# Patient Record
Sex: FEMALE | Race: WHITE | NOT HISPANIC OR LATINO | Employment: OTHER | URBAN - METROPOLITAN AREA
[De-identification: names, ages, dates, MRNs, and addresses within clinical notes are randomized per-mention and may not be internally consistent; named-entity substitution may affect disease eponyms.]

---

## 2017-10-05 PROBLEM — R13.10 DYSPHAGIA: Status: ACTIVE | Noted: 2017-10-05

## 2017-10-06 ENCOUNTER — OFFICE VISIT (OUTPATIENT)
Dept: DERMATOLOGY | Facility: CLINIC | Age: 67
End: 2017-10-06
Payer: MEDICARE

## 2017-10-06 VITALS — BODY MASS INDEX: 42.98 KG/M2 | WEIGHT: 235 LBS

## 2017-10-06 DIAGNOSIS — L30.4 INTERTRIGO: Primary | ICD-10-CM

## 2017-10-06 DIAGNOSIS — Z87.2 HISTORY OF ACTINIC KERATOSES: ICD-10-CM

## 2017-10-06 DIAGNOSIS — L82.1 SEBORRHEIC KERATOSES: ICD-10-CM

## 2017-10-06 DIAGNOSIS — L81.4 LENTIGINES: ICD-10-CM

## 2017-10-06 DIAGNOSIS — Z85.828 HISTORY OF SKIN CANCER: ICD-10-CM

## 2017-10-06 PROCEDURE — 99999 PR PBB SHADOW E&M-EST. PATIENT-LVL III: CPT | Mod: PBBFAC,,, | Performed by: DERMATOLOGY

## 2017-10-06 PROCEDURE — 99202 OFFICE O/P NEW SF 15 MIN: CPT | Mod: S$GLB,,, | Performed by: DERMATOLOGY

## 2017-10-06 RX ORDER — ECONAZOLE NITRATE 10 MG/G
CREAM TOPICAL
Qty: 85 G | Refills: 2 | Status: SHIPPED | OUTPATIENT
Start: 2017-10-06

## 2017-10-06 RX ORDER — ECONAZOLE NITRATE 10 MG/G
CREAM TOPICAL
Qty: 85 G | Refills: 2 | Status: SHIPPED | OUTPATIENT
Start: 2017-10-06 | End: 2017-10-06 | Stop reason: SDUPTHER

## 2017-10-06 NOTE — LETTER
October 6, 2017      Ratna Jefferson MD  [address] [facility] [address]           [place] - Dermatology  [address] [facility] [address]  Phone: [phone]  Fax: [phone]          Patient: Monique Trujillo   MR Number: [id number]   YOB: 1950   Date of Visit: 10/6/2017       Dear Dr. Ratna Jefferson:    Thank you for referring Monique Trujillo to me for evaluation. Attached you will find relevant portions of my assessment and plan of care.    If you have questions, please do not hesitate to call me. I look forward to following Monique Trujillo along with you.    Sincerely,    Loree Pro MD    Enclosure  CC:  No Recipients    If you would like to receive this communication electronically, please contact [email or web address] or [phone] to request more information on [facility] Link access.    For providers and/or their staff who would like to refer a patient to [facility], please contact us through our one-stop-shop provider referral line, [facility], at [phone].    If you feel you have received this communication in error or would no longer like to receive these types of communications, please e-mail [email or web address]

## 2017-10-06 NOTE — PROGRESS NOTES
"  Subjective:       Patient ID:  Monique Trujillo is a 66 y.o. female who presents for   Chief Complaint   Patient presents with    Skin Check     UBSE    Lesion     History of Present Illness: The patient presents with chief complaint of rash.  Location: chest and legs  Duration: months  Signs/Symptoms: itch    Prior treatments: see notes 2/17  -     triamcinolone acetonide 0.1% (KENALOG) 0.1 % cream; Pt has sterile jar.  Pt to mix TAC, lotrimin cream and Milk of Magnesia in 1:1:1 ratio. Apply bid prn  Dispense: 60 g; Refill: 3     The patient was given education and reassurance about this condition.  Pt has intertrigo and she was given rx for shake lotion.            Review of Systems   Constitutional: Negative for fever.   Skin: Positive for itching and rash.   Hematologic/Lymphatic: Does not bruise/bleed easily.        Objective:    Physical Exam   Constitutional: She appears well-developed and well-nourished. No distress.   Neurological: She is alert and oriented to person, place, and time. She is not disoriented.   Psychiatric: She has a normal mood and affect.   Skin:   Areas Examined (abnormalities noted in diagram):   Head / Face Inspection Performed  Neck Inspection Performed  Chest / Axilla Inspection Performed  Abdomen Inspection Performed  Back Inspection Performed  RUE Inspected  LUE Inspection Performed                   Diagram Legend        Pigmented verrucoid papule/plaque c/w seborrheic keratosis        Surgical scar with no sign of skin cancer recurrence       Erythematous eczematous patches and plaques         Assessment / Plan:        Intertrigo  -     econazole nitrate 1 % cream; Use bid for rash  Dispense: 85 g; Refill: 2     Zeasorb powder         hibiclens weekly in shower      Seborrheic keratoses  Brochure provided  reassurance      Lentigines  The "ABCD" rules to observe pigmented lesions were reviewed.  sunscreen    History of actinic keratoses    History of skin " cancer  Comments:  forehead years ago removed elsewhere             Return in about 1 year (around 10/6/2018).

## 2017-10-18 PROBLEM — M19.012 OSTEOARTHRITIS OF LEFT SHOULDER: Status: ACTIVE | Noted: 2017-10-18

## 2017-10-18 PROBLEM — M17.0 BILATERAL PRIMARY OSTEOARTHRITIS OF KNEE: Chronic | Status: ACTIVE | Noted: 2017-10-18

## 2017-12-14 PROBLEM — K25.9 MULTIPLE GASTRIC ULCERS: Status: ACTIVE | Noted: 2017-12-14

## 2018-02-08 ENCOUNTER — OFFICE VISIT (OUTPATIENT)
Dept: ORTHOPEDICS | Facility: CLINIC | Age: 68
End: 2018-02-08
Payer: MEDICARE

## 2018-02-08 VITALS — BODY MASS INDEX: 42.33 KG/M2 | HEIGHT: 62 IN | WEIGHT: 230 LBS

## 2018-02-08 DIAGNOSIS — M17.0 PRIMARY OSTEOARTHRITIS OF BOTH KNEES: Primary | ICD-10-CM

## 2018-02-08 PROCEDURE — 1125F AMNT PAIN NOTED PAIN PRSNT: CPT | Mod: S$GLB,,, | Performed by: ORTHOPAEDIC SURGERY

## 2018-02-08 PROCEDURE — 1159F MED LIST DOCD IN RCRD: CPT | Mod: S$GLB,,, | Performed by: ORTHOPAEDIC SURGERY

## 2018-02-08 PROCEDURE — 3008F BODY MASS INDEX DOCD: CPT | Mod: S$GLB,,, | Performed by: ORTHOPAEDIC SURGERY

## 2018-02-08 PROCEDURE — 99204 OFFICE O/P NEW MOD 45 MIN: CPT | Mod: S$GLB,,, | Performed by: ORTHOPAEDIC SURGERY

## 2018-02-08 PROCEDURE — 99999 PR PBB SHADOW E&M-EST. PATIENT-LVL II: CPT | Mod: PBBFAC,,, | Performed by: ORTHOPAEDIC SURGERY

## 2018-02-08 RX ORDER — SODIUM CHLORIDE 0.9 % (FLUSH) 0.9 %
3 SYRINGE (ML) INJECTION
Status: CANCELLED | OUTPATIENT
Start: 2018-02-08

## 2018-02-08 RX ORDER — MUPIROCIN 20 MG/G
OINTMENT TOPICAL
Status: CANCELLED | OUTPATIENT
Start: 2018-02-08

## 2018-02-08 NOTE — PROGRESS NOTES
Subjective:      Patient ID: Monique Trujillo is a 67 y.o. female.    Chief Complaint: Pain of the Right Knee    HPI     They have experienced problems with their bilateral knee (R>>L) over the past 5 years. The patient denies relevant history of injury/aggravation. Pain is located medially Associated symptoms include swelling, giving way and gelling. They have been treated with injections, over the counter analgesics, NSAIDS, physitherapy and activity modification.   Symptoms have recently worsened. Ambulation reportedly has been impaired. Self care ADLs are painful.     Review of Systems   Constitution: Negative for fever and weight loss.   HENT: Negative for congestion.    Eyes: Negative for visual disturbance.   Cardiovascular: Negative for chest pain.   Respiratory: Negative for shortness of breath.    Hematologic/Lymphatic: Negative for bleeding problem. Does not bruise/bleed easily.   Skin: Negative for poor wound healing.   Musculoskeletal: Positive for joint pain and joint swelling.   Gastrointestinal: Negative for abdominal pain.   Genitourinary: Negative for dysuria.   Neurological: Negative for seizures.   Psychiatric/Behavioral: Negative for altered mental status.   Allergic/Immunologic: Negative for persistent infections.         Objective:            Ortho/SPM Exam    Right Knee    There were no vitals filed for this visit.    The patient is not in acute distress.   Body habitus is obese.   The patient walks with a limp.  Resisted SLR negative.   The skin over the knee is intact.  Knee effusion positive fluid wave   Tendernes is located presentmedial  Range of motion- Flexion 110 deg, Extension 0 deg,   Ligament exam:   MCL 1+ laxity   Lachman intact              Post sag intact    LCL intact  Patellar apprehension negative.  Popliteal cyst negative  Patellar crepitation present.  Flexion/pinch negative.  Pulses DP present, PT present.  Motor normal 5/5 strength in all tested muscle groups.    Sensory normal.    Left Knee    This side is idententical to the contralateral     I reviewed the recent radiographic images.  Both knees have advanced loss of  With significant patellofemoral de        Assessment:       No diagnosis found. .        The right side is much more symptomatic.  The patient has had extensive nonsurgical care and has considered arthroplasty for some time.  She wishes to proceed at this time.    Plan:       There are no diagnoses linked to this encounter.    I explained my diagnostic impression and the reasoning behind it in detail, using layman's terms.  Models and/or pictures were used to help in the explanation.    Considering the patient's history, examination and imaging it is reasonable to proceed with arthroplasty at  This point..  The patient and I agree to defer treatment of the left knee and shoulder for now.        Treatment options were discussed. The surgical process of right knee replacement was discussed in detail with the patient including a detailed discussion of the procedure itself (including visual model, x-ray review, and literature review). The typical perioperative and post-operative course was discussed and perioperative risks were discussed to the patient's satisfaction.  Risks and complications discussed included but were not limited to the risks of anesthetic complications, infection, bleeding, wound healing complications, stiffness, aseptic loosening, instability, limb length inequality, neurologic dysfunction including numbness and weakness, additional surgery,  DVT, pulmonary embolism, perioperative medical risks (cardiac, pulmonary, renal, neurologic), and death and the patient elects to proceed.  The patient should get medically cleared and attend the joint seminar.

## 2018-02-08 NOTE — PROGRESS NOTES
Patient, Monique Trujillo (MRN #[id number]), presented with a recorded BMI of 42.07 kg/m^2 consistent with the definition of morbid obesity (ICD-10 E66.01). The patient's morbid obesity was monitored, evaluated, addressed and/or treated. This addendum to the medical record is made on 02/08/2018.     Preoperative weight loss was recommended during the office visit, and the reasons were explained.

## 2018-02-27 ENCOUNTER — OFFICE VISIT (OUTPATIENT)
Dept: ORTHOPEDICS | Facility: CLINIC | Age: 68
End: 2018-02-27
Payer: MEDICARE

## 2018-02-27 ENCOUNTER — LAB VISIT (OUTPATIENT)
Dept: LAB | Facility: HOSPITAL | Age: 68
End: 2018-02-27
Attending: NURSE PRACTITIONER
Payer: MEDICARE

## 2018-02-27 VITALS
TEMPERATURE: 98 F | BODY MASS INDEX: 42.33 KG/M2 | DIASTOLIC BLOOD PRESSURE: 62 MMHG | SYSTOLIC BLOOD PRESSURE: 98 MMHG | HEIGHT: 62 IN | WEIGHT: 230 LBS

## 2018-02-27 DIAGNOSIS — R30.0 DYSURIA: ICD-10-CM

## 2018-02-27 DIAGNOSIS — M17.11 PRIMARY OSTEOARTHRITIS OF RIGHT KNEE: Primary | ICD-10-CM

## 2018-02-27 LAB
BILIRUB UR QL STRIP: NEGATIVE
CLARITY UR: CLEAR
COLOR UR: YELLOW
GLUCOSE UR QL STRIP: NEGATIVE
HGB UR QL STRIP: NEGATIVE
KETONES UR QL STRIP: NEGATIVE
LEUKOCYTE ESTERASE UR QL STRIP: NEGATIVE
NITRITE UR QL STRIP: NEGATIVE
PH UR STRIP: 6 [PH] (ref 5–8)
PROT UR QL STRIP: NEGATIVE
SP GR UR STRIP: 1.01 (ref 1–1.03)
URN SPEC COLLECT METH UR: NORMAL
UROBILINOGEN UR STRIP-ACNC: NEGATIVE EU/DL

## 2018-02-27 PROCEDURE — 87086 URINE CULTURE/COLONY COUNT: CPT

## 2018-02-27 PROCEDURE — 99999 PR PBB SHADOW E&M-EST. PATIENT-LVL III: CPT | Mod: PBBFAC,,, | Performed by: ORTHOPAEDIC SURGERY

## 2018-02-27 PROCEDURE — 81003 URINALYSIS AUTO W/O SCOPE: CPT

## 2018-02-27 PROCEDURE — 99499 UNLISTED E&M SERVICE: CPT | Mod: S$GLB,,, | Performed by: ORTHOPAEDIC SURGERY

## 2018-02-27 NOTE — H&P
Subjective:      Patient ID: Monique Trujillo is a 67 y.o. female.    Chief Complaint: Pain of the Right Knee      Past Medical History:   Diagnosis Date    Allergy     Arthritis     Chronic lower back pain     Depression     Fever blister     GERD (gastroesophageal reflux disease)     Hemorrhoids, internal 11/5/2014    Hyperlipidemia     Hypertension     Joint pain     Morbid obesity with BMI of 40.0-44.9, adult 2/24/2015    Multiple gastric ulcers 12/14/2017    TIMMY (obstructive sleep apnea)     Skin cancer      Past Surgical History:   Procedure Laterality Date    BACK SURGERY  06/19/2015    COLONOSCOPY N/A 11/2/2016    Procedure: COLONOSCOPY;  Surgeon: Viji Dewitt MD;  Location: [facility];  Service: Endoscopy;  Laterality: N/A;    HIP SURGERY  04/28/2016    Right     KNEE ARTHROSCOPY W/ MENISCAL REPAIR Right 2012    NECK SURGERY      TUBAL LIGATION  1980     Social History     Occupational History     [employer]     Social History Main Topics    Smoking status: Never Smoker    Smokeless tobacco: Never Used    Alcohol use No    Drug use: No    Sexual activity: No      ROS  Current Outpatient Prescriptions on File Prior to Visit   Medication Sig Dispense Refill    amlodipine (NORVASC) 10 MG tablet TAKE 1 TABLET BY MOUTH EVERY DAY 90 tablet 3    ascorbic acid (VITAMIN C) 1000 MG tablet Take 1,000 mg by mouth once daily.      aspirin (ECOTRIN) 325 MG EC tablet Take 1 tablet (325 mg total) by mouth 2 (two) times daily. 60 tablet 0    atenolol (TENORMIN) 25 MG tablet TAKE 1 TABLET BY MOUTH EVERY DAY 90 tablet 3    benzonatate (TESSALON) 100 MG capsule Take 1 capsule (100 mg total) by mouth 3 (three) times daily as needed for Cough. 42 capsule 3    calcium-vitamin D3 (CALCIUM 500 + D) 500 mg(1,250mg) -200 unit per tablet Take 1 tablet by mouth 2 (two) times daily with meals.      citalopram (CELEXA) 40 MG tablet TAKE 1 TABLET BY MOUTH EVERY DAY 90 tablet 3    co-enzyme Q-10 30  "mg capsule Take 30 mg by mouth once daily.       econazole nitrate 1 % cream Use bid for rash 85 g 2    fish oil-omega-3 fatty acids 300-1,000 mg capsule Take 2 g by mouth once daily.      furosemide (LASIX) 40 MG tablet Take 1 tablet (40 mg total) by mouth daily as needed (edema). 90 tablet 3    gabapentin (NEURONTIN) 800 MG tablet Take 1 tablet (800 mg total) by mouth every evening. (Patient taking differently: Take 400 mg by mouth every evening. ) 30 tablet 9    GLUCOSA KHAN 2KCL/CHONDROITIN KHAN (GLUCOSAMINE SULF-CHONDROITIN ORAL) Take 1 tablet by mouth once daily.      multivitamin capsule Take 1 capsule by mouth once daily.      oxybutynin (DITROPAN XL) 15 MG TR24 Take 1 tablet (15 mg total) by mouth once daily. 90 tablet 3    pantoprazole (PROTONIX) 20 MG tablet TAKE 1 TABLET BY MOUTH EVERY DAY 90 tablet 0    potassium 99 mg Tab Take 99 mg by mouth once daily.      pravastatin (PRAVACHOL) 40 MG tablet TAKE 1 TABLET BY MOUTH EVERY DAY 90 tablet 33    triamcinolone acetonide 0.1% (KENALOG) 0.1 % cream Pt has sterile jar.  Pt to mix TAC, lotrimin cream and Milk of Magnesia in 1:1:1 ratio. Apply bid prn 60 g 3    TRILIPIX 135 mg CpDR Take 1 capsule (135 mg total) by mouth once daily. 90 capsule 3    vitamin E 400 UNIT capsule Take 400 Units by mouth once daily.       Current Facility-Administered Medications on File Prior to Visit   Medication Dose Route Frequency Provider Last Rate Last Dose    lidocaine (PF) 20 mg/mL (2 %) injection 2 mL  2 mL Intradermal Once Kalyan Han MD         Review of patient's allergies indicates:  No Known Allergies      Objective:      Vitals:    02/27/18 1400   BP: 98/62   Temp: 98.4 °F (36.9 °C)   Weight: 104.3 kg (230 lb)   Height: 5' 2" (1.575 m)     Overweight woman no acute distress.  Heart regular rate and rhythm  Chest clear  Abdomen nontender      Ortho Exam         The patient walks with a limp.  Resisted SLR negative.   The skin over the knee is " intact.  Knee effusion positive fluid wave   Tendernes is located presentmedial  Range of motion- Flexion 110 deg, Extension 0 deg,   Ligament exam:              MCL 1+ laxity              Lachman intact              Post sag intact              LCL intact  Patellar apprehension negative.  Popliteal cyst negative  Patellar crepitation present.  Flexion/pinch negative.  Pulses DP present, PT present.  Motor normal 5/5 strength in all tested muscle groups.   Sensory normal.  Assessment:       No diagnosis found.      Plan:       Treatment options were discussed. The surgical process of right knee replacement was discussed in detail with the patient including a detailed discussion of the procedure itself (including visual model, x-ray review, and literature review). The typical perioperative and post-operative course was discussed and perioperative risks were discussed to the patient's satisfaction.  Risks and complications discussed included but were not limited to the risks of anesthetic complications, infection, bleeding, wound healing complications, stiffness, aseptic loosening, instability, limb length inequality, neurologic dysfunction including numbness and weakness, additional surgery,  DVT, pulmonary embolism, perioperative medical risks (cardiac, pulmonary, renal, neurologic), and death and the patient elects to proceed.  The patient should get medically cleared and attend the joint seminar.

## 2018-02-28 LAB — BACTERIA UR CULT: NO GROWTH

## 2018-03-07 ENCOUNTER — HOSPITAL ENCOUNTER (OUTPATIENT)
Dept: PREADMISSION TESTING | Facility: HOSPITAL | Age: 68
Discharge: HOME | End: 2018-03-07
Attending: RADIOLOGY
Payer: MEDICARE

## 2018-03-07 ENCOUNTER — ANESTHESIA EVENT (OUTPATIENT)
Dept: SURGERY | Facility: HOSPITAL | Age: 68
DRG: 470 | End: 2018-03-07
Payer: MEDICARE

## 2018-03-07 ENCOUNTER — CLINICAL SUPPORT (OUTPATIENT)
Dept: LAB | Facility: HOSPITAL | Age: 68
End: 2018-03-07
Attending: RADIOLOGY
Payer: MEDICARE

## 2018-03-07 VITALS
SYSTOLIC BLOOD PRESSURE: 126 MMHG | WEIGHT: 230 LBS | RESPIRATION RATE: 16 BRPM | DIASTOLIC BLOOD PRESSURE: 71 MMHG | OXYGEN SATURATION: 99 % | HEIGHT: 62 IN | HEART RATE: 70 BPM | BODY MASS INDEX: 42.33 KG/M2

## 2018-03-07 DIAGNOSIS — Z01.818 PRE-OP EVALUATION: ICD-10-CM

## 2018-03-07 DIAGNOSIS — M85.89 OSTEOPENIA OF MULTIPLE SITES: Primary | ICD-10-CM

## 2018-03-07 DIAGNOSIS — G47.33 OSA (OBSTRUCTIVE SLEEP APNEA): ICD-10-CM

## 2018-03-07 DIAGNOSIS — M17.0 PRIMARY OSTEOARTHRITIS OF BOTH KNEES: ICD-10-CM

## 2018-03-07 PROCEDURE — 93005 ELECTROCARDIOGRAM TRACING: CPT

## 2018-03-07 RX ORDER — LIDOCAINE HYDROCHLORIDE 10 MG/ML
1 INJECTION, SOLUTION EPIDURAL; INFILTRATION; INTRACAUDAL; PERINEURAL ONCE
Status: CANCELLED | OUTPATIENT
Start: 2018-03-07 | End: 2018-03-07

## 2018-03-07 RX ORDER — SODIUM CHLORIDE, SODIUM LACTATE, POTASSIUM CHLORIDE, CALCIUM CHLORIDE 600; 310; 30; 20 MG/100ML; MG/100ML; MG/100ML; MG/100ML
INJECTION, SOLUTION INTRAVENOUS CONTINUOUS
Status: CANCELLED | OUTPATIENT
Start: 2018-03-07

## 2018-03-07 NOTE — ANESTHESIA PREPROCEDURE EVALUATION
03/07/2018  Monique Trujillo is a 67 y.o., female with TIMMY not using CPAP is scheduled for right TKA under spinal/reg/MAC/gen on 3/14/2018.    PCP H&P and clearance in [facility] on 3/09/2018.      Past Surgical History:   Procedure Laterality Date    ANTERIOR CERVICAL CORPECTOMY W/ FUSION  1988    COLONOSCOPY N/A 11/2/2016    Procedure: COLONOSCOPY;  Surgeon: Viji Dewitt MD;  Location[facility];  Service: Endoscopy;  Laterality: N/A;    KNEE ARTHROSCOPY W/ MENISCAL REPAIR Right 2012    LUMBAR LAMINECTOMY  06/19/2015    SKIN CANCER EXCISION      forehead does not recall date    TOTAL HIP ARTHROPLASTY Right 04/28/2016         TUBAL LIGATION  1980     BP Readings from Last 3 Encounters:   03/09/18 (!) 149/70   03/07/18 126/71   02/27/18 98/62       Anesthesia Evaluation    I have reviewed the Patient Summary Reports.    I have reviewed the Nursing Notes.   I have reviewed the Medications.   Steroids Taken In Past Year: Cortisone    Review of Systems  Anesthesia Hx:  No problems with previous Anesthesia  History of prior surgery of interest to airway management or planning: cervical fusion. Previous anesthesia: MAC, General  Procedure performed at an [facility] Facility.    with MAC.  Procedure performed at an [facility] Facility. Denies Family Hx of Anesthesia complications.   Denies Personal Hx of Anesthesia complications.   Social:  Non-Smoker, No Alcohol Use    Hematology/Oncology:  Hematology Normal      Current/Recent Cancer. (skiin cancer excision, forehead) Oncology Comments:       EENT/Dental:EENT/Dental Normal   Cardiovascular:   Hypertension, well controlled Denies Dysrhythmias.   Denies Angina.     hyperlipidemia     ECG has been reviewed.  Functional Capacity 3 METS    Pulmonary:   Denies Shortness of breath. Sleep Apnea (does not use CPAP)    Renal/:  Renal/ Normal     Hepatic/GI:   PUD,  GERD, well controlled  Esophageal / Stomach Disorders Gerd, Peptic Ulcer Disease (states much improved with protonix), in past Controlled by chronic antireflux medication.    Musculoskeletal:   Arthritis  Right knee pain Spine Disorders: lumbar Degenerative disease    Neurological:  Neurology Normal    Endocrine:  Endocrine Normal    Dermatological:  Skin Normal    Psych:   depression          Physical Exam  General:  Morbid Obesity    Airway/Jaw/Neck:  Airway Findings: Mouth Opening: Normal Tongue: Normal  General Airway Assessment: Adult  Mallampati: I  TM Distance: Normal, at least 6 cm  Jaw/Neck Findings:     Neck ROM: Normal ROM  Neck Findings:  Girth Increased, Short Neck     Eyes/Ears/Nose:  EYES/EARS/NOSE FINDINGS: Normal   Dental:  Dental Findings: In tact, Periodontal disease, Severe    Chest/Lungs:  Chest/Lungs Clear    Heart/Vascular:  Heart Findings: Normal Heart murmur: negative    Abdomen:  Abdomen Findings: Normal    Musculoskeletal:  Musculoskeletal Findings: (right knee) Tender Joint     Mental Status:  Mental Status Findings: Normal      2D Echo w/CFD 10/2017  CONCLUSIONS     1 - Normal left ventricular systolic function (EF 55-60%).     2 - Normal right ventricular systolic function .     3 - Indeterminate LV diastolic function.     4 - Mild mitral regurgitation.     5 - Pulmonary hypertension. The estimated PA systolic pressure is 42 mmHg.     6 - Mild tricuspid regurgitation.   This document has been electronically    SIGNED BY: Gerry King MD On: 10/19/2017 09:24    Anesthesia Plan  Type of Anesthesia, risks & benefits discussed:  Anesthesia Type:  MAC, general, regional, spinal  Patient's Preference:   Intra-op Monitoring Plan: standard ASA monitors  Intra-op Monitoring Plan Comments:   Post Op Pain Control Plan: multimodal analgesia and peripheral nerve block  Post Op Pain Control Plan Comments:   Induction:   IV  Beta Blocker:         Informed Consent: Patient understands risks and agrees  with Anesthesia plan.  Questions answered.   ASA Score: 3     Day of Surgery Review of History & Physical:  There are no significant changes.      Anesthesia Plan Notes: Anesthesia consent will be obtained prior to procedure on 3/14/2018.    PCP H&P and clearance in [facility] on 3/09/2018.               Ready For Surgery From Anesthesia Perspective.

## 2018-03-07 NOTE — DISCHARGE INSTRUCTIONS
Your surgery is scheduled for 3/14/18.    Please report to Outpatient Surgery Intake Office on the 2nd FLOOR at *6:00 a.m.          INSTRUCTIONS IMPORTANT!!!  ¨ Do not eat or drink after 12 midnight-including water. OK to brush teeth, no   gum, candy or mints!    ¨ Take only these medicines with a small swallow of water-morning of surgery: protonix        ____  Proceed to [facility] on 3/7/18 for additional blood test.        ____  Do not wear makeup, including mascara.  ____  No powder, lotions or creams to surgical area.  ____  Please remove all jewelry, including piercings and leave at home.  ____  No money or valuables needed. Please leave at home.  ____  Please bring any documents given by your doctor.  ____  If going home the same day, arrange for a ride home. You will not be able to             drive if Anesthesia was used.  ____  Wear loose fitting clothing. Allow for dressings, bandages.  ____  Stop Aspirin, Ibuprofen, Motrin and Aleve at least 3-5 days before surgery, unless otherwise instructed by your doctor, or the nurse.   You MAY use Tylenol/acetaminophen until day of surgery.  ____  Wash the surgical area with Hibiclens the night before surgery, and again the             morning of surgery.  Be sure to rinse hibiclens off completely (if instructed by   nurse).  ____  If you take diabetic medication, do not take am of surgery unless instructed by Doctor.  ____  Call MD for temperature above 101 degrees.  ____ Stop taking any Fish Oil supplement or any Vitamins that contain Vitamin E at least 5 days prior to surgery.  ____ Do Not wear your contact lenses the day of your procedure.  You may wear your glasses.        I have read or had read and explained to me, and understand the above information.  Additional comments or instructions:  For additional questions call [phone]      Pre-Op Bathing Instructions    Before surgery, you can play an important role in your own health.    Because  skin is not sterile, we need to be sure that your skin is as free of germs as possible. By following the instructions below, you can reduce the number of germs on your skin before surgery.    IMPORTANT: You will need to shower with a special soap called Hibiclens*, available at any pharmacy.  If you are allergic to Chlorhexidine (the antiseptic in Hibiclens), use an antibacterial soap such as Dial Soap for your preoperative shower.  You will shower with Hibiclens both the night before your surgery and the morning of your surgery.  Do not use Hibiclens on the head, face or genitals to avoid injury to those areas.    STEP #1: THE NIGHT BEFORE YOUR SURGERY     1. Do not shave the area of your body where your surgery will be performed.  2. Shower and wash your hair and body as usual with your normal soap and shampoo.  3. Rinse your hair and body thoroughly after you shower to remove all soap residue.  4. With your hand, apply one packet of Hibiclens soap to the surgical site.   5. Wash the site gently for five (5) minutes. Do not scrub your skin too hard.   6. Do not wash with your regular soap after Hibiclens is used.  7. Rinse your body thoroughly.  8. Pat yourself dry with a clean, soft towel.  9. Do not use lotion, cream, or powder.  10. Wear clean clothes.    STEP #2: THE MORNING OF YOUR SURGERY     1. Repeat Step #1.    * Not to be used by people allergic to Chlorhexidine.        Total Knee Replacement  During total knee replacement surgery, your damaged knee joint is replaced with an artificial joint, called a prosthesis. This surgery almost always reduces joint pain and improves your quality of life.     The parts of the prosthesis are secured to the bones of the knee. Together they form the new joint.   Before your surgery  You will most likely arrive at the hospital on the morning of the surgery. Be sure to follow all of your doctors instructions on preparing for surgery:  · Follow any directions you are  given for taking medicines or for not eating or drinking before surgery.  · At the hospital, your temperature, pulse, breathing, and blood pressure will be checked.  · An IV (intravenous) line will be started to provide fluids and medicines needed during surgery.  The surgical procedure  When the surgical team is ready, youll be taken to the operating room. There youll be given anesthesia to help you sleep through surgery, or to make you numb from the waist down. Then an incision is made on the front or side of your knee. Any damaged bone is cleaned away, and the new joint components are put into place. The incision is closed with surgical staples or stitches.  After your surgery  After surgery, youll be sent to the recovery room. When you are fully awake, youll be moved to your room. The nurses will give you medicines to ease your pain. You may have a catheter (small tube) in your bladder. A continuous passive motion machine may be used on your knee to keep it from getting stiff. A sequential compression machine may be used to prevent blood clots by gently squeezing then releasing your leg. You may be given medicine to prevent blood clots. Soon, healthcare providers will help you get up and moving.  When to call your doctor  Once at home, call your doctor if you have any of the symptoms below:  · An increase in knee pain  · Pain or swelling in the calf or leg  · Unusual redness, heat, or drainage at the incision site  · Fever of 100.4°F (38°C) or higher  · Shaking chills  · Trouble breathing or chest pain (call 911)   Date Last Reviewed: 9/20/2015  © 4514-2457 [employer]. [address] [place] [address], [place] [address]. All rights reserved. This information is not intended as a substitute for professional medical care. Always follow your healthcare professional's instructions.

## 2018-03-07 NOTE — PRE-PROCEDURE INSTRUCTIONS
Jessie Zarate  [phone]    Allergies, medical, surgical, family and psychosocial histories reviewed with patient. Periop plan of care reviewed. Preop instructions given, including medications to take and to hold. Time allotted for questions to be addressed.  Patient verbalized understanding.

## 2018-03-14 ENCOUNTER — HOSPITAL ENCOUNTER (INPATIENT)
Facility: HOSPITAL | Age: 68
LOS: 1 days | Discharge: HOME HEALTH | DRG: 470 | End: 2018-03-15
Attending: ORTHOPAEDIC SURGERY | Admitting: ORTHOPAEDIC SURGERY
Payer: MEDICARE

## 2018-03-14 ENCOUNTER — ANESTHESIA (OUTPATIENT)
Dept: SURGERY | Facility: HOSPITAL | Age: 68
DRG: 470 | End: 2018-03-14
Payer: MEDICARE

## 2018-03-14 DIAGNOSIS — M17.0 PRIMARY OSTEOARTHRITIS OF BOTH KNEES: ICD-10-CM

## 2018-03-14 PROCEDURE — 37000008 HC ANESTHESIA 1ST 15 MINUTES: Performed by: ORTHOPAEDIC SURGERY

## 2018-03-14 PROCEDURE — 25000003 PHARM REV CODE 250: Performed by: ANESTHESIOLOGY

## 2018-03-14 PROCEDURE — 0SRC0J9 REPLACEMENT OF RIGHT KNEE JOINT WITH SYNTHETIC SUBSTITUTE, CEMENTED, OPEN APPROACH: ICD-10-PCS | Performed by: ORTHOPAEDIC SURGERY

## 2018-03-14 PROCEDURE — 94761 N-INVAS EAR/PLS OXIMETRY MLT: CPT

## 2018-03-14 PROCEDURE — 63600175 PHARM REV CODE 636 W HCPCS: Performed by: ORTHOPAEDIC SURGERY

## 2018-03-14 PROCEDURE — 71000039 HC RECOVERY, EACH ADD'L HOUR: Performed by: ORTHOPAEDIC SURGERY

## 2018-03-14 PROCEDURE — 97116 GAIT TRAINING THERAPY: CPT

## 2018-03-14 PROCEDURE — C1713 ANCHOR/SCREW BN/BN,TIS/BN: HCPCS | Performed by: ORTHOPAEDIC SURGERY

## 2018-03-14 PROCEDURE — C1776 JOINT DEVICE (IMPLANTABLE): HCPCS | Performed by: ORTHOPAEDIC SURGERY

## 2018-03-14 PROCEDURE — 25000003 PHARM REV CODE 250: Performed by: ORTHOPAEDIC SURGERY

## 2018-03-14 PROCEDURE — 27201423 OPTIME MED/SURG SUP & DEVICES STERILE SUPPLY: Performed by: ORTHOPAEDIC SURGERY

## 2018-03-14 PROCEDURE — 63600175 PHARM REV CODE 636 W HCPCS: Performed by: ANESTHESIOLOGY

## 2018-03-14 PROCEDURE — S0020 INJECTION, BUPIVICAINE HYDRO: HCPCS | Performed by: ANESTHESIOLOGY

## 2018-03-14 PROCEDURE — 36000710: Performed by: ORTHOPAEDIC SURGERY

## 2018-03-14 PROCEDURE — 27447 TOTAL KNEE ARTHROPLASTY: CPT | Mod: RT,,, | Performed by: ORTHOPAEDIC SURGERY

## 2018-03-14 PROCEDURE — 27447 TOTAL KNEE ARTHROPLASTY: CPT | Mod: AS,RT,, | Performed by: ORTHOPAEDIC SURGERY

## 2018-03-14 PROCEDURE — 36000711: Performed by: ORTHOPAEDIC SURGERY

## 2018-03-14 PROCEDURE — 97161 PT EVAL LOW COMPLEX 20 MIN: CPT

## 2018-03-14 PROCEDURE — G8978 MOBILITY CURRENT STATUS: HCPCS | Mod: CK

## 2018-03-14 PROCEDURE — 11000001 HC ACUTE MED/SURG PRIVATE ROOM

## 2018-03-14 PROCEDURE — 71000033 HC RECOVERY, INTIAL HOUR: Performed by: ORTHOPAEDIC SURGERY

## 2018-03-14 PROCEDURE — 97165 OT EVAL LOW COMPLEX 30 MIN: CPT

## 2018-03-14 PROCEDURE — G8979 MOBILITY GOAL STATUS: HCPCS | Mod: CJ

## 2018-03-14 PROCEDURE — 37000009 HC ANESTHESIA EA ADD 15 MINS: Performed by: ORTHOPAEDIC SURGERY

## 2018-03-14 PROCEDURE — 27200688 HC TRAY, SPINAL-HYPER/ ISOBARIC: Performed by: ANESTHESIOLOGY

## 2018-03-14 PROCEDURE — 25000003 PHARM REV CODE 250: Performed by: NURSE PRACTITIONER

## 2018-03-14 DEVICE — TRAY TIBIAL CEMENTED 2.5 COBAL: Type: IMPLANTABLE DEVICE | Site: KNEE | Status: FUNCTIONAL

## 2018-03-14 DEVICE — CEMENT BONE IMPLANT: Type: IMPLANTABLE DEVICE | Site: KNEE | Status: FUNCTIONAL

## 2018-03-14 RX ORDER — ONDANSETRON 4 MG/1
4 TABLET, ORALLY DISINTEGRATING ORAL EVERY 6 HOURS PRN
Status: DISCONTINUED | OUTPATIENT
Start: 2018-03-14 | End: 2018-03-15 | Stop reason: HOSPADM

## 2018-03-14 RX ORDER — POLYETHYLENE GLYCOL 3350 17 G/17G
17 POWDER, FOR SOLUTION ORAL DAILY
Status: DISCONTINUED | OUTPATIENT
Start: 2018-03-14 | End: 2018-03-15 | Stop reason: HOSPADM

## 2018-03-14 RX ORDER — DEXAMETHASONE SODIUM PHOSPHATE 4 MG/ML
INJECTION, SOLUTION INTRA-ARTICULAR; INTRALESIONAL; INTRAMUSCULAR; INTRAVENOUS; SOFT TISSUE
Status: DISCONTINUED | OUTPATIENT
Start: 2018-03-14 | End: 2018-03-14

## 2018-03-14 RX ORDER — SODIUM CHLORIDE 0.9 % (FLUSH) 0.9 %
3 SYRINGE (ML) INJECTION
Status: DISCONTINUED | OUTPATIENT
Start: 2018-03-14 | End: 2018-03-14

## 2018-03-14 RX ORDER — OXYCODONE AND ACETAMINOPHEN 5; 325 MG/1; MG/1
1 TABLET ORAL
Status: DISCONTINUED | OUTPATIENT
Start: 2018-03-14 | End: 2018-03-14 | Stop reason: HOSPADM

## 2018-03-14 RX ORDER — ACETAMINOPHEN 500 MG
1000 TABLET ORAL 3 TIMES DAILY
Status: DISCONTINUED | OUTPATIENT
Start: 2018-03-14 | End: 2018-03-15 | Stop reason: HOSPADM

## 2018-03-14 RX ORDER — CEFAZOLIN SODIUM 2 G/50ML
2 SOLUTION INTRAVENOUS
Status: COMPLETED | OUTPATIENT
Start: 2018-03-14 | End: 2018-03-15

## 2018-03-14 RX ORDER — OXYCODONE HCL 10 MG/1
10 TABLET, FILM COATED, EXTENDED RELEASE ORAL EVERY 12 HOURS
Status: DISCONTINUED | OUTPATIENT
Start: 2018-03-14 | End: 2018-03-15 | Stop reason: HOSPADM

## 2018-03-14 RX ORDER — OXYBUTYNIN CHLORIDE 5 MG/1
15 TABLET, EXTENDED RELEASE ORAL DAILY
Status: DISCONTINUED | OUTPATIENT
Start: 2018-03-14 | End: 2018-03-15 | Stop reason: HOSPADM

## 2018-03-14 RX ORDER — TRANEXAMIC ACID 100 MG/ML
INJECTION, SOLUTION INTRAVENOUS
Status: DISCONTINUED | OUTPATIENT
Start: 2018-03-14 | End: 2018-03-14 | Stop reason: HOSPADM

## 2018-03-14 RX ORDER — PANTOPRAZOLE SODIUM 20 MG/1
20 TABLET, DELAYED RELEASE ORAL DAILY
Status: DISCONTINUED | OUTPATIENT
Start: 2018-03-14 | End: 2018-03-15 | Stop reason: HOSPADM

## 2018-03-14 RX ORDER — SODIUM CHLORIDE, SODIUM LACTATE, POTASSIUM CHLORIDE, CALCIUM CHLORIDE 600; 310; 30; 20 MG/100ML; MG/100ML; MG/100ML; MG/100ML
INJECTION, SOLUTION INTRAVENOUS CONTINUOUS
Status: DISCONTINUED | OUTPATIENT
Start: 2018-03-14 | End: 2018-03-14

## 2018-03-14 RX ORDER — LIDOCAINE HCL/PF 100 MG/5ML
SYRINGE (ML) INTRAVENOUS
Status: DISCONTINUED | OUTPATIENT
Start: 2018-03-14 | End: 2018-03-14

## 2018-03-14 RX ORDER — OXYCODONE HYDROCHLORIDE 5 MG/1
10 TABLET ORAL EVERY 6 HOURS PRN
Status: DISCONTINUED | OUTPATIENT
Start: 2018-03-14 | End: 2018-03-15 | Stop reason: HOSPADM

## 2018-03-14 RX ORDER — SODIUM CHLORIDE, SODIUM LACTATE, POTASSIUM CHLORIDE, CALCIUM CHLORIDE 600; 310; 30; 20 MG/100ML; MG/100ML; MG/100ML; MG/100ML
INJECTION, SOLUTION INTRAVENOUS CONTINUOUS PRN
Status: DISCONTINUED | OUTPATIENT
Start: 2018-03-14 | End: 2018-03-14

## 2018-03-14 RX ORDER — FUROSEMIDE 40 MG/1
40 TABLET ORAL DAILY PRN
Status: DISCONTINUED | OUTPATIENT
Start: 2018-03-14 | End: 2018-03-15 | Stop reason: HOSPADM

## 2018-03-14 RX ORDER — HYDROMORPHONE HYDROCHLORIDE 2 MG/ML
0.2 INJECTION, SOLUTION INTRAMUSCULAR; INTRAVENOUS; SUBCUTANEOUS EVERY 5 MIN PRN
Status: DISCONTINUED | OUTPATIENT
Start: 2018-03-14 | End: 2018-03-14 | Stop reason: HOSPADM

## 2018-03-14 RX ORDER — DIPHENHYDRAMINE HYDROCHLORIDE 50 MG/ML
25 INJECTION INTRAMUSCULAR; INTRAVENOUS EVERY 6 HOURS PRN
Status: DISCONTINUED | OUTPATIENT
Start: 2018-03-14 | End: 2018-03-14 | Stop reason: HOSPADM

## 2018-03-14 RX ORDER — PROPOFOL 10 MG/ML
INJECTION, EMULSION INTRAVENOUS CONTINUOUS PRN
Status: DISCONTINUED | OUTPATIENT
Start: 2018-03-14 | End: 2018-03-14

## 2018-03-14 RX ORDER — OXYCODONE HYDROCHLORIDE 5 MG/1
5 TABLET ORAL
Status: DISCONTINUED | OUTPATIENT
Start: 2018-03-14 | End: 2018-03-15 | Stop reason: HOSPADM

## 2018-03-14 RX ORDER — ONDANSETRON 2 MG/ML
INJECTION INTRAMUSCULAR; INTRAVENOUS
Status: DISCONTINUED | OUTPATIENT
Start: 2018-03-14 | End: 2018-03-14

## 2018-03-14 RX ORDER — SODIUM CHLORIDE 0.9 % (FLUSH) 0.9 %
3 SYRINGE (ML) INJECTION EVERY 8 HOURS
Status: DISCONTINUED | OUTPATIENT
Start: 2018-03-14 | End: 2018-03-14 | Stop reason: HOSPADM

## 2018-03-14 RX ORDER — FENOFIBRIC ACID 135 MG/1
135 CAPSULE, DELAYED RELEASE ORAL DAILY
Status: DISCONTINUED | OUTPATIENT
Start: 2018-03-14 | End: 2018-03-15

## 2018-03-14 RX ORDER — HYDROMORPHONE HYDROCHLORIDE 2 MG/ML
0.5 INJECTION, SOLUTION INTRAMUSCULAR; INTRAVENOUS; SUBCUTANEOUS EVERY 6 HOURS PRN
Status: DISCONTINUED | OUTPATIENT
Start: 2018-03-14 | End: 2018-03-15 | Stop reason: HOSPADM

## 2018-03-14 RX ORDER — MUPIROCIN 20 MG/G
1 OINTMENT TOPICAL 2 TIMES DAILY
Status: DISCONTINUED | OUTPATIENT
Start: 2018-03-14 | End: 2018-03-15 | Stop reason: HOSPADM

## 2018-03-14 RX ORDER — LACTULOSE 10 G/15ML
20 SOLUTION ORAL EVERY 6 HOURS PRN
Status: DISCONTINUED | OUTPATIENT
Start: 2018-03-14 | End: 2018-03-15 | Stop reason: HOSPADM

## 2018-03-14 RX ORDER — BISACODYL 10 MG
10 SUPPOSITORY, RECTAL RECTAL DAILY PRN
Status: DISCONTINUED | OUTPATIENT
Start: 2018-03-14 | End: 2018-03-15 | Stop reason: HOSPADM

## 2018-03-14 RX ORDER — LIDOCAINE HYDROCHLORIDE 10 MG/ML
1 INJECTION, SOLUTION EPIDURAL; INFILTRATION; INTRACAUDAL; PERINEURAL ONCE
Status: DISCONTINUED | OUTPATIENT
Start: 2018-03-14 | End: 2018-03-14 | Stop reason: HOSPADM

## 2018-03-14 RX ORDER — MEPERIDINE HYDROCHLORIDE 50 MG/ML
12.5 INJECTION INTRAMUSCULAR; INTRAVENOUS; SUBCUTANEOUS EVERY 10 MIN PRN
Status: DISCONTINUED | OUTPATIENT
Start: 2018-03-14 | End: 2018-03-14 | Stop reason: HOSPADM

## 2018-03-14 RX ORDER — TRANEXAMIC ACID 100 MG/ML
INJECTION, SOLUTION INTRAVENOUS
Status: DISCONTINUED | OUTPATIENT
Start: 2018-03-14 | End: 2018-03-14

## 2018-03-14 RX ORDER — SODIUM CHLORIDE 0.9 % (FLUSH) 0.9 %
5 SYRINGE (ML) INJECTION
Status: DISCONTINUED | OUTPATIENT
Start: 2018-03-14 | End: 2018-03-15 | Stop reason: HOSPADM

## 2018-03-14 RX ORDER — SODIUM CHLORIDE 0.9 % (FLUSH) 0.9 %
3 SYRINGE (ML) INJECTION
Status: DISCONTINUED | OUTPATIENT
Start: 2018-03-14 | End: 2018-03-14 | Stop reason: HOSPADM

## 2018-03-14 RX ORDER — GABAPENTIN 400 MG/1
400 CAPSULE ORAL NIGHTLY
Status: DISCONTINUED | OUTPATIENT
Start: 2018-03-14 | End: 2018-03-15 | Stop reason: HOSPADM

## 2018-03-14 RX ORDER — ONDANSETRON 2 MG/ML
4 INJECTION INTRAMUSCULAR; INTRAVENOUS EVERY 6 HOURS PRN
Status: DISCONTINUED | OUTPATIENT
Start: 2018-03-14 | End: 2018-03-15 | Stop reason: HOSPADM

## 2018-03-14 RX ORDER — ATENOLOL 25 MG/1
25 TABLET ORAL DAILY
Status: DISCONTINUED | OUTPATIENT
Start: 2018-03-14 | End: 2018-03-15 | Stop reason: HOSPADM

## 2018-03-14 RX ORDER — CITALOPRAM 20 MG/1
40 TABLET, FILM COATED ORAL DAILY
Status: DISCONTINUED | OUTPATIENT
Start: 2018-03-14 | End: 2018-03-15 | Stop reason: HOSPADM

## 2018-03-14 RX ORDER — AMLODIPINE BESYLATE 5 MG/1
10 TABLET ORAL DAILY
Status: DISCONTINUED | OUTPATIENT
Start: 2018-03-14 | End: 2018-03-15 | Stop reason: HOSPADM

## 2018-03-14 RX ORDER — AMOXICILLIN 250 MG
1 CAPSULE ORAL 2 TIMES DAILY
Status: DISCONTINUED | OUTPATIENT
Start: 2018-03-14 | End: 2018-03-15 | Stop reason: HOSPADM

## 2018-03-14 RX ORDER — SODIUM CHLORIDE 9 MG/ML
INJECTION, SOLUTION INTRAVENOUS CONTINUOUS
Status: DISCONTINUED | OUTPATIENT
Start: 2018-03-14 | End: 2018-03-15 | Stop reason: HOSPADM

## 2018-03-14 RX ORDER — MUPIROCIN 20 MG/G
OINTMENT TOPICAL
Status: DISCONTINUED | OUTPATIENT
Start: 2018-03-14 | End: 2018-03-14 | Stop reason: HOSPADM

## 2018-03-14 RX ORDER — PROPOFOL 10 MG/ML
INJECTION, EMULSION INTRAVENOUS
Status: DISCONTINUED | OUTPATIENT
Start: 2018-03-14 | End: 2018-03-14

## 2018-03-14 RX ORDER — HYDROMORPHONE HYDROCHLORIDE 2 MG/ML
0.5 INJECTION, SOLUTION INTRAMUSCULAR; INTRAVENOUS; SUBCUTANEOUS EVERY 5 MIN PRN
Status: DISCONTINUED | OUTPATIENT
Start: 2018-03-14 | End: 2018-03-14 | Stop reason: HOSPADM

## 2018-03-14 RX ORDER — FAMOTIDINE 20 MG/1
20 TABLET, FILM COATED ORAL 2 TIMES DAILY
Status: DISCONTINUED | OUTPATIENT
Start: 2018-03-14 | End: 2018-03-14

## 2018-03-14 RX ORDER — BUPIVACAINE HYDROCHLORIDE 5 MG/ML
INJECTION, SOLUTION EPIDURAL; INTRACAUDAL
Status: DISCONTINUED | OUTPATIENT
Start: 2018-03-14 | End: 2018-03-14

## 2018-03-14 RX ORDER — PRAVASTATIN SODIUM 20 MG/1
40 TABLET ORAL DAILY
Status: DISCONTINUED | OUTPATIENT
Start: 2018-03-14 | End: 2018-03-15 | Stop reason: HOSPADM

## 2018-03-14 RX ORDER — ASPIRIN 325 MG
325 TABLET ORAL DAILY
Status: DISCONTINUED | OUTPATIENT
Start: 2018-03-14 | End: 2018-03-15 | Stop reason: HOSPADM

## 2018-03-14 RX ADMIN — MUPIROCIN 1 G: 20 OINTMENT TOPICAL at 01:03

## 2018-03-14 RX ADMIN — POLYETHYLENE GLYCOL 3350 17 G: 17 POWDER, FOR SOLUTION ORAL at 04:03

## 2018-03-14 RX ADMIN — OXYCODONE HYDROCHLORIDE 10 MG: 10 TABLET, FILM COATED, EXTENDED RELEASE ORAL at 08:03

## 2018-03-14 RX ADMIN — DEXAMETHASONE SODIUM PHOSPHATE 4 MG: 4 INJECTION, SOLUTION INTRAMUSCULAR; INTRAVENOUS at 08:03

## 2018-03-14 RX ADMIN — OXYCODONE HYDROCHLORIDE 10 MG: 5 TABLET ORAL at 03:03

## 2018-03-14 RX ADMIN — PRAVASTATIN SODIUM 40 MG: 20 TABLET ORAL at 04:03

## 2018-03-14 RX ADMIN — PROPOFOL 20 MG: 10 INJECTION, EMULSION INTRAVENOUS at 08:03

## 2018-03-14 RX ADMIN — GABAPENTIN 400 MG: 400 CAPSULE ORAL at 08:03

## 2018-03-14 RX ADMIN — DEXTROSE 2 G: 50 INJECTION, SOLUTION INTRAVENOUS at 08:03

## 2018-03-14 RX ADMIN — CEFAZOLIN SODIUM 2 G: 2 SOLUTION INTRAVENOUS at 04:03

## 2018-03-14 RX ADMIN — OXYBUTYNIN CHLORIDE 15 MG: 5 TABLET, EXTENDED RELEASE ORAL at 04:03

## 2018-03-14 RX ADMIN — LIDOCAINE HYDROCHLORIDE 100 MG: 20 INJECTION, SOLUTION INTRAVENOUS at 08:03

## 2018-03-14 RX ADMIN — ATENOLOL 25 MG: 25 TABLET ORAL at 04:03

## 2018-03-14 RX ADMIN — PANTOPRAZOLE SODIUM 20 MG: 20 TABLET, DELAYED RELEASE ORAL at 04:03

## 2018-03-14 RX ADMIN — TRANEXAMIC ACID 1000 MG: 100 INJECTION, SOLUTION INTRAVENOUS at 08:03

## 2018-03-14 RX ADMIN — PROPOFOL 100 MCG/KG/MIN: 10 INJECTION, EMULSION INTRAVENOUS at 08:03

## 2018-03-14 RX ADMIN — STANDARDIZED SENNA CONCENTRATE AND DOCUSATE SODIUM 1 TABLET: 8.6; 5 TABLET, FILM COATED ORAL at 08:03

## 2018-03-14 RX ADMIN — MUPIROCIN 1 G: 20 OINTMENT TOPICAL at 08:03

## 2018-03-14 RX ADMIN — CITALOPRAM HYDROBROMIDE 40 MG: 20 TABLET ORAL at 04:03

## 2018-03-14 RX ADMIN — ACETAMINOPHEN 1000 MG: 500 TABLET ORAL at 08:03

## 2018-03-14 RX ADMIN — ACETAMINOPHEN 1000 MG: 500 TABLET ORAL at 04:03

## 2018-03-14 RX ADMIN — AMLODIPINE BESYLATE 10 MG: 5 TABLET ORAL at 04:03

## 2018-03-14 RX ADMIN — ONDANSETRON 4 MG: 2 INJECTION, SOLUTION INTRAMUSCULAR; INTRAVENOUS at 08:03

## 2018-03-14 RX ADMIN — SODIUM CHLORIDE, POTASSIUM CHLORIDE, SODIUM LACTATE AND CALCIUM CHLORIDE 10 ML/HR: 600; 310; 30; 20 INJECTION, SOLUTION INTRAVENOUS at 07:03

## 2018-03-14 RX ADMIN — BUPIVACAINE HYDROCHLORIDE 2.4 ML: 5 INJECTION, SOLUTION EPIDURAL; INTRACAUDAL; PERINEURAL at 08:03

## 2018-03-14 RX ADMIN — HYDROMORPHONE HYDROCHLORIDE 0.5 MG: 2 INJECTION INTRAMUSCULAR; INTRAVENOUS; SUBCUTANEOUS at 10:03

## 2018-03-14 RX ADMIN — ASPIRIN 325 MG ORAL TABLET 325 MG: 325 PILL ORAL at 04:03

## 2018-03-14 RX ADMIN — SODIUM CHLORIDE: 0.9 INJECTION, SOLUTION INTRAVENOUS at 04:03

## 2018-03-14 RX ADMIN — SODIUM CHLORIDE, SODIUM LACTATE, POTASSIUM CHLORIDE, AND CALCIUM CHLORIDE: .6; .31; .03; .02 INJECTION, SOLUTION INTRAVENOUS at 07:03

## 2018-03-14 NOTE — PROGRESS NOTES
"   03/14/18 1525   Vital Signs   Temp 97.5 °F (36.4 °C)   Temp src Oral   Pulse 74   Heart Rate Source NIBP   Resp 18   SpO2 95 %   O2 Device (Oxygen Therapy) room air   BP (!) 152/79   MAP (mmHg) 108   Height and Weight   Height 5' 2" (1.575 m)   Height Method Stated   Weight 101 kg (222 lb 9.6 oz)   Weight Method Bed Scale   BSA (Calculated - sq m) 2.1 sq meters   BMI (Calculated) 40.8   Weight in (lb) to have BMI = 25 136.4       Patient arrived to room from surgery, voided in bathroom.  IV fluids and antibiotics infusing.  Pain medication given per MAR.  Orders released.  PT came to work with patient.  Bed alarm on. Instructed patient to call with needs.  Will continue to monitor.   "

## 2018-03-14 NOTE — PLAN OF CARE
Nerve block complete. VSS. Denies pain or discomfort @ this time. No changes noted. Family updated. Cont to monitor.

## 2018-03-14 NOTE — INTERVAL H&P NOTE
The patient has been examined and the H&P has been reviewed:    I concur with the findings and no changes have occurred since H&P was written.    Anesthesia/Surgery risks, benefits and alternative options discussed and understood by patient/family.          Active Hospital Problems    Diagnosis  POA    Primary osteoarthritis of both knees [M17.0]  Yes      Resolved Hospital Problems    Diagnosis Date Resolved POA   No resolved problems to display.

## 2018-03-14 NOTE — PLAN OF CARE
VSS. No changes noted. Denies pain or discomfort @ this time. Sensation felt @ L2 dermatome Right leg, not on left leg. Still unable to move BLE. Cap refill < 3 sec BLE, pulses 2+ x4. Daughter updated on pt status. Cont to monitor.

## 2018-03-14 NOTE — PLAN OF CARE
Problem: Physical Therapy Goal  Goal: Physical Therapy Goal  Goals to be met by: 18     Patient will increase functional independence with mobility by performin. Supine <> sit with Stand-by Assistance  2. Sit to stand transfer with Stand-by Assistance  3. Bed to chair transfer with Stand-by Assistance using Rolling Walker  4. Gait  x 100 feet with Stand-by Assistance using Rolling Walker.   5. Lower extremity exercise program x 10 reps per handout, with supervision    Outcome: Ongoing (interventions implemented as appropriate)  PT evaluation completed with note to follow. Recommending  PT/OT upon d/c.

## 2018-03-14 NOTE — H&P (VIEW-ONLY)
Subjective:      Patient ID: Monique Trujillo is a 67 y.o. female.    Chief Complaint: Pain of the Right Knee      Past Medical History:   Diagnosis Date    Allergy     Arthritis     Chronic lower back pain     Depression     Fever blister     GERD (gastroesophageal reflux disease)     Hemorrhoids, internal 11/5/2014    Hyperlipidemia     Hypertension     Joint pain     Morbid obesity with BMI of 40.0-44.9, adult 2/24/2015    Multiple gastric ulcers 12/14/2017    TIMMY (obstructive sleep apnea)     Skin cancer      Past Surgical History:   Procedure Laterality Date    BACK SURGERY  06/19/2015    COLONOSCOPY N/A 11/2/2016    Procedure: COLONOSCOPY;  Surgeon: Viji Dewitt MD;  Location: [facility];  Service: Endoscopy;  Laterality: N/A;    HIP SURGERY  04/28/2016    Right     KNEE ARTHROSCOPY W/ MENISCAL REPAIR Right 2012    NECK SURGERY      TUBAL LIGATION  1980     Social History     Occupational History     [employer]     Social History Main Topics    Smoking status: Never Smoker    Smokeless tobacco: Never Used    Alcohol use No    Drug use: No    Sexual activity: No      ROS  Current Outpatient Prescriptions on File Prior to Visit   Medication Sig Dispense Refill    amlodipine (NORVASC) 10 MG tablet TAKE 1 TABLET BY MOUTH EVERY DAY 90 tablet 3    ascorbic acid (VITAMIN C) 1000 MG tablet Take 1,000 mg by mouth once daily.      aspirin (ECOTRIN) 325 MG EC tablet Take 1 tablet (325 mg total) by mouth 2 (two) times daily. 60 tablet 0    atenolol (TENORMIN) 25 MG tablet TAKE 1 TABLET BY MOUTH EVERY DAY 90 tablet 3    benzonatate (TESSALON) 100 MG capsule Take 1 capsule (100 mg total) by mouth 3 (three) times daily as needed for Cough. 42 capsule 3    calcium-vitamin D3 (CALCIUM 500 + D) 500 mg(1,250mg) -200 unit per tablet Take 1 tablet by mouth 2 (two) times daily with meals.      citalopram (CELEXA) 40 MG tablet TAKE 1 TABLET BY MOUTH EVERY DAY 90 tablet 3    co-enzyme Q-10 30  "mg capsule Take 30 mg by mouth once daily.       econazole nitrate 1 % cream Use bid for rash 85 g 2    fish oil-omega-3 fatty acids 300-1,000 mg capsule Take 2 g by mouth once daily.      furosemide (LASIX) 40 MG tablet Take 1 tablet (40 mg total) by mouth daily as needed (edema). 90 tablet 3    gabapentin (NEURONTIN) 800 MG tablet Take 1 tablet (800 mg total) by mouth every evening. (Patient taking differently: Take 400 mg by mouth every evening. ) 30 tablet 9    GLUCOSA KHAN 2KCL/CHONDROITIN KHAN (GLUCOSAMINE SULF-CHONDROITIN ORAL) Take 1 tablet by mouth once daily.      multivitamin capsule Take 1 capsule by mouth once daily.      oxybutynin (DITROPAN XL) 15 MG TR24 Take 1 tablet (15 mg total) by mouth once daily. 90 tablet 3    pantoprazole (PROTONIX) 20 MG tablet TAKE 1 TABLET BY MOUTH EVERY DAY 90 tablet 0    potassium 99 mg Tab Take 99 mg by mouth once daily.      pravastatin (PRAVACHOL) 40 MG tablet TAKE 1 TABLET BY MOUTH EVERY DAY 90 tablet 33    triamcinolone acetonide 0.1% (KENALOG) 0.1 % cream Pt has sterile jar.  Pt to mix TAC, lotrimin cream and Milk of Magnesia in 1:1:1 ratio. Apply bid prn 60 g 3    TRILIPIX 135 mg CpDR Take 1 capsule (135 mg total) by mouth once daily. 90 capsule 3    vitamin E 400 UNIT capsule Take 400 Units by mouth once daily.       Current Facility-Administered Medications on File Prior to Visit   Medication Dose Route Frequency Provider Last Rate Last Dose    lidocaine (PF) 20 mg/mL (2 %) injection 2 mL  2 mL Intradermal Once Kalyan Han MD         Review of patient's allergies indicates:  No Known Allergies      Objective:      Vitals:    02/27/18 1400   BP: 98/62   Temp: 98.4 °F (36.9 °C)   Weight: 104.3 kg (230 lb)   Height: 5' 2" (1.575 m)     Overweight woman no acute distress.  Heart regular rate and rhythm  Chest clear  Abdomen nontender      Ortho Exam         The patient walks with a limp.  Resisted SLR negative.   The skin over the knee is " intact.  Knee effusion positive fluid wave   Tendernes is located presentmedial  Range of motion- Flexion 110 deg, Extension 0 deg,   Ligament exam:              MCL 1+ laxity              Lachman intact              Post sag intact              LCL intact  Patellar apprehension negative.  Popliteal cyst negative  Patellar crepitation present.  Flexion/pinch negative.  Pulses DP present, PT present.  Motor normal 5/5 strength in all tested muscle groups.   Sensory normal.  Assessment:       No diagnosis found.      Plan:       Treatment options were discussed. The surgical process of right knee replacement was discussed in detail with the patient including a detailed discussion of the procedure itself (including visual model, x-ray review, and literature review). The typical perioperative and post-operative course was discussed and perioperative risks were discussed to the patient's satisfaction.  Risks and complications discussed included but were not limited to the risks of anesthetic complications, infection, bleeding, wound healing complications, stiffness, aseptic loosening, instability, limb length inequality, neurologic dysfunction including numbness and weakness, additional surgery,  DVT, pulmonary embolism, perioperative medical risks (cardiac, pulmonary, renal, neurologic), and death and the patient elects to proceed.  The patient should get medically cleared and attend the joint seminar.

## 2018-03-14 NOTE — BRIEF OP NOTE
[facility]  Brief Operative Note    SUMMARY     Surgery Date: 3/14/2018     Surgeon(s) and Role:     * Yunier Combs MD - Primary    Assisting Surgeon: None    Pre-op Diagnosis:  Primary osteoarthritis of both knees [M17.0]    Post-op Diagnosis:  Post-Op Diagnosis Codes:     * Primary osteoarthritis of both knees [M17.0]    Procedure(s) (LRB):  ARTHROPLASTY-KNEE (Right)    Anesthesia: Regional    Description of Procedure: Right total knee arthroplasty     Description of the findings of the procedure: OA    Estimated Blood Loss: 5 mL         Specimens:   Specimen (12h ago through future)    None

## 2018-03-14 NOTE — OP NOTE
INDICATION/PREOPERATIVE DIAGNOSIS: Osteoarthritis of the rightknee.    POSTOPERATIVE DIAGNOSIS: Same.    DATE OF SURGERY: 3/14/2018    NAME OF PROCEDURE:right total knee replacement.    SURGEON: Yunier Combs MD    ASSISTANT: ROBINSON Adams    IMPLANT SYSTEM: Depuy PFC Sigma PS     EBL: trace    DESCRIPTION OF OPERATION: The patient was taken to the Operating Room. Spinal anesthesia was administered and preoperative antibiotics were given. A tourniquet was placed on the proximal thigh. The lower extremity was prepped and draped in the usual sterile fashion. It was exsanguinated with an Esmarch and the tourniquet was inflated to 300 mmHg. An anterior incision was made. Sharp dissection was carried down to the extensor mechanism. A medial parapatellar arthrotomy was performed. The proximal medial face of the tibia was exposed. The patella was everted. The knee was carefully flexed. The remnants of the anterior cruciate ligament and the patellofemoral ligament were released. The fat pad was resected with the anterior part of the lateral meniscus. Osteophytes around the femur were debrided. The femoral canal was entered with the drill. The alignment guide was inserted. The distal jig was applied in 7 degrees of valgus and the distal cut was made. The distal femur was measured as a size 2.5. The combination cutting block was applied in 3 degrees of external rotation. Anterior, posterior and chamfer cuts were made. The center box was then cut at the same size. The meniscal remnants were resected. The proximal tibia was exposed. The surrounding soft tissues were protected with retractors. The external jig was applied in neutral alignment and a neutral cut was made. This cut was checked and found to be satisfactory. A lamina  was inserted with the knee in flexion. The posterior compartment was debrided of osteophytes and synovitis. The tibia was then drilled and punched with the size 2.5 base plate in the appropriate  rotational position, aligned with the tibial tubercle. Trial implants were inserted. There was excellent range of motion and stability with a size 12.5 mm spacer. The patella was then exposed. It was resected using the guide leaving 14 mm of residual bone. It was then drilled for the size 32 mm button. The trial had central tracking. The trials were all removed. The knee was irrigated and dried. Antibiotic cement was mixed. The implants were cemented with the knee held in extension and the patella clamped. After the cement had dried a careful search for extra cement was made and all was removed. The knee was irrigated. The final spacer was snapped into place. The extensor mechanism was closed with interrupted #1 Vicryl. This was checked in flexion and found to be secure. The joint was injected with 1g of Transexemic acid. The subcutaneous tissue was closed with 2-0 Vicryl. The skin was closed with clips and a clear plastic dressing with a ROBERTO stocking was applied. The tourniquet was released. There were no known complications. I was present for the entire procedure.

## 2018-03-14 NOTE — ANESTHESIA POSTPROCEDURE EVALUATION
"Anesthesia Post Evaluation    Patient: Monique Trujillo    Procedure(s) Performed: Procedure(s) (LRB):  ARTHROPLASTY-KNEE (Right)    Final Anesthesia Type: spinal  Patient location during evaluation: PACU  Patient participation: Yes- Able to Participate  Level of consciousness: awake and alert, oriented and awake  Post-procedure vital signs: reviewed and stable  Pain management: adequate  Airway patency: patent  PONV status at discharge: No PONV  Anesthetic complications: no      Cardiovascular status: blood pressure returned to baseline  Respiratory status: unassisted and room air  Hydration status: euvolemic  Follow-up not needed.        Visit Vitals  /68   Pulse 64   Temp 36.1 °C (97 °F) (Skin)   Resp 18   Ht 5' 2" (1.575 m)   Wt 104.3 kg (230 lb)   SpO2 98%   Breastfeeding? No   BMI 42.07 kg/m²       Pain/Lindsey Score: Pain Assessment Performed: Yes (3/14/2018 10:15 AM)  Presence of Pain: denies (3/14/2018 11:15 AM)  Lindsey Score: 9 (3/14/2018 11:15 AM)      "

## 2018-03-14 NOTE — PLAN OF CARE
VSS. No changes noted. Denies pain or discomfort @ this time. Report given to YASHIRA Johnson, with time allotted for questions.

## 2018-03-14 NOTE — PLAN OF CARE
Problem: Occupational Therapy Goal  Goal: Occupational Therapy Goal  Goals to be met by: 3/19/18     Patient will increase functional independence with ADLs by performing:    LE Dressing with Stand-by Assistance.  Grooming while standing with Stand-by Assistance.  Toileting from toilet with Stand-by Assistance for hygiene and clothing management.   Supine to sit with Stand-by Assistance.  Stand pivot transfers with Stand-by Assistance.  Toilet transfer to toilet with Stand-by Assistance.  Increased functional strength to WFL for self care skills and functional mobility.  Upper extremity exercise program x10 reps per handout, with independence.    Outcome: Ongoing (interventions implemented as appropriate)  Pt would benefit from cont OT services in order to maximize functional independence. Recommending HHOT/PT at d/c. Owns all recommended DME

## 2018-03-14 NOTE — TRANSFER OF CARE
"Anesthesia Transfer of Care Note    Patient: Monique Trujillo    Procedure(s) Performed: Procedure(s) (LRB):  ARTHROPLASTY-KNEE (Right)    Patient location: PACU    Anesthesia Type: spinal    Transport from OR: Transported from OR on 6-10 L/min O2 by face mask with adequate spontaneous ventilation    Post pain: adequate analgesia    Post assessment: no apparent anesthetic complications    Post vital signs: stable    Level of consciousness: awake and alert    Nausea/Vomiting: no nausea/vomiting    Complications: none    Transfer of care protocol was followed      Last vitals:   Visit Vitals  /65 (BP Location: Left arm, Patient Position: Lying)   Pulse 63   Temp 36 °C (96.8 °F) (Oral)   Resp 18   Ht 5' 2" (1.575 m)   Wt 104.3 kg (230 lb)   SpO2 100%   Breastfeeding? No   BMI 42.07 kg/m²     "

## 2018-03-14 NOTE — PLAN OF CARE
VSS. Awake and alert. No changes noted. Denies pain or discomfort @ this time. Unable to feel sensation or move ble, post spinal anesthesia. Cont to monitor.

## 2018-03-14 NOTE — PLAN OF CARE
Patient sleeping, easily arousable. VSS. Denies pain. Left message for patients family Tessa regarding patients room assignment with call back number. Report to Alida AC with time for questions, verbalized understanding. Dr. Gloria mares to release patient from PACU. Patient will transport to floor via stretcher.

## 2018-03-14 NOTE — ANESTHESIA PROCEDURE NOTES
Spinal    Diagnosis: right knee pain  Patient location during procedure: OR  Start time: 3/14/2018 8:00 AM  Timeout: 3/14/2018 8:00 AM  End time: 3/14/2018 8:10 AM  Staffing  Anesthesiologist: NICHOLAS HA  Resident/CRNA: CHAYITO DANIELSON  Performed: resident/CRNA   Preanesthetic Checklist  Completed: patient identified, site marked, surgical consent, pre-op evaluation, timeout performed, IV checked, risks and benefits discussed and monitors and equipment checked  Spinal Block  Patient position: sitting  Prep: ChloraPrep  Patient monitoring: heart rate and continuous pulse ox  Approach: left paramedian  Location: L3-4  Injection technique: single shot  CSF Fluid: clear free-flowing CSF  Needle  Needle type: pencil-tip   Needle gauge: 25 G  Needle length: 4 in  Additional Documentation: incremental injection, negative aspiration for heme and no paresthesia on injection  Needle localization: anatomical landmarks  Medications:  Bolus administered: 2.4 mL of 0.5 bupivacaine  Additional Notes  Sterile technique throughout procedure.

## 2018-03-14 NOTE — PLAN OF CARE
Daughter updated. Daughter has left the building, so she is un-available for a bedside visit at this time. VSS. Denies pain or discomfort @ this time. Able to move BLE and feel sensation at all dermatomes BLE. Cont to monitor.

## 2018-03-14 NOTE — PLAN OF CARE
Pt states name and . Verified to armband. Pt verifies procedure to be done. Verified consent x2 and EPIC chart. Placed on cardiac monitor x3, pulse ox, and O2.

## 2018-03-14 NOTE — PT/OT/SLP EVAL
Occupational Therapy   Evaluation    Name: Monique Trujillo  MRN: [id number]  Admitting Diagnosis:  <principal problem not specified> Day of Surgery    Recommendations:     Discharge Recommendations: home health PT, home health OT  Discharge Equipment Recommendations:  none  Barriers to discharge:  None    History:     Occupational Profile:  Living Environment: Pt reports living with spouse, dghtr, dghtr's fiance and multiple grandchildren in 2 story home, threshold to enter, bed and WIS available downstairs with built in seat  Previous level of function: mod I/independent; PRN use of DME  Equipment Owned:  walker, rolling, rollator, shower chair, bedside commode, cane, straight  Assistance upon Discharge: from family     Past Medical History:   Diagnosis Date    Allergy     Arthritis     Chronic lower back pain     Depression     Fever blister     GERD (gastroesophageal reflux disease)     Hemorrhoids, internal 11/5/2014    Hyperlipidemia     Hypertension     Joint pain     Morbid obesity with BMI of 40.0-44.9, adult 2/24/2015    Multiple gastric ulcers 12/14/2017    TIMMY (obstructive sleep apnea)     does not use CPAP    Skin cancer        Past Surgical History:   Procedure Laterality Date    ANTERIOR CERVICAL CORPECTOMY W/ FUSION  1988    COLONOSCOPY N/A 11/2/2016    Procedure: COLONOSCOPY;  Surgeon: Viji Dewitt MD;  Location: [facility];  Service: Endoscopy;  Laterality: N/A;    KNEE ARTHROSCOPY W/ MENISCAL REPAIR Right 2012    LUMBAR LAMINECTOMY  06/19/2015    SKIN CANCER EXCISION      forehead does not recall date    TOTAL HIP ARTHROPLASTY Right 04/28/2016         TUBAL LIGATION  1980       Subjective     Chief Complaint: pain  Patient/Family stated goals: return to PLOF   Communicated with: nsg prior to session.  Pain/Comfort:  · Pain Rating 1: 8/10  · Location - Side 1: Right  · Location - Orientation 1: generalized  · Location 1: knee  · Pain Addressed 1: Reposition, Distraction,  Cessation of Activity, Nurse notified  · Pain Rating Post-Intervention 1: 8/10    Patients cultural, spiritual, Hoahaoism conflicts given the current situation:      Objective:     Patient found with:      General Precautions: Standard, fall   Orthopedic Precautions:RLE weight bearing as tolerated   Braces: N/A     Occupational Performance:    Bed Mobility:    · Patient completed Scooting/Bridging with stand by assistance  · Patient completed Supine to Sit with contact guard assistance and minimum assistance  · Patient completed Sit to Supine with contact guard assistance and minimum assistance    Functional Mobility/Transfers:  · Patient completed Sit <> Stand Transfer with contact guard assistance  with  rolling walker   · Functional Mobility: CGA with RW; see PT note for details     Activities of Daily Living:  · LB Dressing: total assistance    · Toileting: maximal assistance requires assist with hygiene and clothign management     Cognitive/Visual Perceptual:  Cognitive/Psychosocial Skills:     -       Oriented to: Person, Place, Time and Situation   -       Follows Commands/attention:Follows multistep  commands  -       Communication: clear/fluent  -       Memory: No Deficits noted  -       Safety awareness/insight to disability: intact   -       Mood/Affect/Coping skills/emotional control: Appropriate to situation    Physical Exam:  Balance:    -       WFL sitting balance; CGA standing   Postural examination/scapula alignment:    -       Rounded shoulders  Skin integrity: Wound surgical R knee  Edema:  Mild surgical R knee  Dominant hand:    -       right  Upper Extremity Range of Motion:   RUE WFL; LUE deficits at shoulder 2/2 previous injury; limited flexion and internal rotation   Upper Extremity Strength: L shoulder 4-/5 WFL distally; RUE WFL    Strength:  Good   Fine Motor Coordination:    -       Intact    Patient left supine with all lines intact, call button in reach, bed alarm on and nsg  "notified    [facility] 6 Click:  [facility] Total Score: 18    Treatment & Education:  Pt performing functional mobility in room with RW and education on adaptive sequence to decrease pain at this time. Educated on impt of LE therex/ROM as well as knee in extension while supine   Education:    Assessment:     Monique Trujillo is a 67 y.o. female with a medical diagnosis of s/p R TKA.  She presents with s/p R TKA.  Performance deficits affecting function are weakness, gait instability, impaired balance, impaired endurance, decreased lower extremity function, impaired self care skills, impaired functional mobilty, pain, orthopedic precautions, decreased ROM, decreased upper extremity function.  Pt would benefit from cont OT services in order to maximize functional independence. Recommending HHOT/PT at d/c. Owns all recommended DME    Rehab Prognosis:  Good ; patient would benefit from acute skilled OT services to address these deficits and reach maximum level of function.         Clinical Decision Makin.  OT Low:  "Pt evaluation falls under low complexity for evaluation coding due to performance deficits noted in 1-3 areas as stated above and 0 co-morbities affecting current functional status. Data obtained from problem focused assessments. No modifications or assistance was required for completion of evaluation. Only brief occupational profile and history review completed."     Plan:     Patient to be seen 5 x/week to address the above listed problems via self-care/home management, therapeutic exercises, therapeutic activities  · Plan of Care Expires: 18  · Plan of Care Reviewed with: patient    This Plan of care has been discussed with the patient who was involved in its development and understands and is in agreement with the identified goals and treatment plan    GOALS:    Occupational Therapy Goals        Problem: Occupational Therapy Goal    Goal Priority Disciplines Outcome Interventions   Occupational " Therapy Goal     OT, PT/OT Ongoing (interventions implemented as appropriate)    Description:  Goals to be met by: 3/19/18     Patient will increase functional independence with ADLs by performing:    LE Dressing with Stand-by Assistance.  Grooming while standing with Stand-by Assistance.  Toileting from toilet with Stand-by Assistance for hygiene and clothing management.   Supine to sit with Stand-by Assistance.  Stand pivot transfers with Stand-by Assistance.  Toilet transfer to toilet with Stand-by Assistance.  Increased functional strength to WFL for self care skills and functional mobility.  Upper extremity exercise program x10 reps per handout, with independence.                      Time Tracking:     OT Date of Treatment: 03/14/18  OT Start Time: 1328  OT Stop Time: 1352  OT Total Time (min): 24 min    Billable Minutes:Evaluation 10    Alena Marinelli OT  3/14/2018

## 2018-03-14 NOTE — BRIEF OP NOTE
Certification of Assistant at Surgery       Surgery Date: 3/14/2018     Participating Surgeons:  Surgeon(s) and Role:     * Yunier Combs MD - Primary    Procedures:  Procedure(s) (LRB):  ARTHROPLASTY-KNEE (Right)    Assistant Surgeon's Certification of Necessity:  I understand that section 1842 (b) (6) (d) of the Social Security Act generally prohibits Medicare Part B reasonable charge payment for the services of assistants at surgery in teaching hospitals when qualified residents are available to furnish such services. I certify that the services for which payment is claimed were medically necessary, and that no qualified resident was available to perform the services. I further understand that these services are subject to post-payment review by the Medicare carrier.      Lauren Adams PA-C    03/14/2018  10:15 AM

## 2018-03-14 NOTE — PLAN OF CARE
VSS. No changes noted. Awake and alert @ this time, unable to feel sensation or move BLE post spinal anesthesia. Warm blankets placed on pt. Cont to monitor.

## 2018-03-14 NOTE — PT/OT/SLP EVAL
Physical Therapy Evaluation and Treatment    Patient Name:  Monique Trujillo   MRN:  [id number]    Recommendations:     Discharge Recommendations:  home health PT, home health OT   Discharge Equipment Recommendations: none   Barriers to discharge: None    Assessment:     Monique Trujillo is a 67 y.o. female admitted with a medical diagnosis of <principal problem not specified>.  She presents with the following impairments/functional limitations:  weakness, impaired endurance, impaired self care skills, impaired functional mobilty, gait instability, impaired balance, decreased lower extremity function, decreased upper extremity function, pain, decreased ROM, edema. Pt ambulated 20 ft with RW and CGA with cues for 3-point gait pattern.    Rehab Prognosis: Good; patient would benefit from acute skilled PT services to address these deficits and reach maximum level of function.      Recent Surgery: Procedure(s) (LRB):  ARTHROPLASTY-KNEE (Right) Day of Surgery    Plan:     During this hospitalization, patient to be seen BID (BID M-F, DAily Sat/Sun) to address the above listed problems via gait training, therapeutic activities, therapeutic exercises  · Plan of Care Expires:  04/14/18   Plan of Care Reviewed with: patient (Simultaneous filing. User may not have seen previous data.)    Subjective     Communicated with YASHIRA Irwin prior to session.  Patient found supine with HOB elevated upon PT entry to room, agreeable to evaluation.      Chief Complaint: R knee pain  Patient comments/goals: pt reports she is starting to feels her R knee more and she is having more pain- RN notified  Pain/Comfort:  · Pain Rating 1: 8/10  · Location - Side 1: Right  · Location - Orientation 1: generalized  · Location 1: knee  · Pain Addressed 1: Reposition, Distraction, Nurse notified  · Pain Rating Post-Intervention 1: 8/10    Patients cultural, spiritual, Judaism conflicts given the current situation: none reported    Living  Environment:  Pt lives with her , daughter, daughter's fiance, and her grandchildren in a 2  with THE and [facility] with built in seat and pt stays on the main level and does not need to access the 2nd floor.   Prior to admission, patients level of function was mod I with or without RW/rollator.  Patient has the following equipment: walker, rolling, rollator, cane, straight, bedside commode (built in shower seat).  DME owned (not currently used): none.  Upon discharge, patient will have assistance from her family.    Objective:     Patient found with: peripheral IV, SCD, bed alarm     General Precautions: Standard, fall   Orthopedic Precautions:RLE weight bearing as tolerated   Braces: N/A     Exams:  · Cognitive Exam:  Patient is oriented to Person, Place, Time and Situation and follows 100% of 2-step commands   · Fine Motor Coordination:    · -       Intact  · Gross Motor Coordination:  WFL  · Postural Exam:  Patient presented with the following abnormalities:    · -       Rounded shoulders  · Sensation:    · -       Intact  · Skin Integrity/Edema:      · -       Skin integrity: R knee surgical incision, ACE wrapped  · -       Edema: Mild RLE  · RLE ROM: Deficits: knee flexion ~20-85   · RLE Strength: ankle DF WNLs, knee extension and hip flexion 3-/5  · LLE ROM: WFL  · LLE Strength: WFL    Functional Mobility:  · Bed Mobility:     · Scooting: stand by assistance  · Supine to Sit: stand by assistance  · Sit to Supine: minimum assistance  · Transfers:     · Sit to Stand:  contact guard assistance with rolling walker  · Gait: 20 ft with RW and CGA with cues for 3-point gait pattern and increased UE WB during R stand phase    AM-PAC 6 CLICK MOBILITY  Total Score:17       Therapeutic Activities and Exercises:  Pt tolerated gait as reported above and reports R knee pain remaining the same following activity. Pt educated on pillow positioning under RLE to avoid R knee flexion and on use of ice for pain management.  Pt educated on APs, heel slides, and QS.    Patient left HOB elevated with all lines intact, call button in reach, bed alarm on and RN notified.    GOALS:    Physical Therapy Goals        Problem: Physical Therapy Goal    Goal Priority Disciplines Outcome Goal Variances Interventions   Physical Therapy Goal     PT/OT, PT Ongoing (interventions implemented as appropriate)     Description:  Goals to be met by: 18     Patient will increase functional independence with mobility by performin. Supine <> sit with Stand-by Assistance  2. Sit to stand transfer with Stand-by Assistance  3. Bed to chair transfer with Stand-by Assistance using Rolling Walker  4. Gait  x 100 feet with Stand-by Assistance using Rolling Walker.   5. Lower extremity exercise program x 10 reps per handout, with supervision                      History:     Past Medical History:   Diagnosis Date    Allergy     Arthritis     Chronic lower back pain     Depression     Fever blister     GERD (gastroesophageal reflux disease)     Hemorrhoids, internal 2014    Hyperlipidemia     Hypertension     Joint pain     Morbid obesity with BMI of 40.0-44.9, adult 2015    Multiple gastric ulcers 2017    TIMMY (obstructive sleep apnea)     does not use CPAP    Skin cancer        Past Surgical History:   Procedure Laterality Date    ANTERIOR CERVICAL CORPECTOMY W/ FUSION      COLONOSCOPY N/A 2016    Procedure: COLONOSCOPY;  Surgeon: Viji Dewitt MD;  Location: [facility];  Service: Endoscopy;  Laterality: N/A;    KNEE ARTHROSCOPY W/ MENISCAL REPAIR Right     LUMBAR LAMINECTOMY  2015    SKIN CANCER EXCISION      forehead does not recall date    TOTAL HIP ARTHROPLASTY Right 2016         TUBAL LIGATION         Clinical Decision Making:     History  Co-morbidities and personal factors that may impact the plan of care Examination  Body Structures and Functions, activity limitations and  participation restrictions that may impact the plan of care Clinical Presentation   Decision Making/ Complexity Score   Co-morbidities:   [] Time since onset of injury / illness / exacerbation  [] Status of current condition  []Patient's cognitive status and safety concerns    [] Multiple Medical Problems (see med hx)  Personal Factors:   [] Patient's age  [] Prior Level of function   [] Patient's home situation (environment and family support)  [] Patient's level of motivation  [] Expected progression of patient      HISTORY:(criteria)    [x] 73803 - no personal factors/history    [] 50030 - has 1-2 personal factor/comorbidity     [] 28396 - has >3 personal factor/comorbidity     Body Regions:  [] Objective examination findings  [] Head     []  Neck  [] Trunk   [] Upper Extremity  [x] Lower Extremity    Body Systems:  [] For communication ability, affect, cognition, language, and learning style: the assessment of the ability to make needs known, consciousness, orientation (person, place, and time), expected emotional /behavioral responses, and learning preferences (eg, learning barriers, education  needs)  [x] For the neuromuscular system: a general assessment of gross coordinated movement (eg, balance, gait, locomotion, transfers, and transitions) and motor function  (motor control and motor learning)  [x] For the musculoskeletal system: the assessment of gross symmetry, gross range of motion, gross strength, height, and weight  [] For the integumentary system: the assessment of pliability(texture), presence of scar formation, skin color, and skin integrity  [] For cardiovascular/pulmonary system: the assessment of heart rate, respiratory rate, blood pressure, and edema     Activity limitations:    [] Patient's cognitive status and saf ety concerns          [] Status of current condition      [] Weight bearing restriction  [] Cardiopulmunary Restriction    Participation Restrictions:   [] Goals and goal agreement  with the patient     [] Rehab potential (prognosis) and probable outcome      Examination of Body System: (criteria)    [x] 77059 - addressing 1-2 elements    [] 94005 - addressing a total of 3 or more elements     [] 48734 -  Addressing a total of 4 or more elements         Clinical Presentation: (criteria)  Stable - 26913     On examination of body system using standardized tests and measures patient presents with 1-2 elements from any of the following: body structures and functions, activity limitations, and/or participation restrictions.  Leading to a clinical presentation that is considered stable and/or uncomplicated                              Clinical Decision Making  (Eval Complexity):  Low- 99790     Time Tracking:     PT Received On: 03/14/18  PT Start Time: 1528     PT Stop Time: 1552  PT Total Time (min): 24 min with OT    Billable Minutes: Evaluation 10 and Gait Training 14      Erika Garrett, PT  03/14/2018

## 2018-03-15 ENCOUNTER — TELEPHONE (OUTPATIENT)
Dept: ORTHOPEDICS | Facility: CLINIC | Age: 68
End: 2018-03-15

## 2018-03-15 VITALS
HEIGHT: 62 IN | TEMPERATURE: 99 F | HEART RATE: 65 BPM | BODY MASS INDEX: 42.52 KG/M2 | RESPIRATION RATE: 20 BRPM | DIASTOLIC BLOOD PRESSURE: 68 MMHG | OXYGEN SATURATION: 96 % | SYSTOLIC BLOOD PRESSURE: 132 MMHG | WEIGHT: 231.06 LBS

## 2018-03-15 LAB
ANION GAP SERPL CALC-SCNC: 6 MMOL/L
BUN SERPL-MCNC: 17 MG/DL
CALCIUM SERPL-MCNC: 9.6 MG/DL
CHLORIDE SERPL-SCNC: 105 MMOL/L
CO2 SERPL-SCNC: 29 MMOL/L
CREAT SERPL-MCNC: 0.8 MG/DL
EST. GFR  (AFRICAN AMERICAN): >60 ML/MIN/1.73 M^2
EST. GFR  (NON AFRICAN AMERICAN): >60 ML/MIN/1.73 M^2
GLUCOSE SERPL-MCNC: 186 MG/DL
HCT VFR BLD AUTO: 41.6 %
HGB BLD-MCNC: 13.5 G/DL
POTASSIUM SERPL-SCNC: 4.3 MMOL/L
SODIUM SERPL-SCNC: 140 MMOL/L

## 2018-03-15 PROCEDURE — 63600175 PHARM REV CODE 636 W HCPCS: Performed by: ORTHOPAEDIC SURGERY

## 2018-03-15 PROCEDURE — 36415 COLL VENOUS BLD VENIPUNCTURE: CPT

## 2018-03-15 PROCEDURE — 25000003 PHARM REV CODE 250: Performed by: ORTHOPAEDIC SURGERY

## 2018-03-15 PROCEDURE — 85014 HEMATOCRIT: CPT

## 2018-03-15 PROCEDURE — 80048 BASIC METABOLIC PNL TOTAL CA: CPT

## 2018-03-15 PROCEDURE — 94761 N-INVAS EAR/PLS OXIMETRY MLT: CPT

## 2018-03-15 PROCEDURE — 97116 GAIT TRAINING THERAPY: CPT

## 2018-03-15 PROCEDURE — 97110 THERAPEUTIC EXERCISES: CPT

## 2018-03-15 PROCEDURE — 97535 SELF CARE MNGMENT TRAINING: CPT

## 2018-03-15 PROCEDURE — 85018 HEMOGLOBIN: CPT

## 2018-03-15 RX ORDER — FENOFIBRATE 145 MG/1
145 TABLET, FILM COATED ORAL DAILY
Status: DISCONTINUED | OUTPATIENT
Start: 2018-03-15 | End: 2018-03-15 | Stop reason: HOSPADM

## 2018-03-15 RX ORDER — OXYCODONE AND ACETAMINOPHEN 5; 325 MG/1; MG/1
1 TABLET ORAL EVERY 4 HOURS PRN
Qty: 50 TABLET | Refills: 0 | OUTPATIENT
Start: 2018-03-15 | End: 2018-03-15 | Stop reason: HOSPADM

## 2018-03-15 RX ORDER — OXYCODONE AND ACETAMINOPHEN 5; 325 MG/1; MG/1
1 TABLET ORAL EVERY 4 HOURS PRN
Qty: 50 TABLET | Refills: 0
Start: 2018-03-15 | End: 2018-04-03 | Stop reason: SDUPTHER

## 2018-03-15 RX ADMIN — SODIUM CHLORIDE: 0.9 INJECTION, SOLUTION INTRAVENOUS at 04:03

## 2018-03-15 RX ADMIN — CITALOPRAM HYDROBROMIDE 40 MG: 20 TABLET ORAL at 09:03

## 2018-03-15 RX ADMIN — HYDROMORPHONE HYDROCHLORIDE 0.5 MG: 2 INJECTION INTRAMUSCULAR; INTRAVENOUS; SUBCUTANEOUS at 04:03

## 2018-03-15 RX ADMIN — FENOFIBRATE 145 MG: 145 TABLET ORAL at 09:03

## 2018-03-15 RX ADMIN — MUPIROCIN 1 G: 20 OINTMENT TOPICAL at 09:03

## 2018-03-15 RX ADMIN — OXYCODONE HYDROCHLORIDE 10 MG: 10 TABLET, FILM COATED, EXTENDED RELEASE ORAL at 09:03

## 2018-03-15 RX ADMIN — OXYBUTYNIN CHLORIDE 15 MG: 5 TABLET, EXTENDED RELEASE ORAL at 09:03

## 2018-03-15 RX ADMIN — OXYCODONE HYDROCHLORIDE 10 MG: 5 TABLET ORAL at 11:03

## 2018-03-15 RX ADMIN — ATENOLOL 25 MG: 25 TABLET ORAL at 09:03

## 2018-03-15 RX ADMIN — PRAVASTATIN SODIUM 40 MG: 20 TABLET ORAL at 09:03

## 2018-03-15 RX ADMIN — ACETAMINOPHEN 1000 MG: 500 TABLET ORAL at 09:03

## 2018-03-15 RX ADMIN — CEFAZOLIN SODIUM 2 G: 2 SOLUTION INTRAVENOUS at 12:03

## 2018-03-15 RX ADMIN — PANTOPRAZOLE SODIUM 20 MG: 20 TABLET, DELAYED RELEASE ORAL at 09:03

## 2018-03-15 RX ADMIN — AMLODIPINE BESYLATE 10 MG: 5 TABLET ORAL at 09:03

## 2018-03-15 RX ADMIN — STANDARDIZED SENNA CONCENTRATE AND DOCUSATE SODIUM 1 TABLET: 8.6; 5 TABLET, FILM COATED ORAL at 09:03

## 2018-03-15 RX ADMIN — ASPIRIN 325 MG ORAL TABLET 325 MG: 325 PILL ORAL at 09:03

## 2018-03-15 RX ADMIN — POLYETHYLENE GLYCOL 3350 17 G: 17 POWDER, FOR SOLUTION ORAL at 09:03

## 2018-03-15 NOTE — PT/OT/SLP PROGRESS
Occupational Therapy   Treatment    Name: Monique Trujillo  MRN: [id number]  Admitting Diagnosis:  Primary osteoarthritis of both knees  1 Day Post-Op    Recommendations:     Discharge Recommendations: home health PT  Discharge Equipment Recommendations:  none  Barriers to discharge:  None    Subjective     Communicated with: RN prior to session.  Pain/Comfort:  · Pain Rating 1: 0/10  · Pain Rating Post-Intervention 1: 0/10    Patients cultural, spiritual, Roman Catholic conflicts given the current situation:      Objective:     Patient found with: peripheral IV    General Precautions: Standard, fall   Orthopedic Precautions:RLE weight bearing as tolerated   Braces: N/A     Occupational Performance:    Bed Mobility:    · Pt found up in chair.    Functional Mobility/Transfers:  · Patient completed Sit <> Stand Transfer with supervision  with  rolling walker   · Patient completed Toilet Transfer Stand Pivot technique with supervision with  grab bars and rolling walker  · Functional Mobility: pt ambulated room distances with Supervision using RW.    Activities of Daily Living:  · Grooming: supervision standing at sink  · UB Dressing: modified independence seated  · LB Dressing: minimum assistance chaim pants  · Toileting: supervision      Patient left up in chair with all lines intact, call button in reach and RN notified    AMPA 6 Click:  AMPA Total Score: 20    Treatment & Education:  Pt educated on use of AE to assisted with LB drsg [place]. She reports she is familiar with sock aid and reacher and may look into purchasing.  She does have dtr assists at this time who can assist with ADL's.  Education:    Assessment:     Monique Trujillo is a 67 y.o. female with a medical diagnosis of Primary osteoarthritis of both knees.  She presents with RLE orthopedic precautions, and decreased overall strength, endurance, balance and [place] and safety with ADL's and fx mobility. Performance deficits affecting function  are weakness, impaired endurance, impaired self care skills, impaired functional mobilty, gait instability, impaired balance, orthopedic precautions, impaired skin, edema, decreased ROM, pain, decreased safety awareness.  Pt progressing well towards goals.  She performs transfers with Supervision using RW and is able to perform basic self care with Supervision- Min A for LB mgmt. Continue OT services to address functional goals, progressing as able.      Rehab Prognosis:  good; patient would benefit from acute skilled OT services to address these deficits and reach maximum level of function.       Plan:     Patient to be seen 5 x/week to address the above listed problems via self-care/home management, therapeutic activities, therapeutic exercises  · Plan of Care Expires: 03/19/18  · Plan of Care Reviewed with: patient    This Plan of care has been discussed with the patient who was involved in its development and understands and is in agreement with the identified goals and treatment plan    GOALS:    Occupational Therapy Goals        Problem: Occupational Therapy Goal    Goal Priority Disciplines Outcome Interventions   Occupational Therapy Goal     OT, PT/OT Ongoing (interventions implemented as appropriate)    Description:  Goals to be met by: 3/19/18     Patient will increase functional independence with ADLs by performing:    LE Dressing with Stand-by Assistance.  Grooming while standing with Stand-by Assistance.  Toileting from toilet with Stand-by Assistance for hygiene and clothing management.   Supine to sit with Stand-by Assistance.  Stand pivot transfers with Stand-by Assistance.  Toilet transfer to toilet with Stand-by Assistance.  Increased functional strength to WFL for self care skills and functional mobility.  Upper extremity exercise program x10 reps per handout, with independence.                      Time Tracking:     OT Date of Treatment: 03/15/18  OT Start Time: 1317  OT Stop Time: 1349  OT  Total Time (min): 32 min    Billable Minutes:Self Care/Home Management 32    HARSHAL Berry/MICKY  3/15/2018

## 2018-03-15 NOTE — PLAN OF CARE
Patient to d/c home today, pt has DME in place. TN spoke with Radha at [facility], pt has been set up with [facility].    Message sent to Dr Combs office for post op follow up, pt will be contacted.       o/p pharmacy bedside delivery used.     No other d/c needs.   Follow-up With  Details  Why  Contact Info   Yunier Combs MD  In 2 weeks  Office will contact you with follow up  [address]  [address]  [facility] [address]  [phone]   [facility]  [address]  [address]  [facility] [address]  [phone]       Future Appointments  Date Time Provider Department Center   4/5/2018 10:30 AM [address]   4/25/2018 8:45 AM [facility]   5/2/2018 2:00 PM Cam Garcia MD [facility] MED [facility]           03/15/18 1327   Final Note   Assessment Type Final Discharge Note   Discharge Disposition Home-Health   What phone number can be called within the next 1-3 days to see how you are doing after discharge? [phone]   Hospital Follow Up  Appt(s) scheduled? Yes   Discharge plans and expectations educations in teach back method with documentation complete? Yes   Right Care Referral Info   Post Acute Recommendation Home-care   Referral Type HH via [facility]

## 2018-03-15 NOTE — PROGRESS NOTES
[facility]  Orthopedics  Progress Note    Patient Name: Monique Trujillo  MRN: [id number]  Admission Date: 3/14/2018  Hospital Length of Stay: 1 days  Attending Provider: Yunier Combs MD  Primary Care Provider: Cam Garcia MD  Follow-up For: Procedure(s) (LRB):  ARTHROPLASTY-KNEE (Right)    Post-Operative Day: 1 Day Post-Op  Subjective:     Principal Problem:OA right knee    Principal Orthopedic Problem: same    Interval History: 24 Hr Interval Hx:   The patient states that pain is adequately controlled. No specific complaints reported.        Review of patient's allergies indicates:  No Known Allergies    Current Facility-Administered Medications   Medication    0.9%  NaCl infusion    acetaminophen tablet 1,000 mg    amLODIPine tablet 10 mg    aspirin tablet 325 mg    atenolol tablet 25 mg    bisacodyl suppository 10 mg    citalopram tablet 40 mg    fenofibric acid capsule 135 mg    furosemide tablet 40 mg    gabapentin capsule 400 mg    hydromorphone (PF) injection 0.5 mg    lactulose 20 gram/30 mL solution Soln 20 g    mupirocin 2 % ointment 1 g    ondansetron disintegrating tablet 4 mg    ondansetron injection 4 mg    oxybutynin 24 hr tablet 15 mg    oxyCODONE 12 hr tablet 10 mg    oxyCODONE immediate release tablet 10 mg    oxyCODONE immediate release tablet 5 mg    pantoprazole EC tablet 20 mg    polyethylene glycol packet 17 g    pravastatin tablet 40 mg    senna-docusate 8.6-50 mg per tablet 1 tablet    sodium chloride 0.9% flush 5 mL     Objective:     Vital Signs (Most Recent):  Temp: 97.5 °F (36.4 °C) (03/15/18 0430)  Pulse: 72 (03/15/18 0430)  Resp: 18 (03/15/18 0430)  BP: (!) 147/71 (03/15/18 0430)  SpO2: (!) 94 % (03/15/18 0443) Vital Signs (24h Range):  Temp:  [96.8 °F (36 °C)-97.7 °F (36.5 °C)] 97.5 °F (36.4 °C)  Pulse:  [58-78] 72  Resp:  [13-22] 18  SpO2:  [92 %-100 %] 94 %  BP: (103-152)/(53-81) 147/71     Weight: 104.8 kg (231 lb 0.7  "oz)  Height: 5' 2" (157.5 cm)  Body mass index is 42.26 kg/m².      Intake/Output Summary (Last 24 hours) at 03/15/18 0753  Last data filed at 03/15/18 0700   Gross per 24 hour   Intake          1996.25 ml   Output             1155 ml   Net           841.25 ml       Ortho/SPM Exam    Significant Labs: All pertinent labs within the past 24 hours have been reviewed.    Significant Imaging: None         Vital Sign Ranges:        Temp:  [96.8 °F (36 °C)-97.7 °F (36.5 °C)] 97.5 °F (36.4 °C)  Pulse:  [58-78] 72  Resp:  [13-22] 18  SpO2:  [92 %-100 %] 94 %  BP: (103-152)/(53-81) 147/71           Ort Exam:    Appears alert and comfortable.  There is a small amount of blood the surgical dressing.  Distal neurocirculatory status is intact on the operated extremity.                  Assessment/Plan:     Active Diagnoses:    Diagnosis Date Noted POA    Primary osteoarthritis of both knees [M17.0] 03/14/2018 Yes      Problems Resolved During this Admission:    Diagnosis Date Noted Date Resolved POA     Assessment & Plan:  Normal postop day one.  Physical therapy.  Discontinue intravenous fluid and intravenous narcotics.      Stable for discharge home.      Signature:        Yunier Combs MD        Electronic Signature        Yunier Combs MD  Orthopedics  [facility]  "

## 2018-03-15 NOTE — DISCHARGE SUMMARY
DISCHARGE SUMMARY      Date and time of Admission: 3/14/2018  6:44 AM    Date of Discharge:3/15/2018    Discharge Diagnoses:    Active Hospital Problems    Diagnosis  POA    *Primary osteoarthritis of both knees [M17.0]  Yes      Resolved Hospital Problems    Diagnosis Date Resolved POA   No resolved problems to display.       Discharge Medications:       Medication List      START taking these medications    oxyCODONE-acetaminophen 5-325 mg per tablet  Commonly known as:  PERCOCET  Take 1 tablet by mouth every 4 (four) hours as needed. 1-2 po 4XD prn pain        CHANGE how you take these medications    gabapentin 800 MG tablet  Commonly known as:  NEURONTIN  Take 1 tablet (800 mg total) by mouth every evening.  What changed:  how much to take        CONTINUE taking these medications    amLODIPine 10 MG tablet  Commonly known as:  NORVASC  TAKE 1 TABLET BY MOUTH EVERY DAY     atenolol 25 MG tablet  Commonly known as:  TENORMIN  TAKE 1 TABLET BY MOUTH EVERY DAY     benzonatate 100 MG capsule  Commonly known as:  TESSALON  Take 1 capsule (100 mg total) by mouth 3 (three) times daily as needed for Cough.     CALCIUM 500 + D 500 mg(1,250mg) -200 unit per tablet  Generic drug:  calcium-vitamin D3     citalopram 40 MG tablet  Commonly known as:  CELEXA  TAKE 1 TABLET BY MOUTH EVERY DAY     co-enzyme Q-10 30 mg capsule     econazole nitrate 1 % cream  Use bid for rash     fish oil-omega-3 fatty acids 300-1,000 mg capsule     furosemide 40 MG tablet  Commonly known as:  LASIX  Take 1 tablet (40 mg total) by mouth daily as needed (edema).     GLUCOSAMINE SULF-CHONDROITIN ORAL     multivitamin capsule     oxybutynin 15 MG Tr24  Commonly known as:  DITROPAN XL  Take 1 tablet (15 mg total) by mouth once daily.     pantoprazole 20 MG tablet  Commonly known as:  PROTONIX  TAKE 1 TABLET BY MOUTH EVERY DAY     pravastatin 40 MG tablet  Commonly known as:  PRAVACHOL  TAKE 1 TABLET BY MOUTH EVERY DAY     triamcinolone acetonide 0.1%  0.1 % cream  Commonly known as:  KENALOG  Pt has sterile jar.  Pt to mix TAC, lotrimin cream and Milk of Magnesia in 1:1:1 ratio. Apply bid prn     TRILIPIX 135 mg Cpdr  Generic drug:  fenofibric acid  Take 1 capsule (135 mg total) by mouth once daily.     VITAMIN C 1000 MG tablet  Generic drug:  ascorbic acid (vitamin C)     vitamin E 400 UNIT capsule           Where to Get Your Medications      Information about where to get these medications is not yet available    Ask your nurse or doctor about these medications  · oxyCODONE-acetaminophen 5-325 mg per tablet         Follow-up (including scheduled tests):    History of Present Illness: See H&P    Past Medical History:    Past Medical History:   Diagnosis Date    Allergy     Arthritis     Chronic lower back pain     Depression     Fever blister     GERD (gastroesophageal reflux disease)     Hemorrhoids, internal 11/5/2014    Hyperlipidemia     Hypertension     Joint pain     Morbid obesity with BMI of 40.0-44.9, adult 2/24/2015    Multiple gastric ulcers 12/14/2017    TIMMY (obstructive sleep apnea)     does not use CPAP    Skin cancer        Allergies: Patient has no known allergies.    Hospital Course:    The patient underwent surgery on the day of admission. The procedure was uneventful and the patient tolerated well. Post operatively the patient was hemodynamically stable and made appropriate progress in therapy. There were no evident acute postoperative complications.  Procedures: Right tka    Discharge Physical Exam:    See progress note    Condition: Improved    Activity: WBAT    Diet: Resume preadmission diet    Disposition: Home with services

## 2018-03-15 NOTE — PROGRESS NOTES
.Pharmacy New Medication Education    Patient accepted medication education.    Pharmacy educated patient on name and purpose of medications and possible side effects, using the teach-back method.     D/C 0.9% NaCl infusion   D/C acetaminophen tablet 1,000 mg   D/C amLODIPine tablet 10 mg   D/C aspirin tablet 325 mg   D/C atenolol tablet 25 mg   D/C bisacodyl suppository 10 mg   D/C citalopram tablet 40 mg   D/C fenofibric acid capsule 135 mg   D/C furosemide tablet 40 mg   D/C gabapentin capsule 400 mg   D/C hydromorphone (PF) injection 0.5 mg   D/C lactulose 20 gram/30 mL solution Soln 20 g   D/C mupirocin 2 % ointment 1 g   D/C ondansetron disintegrating tablet 4 mg   D/C ondansetron injection 4 mg   D/C oxybutynin 24 hr tablet 15 mg   D/C oxyCODONE 12 hr tablet 10 mg   D/C oxyCODONE immediate release tablet 10 mg   D/C oxyCODONE immediate release tablet 5 mg   D/C pantoprazole EC tablet 20 mg   D/C polyethylene glycol packet 17 g   D/C pravastatin tablet 40 mg   D/C senna-docusate 8.6-50 mg per tablet 1 tablet   D/C sodium chloride 0.9% flush 5 mL       Learners of pharmacy medication education included:  Patient    Patient +/- learner response:  Verbalized Understanding, Teachback

## 2018-03-15 NOTE — TELEPHONE ENCOUNTER
Spoke with patient to schedule post op appointment. Advised patient of date, time, and location of appointment. Verbalized understanding.    ----- Message from Leonora Whitmore sent at 3/15/2018  1:46 PM CDT -----  Contact: [phone]/ self   Pt its requesting a post op appointment in week . Please advise

## 2018-03-15 NOTE — PT/OT/SLP PROGRESS
Physical Therapy Treatment    Patient Name:  Monique Trujillo   MRN:  [id number]    Recommendations:     Discharge Recommendations:  home health PT   Discharge Equipment Recommendations: none   Barriers to discharge: None    Assessment:     Monique Trujillo is a 67 y.o. female admitted with a medical diagnosis of Primary osteoarthritis of both knees.  She presents with the following impairments/functional limitations:  weakness, impaired endurance, impaired functional mobilty, gait instability, impaired balance, decreased lower extremity function, pain, decreased ROM, impaired coordination, orthopedic precautions pt tolerated fair secondary to pain and will benefit from HH services upon discharge.    Rehab Prognosis:  Good; patient would benefit from acute skilled PT services to address these deficits and reach maximum level of function.      Recent Surgery: Procedure(s) (LRB):  ARTHROPLASTY-KNEE (Right) 1 Day Post-Op    Plan:     During this hospitalization, patient to be seen BID (M-F) to address the above listed problems via gait training, therapeutic activities, therapeutic exercises  · Plan of Care Expires:  04/14/18   Plan of Care Reviewed with: patient    Subjective     Communicated with nsg prior to session.  Patient found supine upon PT entry to room, agreeable to treatment.      Chief Complaint: pain  Patient comments/goals: pt wants to be independent again  Pain/Comfort:  · Pain Rating 1: 6/10  · Location - Side 1: Right  · Location - Orientation 1: generalized  · Location 1: knee  · Pain Addressed 1: Reposition, Distraction, Nurse notified  · Pain Rating Post-Intervention 1: 8/10    Patients cultural, spiritual, Jehovah's witness conflicts given the current situation: none reported    Objective:     Patient found with: peripheral IV, SCD     General Precautions: Standard, fall   Orthopedic Precautions:RLE weight bearing as tolerated   Braces: N/A     Functional Mobility:  · Bed Mobility:     · Supine to Sit:  minimum assistance and at R le  · Transfers:     · Sit to Stand:  contact guard assistance and X 3 trials with rolling walker  · Bed to Chair: stand by assistance and contact guard assistance with  rolling walker  using  ambulation  · Gait: amb ~15' X 1 and ~25' X 1 with RW and CGA X 1 seated rest  · Balance: Fair sitting balance      AM-PAC 6 CLICK MOBILITY  Turning over in bed (including adjusting bedclothes, sheets and blankets)?: 3  Sitting down on and standing up from a chair with arms (e.g., wheelchair, bedside commode, etc.): 3  Moving from lying on back to sitting on the side of the bed?: 3  Moving to and from a bed to a chair (including a wheelchair)?: 3  Need to walk in hospital room?: 3  Climbing 3-5 steps with a railing?: 2  Total Score: 17       Therapeutic Activities and Exercises: le supine ex's X 10-12 reps inc: ap,qs,hs,abd/add,slr with assistance on R       Patient left up in chair with all lines intact, call button in reach and nsg notified..    GOALS: see general POC   Physical Therapy Goals        Problem: Physical Therapy Goal    Goal Priority Disciplines Outcome Goal Variances Interventions   Physical Therapy Goal     PT/OT, PT Ongoing (interventions implemented as appropriate)     Description:  Goals to be met by: 18     Patient will increase functional independence with mobility by performin. Supine <> sit with Stand-by Assistance  2. Sit to stand transfer with Stand-by Assistance  3. Bed to chair transfer with Stand-by Assistance using Rolling Walker  4. Gait  x 100 feet with Stand-by Assistance using Rolling Walker.   5. Lower extremity exercise program x 10 reps per handout, with supervision                      Time Tracking:     PT Received On: 03/15/18  PT Start Time: 858     PT Stop Time: 928  PT Total Time (min): 30 min     Billable Minutes: Gait Training 18 and Therapeutic Exercise 12    Treatment Type: Treatment  PT/PTA: PTA     PTA Visit Number: 1     Gerber Serrato  PTA  03/15/2018

## 2018-03-15 NOTE — PLAN OF CARE
Problem: Occupational Therapy Goal  Goal: Occupational Therapy Goal  Goals to be met by: 3/19/18     Patient will increase functional independence with ADLs by performing:    LE Dressing with Stand-by Assistance.  Grooming while standing with Stand-by Assistance.  Toileting from toilet with Stand-by Assistance for hygiene and clothing management.   Supine to sit with Stand-by Assistance.  Stand pivot transfers with Stand-by Assistance.  Toilet transfer to toilet with Stand-by Assistance.  Increased functional strength to WFL for self care skills and functional mobility.  Upper extremity exercise program x10 reps per handout, with independence.     Outcome: Ongoing (interventions implemented as appropriate)  Monique Trujillo is a 67 y.o. female with a medical diagnosis of Primary osteoarthritis of both knees.  She presents with RLE orthopedic precautions, and decreased overall strength, endurance, balance and [place] and safety with ADL's and fx mobility. Performance deficits affecting function are weakness, impaired endurance, impaired self care skills, impaired functional mobilty, gait instability, impaired balance, orthopedic precautions, impaired skin, edema, decreased ROM, pain, decreased safety awareness.  Pt progressing well towards goals.  She performs transfers with Supervision using RW and is able to perform basic self care with Supervision-Min A for LB mgmt. She demonstrates good safety awareness.  Continue OT services to address functional goals, progressing as able.    CALVIN Berry

## 2018-03-15 NOTE — PLAN OF CARE
Problem: Physical Therapy Goal  Goal: Physical Therapy Goal  Goals to be met by: 18     Patient will increase functional independence with mobility by performin. Supine <> sit with Stand-by Assistance  2. Sit to stand transfer with Stand-by Assistance  3. Bed to chair transfer with Stand-by Assistance using Rolling Walker  4. Gait  x 100 feet with Stand-by Assistance using Rolling Walker.   5. Lower extremity exercise program x 10 reps per handout, with supervision     Outcome: Ongoing (interventions implemented as appropriate)  Goals ongoing

## 2018-03-15 NOTE — PT/OT/SLP PROGRESS
Physical Therapy Treatment    Patient Name:  Monique Trujillo   MRN:  [id number]    Recommendations:     Discharge Recommendations:  home health PT   Discharge Equipment Recommendations: none   Barriers to discharge: None    Assessment:     Monique Trujillo is a 67 y.o. female admitted with a medical diagnosis of Primary osteoarthritis of both knees.  She presents with the following impairments/functional limitations:  weakness, impaired endurance, impaired functional mobilty, gait instability, impaired balance, decreased lower extremity function, pain, decreased ROM, impaired coordination, orthopedic precautions, impaired skin pt with decreased pain in PM and improved mobility and endurance,will benefit from HH services upon discharge.    Rehab Prognosis:  Good; patient would benefit from acute skilled PT services to address these deficits and reach maximum level of function.      Recent Surgery: Procedure(s) (LRB):  ARTHROPLASTY-KNEE (Right) 1 Day Post-Op    Plan:     During this hospitalization, patient to be seen BID (M-F) to address the above listed problems via gait training, therapeutic activities, therapeutic exercises  · Plan of Care Expires:  04/14/18   Plan of Care Reviewed with: patient    Subjective     Communicated with nsg prior to session.  Patient found up in chair upon PT entry to room, agreeable to treatment.      Chief Complaint: increased urinating from fluids  Patient comments/goals: pt feels she is ready to go home  Pain/Comfort:  · Pain Rating 1: 3/10  · Location - Side 1: Right  · Location - Orientation 1: generalized  · Location 1: knee  · Pain Addressed 1: Pre-medicate for activity, Reposition, Distraction  · Pain Rating Post-Intervention 1: 4/10    Patients cultural, spiritual, Episcopalian conflicts given the current situation: none reported    Objective:     Patient found with: peripheral IV     General Precautions: Standard, fall   Orthopedic Precautions:RLE weight bearing as tolerated    Braces: N/A     Functional Mobility:  · Transfers:     · Sit to Stand:  stand by assistance with rolling walker  · Toilet Transfer: stand by assistance with  rolling walker  using  Step Transfer  · Gait: amb ~50' X 1 with RW and SBA  · Balance: requires RW for standing balance      AM-PAC 6 CLICK MOBILITY  Turning over in bed (including adjusting bedclothes, sheets and blankets)?: 3  Sitting down on and standing up from a chair with arms (e.g., wheelchair, bedside commode, etc.): 3  Moving from lying on back to sitting on the side of the bed?: 3  Moving to and from a bed to a chair (including a wheelchair)?: 3  Need to walk in hospital room?: 3  Climbing 3-5 steps with a railing?: 2  Total Score: 17       Therapeutic Activities and Exercises: le supine ex's X 10-12 reps inc: ap,qs,hs,abd/add,slr ane self-assisted R knee flexion with L le X 3 reps       Patient left up in chair with all lines intact, call button in reach and nsg notified..    GOALS: see general POC   Physical Therapy Goals        Problem: Physical Therapy Goal    Goal Priority Disciplines Outcome Goal Variances Interventions   Physical Therapy Goal     PT/OT, PT Ongoing (interventions implemented as appropriate)     Description:  Goals to be met by: 18     Patient will increase functional independence with mobility by performin. Supine <> sit with Stand-by Assistance  2. Sit to stand transfer with Stand-by Assistance  3. Bed to chair transfer with Stand-by Assistance using Rolling Walker  4. Gait  x 100 feet with Stand-by Assistance using Rolling Walker.   5. Lower extremity exercise program x 10 reps per handout, with supervision                      Time Tracking:     PT Received On: 03/15/18  PT Start Time: 1220     PT Stop Time: 1246  PT Total Time (min): 26 min     Billable Minutes: Gait Training 16 and Therapeutic Exercise 10    Treatment Type: Treatment  PT/PTA: PTA     PTA Visit Number: 1     Gerber Serrato, LORI  03/15/2018

## 2018-03-28 ENCOUNTER — TELEPHONE (OUTPATIENT)
Dept: ORTHOPEDICS | Facility: CLINIC | Age: 68
End: 2018-03-28

## 2018-03-28 NOTE — TELEPHONE ENCOUNTER
Spoke with Naty to inform her that we will see patient in office to take out her staples. Verbalized understanding.

## 2018-03-28 NOTE — TELEPHONE ENCOUNTER
----- Message from Mary Venegas sent at 3/28/2018 11:19 AM CDT -----  Contact: jaret palacio [phone]  Nurse wanted to speak with the nurse to verify if the doctor want them to remove the staples or wait until the patient see him. Please call and advise.

## 2018-04-03 ENCOUNTER — TELEPHONE (OUTPATIENT)
Dept: ORTHOPEDICS | Facility: CLINIC | Age: 68
End: 2018-04-03

## 2018-04-03 ENCOUNTER — OFFICE VISIT (OUTPATIENT)
Dept: ORTHOPEDICS | Facility: CLINIC | Age: 68
End: 2018-04-03
Payer: MEDICARE

## 2018-04-03 ENCOUNTER — HOSPITAL ENCOUNTER (OUTPATIENT)
Dept: RADIOLOGY | Facility: HOSPITAL | Age: 68
Discharge: HOME | End: 2018-04-03
Attending: ORTHOPAEDIC SURGERY
Payer: MEDICARE

## 2018-04-03 VITALS
WEIGHT: 231 LBS | SYSTOLIC BLOOD PRESSURE: 135 MMHG | BODY MASS INDEX: 42.51 KG/M2 | TEMPERATURE: 98 F | HEART RATE: 74 BPM | DIASTOLIC BLOOD PRESSURE: 80 MMHG | HEIGHT: 62 IN

## 2018-04-03 DIAGNOSIS — Z96.651 S/P TOTAL KNEE ARTHROPLASTY, RIGHT: Primary | ICD-10-CM

## 2018-04-03 DIAGNOSIS — Z96.651 S/P TOTAL KNEE ARTHROPLASTY, RIGHT: ICD-10-CM

## 2018-04-03 PROCEDURE — 73562 X-RAY EXAM OF KNEE 3: CPT | Mod: 26,RT,, | Performed by: RADIOLOGY

## 2018-04-03 PROCEDURE — 73562 X-RAY EXAM OF KNEE 3: CPT | Mod: TC,FY,RT

## 2018-04-03 PROCEDURE — 99499 UNLISTED E&M SERVICE: CPT | Mod: S$GLB,,, | Performed by: ORTHOPAEDIC SURGERY

## 2018-04-03 PROCEDURE — 99999 PR PBB SHADOW E&M-EST. PATIENT-LVL IV: CPT | Mod: PBBFAC,,, | Performed by: ORTHOPAEDIC SURGERY

## 2018-04-03 PROCEDURE — 73560 X-RAY EXAM OF KNEE 1 OR 2: CPT | Mod: TC,FY,LT

## 2018-04-03 PROCEDURE — 73560 X-RAY EXAM OF KNEE 1 OR 2: CPT | Mod: 26,59,LT, | Performed by: RADIOLOGY

## 2018-04-03 RX ORDER — OXYCODONE AND ACETAMINOPHEN 5; 325 MG/1; MG/1
1 TABLET ORAL EVERY 4 HOURS PRN
Qty: 50 TABLET | Refills: 0
Start: 2018-04-03 | End: 2018-09-21

## 2018-04-03 NOTE — TELEPHONE ENCOUNTER
Spoke with patient she states that she needs a physical therapy order of when patient should start physical therapy because they are booked out. Can you please place order with time frame of when patient should start.  ----- Message from Sandy Aguirre sent at 4/3/2018  1:58 PM CDT -----  Contact: self, [phone]  Patient states physical therapy needs to know exactly when doctor wants patient to be seen. States they are booked.

## 2018-04-03 NOTE — TELEPHONE ENCOUNTER
Spoke with Yoly mills that she faxed over orders for home equipment. Advised her that fax was not received and to resend fax. Verbalized understanding.    ----- Message from Nohemy Garrett sent at 4/3/2018 10:30 AM CDT -----  Contact: [facility] [phone]  Nurse requesting to speak with regarding the status of an order for equipment. Please advise.

## 2018-04-09 ENCOUNTER — TELEPHONE (OUTPATIENT)
Dept: ORTHOPEDICS | Facility: CLINIC | Age: 68
End: 2018-04-09

## 2018-04-09 NOTE — TELEPHONE ENCOUNTER
----- Message from Andrew Ann sent at 4/9/2018 10:53 AM CDT -----  Contact: self/[phone]  Patient called to speak with your office about getting her pain medication refilled.  She said the PA tried filling it last week but it did not work for her.    Please call and advise.    oxyCODONE-acetaminophen (PERCOCET) 5-325 mg per tablet    [facility] [place] - [address]

## 2018-04-10 ENCOUNTER — CLINICAL SUPPORT (OUTPATIENT)
Dept: REHABILITATION | Facility: HOSPITAL | Age: 68
End: 2018-04-10
Payer: MEDICARE

## 2018-04-10 DIAGNOSIS — R53.1 DECREASED STRENGTH: ICD-10-CM

## 2018-04-10 DIAGNOSIS — M25.661 DECREASED ROM OF RIGHT KNEE: ICD-10-CM

## 2018-04-10 DIAGNOSIS — Z74.09 DECREASED MOBILITY AND ENDURANCE: ICD-10-CM

## 2018-04-10 DIAGNOSIS — M25.561 ACUTE PAIN OF RIGHT KNEE: ICD-10-CM

## 2018-04-10 PROCEDURE — G8979 MOBILITY GOAL STATUS: HCPCS | Mod: CK,PN

## 2018-04-10 PROCEDURE — 97110 THERAPEUTIC EXERCISES: CPT | Mod: PN

## 2018-04-10 PROCEDURE — G8978 MOBILITY CURRENT STATUS: HCPCS | Mod: CK,PN

## 2018-04-10 PROCEDURE — 97161 PT EVAL LOW COMPLEX 20 MIN: CPT | Mod: PN

## 2018-04-10 NOTE — PLAN OF CARE
TIME RECORD    Date: 4/10/2018    Start Time:  2:15  Stop Time:  3:00    PROCEDURES:    TIMED  Procedure Min.   TE 10                     UNTIMED  Procedure Min.   IE 35         Total Timed Minutes:  10  Total Timed Units:  1 TE  Total Untimed Units:  1 LCE  Charges Billed/# of units:  1 TE + 1 LCE    [facility]    PHYSICAL THERAPY EVALUATION  Onset Date: 3/14/18  Primary Diagnosis: S/P total knee arthroplasty, right  Treatment Diagnosis:   1. Decreased ROM of right knee     2. Decreased mobility and endurance     3. Acute pain of right knee     4. Decreased strength       Past Medical History:   Diagnosis Date    Allergy     Arthritis     Chronic lower back pain     Depression     Fever blister     GERD (gastroesophageal reflux disease)     Hemorrhoids, internal 11/5/2014    Hyperlipidemia     Hypertension     Joint pain     Morbid obesity with BMI of 40.0-44.9, adult 2/24/2015    Multiple gastric ulcers 12/14/2017    TIMMY (obstructive sleep apnea)     does not use CPAP    Skin cancer      Precautions: depression,, high BMI, skin cancer, R PAPITO  Prior Therapy: Yes for R PAPITO  Medications: Monique Trujillo has a current medication list which includes the following prescription(s): amlodipine, ascorbic acid (vitamin c), atenolol, benzonatate, calcium-vitamin d3, citalopram, co-enzyme q-10, econazole nitrate, fish oil-omega-3 fatty acids, furosemide, gabapentin, glucosa kelley 2kcl/chondroitin kelley, multivitamin, oxybutynin, oxycodone-acetaminophen, pantoprazole, pravastatin, triamcinolone acetonide 0.1%, trilipix, and vitamin e, and the following Facility-Administered Medications: lidocaine (pf).  Nutrition:  Overweight  History of Present Illness: See subjective below  Prior Level of Function: Independent  Social History: was working 2 days a week as  at [employer]  Place of Residence (Steps/Adaptations): 2 story home, bedroom on 1st floor  Functional Deficits Leading to  Referral/Nature of Injury: squatting down, lifting/carrying, prolonged standing/walking without walker  Patient Therapy Goals: I want to be able to walk longer distance without pain or stopping    Subjective     Monique Trujillo states she had R knee replacement on 3/14/18 due to OA; pt reported that she also had R PAPITO and low back fusion. Pt received home health 2x a week for 3 weeks, and reported that she can bend her knee about 94 degrees. Pt is walking with a rollator outside of house, but does not use it sometimes at home right now while she does dishes and other chores. Pt was not using any AD before surgery. Pt also states that she is thinking about getting L shoulder and L knee surgery as well in the future. Results after low back surgery was that she has constant R lateral side of leg numbness and R toes and some foot numbness.  Pt states that her R leg swelled up a couple of days ago, and not sure why.    Pain:  Location: R knee  and L shoulder   Description: Aching and Numb  Activities Which Increase Pain: Sitting, Standing, Bending and Flexing  Activities Which Decrease Pain: rest  Pain Scale: 0/10 at best 1/10 now  10/10 at worst    Objective     Posture: increased thoracic kyphosis, forward head  Palpation: +TTP at posterior R knee, R lateral knee, increased B LE edema (R side minimally greater than L throughout LE), mild increased R LE warmth compared to L Le, no significant redness  Sensation: decreased light touch at R anterior knee laterally, R anterior foot  DTRs: NT  Range of Motion/Strength:   R knee:  AROM: 0-89  PROM: 0-91    L knee:  AROM: 0-118  PROM: 0-120    L/E Strength w/ MicroFET Muscle Edgar Dynamometer Right Left Pain/Dysfunction with Movement   (approx 4 sec hold w/ max contraction)   Hip Flexion 9.3 kg  12.6 kg     Hip Abduction 8.9 kg  13.3 kg     Quadriceps 22.9 kg  26.5 kg     Hamstrings 15.7 kg  17.8 kg       TUG:  15.38 seconds with rollator  30 second sit-to-stand test  (without U/E support): 10 with B UE support    Flexibility: decreased B HS length and R quad length  Gait: With AD.  Device Used -  rollator  Analysis:decreased dea  Bed Mobility:Independent  Transfers: Independent  Special Tests: NT    Functional Limitation Reports: G codes  Tool: FOTO KNEE SURVEY  Category: Mobility ()  Limitation: 55%  Current: CK = at least 40% but < 60% impaired, limited or restricted  Goal: CK = at least 40% but < 60% impaired, limited or restricted    TREATMENT     Time In: 2:50  Time Out: 3:00    PT Evaluation Completed? Yes  Discussed Plan of Care with patient: Yes    Monique received 10 minutes of therapeutic exercise & instruction including:  DATE 4/10/2018   VISIT 1   POC 6/10/18    G CODE 1/10   FOTO 1/5   Cap Visit  Cap Total 113.20  113.20   Bike Next    MHP    MT    Stretches:    HS stretch Next    IT band stretch    Supine:    Quad sets 5x5''   SLR 5x R   SAQ    Knee flexion stretch Next w/ strap   Heel slides 5x R   SL hip abd Next    Clams    Hip add    Bridges 5x       Prone:    Quad stretch    HS curls        Seated:    LAQ Next    Marching Next        Standing:    Heel raises Next    Calf stretch on fitter Next    Steamboats    TKEs    SLS    Tandem walking    Cybex HS curls    Cybex LAQ    Leg Press    Gait Next with SPC   CP    INITIALS RONALDO     Written Home Exercises Provided: YES  Monique demo good understanding of the education provided. Patient demo good return demo of skill of exercises.    See EMR in Patient Instructions for HEP given: Yes      Assessment       Initial Assessment (Pertinent finding, problem list and factors affecting outcome):   Pt is a 66 yo female dx with S/P R TKA presenting to PT at [facility]. Pt currently presents with R knee pain, decreased right knee ROM, decreased BLE strength, poor posture, impaired balance and gait, and functional deficits with community walking, standing ADLs, squatting, down, and  balance. Pt would benefit from skilled PT consisting of gait training, muscular skeletal stretching/strengthening, manual therapy, neuro muscular re-education, and modalities prn to address limitations and increase functional mobility.      History  Co-morbidities and personal factors that may impact the plan of care Examination  Body Structures and Functions, activity limitations and participation restrictions that may impact the plan of care    Clinical Presentation   Co-morbidities:   arthritis, BMI over 30, GI disease, HTN, R TKA, lumbar fusion        Personal Factors:   no deficits Body Regions:   back  lower extremities  trunk    Body Systems:    gross symmetry  ROM  strength  gross coordinated movement  balance  gait  transfers  transitions  motor control  motor learning  edema  scar formation  skin integrity  skin color            Participation Restrictions:   Community walking, standing ADLs     Activity limitations:   Learning and applying knowledge  no deficits    General Tasks and Commands  no deficits    Communication  no deficits    Mobility  lifting and carrying objects  walking  driving (bike, car, motorcycle)    Self care  no deficits    Domestic Life  shopping  cooking  doing house work (cleaning house, washing dishes, laundry)  assisting others    Interactions/Relationships  no deficits    Life Areas  no deficits    Community and Social Life  no deficits         stable and uncomplicated                      low   high  high Decision Making/ Complexity Score:  low       Rehab Potiential: excellent  Spiritual/Cultural Needs: None  Barriers to Rehab: None  Short Term Goals: (4 weeks)  1. Pt will be independent with HEP  2. Pt will improve R LE strength to at least 75% of L LE strength as measured via MicroFet handheld dynamometer in order to improve functional mobility  3. Pt will improve R knee AROM to at least 0-105 in order to improve gait    Long Term Goals: (8 weeks)  1. Pt will be independent  with updated HEP  2. Pt will improve R LE strength to at least 90% of L LE strength as measured via MicroFet handheld dynamometer in order to improve functional mobility  3. Pt will improve R knee AROM to at least 0-110 in order to improve gait and ability to perform ADLs  4. Pt will improve FOTO knee survey score to </= 42% limited in order to demo improved functional mobility  5. Pt will perform TUG in < 10 seconds without AD in order to demo improved gait speed  6. Pt will perform at least 10 sit to stands without UE support on 30 second sit to stand test in order to demo improved ability to perform transfers        Pt's next scheduled visit in on 4/20/18; POC increased to 9 weeks instead of 8 weeks.  Plan     Certification Period: 4/10/18 to 6/20/18  Recommended Treatment Plan: 2 times per week for 9 weeks: Cervical/Lumbar Traction, Electrical Stimulation IFC/Russian, Gait Training, Manual Therapy, Moist Heat/ Ice, Neuromuscular Re-ed, Patient Education, Self Care, Therapeutic Activites, Therapeutic Exercise and Ultrasound/Phonophoresis  Other Recommendations: modalities prn, ASTYM prn, kinesiotape prn, Functional Dry Needling prn       Lew Dao, PT  4/10/2018      I CERTIFY THE NEED FOR THESE SERVICES FURNISHED UNDER THIS PLAN OF TREATMENT AND WHILE UNDER MY CARE    Physician's comments: ________________________________________________________________________________________________________________________________________________      Physician's Name: ___________________________________

## 2018-04-20 ENCOUNTER — CLINICAL SUPPORT (OUTPATIENT)
Dept: REHABILITATION | Facility: HOSPITAL | Age: 68
End: 2018-04-20
Payer: MEDICARE

## 2018-04-20 DIAGNOSIS — R53.1 DECREASED STRENGTH: ICD-10-CM

## 2018-04-20 DIAGNOSIS — Z74.09 DECREASED MOBILITY AND ENDURANCE: ICD-10-CM

## 2018-04-20 DIAGNOSIS — M25.561 RIGHT KNEE PAIN, UNSPECIFIED CHRONICITY: ICD-10-CM

## 2018-04-20 DIAGNOSIS — M25.661 DECREASED ROM OF RIGHT KNEE: ICD-10-CM

## 2018-04-20 PROCEDURE — 97110 THERAPEUTIC EXERCISES: CPT | Mod: PN

## 2018-04-20 NOTE — PROGRESS NOTES
DAILY TREATMENT NOTE    DATE: 4/20/2018    Start Time:  2:05  Stop Time:  2:20    PROCEDURES:    TIMED  Procedure Min.   TE 15                     UNTIMED  Procedure Min.             Total Timed Minutes:  15  Total Timed Units:  1 TE  Total Untimed Units:  0  Charges Billed/# of units:  1 TE      Progress/Current Status    Subjective:     Patient ID: Monique Trujillo is a 67 y.o. female.  Diagnosis: No diagnosis found.  Pain: 2 /10  Pt reports that she is doing well and has been doing HEP.    Objective:     Monique received 15 minutes of TE 1:1 with PT for B HS stretch and R quad sets; see DH note for sets and reps.    Gait training next with SPC.    Assessment:     Pt performed initial TE well without issues. Pt demo nearly full active R knee extension and greater than 90 degrees R knee active flexion. Cont to progress pt to increase flexion and get pt on SPC. Gait training next.  Patient Education/Response:     Cont HEP    Plans and Goals:     Cont per POC, progress per pt tolerance.    Short Term Goals: (4 weeks)  1. Pt will be independent with HEP  2. Pt will improve R LE strength to at least 75% of L LE strength as measured via MicroFet handheld dynamometer in order to improve functional mobility  3. Pt will improve R knee AROM to at least 0-105 in order to improve gait     Long Term Goals: (8 weeks)  1. Pt will be independent with updated HEP  2. Pt will improve R LE strength to at least 90% of L LE strength as measured via MicroFet handheld dynamometer in order to improve functional mobility  3. Pt will improve R knee AROM to at least 0-110 in order to improve gait and ability to perform ADLs  4. Pt will improve FOTO knee survey score to </= 42% limited in order to demo improved functional mobility  5. Pt will perform TUG in < 10 seconds without AD in order to demo improved gait speed  6. Pt will perform at least 10 sit to stands without UE support on 30 second sit to stand test in order to demo improved  ability to perform transfers

## 2018-04-20 NOTE — PROGRESS NOTES
"DAILY TREATMENT NOTE    DATE: 4/20/2018    Start Time:  220  Stop Time:  300    PROCEDURES:    TIMED  Procedure Min.   Therex 40         Total Timed Minutes:  40  Total Timed Units:  3  Total Untimed Units:  0  Charges Billed/# of units:  3  TE      Progress/Current Status    Subjective:     Patient ID: Monique Trujillo is a 67 y.o. female.  Diagnosis:   1. Decreased ROM of right knee     2. Decreased mobility and endurance     3. Right knee pain, unspecified chronicity     4. Decreased strength       See note of Lew Dao PT for subjective reports.    Objective:     Patient treated initial 20 minutes by Lew Dao PT, Remaining 40 minutes with 1:1 by PTA including increased reps and added standing therex as noted.    DATE  4/20/18 4/10/2018   VISIT  2 1   POC 6/10/18       G CODE  2/10 1/10   FOTO  2/5 1/5   Cap Visit  Cap Total 121.28  234.48 113.20  113.20   Bike  Next Next    MHP       MT       Stretches:       HS stretch RONALDO  3x30'' R Next    IT band stretch       Supine:       Quad sets RONALDO  10x10'' R  towel under ankle 5x5''   SLR  2x10 B 5x R   SAQ  2# 2x10 B     Knee flexion stretch  5"x15 1 strap Next w/ strap   Heel slides  w/strap 5x R   SL hip abd  unable - shldr pain Next    Clams        Hip add  w/ball 5"x10     Bridges  2x10 5x           Prone:       Quad stretch  --     HS curls  --             Seated:       LAQ  2# 2x10 B Next    Marching   Next            Standing:       Heel raises   Next    Calf stretch on fitter  oot Next    Steamboats       TKEs       SLS       Tandem walking       Cybex HS curls       Cybex LAQ       Leg Press       Gait  Unable due to shoulder pain Next with SPC   CP  Home     INITIALS  DH 1/6 RONALDO         Assessment:     Patient able to increase activity significantly with added weights and reps, however treatment was limited by complaint of Left shoulder pain.  Unable to perform sidelying TE or pull with B UE for supine knee flexion stretch.  Reports "good " "workout - I'm Ok" after session.    Patient Education/Response:     Patient educated to continue HEP.  Verbalized understanding.      Plans and Goals:     Continue PT per POC, progress as able.    Short Term Goals: (4 weeks)  1. Pt will be independent with HEP  2. Pt will improve R LE strength to at least 75% of L LE strength as measured via MicroFet handheld dynamometer in order to improve functional mobility  3. Pt will improve R knee AROM to at least 0-105 in order to improve gait     Long Term Goals: (8 weeks)  1. Pt will be independent with updated HEP  2. Pt will improve R LE strength to at least 90% of L LE strength as measured via MicroFet handheld dynamometer in order to improve functional mobility  3. Pt will improve R knee AROM to at least 0-110 in order to improve gait and ability to perform ADLs  4. Pt will improve FOTO knee survey score to </= 42% limited in order to demo improved functional mobility  5. Pt will perform TUG in < 10 seconds without AD in order to demo improved gait speed  6. Pt will perform at least 10 sit to stands without UE support on 30 second sit to stand test in order to demo improved ability to perform transfers       "

## 2018-04-24 ENCOUNTER — CLINICAL SUPPORT (OUTPATIENT)
Dept: REHABILITATION | Facility: HOSPITAL | Age: 68
End: 2018-04-24
Payer: MEDICARE

## 2018-04-24 DIAGNOSIS — R53.1 DECREASED STRENGTH: ICD-10-CM

## 2018-04-24 DIAGNOSIS — Z74.09 DECREASED MOBILITY AND ENDURANCE: ICD-10-CM

## 2018-04-24 DIAGNOSIS — M25.561 RIGHT KNEE PAIN, UNSPECIFIED CHRONICITY: ICD-10-CM

## 2018-04-24 DIAGNOSIS — M25.661 DECREASED ROM OF RIGHT KNEE: ICD-10-CM

## 2018-04-24 PROCEDURE — 97110 THERAPEUTIC EXERCISES: CPT | Mod: PN

## 2018-04-24 NOTE — PROGRESS NOTES
"DAILY TREATMENT NOTE    DATE: 4/24/2018    Start Time:  220  Stop Time:  310    PROCEDURES:    TIMED  Procedure Min.   Therex 45         UNTIMED  Procedure Min.   Bike 5         Total Timed Minutes:  45  Total Timed Units:  3  Total Untimed Units:  0  Charges Billed/# of units:  3 TE      Progress/Current Status    Subjective:     Patient ID: Monique Trujillo is a 67 y.o. female.  Diagnosis:   1. Decreased ROM of right knee     2. Decreased mobility and endurance     3. Right knee pain, unspecified chronicity     4. Decreased strength       "My Right knee and ankle are swollen and stiff."    Objective:     Patient performed therex 45 minutes with 1:1 by PTA including increased reps and added standing therex as noted. Added trial on bike today x 5 minutes.    DATE 4/24/18  4/20/18 4/10/2018   VISIT 3  2 1   POC 6/10/18        G CODE 3/10  2/10 1/10   FOTO 3/5  2/5 1/5   Cap Visit  Cap Total   90.96  325.44 121.28  234.48 113.20  113.20   Bike 5'  Next Next    MHP        MT        Stretches:        HS stretch 30"x3 B RONALDO  3x30'' R Next    IT band stretch        Supine        Quad sets 10x10'' R  towel under ankle [id number]'' R  towel under ankle 5x5''   SLR 2x12 B  2x10 B 5x R   SAQ   2# 2x10 B     Knee flexion stretch 5"x15 w/2 straps  5"x15 1 strap Next w/ strap   Heel slides   w/strap 5x R   SL hip abd Unable L shldr  unable - shldr pain Next    Clams         Hip add W/ball 5"x10  w/ball 5"x10     Bridges 2x10  2x10 5x            Prone:        Quad stretch --  --     HS curls --  --              Seated:        LAQ 2# 2x12  2# 2x10 B Next    Marching 2# 2x12   Next             Standing:        Heel raises 2x10   Next    Calf stretch on fitter 30"x3 B  oot Next    Steamboats 1x10 B       TKEs        SLS        Tandem walking        Cybex HS curls        Cybex LAQ        Leg Press        Gait NT  Unable due to shoulder pain Next with SPC   CP   Home     INITIALS DH 2/6  DH 1/6 RONALDO       Assessment:     Treatment " "slightly limited by time due to patient late for appointment.  Able to increase activity with added therex as well as standing and bike trial.  States "good - I got a lot done." Voiced no complaints after treatment.    Patient Education/Response:     Patient educated to continue HEP.  Verbalized understanding.    Plans and Goals:     Continue PT per POC, progress as able.    Short Term Goals: (4 weeks)  1. Pt will be independent with HEP  2. Pt will improve R LE strength to at least 75% of L LE strength as measured via MicroFet handheld dynamometer in order to improve functional mobility  3. Pt will improve R knee AROM to at least 0-105 in order to improve gait     Long Term Goals: (8 weeks)  1. Pt will be independent with updated HEP  2. Pt will improve R LE strength to at least 90% of L LE strength as measured via MicroFet handheld dynamometer in order to improve functional mobility  3. Pt will improve R knee AROM to at least 0-110 in order to improve gait and ability to perform ADLs  4. Pt will improve FOTO knee survey score to </= 42% limited in order to demo improved functional mobility  5. Pt will perform TUG in < 10 seconds without AD in order to demo improved gait speed  6. Pt will perform at least 10 sit to stands without UE support on 30 second sit to stand test in order to demo improved ability to perform transfers       "

## 2018-04-25 ENCOUNTER — OFFICE VISIT (OUTPATIENT)
Dept: ORTHOPEDICS | Facility: CLINIC | Age: 68
End: 2018-04-25
Payer: MEDICARE

## 2018-04-25 DIAGNOSIS — Z96.651 S/P TOTAL KNEE ARTHROPLASTY, RIGHT: Primary | ICD-10-CM

## 2018-04-25 PROCEDURE — 99024 POSTOP FOLLOW-UP VISIT: CPT | Mod: S$GLB,,, | Performed by: ORTHOPAEDIC SURGERY

## 2018-04-25 PROCEDURE — 99999 PR PBB SHADOW E&M-EST. PATIENT-LVL II: CPT | Mod: PBBFAC,,, | Performed by: ORTHOPAEDIC SURGERY

## 2018-04-25 NOTE — PROGRESS NOTES
Subjective:      Patient ID: Monique Trujillo is a 67 y.o. female.    Chief Complaint: Post-op Evaluation of the Right Knee      HPI: Monique Trujillo is 6 weeks s/p right total knee replacement. She is progressing well. She is currently in therapy. She walks with a Rolator walker in public but ambulates independently at home. She reports little knee pain and only requires medication after therapy. Today she is actually more concerned about left shoulder pain.  She attributes worsening of chronic shoulder pain to using her arms for raising from a seated position and walking with walker/cane.    Past Medical History:   Diagnosis Date    Allergy     Arthritis     Chronic lower back pain     Depression     Fever blister     GERD (gastroesophageal reflux disease)     Hemorrhoids, internal 11/5/2014    Hyperlipidemia     Hypertension     Joint pain     Morbid obesity with BMI of 40.0-44.9, adult 2/24/2015    Multiple gastric ulcers 12/14/2017    TIMMY (obstructive sleep apnea)     does not use CPAP    Skin cancer        Current Outpatient Prescriptions:     amlodipine (NORVASC) 10 MG tablet, TAKE 1 TABLET BY MOUTH EVERY DAY, Disp: 90 tablet, Rfl: 3    ascorbic acid (VITAMIN C) 1000 MG tablet, Take 1,000 mg by mouth once daily., Disp: , Rfl:     atenolol (TENORMIN) 25 MG tablet, TAKE 1 TABLET BY MOUTH EVERY DAY, Disp: 90 tablet, Rfl: 3    benzonatate (TESSALON) 100 MG capsule, Take 1 capsule (100 mg total) by mouth 3 (three) times daily as needed for Cough., Disp: 42 capsule, Rfl: 3    calcium-vitamin D3 (CALCIUM 500 + D) 500 mg(1,250mg) -200 unit per tablet, Take 1 tablet by mouth 2 (two) times daily with meals., Disp: , Rfl:     citalopram (CELEXA) 40 MG tablet, TAKE 1 TABLET BY MOUTH EVERY DAY, Disp: 90 tablet, Rfl: 3    co-enzyme Q-10 30 mg capsule, Take 30 mg by mouth once daily. , Disp: , Rfl:     econazole nitrate 1 % cream, Use bid for rash, Disp: 85 g, Rfl: 2    fish oil-omega-3 fatty  acids 300-1,000 mg capsule, Take 2 g by mouth once daily., Disp: , Rfl:     furosemide (LASIX) 40 MG tablet, Take 1 tablet (40 mg total) by mouth daily as needed (edema)., Disp: 90 tablet, Rfl: 3    gabapentin (NEURONTIN) 800 MG tablet, Take 1 tablet (800 mg total) by mouth every evening. (Patient taking differently: Take 400 mg by mouth every evening. ), Disp: 30 tablet, Rfl: 9    GLUCOSA KHAN 2KCL/CHONDROITIN KHAN (GLUCOSAMINE SULF-CHONDROITIN ORAL), Take 1 tablet by mouth once daily., Disp: , Rfl:     multivitamin capsule, Take 1 capsule by mouth once daily., Disp: , Rfl:     oxybutynin (DITROPAN XL) 15 MG TR24, Take 1 tablet (15 mg total) by mouth once daily., Disp: 90 tablet, Rfl: 3    oxyCODONE-acetaminophen (PERCOCET) 5-325 mg per tablet, Take 1 tablet by mouth every 4 (four) hours as needed. 1-2 po 4XD prn pain, Disp: 50 tablet, Rfl: 0    pantoprazole (PROTONIX) 20 MG tablet, TAKE 1 TABLET BY MOUTH EVERY DAY, Disp: 90 tablet, Rfl: 0    pravastatin (PRAVACHOL) 40 MG tablet, TAKE 1 TABLET BY MOUTH EVERY DAY, Disp: 90 tablet, Rfl: 33    triamcinolone acetonide 0.1% (KENALOG) 0.1 % cream, Pt has sterile jar.  Pt to mix TAC, lotrimin cream and Milk of Magnesia in 1:1:1 ratio. Apply bid prn, Disp: 60 g, Rfl: 3    TRILIPIX 135 mg CpDR, Take 1 capsule (135 mg total) by mouth once daily., Disp: 90 capsule, Rfl: 3    vitamin E 400 UNIT capsule, Take 400 Units by mouth once daily., Disp: , Rfl:     Current Facility-Administered Medications:     lidocaine (PF) 20 mg/mL (2 %) injection 2 mL, 2 mL, Intradermal, Once, Kalyan Han MD  Review of patient's allergies indicates:  No Known Allergies    There were no vitals taken for this visit.    Review of Systems   Constitution: Negative for chills and fever.   Cardiovascular: Negative for chest pain and palpitations.   Respiratory: Negative for shortness of breath and wheezing.    Skin: Negative for poor wound healing and rash.   Musculoskeletal: Positive for  joint pain (left shoulder, left knee). Negative for falls and joint swelling.   Gastrointestinal: Negative for nausea and vomiting.   Neurological: Negative for seizures and tremors.   Psychiatric/Behavioral: Negative for altered mental status.         Objective:    Right Knee Exam     Tenderness   The patient is experiencing no tenderness.         Range of Motion   Extension: 0   Flexion: 120     Other   Erythema: absent  Sensation: normal  Pulse: present  Swelling: none    Comments:  Incision is well healed. Pt walking very well with walker -- subtle limp.               Assessment:     Imaging: no new imaging         1. S/P total knee arthroplasty, right          Plan:      Continue therapy. Instructed pt to bring cane to PT for education and instructions for use.  Advised pt to resume normal life activities.   She can start driving.  She would like to wait to resume working as a  since that requires long periods of standing. This is reasonable.   She still has some Percocet left, which she can finished but discussed transitioning to OTC analgesics afterwards.   Referral to Dr. Torres for shoulder pain.   Follow-up in about 1 month (around 5/25/2018).

## 2018-04-27 ENCOUNTER — CLINICAL SUPPORT (OUTPATIENT)
Dept: REHABILITATION | Facility: HOSPITAL | Age: 68
End: 2018-04-27
Payer: MEDICARE

## 2018-04-27 DIAGNOSIS — R53.1 DECREASED STRENGTH: ICD-10-CM

## 2018-04-27 DIAGNOSIS — M25.661 DECREASED ROM OF RIGHT KNEE: ICD-10-CM

## 2018-04-27 DIAGNOSIS — M25.561 RIGHT KNEE PAIN, UNSPECIFIED CHRONICITY: ICD-10-CM

## 2018-04-27 DIAGNOSIS — Z74.09 DECREASED MOBILITY AND ENDURANCE: ICD-10-CM

## 2018-04-27 PROCEDURE — 97110 THERAPEUTIC EXERCISES: CPT | Mod: PN

## 2018-04-27 NOTE — PROGRESS NOTES
"DAILY TREATMENT NOTE    DATE: 4/27/2018    Start Time:  2:05  Stop Time:  3:10    PROCEDURES:    TIMED  Procedure Min.   Therex 25         UNTIMED  Procedure Min.   Bike 5   CP 10     Total Timed Minutes:  30  Total Timed Units:  2  Total Untimed Units:  0  Charges Billed/# of units:  2 TE      Progress/Current Status    Subjective:     Patient ID: Monique Trujillo is a 67 y.o. female.  Diagnosis:   1. Decreased ROM of right knee     2. Decreased mobility and endurance     3. Right knee pain, unspecified chronicity     4. Decreased strength       3-4/10 R knee pain  Pt reports she is walking without cane and walker    Objective:     Patient performed bike x 5 mins included in TE with increased R knee AROM, decreased stiffness and decreased mobility. Pt then performed therex 25 minutes with 1:1 by PT per log below. Pt then performed TE supervised x 25 mins per log below.    DATE 4/27/18 4/24/18  4/20/18 4/10/2018   VISIT 4 3  2 1   POC 6/10/18         G CODE 4/10 3/10  2/10 1/10   FOTO 4/5 3/5  2/5 1/5   Cap Visit  Cap Total 60.64  686.04 90.96  325.44 121.28  234.48 113.20  113.20   Bike 5' 5'  Next Next    MHP         MT         Stretches:         HS stretch 3x30'' R  stairs 30"x3 B RONALDO  3x30'' R Next    IT band stretch         Supine         Quad sets 10''x12 R  Towel under ankle; PT overpressure 10x10'' R  towel under ankle [id number]'' R  towel under ankle 5x5''   SLR 3x10 B 2x12 B  2x10 B 5x R   SAQ    2# 2x10 B     Knee flexion stretch 10x10'' w/ straps  PT overpressure 5"x15 w/2 straps  5"x15 1 strap Next w/ strap   Heel slides 2x10 2# R w/ pillowcase   w/strap 5x R   SL hip abd standing Unable L shldr  unable - shldr pain Next    Clams          Hip add 10x5'' w/ ball W/ball 5"x10  w/ball 5"x10     Bridges 2x15 2x10  2x10 5x             Prone:         Quad stretch  --  --     HS curls  --  --               Seated:         LAQ NT 2# 2x12  2# 2x10 B Next    Marching NT 2# 2x12   Next              Standing:     " "    Heel raises 2x10  2x10   Next    Calf stretch on fitter 3x30'' B 30"x3 B  oot Next    Steamboats Hip abd, ext  2x15 R 1x10 B       TKEs         SLS         Tandem walking         Cybex HS curls         Cybex LAQ         Leg Press 2x10 DL  3.5 plates        Gait NT NT  Unable due to shoulder pain Next with SPC   CP 10'   Home     INITIALS RONALDO DH 2/6  DH 1/6 RONALDO     R knee  AROM: 1-103  PROM: 0-109     Assessment:     Pt performed TE well and demo good effort with all stretching and strengthening. Pt progressing well with R knee range noted above.    Patient Education/Response:     Patient educated to continue HEP.  Verbalized understanding.    Plans and Goals:     Continue PT per POC, progress as able.    Short Term Goals: (4 weeks)  1. Pt will be independent with HEP  2. Pt will improve R LE strength to at least 75% of L LE strength as measured via MicroFet handheld dynamometer in order to improve functional mobility  3. Pt will improve R knee AROM to at least 0-105 in order to improve gait     Long Term Goals: (8 weeks)  1. Pt will be independent with updated HEP  2. Pt will improve R LE strength to at least 90% of L LE strength as measured via MicroFet handheld dynamometer in order to improve functional mobility  3. Pt will improve R knee AROM to at least 0-110 in order to improve gait and ability to perform ADLs  4. Pt will improve FOTO knee survey score to </= 42% limited in order to demo improved functional mobility  5. Pt will perform TUG in < 10 seconds without AD in order to demo improved gait speed  6. Pt will perform at least 10 sit to stands without UE support on 30 second sit to stand test in order to demo improved ability to perform transfers       "

## 2018-05-04 ENCOUNTER — CLINICAL SUPPORT (OUTPATIENT)
Dept: REHABILITATION | Facility: HOSPITAL | Age: 68
End: 2018-05-04
Payer: MEDICARE

## 2018-05-04 DIAGNOSIS — M25.661 DECREASED ROM OF RIGHT KNEE: ICD-10-CM

## 2018-05-04 DIAGNOSIS — Z74.09 DECREASED MOBILITY AND ENDURANCE: ICD-10-CM

## 2018-05-04 DIAGNOSIS — R53.1 DECREASED STRENGTH: ICD-10-CM

## 2018-05-04 DIAGNOSIS — M25.561 RIGHT KNEE PAIN, UNSPECIFIED CHRONICITY: ICD-10-CM

## 2018-05-04 PROCEDURE — 97110 THERAPEUTIC EXERCISES: CPT | Mod: PN

## 2018-05-04 NOTE — PROGRESS NOTES
"DAILY TREATMENT NOTE    DATE: 5/4/2018    Start Time:  2:05  Stop Time:  3:10    PROCEDURES:    TIMED  Procedure Min.   Therex 25         UNTIMED  Procedure Min.   Bike 5   CP 10     Total Timed Minutes:  30  Total Timed Units:  2  Total Untimed Units:  0  Charges Billed/# of units:  2 TE      Progress/Current Status    Subjective:     Patient ID: Monique Trujillo is a 67 y.o. female.  Diagnosis:   No diagnosis found.  3-4/10 R knee pain  Pt reports she is walking without cane and walker    Objective:     Patient performed bike x 5 mins included in TE with increased R knee AROM, decreased stiffness and decreased mobility. Pt then performed therex 25 minutes with 1:1 by PT per log below. Pt then performed TE supervised x 25 mins per log below.    DATE  4/27/18 4/24/18  4/20/18 4/10/2018   VISIT  4 3  2 1   POC 6/10/18          G CODE  4/10 3/10  2/10 1/10   FOTO  4/5 3/5  2/5 1/5   Cap Visit  Cap Total  60.64  686.04 90.96  325.44 121.28  234.48 113.20  113.20   Bike  5' 5'  Next Next    MHP          MT          Stretches:          HS stretch  3x30'' R  stairs 30"x3 B RONALDO  3x30'' R Next    IT band stretch          Supine          Quad sets  10''x12 R  Towel under ankle; PT overpressure 10x10'' R  towel under ankle [id number]'' R  towel under ankle 5x5''   SLR  3x10 B 2x12 B  2x10 B 5x R   SAQ     2# 2x10 B     Knee flexion stretch  10x10'' w/ straps  PT overpressure 5"x15 w/2 straps  5"x15 1 strap Next w/ strap   Heel slides  2x10 2# R w/ pillowcase   w/strap 5x R   SL hip abd  standing Unable L shldr  unable - shldr pain Next    Clams           Hip add  10x5'' w/ ball W/ball 5"x10  w/ball 5"x10     Bridges  2x15 2x10  2x10 5x              Prone:          Quad stretch   --  --     HS curls   --  --                Seated:          LAQ  NT 2# 2x12  2# 2x10 B Next    Marching  NT 2# 2x12   Next               Standing:          Heel raises  2x10  2x10   Next    Calf stretch on fitter  3x30'' B 30"x3 B  oot Next  "   Steamboats  Hip abd, ext  2x15 R 1x10 B       TKEs          SLS          Tandem walking          Cybex HS curls          Cybex LAQ          Leg Press  2x10 DL  3.5 plates        Gait  NT NT  Unable due to shoulder pain Next with SPC   CP  10'   Home     INITIALS RONALDO RONALDO  2/6   1/6 RONALDO     R knee  AROM: 1-103  PROM: 0-109     Assessment:     Pt performed TE well and demo good effort with all stretching and strengthening. Pt progressing well with R knee range noted above.    Patient Education/Response:     Patient educated to continue HEP.  Verbalized understanding.    Plans and Goals:     Continue PT per POC, progress as able.    Short Term Goals: (4 weeks)  1. Pt will be independent with HEP  2. Pt will improve R LE strength to at least 75% of L LE strength as measured via MicroFet handheld dynamometer in order to improve functional mobility  3. Pt will improve R knee AROM to at least 0-105 in order to improve gait     Long Term Goals: (8 weeks)  1. Pt will be independent with updated HEP  2. Pt will improve R LE strength to at least 90% of L LE strength as measured via MicroFet handheld dynamometer in order to improve functional mobility  3. Pt will improve R knee AROM to at least 0-110 in order to improve gait and ability to perform ADLs  4. Pt will improve FOTO knee survey score to </= 42% limited in order to demo improved functional mobility  5. Pt will perform TUG in < 10 seconds without AD in order to demo improved gait speed  6. Pt will perform at least 10 sit to stands without UE support on 30 second sit to stand test in order to demo improved ability to perform transfers

## 2018-05-04 NOTE — PROGRESS NOTES
"DAILY TREATMENT NOTE    DATE: 5/4/2018    Start Time: 1400  Stop Time: 1510    PROCEDURES:    TIMED  Procedure Min.   TE supervised 20   TE 25   MT 10     UNTIMED  Procedure Min.   Bike 5   CP 10     Total Timed Minutes:  35  Total Timed Units:  2  Total Untimed Units: 2  Charges Billed/# of units: TE-2      Progress/Current Status    Subjective:     Patient ID: Monique Trujillo is a 67 y.o. female.  Diagnosis:   1. Decreased ROM of right knee     2. Decreased mobility and endurance     3. Right knee pain, unspecified chronicity     4. Decreased strength       Pain: 0/10 R knee pain    Pt reports she has not been compliant with heel slides or remaining HEP. When asked why pt reports she does not have a flat surface to perform it on    Objective:     Session began on bike supervised x 5' for increased R knee flexion ROM f/b TE supervised x 20' per log. MT x 10' including knee flexion contract relax and overpressure w/ heel slides and quad sets. TE 1:1 with PT x 25' per log.     R knee  AROM: 1-110  PROM: 0-116    DATE 5/4/18 4/27/18 4/24/18  4/20/18 4/10/2018   VISIT 5 4 3  2 1   POC 6/10/18          G CODE 5/10 4/10 3/10  2/10 1/10   FOTO 5/5 done 4/5 3/5  2/5 1/5   Cap Visit  Cap Total 60.64  746.68 60.64  686.04 90.96  325.44 121.28  234.48 113.20  113.20   Bike 5' 5' 5'  Next Next    MHP          MT 10'         Stretches:          HS stretch 3x30'' R  stairs 3x30'' R  stairs 30"x3 B RONALDO  3x30'' R Next    IT band stretch          Supine          Quad sets 10''x10 R  Towel under ankle; PT overpressure 10''x12 R  Towel under ankle; PT overpressure 10x10'' R  towel under ankle [id number]'' R  towel under ankle 5x5''   SLR 2x15 B 3x10 B 2x12 B  2x10 B 5x R   SAQ     2# 2x10 B     Knee flexion stretch 10"x10' R w/ straps  10"x10 PT overpressure 10x10'' w/ straps  PT overpressure 5"x15 w/2 straps  5"x15 1 strap Next w/ strap   Heel slides  2x10 2# R w/ pillowcase   w/strap 5x R   SL hip abd Standing standing Unable L " "shldr  unable - shldr pain Next    Clams           Hip add HEP 10x5'' w/ ball W/ball 5"x10  w/ball 5"x10     Bridges 2x15 B 2x15 2x10  2x10 5x              Prone:          Quad stretch   --  --     HS curls   --  --                Seated:          LAQ  NT 2# 2x12  2# 2x10 B Next    Marching  NT 2# 2x12   Next               Standing:          Heel raises 2x10 2x10  2x10   Next    Calf stretch on fitter 3x30'' B 3x30'' B 30"x3 B  oot Next    Steamboats Hip abd, ext  2x15 R Hip abd, ext  2x15 R 1x10 B       TKEs          SLS          Tandem walking          Cybex HS curls          Cybex LAQ          Leg Press 2x10 DL  3.5 plates 2x10 DL  3.5 plates        Gait  NT NT  Unable due to shoulder pain Next with SPC   CP 10' 10'   Home     INITIALS CC RONALDO DH 2/6  DH 1/6 RONALDO     Assessment:     Knee extension near full. Improvement in knee flexion ROM following MT. Continue to perform MET to improve ROM. Pt would benefit from progressed LE strengthening next.     Patient Education/Response:     Patient educated on importance of HEP in improving ROM and strength. Pt shown how to perform heel slides while sitting up in bed to improve flexion ROM. Verbalized understanding.    Plans and Goals:     Continue PT per POC, progress as able.    Short Term Goals: (4 weeks)  1. Pt will be independent with HEP  2. Pt will improve R LE strength to at least 75% of L LE strength as measured via MicroFet handheld dynamometer in order to improve functional mobility  3. Pt will improve R knee AROM to at least 0-105 in order to improve gait     Long Term Goals: (8 weeks)  1. Pt will be independent with updated HEP  2. Pt will improve R LE strength to at least 90% of L LE strength as measured via MicroFet handheld dynamometer in order to improve functional mobility  3. Pt will improve R knee AROM to at least 0-110 in order to improve gait and ability to perform ADLs  4. Pt will improve FOTO knee survey score to </= 42% limited in order to demo " improved functional mobility  5. Pt will perform TUG in < 10 seconds without AD in order to demo improved gait speed  6. Pt will perform at least 10 sit to stands without UE support on 30 second sit to stand test in order to demo improved ability to perform transfers

## 2018-05-09 ENCOUNTER — CLINICAL SUPPORT (OUTPATIENT)
Dept: REHABILITATION | Facility: HOSPITAL | Age: 68
End: 2018-05-09
Payer: MEDICARE

## 2018-05-09 DIAGNOSIS — M25.661 DECREASED ROM OF RIGHT KNEE: ICD-10-CM

## 2018-05-09 DIAGNOSIS — Z74.09 DECREASED MOBILITY AND ENDURANCE: ICD-10-CM

## 2018-05-09 DIAGNOSIS — R53.1 DECREASED STRENGTH: ICD-10-CM

## 2018-05-09 DIAGNOSIS — M25.561 RIGHT KNEE PAIN, UNSPECIFIED CHRONICITY: ICD-10-CM

## 2018-05-09 PROCEDURE — 97110 THERAPEUTIC EXERCISES: CPT | Mod: PN

## 2018-05-09 PROCEDURE — 97140 MANUAL THERAPY 1/> REGIONS: CPT | Mod: PN

## 2018-05-09 NOTE — PROGRESS NOTES
"DAILY TREATMENT NOTE    DATE: 2018    Start Time: 1500  Stop Time: 1605    PROCEDURES:    TIMED  Procedure Min.   TE supervised 30 NC   TE 10   MT 15     UNTIMED  Procedure Min.       CP 10     Total Timed Minutes:  25  Total Timed Units:  2  Total Untimed Units:   Charges Billed/# of units: 2 (MT-1, TE-1)      Progress/Current Status    Subjective:     Patient ID: Monique Trujillo is a 67 y.o. female.  Diagnosis:   1. Decreased ROM of right knee     2. Decreased mobility and endurance     3. Right knee pain, unspecified chronicity     4. Decreased strength       Pain: 5/10 R knee pain    Pt states she worked a 7 hour shift today and "it went okay." She was able to sit down a couple of times.    Objective:     Session began on bike supervised x 5' for increased R knee flexion ROM f/b TE supervised x 25' f/b TE 1:1 with PT x 10' per log f/b MT x 10' including knee flexion contract relax and overpressure w/ heel slides and quad sets, Kinesiotapin-"I" strip applied to inferior patella in "U" shaped pattern for pain relief and 1-"I" strip applied to scar for scar reformation. Patient instructed on purpose, wear, care, precautions to monitor and removal of KT. Patient verbalized understanding of all instructions provided.       DATE 5/8/18 5/4/18 4/27/18 4/24/18  4/20/18 4/10/2018   VISIT 6 5 4 3  2 1   POC 6/10/18           G CODE 6/10 5/10 4/10 3/10  2/10 1/10   FOTO 6/10 5/5 done 4/5 3/5  2/5 1/5   Cap Visit  Cap Total 57.78  804.46 60.64  746.68 60.64  686.04 90.96  325.44 121.28  234.48 113.20  113.20   Bike 5' 5' 5' 5'  Next Next    [facility]           MT 15' 10'         Stretches:           HS stretch 3x30'' R  stairs 3x30'' R  stairs 3x30'' R  stairs 30"x3 B RONALDO  3x30'' R Next    IT band stretch           Supine           Quad sets 10''x10 R  Towel under ankle; PT overpressure 10''x10 R  Towel under ankle; PT overpressure 10''x12 R  Towel under ankle; PT overpressure 10x10'' R  towel under ankle [id number]'' " "R  towel under ankle 5x5''   SLR 2x15 B 2x15 B 3x10 B 2x12 B  2x10 B 5x R   SAQ      2# 2x10 B     Knee flexion stretch 10"x10' R w/ straps  10"x10 PT overpressure 10"x10' R w/ straps  10"x10 PT overpressure 10x10'' w/ straps  PT overpressure 5"x15 w/2 straps  5"x15 1 strap Next w/ strap   Heel slides   2x10 2# R w/ pillowcase   w/strap 5x R   SL hip abd  Standing standing Unable L shldr  unable - shldr pain Next    Clams            Hip add  HEP 10x5'' w/ ball W/ball 5"x10  w/ball 5"x10     Bridges 2x15 B 2x15 B 2x15 2x10  2x10 5x               Prone:           Quad stretch    --  --     HS curls    --  --                 Seated:           LAQ   NT 2# 2x12  2# 2x10 B Next    Marching   NT 2# 2x12   Next                Standing:           Heel raises 2x10 2x10 2x10  2x10   Next    Calf stretch on fitter 3x30'' B 3x30'' B 3x30'' B 30"x3 B  oot Next    Steamboats Hip abd, ext  2x15 R Hip abd, ext  2x15 R Hip abd, ext  2x15 R 1x10 B       TKEs           SLS           Tandem walking           Cybex HS curls           Cybex LAQ           Leg Press 2x10 DL  3.5 plates 2x10 DL  3.5 plates 2x10 DL  3.5 plates        Gait   NT NT  Unable due to shoulder pain Next with SPC   CP 10' 10' 10'   Home     INITIALS KV CC RONALDO DH 2/6  DH 1/6 RONALDO     Assessment:     Pt with reports of decreased knee pain at the end of the treatment session. Not specific to pain scale. She may benefit from ASTYM to improve pain and ROM.    Patient Education/Response:     Cont HEP.    Plans and Goals:     Continue PT per POC, progress as able.    Short Term Goals: (4 weeks)  1. Pt will be independent with HEP  2. Pt will improve R LE strength to at least 75% of L LE strength as measured via MicroFet handheld dynamometer in order to improve functional mobility  3. Pt will improve R knee AROM to at least 0-105 in order to improve gait     Long Term Goals: (8 weeks)  1. Pt will be independent with updated HEP  2. Pt will improve R LE strength to at least " 90% of L LE strength as measured via MicroFet handheld dynamometer in order to improve functional mobility  3. Pt will improve R knee AROM to at least 0-110 in order to improve gait and ability to perform ADLs  4. Pt will improve FOTO knee survey score to </= 42% limited in order to demo improved functional mobility  5. Pt will perform TUG in < 10 seconds without AD in order to demo improved gait speed  6. Pt will perform at least 10 sit to stands without UE support on 30 second sit to stand test in order to demo improved ability to perform transfers

## 2018-05-11 ENCOUNTER — CLINICAL SUPPORT (OUTPATIENT)
Dept: REHABILITATION | Facility: HOSPITAL | Age: 68
End: 2018-05-11
Payer: MEDICARE

## 2018-05-11 DIAGNOSIS — M25.661 DECREASED ROM OF RIGHT KNEE: ICD-10-CM

## 2018-05-11 DIAGNOSIS — M25.561 RIGHT KNEE PAIN, UNSPECIFIED CHRONICITY: ICD-10-CM

## 2018-05-11 DIAGNOSIS — R53.1 DECREASED STRENGTH: ICD-10-CM

## 2018-05-11 DIAGNOSIS — Z74.09 DECREASED MOBILITY AND ENDURANCE: ICD-10-CM

## 2018-05-11 PROCEDURE — 97110 THERAPEUTIC EXERCISES: CPT | Mod: PN

## 2018-05-11 PROCEDURE — 97140 MANUAL THERAPY 1/> REGIONS: CPT | Mod: PN

## 2018-05-11 NOTE — PROGRESS NOTES
"DAILY TREATMENT NOTE    DATE: 5/11/2018    Start Time: 3:00  Stop Time: 4:00    PROCEDURES:    TIMED  Procedure Min.   TE supervised 35 NC   TE 10   MT 15     UNTIMED  Procedure Min.       CP 10     Total Timed Minutes:  25  Total Timed Units:  2  Total Untimed Units:   Charges Billed/# of units: 2 (MT-1, TE-1)      Progress/Current Status    Subjective:     Patient ID: Monique Trujillo is a 67 y.o. female.  Diagnosis:   1. Decreased ROM of right knee     2. Decreased mobility and endurance     3. Right knee pain, unspecified chronicity     4. Decreased strength       Pain: 5/10 R knee pain  Pt reports that she has been doing well so far today, knee is a little sore. Pt reports that taping helped.    Objective:     Session began on bike supervised x 5' for increased R knee flexion ROM f/b TE 1:1 with PT x 10' per log f/b MT x 15' including knee flexion contract relax and overpressure w/ heel slides and quad sets, knee extensions stretch, and knee flexion stretch. Pt then began supervised TE x 30 mins per log below.     DATE 5/11/18 5/8/18 5/4/18 4/27/18 4/24/18  4/20/18 4/10/2018   VISIT 7 6 5 4 3  2 1   POC 6/10/18            G CODE 7/10 6/10 5/10 4/10 3/10  2/10 1/10   FOTO 7/10 6/10 5/5 done 4/5 3/5  2/5 1/5   Cap Visit  Cap Total 57.78  862.24 57.78  804.46 60.64  746.68 60.64  686.04 90.96  325.44 121.28  234.48 113.20  113.20   Bike 5' 5' 5' 5' 5'  Next Next    M[facility]            MT 15' 15' 10'         Stretches:            HS stretch 3x30'' R stairs 3x30'' R  stairs 3x30'' R  stairs 3x30'' R  stairs 30"x3 B RONALDO  3x30'' R Next    IT band stretch            Supine            Quad sets 10''x10 R  Towel under ankle; PT overpressure 10''x10 R  Towel under ankle; PT overpressure 10''x10 R  Towel under ankle; PT overpressure 10''x12 R  Towel under ankle; PT overpressure 10x10'' R  towel under ankle [id number]'' R  towel under ankle 5x5''   SLR 3x10 B 1# 2x15 B 2x15 B 3x10 B 2x12 B  2x10 B 5x R   SAQ       2# 2x10 B   " "  Knee flexion stretch 10"x10' R w/ straps  10"x10 PT overpressure 10"x10' R w/ straps  10"x10 PT overpressure 10"x10' R w/ straps  10"x10 PT overpressure 10x10'' w/ straps  PT overpressure 5"x15 w/2 straps  5"x15 1 strap Next w/ strap   Heel slides Next w/ weight   2x10 2# R w/ pillowcase   w/strap 5x R   SL hip abd --  Standing standing Unable L shldr  unable - shldr pain Next    Clams             Hip add   HEP 10x5'' w/ ball W/ball 5"x10  w/ball 5"x10     Bridges 2x15 B 2x15 B 2x15 B 2x15 2x10  2x10 5x                Prone:            Quad stretch Next     --  --     HS curls     --  --                  Seated:            LAQ    NT 2# 2x12  2# 2x10 B Next    Marching    NT 2# 2x12   Next                 Standing:            Heel raises 3x10  2x10 2x10 2x10  2x10   Next    Calf stretch on fitter 3x30'' B 3x30'' B 3x30'' B 3x30'' B 30"x3 B  oot Next    Steamboats Hip abd, ext  2x15 R Hip abd, ext  2x15 R Hip abd, ext  2x15 R Hip abd, ext  2x15 R 1x10 B       TKEs Next            SLS            Tandem walking            Cybex HS curls            Cybex LAQ            Leg Press 2x10 DL  3.5 plates 2x10 DL  3.5 plates 2x10 DL  3.5 plates 2x10 DL  3.5 plates        Gait    NT NT  Unable due to shoulder pain Next with SPC   CP 10' 10' 10' 10'   Home     INITIALS RONALDO KV CC RONALDO DH 2/6  DH 1/6 RONALDO     0-110  0-115    Assessment:     Pt demo increased tenderness behind R knee that appears normal, and no significant differences in tissue seen between B knees posteriorly. Pt progressing well in all areas of therapy, pt would benefit from balance training now that she is off the cane. Tape knee next visit.    Patient Education/Response:     Cont HEP.    Plans and Goals:     Continue PT per POC, progress as able.    Short Term Goals: (4 weeks)  1. Pt will be independent with HEP  2. Pt will improve R LE strength to at least 75% of L LE strength as measured via MicroFet handheld dynamometer in order to improve functional " mobility  3. Pt will improve R knee AROM to at least 0-105 in order to improve gait     Long Term Goals: (8 weeks)  1. Pt will be independent with updated HEP  2. Pt will improve R LE strength to at least 90% of L LE strength as measured via [employer] handheld dynamometer in order to improve functional mobility  3. Pt will improve R knee AROM to at least 0-110 in order to improve gait and ability to perform ADLs  4. Pt will improve FOTO knee survey score to </= 42% limited in order to demo improved functional mobility  5. Pt will perform TUG in < 10 seconds without AD in order to demo improved gait speed  6. Pt will perform at least 10 sit to stands without UE support on 30 second sit to stand test in order to demo improved ability to perform transfers

## 2018-05-17 ENCOUNTER — TELEPHONE (OUTPATIENT)
Dept: REHABILITATION | Facility: HOSPITAL | Age: 68
End: 2018-05-17

## 2018-05-18 ENCOUNTER — CLINICAL SUPPORT (OUTPATIENT)
Dept: REHABILITATION | Facility: HOSPITAL | Age: 68
End: 2018-05-18
Payer: MEDICARE

## 2018-05-18 DIAGNOSIS — R53.1 DECREASED STRENGTH: ICD-10-CM

## 2018-05-18 DIAGNOSIS — M25.661 DECREASED ROM OF RIGHT KNEE: ICD-10-CM

## 2018-05-18 DIAGNOSIS — M25.561 RIGHT KNEE PAIN, UNSPECIFIED CHRONICITY: ICD-10-CM

## 2018-05-18 DIAGNOSIS — Z74.09 DECREASED MOBILITY AND ENDURANCE: ICD-10-CM

## 2018-05-18 PROCEDURE — 97140 MANUAL THERAPY 1/> REGIONS: CPT | Mod: PN

## 2018-05-18 PROCEDURE — 97110 THERAPEUTIC EXERCISES: CPT | Mod: PN

## 2018-05-18 NOTE — PROGRESS NOTES
"DAILY TREATMENT NOTE    DATE: 5/18/2018    Start Time: 1:15 PM  Stop Time: 2:00 PM    PROCEDURES:    TIMED  Procedure Min.   MT 15   TE 30         UNTIMED  Procedure Min.       CP 10     Total Timed Minutes:  45  Total Timed Units:  3  Total Untimed Units:   Charges Billed/# of units: 3 (MT-1, TE-2)      Progress/Current Status    Subjective:     Patient ID: Monique Trujillo is a 67 y.o. female.  Diagnosis:   1. Decreased ROM of right knee     2. Decreased mobility and endurance       Pain: 1/10 R Knee Region  Pt reports that she had a rough day today. Pt was agreeable to PT treatment.     Objective:     Session intitiated with Manual therapy x 15 mins in supine which include: STM medial / lateral of R knee joint, STM to hamstrings and ITB, and quadricep mm splaying. Pt then completed therex per log below with SPTA / PTA x 30 mins.       DATE 5/18/18 5/11/18 5/8/18 5/4/18 4/27/18 4/24/18  4/20/18 4/10/2018   VISIT 8 7 6 5 4 3  2 1   POC 6/10/18             G CODE 8/10 7/10 6/10 5/10 4/10 3/10  2/10 1/10   FOTO 8/10 7/10 6/10 5/5 done 4/5 3/5  2/5 1/5   Cap Visit  Cap Total 88.10  950/34 57.78  862.24 57.78  804.46 60.64  746.68 60.64  686.04 90.96  325.44 121.28  234.48 113.20  113.20   Bike NT 5' 5' 5' 5' 5'  Next Next    [facility]             MT 15' 15' 15' 10'         Stretches:             HS stretch 3x30'' R manually  3x30'' R stairs 3x30'' R  stairs 3x30'' R  stairs 3x30'' R  stairs 30"x3 B RONALDO  3x30'' R Next    IT band stretch             Supine             Quad sets 10''x10 R  Towel under ankle; PT overpressure  10''x10 R  Towel under ankle; PT overpressure 10''x10 R  Towel under ankle; PT overpressure 10''x10 R  Towel under ankle; PT overpressure 10''x12 R  Towel under ankle; PT overpressure 10x10'' R  towel under ankle [id number]'' R  towel under ankle 5x5''   SLR 3x10 B 1#  3x10 B 1# 2x15 B 2x15 B 3x10 B 2x12 B  2x10 B 5x R   SAQ        2# 2x10 B     Knee flexion stretch NT 10"x10' R w/ straps  10"x10 PT " "overpressure 10"x10' R w/ straps  10"x10 PT overpressure 10"x10' R w/ straps  10"x10 PT overpressure 10x10'' w/ straps  PT overpressure 5"x15 w/2 straps  5"x15 1 strap Next w/ strap   Heel slides NT Next w/ weight   2x10 2# R w/ pillowcase   w/strap 5x R   SL hip abd standing --  Standing standing Unable L shldr  unable - shldr pain Next    Clams              Hip add    HEP 10x5'' w/ ball W/ball 5"x10  w/ball 5"x10     Bridges 2x15 B 2x15 B 2x15 B 2x15 B 2x15 2x10  2x10 5x                 Prone:             Quad stretch NT Next     --  --     HS curls      --  --                   Seated:             LAQ     NT 2# 2x12  2# 2x10 B Next    Marching     NT 2# 2x12   Next                  Standing:             Heel/Toe raises 3x10 3x10  2x10 2x10 2x10  2x10   Next    Calf stretch on fitter 30"X3 B 3x30'' B 3x30'' B 3x30'' B 3x30'' B 30"x3 B  oot Next    Steamboats RTC   2x10  B Hip abd, ext  2x15 R Hip abd, ext  2x15 R Hip abd, ext  2x15 R Hip abd, ext  2x15 R 1x10 B       TKEs CKC with Purple TB Next            SLS             Tandem walking             Cybex HS curls             Cybex LAQ             Leg Press 2x10 DL  4.5 plates 2x10 DL  3.5 plates 2x10 DL  3.5 plates 2x10 DL  3.5 plates 2x10 DL  3.5 plates        Gait     NT NT  Unable due to shoulder pain Next with SPC   CP 10' 10' 10' 10' 10'   Home     INITIALS JA / KN 1/6 RONALDO KV CC RONALDO DH 2/6  DH 1/6 RONALDO     0-110  0-115    Assessment:     Pt demonstrated with R posterior knee tenderness with palpation during manual therapy. Pt tolerated treatment with no complaints of R knee pain. No complaints of pain with increased resistance and weights. Pt was cooperative and no adverse reactions to session noted.     Patient Education/Response:     Cont HEP.    Plans and Goals:     Continue PT per POC, progress as able.    Short Term Goals: (4 weeks)  1. Pt will be independent with HEP  2. Pt will improve R LE strength to at least 75% of L LE strength as measured via " MicroFet handheld dynamometer in order to improve functional mobility  3. Pt will improve R knee AROM to at least 0-105 in order to improve gait     Long Term Goals: (8 weeks)  1. Pt will be independent with updated HEP  2. Pt will improve R LE strength to at least 90% of L LE strength as measured via MicroFet handheld dynamometer in order to improve functional mobility  3. Pt will improve R knee AROM to at least 0-110 in order to improve gait and ability to perform ADLs  4. Pt will improve FOTO knee survey score to </= 42% limited in order to demo improved functional mobility  5. Pt will perform TUG in < 10 seconds without AD in order to demo improved gait speed  6. Pt will perform at least 10 sit to stands without UE support on 30 second sit to stand test in order to demo improved ability to perform transfers

## 2018-05-21 ENCOUNTER — CLINICAL SUPPORT (OUTPATIENT)
Dept: REHABILITATION | Facility: HOSPITAL | Age: 68
End: 2018-05-21
Payer: MEDICARE

## 2018-05-21 DIAGNOSIS — M25.561 RIGHT KNEE PAIN, UNSPECIFIED CHRONICITY: ICD-10-CM

## 2018-05-21 DIAGNOSIS — M25.661 DECREASED ROM OF RIGHT KNEE: ICD-10-CM

## 2018-05-21 DIAGNOSIS — R53.1 DECREASED STRENGTH: ICD-10-CM

## 2018-05-21 DIAGNOSIS — Z74.09 DECREASED MOBILITY AND ENDURANCE: ICD-10-CM

## 2018-05-21 PROCEDURE — 97110 THERAPEUTIC EXERCISES: CPT | Mod: PN

## 2018-05-21 NOTE — PROGRESS NOTES
"DAILY TREATMENT NOTE    DATE: 5/21/2018    Start Time: 2:05 PM  Stop Time: 3:10 PM    PROCEDURES:    TIMED  Procedure Min.   MT    TE 30         UNTIMED  Procedure Min.   Supervised TE/Bike 20   CP 10     Total Timed Minutes:  30  Total Timed Units:  2  Total Untimed Units:   Charges Billed/# of units: 2 TE      Progress/Current Status    Subjective:     Patient ID: Monique Trujillo is a 67 y.o. female.  Diagnosis:   1. Decreased ROM of right knee     2. Decreased mobility and endurance     3. Right knee pain, unspecified chronicity     4. Decreased strength       Pain: 1/10 R Knee Region  Pt states she started working this past weekend as .    Objective:     Pt performed bike x 5 mins for warmup and then supervised TE x 20 mins. Pt then performed TE 1:1 with PT x 30 mins per log below including tests and measures.    DATE 5/22/18 5/18/18 5/11/18 5/8/18 5/4/18 4/27/18 4/24/18  4/20/18 4/10/2018   VISIT 9 8 7 6 5 4 3  2 1   POC 6/10/18              G CODE 9/10 8/10 7/10 6/10 5/10 4/10 3/10  2/10 1/10   FOTO 9/10 8/10 7/10 6/10 5/5 done 4/5 3/5  2/5 1/5   Cap Visit  Cap Total 60.64  1010.98 88.10  950/34 57.78  862.24 57.78  804.46 60.64  746.68 60.64  686.04 90.96  325.44 121.28  234.48 113.20  113.20   Bike 5' NT 5' 5' 5' 5' 5'  Next Next    MHP              MT NT 15' 15' 15' 10'         Stretches:              HS stretch 3x30'' R stairs 3x30'' R manually  3x30'' R stairs 3x30'' R  stairs 3x30'' R  stairs 3x30'' R  stairs 30"x3 B RONALDO  3x30'' R Next    IT band stretch              Supine              Quad sets 10''x10 R  Towel under ankle; PT overpressure 10''x10 R  Towel under ankle; PT overpressure  10''x10 R  Towel under ankle; PT overpressure 10''x10 R  Towel under ankle; PT overpressure 10''x10 R  Towel under ankle; PT overpressure 10''x12 R  Towel under ankle; PT overpressure 10x10'' R  towel under ankle [id number]'' R  towel under ankle 5x5''   SLR 2x15 1.5# 3x10 B 1#  3x10 B 1# 2x15 B 2x15 B 3x10 B " "2x12 B  2x10 B 5x R   SAQ         2# 2x10 B     Knee flexion stretch 10"x10' R w/ straps NT 10"x10' R w/ straps  10"x10 PT overpressure 10"x10' R w/ straps  10"x10 PT overpressure 10"x10' R w/ straps  10"x10 PT overpressure 10x10'' w/ straps  PT overpressure 5"x15 w/2 straps  5"x15 1 strap Next w/ strap   Heel slides Next  NT Next w/ weight   2x10 2# R w/ pillowcase   w/strap 5x R   SL hip abd standing standing --  Standing standing Unable L shldr  unable - shldr pain Next    Clams               Hip add     HEP 10x5'' w/ ball W/ball 5"x10  w/ball 5"x10     Bridges 2x15 2x15 B 2x15 B 2x15 B 2x15 B 2x15 2x10  2x10 5x                  Prone:              Quad stretch  NT Next     --  --     HS curls       --  --                    Seated:              LAQ      NT 2# 2x12  2# 2x10 B Next    Marching      NT 2# 2x12   Next                   Standing:              Heel/Toe raises 2x15 3x10 3x10  2x10 2x10 2x10  2x10   Next    Calf stretch on fitter 3x30'' B 30"X3 B 3x30'' B 3x30'' B 3x30'' B 3x30'' B 30"x3 B  oot Next    Steamboats RTC 2x10 B RTC   2x10  B Hip abd, ext  2x15 R Hip abd, ext  2x15 R Hip abd, ext  2x15 R Hip abd, ext  2x15 R 1x10 B       TKEs oot CKC with Purple TB Next            SLS              Tandem walking              Cybex HS curls              Cybex LAQ              Leg Press 3x10 DL  4.5 plates 2x10 DL  4.5 plates 2x10 DL  3.5 plates 2x10 DL  3.5 plates 2x10 DL  3.5 plates 2x10 DL  3.5 plates        Gait      NT NT  Unable due to shoulder pain Next with SPC   CP 10' 10' 10' 10' 10' 10'   Home     INITIALS RONALDO JA / KN  RONALDO KV CC RONALDO DH   DH  RONALDO     18 re-assessment  R knee  AROM: 0-111  PROM: 0-115    L/E Strength w/ MicroFET Muscle Edgar Dynamometer Right Left Pain/Dysfunction with Movement   (approx 4 sec hold w/ max contraction)   Hip Flexion 12.7 kg  20.5 kg     Hip Abduction 12.2 kg  12.2 kg     Quadriceps 25.1 kg  27.3 kg     Hamstrings 17.1 kg  20.3 kg       TU.67  " seconds without AD  30 second sit-to-stand test (without U/E support): 10 without B UE use      Assessment:     Pt did well today with minimal increase in pain throughout session. Pt demo improved B LE strength and R knee A/PROM, and is progressing towards discharge. Pt does exhibit gait deviations, and would benefit from balance training.    Patient Education/Response:     Cont HEP.    Plans and Goals:     Continue PT per POC, progress as able.    Short Term Goals: (4 weeks)  1. Pt will be independent with HEP  2. Pt will improve R LE strength to at least 75% of L LE strength as measured via MicroFet handheld dynamometer in order to improve functional mobility  3. Pt will improve R knee AROM to at least 0-105 in order to improve gait     Long Term Goals: (8 weeks)  1. Pt will be independent with updated HEP  2. Pt will improve R LE strength to at least 90% of L LE strength as measured via MicroFet handheld dynamometer in order to improve functional mobility  3. Pt will improve R knee AROM to at least 0-110 in order to improve gait and ability to perform ADLs  4. Pt will improve FOTO knee survey score to </= 42% limited in order to demo improved functional mobility  5. Pt will perform TUG in < 10 seconds without AD in order to demo improved gait speed  6. Pt will perform at least 10 sit to stands without UE support on 30 second sit to stand test in order to demo improved ability to perform transfers

## 2018-05-22 ENCOUNTER — OFFICE VISIT (OUTPATIENT)
Dept: ORTHOPEDICS | Facility: CLINIC | Age: 68
End: 2018-05-22
Payer: MEDICARE

## 2018-05-22 VITALS
SYSTOLIC BLOOD PRESSURE: 141 MMHG | WEIGHT: 231 LBS | BODY MASS INDEX: 42.51 KG/M2 | DIASTOLIC BLOOD PRESSURE: 79 MMHG | HEIGHT: 62 IN | HEART RATE: 75 BPM

## 2018-05-22 DIAGNOSIS — Z96.651 S/P TOTAL KNEE ARTHROPLASTY, RIGHT: Primary | ICD-10-CM

## 2018-05-22 DIAGNOSIS — M17.11 PRIMARY OSTEOARTHRITIS OF RIGHT KNEE: ICD-10-CM

## 2018-05-22 DIAGNOSIS — M19.012 PRIMARY OSTEOARTHRITIS OF LEFT SHOULDER: ICD-10-CM

## 2018-05-22 PROCEDURE — 99999 PR PBB SHADOW E&M-EST. PATIENT-LVL III: CPT | Mod: PBBFAC,,, | Performed by: ORTHOPAEDIC SURGERY

## 2018-05-22 PROCEDURE — 99024 POSTOP FOLLOW-UP VISIT: CPT | Mod: S$GLB,,, | Performed by: ORTHOPAEDIC SURGERY

## 2018-05-22 RX ORDER — MELOXICAM 15 MG/1
15 TABLET ORAL DAILY
Qty: 30 TABLET | Refills: 1 | Status: SHIPPED | OUTPATIENT
Start: 2018-05-22 | End: 2018-06-14

## 2018-05-22 NOTE — PROGRESS NOTES
Subjective:      Patient ID: Monique Trujillo is a 67 y.o. female.    Chief Complaint: Post-op Evaluation of the Right Knee      HPI: Monique Trujillo is here for a pos-op visit. She is 10 weeks s/p Right total knee replacement. She is doing great. Currently in therapy through June. Walking independently without difficulty. She has resumed working as a  without issue. Today she complaints of numbness around inferior lateral knee and swelling at posterior knee that is occasionally tender. Otherwise she is doing excellent with the knee.     Unrelated, she has left shoulder pain for 2 years duration. She has pain at night and with overhead activities. She is scheduled to see Dr. Torres in June.     Past Medical History:   Diagnosis Date    Allergy     Arthritis     Chronic lower back pain     Depression     Fever blister     GERD (gastroesophageal reflux disease)     Hemorrhoids, internal 11/5/2014    Hyperlipidemia     Hypertension     Joint pain     Morbid obesity with BMI of 40.0-44.9, adult 2/24/2015    Multiple gastric ulcers 12/14/2017    TIMMY (obstructive sleep apnea)     does not use CPAP    Skin cancer        Current Outpatient Prescriptions:     amlodipine (NORVASC) 10 MG tablet, TAKE 1 TABLET BY MOUTH EVERY DAY, Disp: 90 tablet, Rfl: 3    ascorbic acid (VITAMIN C) 1000 MG tablet, Take 1,000 mg by mouth once daily., Disp: , Rfl:     atenolol (TENORMIN) 25 MG tablet, TAKE 1 TABLET BY MOUTH EVERY DAY, Disp: 90 tablet, Rfl: 3    benzonatate (TESSALON) 100 MG capsule, Take 1 capsule (100 mg total) by mouth 3 (three) times daily as needed for Cough., Disp: 42 capsule, Rfl: 3    calcium-vitamin D3 (CALCIUM 500 + D) 500 mg(1,250mg) -200 unit per tablet, Take 1 tablet by mouth 2 (two) times daily with meals., Disp: , Rfl:     citalopram (CELEXA) 40 MG tablet, TAKE 1 TABLET BY MOUTH EVERY DAY, Disp: 90 tablet, Rfl: 3    co-enzyme Q-10 30 mg capsule, Take 30 mg by mouth once daily. ,  "Disp: , Rfl:     econazole nitrate 1 % cream, Use bid for rash, Disp: 85 g, Rfl: 2    fish oil-omega-3 fatty acids 300-1,000 mg capsule, Take 2 g by mouth once daily., Disp: , Rfl:     furosemide (LASIX) 40 MG tablet, Take 1 tablet (40 mg total) by mouth daily as needed (edema)., Disp: 90 tablet, Rfl: 3    gabapentin (NEURONTIN) 800 MG tablet, Take 1 tablet (800 mg total) by mouth every evening. (Patient taking differently: Take 400 mg by mouth every evening. ), Disp: 30 tablet, Rfl: 9    GLUCOSA KHAN 2KCL/CHONDROITIN KHAN (GLUCOSAMINE SULF-CHONDROITIN ORAL), Take 1 tablet by mouth once daily., Disp: , Rfl:     multivitamin capsule, Take 1 capsule by mouth once daily., Disp: , Rfl:     oxybutynin (DITROPAN XL) 15 MG TR24, Take 1 tablet (15 mg total) by mouth once daily., Disp: 90 tablet, Rfl: 3    oxyCODONE-acetaminophen (PERCOCET) 5-325 mg per tablet, Take 1 tablet by mouth every 4 (four) hours as needed. 1-2 po 4XD prn pain, Disp: 50 tablet, Rfl: 0    pantoprazole (PROTONIX) 20 MG tablet, Take 1 tablet (20 mg total) by mouth once daily., Disp: 90 tablet, Rfl: 3    pravastatin (PRAVACHOL) 40 MG tablet, TAKE 1 TABLET BY MOUTH EVERY DAY, Disp: 90 tablet, Rfl: 33    triamcinolone acetonide 0.1% (KENALOG) 0.1 % cream, Pt has sterile jar.  Pt to mix TAC, lotrimin cream and Milk of Magnesia in 1:1:1 ratio. Apply bid prn, Disp: 60 g, Rfl: 3    TRILIPIX 135 mg CpDR, Take 1 capsule (135 mg total) by mouth once daily., Disp: 90 capsule, Rfl: 3    vitamin E 400 UNIT capsule, Take 400 Units by mouth once daily., Disp: , Rfl:     meloxicam (MOBIC) 15 MG tablet, Take 1 tablet (15 mg total) by mouth once daily., Disp: 30 tablet, Rfl: 1    Current Facility-Administered Medications:     lidocaine (PF) 20 mg/mL (2 %) injection 2 mL, 2 mL, Intradermal, Once, Kalyan Han MD  Review of patient's allergies indicates:  No Known Allergies    BP (!) 141/79   Pulse 75   Ht 5' 2" (1.575 m)   Wt 104.8 kg (231 lb)   BMI " 42.25 kg/m²     Review of Systems   Constitution: Negative for chills and fever.   Cardiovascular: Negative for chest pain and palpitations.   Respiratory: Negative for shortness of breath and wheezing.    Skin: Negative for poor wound healing and rash.   Musculoskeletal: Positive for arthritis and joint pain (left shoulder). Negative for neck pain.   Gastrointestinal: Negative for nausea and vomiting.   Neurological: Negative for seizures and tremors.   Psychiatric/Behavioral: Negative for altered mental status.         Objective:    Ortho Exam     RIGHT KNEE:  The patient walks with non-antalgic gait.  Resisted SLR negative.  The skin over the knee is intact with healed midline scar.  Knee effusion none. No swelling noted.  Tendernes is located posteriorly.  Flores Cyst present.  Range of motion- Flexion 120 deg, Extension 0 deg,   Ligament exam:              MCL intact              Lachman intact              Post sag intact              LCL intact  Patellar crepitation present.  Pulses DP present, PT present  Motor normal 5/5  strength in all tested muscle groups.   Sensory decreased inferior lateral knee.         Assessment:     Imaging: no new imaging of knee today. I reviewed Left shoulder xray from 7/2017 which reveals significant degenerative changes.        1. S/P total knee arthroplasty, right    2. Primary osteoarthritis of right knee    3. Primary osteoarthritis of left shoulder          Plan:     Progressing well with Right knee.   Continue therapy through June, I do not foresee her needing additional therapy.   Follow-up in one year with Dr. Combs. Will need Xrays at this visit.     For the shoulder. I offered the patient the option of a subacromial injection today for relief but it might not be as effective as an intraarticular injection. Or the pt can wait for apt in June with Dr. Torres for an intraarticular injection. She opted to wait for apt in June.   In the meantime I have given her meloxicam  for shoulder arthritis.   Follow-up with Dr. Torres in June.     Orders Placed This Encounter    meloxicam (MOBIC) 15 MG tablet

## 2018-05-24 ENCOUNTER — CLINICAL SUPPORT (OUTPATIENT)
Dept: REHABILITATION | Facility: HOSPITAL | Age: 68
End: 2018-05-24
Payer: MEDICARE

## 2018-05-24 DIAGNOSIS — R53.1 DECREASED STRENGTH: ICD-10-CM

## 2018-05-24 DIAGNOSIS — M25.561 RIGHT KNEE PAIN, UNSPECIFIED CHRONICITY: ICD-10-CM

## 2018-05-24 DIAGNOSIS — Z74.09 DECREASED MOBILITY AND ENDURANCE: ICD-10-CM

## 2018-05-24 DIAGNOSIS — M25.661 DECREASED ROM OF RIGHT KNEE: ICD-10-CM

## 2018-05-24 PROCEDURE — 97140 MANUAL THERAPY 1/> REGIONS: CPT | Mod: PN

## 2018-05-24 PROCEDURE — 97110 THERAPEUTIC EXERCISES: CPT | Mod: PN

## 2018-05-24 NOTE — PROGRESS NOTES
"DAILY TREATMENT NOTE    DATE: 5/24/2018    Start Time: 3:00 PM  Stop Time: 3:50 PM    PROCEDURES:    TIMED  Procedure Min.   MT 15   TE 35         UNTIMED  Procedure Min.       CP 10     Total Timed Minutes: 50  Total Timed Units:  3  Total Untimed Units:   Charges Billed/# of units: 1 MT, 2 TE       Progress/Current Status    Subjective:     Patient ID: Monique Trujillo is a 67 y.o. female.  Diagnosis:   1. Decreased ROM of right knee     2. Decreased mobility and endurance     3. Right knee pain, unspecified chronicity     4. Decreased strength       Pain: 1/10 R Knee Region  Pt reports that she has an aggravating, soreness feeling on her R knee region. Pt was agreeable to PT treatment.     Objective:     Session initiated with stationary bike x 5 mins for joint lubrication and warm-up. Pt then received Manual therapy x 15 mins in supine which include: STM medial / lateral of R knee joint, STM to hamstrings and ITB, and quadricep mm splaying. Pt then completed therex per log below x 30 mins with SPTA, under direct supervision of PT (Ami Rushing).      DATE 5/24/18 5/18/18 5/11/18 5/8/18 5/4/18 4/27/18 4/24/18  4/20/18 4/10/2018   VISIT 9 8 7 6 5 4 3  2 1   POC 6/10/18              G CODE 9/10 8/10 7/10 6/10 5/10 4/10 3/10  2/10 1/10   FOTO 9/10  8/10 7/10 6/10 5/5 done 4/5 3/5  2/5 1/5   Cap Visit  Cap Total 88.10  1038.44 88.10  950.34 57.78  862.24 57.78  804.46 60.64  746.68 60.64  686.04 90.96  325.44 121.28  234.48 113.20  113.20   Bike 5' NT 5' 5' 5' 5' 5'  Next Next    MHP              MT 15' 15' 15' 15' 10'         Stretches:              HS stretch 3x30'' bilateral manually   3x30'' R manually  3x30'' R stairs 3x30'' R  stairs 3x30'' R  stairs 3x30'' R  stairs 30"x3 B RONALDO  3x30'' R Next    IT band stretch              Supine              Quad sets 10''x10 R  Towel under ankle; SPTA overpressure  10''x10 R  Towel under ankle; PT overpressure  10''x10 R  Towel under ankle; PT overpressure 10''x10 " "R  Towel under ankle; PT overpressure 10''x10 R  Towel under ankle; PT overpressure 10''x12 R  Towel under ankle; PT overpressure 10x10'' R  towel under ankle [id number]'' R  towel under ankle 5x5''   SLR 3x10 B 1# 3x10 B 1#  3x10 B 1# 2x15 B 2x15 B 3x10 B 2x12 B  2x10 B 5x R   SAQ         2# 2x10 B     Knee flexion stretch NT NT 10"x10' R w/ straps  10"x10 PT overpressure 10"x10' R w/ straps  10"x10 PT overpressure 10"x10' R w/ straps  10"x10 PT overpressure 10x10'' w/ straps  PT overpressure 5"x15 w/2 straps  5"x15 1 strap Next w/ strap   Heel slides 2x10 2# R w/ pillowcase  NT Next w/ weight   2x10 2# R w/ pillowcase   w/strap 5x R   SL hip abd standing standing --  Standing standing Unable L shldr  unable - shldr pain Next    Clams               Hip add     HEP 10x5'' w/ ball W/ball 5"x10  w/ball 5"x10     Bridges 2x15 B 2x15 B 2x15 B 2x15 B 2x15 B 2x15 2x10  2x10 5x                  Prone:              Quad stretch NT NT Next     --  --     HS curls       --  --                    Seated:              LAQ      NT 2# 2x12  2# 2x10 B Next    Marching      NT 2# 2x12   Next                   Standing:              Heel/Toe raises 3x10  3x10 3x10  2x10 2x10 2x10  2x10   Next    Calf stretch on fitter 30"X3 B  30"X3 B 3x30'' B 3x30'' B 3x30'' B 3x30'' B 30"x3 B  oot Next    Steamboats 2x10 B 1# RTC   2x10  B Hip abd, ext  2x15 R Hip abd, ext  2x15 R Hip abd, ext  2x15 R Hip abd, ext  2x15 R 1x10 B       TKEs CKC with Purple TB  CKC with Purple TB Next            SLS              Tandem walking              Cybex HS curls              Cybex LAQ              Leg Press 2x10 DL  4.5 plates  2x10 DL  4.5 plates 2x10 DL  3.5 plates 2x10 DL  3.5 plates 2x10 DL  3.5 plates 2x10 DL  3.5 plates        Gait      NT NT  Unable due to shoulder pain Next with SPC   CP 10' 10' 10' 10' 10' 10'   Home     INITIALS FS / GIANCARLO CHENG / GIANCARLO 1/6 RONALDO KV CC RONALDO DH 2/6  DH 1/6 RONALDO         Assessment:     Pt tolerated treatment without any " complaints of pain to R knee region. Tenderness noted with palpation in posterior aspect of R knee and quadriceps mm, improved with manual therapy. Required verbal / visual instructions to complete therex for proper technique and form.       Patient Education/Response:     Cont HEP. Pt verbalized understanding.     Plans and Goals:     Continue PT per POC, progress as able.    Short Term Goals: (4 weeks)  1. Pt will be independent with HEP  2. Pt will improve R LE strength to at least 75% of L LE strength as measured via MicroFet handheld dynamometer in order to improve functional mobility  3. Pt will improve R knee AROM to at least 0-105 in order to improve gait     Long Term Goals: (8 weeks)  1. Pt will be independent with updated HEP  2. Pt will improve R LE strength to at least 90% of L LE strength as measured via MicroFet handheld dynamometer in order to improve functional mobility  3. Pt will improve R knee AROM to at least 0-110 in order to improve gait and ability to perform ADLs  4. Pt will improve FOTO knee survey score to </= 42% limited in order to demo improved functional mobility  5. Pt will perform TUG in < 10 seconds without AD in order to demo improved gait speed  6. Pt will perform at least 10 sit to stands without UE support on 30 second sit to stand test in order to demo improved ability to perform transfers    Session conducted by VEENA Blanco, under direct supervision of Ami Rushing PT

## 2018-05-29 ENCOUNTER — PATIENT MESSAGE (OUTPATIENT)
Dept: ORTHOPEDICS | Facility: CLINIC | Age: 68
End: 2018-05-29

## 2018-05-30 ENCOUNTER — CLINICAL SUPPORT (OUTPATIENT)
Dept: REHABILITATION | Facility: HOSPITAL | Age: 68
End: 2018-05-30
Payer: MEDICARE

## 2018-05-30 DIAGNOSIS — R53.1 DECREASED STRENGTH: ICD-10-CM

## 2018-05-30 DIAGNOSIS — Z74.09 DECREASED MOBILITY AND ENDURANCE: ICD-10-CM

## 2018-05-30 DIAGNOSIS — M25.661 DECREASED ROM OF RIGHT KNEE: ICD-10-CM

## 2018-05-30 DIAGNOSIS — M25.561 RIGHT KNEE PAIN, UNSPECIFIED CHRONICITY: ICD-10-CM

## 2018-05-30 PROCEDURE — 97110 THERAPEUTIC EXERCISES: CPT | Mod: PN

## 2018-05-30 PROCEDURE — 97140 MANUAL THERAPY 1/> REGIONS: CPT | Mod: PN

## 2018-05-30 NOTE — PROGRESS NOTES
"DAILY TREATMENT NOTE    DATE: 5/30/2018    Start Time:  3:00pm  Stop Time:  4:05pm    PROCEDURES:    TIMED  Procedure Min.   TE 30   MT 10   TE supervised 10 NC   bike 5 NC         UNTIMED  Procedure Min.   CP 10         Total Timed Minutes:  40  Total Timed Units:  3  Total Untimed Units:  0  Charges Billed/# of units:  3 (2TE+1MT)      Progress/Current Status    Subjective:     Patient ID: Monique Trujillo is a 67 y.o. female.  Diagnosis:   1. Decreased ROM of right knee     2. Decreased mobility and endurance     3. Right knee pain, unspecified chronicity     4. Decreased strength       Pain: 0 /10  Patient reports having no pain today or over the weekend, and reports no pain with walking.    Objective:     Pt initiated treatment session today with LE bike for 5' for warm-up, f/b TE supervised for 10', f/b TE 1:1 with SPT per log below for 30', and MT for 10; including STM/MFR to quads, hamstrings, gastroc, and IT band, with supervision of Alena Rutledge PT. Pt completed treatment session with CP for 10' to R LE.     DATE 5/30/18 5/24/18 5/18/18 5/11/18 5/8/18 5/4/18 4/27/18 4/24/18  4/20/18 4/10/2018   VISIT 10 9 8 7 6 5 4 3  2 1   POC 6/10/18                      G CODE 10/10 9/10 8/10 7/10 6/10 5/10 4/10 3/10  2/10 1/10   FOTO done 9/10  8/10 7/10 6/10 5/5 done 4/5 3/5 2/5 1/5   Cap Visit  Cap Total 88.10  1126.54 88.10  1038.44 88.10  950.34 57.78  862.24 57.78  804.46 60.64  746.68 60.64  686.04 90.96  325.44 121.28  234.48 113.20  113.20   Bike 5' 5' NT 5' 5' 5' 5' 5'  Next Next    MHP                      MT 10' 15' 15' 15' 15' 10'           Stretches:                      HS stretch 3x30'' bilateral manually  3x30'' bilateral manually   3x30'' R manually  3x30'' R stairs 3x30'' R  stairs 3x30'' R  stairs 3x30'' R  stairs 30"x3 B RONALDO  3x30'' R Next    IT band stretch                      Supine                      Quad sets 10''x10 R  Towel under ankle; SPTA overpressure 10''x10 R  Towel under ankle; " "SPTA overpressure  10''x10 R  Towel under ankle; PT overpressure  10''x10 R  Towel under ankle; PT overpressure 10''x10 R  Towel under ankle; PT overpressure 10''x10 R  Towel under ankle; PT overpressure 10''x12 R  Towel under ankle; PT overpressure 10x10'' R  towel under ankle [id number]'' R  towel under ankle 5x5''   SLR 3x10 B 1# 3x10 B 1# 3x10 B 1#  3x10 B 1# 2x15 B 2x15 B 3x10 B 2x12 B  2x10 B 5x R   SAQ                 2# 2x10 B     Knee flexion stretch  NT NT 10"x10' R w/ straps  10"x10 PT overpressure 10"x10' R w/ straps  10"x10 PT overpressure 10"x10' R w/ straps  10"x10 PT overpressure 10x10'' w/ straps  PT overpressure 5"x15 w/2 straps  5"x15 1 strap Next w/ strap   Heel slides  2x10 2# R w/ pillowcase  NT Next w/ weight     2x10 2# R w/ pillowcase    w/strap 5x R   SL hip abd  standing standing --   Standing standing Unable L shldr  unable - shldr pain Next    Clams                       Hip add          HEP 10x5'' w/ ball W/ball 5"x10  w/ball 5"x10     Bridges 2x15 B 2x15 B 2x15 B 2x15 B 2x15 B 2x15 B 2x15 2x10  2x10 5x                          Prone:                      Quad stretch  NT NT Next        --  --     HS curls              --  --                            Seated:                      LAQ            NT 2# 2x12  2# 2x10 B Next    Marching            NT 2# 2x12   Next                           Standing:                      Heel/Toe raises 3x10  3x10  3x10 3x10  2x10 2x10 2x10  2x10   Next    Calf stretch on fitter 30"X3 B  30"X3 B  30"X3 B 3x30'' B 3x30'' B 3x30'' B 3x30'' B 30"x3 B  oot Next    Steamboats RTC   2x10  B 2x10 B 1# RTC   2x10  B Hip abd, ext  2x15 R Hip abd, ext  2x15 R Hip abd, ext  2x15 R Hip abd, ext  2x15 R 1x10 B       TKEs 2x15 w 3" hold CKC with Purple TB  CKC with Purple TB Next                SLS                      Tandem walking                      Cybex HS curls                      Cybex LAQ                      Leg Press 2x15 DL 4.5 plates 2x10 DL  4.5 plates  " 2x10 DL  4.5 plates 2x10 DL  3.5 plates 2x10 DL  3.5 plates 2x10 DL  3.5 plates 2x10 DL  3.5 plates         Gait            NT NT  Unable due to shoulder pain Next with SPC   CP 10' 10' 10' 10' 10' 10' 10'    Home     INITIALS AB/KV FS / KN  JA / KN 1/6 RONALDO KV CC RONALDO DH 2/6  DH 1/6 RONALDO         Assessment:     Pt was able to progress TE by increased repetitions on leg press, and pt was able to tolerate the progression and all other TE without complaints of increased pain.    Patient Education/Response:     Pt to continue with HEP, pt verbalized understanding.    Plans and Goals:     Continue PT per POC, progress to standing TE as able.     Short Term Goals: (4 weeks)  1. Pt will be independent with HEP  2. Pt will improve R LE strength to at least 75% of L LE strength as measured via MicroFet handheld dynamometer in order to improve functional mobility  3. Pt will improve R knee AROM to at least 0-105 in order to improve gait     Long Term Goals: (8 weeks)  1. Pt will be independent with updated HEP  2. Pt will improve R LE strength to at least 90% of L LE strength as measured via MicroFet handheld dynamometer in order to improve functional mobility  3. Pt will improve R knee AROM to at least 0-110 in order to improve gait and ability to perform ADLs  4. Pt will improve FOTO knee survey score to </= 42% limited in order to demo improved functional mobility  5. Pt will perform TUG in < 10 seconds without AD in order to demo improved gait speed  6. Pt will perform at least 10 sit to stands without UE support on 30 second sit to stand test in order to demo improved ability to perform transfers    Armando Barron, SPT

## 2018-06-05 ENCOUNTER — TELEPHONE (OUTPATIENT)
Dept: ORTHOPEDICS | Facility: CLINIC | Age: 68
End: 2018-06-05

## 2018-06-05 NOTE — TELEPHONE ENCOUNTER
Left message for patient to return call to office.    ----- Message from Leonora Whitmore sent at 6/5/2018  3:35 PM CDT -----  Contact: [phone]/ self   Patient called in returning your call. Please advise

## 2018-06-06 ENCOUNTER — CLINICAL SUPPORT (OUTPATIENT)
Dept: REHABILITATION | Facility: HOSPITAL | Age: 68
End: 2018-06-06
Payer: MEDICARE

## 2018-06-06 DIAGNOSIS — R53.1 DECREASED STRENGTH: ICD-10-CM

## 2018-06-06 DIAGNOSIS — Z74.09 DECREASED MOBILITY AND ENDURANCE: ICD-10-CM

## 2018-06-06 DIAGNOSIS — M25.661 DECREASED ROM OF RIGHT KNEE: ICD-10-CM

## 2018-06-06 PROCEDURE — 97110 THERAPEUTIC EXERCISES: CPT | Mod: PN

## 2018-06-06 PROCEDURE — 97140 MANUAL THERAPY 1/> REGIONS: CPT | Mod: PN

## 2018-06-06 NOTE — PROGRESS NOTES
"DAILY TREATMENT NOTE    DATE: 6/6/2018    Start Time:  200  Stop Time:  300    PROCEDURES:    TIMED  Procedure Min.   Therex 20   therex supervised 25   Manual therapy 10             UNTIMED  Procedure Min.   Bike 5         Total Timed Minutes:  30  Total Timed Units:  2  Total Untimed Units:  0  Total Units:  2,  MTx1, TEx1  Charges Billed/# of units:  2       Progress/Current Status    Subjective:     Patient ID: Monique Trujillo is a 67 y.o. female.  Diagnosis:   1. Decreased ROM of right knee     2. Decreased mobility and endurance     3. Decreased strength         Patient reports complaint of L buttock pain going down leg since last visit.  "I felt something when I was doing the bridges, its been hurting since."    Objective:       Pt initiated treatment session today with LE bike for 5' for warm-up,  f/b TE 1:1 with PTA per log below for 20' additional 25 with supervision, and MT for 10; including STM/MFR to quads, hamstrings, gastroc, and IT band. Trial L piriformis release.     DATE 6/6/18 5/30/18 5/24/18 5/18/18 5/11/18 5/8/18 5/4/18 4/27/18 4/24/18  4/20/18 4/10/2018   VISIT 11 10 9 8 7 6 5 4 3  2 1   POC 6/10/18                       G CODE 1/10 10/10 9/10 8/10 7/10 6/10 5/10 4/10 3/10  2/10 1/10   FOTO done done 9/10  8/10 7/10 6/10 5/5 done 4/5 3/5  2/5 1/5   Cap Visit  Cap Total     57.78  1187.18   88.10  1126.54 88.10  1038.44 88.10  950.34 57.78  862.24 57.78  804.46 60.64  746.68 60.64  686.04 90.96  325.44 121.28  234.48 113.20  113.20   Bike 5' 5' 5' NT 5' 5' 5' 5' 5'  Next Next    MHP                       MT 10' 10' 15' 15' 15' 15' 10'           Stretches:                       HS stretch 30"x2 B manual 3x30'' bilateral manually  3x30'' bilateral manually   3x30'' R manually  3x30'' R stairs 3x30'' R  stairs 3x30'' R  stairs 3x30'' R  stairs 30"x3 B RONALDO  3x30'' R Next    IT band stretch                       Supine                       Quad sets 10''x10 R  Towel under ankle; 10''x10 R  Towel " "under ankle; SPTA overpressure 10''x10 R  Towel under ankle; SPTA overpressure  10''x10 R  Towel under ankle; PT overpressure  10''x10 R  Towel under ankle; PT overpressure 10''x10 R  Towel under ankle; PT overpressure 10''x10 R  Towel under ankle; PT overpressure 10''x12 R  Towel under ankle; PT overpressure 10x10'' R  towel under ankle [id number]'' R  towel under ankle 5x5''   SLR 2# 2x10 B 3x10 B 1# 3x10 B 1# 3x10 B 1#  3x10 B 1# 2x15 B 2x15 B 3x10 B 2x12 B  2x10 B 5x R   SAQ                  2# 2x10 B     Knee flexion stretch   NT NT 10"x10' R w/ straps  10"x10 PT overpressure 10"x10' R w/ straps  10"x10 PT overpressure 10"x10' R w/ straps  10"x10 PT overpressure 10x10'' w/ straps  PT overpressure 5"x15 w/2 straps  5"x15 1 strap Next w/ strap   Heel slides 2x10 R   2x10 2# R w/ pillowcase  NT Next w/ weight     2x10 2# R w/ pillowcase    w/strap 5x R   SL hip abd   standing standing --   Standing standing Unable L shldr  unable - shldr pain Next    Clams                        Hip add           HEP 10x5'' w/ ball W/ball 5"x10  w/ball 5"x10     Bridges Held 2x15 B 2x15 B 2x15 B 2x15 B 2x15 B 2x15 B 2x15 2x10  2x10 5x                           Prone:                       Quad stretch   NT NT Next        --  --     HS curls               --  --                             Seated:                       LAQ             NT 2# 2x12  2# 2x10 B Next    Marching             NT 2# 2x12   Next                            Standing:                       Heel/Toe raises 3x10 DL 3x10  3x10  3x10 3x10  2x10 2x10 2x10  2x10   Next    Calf stretch on fitter 30"x3 B 30"X3 B  30"X3 B  30"X3 B 3x30'' B 3x30'' B 3x30'' B 3x30'' B 30"x3 B  oot Next    Steamboats RTC  2x10 B ^ RTC   2x10  B 2x10 B 1# RTC   2x10  B Hip abd, ext  2x15 R Hip abd, ext  2x15 R Hip abd, ext  2x15 R Hip abd, ext  2x15 R 1x10 B       TKEs 2x15 3" hold R 2x15 w 3" hold CKC with Purple TB  CKC with Purple TB Next                SLS                       Tandem " "walking                       Cybex HS curls                       Cybex LAQ                       Leg Press 4.5 DL  2x15 2x15 DL 4.5 plates 2x10 DL  4.5 plates  2x10 DL  4.5 plates 2x10 DL  3.5 plates 2x10 DL  3.5 plates 2x10 DL  3.5 plates 2x10 DL  3.5 plates         Gait             NT NT  Unable due to shoulder pain Next with SPC   CP  10' 10' 10' 10' 10' 10' 10'    Home     INITIALS DH 1/6 AB/KV FS / KN  JA / KN 1/6 RONALDO KV CC RONALDO DH 2/6  DH 1/6 RONALDO     AROM 0*-111*  PROM flex 118*      Assessment:     Patient able to tolerate progression of therex with added weight and reps as noted.  Reports no complaint of knee pain, left buttock pain "still there."    Patient Education/Response:     Instructed in self piriformis stretch in supine and in sitting.  Demonstrated understanding.    Plans and Goals:     Continue PT per POC, progress to standing TE as able.     Short Term Goals: (4 weeks)  1. Pt will be independent with HEP  2. Pt will improve R LE strength to at least 75% of L LE strength as measured via MicroFet handheld dynamometer in order to improve functional mobility  3. Pt will improve R knee AROM to at least 0-105 in order to improve gait     Long Term Goals: (8 weeks)  1. Pt will be independent with updated HEP  2. Pt will improve R LE strength to at least 90% of L LE strength as measured via MicroFet handheld dynamometer in order to improve functional mobility  3. Pt will improve R knee AROM to at least 0-110 in order to improve gait and ability to perform ADLs  4. Pt will improve FOTO knee survey score to </= 42% limited in order to demo improved functional mobility  5. Pt will perform TUG in < 10 seconds without AD in order to demo improved gait speed  6. Pt will perform at least 10 sit to stands without UE support on 30 second sit to stand test in order to demo improved ability to perform transfers        "

## 2018-06-08 ENCOUNTER — CLINICAL SUPPORT (OUTPATIENT)
Dept: REHABILITATION | Facility: HOSPITAL | Age: 68
End: 2018-06-08
Payer: MEDICARE

## 2018-06-08 DIAGNOSIS — G89.29 CHRONIC PAIN OF RIGHT KNEE: ICD-10-CM

## 2018-06-08 DIAGNOSIS — M25.661 DECREASED ROM OF RIGHT KNEE: ICD-10-CM

## 2018-06-08 DIAGNOSIS — M25.561 CHRONIC PAIN OF RIGHT KNEE: ICD-10-CM

## 2018-06-08 DIAGNOSIS — Z74.09 DECREASED MOBILITY AND ENDURANCE: ICD-10-CM

## 2018-06-08 DIAGNOSIS — R53.1 DECREASED STRENGTH: ICD-10-CM

## 2018-06-08 PROCEDURE — 97110 THERAPEUTIC EXERCISES: CPT | Mod: PN

## 2018-06-08 NOTE — PROGRESS NOTES
"DAILY TREATMENT NOTE    DATE: 6/8/2018    Start Time:  315  Stop Time:  400    PROCEDURES:    TIMED  Procedure Min.   Therex 40         UNTIMED  Procedure Min.   Bike 5         Total Timed Minutes:  40  Total Timed Units:  3  Total Untimed Units:  0  Charges Billed/# of units:  3 TE      Progress/Current Status    Subjective:     Patient ID: Monique Trujillo is a 67 y.o. female.  Diagnosis:   1. Decreased ROM of right knee     2. Decreased mobility and endurance     3. Chronic pain of right knee     4. Decreased strength       Pain: 3.5 /10  My right leg is good - Its the left side in the buttocks from the other day.  But its less than it was."    Objective:     Patient arrived 15 minutes late for appointment.  Initiated treatment session today with LE bike for 5' for warm-up,  f/b TE 1:1 with PTA per log below for 40 minutes.    DATE 6/8/18 6/6/18 5/30/18 5/24/18 5/18/18 5/11/18 5/8/18 5/4/18 4/27/18 4/24/18  4/20/18 4/10/2018   VISIT 12 11 10 9 8 7 6 5 4 3  2 1   POC 6/10/18                        G CODE 2/10 1/10 10/10 9/10 8/10 7/10 6/10 5/10 4/10 3/10  2/10 1/10   FOTO w/gcode done done 9/10  8/10 7/10 6/10 5/5 done 4/5 3/5  2/5 1/5   Cap Visit  Cap Total     90.96  1278.14     57.78  1187.18   88.10  1126.54 88.10  1038.44 88.10  950.34 57.78  862.24 57.78  804.46 60.64  746.68 60.64  686.04 90.96  325.44 121.28  234.48 113.20  113.20   Bike 5' 5' 5' 5' NT 5' 5' 5' 5' 5'  Next Next    MHP                        MT NT oot 10' 10' 15' 15' 15' 15' 10'           Stretches:                        HS stretch 30"x2 B  Manual  30"x2 B manual 3x30'' bilateral manually  3x30'' bilateral manually   3x30'' R manually  3x30'' R stairs 3x30'' R  stairs 3x30'' R  stairs 3x30'' R  stairs 30"x3 B RONALDO  3x30'' R Next    IT band stretch                        Supine                        Quad sets 10"x10 R  Towel under ankle 10''x10 R  Towel under ankle; 10''x10 R  Towel under ankle; SPTA overpressure 10''x10 R  Towel under " "ankle; SPTA overpressure  10''x10 R  Towel under ankle; PT overpressure  10''x10 R  Towel under ankle; PT overpressure 10''x10 R  Towel under ankle; PT overpressure 10''x10 R  Towel under ankle; PT overpressure 10''x12 R  Towel under ankle; PT overpressure 10x10'' R  towel under ankle [id number]'' R  towel under ankle 5x5''   SLR #2 2x12 B 2# 2x10 B 3x10 B 1# 3x10 B 1# 3x10 B 1#  3x10 B 1# 2x15 B 2x15 B 3x10 B 2x12 B  2x10 B 5x R   SAQ                   2# 2x10 B     Knee flexion stretch    NT NT 10"x10' R w/ straps  10"x10 PT overpressure 10"x10' R w/ straps  10"x10 PT overpressure 10"x10' R w/ straps  10"x10 PT overpressure 10x10'' w/ straps  PT overpressure 5"x15 w/2 straps  5"x15 1 strap Next w/ strap   Heel slides 2x10 R 2x10 R   2x10 2# R w/ pillowcase  NT Next w/ weight     2x10 2# R w/ pillowcase    w/strap 5x R   SL hip abd    standing standing --   Standing standing Unable L shldr  unable - shldr pain Next    Clams                         Hip add            HEP 10x5'' w/ ball W/ball 5"x10  w/ball 5"x10     Bridges Held Held 2x15 B 2x15 B 2x15 B 2x15 B 2x15 B 2x15 B 2x15 2x10  2x10 5x                            Prone:                        Quad stretch    NT NT Next        --  --     HS curls                --  --                              Seated:                        LAQ              NT 2# 2x12  2# 2x10 B Next    Marching              NT 2# 2x12   Next                             Standing:                        Heel/Toe raises 2x15 DL 3x10 DL 3x10  3x10  3x10 3x10  2x10 2x10 2x10  2x10   Next    Calf stretch on fitter 30"x3 B 30"x3 B 30"X3 B  30"X3 B  30"X3 B 3x30'' B 3x30'' B 3x30'' B 3x30'' B 30"x3 B  oot Next    Steamboats RTC  2x12 B RTC  2x10 B ^ RTC   2x10  B 2x10 B 1# RTC   2x10  B Hip abd, ext  2x15 R Hip abd, ext  2x15 R Hip abd, ext  2x15 R Hip abd, ext  2x15 R 1x10 B       TKEs oot 2x15 3" hold R 2x15 w 3" hold CKC with Purple TB  CKC with Purple TB Next                SLS                  "       Tandem walking                        Cybex HS curls                        Cybex LAQ                        Leg Press 4.5 DL   2x15  4.5 DL  2x15 2x15 DL 4.5 plates 2x10 DL  4.5 plates  2x10 DL  4.5 plates 2x10 DL  3.5 plates 2x10 DL  3.5 plates 2x10 DL  3.5 plates 2x10 DL  3.5 plates         Gait              NT NT  Unable due to shoulder pain Next with SPC   CP   10' 10' 10' 10' 10' 10' 10'    Home     INITIALS DH 2/6  1/6 AB/KV FS / KN  JA / KN 1/6 RONALDO KV CC RONALDO  2/6   1/6 RONALDO       Assessment:     Patient able to increase activity slightly with added reps.  Treatment limited by time due to late for appointment.    Patient Education/Response:     Patient educated to continue HEP.  Verbalized understanding.    Plans and Goals:     Continue PT per POC, progress to standing TE as able.     Short Term Goals: (4 weeks)  1. Pt will be independent with HEP  2. Pt will improve R LE strength to at least 75% of L LE strength as measured via MicroFet handheld dynamometer in order to improve functional mobility  3. Pt will improve R knee AROM to at least 0-105 in order to improve gait     Long Term Goals: (8 weeks)  1. Pt will be independent with updated HEP  2. Pt will improve R LE strength to at least 90% of L LE strength as measured via MicroFet handheld dynamometer in order to improve functional mobility  3. Pt will improve R knee AROM to at least 0-110 in order to improve gait and ability to perform ADLs  4. Pt will improve FOTO knee survey score to </= 42% limited in order to demo improved functional mobility  5. Pt will perform TUG in < 10 seconds without AD in order to demo improved gait speed  6. Pt will perform at least 10 sit to stands without UE support on 30 second sit to stand test in order to demo improved ability to perform transfers

## 2018-06-11 ENCOUNTER — CLINICAL SUPPORT (OUTPATIENT)
Dept: REHABILITATION | Facility: HOSPITAL | Age: 68
End: 2018-06-11
Payer: MEDICARE

## 2018-06-11 DIAGNOSIS — R53.1 DECREASED STRENGTH: ICD-10-CM

## 2018-06-11 DIAGNOSIS — M25.661 DECREASED ROM OF RIGHT KNEE: ICD-10-CM

## 2018-06-11 DIAGNOSIS — G89.29 CHRONIC PAIN OF RIGHT KNEE: ICD-10-CM

## 2018-06-11 DIAGNOSIS — Z74.09 DECREASED MOBILITY AND ENDURANCE: ICD-10-CM

## 2018-06-11 DIAGNOSIS — M25.561 CHRONIC PAIN OF RIGHT KNEE: ICD-10-CM

## 2018-06-11 PROCEDURE — G8980 MOBILITY D/C STATUS: HCPCS | Mod: CJ,PN

## 2018-06-11 PROCEDURE — G8979 MOBILITY GOAL STATUS: HCPCS | Mod: CK,PN

## 2018-06-11 PROCEDURE — 97110 THERAPEUTIC EXERCISES: CPT | Mod: PN

## 2018-06-11 NOTE — Clinical Note
Abiel Combs  Thank you for your referral of Monique Trujillo (MRN:[id number], :1950) to [facility] and [facility]. Pt discharged from PT today reaching all goals set.  Thank you, Lew Dao, PT, DPT

## 2018-06-11 NOTE — PROGRESS NOTES
"DAILY TREATMENT NOTE    DATE: 6/11/2018    Start Time:  2:00  Stop Time:  2:45    PROCEDURES:    TIMED  Procedure Min.   Therex 40         UNTIMED  Procedure Min.   Bike 5         Total Timed Minutes:  40  Total Timed Units:  3  Total Untimed Units:  0  Charges Billed/# of units:  3 TE      Progress/Current Status    Subjective:     Patient ID: Monique Trujillo is a 67 y.o. female.  Diagnosis:   1. Decreased ROM of right knee     2. Decreased mobility and endurance     3. Chronic pain of right knee     4. Decreased strength       Pain: 3.5 /10  Pt reports no R knee pain,    Objective:     Pt performed bike x 5 mins and then TE x 40 mins per log below including tests and measures for discharge today.    DATE 6/11/18 6/8/18 6/6/18 5/30/18 5/24/18 5/18/18 5/11/18 5/8/18 5/4/18 4/27/18 4/24/18  4/20/18 4/10/2018   VISIT 13 12 11 10 9 8 7 6 5 4 3  2 1   POC 6/10/18                         G CODE 3/10 2/10 1/10 10/10 9/10 8/10 7/10 6/10 5/10 4/10 3/10  2/10 1/10   FOTO  w/gcode done done 9/10  8/10 7/10 6/10 5/5 done 4/5 3/5  2/5 1/5   Cap Visit  Cap Total 90.96  1369.10     90.96  1278.14     57.78  1187.18   88.10  1126.54 88.10  1038.44 88.10  950.34 57.78  862.24 57.78  804.46 60.64  746.68 60.64  686.04 90.96  325.44 121.28  234.48 113.20  113.20   Bike 5' 5' 5' 5' 5' NT 5' 5' 5' 5' 5'  Next Next    MHP                         MT  NT oot 10' 10' 15' 15' 15' 15' 10'           Stretches:                         HS stretch 2x30'' B 30"x2 B  Manual  30"x2 B manual 3x30'' bilateral manually  3x30'' bilateral manually   3x30'' R manually  3x30'' R stairs 3x30'' R  stairs 3x30'' R  stairs 3x30'' R  stairs 30"x3 B RONALDO  3x30'' R Next    IT band stretch                         Supine                         Quad sets 10x10'' R 10"x10 R  Towel under ankle 10''x10 R  Towel under ankle; 10''x10 R  Towel under ankle; SPTA overpressure 10''x10 R  Towel under ankle; SPTA overpressure  10''x10 R  Towel under ankle; PT overpressure  " "10''x10 R  Towel under ankle; PT overpressure 10''x10 R  Towel under ankle; PT overpressure 10''x10 R  Towel under ankle; PT overpressure 10''x12 R  Towel under ankle; PT overpressure 10x10'' R  towel under ankle [id number]'' R  towel under ankle 5x5''   SLR 2# 2x15 B #2 2x12 B 2# 2x10 B 3x10 B 1# 3x10 B 1# 3x10 B 1#  3x10 B 1# 2x15 B 2x15 B 3x10 B 2x12 B  2x10 B 5x R   SAQ                    2# 2x10 B     Knee flexion stretch 4x20'' R w/ straps    NT NT 10"x10' R w/ straps  10"x10 PT overpressure 10"x10' R w/ straps  10"x10 PT overpressure 10"x10' R w/ straps  10"x10 PT overpressure 10x10'' w/ straps  PT overpressure 5"x15 w/2 straps  5"x15 1 strap Next w/ strap   Heel slides 2x10 R 2x10 R 2x10 R   2x10 2# R w/ pillowcase  NT Next w/ weight     2x10 2# R w/ pillowcase    w/strap 5x R   SL hip abd     standing standing --   Standing standing Unable L shldr  unable - shldr pain Next    Clams                          Hip add             HEP 10x5'' w/ ball W/ball 5"x10  w/ball 5"x10     Bridges NT, pain Held Held 2x15 B 2x15 B 2x15 B 2x15 B 2x15 B 2x15 B 2x15 2x10  2x10 5x                             Prone:                         Quad stretch     NT NT Next        --  --     HS curls                 --  --                               Seated:                         LAQ               NT 2# 2x12  2# 2x10 B Next    Marching               NT 2# 2x12   Next                              Standing:                         Heel/Toe raises -- 2x15 DL 3x10 DL 3x10  3x10  3x10 3x10  2x10 2x10 2x10  2x10   Next    Calf stretch on fitter -- 30"x3 B 30"x3 B 30"X3 B  30"X3 B  30"X3 B 3x30'' B 3x30'' B 3x30'' B 3x30'' B 30"x3 B  oot Next    Steamboats -- RTC  2x12 B RTC  2x10 B ^ RTC   2x10  B 2x10 B 1# RTC   2x10  B Hip abd, ext  2x15 R Hip abd, ext  2x15 R Hip abd, ext  2x15 R Hip abd, ext  2x15 R 1x10 B       TKEs -- oot 2x15 3" hold R 2x15 w 3" hold CKC with Purple TB  CKC with Purple TB Next                SLS                     "     Tandem walking                         Cybex HS curls                         Cybex LAQ                         Leg Press -- 4.5 DL   2x15  4.5 DL  2x15 2x15 DL 4.5 plates 2x10 DL  4.5 plates  2x10 DL  4.5 plates 2x10 DL  3.5 plates 2x10 DL  3.5 plates 2x10 DL  3.5 plates 2x10 DL  3.5 plates         Gait               NT NT  Unable due to shoulder pain Next with SPC   CP    10' 10' 10' 10' 10' 10' 10'    Home     INITIALS RONALDO  2/6  1/6 AB/KV FS / KN  JA / KN 1/6 RONALDO KV CC [facility] 2/6   1/6 [facility]       Assessment:     See discharge assessment below.    Patient Education/Response:     See discharge assessment below.    Plans and Goals:     See discharge assessment below.    REHAB SERVICES OUTPATIENT DISCHARGE SUMMARY  Physical Therapy      Name:  Monique Trujillo  Date:  6/11/18  Date of Evaluation:  4/10/18  Physician:  Yunier Combs MD   Total # Of Visits: 13  Diagnosis:    1. Decreased ROM of right knee     2. Decreased mobility and endurance     3. Chronic pain of right knee     4. Decreased strength         Physical/Functional Status:  At time of discharge, patient was able to perform all daily activities at home and at work without issues. Pt does report her only pain in her L upper buttock that started about a week ago when performing a bridge. Pain has been about the same and pt instructed to make MD follow-up if pain is still present after 2 weeks or gets worse.    The patient is to be discharged from our Therapy service for the following reason(s):  Patient is now asymptomatic, Patient has met all of his/her goals and Patient has reached the maximum rehab potential for the present time  EVAL:  Range of Motion/Strength:   R knee:  AROM: 0-89  PROM: 0-91     L knee:  AROM: 0-118  PROM: 0-120     L/E Strength w/ MicroFET Muscle Edgar Dynamometer Right Left Pain/Dysfunction with Movement   (approx 4 sec hold w/ max contraction)   Hip Flexion 9.3 kg  12.6 kg      Hip Abduction 8.9 kg  13.3 kg       Quadriceps 22.9 kg  26.5 kg      Hamstrings 15.7 kg  17.8 kg         TUG:  15.38 seconds with rollator  30 second sit-to-stand test (without U/E support): 10 with B UE support    D/C:  Range of Motion/Strength:     R knee:  AROM: 0-113  PROM: 0-116    L knee:  AROM: 0-121  PROM: 0-123     L/E Strength w/ MicroFET Muscle Edgar Dynamometer Right Left Pain/Dysfunction with Movement   (approx 4 sec hold w/ max contraction)   Hip Flexion 14.6 kg  18.2 kg      Hip Abduction 14.6 kg  12.0 kg      Quadriceps 24.0 kg  25.0 kg      Hamstrings 16.6 kg  22.0 kg         TUG:  10.37 seconds without AD  30 second sit-to-stand test (without U/E support): 12 without BUE support     Degree of Goal Achievement:  Patient has met all goals  Short Term Goals: (4 weeks)  1. Pt will be independent with HEP - Met  2. Pt will improve R LE strength to at least 75% of L LE strength as measured via MicroFet handheld dynamometer in order to improve functional mobility - Met  3. Pt will improve R knee AROM to at least 0-105 in order to improve gait - Met     Long Term Goals: (8 weeks)  1. Pt will be independent with updated HEP. - Met  2. Pt will improve R LE strength to at least 90% of L LE strength as measured via MicroFet handheld dynamometer in order to improve functional mobility  - Met  3. Pt will improve R knee AROM to at least 0-110 in order to improve gait and ability to perform ADLs - Met  4. Pt will improve FOTO knee survey score to </= 42% limited in order to demo improved functional mobility - Met  5. Pt will perform TUG in < 10 seconds without AD in order to demo improved gait speed. - Met  6. Pt will perform at least 10 sit to stands without UE support on 30 second sit to stand test in order to demo improved ability to perform transfers. - Met  Patient Education:  HEP    Discharge Plan:  Home Program:  HEP

## 2018-06-13 ENCOUNTER — TELEPHONE (OUTPATIENT)
Dept: ORTHOPEDICS | Facility: CLINIC | Age: 68
End: 2018-06-13

## 2018-06-13 DIAGNOSIS — M25.511 RIGHT SHOULDER PAIN, UNSPECIFIED CHRONICITY: Primary | ICD-10-CM

## 2018-06-14 ENCOUNTER — HOSPITAL ENCOUNTER (OUTPATIENT)
Dept: RADIOLOGY | Facility: HOSPITAL | Age: 68
Discharge: HOME | End: 2018-06-14
Attending: ORTHOPAEDIC SURGERY
Payer: MEDICARE

## 2018-06-14 ENCOUNTER — OFFICE VISIT (OUTPATIENT)
Dept: ORTHOPEDICS | Facility: CLINIC | Age: 68
End: 2018-06-14
Payer: MEDICARE

## 2018-06-14 VITALS — HEIGHT: 62 IN | BODY MASS INDEX: 41.04 KG/M2 | WEIGHT: 223 LBS

## 2018-06-14 DIAGNOSIS — M19.012 PRIMARY OSTEOARTHRITIS OF LEFT SHOULDER: Primary | ICD-10-CM

## 2018-06-14 DIAGNOSIS — M25.511 RIGHT SHOULDER PAIN, UNSPECIFIED CHRONICITY: ICD-10-CM

## 2018-06-14 PROCEDURE — 99999 PR PBB SHADOW E&M-EST. PATIENT-LVL II: CPT | Mod: PBBFAC,,, | Performed by: ORTHOPAEDIC SURGERY

## 2018-06-14 PROCEDURE — 99213 OFFICE O/P EST LOW 20 MIN: CPT | Mod: 25,S$GLB,, | Performed by: ORTHOPAEDIC SURGERY

## 2018-06-14 PROCEDURE — 20610 DRAIN/INJ JOINT/BURSA W/O US: CPT | Mod: LT,S$GLB,, | Performed by: ORTHOPAEDIC SURGERY

## 2018-06-14 RX ORDER — TRIAMCINOLONE ACETONIDE 40 MG/ML
40 INJECTION, SUSPENSION INTRA-ARTICULAR; INTRAMUSCULAR
Status: COMPLETED | OUTPATIENT
Start: 2018-06-14 | End: 2018-06-14

## 2018-06-14 RX ORDER — CELECOXIB 200 MG/1
200 CAPSULE ORAL DAILY
Qty: 30 CAPSULE | Refills: 3 | Status: SHIPPED | OUTPATIENT
Start: 2018-06-14 | End: 2018-09-17 | Stop reason: SDUPTHER

## 2018-06-14 RX ADMIN — TRIAMCINOLONE ACETONIDE 40 MG: 40 INJECTION, SUSPENSION INTRA-ARTICULAR; INTRAMUSCULAR at 02:06

## 2018-06-14 NOTE — LETTER
June 14, 2018      Yunier Combs MD  [address]  [address]  [place] [address]           [facility]  [address]  [place] [address]  Phone: [phone]          Patient: Monique Trujillo   MR Number: [id number]   YOB: 1950   Date of Visit: 6/14/2018       Dear Dr. Yunier Combs:    Thank you for referring Monique Trujillo to me for evaluation. Attached you will find relevant portions of my assessment and plan of care.    If you have questions, please do not hesitate to call me. I look forward to following oMnique Trujillo along with you.    Sincerely,    Jovanni Torres Jr., MD    Enclosure  CC:  No Recipients    If you would like to receive this communication electronically, please contact [email or web address] or [phone] to request more information on [employer] access.    For providers and/or their staff who would like to refer a patient to [facility], please contact us through our one-stop-shop provider referral line, [facility], at [phone].    If you feel you have received this communication in error or would no longer like to receive these types of communications, please e-mail [email or web address]

## 2018-06-14 NOTE — PROGRESS NOTES
INITIAL VISIT HISTORY:  A 67-year-old female referred by Dr. Combs for   evaluation of left shoulder symptoms.  She has had problems on and off left   shoulder for the past several years, but has not really had any specific   treatment for it.    No recent trauma or injury reported.  She is having pain and difficulty using   the shoulder and she does hear a grinding sensation from time to time.    No recent trauma.    PAST MEDICAL HISTORY:  Significant for arthritis, back problems, depression,   GERD, hyperlipidemia, hypertension and history of ulcers.    PAST SURGICAL HISTORY:  Includes knee arthroscopy, tubal ligation, anterior   cervical fusion, lumbar surgery and knee replacement.    FAMILY HISTORY:  Positive for kidney disease, Alzheimer's and cancer.    SOCIAL HISTORY:  The patient does not smoke or drink.    REVIEW OF SYSTEMS:  Negative fever, chills, rashes.    CURRENT MEDICATIONS:  Reviewed on chart.    ALLERGIES:  None.    PHYSICAL EXAMINATION:  GENERAL:  Well-developed, well-nourished female in no acute distress, alert and   oriented x3.  MUSCULOSKELETAL:  Examination of upper extremities significant for the left   shoulder, demonstrating some mild tenderness along the anterior joint line.    Range of motion of left shoulder is slightly decreased secondary to pain.  She   does have some crepitation and pain with external rotation and abduction left   shoulder.    Rotator cuff strength intact.  No instability.  Sensation intact.  Neck   nontender.    X-RAYS:  AP and lateral, left shoulder, demonstrate arthritic change of the   glenohumeral joint, moderate-to-severe.  She also has some spurring at the   anterolateral acromion.    IMPRESSION:  Glenohumeral arthritis, left shoulder.    PLAN:  I explained the nature of the problem to the patient.  Recommended we try   an injection today.  She was in agreement.    After pause for timeout, she identified the left shoulder injected combination   of Kenalog 40  mg, 2 mL Xylocaine, sterile technique.  She tolerated the   procedure well without complication.    I have also recommended we try her on Celebrex.  She does have a history of   stomach ulcers, so I want to be real careful with that just one a day with food   and discontinue if she has any problems.  Follow up in one month for recheck.      GRIS  dd: 06/14/2018 14:05:02 (CDT)  td: 06/15/2018 10:25:45 (CDT)  Doc ID   #[id number]  Job ID #[id number]    CC:

## 2018-07-16 ENCOUNTER — OFFICE VISIT (OUTPATIENT)
Dept: ORTHOPEDICS | Facility: CLINIC | Age: 68
End: 2018-07-16
Payer: MEDICARE

## 2018-07-16 VITALS — WEIGHT: 223 LBS | HEIGHT: 62 IN | BODY MASS INDEX: 41.04 KG/M2

## 2018-07-16 DIAGNOSIS — M19.012 PRIMARY OSTEOARTHRITIS OF LEFT SHOULDER: Primary | ICD-10-CM

## 2018-07-16 PROCEDURE — 20610 DRAIN/INJ JOINT/BURSA W/O US: CPT | Mod: LT,S$GLB,, | Performed by: ORTHOPAEDIC SURGERY

## 2018-07-16 PROCEDURE — 99213 OFFICE O/P EST LOW 20 MIN: CPT | Mod: 25,S$GLB,, | Performed by: ORTHOPAEDIC SURGERY

## 2018-07-16 PROCEDURE — 99999 PR PBB SHADOW E&M-EST. PATIENT-LVL IV: CPT | Mod: PBBFAC,,, | Performed by: ORTHOPAEDIC SURGERY

## 2018-07-16 RX ORDER — TRIAMCINOLONE ACETONIDE 40 MG/ML
40 INJECTION, SUSPENSION INTRA-ARTICULAR; INTRAMUSCULAR
Status: COMPLETED | OUTPATIENT
Start: 2018-07-16 | End: 2018-07-16

## 2018-07-16 RX ADMIN — TRIAMCINOLONE ACETONIDE 40 MG: 40 INJECTION, SUSPENSION INTRA-ARTICULAR; INTRAMUSCULAR at 01:07

## 2018-07-16 NOTE — PROGRESS NOTES
INITIAL VISIT HISTORY:  Ms. Trujillo was seen previously for left shoulder   arthritis and had good results after the previous injection, but would like to   have another injection today.  Symptoms improved.    PHYSICAL EXAMINATION:  LEFT SHOULDER:  Mild tenderness, left shoulder.  No swelling.  Range of motion   improved, still lacks full elevation and external rotation, mild crepitation.    Strength is improved.    IMPRESSION:  Left shoulder arthritis, glenohumeral.    PLAN:  After a pause for timeout, the patient identified the left shoulder,   injected the glenohumeral joint with combination of Kenalog 40 mg and 2 mL   Xylocaine, sterile technique.  She tolerated the procedure well without   complication.    I also brought up therapy as an option.  She really does not want to do that   now.  I think she has some stuff going on with her  who has Alzheimer's,   but I have shown her some exercises she can do at home including walking the   wall, to work on elevation of the shoulder and abduction.  Advil or Tylenol for   pain.  Follow up in 1-2 months or as needed.      GRIS  dd: 07/16/2018 13:55:24 (CDT)  td: 07/17/2018 02:33:54 (CDT)  Doc ID   #[id number]  Job ID #[id number]    CC:

## 2018-09-17 ENCOUNTER — OFFICE VISIT (OUTPATIENT)
Dept: ORTHOPEDICS | Facility: CLINIC | Age: 68
End: 2018-09-17
Payer: MEDICARE

## 2018-09-17 VITALS — WEIGHT: 225 LBS | BODY MASS INDEX: 41.41 KG/M2 | HEIGHT: 62 IN

## 2018-09-17 DIAGNOSIS — M19.012 PRIMARY OSTEOARTHRITIS OF LEFT SHOULDER: Primary | ICD-10-CM

## 2018-09-17 PROCEDURE — 99999 PR PBB SHADOW E&M-EST. PATIENT-LVL II: CPT | Mod: PBBFAC,,, | Performed by: ORTHOPAEDIC SURGERY

## 2018-09-17 PROCEDURE — 1101F PT FALLS ASSESS-DOCD LE1/YR: CPT | Mod: CPTII,,, | Performed by: ORTHOPAEDIC SURGERY

## 2018-09-17 PROCEDURE — 99212 OFFICE O/P EST SF 10 MIN: CPT | Mod: PBBFAC,PN | Performed by: ORTHOPAEDIC SURGERY

## 2018-09-17 PROCEDURE — 99213 OFFICE O/P EST LOW 20 MIN: CPT | Mod: S$PBB,,, | Performed by: ORTHOPAEDIC SURGERY

## 2018-09-17 RX ORDER — CELECOXIB 200 MG/1
200 CAPSULE ORAL DAILY
Qty: 30 CAPSULE | Refills: 3 | Status: SHIPPED | OUTPATIENT
Start: 2018-09-17 | End: 2019-01-27 | Stop reason: SDUPTHER

## 2018-09-17 NOTE — PROGRESS NOTES
HISTORY OF PRESENT ILLNESS:  Ms. Trujillo in followup of left shoulder arthritis,   doing much better since the injection last visit.  She is having less pain and   feels like she is moving the arm more.    PHYSICAL EXAMINATION:  LEFT SHOULDER:  No tenderness, no swelling.  Range of motion is definitely   improved.  She has elevation to about 120 degrees, external rotation also   improved.  Strength is better.    PLAN:  Since she is doing well, I have just recommended she stay on   anti-inflammatory medication by mouth, no heavy lifting but we will keep an eye   on the shoulder, maybe consider injection in a month or two if symptoms get   worse.  Refilled Celebrex today.  Follow up in 1-2 months.      GRIS  dd: 09/17/2018 14:23:01 (CDT)  td: 09/18/2018 03:11:46 (CDT)  Doc ID   #[id number]  Job ID #[id number]    CC:

## 2018-09-27 ENCOUNTER — TELEPHONE (OUTPATIENT)
Dept: ORTHOPEDICS | Facility: CLINIC | Age: 68
End: 2018-09-27

## 2018-09-27 ENCOUNTER — OFFICE VISIT (OUTPATIENT)
Dept: ORTHOPEDICS | Facility: CLINIC | Age: 68
End: 2018-09-27
Payer: MEDICARE

## 2018-09-27 VITALS — WEIGHT: 231 LBS | HEIGHT: 62 IN | BODY MASS INDEX: 42.51 KG/M2

## 2018-09-27 DIAGNOSIS — Z96.651 S/P TOTAL KNEE ARTHROPLASTY, RIGHT: Primary | ICD-10-CM

## 2018-09-27 PROCEDURE — 99999 PR PBB SHADOW E&M-EST. PATIENT-LVL III: CPT | Mod: PBBFAC,,, | Performed by: ORTHOPAEDIC SURGERY

## 2018-09-27 PROCEDURE — 1101F PT FALLS ASSESS-DOCD LE1/YR: CPT | Mod: CPTII,,, | Performed by: ORTHOPAEDIC SURGERY

## 2018-09-27 PROCEDURE — 99213 OFFICE O/P EST LOW 20 MIN: CPT | Mod: PBBFAC,PN | Performed by: ORTHOPAEDIC SURGERY

## 2018-09-27 PROCEDURE — 99212 OFFICE O/P EST SF 10 MIN: CPT | Mod: S$PBB,,, | Performed by: ORTHOPAEDIC SURGERY

## 2018-09-27 NOTE — TELEPHONE ENCOUNTER
I left message for pt in regards to provider rescheduling her appt from 10:45 to 1:45 . I explained time , date and location on Voicemail .

## 2018-09-27 NOTE — PROGRESS NOTES
Subjective:      Patient ID: Monique Trujillo is a 67 y.o. female.    Chief Complaint: Follow-up of the Right Knee      HPI:  Six month follow-up   The patient is seen for postop follow-up of right  TKA.  Pain control has been satisfactory  They feel that they are ambulating easily  Preoperative complaints include:  Clicking at start up      Current Outpatient Medications:     amlodipine (NORVASC) 10 MG tablet, TAKE 1 TABLET BY MOUTH EVERY DAY, Disp: 90 tablet, Rfl: 3    ascorbic acid (VITAMIN C) 1000 MG tablet, Take 1,000 mg by mouth once daily., Disp: , Rfl:     atenolol (TENORMIN) 25 MG tablet, TAKE 1 TABLET BY MOUTH EVERY DAY, Disp: 90 tablet, Rfl: 3    calcium-vitamin D3 (CALCIUM 500 + D) 500 mg(1,250mg) -200 unit per tablet, Take 1 tablet by mouth 2 (two) times daily with meals., Disp: , Rfl:     celecoxib (CELEBREX) 200 MG capsule, Take 1 capsule (200 mg total) by mouth once daily., Disp: 30 capsule, Rfl: 3    citalopram (CELEXA) 40 MG tablet, TAKE 1 TABLET BY MOUTH EVERY DAY, Disp: 90 tablet, Rfl: 3    co-enzyme Q-10 30 mg capsule, Take 30 mg by mouth once daily. , Disp: , Rfl:     econazole nitrate 1 % cream, Use bid for rash, Disp: 85 g, Rfl: 2    fish oil-omega-3 fatty acids 300-1,000 mg capsule, Take 2 g by mouth once daily., Disp: , Rfl:     furosemide (LASIX) 40 MG tablet, Take 1 tablet (40 mg total) by mouth daily as needed (edema)., Disp: 90 tablet, Rfl: 3    gabapentin (NEURONTIN) 800 MG tablet, TAKE 1 TABLET BY MOUTH EVERY EVENING, Disp: 90 tablet, Rfl: 3    GLUCOSA KHAN 2KCL/CHONDROITIN KHAN (GLUCOSAMINE SULF-CHONDROITIN ORAL), Take 1 tablet by mouth once daily., Disp: , Rfl:     multivitamin capsule, Take 1 capsule by mouth once daily., Disp: , Rfl:     OPW TEST CLAIM - DO NOT FILL, Take by mouth. OPW test claim. Do not fill., Disp: 11 tablet, Rfl: 0    pantoprazole (PROTONIX) 20 MG tablet, Take 1 tablet (20 mg total) by mouth once daily., Disp: 90 tablet, Rfl: 3    pravastatin  "(PRAVACHOL) 40 MG tablet, TAKE 1 TABLET BY MOUTH EVERY DAY, Disp: 90 tablet, Rfl: 33    solifenacin (VESICARE) 10 MG tablet, Take 1 tablet (10 mg total) by mouth once daily., Disp: 28 tablet, Rfl: 0    vitamin E 400 UNIT capsule, Take 400 Units by mouth once daily., Disp: , Rfl:     Current Facility-Administered Medications:     lidocaine (PF) 20 mg/mL (2 %) injection 2 mL, 2 mL, Intradermal, Once, Kalyan Han MD  Review of patient's allergies indicates:  No Known Allergies    Ht 5' 2" (1.575 m)   Wt 104.8 kg (231 lb)   BMI 42.25 kg/m²     ROS        Objective:              Alert, oriented, no acute distress  non-ataxic  wound margins intact and healing well.  No signs of infection.  Range of motion: extension- 0 degrees; flexion- 135 degrees  Valgus/varus stability- stable  No crepitation  Neurovascular exam intact    Assessment:     Imaging:  None        1. S/P total knee arthroplasty, right        This total knee is functioning normally.  I explained the anatomic basis of clicking.          Plan:          No Follow-up on file.    The total knee is functioning normally    Avoidance of provocative positions explained      X-ray at follow-up        "

## 2018-10-24 ENCOUNTER — HOSPITAL ENCOUNTER (OUTPATIENT)
Dept: RADIOLOGY | Facility: HOSPITAL | Age: 68
Discharge: HOME | End: 2018-10-24
Attending: PAIN MEDICINE
Payer: MEDICARE

## 2018-10-24 ENCOUNTER — OFFICE VISIT (OUTPATIENT)
Dept: PAIN MEDICINE | Facility: CLINIC | Age: 68
End: 2018-10-24
Payer: MEDICARE

## 2018-10-24 VITALS
WEIGHT: 233 LBS | DIASTOLIC BLOOD PRESSURE: 80 MMHG | SYSTOLIC BLOOD PRESSURE: 138 MMHG | BODY MASS INDEX: 42.62 KG/M2 | HEART RATE: 75 BPM

## 2018-10-24 DIAGNOSIS — M54.42 CHRONIC BILATERAL LOW BACK PAIN WITH BILATERAL SCIATICA: Primary | ICD-10-CM

## 2018-10-24 DIAGNOSIS — M51.36 LUMBAR DEGENERATIVE DISC DISEASE: ICD-10-CM

## 2018-10-24 DIAGNOSIS — G89.29 CHRONIC BILATERAL LOW BACK PAIN WITH BILATERAL SCIATICA: Primary | ICD-10-CM

## 2018-10-24 DIAGNOSIS — M54.41 CHRONIC BILATERAL LOW BACK PAIN WITH BILATERAL SCIATICA: ICD-10-CM

## 2018-10-24 DIAGNOSIS — G89.29 CHRONIC BILATERAL LOW BACK PAIN WITH BILATERAL SCIATICA: ICD-10-CM

## 2018-10-24 DIAGNOSIS — M54.42 CHRONIC BILATERAL LOW BACK PAIN WITH BILATERAL SCIATICA: ICD-10-CM

## 2018-10-24 DIAGNOSIS — M54.41 CHRONIC BILATERAL LOW BACK PAIN WITH BILATERAL SCIATICA: Primary | ICD-10-CM

## 2018-10-24 DIAGNOSIS — M54.16 LUMBAR RADICULOPATHY: ICD-10-CM

## 2018-10-24 DIAGNOSIS — E66.01 MORBID OBESITY WITH BMI OF 40.0-44.9, ADULT: Chronic | ICD-10-CM

## 2018-10-24 PROCEDURE — 72110 X-RAY EXAM L-2 SPINE 4/>VWS: CPT | Mod: TC,FY

## 2018-10-24 PROCEDURE — 72110 X-RAY EXAM L-2 SPINE 4/>VWS: CPT | Mod: 26,,, | Performed by: RADIOLOGY

## 2018-10-24 PROCEDURE — 99999 PR PBB SHADOW E&M-EST. PATIENT-LVL III: CPT | Mod: PBBFAC,,, | Performed by: PAIN MEDICINE

## 2018-10-24 PROCEDURE — 3079F DIAST BP 80-89 MM HG: CPT | Mod: CPTII,,, | Performed by: PAIN MEDICINE

## 2018-10-24 PROCEDURE — 3075F SYST BP GE 130 - 139MM HG: CPT | Mod: CPTII,,, | Performed by: PAIN MEDICINE

## 2018-10-24 PROCEDURE — 99204 OFFICE O/P NEW MOD 45 MIN: CPT | Mod: S$PBB,,, | Performed by: PAIN MEDICINE

## 2018-10-24 PROCEDURE — 99213 OFFICE O/P EST LOW 20 MIN: CPT | Mod: PBBFAC,25,PO | Performed by: PAIN MEDICINE

## 2018-10-24 PROCEDURE — 1101F PT FALLS ASSESS-DOCD LE1/YR: CPT | Mod: CPTII,,, | Performed by: PAIN MEDICINE

## 2018-10-24 NOTE — PROGRESS NOTES
"[facility] New Patient Evaluation    Referred by: Self  Reason for referral: Back Pain    CC:   Chief Complaint   Patient presents with    Low-back Pain     Last 3 PDI Scores 10/24/2018   Pain Disability Index (PDI) 42       HPI:   Monique Trujillo is a 67 y.o. female who complains of clbp with radiculopathy.    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  Completed for knee and hip after each replacement.  HEP:   Interventions: Denies  Surgery:   2014 - lumbar fixation to treat listhesis - patient felt good for several years after this   2017 - right hip replacement   2018 - right knee replaced  Medications:   - NSAIDS: Yes, current  - MSK Relaxants: Tried several in the past with severe sedation  - TCAs:   - SNRIs:   - Topicals:   - Anticonvulsants: Yes, current  - Opioids:     Current Pain Medications:  1. Celecoxib 200 mg daily  2. Gabapentin 800 mg QHS  3. Other: Celexa    Review of Systems:  ROS    History:    Current Outpatient Medications:     amLODIPine (NORVASC) 10 MG tablet, TAKE 1 TABLET BY MOUTH EVERY DAY, Disp: 90 tablet, Rfl: 3    ascorbic acid (VITAMIN C) 1000 MG tablet, Take 1,000 mg by mouth once daily., Disp: , Rfl:     atenolol (TENORMIN) 25 MG tablet, TAKE 1 TABLET BY MOUTH EVERY DAY, Disp: 90 tablet, Rfl: 3    calcium-vitamin D3 (CALCIUM 500 + D) 500 mg(1,250mg) -200 unit per tablet, Take 1 tablet by mouth 2 (two) times daily with meals., Disp: , Rfl:     celecoxib (CELEBREX) 200 MG capsule, Take 1 capsule (200 mg total) by mouth once daily., Disp: 30 capsule, Rfl: 3    citalopram (CELEXA) 40 MG tablet, TAKE " 1 TABLET BY MOUTH EVERY DAY, Disp: 90 tablet, Rfl: 3    co-enzyme Q-10 30 mg capsule, Take 30 mg by mouth once daily. , Disp: , Rfl:     econazole nitrate 1 % cream, Use bid for rash, Disp: 85 g, Rfl: 2    fish oil-omega-3 fatty acids 300-1,000 mg capsule, Take 2 g by mouth once daily., Disp: , Rfl:     furosemide (LASIX) 40 MG tablet, Take 1 tablet (40 mg total) by mouth daily as needed (edema)., Disp: 90 tablet, Rfl: 3    gabapentin (NEURONTIN) 800 MG tablet, TAKE 1 TABLET BY MOUTH EVERY EVENING, Disp: 90 tablet, Rfl: 3    GLUCOSA KHAN 2KCL/CHONDROITIN KHAN (GLUCOSAMINE SULF-CHONDROITIN ORAL), Take 1 tablet by mouth once daily., Disp: , Rfl:     multivitamin capsule, Take 1 capsule by mouth once daily., Disp: , Rfl:     OPW TEST CLAIM - DO NOT FILL, Take by mouth. OPW test claim. Do not fill., Disp: 11 tablet, Rfl: 0    pantoprazole (PROTONIX) 20 MG tablet, Take 1 tablet (20 mg total) by mouth once daily., Disp: 90 tablet, Rfl: 3    pravastatin (PRAVACHOL) 40 MG tablet, TAKE 1 TABLET BY MOUTH EVERY DAY, Disp: 90 tablet, Rfl: 33    solifenacin (VESICARE) 10 MG tablet, Take 1 tablet (10 mg total) by mouth once daily., Disp: 28 tablet, Rfl: 0    vitamin E 400 UNIT capsule, Take 400 Units by mouth once daily., Disp: , Rfl:     Current Facility-Administered Medications:     lidocaine (PF) 20 mg/mL (2 %) injection 2 mL, 2 mL, Intradermal, Once, Kalyan Han MD    Past Medical History:   Diagnosis Date    Allergy     Arthritis     Chronic lower back pain     Depression     Fever blister     GERD (gastroesophageal reflux disease)     Hemorrhoids, internal 11/5/2014    Hyperlipidemia     Hypertension     Joint pain     Morbid obesity with BMI of 40.0-44.9, adult 2/24/2015    Multiple gastric ulcers 12/14/2017    TIMMY (obstructive sleep apnea)     does not use CPAP    Skin cancer        Past Surgical History:   Procedure Laterality Date    ANTERIOR CERVICAL CORPECTOMY W/ FUSION  1988     ARTHROPLASTY-HIP Right 4/28/2016    [facility]    ARTHROPLASTY-KNEE Right 3/14/2018    [facility]    COLONOSCOPY N/A 11/2/2016    Procedure: COLONOSCOPY;  Surgeon: Viji Dewitt MD;  Location: [facility];  Service: Endoscopy;  Laterality: N/A;    COLONOSCOPY N/A 11/2/2016    [facility]    COLONOSCOPY N/A 11/5/2014    [facility] ENDO    ESOPHAGOGASTRODUODENOSCOPY (EGD) N/A 12/14/2017    [facility] El [facility] ENDO    ESOPHAGOGASTRODUODENOSCOPY (EGD) N/A 10/20/2017    [facility]    KNEE ARTHROSCOPY W/ MENISCAL REPAIR Right 2012    LUMBAR LAMINECTOMY  06/19/2015    SKIN CANCER EXCISION      forehead does not recall date    TOTAL HIP ARTHROPLASTY Right 04/28/2016         TUBAL LIGATION  1980       Family History   Problem Relation Age of Onset    Arthritis Mother     Cancer Mother         breast ca    Breast cancer Mother     Kidney disease Father     Alzheimer's disease Maternal Grandfather     Cancer Paternal Grandmother         colon ca    Colon cancer Paternal Grandmother        Social History     Socioeconomic History    Marital status:      Spouse name: None    Number of children: None    Years of education: 12    Highest education level: None   Social Needs    Financial resource strain: None    Food insecurity - worry: None    Food insecurity - inability: None    Transportation needs - medical: None    Transportation needs - non-medical: None   Occupational History     Employer: [employer]   Tobacco Use    Smoking status: Never Smoker    Smokeless tobacco: Never Used   Substance and Sexual Activity    Alcohol use: No     Alcohol/week: 0.0 oz    Drug use: No    Sexual activity: No   Other Topics Concern    Are you pregnant or think you may be? Not Asked    Breast-feeding  Not Asked   Social History Narrative    None       Review of patient's allergies indicates:  No Known Allergies    Physical Exam:  Vitals:    10/24/18 1350   BP: 138/80   Pulse: 75   Weight: 105.7 kg (233 lb)   PainSc:   8     Ortho/SPM Exam    Imaging:  None recent.  Old imaging prior to lumbar fusion surgery was reviewed.    Labs:  BMP  Lab Results   Component Value Date     05/02/2018    K 4.2 05/02/2018     05/02/2018    CO2 23 05/02/2018    BUN 20 05/02/2018    CREATININE 0.9 05/02/2018    CALCIUM 10.0 05/02/2018    ANIONGAP 14 05/02/2018    ESTGFRAFRICA >60.0 05/02/2018    EGFRNONAA >60.0 05/02/2018     Lab Results   Component Value Date    ALT 15 05/02/2018    AST 19 05/02/2018    ALKPHOS 74 05/02/2018    BILITOT 0.6 05/02/2018       Assessment:  Problem List Items Addressed This Visit     Morbid obesity with BMI of 40.0-44.9, adult (Chronic)    Lumbar degenerative disc disease    Relevant Orders    X-Ray Lumbar Spine Complete 5 View (Completed)    MRI Lumbar Spine Without Contrast    Lumbar radiculopathy    Relevant Orders    X-Ray Lumbar Spine Complete 5 View (Completed)    MRI Lumbar Spine Without Contrast    Chronic bilateral low back pain with bilateral sciatica - Primary    Relevant Orders    X-Ray Lumbar Spine Complete 5 View (Completed)    MRI Lumbar Spine Without Contrast          67-year-old female with medical problems consisting of morbid obesity, GERD, hypertension, a multiple gastric ulcers who presents complaining of chronic low back pain with radiculopathy.  She had a lumbar surgery 4 years ago with fusion of the lumbar spine using hardware.  Following that surgery, radicular symptoms improved; however, over the past year she has experienced increasing radicular pain associated with numbness and tingling in the feet bilaterally.  The pain is made worse with prolonged sitting or lying in bed in certain positions.  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  There is no current imaging of her lumbar spine and I will place an order for x-ray lumbar spine as well as MRI lumbar spine today.  I will have her return to clinic immediately after obtaining the MRI lumbar spine so that we can make an assessment and recommend interventional therapies.  I will likely recommend an epidural steroid injection but MRI is required to ensure safe access into the epidural space.    : Not applicable    Treatment Plan:   Procedures: None pending MRI result  PT/OT/HEP:   Medications:   Labs: reviewed and medications are appropriately dosed for current hepatorenal function.  Imaging:    (1) XRAY Lumbar spine   (2) MRI lumbar spine    Follow Up: RTC 1-2 weeks    Lydia Velásquez Jr, MD  Interventional Pain Medicine / Anesthesiology    Disclaimer: This note was partly generated using dictation software which may occasionally result in transcription errors.

## 2018-10-30 ENCOUNTER — HOSPITAL ENCOUNTER (OUTPATIENT)
Dept: RADIOLOGY | Facility: HOSPITAL | Age: 68
Discharge: HOME | End: 2018-10-30
Attending: PAIN MEDICINE
Payer: MEDICARE

## 2018-10-30 DIAGNOSIS — M54.41 CHRONIC BILATERAL LOW BACK PAIN WITH BILATERAL SCIATICA: ICD-10-CM

## 2018-10-30 DIAGNOSIS — M54.16 LUMBAR RADICULOPATHY: ICD-10-CM

## 2018-10-30 DIAGNOSIS — M51.36 LUMBAR DEGENERATIVE DISC DISEASE: ICD-10-CM

## 2018-10-30 DIAGNOSIS — M54.42 CHRONIC BILATERAL LOW BACK PAIN WITH BILATERAL SCIATICA: ICD-10-CM

## 2018-10-30 DIAGNOSIS — G89.29 CHRONIC BILATERAL LOW BACK PAIN WITH BILATERAL SCIATICA: ICD-10-CM

## 2018-10-30 PROCEDURE — 72148 MRI LUMBAR SPINE W/O DYE: CPT | Mod: 26,,, | Performed by: RADIOLOGY

## 2018-10-30 PROCEDURE — 72148 MRI LUMBAR SPINE W/O DYE: CPT | Mod: TC

## 2018-10-31 ENCOUNTER — OFFICE VISIT (OUTPATIENT)
Dept: PAIN MEDICINE | Facility: CLINIC | Age: 68
End: 2018-10-31
Payer: MEDICARE

## 2018-10-31 VITALS
BODY MASS INDEX: 42.43 KG/M2 | HEART RATE: 64 BPM | DIASTOLIC BLOOD PRESSURE: 78 MMHG | SYSTOLIC BLOOD PRESSURE: 130 MMHG | WEIGHT: 232 LBS

## 2018-10-31 DIAGNOSIS — M54.16 LUMBAR RADICULOPATHY: Primary | ICD-10-CM

## 2018-10-31 DIAGNOSIS — M48.062 SPINAL STENOSIS OF LUMBAR REGION WITH NEUROGENIC CLAUDICATION: ICD-10-CM

## 2018-10-31 DIAGNOSIS — G89.29 CHRONIC BILATERAL LOW BACK PAIN WITH BILATERAL SCIATICA: ICD-10-CM

## 2018-10-31 DIAGNOSIS — M47.26 OSTEOARTHRITIS OF SPINE WITH RADICULOPATHY, LUMBAR REGION: ICD-10-CM

## 2018-10-31 DIAGNOSIS — M54.42 CHRONIC BILATERAL LOW BACK PAIN WITH BILATERAL SCIATICA: ICD-10-CM

## 2018-10-31 DIAGNOSIS — M43.16 SPONDYLOLISTHESIS, LUMBAR REGION: ICD-10-CM

## 2018-10-31 DIAGNOSIS — M54.41 CHRONIC BILATERAL LOW BACK PAIN WITH BILATERAL SCIATICA: ICD-10-CM

## 2018-10-31 PROCEDURE — 3078F DIAST BP <80 MM HG: CPT | Mod: CPTII,,, | Performed by: PAIN MEDICINE

## 2018-10-31 PROCEDURE — 99213 OFFICE O/P EST LOW 20 MIN: CPT | Mod: PBBFAC,PO | Performed by: PAIN MEDICINE

## 2018-10-31 PROCEDURE — 1101F PT FALLS ASSESS-DOCD LE1/YR: CPT | Mod: CPTII,,, | Performed by: PAIN MEDICINE

## 2018-10-31 PROCEDURE — 3075F SYST BP GE 130 - 139MM HG: CPT | Mod: CPTII,,, | Performed by: PAIN MEDICINE

## 2018-10-31 PROCEDURE — 99999 PR PBB SHADOW E&M-EST. PATIENT-LVL III: CPT | Mod: PBBFAC,,, | Performed by: PAIN MEDICINE

## 2018-10-31 PROCEDURE — 99214 OFFICE O/P EST MOD 30 MIN: CPT | Mod: S$PBB,,, | Performed by: PAIN MEDICINE

## 2018-10-31 NOTE — PROGRESS NOTES
"[facility] Established Patient Evaluation    Referred by: Self  Reason for referral: Back Pain    CC:   Chief Complaint   Patient presents with    Low-back Pain    Leg Pain    Foot Pain     Last 3 PDI Scores 10/31/2018 10/24/2018   Pain Disability Index (PDI) 35 42     Interval Update:   10/31/18- Pt returns today for follow up with MRI results she is reporting 6 /10 pain.    Background:   Monique Trujillo is a 67 y.o. female who complains of clbp with radiculopathy.    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  Completed for knee and hip after each replacement.  HEP:   Interventions: Denies  Surgery:   2014 - lumbar fixation to treat listhesis - patient felt good for several years after this   2017 - right hip replacement   2018 - right knee replaced  Medications:   - NSAIDS: Yes, current  - MSK Relaxants: Tried several in the past with severe sedation  - TCAs:   - SNRIs:   - Topicals:   - Anticonvulsants: Yes, current  - Opioids:     Current Pain Medications:  1. Celecoxib 200 mg daily  2. Gabapentin 800 mg QHS  3. Other: Celexa    Review of Systems:  Review of Systems   Constitutional: Negative for chills and fever.   HENT: Negative for nosebleeds.    Eyes: Negative for pain.   Respiratory: Negative for hemoptysis.    Cardiovascular: Negative for chest pain.   Gastrointestinal: Negative for nausea and vomiting.   Genitourinary: Negative for dysuria.   Musculoskeletal: Positive for back pain and joint pain. Negative for falls.   Skin: Negative for rash.   Neurological: Positive for tingling. Negative for focal weakness. "   Endo/Heme/Allergies: Does not bruise/bleed easily.   Psychiatric/Behavioral: The patient has insomnia.        History:    Current Outpatient Medications:     amLODIPine (NORVASC) 10 MG tablet, TAKE 1 TABLET BY MOUTH EVERY DAY, Disp: 90 tablet, Rfl: 3    ascorbic acid (VITAMIN C) 1000 MG tablet, Take 1,000 mg by mouth once daily., Disp: , Rfl:     atenolol (TENORMIN) 25 MG tablet, TAKE 1 TABLET BY MOUTH EVERY DAY, Disp: 90 tablet, Rfl: 3    calcium-vitamin D3 (CALCIUM 500 + D) 500 mg(1,250mg) -200 unit per tablet, Take 1 tablet by mouth 2 (two) times daily with meals., Disp: , Rfl:     celecoxib (CELEBREX) 200 MG capsule, Take 1 capsule (200 mg total) by mouth once daily., Disp: 30 capsule, Rfl: 3    citalopram (CELEXA) 40 MG tablet, TAKE 1 TABLET BY MOUTH EVERY DAY, Disp: 90 tablet, Rfl: 3    co-enzyme Q-10 30 mg capsule, Take 30 mg by mouth once daily. , Disp: , Rfl:     econazole nitrate 1 % cream, Use bid for rash, Disp: 85 g, Rfl: 2    fenofibric acid (FIBRICOR) 135 mg CpDR, Take 1 capsule (135 mg total) by mouth once daily., Disp: 90 capsule, Rfl: 3    fish oil-omega-3 fatty acids 300-1,000 mg capsule, Take 2 g by mouth once daily., Disp: , Rfl:     furosemide (LASIX) 40 MG tablet, Take 1 tablet (40 mg total) by mouth daily as needed (edema)., Disp: 90 tablet, Rfl: 3    gabapentin (NEURONTIN) 800 MG tablet, TAKE 1 TABLET BY MOUTH EVERY EVENING, Disp: 90 tablet, Rfl: 3    GLUCOSA KHAN 2KCL/CHONDROITIN KHAN (GLUCOSAMINE SULF-CHONDROITIN ORAL), Take 1 tablet by mouth once daily., Disp: , Rfl:     multivitamin capsule, Take 1 capsule by mouth once daily., Disp: , Rfl:     OPW TEST CLAIM - DO NOT FILL, Take by mouth. OPW test claim. Do not fill., Disp: 11 tablet, Rfl: 0    pantoprazole (PROTONIX) 20 MG tablet, Take 1 tablet (20 mg total) by mouth once daily., Disp: 90 tablet, Rfl: 3    pravastatin (PRAVACHOL) 40 MG tablet, TAKE 1 TABLET BY MOUTH EVERY DAY, Disp: 90 tablet, Rfl: 3    solifenacin  (VESICARE) 10 MG tablet, Take 1 tablet (10 mg total) by mouth once daily., Disp: 28 tablet, Rfl: 0    vitamin E 400 UNIT capsule, Take 400 Units by mouth once daily., Disp: , Rfl:     Current Facility-Administered Medications:     lidocaine (PF) 20 mg/mL (2 %) injection 2 mL, 2 mL, Intradermal, Once, Kalyan Han MD    Past Medical History:   Diagnosis Date    Allergy     Arthritis     Chronic lower back pain     Depression     Fever blister     GERD (gastroesophageal reflux disease)     Hemorrhoids, internal 11/5/2014    Hyperlipidemia     Hypertension     Joint pain     Morbid obesity with BMI of 40.0-44.9, adult 2/24/2015    Multiple gastric ulcers 12/14/2017    TIMMY (obstructive sleep apnea)     does not use CPAP    Skin cancer        Past Surgical History:   Procedure Laterality Date    ANTERIOR CERVICAL CORPECTOMY W/ FUSION  1988    ARTHROPLASTY-HIP Right 4/28/2016    [facility]    ARTHROPLASTY-KNEE Right 3/14/2018    [facility]    COLONOSCOPY N/A 11/2/2016    Procedure: COLONOSCOPY;  Surgeon: Viji Dewitt MD;  Location: [facility];  Service: Endoscopy;  Laterality: N/A;    COLONOSCOPY N/A 11/2/2016    [facility]    COLONOSCOPY N/A 11/5/2014    [facility] ENDO    ESOPHAGOGASTRODUODENOSCOPY (EGD) N/A 12/14/2017    [facility]    ESOPHAGOGASTRODUODENOSCOPY (EGD) N/A 10/20/2017    [facility]    KNEE ARTHROSCOPY W/ MENISCAL REPAIR Right 2012    LUMBAR LAMINECTOMY  06/19/2015    SKIN CANCER EXCISION      forehead does not recall date    TOTAL HIP ARTHROPLASTY Right 04/28/2016         TUBAL LIGATION  1980       Family History   Problem Relation Age of Onset    Arthritis Mother     Cancer Mother         breast ca    Breast cancer Mother     Kidney disease  Father     Alzheimer's disease Maternal Grandfather     Cancer Paternal Grandmother         colon ca    Colon cancer Paternal Grandmother        Social History     Socioeconomic History    Marital status:      Spouse name: None    Number of children: None    Years of education: 12    Highest education level: None   Social Needs    Financial resource strain: None    Food insecurity - worry: None    Food insecurity - inability: None    Transportation needs - medical: None    Transportation needs - non-medical: None   Occupational History     Employer: [employer]   Tobacco Use    Smoking status: Never Smoker    Smokeless tobacco: Never Used   Substance and Sexual Activity    Alcohol use: No     Alcohol/week: 0.0 oz    Drug use: No    Sexual activity: No   Other Topics Concern    Are you pregnant or think you may be? Not Asked    Breast-feeding Not Asked   Social History Narrative    None       Review of patient's allergies indicates:  No Known Allergies    Physical Exam:  Vitals:    10/31/18 0835   BP: 130/78   Pulse: 64   Weight: 105.2 kg (232 lb)   PainSc:   6     General    Nursing note and vitals reviewed.  Constitutional: She is oriented to person, place, and time. She appears well-developed and well-nourished. No distress.   HENT:   Head: Normocephalic and atraumatic.   Nose: Nose normal.   Eyes: Conjunctivae and EOM are normal. Pupils are equal, round, and reactive to light. Right eye exhibits no discharge. Left eye exhibits no discharge. No scleral icterus.   Neck: No JVD present.   Cardiovascular: Intact distal pulses.    Pulmonary/Chest: Effort normal. No respiratory distress.   Abdominal: She exhibits no distension.   Neurological: She is alert and oriented to person, place, and time. Coordination normal.   Psychiatric: She has a normal mood and affect. Her behavior is normal. Judgment and thought content normal.     General Musculoskeletal Exam   Gait: normal     Back (L-Spine &  T-Spine) / Neck (C-Spine) Exam     Tenderness Right paramedian tenderness of the Lower L-Spine. Left paramedian tenderness of the Lower L-Spine.     Back (L-Spine & T-Spine) Range of Motion   Back extension: facet loading is positive and exacerabtes/reproduces the patient's typical low back pain    Back flexion: limited ROM but partial relief of low back pain noted.     Spinal Sensation   Right Side Sensation  L-Spine Level: normal  Left Side Sensation  L-Spine Level: normal    Other She has no scoliosis .    Comments:  + SLR on right      Muscle Strength   Right Lower Extremity   Hip Flexion: 5/5   Hip Extensors: 5/5  Quadriceps:  5/5   Hamstrin/5   Gastrocsoleus:  5/5/5  Left Lower Extremity   Hip Flexion: 5/5   Hip Extensors: 5/5  Quadriceps:  5/5   Hamstrin/5   Gastrocsoleus:  5/5/5    Reflexes     Left Side  Quadriceps:  2+  Achilles:  2+    Right Side   Quadriceps:  2+  Achilles:  2+      Imaging:  MRI Lumbar Spine Without Contrast 10/31/2018   1. Severe multifactorial central canal spinal stenosis at the L3-4 disc space since the previous study.  There is also a 8 mm anterolisthesis of L3 on L4 since the previous study.  2. Postoperative changes from L4-5 discectomy with improvement in the anterolisthesis.  There is however at least a moderate central canal spinal stenosis related to hypertrophic changes of the dorsal elements.  3. Spondylotic changes T12-L1 disc space as above.  4. Rest of the findings as above.       Labs:  BMP  Lab Results   Component Value Date     2018    K 4.2 2018     2018    CO2 23 2018    BUN 20 2018    CREATININE 0.9 2018    CALCIUM 10.0 2018    ANIONGAP 14 2018    ESTGFRAFRICA >60.0 2018    EGFRNONAA >60.0 2018     Lab Results   Component Value Date    ALT 15 2018    AST 19 2018    ALKPHOS 74 2018    BILITOT 0.6 2018       Assessment:  Problem List Items Addressed This Visit      Osteoarthritis of spine with radiculopathy, lumbar region    Spondylolisthesis, lumbar region    Lumbar radiculopathy - Primary    Chronic bilateral low back pain with bilateral sciatica    Spinal stenosis of lumbar region with neurogenic claudication          10/24/18 - 67-year-old female with medical problems consisting of morbid obesity, GERD, hypertension, a multiple gastric ulcers who presents complaining of chronic low back pain with radiculopathy.  She had a lumbar surgery 4 years ago with fusion of the lumbar spine using hardware.  Following that surgery, radicular symptoms improved; however, over the past year she has experienced increasing radicular pain associated with numbness and tingling in the feet bilaterally.  The pain is made worse with prolonged sitting or lying in bed in certain positions.  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  There is no current imaging of her lumbar spine and I will place an order for x-ray lumbar spine as well as MRI lumbar spine today.  I will have her return to clinic immediately after obtaining the MRI lumbar spine so that we can make an assessment and recommend interventional therapies.  I will likely recommend an epidural steroid injection but MRI is required to ensure safe access into the epidural space.    10/31/18 - Patient returned to discuss imaging which shows anterolisthesis of L3 on L4 with central canal stenosis.  There is also multilevel neuroforaminal stenosis as well.  I recommend Lumbar ELINA at L2-3 for therapeutic purposes.  Once pain is controlled, she will be referred to PT.  She may also need eval by neurosurgery as this qualifies as adjacent level disease with her Hx of L4-5 fusion with hardware for anterolisthesis at that level.    : Not applicable    Treatment Plan:   Procedures: Lumbar ELINA @ L2-3 (above the lesion).  Consider Caudal with cath in the  future.  PT/OT/HEP: I plan to refer to PT once pain is better controlled with ELINA  Medications:   Labs: reviewed and medications are appropriately dosed for current hepatorenal function.  Imaging: Reviewed with patient today.  Follow Up: RTC p injection    Lydia Velásquez Jr, MD  Interventional Pain Medicine / Anesthesiology    Disclaimer: This note was partly generated using dictation software which may occasionally result in transcription errors.

## 2018-10-31 NOTE — H&P (VIEW-ONLY)
"[facility] Established Patient Evaluation    Referred by: Self  Reason for referral: Back Pain    CC:   Chief Complaint   Patient presents with    Low-back Pain    Leg Pain    Foot Pain     Last 3 PDI Scores 10/31/2018 10/24/2018   Pain Disability Index (PDI) 35 42     Interval Update:   10/31/18- Pt returns today for follow up with MRI results she is reporting 6 /10 pain.    Background:   Monique Trujillo is a 67 y.o. female who complains of clbp with radiculopathy.    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  Completed for knee and hip after each replacement.  HEP:   Interventions: Denies  Surgery:   2014 - lumbar fixation to treat listhesis - patient felt good for several years after this   2017 - right hip replacement   2018 - right knee replaced  Medications:   - NSAIDS: Yes, current  - MSK Relaxants: Tried several in the past with severe sedation  - TCAs:   - SNRIs:   - Topicals:   - Anticonvulsants: Yes, current  - Opioids:     Current Pain Medications:  1. Celecoxib 200 mg daily  2. Gabapentin 800 mg QHS  3. Other: Celexa    Review of Systems:  Review of Systems   Constitutional: Negative for chills and fever.   HENT: Negative for nosebleeds.    Eyes: Negative for pain.   Respiratory: Negative for hemoptysis.    Cardiovascular: Negative for chest pain.   Gastrointestinal: Negative for nausea and vomiting.   Genitourinary: Negative for dysuria.   Musculoskeletal: Positive for back pain and joint pain. Negative for falls.   Skin: Negative for rash.   Neurological: Positive for tingling. Negative for focal weakness. "   Endo/Heme/Allergies: Does not bruise/bleed easily.   Psychiatric/Behavioral: The patient has insomnia.        History:    Current Outpatient Medications:     amLODIPine (NORVASC) 10 MG tablet, TAKE 1 TABLET BY MOUTH EVERY DAY, Disp: 90 tablet, Rfl: 3    ascorbic acid (VITAMIN C) 1000 MG tablet, Take 1,000 mg by mouth once daily., Disp: , Rfl:     atenolol (TENORMIN) 25 MG tablet, TAKE 1 TABLET BY MOUTH EVERY DAY, Disp: 90 tablet, Rfl: 3    calcium-vitamin D3 (CALCIUM 500 + D) 500 mg(1,250mg) -200 unit per tablet, Take 1 tablet by mouth 2 (two) times daily with meals., Disp: , Rfl:     celecoxib (CELEBREX) 200 MG capsule, Take 1 capsule (200 mg total) by mouth once daily., Disp: 30 capsule, Rfl: 3    citalopram (CELEXA) 40 MG tablet, TAKE 1 TABLET BY MOUTH EVERY DAY, Disp: 90 tablet, Rfl: 3    co-enzyme Q-10 30 mg capsule, Take 30 mg by mouth once daily. , Disp: , Rfl:     econazole nitrate 1 % cream, Use bid for rash, Disp: 85 g, Rfl: 2    fenofibric acid (FIBRICOR) 135 mg CpDR, Take 1 capsule (135 mg total) by mouth once daily., Disp: 90 capsule, Rfl: 3    fish oil-omega-3 fatty acids 300-1,000 mg capsule, Take 2 g by mouth once daily., Disp: , Rfl:     furosemide (LASIX) 40 MG tablet, Take 1 tablet (40 mg total) by mouth daily as needed (edema)., Disp: 90 tablet, Rfl: 3    gabapentin (NEURONTIN) 800 MG tablet, TAKE 1 TABLET BY MOUTH EVERY EVENING, Disp: 90 tablet, Rfl: 3    GLUCOSA KHAN 2KCL/CHONDROITIN KHAN (GLUCOSAMINE SULF-CHONDROITIN ORAL), Take 1 tablet by mouth once daily., Disp: , Rfl:     multivitamin capsule, Take 1 capsule by mouth once daily., Disp: , Rfl:     OPW TEST CLAIM - DO NOT FILL, Take by mouth. OPW test claim. Do not fill., Disp: 11 tablet, Rfl: 0    pantoprazole (PROTONIX) 20 MG tablet, Take 1 tablet (20 mg total) by mouth once daily., Disp: 90 tablet, Rfl: 3    pravastatin (PRAVACHOL) 40 MG tablet, TAKE 1 TABLET BY MOUTH EVERY DAY, Disp: 90 tablet, Rfl: 3    solifenacin  (VESICARE) 10 MG tablet, Take 1 tablet (10 mg total) by mouth once daily., Disp: 28 tablet, Rfl: 0    vitamin E 400 UNIT capsule, Take 400 Units by mouth once daily., Disp: , Rfl:     Current Facility-Administered Medications:     lidocaine (PF) 20 mg/mL (2 %) injection 2 mL, 2 mL, Intradermal, Once, Kalyan Han MD    Past Medical History:   Diagnosis Date    Allergy     Arthritis     Chronic lower back pain     Depression     Fever blister     GERD (gastroesophageal reflux disease)     Hemorrhoids, internal 11/5/2014    Hyperlipidemia     Hypertension     Joint pain     Morbid obesity with BMI of 40.0-44.9, adult 2/24/2015    Multiple gastric ulcers 12/14/2017    TIMMY (obstructive sleep apnea)     does not use CPAP    Skin cancer        Past Surgical History:   Procedure Laterality Date    ANTERIOR CERVICAL CORPECTOMY W/ FUSION  1988    ARTHROPLASTY-HIP Right 4/28/2016    Performed by Fermín Naylor MD at [facility] OR    ARTHROPLASTY-KNEE Right 3/14/2018    [facility]    COLONOSCOPY N/A 11/2/2016    Procedure: COLONOSCOPY;  Surgeon: Viji Dewitt MD;  Location: [facility];  Service: Endoscopy;  Laterality: N/A;    COLONOSCOPY N/A 11/2/2016    [facility] at [facility]    COLONOSCOPY N/A 11/5/2014    Performed by Pastora Olvera MD at [facility] END[facility]    ESOPHAGOGASTRODUODENOSCOPY (EGD) N/A 12/14/2017    Performed by El Dewitt MD at [facility] END[facility]    ESOPHAGOGASTRODUODENOSCOPY (EGD) N/A 10/20/2017    [facility] at [facility]    KNEE ARTHROSCOPY W/ MENISCAL REPAIR Right 2012    LUMBAR LAMINECTOMY  06/19/2015    SKIN CANCER EXCISION      forehead does not recall date    TOTAL HIP ARTHROPLASTY Right 04/28/2016         TUBAL LIGATION  1980       Family History   Problem Relation Age of Onset    Arthritis Mother     Cancer Mother         breast ca    Breast cancer Mother     Kidney disease  Father     Alzheimer's disease Maternal Grandfather     Cancer Paternal Grandmother         colon ca    Colon cancer Paternal Grandmother        Social History     Socioeconomic History    Marital status:      Spouse name: None    Number of children: None    Years of education: 12    Highest education level: None   Social Needs    Financial resource strain: None    Food insecurity - worry: None    Food insecurity - inability: None    Transportation needs - medical: None    Transportation needs - non-medical: None   Occupational History     Employer: [employer]   Tobacco Use    Smoking status: Never Smoker    Smokeless tobacco: Never Used   Substance and Sexual Activity    Alcohol use: No     Alcohol/week: 0.0 oz    Drug use: No    Sexual activity: No   Other Topics Concern    Are you pregnant or think you may be? Not Asked    Breast-feeding Not Asked   Social History Narrative    None       Review of patient's allergies indicates:  No Known Allergies    Physical Exam:  Vitals:    10/31/18 0835   BP: 130/78   Pulse: 64   Weight: 105.2 kg (232 lb)   PainSc:   6     General    Nursing note and vitals reviewed.  Constitutional: She is oriented to person, place, and time. She appears well-developed and well-nourished. No distress.   HENT:   Head: Normocephalic and atraumatic.   Nose: Nose normal.   Eyes: Conjunctivae and EOM are normal. Pupils are equal, round, and reactive to light. Right eye exhibits no discharge. Left eye exhibits no discharge. No scleral icterus.   Neck: No JVD present.   Cardiovascular: Intact distal pulses.    Pulmonary/Chest: Effort normal. No respiratory distress.   Abdominal: She exhibits no distension.   Neurological: She is alert and oriented to person, place, and time. Coordination normal.   Psychiatric: She has a normal mood and affect. Her behavior is normal. Judgment and thought content normal.     General Musculoskeletal Exam   Gait: normal     Back (L-Spine &  T-Spine) / Neck (C-Spine) Exam     Tenderness Right paramedian tenderness of the Lower L-Spine. Left paramedian tenderness of the Lower L-Spine.     Back (L-Spine & T-Spine) Range of Motion   Back extension: facet loading is positive and exacerabtes/reproduces the patient's typical low back pain    Back flexion: limited ROM but partial relief of low back pain noted.     Spinal Sensation   Right Side Sensation  L-Spine Level: normal  Left Side Sensation  L-Spine Level: normal    Other She has no scoliosis .    Comments:  + SLR on right      Muscle Strength   Right Lower Extremity   Hip Flexion: 5/5   Hip Extensors: 5/5  Quadriceps:  5/5   Hamstrin/5   Gastrocsoleus:  5/5/5  Left Lower Extremity   Hip Flexion: 5/5   Hip Extensors: 5/5  Quadriceps:  5/5   Hamstrin/5   Gastrocsoleus:  5/5/5    Reflexes     Left Side  Quadriceps:  2+  Achilles:  2+    Right Side   Quadriceps:  2+  Achilles:  2+      Imaging:  MRI Lumbar Spine Without Contrast 10/31/2018   1. Severe multifactorial central canal spinal stenosis at the L3-4 disc space since the previous study.  There is also a 8 mm anterolisthesis of L3 on L4 since the previous study.  2. Postoperative changes from L4-5 discectomy with improvement in the anterolisthesis.  There is however at least a moderate central canal spinal stenosis related to hypertrophic changes of the dorsal elements.  3. Spondylotic changes T12-L1 disc space as above.  4. Rest of the findings as above.       Labs:  BMP  Lab Results   Component Value Date     2018    K 4.2 2018     2018    CO2 23 2018    BUN 20 2018    CREATININE 0.9 2018    CALCIUM 10.0 2018    ANIONGAP 14 2018    ESTGFRAFRICA >60.0 2018    EGFRNONAA >60.0 2018     Lab Results   Component Value Date    ALT 15 2018    AST 19 2018    ALKPHOS 74 2018    BILITOT 0.6 2018       Assessment:  Problem List Items Addressed This Visit      Osteoarthritis of spine with radiculopathy, lumbar region    Spondylolisthesis, lumbar region    Lumbar radiculopathy - Primary    Chronic bilateral low back pain with bilateral sciatica    Spinal stenosis of lumbar region with neurogenic claudication          10/24/18 - 67-year-old female with medical problems consisting of morbid obesity, GERD, hypertension, a multiple gastric ulcers who presents complaining of chronic low back pain with radiculopathy.  She had a lumbar surgery 4 years ago with fusion of the lumbar spine using hardware.  Following that surgery, radicular symptoms improved; however, over the past year she has experienced increasing radicular pain associated with numbness and tingling in the feet bilaterally.  The pain is made worse with prolonged sitting or lying in bed in certain positions.  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  There is no current imaging of her lumbar spine and I will place an order for x-ray lumbar spine as well as MRI lumbar spine today.  I will have her return to clinic immediately after obtaining the MRI lumbar spine so that we can make an assessment and recommend interventional therapies.  I will likely recommend an epidural steroid injection but MRI is required to ensure safe access into the epidural space.    10/31/18 - Patient returned to discuss imaging which shows anterolisthesis of L3 on L4 with central canal stenosis.  There is also multilevel neuroforaminal stenosis as well.  I recommend Lumbar ELINA at L2-3 for therapeutic purposes.  Once pain is controlled, she will be referred to PT.  She may also need eval by neurosurgery as this qualifies as adjacent level disease with her Hx of L4-5 fusion with hardware for anterolisthesis at that level.    : Not applicable    Treatment Plan:   Procedures: Lumbar ELINA @ L2-3 (above the lesion).  Consider Caudal with cath in the  future.  PT/OT/HEP: I plan to refer to PT once pain is better controlled with ELINA  Medications:   Labs: reviewed and medications are appropriately dosed for current hepatorenal function.  Imaging: Reviewed with patient today.  Follow Up: RTC p injection    Lydia Velásquez Jr, MD  Interventional Pain Medicine / Anesthesiology    Disclaimer: This note was partly generated using dictation software which may occasionally result in transcription errors.

## 2018-11-01 ENCOUNTER — TELEPHONE (OUTPATIENT)
Dept: PAIN MEDICINE | Facility: CLINIC | Age: 68
End: 2018-11-01

## 2018-11-01 DIAGNOSIS — M54.16 LUMBAR RADICULOPATHY: Primary | ICD-10-CM

## 2018-11-07 NOTE — PLAN OF CARE
left message for pt to arrive at 1030. advd n[place] and hold dm and [facility] med. advd must have a ride to and from.

## 2018-11-07 NOTE — DISCHARGE INSTRUCTIONS
Home Care Instructions Pain Management:    1.  DIET:    You may resume your normal diet today.    2.  BATHING:    You may shower with luke warm water.    3.  DRESSING:    You may remove your bandage today.    4.  ACTIVITY LEVEL:      You may resume your normal activities 24 hours after your procedure.    5.  MEDICATIONS:    You may resume your normal medications today.    6.  SPECIAL INSTRUCTIONS:    No heat to the injection site for 24 hours including bath or shower, heating pad, moist heat or hot tubs.    Use an ice pack to the injection site for any pain or discomfort.  Apply ice packs for 20 minute intervals as needed.    If you have received any sedatives by mouth today, you can not drive for 12 hours.    If you have received sedation through an IV, you can not drive for 24 hours.    PLEASE CALL YOUR DOCTOR FOR THE FOLLOWIN.  Redness or swelling around the injection site.  2.  Fever of 101 degrees.  3.  Drainage (pus) from the injection site.  4.  For any continuous bleeding (some dried blood over the incision is normal.)    FOR EMERGENCIES:    If any unusual problems or difficulties occur during clinic hours, call [phone] or dial [phone].    Follow up with with your physician in 2-3 weeks.

## 2018-11-08 ENCOUNTER — HOSPITAL ENCOUNTER (OUTPATIENT)
Facility: HOSPITAL | Age: 68
Discharge: HOME | End: 2018-11-08
Attending: PAIN MEDICINE | Admitting: PAIN MEDICINE
Payer: MEDICARE

## 2018-11-08 VITALS
HEIGHT: 62 IN | WEIGHT: 230 LBS | RESPIRATION RATE: 16 BRPM | SYSTOLIC BLOOD PRESSURE: 155 MMHG | DIASTOLIC BLOOD PRESSURE: 74 MMHG | HEART RATE: 63 BPM | OXYGEN SATURATION: 95 % | BODY MASS INDEX: 42.33 KG/M2 | TEMPERATURE: 98 F

## 2018-11-08 DIAGNOSIS — M54.16 LUMBAR RADICULOPATHY: Primary | ICD-10-CM

## 2018-11-08 DIAGNOSIS — G89.29 CHRONIC PAIN: ICD-10-CM

## 2018-11-08 PROCEDURE — 25500020 PHARM REV CODE 255: Performed by: PAIN MEDICINE

## 2018-11-08 PROCEDURE — 62323 NJX INTERLAMINAR LMBR/SAC: CPT | Performed by: PAIN MEDICINE

## 2018-11-08 PROCEDURE — 25000003 PHARM REV CODE 250: Performed by: PAIN MEDICINE

## 2018-11-08 PROCEDURE — 62323 NJX INTERLAMINAR LMBR/SAC: CPT | Mod: ,,, | Performed by: PAIN MEDICINE

## 2018-11-08 PROCEDURE — 63600175 PHARM REV CODE 636 W HCPCS: Performed by: PAIN MEDICINE

## 2018-11-08 RX ORDER — METHYLPREDNISOLONE ACETATE 40 MG/ML
INJECTION, SUSPENSION INTRA-ARTICULAR; INTRALESIONAL; INTRAMUSCULAR; SOFT TISSUE
Status: DISCONTINUED | OUTPATIENT
Start: 2018-11-08 | End: 2018-11-08 | Stop reason: HOSPADM

## 2018-11-08 RX ORDER — LIDOCAINE HYDROCHLORIDE 20 MG/ML
INJECTION, SOLUTION EPIDURAL; INFILTRATION; INTRACAUDAL; PERINEURAL
Status: DISCONTINUED | OUTPATIENT
Start: 2018-11-08 | End: 2018-11-08 | Stop reason: HOSPADM

## 2018-11-08 RX ORDER — ALPRAZOLAM 0.5 MG/1
0.5 TABLET, ORALLY DISINTEGRATING ORAL ONCE AS NEEDED
Status: COMPLETED | OUTPATIENT
Start: 2018-11-08 | End: 2018-11-08

## 2018-11-08 RX ADMIN — ALPRAZOLAM 0.5 MG: 0.5 TABLET, ORALLY DISINTEGRATING ORAL at 11:11

## 2018-11-08 NOTE — OP NOTE
"Procedure Note    Pre-operative Diagnosis: Lumbar Radiculopathy  Post-operative Diagnosis: Lumbar Radiculopathy  Procedure Date: 11/08/2018  Procedure:  (1) Lumbar Epidural Steroid Injection at L2-3    (2) Intraoperative fluoroscopy          Anesthesia: Local    Indications: To alleviate pain and suffering, and reduce functional impairment.    Procedure in Detail:   The patients history and physical exam were reviewed. The risks, benefits and alternatives to the procedure were discussed, and all questions were answered to the patients satisfaction. The patient agreed to proceed, and written informed consent was verified.    The patient was brought into the procedure room and placed in the prone position on the fluoroscopy table. The area of the lumbar spine was prepped with Chloraprep and draped in a sterile manner. The L2-3 interspace was identified and marked under AP fluoroscopy. The skin and subcutaneous tissues overlying the targeted interspace were anesthetized with 3-5 mL of 1% lidocaine using a 25G, 1.5" needle. A 17G, 3.5" Tuohy epidural needle was directed toward the interspace under fluoroscopic guidance until the ligamentum flavum was engaged. From this point, a loss of resistance technique with a glass syringe and saline was used to identify entrance of the needle into the epidural space. Once loss of resistance was observed 1 mL of contrast solution was injected. An appropriate epidurogram was noted.    A 5 mL mixture consisting of saline and Depomedrol 80 mg was injected slowly and without resistance.  The needle was removed and a bandage applied to puncture site.    Blood Loss: nil    Disposition: The patient tolerated the procedure well, and there were no apparent complications. Vital signs remained stable throughout the procedure. The patient was taken to the recovery area where written discharge instructions for the procedure were given.     Follow-up: RTC as scheduled      Lydia Mg" Jr Velásquez MD  Interventional Pain Medicine / Anesthesiology

## 2018-12-10 ENCOUNTER — OFFICE VISIT (OUTPATIENT)
Dept: PAIN MEDICINE | Facility: CLINIC | Age: 68
End: 2018-12-10
Payer: MEDICARE

## 2018-12-10 VITALS
BODY MASS INDEX: 43.02 KG/M2 | HEART RATE: 75 BPM | WEIGHT: 235.25 LBS | SYSTOLIC BLOOD PRESSURE: 145 MMHG | DIASTOLIC BLOOD PRESSURE: 71 MMHG

## 2018-12-10 DIAGNOSIS — G89.4 CHRONIC PAIN SYNDROME: ICD-10-CM

## 2018-12-10 DIAGNOSIS — M48.062 SPINAL STENOSIS OF LUMBAR REGION WITH NEUROGENIC CLAUDICATION: ICD-10-CM

## 2018-12-10 DIAGNOSIS — M54.41 CHRONIC BILATERAL LOW BACK PAIN WITH BILATERAL SCIATICA: ICD-10-CM

## 2018-12-10 DIAGNOSIS — G89.29 CHRONIC BILATERAL LOW BACK PAIN WITH BILATERAL SCIATICA: ICD-10-CM

## 2018-12-10 DIAGNOSIS — M54.42 CHRONIC BILATERAL LOW BACK PAIN WITH BILATERAL SCIATICA: ICD-10-CM

## 2018-12-10 DIAGNOSIS — M43.16 SPONDYLOLISTHESIS, LUMBAR REGION: ICD-10-CM

## 2018-12-10 DIAGNOSIS — M54.16 LUMBAR RADICULOPATHY: Primary | ICD-10-CM

## 2018-12-10 PROCEDURE — 3077F SYST BP >= 140 MM HG: CPT | Mod: CPTII,S$GLB,, | Performed by: NURSE PRACTITIONER

## 2018-12-10 PROCEDURE — 3078F DIAST BP <80 MM HG: CPT | Mod: CPTII,S$GLB,, | Performed by: NURSE PRACTITIONER

## 2018-12-10 PROCEDURE — 1101F PT FALLS ASSESS-DOCD LE1/YR: CPT | Mod: CPTII,S$GLB,, | Performed by: NURSE PRACTITIONER

## 2018-12-10 PROCEDURE — 99999 PR PBB SHADOW E&M-EST. PATIENT-LVL III: CPT | Mod: PBBFAC,,, | Performed by: NURSE PRACTITIONER

## 2018-12-10 PROCEDURE — 99213 OFFICE O/P EST LOW 20 MIN: CPT | Mod: S$GLB,,, | Performed by: NURSE PRACTITIONER

## 2018-12-10 NOTE — PROGRESS NOTES
"[facility] Established Patient Evaluation    Referred by: Self  Reason for referral: Back Pain    CC:   Chief Complaint   Patient presents with    Back Pain     Last 3 PDI Scores 12/10/2018 10/31/2018 10/24/2018   Pain Disability Index (PDI) 37 [phone]     Interval Update: 12/10/18 pt returns today S/P Lumbar Epidural Steroid Injection on 11/8/18 with 20% relief.    10/31/18- Pt returns today for follow up with MRI results she is reporting 6 /10 pain.    Background:   Monique Trujillo is a 68 y.o. female who complains of clbp with radiculopathy.    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  Completed for knee and hip after each replacement.  HEP:   Interventions: 11/8/18 Lumbar Epidural Steroid Injection  Surgery:   2014 - lumbar fixation to treat listhesis - patient felt good for several years after this   2017 - right hip replacement   2018 - right knee replaced  Medications:   - NSAIDS: Yes, current  - MSK Relaxants: Tried several in the past with severe sedation  - TCAs:   - SNRIs:   - Topicals:   - Anticonvulsants: Yes, current  - Opioids:     Current Pain Medications:  1. Celecoxib 200 mg daily  2. Gabapentin 800 mg QHS  3. Other: Celexa    Review of Systems:  Review of Systems   Constitutional: Negative for chills and fever.   HENT: Negative for nosebleeds.    Eyes: Negative for pain.   Respiratory: Negative for hemoptysis.    Cardiovascular: Negative for chest pain.   Gastrointestinal: Negative for nausea and vomiting.   Genitourinary: Negative for dysuria.   Musculoskeletal: Positive for back pain and joint pain. Negative for " falls.   Skin: Negative for rash.   Neurological: Positive for tingling. Negative for focal weakness.   Endo/Heme/Allergies: Does not bruise/bleed easily.   Psychiatric/Behavioral: The patient has insomnia.        History:    Current Outpatient Medications:     amLODIPine (NORVASC) 10 MG tablet, TAKE 1 TABLET BY MOUTH EVERY DAY, Disp: 90 tablet, Rfl: 3    ascorbic acid (VITAMIN C) 1000 MG tablet, Take 1,000 mg by mouth once daily., Disp: , Rfl:     atenolol (TENORMIN) 25 MG tablet, TAKE 1 TABLET BY MOUTH EVERY DAY, Disp: 90 tablet, Rfl: 3    calcium-vitamin D3 (CALCIUM 500 + D) 500 mg(1,250mg) -200 unit per tablet, Take 1 tablet by mouth 2 (two) times daily with meals., Disp: , Rfl:     celecoxib (CELEBREX) 200 MG capsule, Take 1 capsule (200 mg total) by mouth once daily., Disp: 30 capsule, Rfl: 3    citalopram (CELEXA) 40 MG tablet, TAKE 1 TABLET BY MOUTH EVERY DAY, Disp: 90 tablet, Rfl: 3    co-enzyme Q-10 30 mg capsule, Take 30 mg by mouth once daily. , Disp: , Rfl:     econazole nitrate 1 % cream, Use bid for rash, Disp: 85 g, Rfl: 2    fenofibric acid (FIBRICOR) 135 mg CpDR, Take 1 capsule (135 mg total) by mouth once daily., Disp: 90 capsule, Rfl: 3    fish oil-omega-3 fatty acids 300-1,000 mg capsule, Take 2 g by mouth once daily., Disp: , Rfl:     furosemide (LASIX) 40 MG tablet, Take 1 tablet (40 mg total) by mouth daily as needed (edema)., Disp: 90 tablet, Rfl: 3    gabapentin (NEURONTIN) 800 MG tablet, TAKE 1 TABLET BY MOUTH EVERY EVENING, Disp: 90 tablet, Rfl: 3    GLUCOSA KHAN 2KCL/CHONDROITIN KHAN (GLUCOSAMINE SULF-CHONDROITIN ORAL), Take 1 tablet by mouth once daily., Disp: , Rfl:     multivitamin capsule, Take 1 capsule by mouth once daily., Disp: , Rfl:     OPW TEST CLAIM - DO NOT FILL, Take by mouth. OPW test claim. Do not fill., Disp: 11 tablet, Rfl: 0    pantoprazole (PROTONIX) 20 MG tablet, Take 1 tablet (20 mg total) by mouth once daily., Disp: 90 tablet, Rfl: 3    pravastatin  (PRAVACHOL) 40 MG tablet, TAKE 1 TABLET BY MOUTH EVERY DAY, Disp: 90 tablet, Rfl: 3    solifenacin (VESICARE) 10 MG tablet, Take 1 tablet (10 mg total) by mouth once daily., Disp: 28 tablet, Rfl: 0    vitamin E 400 UNIT capsule, Take 400 Units by mouth once daily., Disp: , Rfl:     Current Facility-Administered Medications:     lidocaine (PF) 20 mg/mL (2 %) injection 2 mL, 2 mL, Intradermal, Once, Kalyan Han MD    Past Medical History:   Diagnosis Date    Allergy     Arthritis     Chronic lower back pain     Depression     Fever blister     GERD (gastroesophageal reflux disease)     Hemorrhoids, internal 11/5/2014    Hyperlipidemia     Hypertension     Joint pain     Morbid obesity with BMI of 40.0-44.9, adult 2/24/2015    Multiple gastric ulcers 12/14/2017    TIMMY (obstructive sleep apnea)     does not use CPAP    Skin cancer        Past Surgical History:   Procedure Laterality Date    ANTERIOR CERVICAL CORPECTOMY W/ FUSION  1988    ARTHROPLASTY-HIP Right 4/28/2016    [facility]formed [facility] Ferm[facility]    ARTHROPLASTY-KNEE Right 3/14/2018    [facility] by [facility]ier Combs [facility] at [facility] OR    COLONOSCOPY N/A 11/2/2016    Procedure: COLONOSCOPY;  Surgeon: Viji Dewitt MD;  [facility];  Service: Endoscopy;  Laterality: N/A;    COLONOSCOPY N/A 11/2/2016    [facility]    COLONOSCOPY N/A 11/5/2014    [facility]    EPIDURAL STEROID INJECTION INTO LUMBAR SPINE N/A 11/8/2018    Procedure: Injection-steroid-epidural-lumbar-L2-3 (LEFT > RIGHT);  Surgeon: Lydia Velásquez Jr., MD;  Location: [facility] PAIN MGT;  Service: Pain Management;  Laterality: N/A;    ESOPHAGOGASTRODUODENOSCOPY (EGD) N/A 12/14/2017    [facility]    ESOPHAGOGASTRODUODENOSCOPY (EGD) N/A 10/20/2017    [facility]     Injection-steroid-epidural-lumbar-L2-3 (LEFT > RIGHT) N/A 11/8/2018    [facility]    KNEE ARTHROSCOPY W/ MENISCAL REPAIR Right 2012    LUMBAR LAMINECTOMY  06/19/2015    SKIN CANCER EXCISION      forehead does not recall date    TOTAL HIP ARTHROPLASTY Right 04/28/2016         TUBAL LIGATION  1980       Family History   Problem Relation Age of Onset    Arthritis Mother     Cancer Mother         breast ca    Breast cancer Mother     Kidney disease Father     Alzheimer's disease Maternal Grandfather     Cancer Paternal Grandmother         colon ca    Colon cancer Paternal Grandmother        Social History     Socioeconomic History    Marital status:      Spouse name: None    Number of children: None    Years of education: 12    Highest education level: None   Social Needs    Financial resource strain: None    Food insecurity - worry: None    Food insecurity - inability: None    Transportation needs - medical: None    Transportation needs - non-medical: None   Occupational History     Employer: [employer]   Tobacco Use    Smoking status: Never Smoker    Smokeless tobacco: Never Used   Substance and Sexual Activity    Alcohol use: No     Alcohol/week: 0.0 oz    Drug use: No    Sexual activity: No   Other Topics Concern    Are you pregnant or think you may be? Not Asked    Breast-feeding Not Asked   Social History Narrative    None       Review of patient's allergies indicates:  No Known Allergies    Physical Exam:  Vitals:    12/10/18 0855   BP: (!) 145/71   Pulse: 75   Weight: 106.7 kg (235 lb 3.7 oz)   PainSc:   8     General    Nursing note and vitals reviewed.  Constitutional: She is oriented to person, place, and time. She appears well-developed and well-nourished. No distress.   HENT:   Head: Normocephalic and atraumatic.   Nose: Nose normal.   Eyes: Conjunctivae and EOM are normal. Pupils are equal, round, and reactive to light. Right  eye exhibits no discharge. Left eye exhibits no discharge. No scleral icterus.   Neck: No JVD present.   Cardiovascular: Intact distal pulses.    Pulmonary/Chest: Effort normal. No respiratory distress.   Abdominal: She exhibits no distension.   Neurological: She is alert and oriented to person, place, and time. Coordination normal.   Psychiatric: She has a normal mood and affect. Her behavior is normal. Judgment and thought content normal.     General Musculoskeletal Exam   Gait: normal     Back (L-Spine & T-Spine) / Neck (C-Spine) Exam     Tenderness Right paramedian tenderness of the Lower L-Spine. Left paramedian tenderness of the Lower L-Spine.     Back (L-Spine & T-Spine) Range of Motion   Back extension: facet loading is positive and exacerabtes/reproduces the patient's typical low back pain    Back flexion: limited ROM but partial relief of low back pain noted.     Spinal Sensation   Right Side Sensation  L-Spine Level: normal  Left Side Sensation  L-Spine Level: normal    Other She has no scoliosis .    Comments:  + SLR on right      Muscle Strength   Right Lower Extremity   Hip Flexion: 5/5   Hip Extensors: 5/5  Quadriceps:  5/5   Hamstrin/5   Gastrocsoleus:  5/5/5  Left Lower Extremity   Hip Flexion: 5/5   Hip Extensors: 5/5  Quadriceps:  5/5   Hamstrin/5   Gastrocsoleus:  5/5/5    Reflexes     Left Side  Quadriceps:  2+  Achilles:  2+    Right Side   Quadriceps:  2+  Achilles:  2+      Imaging:  MRI Lumbar Spine Without Contrast 10/31/2018   1. Severe multifactorial central canal spinal stenosis at the L3-4 disc space since the previous study.  There is also a 8 mm anterolisthesis of L3 on L4 since the previous study.  2. Postoperative changes from L4-5 discectomy with improvement in the anterolisthesis.  There is however at least a moderate central canal spinal stenosis related to hypertrophic changes of the dorsal elements.  3. Spondylotic changes T12-L1 disc space as above.  4. Rest of the  findings as above.       Labs:  BMP  Lab Results   Component Value Date     05/02/2018    K 4.2 05/02/2018     05/02/2018    CO2 23 05/02/2018    BUN 20 05/02/2018    CREATININE 0.9 05/02/2018    CALCIUM 10.0 05/02/2018    ANIONGAP 14 05/02/2018    ESTGFRAFRICA >60.0 05/02/2018    EGFRNONAA >60.0 05/02/2018     Lab Results   Component Value Date    ALT 15 05/02/2018    AST 19 05/02/2018    ALKPHOS 74 05/02/2018    BILITOT 0.6 05/02/2018       Assessment:  Problem List Items Addressed This Visit     None          10/24/18 - 67-year-old female with medical problems consisting of morbid obesity, GERD, hypertension, a multiple gastric ulcers who presents complaining of chronic low back pain with radiculopathy.  She had a lumbar surgery 4 years ago with fusion of the lumbar spine using hardware.  Following that surgery, radicular symptoms improved; however, over the past year she has experienced increasing radicular pain associated with numbness and tingling in the feet bilaterally.  The pain is made worse with prolonged sitting or lying in bed in certain positions.  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  There is no current imaging of her lumbar spine and I will place an order for x-ray lumbar spine as well as MRI lumbar spine today.  I will have her return to clinic immediately after obtaining the MRI lumbar spine so that we can make an assessment and recommend interventional therapies.  I will likely recommend an epidural steroid injection but MRI is required to ensure safe access into the epidural space.    10/31/18 - Patient returned to discuss imaging which shows anterolisthesis of L3 on L4 with central canal stenosis.  There is also multilevel neuroforaminal stenosis as well.  I recommend Lumbar ELINA at L2-3 for therapeutic purposes.  Once pain is controlled, she will be referred to PT.  She may also need eval by  neurosurgery as this qualifies as adjacent level disease with her Hx of L4-5 fusion with hardware for anterolisthesis at that level.    [id number]-hodk-old female status post lumbar ELINA at L2-3 with 20% relief, she has history of a fusion at L4-5, previous clinic encounter with Dr. Velásquez suggested that we attempt a Caudal ELINA if relief was minimal with L2-3 IESI. I will s/f Caudal today, explained procedure in detail with patient, pt states she understood and agreed.     : Not applicable    Treatment Plan:   Procedures:  Caudal ELINA   PT/OT/HEP: I plan to refer to PT once pain is better controlled with ELINA  Medications:   Labs: reviewed and medications are appropriately dosed for current hepatorenal function.  Imaging: Reviewed with patient today.  Follow Up: RTC p injection    Curt Chambers, NP-C  Interventional Pain Management      Disclaimer: This note was partly generated using dictation software which may occasionally result in transcription errors.

## 2018-12-11 ENCOUNTER — TELEPHONE (OUTPATIENT)
Dept: PAIN MEDICINE | Facility: CLINIC | Age: 68
End: 2018-12-11

## 2018-12-11 NOTE — TELEPHONE ENCOUNTER
----- Message from Yoly Veliz sent at 12/11/2018  4:44 PM CST -----  Contact: [phone]/self  Pt would like a callback regarding a pre admit blue folder that was left at office. Please call and advise.

## 2018-12-11 NOTE — TELEPHONE ENCOUNTER
Patient was called by Deborah and she informed the patient that she left her procedure folder at the clinic

## 2018-12-13 ENCOUNTER — OFFICE VISIT (OUTPATIENT)
Dept: ORTHOPEDICS | Facility: CLINIC | Age: 68
End: 2018-12-13
Payer: MEDICARE

## 2018-12-13 VITALS — HEIGHT: 62 IN | WEIGHT: 235 LBS | BODY MASS INDEX: 43.24 KG/M2

## 2018-12-13 DIAGNOSIS — M19.012 PRIMARY OSTEOARTHRITIS OF LEFT SHOULDER: Primary | ICD-10-CM

## 2018-12-13 PROCEDURE — 99999 PR PBB SHADOW E&M-EST. PATIENT-LVL III: CPT | Mod: PBBFAC,,, | Performed by: ORTHOPAEDIC SURGERY

## 2018-12-13 PROCEDURE — 1101F PT FALLS ASSESS-DOCD LE1/YR: CPT | Mod: CPTII,S$GLB,, | Performed by: ORTHOPAEDIC SURGERY

## 2018-12-13 PROCEDURE — 20610 DRAIN/INJ JOINT/BURSA W/O US: CPT | Mod: LT,S$GLB,, | Performed by: ORTHOPAEDIC SURGERY

## 2018-12-13 PROCEDURE — 99213 OFFICE O/P EST LOW 20 MIN: CPT | Mod: 25,S$GLB,, | Performed by: ORTHOPAEDIC SURGERY

## 2018-12-13 RX ORDER — TRAMADOL HYDROCHLORIDE 50 MG/1
50 TABLET ORAL EVERY 12 HOURS PRN
Qty: 40 TABLET | Refills: 1 | Status: SHIPPED | OUTPATIENT
Start: 2018-12-13 | End: 2019-02-06

## 2018-12-13 RX ORDER — TRIAMCINOLONE ACETONIDE 40 MG/ML
40 INJECTION, SUSPENSION INTRA-ARTICULAR; INTRAMUSCULAR
Status: COMPLETED | OUTPATIENT
Start: 2018-12-13 | End: 2018-12-13

## 2018-12-13 RX ADMIN — TRIAMCINOLONE ACETONIDE 40 MG: 40 INJECTION, SUSPENSION INTRA-ARTICULAR; INTRAMUSCULAR at 09:12

## 2018-12-13 NOTE — PROGRESS NOTES
HISTORY OF PRESENT ILLNESS:  Ms. Trujillo in followup left shoulder arthritis,   has had a flare-up again, possibly related to weather changes.  She is having   some pain in the shoulder.  She does have severe arthritis.    PHYSICAL EXAMINATION:  LEFT SHOULDER:  No tenderness.  No swelling.  Range of motion limited secondary   to stiffness and pain.  She does have some crepitation and limited external   rotation and abduction with pain.  NEUROLOGIC:  Intact.    IMPRESSION:  Left shoulder arthritis.    PLAN:  We discussed options for treatment including injection versus surgery.  I   think she would be a good candidate for shoulder replacement.  She would like   to hold off on that for now.    After pause for timeout, she identified the left shoulder, injected with Kenalog   40 mg, 2 mL Xylocaine, sterile technique, tolerated the procedure well without   complication.    Continue anti-inflammatory medication by mouth.  Follow up in 2-3 months.      GRIS  dd: 12/13/2018 09:39:33 (CST)  td: 12/14/2018 05:15:44 (CST)  Doc ID   #[id number]  Job ID #[id number]    CC:

## 2018-12-14 ENCOUNTER — TELEPHONE (OUTPATIENT)
Dept: PAIN MEDICINE | Facility: CLINIC | Age: 68
End: 2018-12-14

## 2018-12-14 DIAGNOSIS — M54.16 LUMBAR RADICULOPATHY: Primary | ICD-10-CM

## 2018-12-19 NOTE — PLAN OF CARE
Spoke with pt regarding arrival time, advised to arrive at 1315 . Advised npo at midnight and hold asa and dm medication. Advised must have a ride to and from procedure.       *advised pt insurance still pending and to check in the morning to see if it is approved.

## 2018-12-20 NOTE — DISCHARGE INSTRUCTIONS
Home Care Instructions Pain Management:    1.  DIET:    You may resume your normal diet today.    2.  BATHING:    You may shower with luke warm water.    3.  DRESSING:    You may remove your bandage today.    4.  ACTIVITY LEVEL:      You may resume your normal activities 24 hours after your procedure.    5.  MEDICATIONS:    You may resume your normal medications today.    6.  SPECIAL INSTRUCTIONS:    No heat to the injection site for 24 hours including bath or shower, heating pad, moist heat or hot tubs.    Use an ice pack to the injection site for any pain or discomfort.  Apply ice packs for 20 minute intervals as needed.    If you have received any sedatives by mouth today, you can not drive for 12 hours.    If you have received sedation through an IV, you can not drive for 24 hours.    PLEASE CALL YOUR DOCTOR FOR THE FOLLOWIN.  Redness or swelling around the injection site.  2.  Fever of 101 degrees.  3.  Drainage (pus) from the injection site.  4.  For any continuous bleeding (some dried blood over the incision is normal.)    FOR EMERGENCIES:    If any unusual problems or difficulties occur during clinic hours, call [phone] or dial [phone].    Follow up with with your physician in 2-3 weeks.

## 2019-01-02 ENCOUNTER — TELEPHONE (OUTPATIENT)
Dept: PAIN MEDICINE | Facility: HOSPITAL | Age: 69
End: 2019-01-02

## 2019-01-02 ENCOUNTER — TELEPHONE (OUTPATIENT)
Dept: PAIN MEDICINE | Facility: CLINIC | Age: 69
End: 2019-01-02

## 2019-01-02 NOTE — TELEPHONE ENCOUNTER
Pt is rescheduled for 1/10/19  ----- Message from Sandy Aguirre sent at 1/2/2019 11:44 AM CST -----  Contact: self, [phone] (M)  Patient requests to reschedule her procedure scheduled for tomorrow. States she woke up with today with sore throat, earache and case of diarrhea. Please advise.

## 2019-01-07 ENCOUNTER — TELEPHONE (OUTPATIENT)
Dept: PAIN MEDICINE | Facility: CLINIC | Age: 69
End: 2019-01-07

## 2019-01-10 ENCOUNTER — HOSPITAL ENCOUNTER (OUTPATIENT)
Facility: HOSPITAL | Age: 69
Discharge: HOME | End: 2019-01-10
Attending: PAIN MEDICINE | Admitting: PAIN MEDICINE
Payer: MEDICARE

## 2019-01-10 VITALS
HEART RATE: 71 BPM | WEIGHT: 130 LBS | BODY MASS INDEX: 24.55 KG/M2 | DIASTOLIC BLOOD PRESSURE: 72 MMHG | OXYGEN SATURATION: 97 % | TEMPERATURE: 98 F | RESPIRATION RATE: 16 BRPM | HEIGHT: 61 IN | SYSTOLIC BLOOD PRESSURE: 153 MMHG

## 2019-01-10 DIAGNOSIS — M54.16 LUMBAR RADICULOPATHY: Primary | ICD-10-CM

## 2019-01-10 DIAGNOSIS — G89.29 CHRONIC PAIN: ICD-10-CM

## 2019-01-10 PROCEDURE — 62327 PR INJ/INFUS, LUMBAR/SACRAL INDWELL CATH, W/GUIDANCE: ICD-10-PCS | Mod: ,,, | Performed by: PAIN MEDICINE

## 2019-01-10 PROCEDURE — 25000003 PHARM REV CODE 250: Performed by: PAIN MEDICINE

## 2019-01-10 PROCEDURE — 62323 NJX INTERLAMINAR LMBR/SAC: CPT | Performed by: PAIN MEDICINE

## 2019-01-10 PROCEDURE — 62327 NJX INTERLAMINAR LMBR/SAC: CPT | Mod: ,,, | Performed by: PAIN MEDICINE

## 2019-01-10 RX ORDER — ALPRAZOLAM 0.5 MG/1
0.5 TABLET, ORALLY DISINTEGRATING ORAL ONCE AS NEEDED
Status: COMPLETED | OUTPATIENT
Start: 2019-01-10 | End: 2019-01-10

## 2019-01-10 RX ADMIN — ALPRAZOLAM 0.5 MG: 0.5 TABLET, ORALLY DISINTEGRATING ORAL at 01:01

## 2019-01-10 NOTE — H&P
[facility]  History & Physical - Short Stay  Pain Management           SUBJECTIVE:     Procedure: Procedure(s) (LRB):  Injection-steroid-epidural-caudal (N/A)    Chief Complaint/Reason for Admission:  Lumbar radiculopathy [M54.16]    Facility-Administered Medications Prior to Admission   Medication    lidocaine (PF) 20 mg/mL (2 %) injection 2 mL     PTA Medications   Medication Sig    amLODIPine (NORVASC) 10 MG tablet TAKE 1 TABLET BY MOUTH EVERY DAY    ascorbic acid (VITAMIN C) 1000 MG tablet Take 1,000 mg by mouth once daily.    atenolol (TENORMIN) 25 MG tablet TAKE 1 TABLET BY MOUTH EVERY DAY    celecoxib (CELEBREX) 200 MG capsule Take 1 capsule (200 mg total) by mouth once daily.    citalopram (CELEXA) 40 MG tablet TAKE 1 TABLET BY MOUTH EVERY DAY    co-enzyme Q-10 30 mg capsule Take 30 mg by mouth once daily.     fish oil-omega-3 fatty acids 300-1,000 mg capsule Take 2 g by mouth once daily.    pantoprazole (PROTONIX) 20 MG tablet Take 1 tablet (20 mg total) by mouth once daily.    pravastatin (PRAVACHOL) 40 MG tablet TAKE 1 TABLET BY MOUTH EVERY DAY    solifenacin (VESICARE) 10 MG tablet Take 1 tablet (10 mg total) by mouth once daily.    vitamin E 400 UNIT capsule Take 400 Units by mouth once daily.    calcium-vitamin D3 (CALCIUM 500 + D) 500 mg(1,250mg) -200 unit per tablet Take 1 tablet by mouth 2 (two) times daily with meals.    econazole nitrate 1 % cream Use bid for rash    fenofibric acid (FIBRICOR) 135 mg CpDR Take 1 capsule (135 mg total) by mouth once daily.    furosemide (LASIX) 40 MG tablet Take 1 tablet (40 mg total) by mouth daily as needed (edema).    gabapentin (NEURONTIN) 800 MG tablet TAKE 1 TABLET BY MOUTH EVERY EVENING    GLUCOSA KHAN 2KCL/CHONDROITIN KHAN (GLUCOSAMINE SULF-CHONDROITIN ORAL) Take 1 tablet by mouth once daily.    multivitamin capsule Take 1 capsule by mouth once daily.    OPW TEST CLAIM - DO NOT FILL Take by mouth. OPW test claim. Do not  fill.    traMADol (ULTRAM) 50 mg tablet Take 1 tablet (50 mg total) by mouth every 12 (twelve) hours as needed for Pain.       Review of patient's allergies indicates:  No Known Allergies    Past Medical History:   Diagnosis Date    Allergy     Arthritis     Chronic lower back pain     Depression     Fever blister     GERD (gastroesophageal reflux disease)     Hemorrhoids, internal 11/5/2014    Hyperlipidemia     Hypertension     Joint pain     Morbid obesity with BMI of 40.0-44.9, adult 2/24/2015    Multiple gastric ulcers 12/14/2017    TIMMY (obstructive sleep apnea)     does not use CPAP    Skin cancer      Past Surgical History:   Procedure Laterality Date    ANTERIOR CERVICAL CORPECTOMY W/ FUSION  1988    ARTHROPLASTY-HIP Right 4/28/2016    [facility]    ARTHROPLASTY-KNEE Right 3/14/2018    [facility] OR    COLONOSCOPY N/A 11/2/2016    [facility]itt [facility] ENDO    COLONOSCOPY N/A 11/5/2014    [facility] by [facility]a [facility] at [facility] ENDO    ESOPHAGOGASTRODUODENOSCOPY (EGD) N/A 12/14/2017    [facility] by El [facility]itt [facility] at [facility] ENDO    ESOPHAGOGASTRODUODENOSCOPY (EGD) N/A 10/20/2017    [facility] ENDO    Injection-steroid-epidural-lumbar-L2-3 (LEFT > RIGHT) N/A 11/8/2018    Performed by Lydia [facility]quez Jr., [facility] at [facility] PAIN MGT    KNEE ARTHROSCOPY W/ MENISCAL REPAIR Right 2012    LUMBAR LAMINECTOMY  06/19/2015    SKIN CANCER EXCISION      forehead does not recall date    TOTAL HIP ARTHROPLASTY Right 04/28/2016         TUBAL LIGATION  1980     Family History   Problem Relation Age of Onset    Arthritis Mother     Cancer Mother         breast ca    Breast cancer Mother     Kidney disease Father     Alzheimer's disease Maternal Grandfather     Cancer Paternal Grandmother         colon ca    Colon cancer Paternal Grandmother      Social History      Tobacco Use    Smoking status: Never Smoker    Smokeless tobacco: Never Used   Substance Use Topics    Alcohol use: No     Alcohol/week: 0.0 oz    Drug use: No        Review of Systems:  Review of Systems   Constitutional: Negative for chills and fever.   HENT: Negative for nosebleeds.    Eyes: Negative for blurred vision.   Respiratory: Negative for hemoptysis.    Cardiovascular: Negative for chest pain and palpitations.   Gastrointestinal: Negative for heartburn and vomiting.   Genitourinary: Negative for hematuria.   Musculoskeletal: Positive for back pain. Negative for myalgias.   Skin: Negative for rash.   Neurological: Negative for seizures and loss of consciousness.   Endo/Heme/Allergies: Does not bruise/bleed easily.   Psychiatric/Behavioral: Negative for hallucinations.       OBJECTIVE:     Vital Signs (Most Recent):  Temp: 98.7 °F (37.1 °C) (01/10/19 1319)  Pulse: 61 (01/10/19 1410)  Resp: 17 (01/10/19 1410)  BP: 129/69 (01/10/19 1410)  SpO2: 100 % (01/10/19 1410)    Physical Exam:  General appearance - alert, well appearing, and in no distress  Mental status - AOx3  Eyes - pupils equal and reactive, extraocular eye movements intact  Heart - normal rate, regular rhythm, normal S1, S2, no murmurs, rubs, clicks or gallops  Chest - clear to auscultation, no wheezes, rales or rhonchi, symmetric air entry  Abdomen - soft, nontender, nondistended, no masses or organomegaly  Neurological - alert, oriented, normal speech, no focal findings or movement disorder noted  Extremities - peripheral pulses normal, no pedal edema, no clubbing or cyanosis        ASSESSMENT/PLAN:     Active Hospital Problems    Diagnosis  POA    Chronic pain [G89.29]  Yes      Resolved Hospital Problems   No resolved problems to display.        The risks and benefits of this intervention, and alternative therapies were discussed with the patient.  The discussion of risks included infection, bleeding, need for additional procedures  or surgery, nerve damage, paralysis, adverse medication reaction(s), stroke, and/or death.  Questions regarding the procedure, risks, expected outcome, and possible side effects were solicited and answered to the patient's satisfaction.  Monique wishes to proceed with the injection.  Verbal and written consent were verified.      Proceed with intervention as scheduled.      Lydia Velásquez Jr, MD  Interventional Pain Medicine / Anesthesiology

## 2019-01-10 NOTE — PROGRESS NOTES
Discharge instructions reviewed with patient. Questions answered. Verbalized understanding, no further questions at this time. Procedure site is DSI. VSS. No acute distress noted. Staff at bedside to help pt change. Wheeled to DC are by staff. Home with family in private vehicle.

## 2019-01-10 NOTE — OP NOTE
"Procedure Note    Pre-operative Diagnosis: Lumbar radiculopathy  Post-operative Diagnosis: Lumbar radiculopathy  Procedure Date: 01/10/2019  Procedure:  (1) Caudal Epidural Steroid Injection with Epidural Catheter    (2) Intraoperative fluoroscopy      Indications: To alleviate pain and suffering, and reduce functional impairment associated with lumbar radiculopathy.      The patients history and physical exam were reviewed. The risks, benefits and alternatives to the procedure were discussed, and all questions were answered to the patients satisfaction. Special attention was given to her bleeding risk during the consenting process due to her use of warfarin, which was not stopped for this procedure.  The patient agreed to proceed, and written informed consent was verified.    Procedure in Detail: The patient was brought into the procedure room and placed in the prone position on the fluoroscopy table. The area of the sacrum and coccyx was prepped with Chloraprep and draped in a sterile manner. The sacral hiatus was identified by palpation and lateral fluoroscopy.  The skin and subcutaneous tissues were anesthetized with 1 mL of Lidocaine 1% using a 25G 1.25" needle.  A 16G 3.5" Touhy needle was inserted inferiorly to the sacral hiatus with cephalad angulation and advanced into through the sacro-coxxygeal ligament.  Entry into the epidural space was confirmed on latera fluoroscopy by advancement of a 19G styletted catheter through the needle and into the epidural space.  Live and intermittent AP fluoroscopy was then used to guide the catheter to the level of L5.  Injection of 1 mL of iodinated contrast revealed appropriate epidurogram without evidence of direct vascular uptake.    A 5 mL containing Depomedrol 80 mg (1 mL) and Ropivacaine 0.2% (1 mL) with saline (3 mL) was injected slowly and without resistance.  The needle and catheter were removed and a bandage applied to puncture site.    Disposition: The " patient tolerated the procedure well, and there were no apparent complications. Vital signs remained stable throughout the procedure. The patient was taken to the recovery area where written discharge instructions for the procedure were given.     EBL: nil    Findings: Catheter and needle placement were consistent with successful caudal epidural steroid injection with catheter.    Follow-up: in clinic as scheduled      Lydia Velásquez Jr, MD  Interventional Pain Medicine / Anesthesiology

## 2019-01-10 NOTE — DISCHARGE SUMMARY
[facility]  Discharge Note  Short Stay     Admit Date: 1/10/2019    Discharge Date: 1/10/2019     Attending Physician: Lydia Velásquez Jr, MD    Diagnoses:  Active Hospital Problems    Diagnosis  POA    *Lumbar radiculopathy [M54.16]  Yes    Chronic pain [G89.29]  Yes      Resolved Hospital Problems   No resolved problems to display.     Discharged Condition: Good     Hospital Course: Patient was admitted for an outpatient interventional pain management procedure and tolerated the procedure well with no complications.     Final Diagnoses: Same as principal problem.     Disposition: Home or Self Care     Follow up/Patient Instructions:    Follow-up Information     Lydia Velásquez Jr, MD. Go in 2 weeks.    Specialty:  Pain Medicine  Why:  Post-procedural Follow Up As Scheduled, Call to make an appointment if you do not have one  Contact information:  [address]  [address]  [place] [address]  [phone]                   Reconciled Medications:     Medication List      CONTINUE taking these medications    amLODIPine 10 MG tablet  Commonly known as:  NORVASC  TAKE 1 TABLET BY MOUTH EVERY DAY     atenolol 25 MG tablet  Commonly known as:  TENORMIN  TAKE 1 TABLET BY MOUTH EVERY DAY     CALCIUM 500 + D 500 mg(1,250mg) -200 unit per tablet  Generic drug:  calcium-vitamin D3  Take 1 tablet by mouth 2 (two) times daily with meals.     celecoxib 200 MG capsule  Commonly known as:  CeleBREX  Take 1 capsule (200 mg total) by mouth once daily.     citalopram 40 MG tablet  Commonly known as:  CELEXA  TAKE 1 TABLET BY MOUTH EVERY DAY     co-enzyme Q-10 30 mg capsule  Take 30 mg by mouth once daily.     econazole nitrate 1 % cream  Use bid for rash     fenofibric acid 135 mg Cpdr  Commonly known as:  FIBRICOR  Take 1 capsule (135 mg total) by mouth once daily.     fish oil-omega-3 fatty acids 300-1,000 mg capsule  Take 2 g by mouth once daily.     furosemide 40 MG tablet  Commonly known as:  LASIX  Take 1  tablet (40 mg total) by mouth daily as needed (edema).     gabapentin 800 MG tablet  Commonly known as:  NEURONTIN  TAKE 1 TABLET BY MOUTH EVERY EVENING     GLUCOSAMINE SULF-CHONDROITIN ORAL  Take 1 tablet by mouth once daily.     multivitamin capsule  Take 1 capsule by mouth once daily.     OPW TEST CLAIM - DO NOT FILL  Take by mouth. OPW test claim. Do not fill.     pantoprazole 20 MG tablet  Commonly known as:  PROTONIX  Take 1 tablet (20 mg total) by mouth once daily.     pravastatin 40 MG tablet  Commonly known as:  PRAVACHOL  TAKE 1 TABLET BY MOUTH EVERY DAY     solifenacin 10 MG tablet  Commonly known as:  VESICARE  Take 1 tablet (10 mg total) by mouth once daily.     traMADol 50 mg tablet  Commonly known as:  ULTRAM  Take 1 tablet (50 mg total) by mouth every 12 (twelve) hours as needed for Pain.     VITAMIN C 1000 MG tablet  Generic drug:  ascorbic acid (vitamin C)  Take 1,000 mg by mouth once daily.     vitamin E 400 UNIT capsule  Take 400 Units by mouth once daily.           Discharge Procedure Orders (must include Diet, Follow-up, Activity)   Call MD for:  temperature >100.4     Call MD for:  severe uncontrolled pain     Call MD for:  redness, tenderness, or signs of infection (pain, swelling, redness, odor or green/yellow discharge around incision site)     Call MD for:  difficulty breathing or increased cough     Call MD for:  severe persistent headache     Call MD for:  worsening rash     Remove dressing in 24 hours       Lydia Velásquez Jr, MD  Interventional Pain Medicine / Anesthesiology

## 2019-01-31 RX ORDER — CELECOXIB 200 MG/1
CAPSULE ORAL
Qty: 30 CAPSULE | Refills: 0 | Status: SHIPPED | OUTPATIENT
Start: 2019-01-31 | End: 2019-02-11 | Stop reason: SDUPTHER

## 2019-02-06 ENCOUNTER — OFFICE VISIT (OUTPATIENT)
Dept: INTERNAL MEDICINE | Facility: CLINIC | Age: 69
End: 2019-02-06
Payer: MEDICARE

## 2019-02-06 ENCOUNTER — HOSPITAL ENCOUNTER (OUTPATIENT)
Dept: RADIOLOGY | Facility: HOSPITAL | Age: 69
Discharge: HOME | End: 2019-02-06
Attending: INTERNAL MEDICINE
Payer: MEDICARE

## 2019-02-06 VITALS
HEART RATE: 80 BPM | SYSTOLIC BLOOD PRESSURE: 118 MMHG | BODY MASS INDEX: 43.28 KG/M2 | DIASTOLIC BLOOD PRESSURE: 72 MMHG | TEMPERATURE: 99 F | WEIGHT: 229.25 LBS | OXYGEN SATURATION: 95 % | HEIGHT: 61 IN

## 2019-02-06 DIAGNOSIS — Z13.220 ENCOUNTER FOR LIPID SCREENING FOR CARDIOVASCULAR DISEASE: ICD-10-CM

## 2019-02-06 DIAGNOSIS — Z00.00 ANNUAL PHYSICAL EXAM: Primary | ICD-10-CM

## 2019-02-06 DIAGNOSIS — I10 ESSENTIAL HYPERTENSION: ICD-10-CM

## 2019-02-06 DIAGNOSIS — M85.80 OSTEOPENIA, UNSPECIFIED LOCATION: ICD-10-CM

## 2019-02-06 DIAGNOSIS — G89.29 CHRONIC BILATERAL LOW BACK PAIN, WITH SCIATICA PRESENCE UNSPECIFIED: ICD-10-CM

## 2019-02-06 DIAGNOSIS — M54.5 CHRONIC BILATERAL LOW BACK PAIN, WITH SCIATICA PRESENCE UNSPECIFIED: ICD-10-CM

## 2019-02-06 DIAGNOSIS — Z23 NEED FOR INFLUENZA VACCINATION: ICD-10-CM

## 2019-02-06 DIAGNOSIS — Z13.6 ENCOUNTER FOR LIPID SCREENING FOR CARDIOVASCULAR DISEASE: ICD-10-CM

## 2019-02-06 DIAGNOSIS — N39.3 STRESS INCONTINENCE: ICD-10-CM

## 2019-02-06 DIAGNOSIS — M25.561 ACUTE PAIN OF RIGHT KNEE: ICD-10-CM

## 2019-02-06 DIAGNOSIS — R73.9 HYPERGLYCEMIA: ICD-10-CM

## 2019-02-06 PROCEDURE — 3074F PR MOST RECENT SYSTOLIC BLOOD PRESSURE < 130 MM HG: ICD-10-PCS | Mod: CPTII,S$GLB,, | Performed by: INTERNAL MEDICINE

## 2019-02-06 PROCEDURE — G0008 FLU VACCINE - HIGH DOSE (65+) PRESERVATIVE FREE IM: ICD-10-PCS | Mod: S$GLB,,, | Performed by: INTERNAL MEDICINE

## 2019-02-06 PROCEDURE — 90662 IIV NO PRSV INCREASED AG IM: CPT | Mod: S$GLB,,, | Performed by: INTERNAL MEDICINE

## 2019-02-06 PROCEDURE — 99214 OFFICE O/P EST MOD 30 MIN: CPT | Mod: 25,S$GLB,, | Performed by: INTERNAL MEDICINE

## 2019-02-06 PROCEDURE — 90662 FLU VACCINE - HIGH DOSE (65+) PRESERVATIVE FREE IM: ICD-10-PCS | Mod: S$GLB,,, | Performed by: INTERNAL MEDICINE

## 2019-02-06 PROCEDURE — 73564 XR KNEE COMP 4 OR MORE VIEWS RIGHT: ICD-10-PCS | Mod: 26,RT,, | Performed by: RADIOLOGY

## 2019-02-06 PROCEDURE — 3288F FALL RISK ASSESSMENT DOCD: CPT | Mod: CPTII,S$GLB,, | Performed by: INTERNAL MEDICINE

## 2019-02-06 PROCEDURE — 3078F PR MOST RECENT DIASTOLIC BLOOD PRESSURE < 80 MM HG: ICD-10-PCS | Mod: CPTII,S$GLB,, | Performed by: INTERNAL MEDICINE

## 2019-02-06 PROCEDURE — 73564 X-RAY EXAM KNEE 4 OR MORE: CPT | Mod: 26,RT,, | Performed by: RADIOLOGY

## 2019-02-06 PROCEDURE — 99999 PR PBB SHADOW E&M-EST. PATIENT-LVL IV: ICD-10-PCS | Mod: PBBFAC,,, | Performed by: INTERNAL MEDICINE

## 2019-02-06 PROCEDURE — 3078F DIAST BP <80 MM HG: CPT | Mod: CPTII,S$GLB,, | Performed by: INTERNAL MEDICINE

## 2019-02-06 PROCEDURE — 3074F SYST BP LT 130 MM HG: CPT | Mod: CPTII,S$GLB,, | Performed by: INTERNAL MEDICINE

## 2019-02-06 PROCEDURE — 1100F PTFALLS ASSESS-DOCD GE2>/YR: CPT | Mod: CPTII,S$GLB,, | Performed by: INTERNAL MEDICINE

## 2019-02-06 PROCEDURE — 99214 PR OFFICE/OUTPT VISIT, EST, LEVL IV, 30-39 MIN: ICD-10-PCS | Mod: 25,S$GLB,, | Performed by: INTERNAL MEDICINE

## 2019-02-06 PROCEDURE — 1100F PR PT FALLS ASSESS DOC 2+ FALLS/FALL W/INJURY/YR: ICD-10-PCS | Mod: CPTII,S$GLB,, | Performed by: INTERNAL MEDICINE

## 2019-02-06 PROCEDURE — 99999 PR PBB SHADOW E&M-EST. PATIENT-LVL IV: CPT | Mod: PBBFAC,,, | Performed by: INTERNAL MEDICINE

## 2019-02-06 PROCEDURE — G0008 ADMIN INFLUENZA VIRUS VAC: HCPCS | Mod: S$GLB,,, | Performed by: INTERNAL MEDICINE

## 2019-02-06 PROCEDURE — 73564 X-RAY EXAM KNEE 4 OR MORE: CPT | Mod: TC,FY,PO,RT

## 2019-02-06 PROCEDURE — 3288F PR FALLS RISK ASSESSMENT DOCUMENTED: ICD-10-PCS | Mod: CPTII,S$GLB,, | Performed by: INTERNAL MEDICINE

## 2019-02-06 RX ORDER — TRAMADOL HYDROCHLORIDE 50 MG/1
50 TABLET ORAL EVERY 8 HOURS PRN
Qty: 30 TABLET | Refills: 0 | Status: SHIPPED | OUTPATIENT
Start: 2019-02-06 | End: 2019-07-09

## 2019-02-06 NOTE — PROGRESS NOTES
Subjective:       Patient ID: Monique Trujillo is a 68 y.o. female.    Chief Complaint: Establish Care    HPI Mrs. Trujillo is a 68-year-old female with chronic back pain, anxiety, depression, osteopenia, hypertension, hyperlipidemia who presents to [facility] for annual exam.  Patient has a diagnosis of obstructive sleep apnea the chart.  However she states that she went for sleep study and it did not reveal sleep apnea.  Patient has been followed by [facility] so pain management for chronic back pain. She has had multiple back injections.  However they have not been helpful.  Currently she is experiencing pain. She is not taking the tramadol.  Reports the gabapentin and tramadol cause drowsiness.  She was taking half a tablet of her 's hydrocodone for back pain recently.  However this also did not help with her pain. About 1 week ago she fell and injured her right knee and hit the back of her head.  Her head is feeling much better.  However she continues to have pain in the right knee.  It is associated with bruising.  Patient also complains of difficulty holding her urine before getting to the bathroom.  Reports that she has some leakage of urine with coughing or sneezing.  She has had 3 vaginal deliveries.  She has followed up with what sounds like Urogynecology in the past.  She was given Myrbetriq for treatment.  However she reports that this did not help so she stopped the medicine.    Review of Systems   Genitourinary:        Urinary incontinence     Musculoskeletal: Positive for back pain (chronic).        Right knee pain       Objective:      Physical Exam   Constitutional: She is oriented to person, place, and time. She appears well-developed and well-nourished. No distress.   HENT:   Head: Normocephalic and atraumatic.   Eyes: Conjunctivae and EOM are normal.   Cardiovascular: Normal rate, regular rhythm, normal heart sounds and intact distal pulses. Exam reveals no gallop and no friction rub.    No murmur heard.  Pulmonary/Chest: Effort normal and breath sounds normal. No stridor. No respiratory distress. She has no wheezes. She has no rales.   Musculoskeletal:   Bruising noted in area of right knee.  Patient has full range of motion of knee both actively and passively.  No appreciable edema or erythema.   Neurological: She is alert and oriented to person, place, and time.   Skin: Skin is warm and dry. She is not diaphoretic.   Psychiatric: She has a normal mood and affect. Her behavior is normal. Judgment and thought content normal.   Vitals reviewed.      Assessment:       1. Annual physical exam    2. Acute pain of right knee    3. Need for influenza vaccination    4. Chronic bilateral low back pain, with sciatica presence unspecified    5. Encounter for lipid screening for cardiovascular disease    6. Hyperglycemia    7. Stress incontinence    8. Essential hypertension    9. Osteopenia, unspecified location        Plan:     1.  Annual exam  CBC, CMP, A1c, lipids  Influenza vaccine given today    2.  Acute pain of right knee  Right knee x-ray for further evaluation  Increasing dose of tramadol to 50 mg p.o. q.8 hours p.r.n. pain    3.  Chronic bilateral low back pain  Increasing tramadol as above  Referral placed to outside pain management    4.  Encounter for lipid screening for cardiovascular disease  Lipid profile pending    5.  Hyperglycemia  Noted on prior labs  A1c pending    6.  Stress incontinence  Discussed with patient that I recommend physical therapy and referral to Urogynecology for this issue.  Patient declines both at this time.    7.  Hypertension  Stable, well controlled  Continue current medications    8.  Osteopenia  Continue calcium and vitamin-D supplementation.    Return to clinic in 3 months for follow-up

## 2019-02-08 DIAGNOSIS — R94.4 DECREASED GFR: ICD-10-CM

## 2019-02-08 DIAGNOSIS — E83.52 HYPERCALCEMIA: Primary | ICD-10-CM

## 2019-02-11 RX ORDER — CELECOXIB 200 MG/1
CAPSULE ORAL
Qty: 30 CAPSULE | Refills: 0 | Status: SHIPPED | OUTPATIENT
Start: 2019-02-11 | End: 2019-04-19 | Stop reason: SDUPTHER

## 2019-02-28 ENCOUNTER — OFFICE VISIT (OUTPATIENT)
Dept: ORTHOPEDICS | Facility: CLINIC | Age: 69
End: 2019-02-28
Payer: MEDICARE

## 2019-02-28 ENCOUNTER — TELEPHONE (OUTPATIENT)
Dept: ORTHOPEDICS | Facility: CLINIC | Age: 69
End: 2019-02-28

## 2019-02-28 VITALS — HEIGHT: 61 IN | WEIGHT: 229.25 LBS | BODY MASS INDEX: 43.28 KG/M2

## 2019-02-28 DIAGNOSIS — M19.019 SHOULDER ARTHRITIS: ICD-10-CM

## 2019-02-28 PROCEDURE — 99213 PR OFFICE/OUTPT VISIT, EST, LEVL III, 20-29 MIN: ICD-10-PCS | Mod: 25,S$GLB,, | Performed by: ORTHOPAEDIC SURGERY

## 2019-02-28 PROCEDURE — 1101F PR PT FALLS ASSESS DOC 0-1 FALLS W/OUT INJ PAST YR: ICD-10-PCS | Mod: CPTII,S$GLB,, | Performed by: ORTHOPAEDIC SURGERY

## 2019-02-28 PROCEDURE — 99999 PR PBB SHADOW E&M-EST. PATIENT-LVL IV: CPT | Mod: PBBFAC,,, | Performed by: ORTHOPAEDIC SURGERY

## 2019-02-28 PROCEDURE — 99499 RISK ADDL DX/OHS AUDIT: ICD-10-PCS | Mod: S$GLB,,, | Performed by: ORTHOPAEDIC SURGERY

## 2019-02-28 PROCEDURE — 99499 UNLISTED E&M SERVICE: CPT | Mod: S$GLB,,, | Performed by: ORTHOPAEDIC SURGERY

## 2019-02-28 PROCEDURE — 20610 DRAIN/INJ JOINT/BURSA W/O US: CPT | Mod: LT,S$GLB,, | Performed by: ORTHOPAEDIC SURGERY

## 2019-02-28 PROCEDURE — 1101F PT FALLS ASSESS-DOCD LE1/YR: CPT | Mod: CPTII,S$GLB,, | Performed by: ORTHOPAEDIC SURGERY

## 2019-02-28 PROCEDURE — 20610 PR DRAIN/INJECT LARGE JOINT/BURSA: ICD-10-PCS | Mod: LT,S$GLB,, | Performed by: ORTHOPAEDIC SURGERY

## 2019-02-28 PROCEDURE — 99213 OFFICE O/P EST LOW 20 MIN: CPT | Mod: 25,S$GLB,, | Performed by: ORTHOPAEDIC SURGERY

## 2019-02-28 PROCEDURE — 99999 PR PBB SHADOW E&M-EST. PATIENT-LVL IV: ICD-10-PCS | Mod: PBBFAC,,, | Performed by: ORTHOPAEDIC SURGERY

## 2019-02-28 RX ORDER — TRIAMCINOLONE ACETONIDE 40 MG/ML
40 INJECTION, SUSPENSION INTRA-ARTICULAR; INTRAMUSCULAR
Status: COMPLETED | OUTPATIENT
Start: 2019-02-28 | End: 2019-02-28

## 2019-02-28 RX ADMIN — TRIAMCINOLONE ACETONIDE 40 MG: 40 INJECTION, SUSPENSION INTRA-ARTICULAR; INTRAMUSCULAR at 11:02

## 2019-02-28 NOTE — PROGRESS NOTES
Patient, Monique Trujillo (MRN #[id number]), presented with a recorded BMI of 43.32 kg/m^2 consistent with the definition of morbid obesity (ICD-10 E66.01). The patient's morbid obesity was monitored, evaluated, addressed and/or treated. This addendum to the medical record is made on 02/28/2019.

## 2019-02-28 NOTE — PROGRESS NOTES
HISTORY OF PRESENT ILLNESS:  Ms. Trujillo in followup of left shoulder arthritis,   not interested in surgery, but she would like to have another injection today.    PHYSICAL EXAMINATION:  LEFT SHOULDER:  Mild diffuse tenderness.  Range of motion limited secondary to   pain.  Positive crepitation.  NEUROLOGIC:  Intact.    IMPRESSION:  Left shoulder arthritis.    PLAN:  After pause for timeout, she identified the left shoulder, injected with   Kenalog 40 mg, 2 mL Xylocaine, sterile technique, tolerated the procedure well   without complication.    Recommend she continue anti-inflammatory medication by mouth, consider surgery   in the future and follow up in 3-4 months.      HARDEEP/ANNELIESE  dd: 02/28/2019 11:33:04 (CST)  td: 03/01/2019 06:31:38 (CST)  Doc ID   #[id number]  Job ID #[id number]    CC:

## 2019-04-19 RX ORDER — CELECOXIB 200 MG/1
CAPSULE ORAL
Qty: 30 CAPSULE | Refills: 0 | Status: SHIPPED | OUTPATIENT
Start: 2019-04-19 | End: 2019-05-30 | Stop reason: ALTCHOICE

## 2019-05-06 ENCOUNTER — LAB VISIT (OUTPATIENT)
Dept: LAB | Facility: HOSPITAL | Age: 69
End: 2019-05-06
Attending: INTERNAL MEDICINE
Payer: MEDICARE

## 2019-05-06 ENCOUNTER — OFFICE VISIT (OUTPATIENT)
Dept: INTERNAL MEDICINE | Facility: CLINIC | Age: 69
End: 2019-05-06
Payer: MEDICARE

## 2019-05-06 VITALS
DIASTOLIC BLOOD PRESSURE: 84 MMHG | WEIGHT: 233.44 LBS | HEART RATE: 82 BPM | BODY MASS INDEX: 44.07 KG/M2 | HEIGHT: 61 IN | SYSTOLIC BLOOD PRESSURE: 126 MMHG | TEMPERATURE: 98 F | OXYGEN SATURATION: 97 %

## 2019-05-06 DIAGNOSIS — F32.A DEPRESSION, UNSPECIFIED DEPRESSION TYPE: ICD-10-CM

## 2019-05-06 DIAGNOSIS — G89.29 CHRONIC BILATERAL LOW BACK PAIN WITH BILATERAL SCIATICA: ICD-10-CM

## 2019-05-06 DIAGNOSIS — E83.52 HYPERCALCEMIA: ICD-10-CM

## 2019-05-06 DIAGNOSIS — E83.52 HYPERCALCEMIA: Primary | ICD-10-CM

## 2019-05-06 DIAGNOSIS — F41.9 ANXIETY: ICD-10-CM

## 2019-05-06 DIAGNOSIS — M54.41 CHRONIC BILATERAL LOW BACK PAIN WITH BILATERAL SCIATICA: ICD-10-CM

## 2019-05-06 DIAGNOSIS — M54.42 CHRONIC BILATERAL LOW BACK PAIN WITH BILATERAL SCIATICA: ICD-10-CM

## 2019-05-06 DIAGNOSIS — R42 DIZZINESS: ICD-10-CM

## 2019-05-06 DIAGNOSIS — G47.00 INSOMNIA, UNSPECIFIED TYPE: ICD-10-CM

## 2019-05-06 LAB — PTH-INTACT SERPL-MCNC: 20 PG/ML (ref 9–77)

## 2019-05-06 PROCEDURE — 99499 UNLISTED E&M SERVICE: CPT | Mod: S$GLB,,, | Performed by: INTERNAL MEDICINE

## 2019-05-06 PROCEDURE — 99999 PR PBB SHADOW E&M-EST. PATIENT-LVL V: ICD-10-PCS | Mod: PBBFAC,,, | Performed by: INTERNAL MEDICINE

## 2019-05-06 PROCEDURE — 99499 RISK ADDL DX/OHS AUDIT: ICD-10-PCS | Mod: S$GLB,,, | Performed by: INTERNAL MEDICINE

## 2019-05-06 PROCEDURE — 36415 COLL VENOUS BLD VENIPUNCTURE: CPT | Mod: PO

## 2019-05-06 PROCEDURE — 1101F PT FALLS ASSESS-DOCD LE1/YR: CPT | Mod: CPTII,S$GLB,, | Performed by: INTERNAL MEDICINE

## 2019-05-06 PROCEDURE — 99214 OFFICE O/P EST MOD 30 MIN: CPT | Mod: S$GLB,,, | Performed by: INTERNAL MEDICINE

## 2019-05-06 PROCEDURE — 3074F SYST BP LT 130 MM HG: CPT | Mod: CPTII,S$GLB,, | Performed by: INTERNAL MEDICINE

## 2019-05-06 PROCEDURE — 3079F PR MOST RECENT DIASTOLIC BLOOD PRESSURE 80-89 MM HG: ICD-10-PCS | Mod: CPTII,S$GLB,, | Performed by: INTERNAL MEDICINE

## 2019-05-06 PROCEDURE — 99214 PR OFFICE/OUTPT VISIT, EST, LEVL IV, 30-39 MIN: ICD-10-PCS | Mod: S$GLB,,, | Performed by: INTERNAL MEDICINE

## 2019-05-06 PROCEDURE — 83970 ASSAY OF PARATHORMONE: CPT

## 2019-05-06 PROCEDURE — 3079F DIAST BP 80-89 MM HG: CPT | Mod: CPTII,S$GLB,, | Performed by: INTERNAL MEDICINE

## 2019-05-06 PROCEDURE — 3074F PR MOST RECENT SYSTOLIC BLOOD PRESSURE < 130 MM HG: ICD-10-PCS | Mod: CPTII,S$GLB,, | Performed by: INTERNAL MEDICINE

## 2019-05-06 PROCEDURE — 82306 VITAMIN D 25 HYDROXY: CPT

## 2019-05-06 PROCEDURE — 82330 ASSAY OF CALCIUM: CPT

## 2019-05-06 PROCEDURE — 99999 PR PBB SHADOW E&M-EST. PATIENT-LVL V: CPT | Mod: PBBFAC,,, | Performed by: INTERNAL MEDICINE

## 2019-05-06 PROCEDURE — 1101F PR PT FALLS ASSESS DOC 0-1 FALLS W/OUT INJ PAST YR: ICD-10-PCS | Mod: CPTII,S$GLB,, | Performed by: INTERNAL MEDICINE

## 2019-05-06 RX ORDER — TRAZODONE HYDROCHLORIDE 50 MG/1
50 TABLET ORAL NIGHTLY PRN
Qty: 30 TABLET | Refills: 1 | Status: SHIPPED | OUTPATIENT
Start: 2019-05-06 | End: 2019-05-30 | Stop reason: SDUPTHER

## 2019-05-06 RX ORDER — SERTRALINE HYDROCHLORIDE 25 MG/1
25 TABLET, FILM COATED ORAL DAILY
Qty: 30 TABLET | Refills: 1 | Status: SHIPPED | OUTPATIENT
Start: 2019-05-06 | End: 2019-05-30 | Stop reason: SDUPTHER

## 2019-05-06 NOTE — PATIENT INSTRUCTIONS
Please take your calcium supplement only once per day.    The phone number for the Psychology/Psychiatry Department is [phone]

## 2019-05-06 NOTE — PROGRESS NOTES
Subjective:       Patient ID: Monique Trujillo is a 68 y.o. female.    Chief Complaint: Follow-up (3 month )    HPI Mrs. Trujillo is a 68 year old female with chronic low back pain, depression, anxiety, hyperlipidemia, obesity who presents for follow-up of these general medical conditions.  She has seen by Pain Management at [facility] in the past for chronic back pain. A procedure was performed but this did not alleviate her pain.  At last visit, I referred her to outside pain management.  However she does not recall this.  She continues to have low back pain that radiates down to the right leg.      She also reports that her daughter is concerned that she is depressed.  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  She thinks this is likely taking a toll on her.  She denies any suicidal ideation.  She is currently taking Celexa 40 mg p.o. daily.  She is having difficulty sleeping at night.      She reports dizziness.  Dizziness is worsened by head movements.  It is not always associated with head movements however.  It will sometimes occur randomly.  She denies any sinus symptoms such as nasal congestion, sinus pressure/pain, runny nose, sore throat, or ear pain. She does however have associated tinnitus.    Her recent lab results were reviewed with her.  They do show prediabetes, an elevated calcium level, and slight reduction in renal function.  Patient reports that she takes 600 mg of calcium in the morning and 600 mg again in the evening.    Review of Systems   Musculoskeletal: Positive for back pain.   Neurological: Positive for dizziness.   Psychiatric/Behavioral: Positive for dysphoric mood and sleep disturbance. Negative for suicidal ideas.       Objective:      Physical Exam   Constitutional: She is oriented to person, place, and time. She appears well-developed and well-nourished. No distress.   HENT:   Head: Normocephalic and atraumatic.   Right Ear: External ear normal.    Left Ear: External ear normal.   Nose: Nose normal.   Eyes: Conjunctivae and EOM are normal.   Cardiovascular: Normal rate, regular rhythm, normal heart sounds and intact distal pulses. Exam reveals no gallop and no friction rub.   No murmur heard.  Pulmonary/Chest: Effort normal and breath sounds normal. No stridor. No respiratory distress. She has no wheezes. She has no rales.   Neurological: She is alert and oriented to person, place, and time.   Skin: Skin is warm and dry. She is not diaphoretic.   Psychiatric: She has a normal mood and affect. Her behavior is normal. Judgment and thought content normal.   Vitals reviewed.      Assessment:       1. Hypercalcemia    2. Dizziness    3. Chronic bilateral low back pain with bilateral sciatica    4. Depression, unspecified depression type    5. Anxiety    6. Insomnia, unspecified type        Plan:         1.  Hypercalcemia  Reduce calcium dosing to 600 mg in the a.m. only  Checking PTH, ionized calcium, vitamin-D levels today    2.  Dizziness  Likely due to middle ear dysfunction given associated tinnitus.  Referral placed to ENT    3.  Chronic bilateral low back pain with bilateral sciatica  Referral placed to pain management.  Patient wishes to be seen at [facility].    4.  Depression and anxiety, insomnia  PHQ-9 score 19 indicating severe depression.  No suicidal ideation  Discontinue Celexa  Starting Zoloft 25 mg p.o. daily  Starting trazodone 50 mg p.o. nightly as needed for sleep    RTC 6 weeks

## 2019-05-07 ENCOUNTER — TELEPHONE (OUTPATIENT)
Dept: PAIN MEDICINE | Facility: CLINIC | Age: 69
End: 2019-05-07

## 2019-05-07 LAB
25(OH)D3+25(OH)D2 SERPL-MCNC: 54 NG/ML (ref 30–96)
CA-I BLDV-SCNC: 1.22 MMOL/L (ref 1.06–1.42)

## 2019-05-07 NOTE — TELEPHONE ENCOUNTER
Nurse spoke with patient regarding up-coming appt with . Nurse explained to patient that per [facility] policy a patient can't be under the care of two pain managements MD and she will have to to be released by her previous pain management MD. Pt verbalized understanding.

## 2019-05-30 ENCOUNTER — OFFICE VISIT (OUTPATIENT)
Dept: ORTHOPEDICS | Facility: CLINIC | Age: 69
End: 2019-05-30
Payer: MEDICARE

## 2019-05-30 VITALS — BODY MASS INDEX: 43.99 KG/M2 | WEIGHT: 233 LBS | HEIGHT: 61 IN

## 2019-05-30 DIAGNOSIS — M19.012 PRIMARY OSTEOARTHRITIS OF LEFT SHOULDER: Primary | ICD-10-CM

## 2019-05-30 DIAGNOSIS — F41.9 ANXIETY: ICD-10-CM

## 2019-05-30 DIAGNOSIS — M25.511 CHRONIC PAIN OF BOTH SHOULDERS: ICD-10-CM

## 2019-05-30 DIAGNOSIS — G47.00 INSOMNIA, UNSPECIFIED TYPE: ICD-10-CM

## 2019-05-30 DIAGNOSIS — M25.50 POLYARTHRALGIA: ICD-10-CM

## 2019-05-30 DIAGNOSIS — M25.512 CHRONIC PAIN OF BOTH SHOULDERS: ICD-10-CM

## 2019-05-30 DIAGNOSIS — F32.A DEPRESSION, UNSPECIFIED DEPRESSION TYPE: ICD-10-CM

## 2019-05-30 DIAGNOSIS — G89.29 CHRONIC PAIN OF BOTH SHOULDERS: ICD-10-CM

## 2019-05-30 PROCEDURE — 99213 PR OFFICE/OUTPT VISIT, EST, LEVL III, 20-29 MIN: ICD-10-PCS | Mod: S$GLB,,, | Performed by: ORTHOPAEDIC SURGERY

## 2019-05-30 PROCEDURE — 1101F PT FALLS ASSESS-DOCD LE1/YR: CPT | Mod: CPTII,S$GLB,, | Performed by: ORTHOPAEDIC SURGERY

## 2019-05-30 PROCEDURE — 99999 PR PBB SHADOW E&M-EST. PATIENT-LVL III: ICD-10-PCS | Mod: PBBFAC,,, | Performed by: ORTHOPAEDIC SURGERY

## 2019-05-30 PROCEDURE — 99213 OFFICE O/P EST LOW 20 MIN: CPT | Mod: S$GLB,,, | Performed by: ORTHOPAEDIC SURGERY

## 2019-05-30 PROCEDURE — 1101F PR PT FALLS ASSESS DOC 0-1 FALLS W/OUT INJ PAST YR: ICD-10-PCS | Mod: CPTII,S$GLB,, | Performed by: ORTHOPAEDIC SURGERY

## 2019-05-30 PROCEDURE — 99999 PR PBB SHADOW E&M-EST. PATIENT-LVL III: CPT | Mod: PBBFAC,,, | Performed by: ORTHOPAEDIC SURGERY

## 2019-05-30 RX ORDER — ETODOLAC 200 MG/1
400 CAPSULE ORAL 2 TIMES DAILY
Qty: 60 CAPSULE | Refills: 1 | Status: SHIPPED | OUTPATIENT
Start: 2019-05-30 | End: 2019-09-12

## 2019-05-30 RX ORDER — SERTRALINE HYDROCHLORIDE 25 MG/1
TABLET, FILM COATED ORAL
Qty: 30 TABLET | Refills: 0 | Status: SHIPPED | OUTPATIENT
Start: 2019-05-30 | End: 2019-06-28 | Stop reason: SDUPTHER

## 2019-05-30 RX ORDER — TRAZODONE HYDROCHLORIDE 50 MG/1
50 TABLET ORAL NIGHTLY PRN
Qty: 30 TABLET | Refills: 0 | Status: SHIPPED | OUTPATIENT
Start: 2019-05-30 | End: 2019-07-01 | Stop reason: SDUPTHER

## 2019-05-30 NOTE — PROGRESS NOTES
Subjective:      Patient ID: Monique Trujillo is a 68 y.o. female.    Chief Complaint: Pain of the Left Shoulder      HPI: Monique Trujillo is here in follow-up chronic left shoulder pain secondary to arthritis.  She was last seen injury 3 months ago with corticosteroid injection left shoulder by Dr. Torres.  Patient reports injections were very helpful in the past however, the last injection was not helpful.  She continues to have constant achy pain as worse with use, crepitation, and decreased range of motion.  She is starting to experience symptoms on the opposite, right side. 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    Today, she also complains of polyarthropathy (bilateral shoulders, knees, ankles, and wrist) as well as chronic back pain status post lumbar laminectomy and multiple epidural injections.     Past Medical History:   Diagnosis Date    Allergy     Arthritis     Chronic lower back pain     Depression     Fever blister     GERD (gastroesophageal reflux disease)     Hemorrhoids, internal 11/5/2014    Hyperlipidemia     Hypertension     Joint pain     Morbid obesity with BMI of 40.0-44.9, adult 2/24/2015    Multiple gastric ulcers 12/14/2017    TIMMY (obstructive sleep apnea)     does not use CPAP    Skin cancer        Current Outpatient Medications:     amLODIPine (NORVASC) 10 MG tablet, TAKE 1 TABLET BY MOUTH EVERY DAY, Disp: 90 tablet, Rfl: 3    ascorbic acid (VITAMIN C) 1000 MG tablet, Take 1,000 mg by mouth once daily., Disp: , Rfl:     atenolol (TENORMIN) 25 MG tablet, TAKE 1 TABLET BY MOUTH EVERY DAY, Disp: 90 tablet, Rfl: 3    calcium-vitamin D3 (CALCIUM 500 + D) 500 mg(1,250mg) -200 unit per tablet, Take 1 tablet by mouth 2 (two) times daily with meals., Disp: , Rfl:     co-enzyme Q-10 30 mg capsule, Take 30 mg by mouth once daily. , Disp: , Rfl:     fenofibric acid (FIBRICOR) 135 mg CpDR, Take 1 capsule  "(135 mg total) by mouth once daily., Disp: 90 capsule, Rfl: 3    fish oil-omega-3 fatty acids 300-1,000 mg capsule, Take 2 g by mouth once daily., Disp: , Rfl:     gabapentin (NEURONTIN) 800 MG tablet, Take 1 tablet (800 mg total) by mouth every evening. (Patient taking differently: Take 800 mg by mouth every evening. ), Disp: 90 tablet, Rfl: 3    GLUCOSA KHAN 2KCL/CHONDROITIN KHAN (GLUCOSAMINE SULF-CHONDROITIN ORAL), Take 1 tablet by mouth once daily., Disp: , Rfl:     multivitamin capsule, Take 1 capsule by mouth once daily., Disp: , Rfl:     pantoprazole (PROTONIX) 20 MG tablet, TAKE 1 TABLET BY MOUTH EVERY DAY, Disp: 90 tablet, Rfl: 3    pravastatin (PRAVACHOL) 40 MG tablet, TAKE 1 TABLET BY MOUTH EVERY DAY, Disp: 90 tablet, Rfl: 3    sertraline (ZOLOFT) 25 MG tablet, Take 1 tablet (25 mg total) by mouth once daily., Disp: 30 tablet, Rfl: 1    traMADol (ULTRAM) 50 mg tablet, Take 1 tablet (50 mg total) by mouth every 8 (eight) hours as needed for Pain., Disp: 30 tablet, Rfl: 0    traZODone (DESYREL) 50 MG tablet, Take 1 tablet (50 mg total) by mouth nightly as needed for Insomnia., Disp: 30 tablet, Rfl: 1    vitamin E 400 UNIT capsule, Take 400 Units by mouth once daily., Disp: , Rfl:     econazole nitrate 1 % cream, Use bid for rash, Disp: 85 g, Rfl: 2    etodolac (LODINE) 200 MG Cap, Take 2 capsules (400 mg total) by mouth 2 (two) times daily., Disp: 60 capsule, Rfl: 1    OPW TEST CLAIM - DO NOT FILL, Take by mouth. OPW test claim. Do not fill., Disp: 11 tablet, Rfl: 0    Current Facility-Administered Medications:     lidocaine (PF) 20 mg/mL (2 %) injection 2 mL, 2 mL, Intradermal, Once, Kalyan Han MD  Review of patient's allergies indicates:  No Known Allergies    Ht 5' 1" (1.549 m)   Wt 105.7 kg (233 lb)   BMI 44.02 kg/m²     Review of Systems   Constitution: Negative for chills and fever.   Cardiovascular: Negative for chest pain and palpitations.   Respiratory: Negative for shortness " of breath and wheezing.    Skin: Negative for poor wound healing and rash.   Musculoskeletal: Positive for arthritis, back pain, joint pain, joint swelling and stiffness.   Gastrointestinal: Negative for nausea and vomiting.   Genitourinary: Negative for dysuria and hematuria.   Neurological: Positive for numbness (Bilateral lower leg secondary to sciatica) and paresthesias. Negative for seizures and tremors.   Psychiatric/Behavioral: Negative for altered mental status.   Allergic/Immunologic: Negative for environmental allergies and persistent infections.         Objective:    Ortho Exam   Left shoulder  Skin: No rashes or lesions on exposed areas.  Atrophy: none noted.  Tenderness to palpation:  Posterior/joint line/deltoid.  AROM (deg): abduction-60, flexion-60, rotation- unrestricted, painful rotation- present.  Rotator cuff: limited by pain, Impingement test- positive.  Cross arm adduction test- positive.  Instability testing: negative.   Distal neuro: normal, no muscle wasting or atrophy.  Pulses: Positive peripheral pulses.   Right shoulder  Skin: No rashes or lesions on exposed areas.  Atrophy: none noted.  Tenderness to palpation: None.  AROM (deg): abduction-120, flexion-160, rotation- unrestricted, painful rotation- absent.  Rotator cuff: limited by pain, Impingement test- positive.  Cross arm adduction test- negative.  Instability testing: negative.   Distal neuro: normal, no muscle wasting or atrophy.  Pulses: Positive peripheral pulses.     Bilateral wrist:  Significant for swelling.  Tenderness to palpation.  Range of motion stiff.   strength decreased.    GEN: Well developed, well nourished female. AAOX3. No acute distress.   Normocephalic, atraumatic.   STELLA  Breathing unlabored.  Mood and affect appropriate.         Assessment:     Imaging:  Previous imaging of the left shoulder from 1 year ago was reviewed which reveals advanced osteoarthritic changes of the glenohumeral joint.  Patient will  need updated bilateral shoulder x-rays.        1. Primary osteoarthritis of left shoulder    2. Polyarthralgia    3. Chronic pain of both shoulders          Plan:       Patient is not interested in definitive surgical treatment at this time or in the forseeable future secondary to caring for her .  Injections have worked in the past however they no longer effective.  For the the left shoulder, I recommend referral to pain management for nerve block/RFA.  For the polyarthralgias I recommend the patient follow with rheumatology for better control/maintenance.  We will consider individual joint injections for acute flares.  Celebrex is no longer helpful - will try Lodine.    Orders Placed This Encounter    X-Ray Shoulder Complete Bilateral    Ambulatory Referral to Pain Clinic    Ambulatory Referral to Rheumatology    etodolac (LODINE) 200 MG Cap     Follow up in about 3 months (around 8/30/2019).

## 2019-06-07 ENCOUNTER — HOSPITAL ENCOUNTER (OUTPATIENT)
Dept: RADIOLOGY | Facility: HOSPITAL | Age: 69
Discharge: HOME | End: 2019-06-07
Attending: ORTHOPAEDIC SURGERY
Payer: MEDICARE

## 2019-06-07 DIAGNOSIS — G89.29 CHRONIC PAIN OF BOTH SHOULDERS: ICD-10-CM

## 2019-06-07 DIAGNOSIS — M25.512 CHRONIC PAIN OF BOTH SHOULDERS: ICD-10-CM

## 2019-06-07 DIAGNOSIS — M25.511 CHRONIC PAIN OF BOTH SHOULDERS: ICD-10-CM

## 2019-06-07 PROCEDURE — 73030 XR SHOULDER COMPLETE 2 OR MORE VIEWS BILATERAL: ICD-10-PCS | Mod: 26,50,, | Performed by: INTERNAL MEDICINE

## 2019-06-07 PROCEDURE — 73030 X-RAY EXAM OF SHOULDER: CPT | Mod: TC,50,FY

## 2019-06-07 PROCEDURE — 73030 X-RAY EXAM OF SHOULDER: CPT | Mod: 26,50,, | Performed by: INTERNAL MEDICINE

## 2019-06-10 ENCOUNTER — OFFICE VISIT (OUTPATIENT)
Dept: PAIN MEDICINE | Facility: CLINIC | Age: 69
End: 2019-06-10
Payer: MEDICARE

## 2019-06-10 VITALS
HEART RATE: 81 BPM | DIASTOLIC BLOOD PRESSURE: 78 MMHG | SYSTOLIC BLOOD PRESSURE: 137 MMHG | HEIGHT: 61 IN | WEIGHT: 233 LBS | BODY MASS INDEX: 43.99 KG/M2

## 2019-06-10 DIAGNOSIS — M25.511 CHRONIC PAIN OF BOTH SHOULDERS: ICD-10-CM

## 2019-06-10 DIAGNOSIS — M25.512 CHRONIC PAIN OF BOTH SHOULDERS: ICD-10-CM

## 2019-06-10 DIAGNOSIS — G89.29 CHRONIC PAIN OF BOTH SHOULDERS: ICD-10-CM

## 2019-06-10 DIAGNOSIS — M19.019 SHOULDER ARTHRITIS: Primary | ICD-10-CM

## 2019-06-10 PROCEDURE — 3075F SYST BP GE 130 - 139MM HG: CPT | Mod: CPTII,S$GLB,, | Performed by: PAIN MEDICINE

## 2019-06-10 PROCEDURE — 1101F PR PT FALLS ASSESS DOC 0-1 FALLS W/OUT INJ PAST YR: ICD-10-PCS | Mod: CPTII,S$GLB,, | Performed by: PAIN MEDICINE

## 2019-06-10 PROCEDURE — 1101F PT FALLS ASSESS-DOCD LE1/YR: CPT | Mod: CPTII,S$GLB,, | Performed by: PAIN MEDICINE

## 2019-06-10 PROCEDURE — 99214 PR OFFICE/OUTPT VISIT, EST, LEVL IV, 30-39 MIN: ICD-10-PCS | Mod: S$GLB,,, | Performed by: PAIN MEDICINE

## 2019-06-10 PROCEDURE — 99999 PR PBB SHADOW E&M-EST. PATIENT-LVL III: CPT | Mod: PBBFAC,,, | Performed by: PAIN MEDICINE

## 2019-06-10 PROCEDURE — 3078F PR MOST RECENT DIASTOLIC BLOOD PRESSURE < 80 MM HG: ICD-10-PCS | Mod: CPTII,S$GLB,, | Performed by: PAIN MEDICINE

## 2019-06-10 PROCEDURE — 99999 PR PBB SHADOW E&M-EST. PATIENT-LVL III: ICD-10-PCS | Mod: PBBFAC,,, | Performed by: PAIN MEDICINE

## 2019-06-10 PROCEDURE — 99214 OFFICE O/P EST MOD 30 MIN: CPT | Mod: S$GLB,,, | Performed by: PAIN MEDICINE

## 2019-06-10 PROCEDURE — 3075F PR MOST RECENT SYSTOLIC BLOOD PRESS GE 130-139MM HG: ICD-10-PCS | Mod: CPTII,S$GLB,, | Performed by: PAIN MEDICINE

## 2019-06-10 PROCEDURE — 3078F DIAST BP <80 MM HG: CPT | Mod: CPTII,S$GLB,, | Performed by: PAIN MEDICINE

## 2019-06-10 NOTE — LETTER
Radha 10, 2019      Lauren Adams PA-C  [address]  [address]  [facility] [place] [address]           [place] [facility]  [address]  [facility] [place] [address]  Phone: [phone]          Patient: Monique Trujillo   MR Number: [id number]   YOB: 1950   Date of Visit: 6/10/2019       Dear Lauren Adams:    Thank you for referring Monique Trujillo to me for evaluation. Attached you will find relevant portions of my assessment and plan of care.    If you have questions, please do not hesitate to call me. I look forward to following Monique Trujillo along with you.    Sincerely,    Lydia Velásquez Jr., MD    Enclosure  CC:  No Recipients    If you would like to receive this communication electronically, please contact [email or web address] or [phone] to request more information on [facility] Link access.    For providers and/or their staff who would like to refer a patient to [facility], please contact us through our one-stop-shop provider referral line, [facility] [place], at [phone].    If you feel you have received this communication in error or would no longer like to receive these types of communications, please e-mail [email or web address]

## 2019-06-10 NOTE — PROGRESS NOTES
"[facility] Established Patient Evaluation    Referred by: Self  Reason for referral: Back Pain    CC:   Chief Complaint   Patient presents with    Shoulder Pain     Last 3 PDI Scores 1/23/2019 12/10/2018 10/31/2018   Pain Disability Index (PDI) 29 [phone] 35     Interval Update:   6/10/19 Pt presents today for evaluation of her chronic shoulder pain. She was referred by her orthopedic surgery team for consideration bilateral shoulder articular branch block and possible radiofrequency ablation.  She reports chronic pain in both shoulders which she rates 6/10 at this time. Shoulder pain is worse on the left side; however, pain in both shoulders limits her ability to perform some activities of daily living, especially those requiring her to reach overhead.  Patient states that she is opposed to any surgical interventions at this time and would like to try the articular branch block/ablation.    12/10/18 pt returns today S/P Lumbar Epidural Steroid Injection on 11/8/18 with 20% relief.    10/31/18- Pt returns today for follow up with MRI results she is reporting 6 /10 pain.    Background:   Monique Trujillo is a 68 y.o. female who complains of clbp with radiculopathy.    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  Completed for knee and hip after each replacement.  HEP:   Interventions: 11/8/18 Lumbar Epidural Steroid Injection  Surgery:   2014 - lumbar fixation to treat listhesis - patient felt good for several years after this   2017 - right hip replacement   2018 - right knee replaced  Medications:   - NSAIDS: Yes, current  - MSK Relaxants: " Tried several in the past with severe sedation  - TCAs:   - SNRIs:   - Topicals:   - Anticonvulsants: Yes, current  - Opioids:     Current Pain Medications:  1. Celecoxib 200 mg daily  2. Gabapentin 800 mg QHS  3. Other: Celexa    Review of Systems:  Review of Systems   Constitutional: Negative for chills and fever.   HENT: Negative for nosebleeds.    Eyes: Negative for pain.   Respiratory: Negative for hemoptysis.    Cardiovascular: Negative for chest pain.   Gastrointestinal: Negative for nausea and vomiting.   Genitourinary: Negative for dysuria.   Musculoskeletal: Positive for back pain and joint pain. Negative for falls.   Skin: Negative for rash.   Neurological: Positive for tingling. Negative for focal weakness.   Endo/Heme/Allergies: Does not bruise/bleed easily.   Psychiatric/Behavioral: The patient has insomnia.        History:    Current Outpatient Medications:     amLODIPine (NORVASC) 10 MG tablet, TAKE 1 TABLET BY MOUTH EVERY DAY, Disp: 90 tablet, Rfl: 3    ascorbic acid (VITAMIN C) 1000 MG tablet, Take 1,000 mg by mouth once daily., Disp: , Rfl:     atenolol (TENORMIN) 25 MG tablet, TAKE 1 TABLET BY MOUTH EVERY DAY, Disp: 90 tablet, Rfl: 3    calcium-vitamin D3 (CALCIUM 500 + D) 500 mg(1,250mg) -200 unit per tablet, Take 1 tablet by mouth 2 (two) times daily with meals., Disp: , Rfl:     co-enzyme Q-10 30 mg capsule, Take 30 mg by mouth once daily. , Disp: , Rfl:     econazole nitrate 1 % cream, Use bid for rash, Disp: 85 g, Rfl: 2    etodolac (LODINE) 200 MG Cap, Take 2 capsules (400 mg total) by mouth 2 (two) times daily., Disp: 60 capsule, Rfl: 1    fenofibric acid (FIBRICOR) 135 mg CpDR, Take 1 capsule (135 mg total) by mouth once daily., Disp: 90 capsule, Rfl: 3    fish oil-omega-3 fatty acids 300-1,000 mg capsule, Take 2 g by mouth once daily., Disp: , Rfl:     gabapentin (NEURONTIN) 800 MG tablet, Take 1 tablet (800 mg total) by mouth every evening. (Patient taking differently: Take  800 mg by mouth every evening. ), Disp: 90 tablet, Rfl: 3    GLUCOSA KHAN 2KCL/CHONDROITIN KHAN (GLUCOSAMINE SULF-CHONDROITIN ORAL), Take 1 tablet by mouth once daily., Disp: , Rfl:     multivitamin capsule, Take 1 capsule by mouth once daily., Disp: , Rfl:     OPW TEST CLAIM - DO NOT FILL, Take by mouth. OPW test claim. Do not fill., Disp: 11 tablet, Rfl: 0    oxybutynin (DITROPAN XL) 15 MG TR24, TAKE 1 TABLET (15 MG TOTAL) BY MOUTH ONCE DAILY., Disp: 90 tablet, Rfl: 3    pantoprazole (PROTONIX) 20 MG tablet, TAKE 1 TABLET BY MOUTH EVERY DAY, Disp: 90 tablet, Rfl: 3    pravastatin (PRAVACHOL) 40 MG tablet, TAKE 1 TABLET BY MOUTH EVERY DAY, Disp: 90 tablet, Rfl: 3    sertraline (ZOLOFT) 25 MG tablet, TAKE 1 TABLET BY MOUTH EVERY DAY, Disp: 30 tablet, Rfl: 0    traMADol (ULTRAM) 50 mg tablet, Take 1 tablet (50 mg total) by mouth every 8 (eight) hours as needed for Pain., Disp: 30 tablet, Rfl: 0    traZODone (DESYREL) 50 MG tablet, TAKE 1 TABLET (50 MG TOTAL) BY MOUTH NIGHTLY AS NEEDED FOR INSOMNIA., Disp: 30 tablet, Rfl: 0    vitamin E 400 UNIT capsule, Take 400 Units by mouth once daily., Disp: , Rfl:     Current Facility-Administered Medications:     lidocaine (PF) 20 mg/mL (2 %) injection 2 mL, 2 mL, Intradermal, Once, Kalyan Han MD    Past Medical History:   Diagnosis Date    Allergy     Arthritis     Chronic lower back pain     Depression     Fever blister     GERD (gastroesophageal reflux disease)     Hemorrhoids, internal 11/5/2014    Hyperlipidemia     Hypertension     Joint pain     Morbid obesity with BMI of 40.0-44.9, adult 2/24/2015    Multiple gastric ulcers 12/14/2017    TIMMY (obstructive sleep apnea)     does not use CPAP    Skin cancer        Past Surgical History:   Procedure Laterality Date    ANTERIOR CERVICAL CORPECTOMY W/ FUSION  1988    ARTHROPLASTY-HIP Right 4/28/2016    [facility]    ARTHROPLASTY-KNEE Right 3/14/2018    Performed  by [facility]ier [facility] at [facility] OR    COLONOSCOPY N/A 11/2/2016    [facility] ENDO    COLONOSCOPY N/A 11/5/2014    Performed by Pastora Olvera [facility] at [facility] ENDO    ESOPHAGOGASTRODUODENOSCOPY (EGD) N/A 12/14/2017    Performed by El [facility]itt [facility] at [facility] ENDO    ESOPHAGOGASTRODUODENOSCOPY (EGD) N/A 10/20/2017    Performed by El [facility]itt [facility] at [facility] ENDO    Injection-steroid-epidural-caudal N/A 1/10/2019    Performed by Lydia Vel[facility]quez Jr., [facility] at [facility] PAIN MGT    Injection-steroid-epidural-lumbar-L2-3 (LEFT > RIGHT) N/A 11/8/2018    Performed by L[facility] Jr[facility] at [facility] PAIN MGT    KNEE ARTHROSCOPY W/ MENISCAL REPAIR Right 2012    LUMBAR LAMINECTOMY  06/19/2015    SKIN CANCER EXCISION      forehead does not recall date    TOTAL HIP ARTHROPLASTY Right 04/28/2016         TUBAL LIGATION  1980       Family History   Problem Relation Age of Onset    Arthritis Mother     Cancer Mother         breast ca    Breast cancer Mother     Kidney disease Father     Alzheimer's disease Maternal Grandfather     Cancer Paternal Grandmother         colon ca    Colon cancer Paternal Grandmother        Social History     Socioeconomic History    Marital status:      Spouse name: Not on file    Number of children: Not on file    Years of education: 12    Highest education level: Not on file   Occupational History     Employer: [employer]   Social Needs    Financial resource strain: Not on file    Food insecurity:     Worry: Not on file     Inability: Not on file    Transportation needs:     Medical: Not on file     Non-medical: Not on file   Tobacco Use    Smoking status: Never Smoker    Smokeless tobacco: Never Used   Substance and Sexual Activity    Alcohol use: No     Alcohol/week: 0.0 oz    Drug use: No    Sexual activity: Never   Lifestyle    Physical activity:     Days per week: Not on file     Minutes per session: Not on file     "Stress: Not on file   Relationships    Social connections:     Talks on phone: Not on file     Gets together: Not on file     Attends Evangelical service: Not on file     Active member of club or organization: Not on file     Attends meetings of clubs or organizations: Not on file     Relationship status: Not on file   Other Topics Concern    Are you pregnant or think you may be? Not Asked    Breast-feeding Not Asked   Social History Narrative    Not on file       Review of patient's allergies indicates:  No Known Allergies    Physical Exam:  Vitals:    06/10/19 1024   BP: 137/78   Pulse: 81   Weight: 105.7 kg (233 lb)   Height: 5' 1" (1.549 m)   PainSc:   6     General    Nursing note and vitals reviewed.  Constitutional: She is oriented to person, place, and time. She appears well-developed and well-nourished. No distress.   HENT:   Head: Normocephalic and atraumatic.   Nose: Nose normal.   Eyes: Conjunctivae and EOM are normal. Pupils are equal, round, and reactive to light. Right eye exhibits no discharge. Left eye exhibits no discharge. No scleral icterus.   Neck: No JVD present.   Cardiovascular: Intact distal pulses.    Pulmonary/Chest: Effort normal. No respiratory distress.   Abdominal: She exhibits no distension.   Neurological: She is alert and oriented to person, place, and time. Coordination normal.   Psychiatric: She has a normal mood and affect. Her behavior is normal. Judgment and thought content normal.     General Musculoskeletal Exam   Gait: normal     Back (L-Spine & T-Spine) / Neck (C-Spine) Exam     Tenderness Right paramedian tenderness of the Lower L-Spine. Left paramedian tenderness of the Lower L-Spine.     Back (L-Spine & T-Spine) Range of Motion   Back extension: facet loading is positive and exacerabtes/reproduces the patient's typical low back pain    Back flexion: limited ROM but partial relief of low back pain noted.     Spinal Sensation   Right Side Sensation  L-Spine Level: " normal  Left Side Sensation  L-Spine Level: normal    Other She has no scoliosis .    Comments:  + SLR on right      Muscle Strength   Right Lower Extremity   Hip Flexion: 5/5   Hip Extensors: 5/5  Quadriceps:  5/5   Hamstrin/5   Gastrocsoleus:  5/5/5  Left Lower Extremity   Hip Flexion: 5/5   Hip Extensors: 5/5  Quadriceps:  5/5   Hamstrin/5   Gastrocsoleus:  5/5/5    Reflexes     Left Side  Quadriceps:  2+  Achilles:  2+    Right Side   Quadriceps:  2+  Achilles:  2+      Imaging:  MRI Lumbar Spine Without Contrast 10/31/2018   1. Severe multifactorial central canal spinal stenosis at the L3-4 disc space since the previous study.  There is also a 8 mm anterolisthesis of L3 on L4 since the previous study.  2. Postoperative changes from L4-5 discectomy with improvement in the anterolisthesis.  There is however at least a moderate central canal spinal stenosis related to hypertrophic changes of the dorsal elements.  3. Spondylotic changes T12-L1 disc space as above.  4. Rest of the findings as above.       Labs:  BMP  Lab Results   Component Value Date     2019    K 3.6 2019     2019    CO2 31 (H) 2019    BUN 22 2019    CREATININE 1.0 2019    CALCIUM 11.3 (H) 2019    ANIONGAP 8 2019    ESTGFRAFRICA >60.0 2019    EGFRNONAA 57.6 (A) 2019     Lab Results   Component Value Date    ALT 23 2019    AST 24 2019    ALKPHOS 55 2019    BILITOT 0.5 2019       Assessment:  Problem List Items Addressed This Visit     None          10/24/18 - 67-year-old female with medical problems consisting of morbid obesity, GERD, hypertension, a multiple gastric ulcers who presents complaining of chronic low back pain with radiculopathy.  She had a lumbar surgery 4 years ago with fusion of the lumbar spine using hardware.  Following that surgery, radicular symptoms improved; however, over the past year she has experienced increasing  radicular pain associated with numbness and tingling in the feet bilaterally.  The pain is made worse with prolonged sitting or lying in bed in certain positions.  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  There is no current imaging of her lumbar spine and I will place an order for x-ray lumbar spine as well as MRI lumbar spine today.  I will have her return to clinic immediately after obtaining the MRI lumbar spine so that we can make an assessment and recommend interventional therapies.  I will likely recommend an epidural steroid injection but MRI is required to ensure safe access into the epidural space.    10/31/18 - Patient returned to discuss imaging which shows anterolisthesis of L3 on L4 with central canal stenosis.  There is also multilevel neuroforaminal stenosis as well.  I recommend Lumbar ELINA at L2-3 for therapeutic purposes.  Once pain is controlled, she will be referred to PT.  She may also need eval by neurosurgery as this qualifies as adjacent level disease with her Hx of L4-5 fusion with hardware for anterolisthesis at that level.    [birth year]/10[birth year]-64-kxhf-old female status post lumbar ELINA at L2-3 with 20% relief, she has history of a fusion at L4-5, previous clinic encounter with Dr. Velásquez suggested that we attempt a Caudal ELINA if relief was minimal with L2-3 IESI. I will s/f Caudal today, explained procedure in detail with patient, pt states she understood and agreed.     6/10/19 - while we have been treating her for chronic low back pain radiculopathy, patient presents today complaining of bilateral shoulder pain. She was referred by her orthopedic surgery team for consideration of bilateral articular branch block of the shoulder and possible radiofrequency ablation.  If she responds well to a single block, will move forward with the ablation.  Imaging of the shoulders was reviewed today demonstrating  degenerative joint disease.    : Not applicable    Treatment Plan:   Procedures: Bilateral Shoulder articular branch block.  Plan for RFA if block works well.  PT/OT/HEP:  Medications:   Labs: reviewed and medications are appropriately dosed for current hepatorenal function.  Imaging: Reviewed with patient today.  Follow Up: RTC p injection    Lydia Velásquez Jr, MD  Interventional Pain Medicine / Anesthesiology        Disclaimer: This note was partly generated using dictation software which may occasionally result in transcription errors.

## 2019-06-11 ENCOUNTER — TELEPHONE (OUTPATIENT)
Dept: PAIN MEDICINE | Facility: CLINIC | Age: 69
End: 2019-06-11

## 2019-06-11 DIAGNOSIS — M19.012 PRIMARY OSTEOARTHRITIS OF BOTH SHOULDERS: Primary | ICD-10-CM

## 2019-06-11 DIAGNOSIS — M25.511 CHRONIC PAIN OF BOTH SHOULDERS: ICD-10-CM

## 2019-06-11 DIAGNOSIS — M19.011 PRIMARY OSTEOARTHRITIS OF BOTH SHOULDERS: Primary | ICD-10-CM

## 2019-06-11 DIAGNOSIS — M25.512 CHRONIC PAIN OF BOTH SHOULDERS: ICD-10-CM

## 2019-06-11 DIAGNOSIS — G89.29 CHRONIC PAIN OF BOTH SHOULDERS: ICD-10-CM

## 2019-06-24 NOTE — DISCHARGE INSTRUCTIONS
Home Care Instructions Pain Management:    1.  DIET:    You may resume your normal diet today.    2.  BATHING:    You may shower with luke warm water.    3.  DRESSING:    You may remove your bandage today.    4.  ACTIVITY LEVEL:      You may resume your normal activities 24 hours after your procedure.    5.  MEDICATIONS:    You may resume your normal medications today.    6.  SPECIAL INSTRUCTIONS:    No heat to the injection site for 24 hours including bath or shower, heating pad, moist heat or hot tubs.    Use an ice pack to the injection site for any pain or discomfort.  Apply ice packs for 20 minute intervals as needed.    If you have received any sedatives by mouth today, you can not drive for 12 hours.    If you have received sedation through an IV, you can not drive for 24 hours.    PLEASE CALL YOUR DOCTOR FOR THE FOLLOWIN.  Redness or swelling around the injection site.  2.  Fever of 101 degrees.  3.  Drainage (pus) from the injection site.  4.  For any continuous bleeding (some dried blood over the incision is normal.)    FOR EMERGENCIES:    If any unusual problems or difficulties occur during clinic hours, call [phone] or dial [phone].    Follow up with with your physician in 2-3 weeks.

## 2019-06-26 ENCOUNTER — TELEPHONE (OUTPATIENT)
Dept: PAIN MEDICINE | Facility: CLINIC | Age: 69
End: 2019-06-26

## 2019-06-28 DIAGNOSIS — F32.A DEPRESSION, UNSPECIFIED DEPRESSION TYPE: ICD-10-CM

## 2019-06-28 DIAGNOSIS — F41.9 ANXIETY: ICD-10-CM

## 2019-06-28 RX ORDER — SERTRALINE HYDROCHLORIDE 25 MG/1
TABLET, FILM COATED ORAL
Qty: 30 TABLET | Refills: 0 | Status: SHIPPED | OUTPATIENT
Start: 2019-06-28 | End: 2019-07-27 | Stop reason: SDUPTHER

## 2019-07-01 DIAGNOSIS — F41.9 ANXIETY: ICD-10-CM

## 2019-07-01 DIAGNOSIS — G47.00 INSOMNIA, UNSPECIFIED TYPE: ICD-10-CM

## 2019-07-01 RX ORDER — TRAZODONE HYDROCHLORIDE 50 MG/1
50 TABLET ORAL NIGHTLY PRN
Qty: 30 TABLET | Refills: 0 | Status: SHIPPED | OUTPATIENT
Start: 2019-07-01 | End: 2019-07-27 | Stop reason: SDUPTHER

## 2019-07-08 ENCOUNTER — TELEPHONE (OUTPATIENT)
Dept: PAIN MEDICINE | Facility: CLINIC | Age: 69
End: 2019-07-08

## 2019-07-09 ENCOUNTER — INITIAL CONSULT (OUTPATIENT)
Dept: RHEUMATOLOGY | Facility: CLINIC | Age: 69
End: 2019-07-09
Payer: MEDICARE

## 2019-07-09 VITALS
HEART RATE: 82 BPM | SYSTOLIC BLOOD PRESSURE: 151 MMHG | DIASTOLIC BLOOD PRESSURE: 84 MMHG | BODY MASS INDEX: 43.16 KG/M2 | WEIGHT: 234.56 LBS | HEIGHT: 62 IN

## 2019-07-09 DIAGNOSIS — M17.12 PRIMARY OSTEOARTHRITIS OF LEFT KNEE: ICD-10-CM

## 2019-07-09 DIAGNOSIS — M19.012 PRIMARY OSTEOARTHRITIS OF LEFT SHOULDER: ICD-10-CM

## 2019-07-09 DIAGNOSIS — M48.062 SPINAL STENOSIS OF LUMBAR REGION WITH NEUROGENIC CLAUDICATION: Primary | ICD-10-CM

## 2019-07-09 DIAGNOSIS — E66.01 CLASS 3 SEVERE OBESITY WITH BODY MASS INDEX (BMI) OF 40.0 TO 44.9 IN ADULT, UNSPECIFIED OBESITY TYPE, UNSPECIFIED WHETHER SERIOUS COMORBIDITY PRESENT: ICD-10-CM

## 2019-07-09 DIAGNOSIS — M25.561 ACUTE PAIN OF RIGHT KNEE: ICD-10-CM

## 2019-07-09 PROCEDURE — 3079F PR MOST RECENT DIASTOLIC BLOOD PRESSURE 80-89 MM HG: ICD-10-PCS | Mod: CPTII,S$GLB,, | Performed by: INTERNAL MEDICINE

## 2019-07-09 PROCEDURE — 3077F PR MOST RECENT SYSTOLIC BLOOD PRESSURE >= 140 MM HG: ICD-10-PCS | Mod: CPTII,S$GLB,, | Performed by: INTERNAL MEDICINE

## 2019-07-09 PROCEDURE — 99999 PR PBB SHADOW E&M-EST. PATIENT-LVL III: CPT | Mod: PBBFAC,,, | Performed by: INTERNAL MEDICINE

## 2019-07-09 PROCEDURE — 3077F SYST BP >= 140 MM HG: CPT | Mod: CPTII,S$GLB,, | Performed by: INTERNAL MEDICINE

## 2019-07-09 PROCEDURE — 1101F PT FALLS ASSESS-DOCD LE1/YR: CPT | Mod: CPTII,S$GLB,, | Performed by: INTERNAL MEDICINE

## 2019-07-09 PROCEDURE — 99204 PR OFFICE/OUTPT VISIT, NEW, LEVL IV, 45-59 MIN: ICD-10-PCS | Mod: S$GLB,,, | Performed by: INTERNAL MEDICINE

## 2019-07-09 PROCEDURE — 99204 OFFICE O/P NEW MOD 45 MIN: CPT | Mod: S$GLB,,, | Performed by: INTERNAL MEDICINE

## 2019-07-09 PROCEDURE — 99999 PR PBB SHADOW E&M-EST. PATIENT-LVL III: ICD-10-PCS | Mod: PBBFAC,,, | Performed by: INTERNAL MEDICINE

## 2019-07-09 PROCEDURE — 1101F PR PT FALLS ASSESS DOC 0-1 FALLS W/OUT INJ PAST YR: ICD-10-PCS | Mod: CPTII,S$GLB,, | Performed by: INTERNAL MEDICINE

## 2019-07-09 PROCEDURE — 3079F DIAST BP 80-89 MM HG: CPT | Mod: CPTII,S$GLB,, | Performed by: INTERNAL MEDICINE

## 2019-07-09 RX ORDER — TRAMADOL HYDROCHLORIDE 50 MG/1
50 TABLET ORAL EVERY 12 HOURS PRN
Qty: 30 TABLET | Refills: 5 | Status: SHIPPED | OUTPATIENT
Start: 2019-07-09 | End: 2019-08-08

## 2019-07-09 ASSESSMENT — ROUTINE ASSESSMENT OF PATIENT INDEX DATA (RAPID3)
TOTAL RAPID3 SCORE: 6.72
FATIGUE SCORE: 7
PATIENT GLOBAL ASSESSMENT SCORE: 7.5
PAIN SCORE: 8
WHEN YOU AWAKENED IN THE MORNING OVER THE LAST WEEK, PLEASE INDICATE THE AMOUNT OF TIME IT TAKES UNTIL YOU ARE AS LIMBER AS YOU WILL BE FOR THE DAY: 3H
AM STIFFNESS SCORE: 1, YES
PSYCHOLOGICAL DISTRESS SCORE: 2
MDHAQ FUNCTION SCORE: 1.4

## 2019-07-09 NOTE — PROGRESS NOTES
Chief Complaint   Patient presents with    Disease Management     osteoarthritis in many joints    Pain       Patient was referred by Lauren    History of presenting illness    68 year old female has had pain in the joints all over  She has pain in the bones and muscles    Hands,wrists hurt  She has a hard time opening jars  No particular joint bothers more  No swelling    Elbows ok    Right hip replaced  Right knee replaced  Left knee has moderate OA    Low back has degenerative arthritis  She has had surgeries  MRI LS spine :  Since the previous examination there are postoperative changes from a L4-5 discectomy and dorsal spinal fusion with pedicle screws extending in the L4 and L5 vertebral bodies.  There is no marrow edema throughout the lumbar spine to suggest acute fractures or neoplastic process.  There is a hemangioma in the L1 vertebral body, seen on the previous study and without significant change from the study.  The tip of the conus medullaris is at T12-L1 disc space, a normal location.  Paraspinal soft tissues are unremarkable.    L5-S1: No disc herniations or spinal stenosis or foraminal stenosis.    L4-5: There are postoperative changes from a discectomy with a spacer within the disc space.  The posterior margin of the spacer is at the level of the endplate of L4.  There is also a approximately 3 mm anterolisthesis of L4 on L5, significantly improved from the previous examination.  Mild extradural compression of the thecal sac ventrally greater on the right is noted.  There is also facet arthropathy.  There is suggestion of a right L4-5 laminectomy and partial facetectomy.  There is at least a moderate L4-5 central canal spinal stenosis.  There is moderate bilateral foraminal stenosis.    L3-4: Since the previous study there is now severe disc degeneration with disc space narrowing and a approximately 8 mm anterolisthesis of L4 on L5.  There is a circumferential annular disc bulge with extension  in the neural foramina resulting in at least moderate bilateral foraminal stenosis.  No definite signs for compression of the exiting L3 roots in the foramina.  There is degenerative hypertrophic facet arthropathy and hypertrophic changes of the ligamentum flava resulting in a severe L3-4 central canal spinal stenosis.    L2-3: Mild circumferential annular disc bulge.  No significant spinal stenosis or foraminal stenosis.  There is facet arthropathy.    L1-2: There is no significant disc herniations or spinal stenosis or foraminal stenosis.    T12-L1: There is disc space narrowing associated with marginal anterior spondylotic osteophytes.  There is development of Schmorl's node along the inferior endplate of T12 since the previous examination.  There is a circumferential annular disc bulge with compression of the thecal sac.  No significant central canal spinal stenosis.  Neural foramina are unremarkable.    There is degenerative disc disease also at the T11-T12 and T10-T11 disc spaces without cord compression or significant spinal stenosis.      Impression       1. Severe multifactorial central canal spinal stenosis at the L3-4 disc space since the previous study.  There is also a 8 mm anterolisthesis of L3 on L4 since the previous study.  2. Postoperative changes from L4-5 discectomy with improvement in the anterolisthesis.  There is however at least a moderate central canal spinal stenosis related to hypertrophic changes of the dorsal elements.  3. Spondylotic changes T12-L1 disc space as above.  4. Rest of the findings as above.     Right leg is numb    Left shoulder : pain : cannot lift it up  Surgery recommended : replacement suggested  B/l shoulder xrays :   The left shoulder again demonstrates significant narrowing of the glenohumeral joint space, with osteophytic spurring and subchondral sclerosis and cyst formation.  Findings at the left shoulder are more pronounced as compared to prior imaging.  There is  no dislocation.  On the right, there is osteophytic spurring at the glenohumeral joint with moderate degenerative change, less advanced as compared to the left.  There is no dislocation.  Acromioclavicular joint degenerative changes are present.  There is likely a small calcification in the soft tissues overlying the glenoid.  MRI Left shoulder in the past showed torn rotator cuff tendons    Pain management     Tramadol helps minimally  Ibuprofen helps   Lodine prn used to help  Cant tolerate trazodone   Gabapentin 400 mg a day helps some  Zoloft 25 mg for depression    Past history :  HTN,HLD,mood d/o, osteoarthritis of many joints,TIMMY    Family history : arthritis in parents    Social history : not a smoker        Review of Systems   Constitutional: Negative for activity change, appetite change, chills, diaphoresis, fatigue, fever and unexpected weight change.   HENT: Negative for congestion, dental problem, drooling, ear discharge, ear pain, facial swelling, hearing loss, mouth sores, nosebleeds, postnasal drip, rhinorrhea, sinus pressure, sinus pain, sneezing, sore throat, tinnitus, trouble swallowing and voice change.    Eyes: Negative for photophobia, pain, discharge, redness, itching and visual disturbance.   Respiratory: Negative for apnea, cough, choking, chest tightness, shortness of breath, wheezing and stridor.    Cardiovascular: Negative for chest pain, palpitations and leg swelling.   Gastrointestinal: Negative for abdominal distention, abdominal pain, anal bleeding, blood in stool, constipation, diarrhea, nausea, rectal pain and vomiting.   Endocrine: Negative for cold intolerance, heat intolerance, polydipsia, polyphagia and polyuria.   Genitourinary: Negative for decreased urine volume, difficulty urinating, dysuria, enuresis, flank pain, frequency, genital sores, hematuria and urgency.   Musculoskeletal: Positive for arthralgias. Negative for back pain, gait problem, joint swelling, myalgias, neck  pain and neck stiffness.   Skin: Negative for color change, pallor, rash and wound.   Allergic/Immunologic: Negative for environmental allergies, food allergies and immunocompromised state.   Neurological: Negative for dizziness, tremors, seizures, syncope, facial asymmetry, speech difficulty, weakness, light-headedness, numbness and headaches.   Hematological: Negative for adenopathy. Does not bruise/bleed easily.   Psychiatric/Behavioral: Negative for agitation, behavioral problems, confusion, decreased concentration, dysphoric mood, hallucinations, self-injury, sleep disturbance and suicidal ideas. The patient is not nervous/anxious and is not hyperactive.      Physical Exam     Tenderness:   LUE: glenohumeral  LLE: acetabulofemoral and tibiofemoral    TOWNSEND-28 tender joint count: 2  TOWNSEND-28 swollen joint count: 0    Positive SLRT left side    Hyperalgesia +    Physical Exam   Constitutional: She is oriented to person, place, and time and well-developed, well-nourished, and in no distress. No distress.   HENT:   Head: Normocephalic.   Mouth/Throat: Oropharynx is clear and moist.   Eyes: Conjunctivae are normal. Pupils are equal, round, and reactive to light. Right eye exhibits no discharge. Left eye exhibits no discharge. No scleral icterus.   Neck: Normal range of motion. No thyromegaly present.   Cardiovascular: Normal rate, regular rhythm, normal heart sounds and intact distal pulses.    Pulmonary/Chest: Effort normal and breath sounds normal. No stridor.   Abdominal: Soft. Bowel sounds are normal.   Lymphadenopathy:     She has no cervical adenopathy.   Neurological: She is alert and oriented to person, place, and time.   Skin: Skin is warm. No rash noted. She is not diaphoretic.     Psychiatric: Affect and judgment normal.   Musculoskeletal: Normal range of motion.         Assessment     68 year old female has had pain in the joints all over  She has pain in the bones and muscles    Hands,wrists hurt  She has a  hard time opening jars  No particular joint bothers more  No swelling    Elbows ok    Right hip replaced  Right knee replaced  Left knee has moderate OA    Low back has degenerative arthritis  She has had surgeries  MRI LS spine :  Since the previous examination there are postoperative changes from a L4-5 discectomy and dorsal spinal fusion with pedicle screws extending in the L4 and L5 vertebral bodies.  There is no marrow edema throughout the lumbar spine to suggest acute fractures or neoplastic process.  There is a hemangioma in the L1 vertebral body, seen on the previous study and without significant change from the study.  The tip of the conus medullaris is at T12-L1 disc space, a normal location.  Paraspinal soft tissues are unremarkable.    L5-S1: No disc herniations or spinal stenosis or foraminal stenosis.    L4-5: There are postoperative changes from a discectomy with a spacer within the disc space.  The posterior margin of the spacer is at the level of the endplate of L4.  There is also a approximately 3 mm anterolisthesis of L4 on L5, significantly improved from the previous examination.  Mild extradural compression of the thecal sac ventrally greater on the right is noted.  There is also facet arthropathy.  There is suggestion of a right L4-5 laminectomy and partial facetectomy.  There is at least a moderate L4-5 central canal spinal stenosis.  There is moderate bilateral foraminal stenosis.    L3-4: Since the previous study there is now severe disc degeneration with disc space narrowing and a approximately 8 mm anterolisthesis of L4 on L5.  There is a circumferential annular disc bulge with extension in the neural foramina resulting in at least moderate bilateral foraminal stenosis.  No definite signs for compression of the exiting L3 roots in the foramina.  There is degenerative hypertrophic facet arthropathy and hypertrophic changes of the ligamentum flava resulting in a severe L3-4 central canal  spinal stenosis.    L2-3: Mild circumferential annular disc bulge.  No significant spinal stenosis or foraminal stenosis.  There is facet arthropathy.    L1-2: There is no significant disc herniations or spinal stenosis or foraminal stenosis.    T12-L1: There is disc space narrowing associated with marginal anterior spondylotic osteophytes.  There is development of Schmorl's node along the inferior endplate of T12 since the previous examination.  There is a circumferential annular disc bulge with compression of the thecal sac.  No significant central canal spinal stenosis.  Neural foramina are unremarkable.    There is degenerative disc disease also at the T11-T12 and T10-T11 disc spaces without cord compression or significant spinal stenosis.      Impression       1. Severe multifactorial central canal spinal stenosis at the L3-4 disc space since the previous study.  There is also a 8 mm anterolisthesis of L3 on L4 since the previous study.  2. Postoperative changes from L4-5 discectomy with improvement in the anterolisthesis.  There is however at least a moderate central canal spinal stenosis related to hypertrophic changes of the dorsal elements.  3. Spondylotic changes T12-L1 disc space as above.  4. Rest of the findings as above.     Right leg is numb    Left shoulder : pain : cannot lift it up  Surgery recommended : replacement suggested  B/l shoulder xrays :   The left shoulder again demonstrates significant narrowing of the glenohumeral joint space, with osteophytic spurring and subchondral sclerosis and cyst formation.  Findings at the left shoulder are more pronounced as compared to prior imaging.  There is no dislocation.  On the right, there is osteophytic spurring at the glenohumeral joint with moderate degenerative change, less advanced as compared to the left.  There is no dislocation.  Acromioclavicular joint degenerative changes are present.  There is likely a small calcification in the soft tissues  overlying the glenoid.  MRI Left shoulder in the past showed torn rotator cuff tendons    Pain management     Tramadol helps minimally  Ibuprofen helps   Lodine prn used to help  Cant tolerate trazodone   Gabapentin 400 mg a day helps some  Zoloft 25 mg for depression    Past history :  HTN,HLD,mood d/o, osteoarthritis of many joints,TIMMY    Her symptoms are from osteoarthritis of : left shoulder,left knee,low back  Suspect a component of central sensitisation    1. Spinal stenosis of lumbar region with neurogenic claudication    2. Acute pain of right knee    3. Primary osteoarthritis of left knee    4. Primary osteoarthritis of left shoulder    5. Class 3 severe obesity with body mass index (BMI) of 40.0 to 44.9 in adult, unspecified obesity type, unspecified whether serious comorbidity present          New problem     Plan    Offered    Tramadol 1 per day  Gabapentin 400 mg bed time : see how this regimen works   Prn NSAIDs only     Pain management : gives her injections to the shoulder,LS spine    Lose weight    Physical therapy offered     Offered medical fitness referral.She defers it to a later date    Suggested that she can follow henceforth with pain management    Monique was seen today for disease management and pain.    Diagnoses and all orders for this visit:    Spinal stenosis of lumbar region with neurogenic claudication    Acute pain of right knee  -     traMADol (ULTRAM) 50 mg tablet; Take 1 tablet (50 mg total) by mouth every 12 (twelve) hours as needed for Pain.    Primary osteoarthritis of left knee    Primary osteoarthritis of left shoulder    Class 3 severe obesity with body mass index (BMI) of 40.0 to 44.9 in adult, unspecified obesity type, unspecified whether serious comorbidity present

## 2019-07-19 ENCOUNTER — OFFICE VISIT (OUTPATIENT)
Dept: OTOLARYNGOLOGY | Facility: CLINIC | Age: 69
End: 2019-07-19
Payer: MEDICARE

## 2019-07-19 ENCOUNTER — CLINICAL SUPPORT (OUTPATIENT)
Dept: OTOLARYNGOLOGY | Facility: CLINIC | Age: 69
End: 2019-07-19
Payer: MEDICARE

## 2019-07-19 VITALS
HEART RATE: 74 BPM | HEIGHT: 62 IN | BODY MASS INDEX: 42.01 KG/M2 | TEMPERATURE: 97 F | SYSTOLIC BLOOD PRESSURE: 121 MMHG | WEIGHT: 228.31 LBS | DIASTOLIC BLOOD PRESSURE: 70 MMHG

## 2019-07-19 DIAGNOSIS — H93.13 TINNITUS AURIUM, BILATERAL: ICD-10-CM

## 2019-07-19 DIAGNOSIS — H90.3 SENSORINEURAL HEARING LOSS (SNHL), BILATERAL: ICD-10-CM

## 2019-07-19 DIAGNOSIS — R42 DIZZINESS: ICD-10-CM

## 2019-07-19 DIAGNOSIS — R42 VERTIGO: Primary | ICD-10-CM

## 2019-07-19 DIAGNOSIS — H90.3 SENSORINEURAL HEARING LOSS (SNHL), BILATERAL: Primary | ICD-10-CM

## 2019-07-19 PROCEDURE — 3074F SYST BP LT 130 MM HG: CPT | Mod: CPTII,S$GLB,, | Performed by: OTOLARYNGOLOGY

## 2019-07-19 PROCEDURE — 92567 TYMPANOMETRY: CPT | Mod: S$GLB,,, | Performed by: AUDIOLOGIST-HEARING AID FITTER

## 2019-07-19 PROCEDURE — 3078F PR MOST RECENT DIASTOLIC BLOOD PRESSURE < 80 MM HG: ICD-10-PCS | Mod: CPTII,S$GLB,, | Performed by: OTOLARYNGOLOGY

## 2019-07-19 PROCEDURE — 1101F PR PT FALLS ASSESS DOC 0-1 FALLS W/OUT INJ PAST YR: ICD-10-PCS | Mod: CPTII,S$GLB,, | Performed by: OTOLARYNGOLOGY

## 2019-07-19 PROCEDURE — 3074F PR MOST RECENT SYSTOLIC BLOOD PRESSURE < 130 MM HG: ICD-10-PCS | Mod: CPTII,S$GLB,, | Performed by: OTOLARYNGOLOGY

## 2019-07-19 PROCEDURE — 99203 PR OFFICE/OUTPT VISIT, NEW, LEVL III, 30-44 MIN: ICD-10-PCS | Mod: S$GLB,,, | Performed by: OTOLARYNGOLOGY

## 2019-07-19 PROCEDURE — 3078F DIAST BP <80 MM HG: CPT | Mod: CPTII,S$GLB,, | Performed by: OTOLARYNGOLOGY

## 2019-07-19 PROCEDURE — 92567 PR TYMPA2METRY: ICD-10-PCS | Mod: S$GLB,,, | Performed by: AUDIOLOGIST-HEARING AID FITTER

## 2019-07-19 PROCEDURE — 99999 PR PBB SHADOW E&M-EST. PATIENT-LVL V: ICD-10-PCS | Mod: PBBFAC,,, | Performed by: OTOLARYNGOLOGY

## 2019-07-19 PROCEDURE — 92557 PR COMPREHENSIVE HEARING TEST: ICD-10-PCS | Mod: S$GLB,,, | Performed by: AUDIOLOGIST-HEARING AID FITTER

## 2019-07-19 PROCEDURE — 99203 OFFICE O/P NEW LOW 30 MIN: CPT | Mod: S$GLB,,, | Performed by: OTOLARYNGOLOGY

## 2019-07-19 PROCEDURE — 99999 PR PBB SHADOW E&M-EST. PATIENT-LVL II: CPT | Mod: PBBFAC,,, | Performed by: AUDIOLOGIST-HEARING AID FITTER

## 2019-07-19 PROCEDURE — 1101F PT FALLS ASSESS-DOCD LE1/YR: CPT | Mod: CPTII,S$GLB,, | Performed by: OTOLARYNGOLOGY

## 2019-07-19 PROCEDURE — 99999 PR PBB SHADOW E&M-EST. PATIENT-LVL V: CPT | Mod: PBBFAC,,, | Performed by: OTOLARYNGOLOGY

## 2019-07-19 PROCEDURE — 99999 PR PBB SHADOW E&M-EST. PATIENT-LVL II: ICD-10-PCS | Mod: PBBFAC,,, | Performed by: AUDIOLOGIST-HEARING AID FITTER

## 2019-07-19 PROCEDURE — 92557 COMPREHENSIVE HEARING TEST: CPT | Mod: S$GLB,,, | Performed by: AUDIOLOGIST-HEARING AID FITTER

## 2019-07-19 NOTE — PROGRESS NOTES
"  Chief Complaint   Patient presents with    Dizziness     Referred by    .     HPI:     Mrs. Trujillo is a 68 year old female who presents for evaluation of dizziness and "feeling of equilibrium being off". The symptoms started 2 months and have gradually worsened. The attacks are described as a lightheadedness sensation and feeling of falling and occur frequently and last a few seconds. Positions that worsen symptoms: bending over, standing up and turning head.  Associated ear symptoms: tinnitus. Associated CNS symptoms: none. Recent infections: none. Head trauma: denied. Drug ingestion prior to onset: none. Noise exposure: no occupational exposure.Previous workup: none. Previous treatment includes: no medications to date. She has not noted any alleviating or exacerbating factors. She does have lumbar disc disease and lumbar radiculopathy and walks with a cane. She denies any falls.       Past Medical History:   Diagnosis Date    Allergy     Arthritis     Chronic lower back pain     Depression     Fever blister     GERD (gastroesophageal reflux disease)     Hemorrhoids, internal 11/5/2014    Hyperlipidemia     Hypertension     Joint pain     Morbid obesity with BMI of 40.0-44.9, adult 2/24/2015    Multiple gastric ulcers 12/14/2017    TIMMY (obstructive sleep apnea)     does not use CPAP    Skin cancer      Social History     Socioeconomic History    Marital status:      Spouse name: Not on file    Number of children: Not on file    Years of education: 12    Highest education level: Not on file   Occupational History     Employer: [employer]   Social Needs    Financial resource strain: Not on file    Food insecurity:     Worry: Not on file     Inability: Not on file    Transportation needs:     Medical: Not on file     Non-medical: Not on file   Tobacco Use    Smoking status: Never Smoker    Smokeless tobacco: Never Used   Substance and Sexual Activity    Alcohol use: No     " Alcohol/week: 0.0 oz    Drug use: No    Sexual activity: Never   Lifestyle    Physical activity:     Days per week: Not on file     Minutes per session: Not on file    Stress: Not on file   Relationships    Social connections:     Talks on phone: Not on file     Gets together: Not on file     Attends Denominational service: Not on file     Active member of club or organization: Not on file     Attends meetings of clubs or organizations: Not on file     Relationship status: Not on file   Other Topics Concern    Are you pregnant or think you may be? Not Asked    Breast-feeding Not Asked   Social History Narrative    Not on file     Past Surgical History:   Procedure Laterality Date    ANTERIOR CERVICAL CORPECTOMY W/ FUSION  1988    ARTHROPLASTY-HIP Right 4/28/2016    [facility]    ARTHROPLASTY-KNEE Right 3/14/2018    [facility]bs [facility] at [facility]    COLONOSCOPY N/A 11/2/2016    [facility]itt [facility] ENDO    COLONOSCOPY N/A 11/5/2014    [facility] Pastora [facility]vera [facility] at [facility]    ESOPHAGOGASTRODUODENOSCOPY (EGD) N/A 12/14/2017    [facility]formed by El [facility]itt [facility] END[facility]    ESOPHAGOGASTRODUODENOSCOPY (EGD) N/A 10/20/2017    [facility] El [facility] END[facility]    Injection-steroid-epidural-caudal N/A 1/10/2019    Performed by Lydia [facility] Jr., [facility] PAIN MGT    Injection-steroid-epidural-lumbar-L2-3 (LEFT > RIGHT) N/A 11/8/2018    Performed [facility] L[facility] Jr[facility] at [facility]    KNEE ARTHROSCOPY W/ MENISCAL REPAIR Right 2012    LUMBAR LAMINECTOMY  06/19/2015    SKIN CANCER EXCISION      forehead does not recall date    TOTAL HIP ARTHROPLASTY Right 04/28/2016         TUBAL LIGATION  1980     Family History   Problem Relation Age of Onset    Arthritis Mother     Cancer Mother         breast ca    Breast cancer Mother     Kidney disease Father     Alzheimer's disease Maternal  Grandfather     Cancer Paternal Grandmother         colon ca    Colon cancer Paternal Grandmother            Review of Systems  General: negative for chills, fever or weight loss  Psychological: negative for mood changes or depression  Ophthalmic: negative for blurry vision, photophobia or eye pain  ENT: see HPI  Respiratory: no cough, shortness of breath, or wheezing  Cardiovascular: no chest pain or dyspnea on exertion  Gastrointestinal: no abdominal pain, change in bowel habits, or black/ bloody stools  Musculoskeletal: negative for gait disturbance or muscular weakness  Neurological: no syncope or seizures; no ataxia  Dermatological: negative for puritis,  rash and jaundice  Hematologic/lymphatic: no easy bruising, no new lumps or bumps      Physical Exam:    Vitals:    07/19/19 0950   BP: 121/70   Pulse: 74   Temp: 96.8 °F (36 °C)       Constitutional: Well appearing / communicating without difficutly.  NAD.  Eyes: EOM I Bilaterally  Head/Face: Normocephalic.  Negative paranasal sinus pressure/tenderness.  Salivary glands WNL.  House Brackmann I Bilaterally.    Right Ear: Auricle normal appearance. External Auditory Canal within normal limits no lesions or masses,TM w/o masses/lesions/perforations. TM mobility noted.   Left Ear: Auricle normal appearance. External Auditory Canal within normal limits no lesions or masses,TM w/o masses/lesions/perforations. TM mobility noted.  Vestibular exam: negativehead thrust; negative Fakuda step test; negative Romberg; negative Right Dixx- Hallpike; negative left Dixx- Hallpike  Nose: No gross nasal septal deviation. Inferior Turbinates 3+ bilaterally. No septal perforation. No masses/lesions. External nasal skin appears normal without masses/lesions.  Oral Cavity: Gingiva/lips within normal limits.  Dentition/gingiva healthy appearing. Mucus membranes moist. Floor of mouth soft, no masses palpated. Oral Tongue mobile. Hard Palate appears normal.    Oropharynx: Base of  tongue appears normal. No masses/lesions noted. Tonsillar fossa/pharyngeal wall without lesions. Posterior oropharynx WNL.  Soft palate without masses. Midline uvula.   Neck/Lymphatic: No LAD I-VI bilaterally.  No thyromegaly.  No masses noted on exam.    Mirror laryngoscopy/nasopharyngoscopy: Active gag reflex.  Unable to perform.    Neuro/Psychiatric: AOx3.  Normal mood and affect.   Cardiovascular: Normal carotid pulses bilaterally, no increasing jugular venous distention noted at cervical region bilaterally.    Respiratory: Normal respiratory effort, no stridor, no retractions noted.        Audiogram reviewed personally by myself and in detail with the patient today.               Assessment:    ICD-10-CM ICD-9-CM    1. Vertigo R42 780.4 Basic vestibular evaluation   2. Sensorineural hearing loss (SNHL), bilateral H90.3 389.18    3. Tinnitus aurium, bilateral H93.13 388.31      The primary encounter diagnosis was Vertigo. Diagnoses of Sensorineural hearing loss (SNHL), bilateral and Tinnitus aurium, bilateral were also pertinent to this visit.      Plan:  Orders Placed This Encounter   Procedures    Basic vestibular evaluation         I had a long discussion with the patient regarding their symptoms.  Dizziness, vertigo and disequilibrium are common symptoms reported by adults during visits to their doctors. They are all symptoms that can result from a peripheral vestibular disorder (a dysfunction of the balance organs of the inner ear) or central vestibular disorder (a dysfunction of one or more parts of the central nervous system that help process balance and spatial information).  There are also non-vestibular causes of dizziness, and dizziness can be linked to a wide array of problems such as blood-flow irregularities from cardiovascular problems and blood pressure fluctuations.  I would recommend a VNG for further diagnostic testing, and will contact the patient with further recommendations when that is  complete.     We reviewed the patient's recent audiogram and hearing loss in detail.    We discussed the use hearing protection when exposed to loud noise, including lawn equipment.   We also discussed that tinnitus is most often caused by a hearing loss, and that as the hair cells are damaged, either genetic or as a result of loud noise exposure, they then cause tinnitus.  Some patients find that restricting the salt or caffeine in their diet helps.  Tinnitus tends to be louder in times of stress and fatigue, and may decrease with time.  Sound machines may also be an effective masking technique if needed at night. Recommend annual audiogram.     Thank you kindly for allowing me to participate in the patient's care.     Evelyn Flores MD

## 2019-07-19 NOTE — PATIENT INSTRUCTIONS
[facility] [address], [address]  [place] [address]  [phone]    Your physician may determine a need for one or more tests of your balance system at the time of your visit. One test is called a videonystagmogram (VNG), the other is called Posturography.  We ask that you prepare in the event that either of these tests are ordered.  The tests are simple and painless and take about 90 minutes (combined).  Please dress comfortably.  Certain medications will affect the results of theses tests.  In order to prevent this from happening, it will be necessary for you to follow these instructions:    1. DO NOT  take any of the following medications for at least 24 hours prior to the test:  a. Sleeping pills (Seconal, Donnatol, Dalmane, etc.)  b. Tranquilizers (Librium, Valium, Equanil, etc.)  c. Anti-histamines or over-the-counter cold medications  d. Anti-dizzy medications (Antivert, Dramamine, etc.)  e. Muscle relaxants    Seizure medications (Dilantin, Phenobartibal, etc.) interfere with the accuracy of the test.  They should be stopped BUT ONLY AFTER CLEARANCE FROM THE PHYSICIAN WHO PRESCRIBED THESE MEDICATIONS.    Heart or blood pressure medications ARE allowed.    2. DO NOT  eat or drink anything other than small sips of water for 4 hours prior to the VNG.    3. DO NOT drink alcoholic beverages for 24 hours prior to the test.      4. DO NOT wear makeup, especially mascara or eye liner for the VNG test.    5. Ladies who are scheduled for Posturography should wear slacks rather than skirts or dresses.    6. If you are diabetic, please inform the  when booking your appointment.  Please schedule a time that would make fasting for 4 hours most convenient to your medication routine.  If you have any concerns, check with your primary care physician.    Testing is scheduled at [facility] at [address] [place] [address]. After the physician orders your balance testing, a  representative from the main campus will call you to schedule your appointment. If you need to cancel or re-schedule, please call them at [phone].    If you have any questions, please call [phone] to reach us at [facility] in [place].

## 2019-07-19 NOTE — PROGRESS NOTES
Chaparro Brian, CCC-A  [facility] [address]  [address]  [place] [address]      Patient: Monique Trujillo   MRN: [id number]  : 1950  BAUMANN: 2019       AUDIOLOGICAL EVALUATION    CASE HISTORY:    Monique Trujillo, 68 y.o., was seen on the above date for an audiological evaluation. Patient reported suspected hearing loss and tinnitus. She also reported constant dizziness. No further history significant to hearing loss was reported.    TEST RESULTS:    Otoscopy was unremarkable for both ears.   Imittance testing revealed normal middle ear compliance (Type A) in her left ear.  Imittance testing revealed reduced compliance (Type As) in her right ear.  Speech reception thresholds were obtained at 25 dB HL and 20 dB HL, in the right and left ears, respectively, which was consistent with pure tone results.   Word recognitions scores of 96% were obtained in both ears using a presentation level of 65 dB HL in the right ear and 60 dB HL in the left ear.    IMPRESSION:   Audiological testing indicated that Monique Trujillo has a mild sensorineural hearing loss in both ears.    RECOMMENDATIONS:   It is recommended that she continue to receive audiological monitoring annually.    If you should have any questions or concerns regarding the above information, please do not hesitate to contact me at [phone].      _______________________________  Zechariah Brian, CCC-A  Clinical Audiologist    enclosure: audiogram

## 2019-07-19 NOTE — LETTER
July 19, 2019      Katelynn Laura MD  [address]  [place] [address]           [facility]hin[facility]  [address]  [place] [address]  Phone: [phone]  Fax: [phone]          Patient: Monique Trujillo   MR Number: [id number]   YOB: 1950   Date of Visit: 7/19/2019       Dear Dr. Katelynn Laura:    Thank you for referring Monique Trujillo to me for evaluation. Attached you will find relevant portions of my assessment and plan of care.    If you have questions, please do not hesitate to call me. I look forward to following Monique Trujillo along with you.    Sincerely,    Evelyn Flores MD    Enclosure  CC:  No Recipients    If you would like to receive this communication electronically, please contact [email or web address] or [phone] to request more information on [employer] Link access.    For providers and/or their staff who would like to refer a patient to [facility], please contact us through our one-stop-shop provider referral line, [facility], at [phone].    If you feel you have received this communication in error or would no longer like to receive these types of communications, please e-mail [email or web address]

## 2019-07-19 NOTE — PATIENT INSTRUCTIONS
WHAT IS TINNITUS?  The perception of sound heard in one or both sides of the head. Some refer to it as a ringing, noise, buzzing, roaring, clicking, wooshing, crickets, heartbeat, music, or other noises. There are varying levels of severity. The tinnitus may be constant or periodic. Common complaints include difficulty sleeping, struggling to understand other's speech, depression, and problems focusing.  Tinnitus is a symptom, not a disease. It affects 10-15% of adults in the [place].    WHAT CAUSES TINNITUS?  There are 2 types of tinnitus: Primary and Secondary. Primary tinnitus has an unknown cause. There may be a relationship to primary tinnitus and hearing loss. Secondary tinnitus has a cause which could be impacted cerumen (wax), or diseases or pressure behind the eardrum. It could also be related to Meniere's disease.   There are several likely sources, all of which are known to trigger or worsen tinnitus such as noise exposure, head/neck trauma, ototoxic drugs (www.drugThe Original SoupMan.com), tumors, blood vessel problems, cardiovascular disease or jaw misalignment.     WHAT CAN YOU DO?  You should seek medical care if you suspect you have tinnitus and tell your physician if your symptoms are affecting your daily life. Tinnitus may cause anxiety, depression, insomnia, negative emotions, and withdrawal. It's okay to seek help as your symptoms may be managed, and common.    HOW IS TINNITUS DIAGNOSED?  A doctor can diagnose tinnitus after a physical examination and interview. The examination may rule out conditions causing the tinnitus such as wax impaction or fluid behind the eardrum. Tinnitus may also be related to hearing loss, especially hearing loss from frequent noise exposure. A hearing test may be performed if you are experiencing tinnitus.    TREATMENT FOR TINNITUS?  Treatment may improve on its own but if it doesn't there are several ways to manage your symptoms although there is no cure. Possible treatment  options include amplification (hearing aids), sound therapy (maskers,white noise,etc.), TMJ treatment, cognitive behavioral therapy, relaxation exercises, and managing your medications.  There is not enough evidence to suggest that over the counter products help patients with tinnitus. Acupuncture can neither be recommended or discouraged due to lack of evidence as well.    Source: American Academy of Otolaryngology-Head And Neck Surgery    ORGANIZATIONS AND LINKS FOR TINNITUS AND HEARING LOSS    American Academy of Audiology      www.audiology.org  American Tinnitus Associate        www.feliberto.org  American Academy of Otolaryngology, Head & Neck Surgery www.entnet.org  American Auditory Society     www.amauditorysoc.org  Better Hearing Baggs      www.betterhearing.org  EarHelp        www.earhelp.co.uk/  Hearing Education and Awareness for Rockers (HEAR)  www.hearnet.National Transcript Center  Hearing Loss Association of [place]    www.hearingloss.com   Hear-It        www.hear-it.org  Healthy Hearing       www.healthyenEvolvring.National Transcript Center   Sight and Hearing Association     www.sightandhearing.org

## 2019-07-27 DIAGNOSIS — F41.9 ANXIETY: ICD-10-CM

## 2019-07-27 DIAGNOSIS — G47.00 INSOMNIA, UNSPECIFIED TYPE: ICD-10-CM

## 2019-07-27 DIAGNOSIS — F32.A DEPRESSION, UNSPECIFIED DEPRESSION TYPE: ICD-10-CM

## 2019-07-29 RX ORDER — SERTRALINE HYDROCHLORIDE 25 MG/1
TABLET, FILM COATED ORAL
Qty: 30 TABLET | Refills: 0 | Status: SHIPPED | OUTPATIENT
Start: 2019-07-29 | End: 2019-09-09 | Stop reason: SDUPTHER

## 2019-07-29 RX ORDER — TRAZODONE HYDROCHLORIDE 50 MG/1
50 TABLET ORAL NIGHTLY PRN
Qty: 30 TABLET | Refills: 0 | Status: SHIPPED | OUTPATIENT
Start: 2019-07-29 | End: 2019-09-23

## 2019-07-31 ENCOUNTER — TELEPHONE (OUTPATIENT)
Dept: PAIN MEDICINE | Facility: CLINIC | Age: 69
End: 2019-07-31

## 2019-08-01 ENCOUNTER — HOSPITAL ENCOUNTER (OUTPATIENT)
Facility: HOSPITAL | Age: 69
Discharge: HOME | End: 2019-08-01
Attending: PAIN MEDICINE | Admitting: PAIN MEDICINE
Payer: MEDICARE

## 2019-08-01 VITALS
RESPIRATION RATE: 16 BRPM | OXYGEN SATURATION: 97 % | HEIGHT: 61 IN | WEIGHT: 220 LBS | DIASTOLIC BLOOD PRESSURE: 65 MMHG | HEART RATE: 71 BPM | TEMPERATURE: 98 F | SYSTOLIC BLOOD PRESSURE: 129 MMHG | BODY MASS INDEX: 41.54 KG/M2

## 2019-08-01 DIAGNOSIS — G89.29 CHRONIC PAIN: ICD-10-CM

## 2019-08-01 DIAGNOSIS — M25.511 CHRONIC PAIN OF BOTH SHOULDERS: Primary | ICD-10-CM

## 2019-08-01 DIAGNOSIS — G89.29 CHRONIC PAIN OF BOTH SHOULDERS: Primary | ICD-10-CM

## 2019-08-01 DIAGNOSIS — M25.512 CHRONIC PAIN OF BOTH SHOULDERS: Primary | ICD-10-CM

## 2019-08-01 PROCEDURE — 64417 NJX AA&/STRD AX NERVE IMG: CPT | Mod: 50,59,, | Performed by: PAIN MEDICINE

## 2019-08-01 PROCEDURE — 64418 NJX AA&/STRD SPRSCAP NRV: CPT | Mod: 50

## 2019-08-01 PROCEDURE — 25000003 PHARM REV CODE 250: Performed by: PAIN MEDICINE

## 2019-08-01 PROCEDURE — 64418 NJX AA&/STRD SPRSCAP NRV: CPT | Mod: 50,,, | Performed by: PAIN MEDICINE

## 2019-08-01 PROCEDURE — 64417 NJX AA&/STRD AX NERVE IMG: CPT | Mod: 50 | Performed by: PAIN MEDICINE

## 2019-08-01 PROCEDURE — 64418 PR NERVE BLOCK INJ, ANES/STEROID, SUPRASCAPULAR: ICD-10-PCS | Mod: 50,,, | Performed by: PAIN MEDICINE

## 2019-08-01 PROCEDURE — 64417 PR NERVE BLOCK INJ, ANES/STEROID, AXILLARY, INCL IMAG GUIDANCE: ICD-10-PCS | Mod: 50,59,, | Performed by: PAIN MEDICINE

## 2019-08-01 PROCEDURE — 25500020 PHARM REV CODE 255: Performed by: PAIN MEDICINE

## 2019-08-01 PROCEDURE — 64450 NJX AA&/STRD OTHER PN/BRANCH: CPT | Mod: 50 | Performed by: PAIN MEDICINE

## 2019-08-01 RX ORDER — LIDOCAINE HYDROCHLORIDE 10 MG/ML
INJECTION, SOLUTION EPIDURAL; INFILTRATION; INTRACAUDAL; PERINEURAL
Status: DISCONTINUED | OUTPATIENT
Start: 2019-08-01 | End: 2019-08-01 | Stop reason: HOSPADM

## 2019-08-01 RX ORDER — INDOMETHACIN 25 MG/1
CAPSULE ORAL
Status: DISCONTINUED | OUTPATIENT
Start: 2019-08-01 | End: 2019-08-01 | Stop reason: HOSPADM

## 2019-08-01 RX ORDER — BUPIVACAINE HYDROCHLORIDE 2.5 MG/ML
INJECTION, SOLUTION EPIDURAL; INFILTRATION; INTRACAUDAL
Status: DISCONTINUED | OUTPATIENT
Start: 2019-08-01 | End: 2019-08-01 | Stop reason: HOSPADM

## 2019-08-01 RX ORDER — ALPRAZOLAM 0.25 MG/1
1 TABLET, ORALLY DISINTEGRATING ORAL ONCE AS NEEDED
Status: COMPLETED | OUTPATIENT
Start: 2019-08-01 | End: 2019-08-01

## 2019-08-01 RX ADMIN — ALPRAZOLAM 1 MG: 0.5 TABLET, ORALLY DISINTEGRATING ORAL at 10:08

## 2019-08-01 NOTE — OP NOTE
"Procedure Note    Pre-operative Diagnosis: Chronic Shoulder Pain  Post-operative Diagnosis: Chronic Shoulder Pain  Procedure Date: 08/01/2019  Procedure:  (1) Bilateral Suprascapular Nerve Articular Branch Block    (2) Bilateral Axillary Nerve Articular Branch Block  (3) Procedural Fluoroscopy    Indications: This is a diagnostic and therapeutic intervention to alleviate pain and suffering, and reduce functional impairment associated with chronic shoulder pain.      The patients history and physical exam were reviewed. The risks, benefits and alternatives to the procedure were discussed, and all questions were answered to the patients satisfaction. The patient agreed to proceed, and written informed consent was verified.    Procedure in Detail: The patient was brought into the procedure room and placed in the prone position on the fluoroscopy table. The area of the Left shoulder was prepped with Chloraprep and draped in a sterile manner. In a PA fluoroscopic view the target locations were identified.  The intensifier was adjusted with approximately 15 degrees of ipsilateral oblique rotation and 25 degrees of caudal tilt to optimize the view.  The target points were identified and marked on the skin.  Skin and subcutaneous tissues were anesthetized with 1% lidocaine using a 25G, 1.5" needle.  Next, a 22G 3.5" spinal needle was introduced through the anesthetized skin directed to contact os inferior to the spinoglenoid notch on the most lateral border of the glenoid fossa rim.  Iodinated contrast was administered demonstrating local accumulation over the target area.  Anesthesia was produced by injecting bupivacaine 0.5% 1 mL. The needle was repositioned.  Additional contrast was administered demonstrating local accumulation followed by administration of a 2nd dose of bupivacaine 0.5%.    Next, the junction of the greater tubercle and the surgical neck of the humerus was identified and marked on the skin.  The skin " "and subcutaneous tissues were infiltrated with lidocaine 1% using a 25 G, 1.5" needle.  A 22G 3.5"  spinal needle was introduced through the anesthetized skin and directed to contact os at the target location.  Iodinated contrast was administered demonstrated local accumulation of the target area.  Anesthesia was produced by injecting bupivacaine 0.5% 1 mL.    The procedure was repeated on the right with similar fluoroscopic findings.    Following the injection, all needles were removed and that he has of Band-Aid was applied.      Disposition: The patient tolerated the procedure well, and there were no apparent complications. Vital signs remained stable throughout the procedure. The patient was taken to the recovery area where written discharge instructions for the procedure were given.     Follow-up:  The patient was directed to follow up with our clinic as scheduled and to complete the pain diary.      Lydia Velásquez Jr, MD  Interventional Pain Medicine / Anesthesiology      "

## 2019-08-01 NOTE — H&P
[facility]  History & Physical - Short Stay  Pain Management           SUBJECTIVE:     Procedure: Procedure(s) (LRB):  BILATERAL SHOULDER ARTICULAR BRANCH BLOCK (Bilateral)    Chief Complaint/Reason for Admission:  Chronic pain of both shoulders [M25.511, G89.29, M25.512]  Primary osteoarthritis of both shoulders [M19.011, M19.012]    Facility-Administered Medications Prior to Admission   Medication    lidocaine (PF) 20 mg/mL (2 %) injection 2 mL     PTA Medications   Medication Sig    amLODIPine (NORVASC) 10 MG tablet TAKE 1 TABLET BY MOUTH EVERY DAY    ascorbic acid (VITAMIN C) 1000 MG tablet Take 1,000 mg by mouth once daily.    atenolol (TENORMIN) 25 MG tablet TAKE 1 TABLET BY MOUTH EVERY DAY    calcium-vitamin D3 (CALCIUM 500 + D) 500 mg(1,250mg) -200 unit per tablet Take 1 tablet by mouth 2 (two) times daily with meals.    co-enzyme Q-10 30 mg capsule Take 30 mg by mouth once daily.     etodolac (LODINE) 200 MG Cap Take 2 capsules (400 mg total) by mouth 2 (two) times daily.    fenofibric acid (FIBRICOR) 135 mg CpDR Take 1 capsule (135 mg total) by mouth once daily.    fish oil-omega-3 fatty acids 300-1,000 mg capsule Take 2 g by mouth once daily.    gabapentin (NEURONTIN) 800 MG tablet Take 1 tablet (800 mg total) by mouth every evening. (Patient taking differently: Take 800 mg by mouth every evening. )    GLUCOSA KHAN 2KCL/CHONDROITIN KHAN (GLUCOSAMINE SULF-CHONDROITIN ORAL) Take 1 tablet by mouth once daily.    multivitamin capsule Take 1 capsule by mouth once daily.    oxybutynin (DITROPAN XL) 15 MG TR24 TAKE 1 TABLET (15 MG TOTAL) BY MOUTH ONCE DAILY.    pantoprazole (PROTONIX) 20 MG tablet TAKE 1 TABLET BY MOUTH EVERY DAY    pravastatin (PRAVACHOL) 40 MG tablet TAKE 1 TABLET BY MOUTH EVERY DAY    sertraline (ZOLOFT) 25 MG tablet TAKE 1 TABLET BY MOUTH EVERY DAY    traMADol (ULTRAM) 50 mg tablet Take 1 tablet (50 mg total) by mouth every 12 (twelve) hours as needed for  Pain.    traZODone (DESYREL) 50 MG tablet TAKE 1 TABLET (50 MG TOTAL) BY MOUTH NIGHTLY AS NEEDED FOR INSOMNIA.    vitamin E 400 UNIT capsule Take 400 Units by mouth once daily.    econazole nitrate 1 % cream Use bid for rash    OPW TEST CLAIM - DO NOT FILL Take by mouth. OPW test claim. Do not fill.       Review of patient's allergies indicates:  No Known Allergies    Past Medical History:   Diagnosis Date    Allergy     Arthritis     Chronic lower back pain     Depression     Fever blister     GERD (gastroesophageal reflux disease)     Hemorrhoids, internal 11/5/2014    Hyperlipidemia     Hypertension     Joint pain     Morbid obesity with BMI of 40.0-44.9, adult 2/24/2015    Multiple gastric ulcers 12/14/2017    TIMMY (obstructive sleep apnea)     does not use CPAP    Skin cancer      Past Surgical History:   Procedure Laterality Date    ANTERIOR CERVICAL CORPECTOMY W/ FUSION  1988    ARTHROPLASTY-HIP Right 4/28/2016    [facility]    ARTHROPLASTY-KNEE Right 3/14/2018    Performed by Yunier Combs [facility]    COLONOSCOPY N/A 11/2/2016    [facility]iji [facility]itt [facility] ENDO    COLONOSCOPY N/A 11/5/2014    [facility] Past[facility] Ol[facility]a [facility] at [facility] ENDO    ESOPHAGOGASTRODUODENOSCOPY (EGD) N/A 12/14/2017    [facility]formed by El [facility]itt [facility] ENDO    ESOPHAGOGASTRODUODENOSCOPY (EGD) N/A 10/20/2017    [facility] El [facility] ENDO    Injection-steroid-epidural-caudal N/A 1/10/2019    Per[facility] L[facility] Jr., [facility] PAIN MGT    Injection-steroid-epidural-lumbar-L2-3 (LEFT > RIGHT) N/A 11/8/2018    [facility] PAIN MGT    KNEE ARTHROSCOPY W/ MENISCAL REPAIR Right 2012    LUMBAR LAMINECTOMY  06/19/2015    SKIN CANCER EXCISION      forehead does not recall date    TOTAL HIP ARTHROPLASTY Right 04/28/2016         TUBAL LIGATION  1980     Family History    Problem Relation Age of Onset    Arthritis Mother     Cancer Mother         breast ca    Breast cancer Mother     Kidney disease Father     Alzheimer's disease Maternal Grandfather     Cancer Paternal Grandmother         colon ca    Colon cancer Paternal Grandmother      Social History     Tobacco Use    Smoking status: Never Smoker    Smokeless tobacco: Never Used   Substance Use Topics    Alcohol use: No     Alcohol/week: 0.0 oz    Drug use: No        Review of Systems:  Review of Systems   Constitutional: Negative for chills and fever.   HENT: Negative for nosebleeds.    Eyes: Negative for blurred vision.   Respiratory: Negative for hemoptysis.    Cardiovascular: Negative for chest pain and palpitations.   Gastrointestinal: Negative for heartburn and vomiting.   Genitourinary: Negative for hematuria.   Musculoskeletal: Positive for joint pain. Negative for myalgias.   Skin: Negative for rash.   Neurological: Negative for seizures and loss of consciousness.   Endo/Heme/Allergies: Does not bruise/bleed easily.   Psychiatric/Behavioral: Negative for hallucinations.       OBJECTIVE:     Vital Signs (Most Recent):  Temp: 98 °F (36.7 °C) (08/01/19 0930)  Pulse: 72 (08/01/19 0930)  Resp: 17 (08/01/19 0930)  BP: (!) 150/72 (08/01/19 0930)  SpO2: (!) 94 % (08/01/19 0930)    Physical Exam:  General appearance - alert, well appearing, and in no distress  Mental status - AOx3  Eyes - pupils equal and reactive, extraocular eye movements intact  Heart - normal rate, regular rhythm, normal S1, S2, no murmurs, rubs, clicks or gallops  Chest - clear to auscultation, no wheezes, rales or rhonchi, symmetric air entry  Abdomen - soft, nontender, nondistended, no masses or organomegaly  Neurological - alert, oriented, normal speech, no focal findings or movement disorder noted  Extremities - peripheral pulses normal, no pedal edema, no clubbing or cyanosis  bilat shoulder pain with ROM      ASSESSMENT/PLAN:     Active  Hospital Problems    Diagnosis  POA    Chronic pain [G89.29]  Yes      Resolved Hospital Problems   No resolved problems to display.        The risks and benefits of this intervention, and alternative therapies were discussed with the patient.  The discussion of risks included infection, bleeding, need for additional procedures or surgery, nerve damage, paralysis, adverse medication reaction(s), stroke, and/or death.  Questions regarding the procedure, risks, expected outcome, and possible side effects were solicited and answered to the patient's satisfaction.  Monique wishes to proceed with the injection.  Verbal and written consent were verified.      Proceed with intervention as scheduled.      Lydia Velásquez Jr, MD  Interventional Pain Medicine / Anesthesiology

## 2019-08-01 NOTE — DISCHARGE SUMMARY
[facility]  Discharge Note  Short Stay     Admit Date: 8/1/2019    Discharge Date: 8/1/2019     Attending Physician: Lydia Velásquez Jr, MD    Diagnoses:  Active Hospital Problems    Diagnosis  POA    *Chronic pain of both shoulders [M25.511, G89.29, M25.512]  Yes    Chronic pain [G89.29]  Yes      Resolved Hospital Problems   No resolved problems to display.     Discharged Condition: Good     Hospital Course: Patient was admitted for an outpatient interventional pain management procedure and tolerated the procedure well with no complications.     Final Diagnoses: Same as principal problem.     Disposition: Home or Self Care     Follow up/Patient Instructions:    Follow-up Information     Lydia Velásquez Jr, MD. Go in 2 weeks.    Specialty:  Pain Medicine  Why:  Post-procedural Follow Up As Scheduled, To Report Your Pain Scores from the Pain Diary  Contact information:  [address]  [address]  [place] [id number] [phone]                   Reconciled Medications:     Medication List      CONTINUE taking these medications    amLODIPine 10 MG tablet  Commonly known as:  NORVASC  TAKE 1 TABLET BY MOUTH EVERY DAY     atenolol 25 MG tablet  Commonly known as:  TENORMIN  TAKE 1 TABLET BY MOUTH EVERY DAY     CALCIUM 500 + D 500 mg(1,250mg) -200 unit per tablet  Generic drug:  calcium-vitamin D3  Take 1 tablet by mouth 2 (two) times daily with meals.     co-enzyme Q-10 30 mg capsule  Take 30 mg by mouth once daily.     econazole nitrate 1 % cream  Use bid for rash     etodolac 200 MG Cap  Commonly known as:  LODINE  Take 2 capsules (400 mg total) by mouth 2 (two) times daily.     fenofibric acid 135 mg Cpdr  Commonly known as:  FIBRICOR  Take 1 capsule (135 mg total) by mouth once daily.     fish oil-omega-3 fatty acids 300-1,000 mg capsule  Take 2 g by mouth once daily.     gabapentin 800 MG tablet  Commonly known as:  NEURONTIN  Take 1 tablet (800 mg total) by mouth every evening.     GLUCOSAMINE  SULF-CHONDROITIN ORAL  Take 1 tablet by mouth once daily.     multivitamin capsule  Take 1 capsule by mouth once daily.     OPW TEST CLAIM - DO NOT FILL  Take by mouth. OPW test claim. Do not fill.     oxybutynin 15 MG Tr24  Commonly known as:  DITROPAN XL  TAKE 1 TABLET (15 MG TOTAL) BY MOUTH ONCE DAILY.     pantoprazole 20 MG tablet  Commonly known as:  PROTONIX  TAKE 1 TABLET BY MOUTH EVERY DAY     pravastatin 40 MG tablet  Commonly known as:  PRAVACHOL  TAKE 1 TABLET BY MOUTH EVERY DAY     sertraline 25 MG tablet  Commonly known as:  ZOLOFT  TAKE 1 TABLET BY MOUTH EVERY DAY     traMADol 50 mg tablet  Commonly known as:  ULTRAM  Take 1 tablet (50 mg total) by mouth every 12 (twelve) hours as needed for Pain.     traZODone 50 MG tablet  Commonly known as:  DESYREL  TAKE 1 TABLET (50 MG TOTAL) BY MOUTH NIGHTLY AS NEEDED FOR INSOMNIA.     VITAMIN C 1000 MG tablet  Generic drug:  ascorbic acid (vitamin C)  Take 1,000 mg by mouth once daily.     vitamin E 400 UNIT capsule  Take 400 Units by mouth once daily.           Discharge Procedure Orders (must include Diet, Follow-up, Activity)   Call MD for:  temperature >100.4     Call MD for:  severe uncontrolled pain     Call MD for:  redness, tenderness, or signs of infection (pain, swelling, redness, odor or green/yellow discharge around incision site)     Call MD for:  difficulty breathing or increased cough     Call MD for:  severe persistent headache     Call MD for:  worsening rash     Remove dressing in 24 hours       Lydia Velásquez Jr, MD  Interventional Pain Medicine / Anesthesiology

## 2019-08-06 DIAGNOSIS — L71.9 ROSACEA: Primary | ICD-10-CM

## 2019-08-06 DIAGNOSIS — H10.9 CONJUNCTIVITIS, UNSPECIFIED CONJUNCTIVITIS TYPE, UNSPECIFIED LATERALITY: ICD-10-CM

## 2019-08-06 RX ORDER — METRONIDAZOLE 7.5 MG/G
CREAM TOPICAL DAILY
Qty: 45 G | Refills: 1 | Status: SHIPPED | OUTPATIENT
Start: 2019-08-06 | End: 2020-09-17

## 2019-08-06 RX ORDER — POLYMYXIN B SULFATE AND TRIMETHOPRIM 1; 10000 MG/ML; [USP'U]/ML
1 SOLUTION OPHTHALMIC EVERY 4 HOURS
Qty: 10 ML | Refills: 0 | Status: ON HOLD | OUTPATIENT
Start: 2019-08-06 | End: 2022-05-25 | Stop reason: ALTCHOICE

## 2019-08-08 ENCOUNTER — TELEPHONE (OUTPATIENT)
Dept: INTERNAL MEDICINE | Facility: CLINIC | Age: 69
End: 2019-08-08

## 2019-08-09 NOTE — TELEPHONE ENCOUNTER
Mrs. Trujillo presented to clinic yesterday with her . He had an appointment to see me but she did not. However she did complain of her skin breaking out and of eye redness and eye discharge located in the left eye. On exam she has findings consistent with rosacea. She also had a purulent discharge from the left eye associated with left eye erythema and injection. I have prescribed flagyl cream for treatment of rosacea and polytrim eye drops for treatment of conjunctivitis.

## 2019-08-14 ENCOUNTER — TELEPHONE (OUTPATIENT)
Dept: INTERNAL MEDICINE | Facility: CLINIC | Age: 69
End: 2019-08-14

## 2019-08-14 NOTE — TELEPHONE ENCOUNTER
Spoke with wife regarding  Conor Trujillo. Informed labs need to be done before appt in 6 months. Patient voices  understanding.

## 2019-08-15 ENCOUNTER — OFFICE VISIT (OUTPATIENT)
Dept: PAIN MEDICINE | Facility: CLINIC | Age: 69
End: 2019-08-15
Payer: MEDICARE

## 2019-08-15 ENCOUNTER — TELEPHONE (OUTPATIENT)
Dept: PAIN MEDICINE | Facility: CLINIC | Age: 69
End: 2019-08-15

## 2019-08-15 VITALS — SYSTOLIC BLOOD PRESSURE: 124 MMHG | DIASTOLIC BLOOD PRESSURE: 74 MMHG | HEART RATE: 73 BPM

## 2019-08-15 DIAGNOSIS — M51.36 LUMBAR DEGENERATIVE DISC DISEASE: ICD-10-CM

## 2019-08-15 DIAGNOSIS — M48.062 SPINAL STENOSIS OF LUMBAR REGION WITH NEUROGENIC CLAUDICATION: Primary | ICD-10-CM

## 2019-08-15 DIAGNOSIS — M19.012 PRIMARY OSTEOARTHRITIS OF BOTH SHOULDERS: ICD-10-CM

## 2019-08-15 DIAGNOSIS — M19.011 PRIMARY OSTEOARTHRITIS OF BOTH SHOULDERS: ICD-10-CM

## 2019-08-15 DIAGNOSIS — M25.511 CHRONIC PAIN OF BOTH SHOULDERS: ICD-10-CM

## 2019-08-15 DIAGNOSIS — M25.512 CHRONIC PAIN OF BOTH SHOULDERS: ICD-10-CM

## 2019-08-15 DIAGNOSIS — Z98.890 STATUS POST LUMBAR DISCECTOMY: ICD-10-CM

## 2019-08-15 DIAGNOSIS — M43.16 SPONDYLOLISTHESIS, LUMBAR REGION: ICD-10-CM

## 2019-08-15 DIAGNOSIS — M54.42 CHRONIC BILATERAL LOW BACK PAIN WITH BILATERAL SCIATICA: ICD-10-CM

## 2019-08-15 DIAGNOSIS — M19.019 SHOULDER ARTHRITIS: ICD-10-CM

## 2019-08-15 DIAGNOSIS — M54.41 CHRONIC BILATERAL LOW BACK PAIN WITH BILATERAL SCIATICA: ICD-10-CM

## 2019-08-15 DIAGNOSIS — G89.4 CHRONIC PAIN SYNDROME: ICD-10-CM

## 2019-08-15 DIAGNOSIS — E66.01 MORBID OBESITY WITH BMI OF 40.0-44.9, ADULT: ICD-10-CM

## 2019-08-15 DIAGNOSIS — G89.29 CHRONIC PAIN OF BOTH SHOULDERS: ICD-10-CM

## 2019-08-15 DIAGNOSIS — G89.29 CHRONIC BILATERAL LOW BACK PAIN WITH BILATERAL SCIATICA: ICD-10-CM

## 2019-08-15 PROCEDURE — 3078F PR MOST RECENT DIASTOLIC BLOOD PRESSURE < 80 MM HG: ICD-10-PCS | Mod: CPTII,S$GLB,, | Performed by: NURSE PRACTITIONER

## 2019-08-15 PROCEDURE — 99999 PR PBB SHADOW E&M-EST. PATIENT-LVL III: CPT | Mod: PBBFAC,,, | Performed by: NURSE PRACTITIONER

## 2019-08-15 PROCEDURE — 3074F SYST BP LT 130 MM HG: CPT | Mod: CPTII,S$GLB,, | Performed by: NURSE PRACTITIONER

## 2019-08-15 PROCEDURE — 99214 PR OFFICE/OUTPT VISIT, EST, LEVL IV, 30-39 MIN: ICD-10-PCS | Mod: S$GLB,,, | Performed by: NURSE PRACTITIONER

## 2019-08-15 PROCEDURE — 1101F PR PT FALLS ASSESS DOC 0-1 FALLS W/OUT INJ PAST YR: ICD-10-PCS | Mod: CPTII,S$GLB,, | Performed by: NURSE PRACTITIONER

## 2019-08-15 PROCEDURE — 1101F PT FALLS ASSESS-DOCD LE1/YR: CPT | Mod: CPTII,S$GLB,, | Performed by: NURSE PRACTITIONER

## 2019-08-15 PROCEDURE — 99999 PR PBB SHADOW E&M-EST. PATIENT-LVL III: ICD-10-PCS | Mod: PBBFAC,,, | Performed by: NURSE PRACTITIONER

## 2019-08-15 PROCEDURE — 3074F PR MOST RECENT SYSTOLIC BLOOD PRESSURE < 130 MM HG: ICD-10-PCS | Mod: CPTII,S$GLB,, | Performed by: NURSE PRACTITIONER

## 2019-08-15 PROCEDURE — 3078F DIAST BP <80 MM HG: CPT | Mod: CPTII,S$GLB,, | Performed by: NURSE PRACTITIONER

## 2019-08-15 PROCEDURE — 99214 OFFICE O/P EST MOD 30 MIN: CPT | Mod: S$GLB,,, | Performed by: NURSE PRACTITIONER

## 2019-08-15 NOTE — PROGRESS NOTES
"[facility] Established Patient Evaluation    Referred by: Self  Reason for referral: Back Pain    CC:   Chief Complaint   Patient presents with    Shoulder Pain     bilateral      Last 3 PDI Scores 1/23/2019 12/10/2018 10/31/2018   Pain Disability Index (PDI) 29 37 35     Interval Update:     8/15/19 Pt returns today S/P Bilateral Suprascapular Nerve Articular Branch Block on 8/1/19 with 90% relief.  She is also complaining of low back and bilateral leg pain.    6/10/19 Pt presents today for evaluation of her chronic shoulder pain. She was referred by her orthopedic surgery team for consideration bilateral shoulder articular branch block and possible radiofrequency ablation.  She reports chronic pain in both shoulders which she rates 6/10 at this time. Shoulder pain is worse on the left side; however, pain in both shoulders limits her ability to perform some activities of daily living, especially those requiring her to reach overhead.  Patient states that she is opposed to any surgical interventions at this time and would like to try the articular branch block/ablation.    12/10/18 pt returns today S/P Lumbar Epidural Steroid Injection on 11/8/18 with 20% relief.    10/31/18- Pt returns today for follow up with MRI results she is reporting 6 /10 pain.    Background:   Monique Trujillo is a 68 y.o. female who complains of clbp with radiculopathy.    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  Completed for knee and hip after each replacement.  HEP:   Interventions: 11/8/18 Lumbar Epidural Steroid Injection  Surgery: 8/1/19 Bilateral " Suprascapular Nerve Articular Branch Block   2014 - lumbar fixation to treat listhesis - patient felt good for several years after this   2017 - right hip replacement   2018 - right knee replaced  Medications:   - NSAIDS: Yes, current  - MSK Relaxants: Tried several in the past with severe sedation  - TCAs:   - SNRIs:   - Topicals:   - Anticonvulsants: Yes, current  - Opioids:     Current Pain Medications:  1. Celecoxib 200 mg daily  2. Gabapentin 800 mg QHS  3. Other: Celexa    Review of Systems:  Review of Systems   Constitutional: Negative for chills and fever.   HENT: Positive for congestion. Negative for nosebleeds.    Eyes: Negative for pain.   Respiratory: Negative for hemoptysis.    Cardiovascular: Negative for chest pain.   Gastrointestinal: Negative for nausea and vomiting.   Genitourinary: Negative for dysuria.   Musculoskeletal: Positive for back pain and joint pain. Negative for falls.   Skin: Negative for rash.   Neurological: Positive for tingling. Negative for focal weakness.   Endo/Heme/Allergies: Does not bruise/bleed easily.   Psychiatric/Behavioral: The patient has insomnia.        History:    Current Outpatient Medications:     amLODIPine (NORVASC) 10 MG tablet, TAKE 1 TABLET BY MOUTH EVERY DAY, Disp: 90 tablet, Rfl: 3    ascorbic acid (VITAMIN C) 1000 MG tablet, Take 1,000 mg by mouth once daily., Disp: , Rfl:     atenolol (TENORMIN) 25 MG tablet, TAKE 1 TABLET BY MOUTH EVERY DAY, Disp: 90 tablet, Rfl: 3    calcium-vitamin D3 (CALCIUM 500 + D) 500 mg(1,250mg) -200 unit per tablet, Take 1 tablet by mouth 2 (two) times daily with meals., Disp: , Rfl:     co-enzyme Q-10 30 mg capsule, Take 30 mg by mouth once daily. , Disp: , Rfl:     econazole nitrate 1 % cream, Use bid for rash, Disp: 85 g, Rfl: 2    etodolac (LODINE) 200 MG Cap, Take 2 capsules (400 mg total) by mouth 2 (two) times daily., Disp: 60 capsule, Rfl: 1    fenofibric acid (FIBRICOR) 135 mg CpDR, Take 1 capsule (135 mg total)  by mouth once daily., Disp: 90 capsule, Rfl: 3    fish oil-omega-3 fatty acids 300-1,000 mg capsule, Take 2 g by mouth once daily., Disp: , Rfl:     gabapentin (NEURONTIN) 800 MG tablet, Take 1 tablet (800 mg total) by mouth every evening. (Patient taking differently: Take 800 mg by mouth every evening. ), Disp: 90 tablet, Rfl: 3    GLUCOSA KHAN 2KCL/CHONDROITIN KHAN (GLUCOSAMINE SULF-CHONDROITIN ORAL), Take 1 tablet by mouth once daily., Disp: , Rfl:     metronidazole 0.75% (METROCREAM) 0.75 % Crea, Apply topically once daily to affected area, Disp: 45 g, Rfl: 1    multivitamin capsule, Take 1 capsule by mouth once daily., Disp: , Rfl:     OPW TEST CLAIM - DO NOT FILL, Take by mouth. OPW test claim. Do not fill., Disp: 11 tablet, Rfl: 0    oxybutynin (DITROPAN XL) 15 MG TR24, TAKE 1 TABLET (15 MG TOTAL) BY MOUTH ONCE DAILY., Disp: 90 tablet, Rfl: 3    pantoprazole (PROTONIX) 20 MG tablet, TAKE 1 TABLET BY MOUTH EVERY DAY, Disp: 90 tablet, Rfl: 3    polymyxin B sulf-trimethoprim (POLYTRIM) 10,000 unit- 1 mg/mL Drop, Place 1 drop into both eyes every 4 (four) hours., Disp: 10 mL, Rfl: 0    pravastatin (PRAVACHOL) 40 MG tablet, TAKE 1 TABLET BY MOUTH EVERY DAY, Disp: 90 tablet, Rfl: 3    sertraline (ZOLOFT) 25 MG tablet, TAKE 1 TABLET BY MOUTH EVERY DAY, Disp: 30 tablet, Rfl: 0    traZODone (DESYREL) 50 MG tablet, TAKE 1 TABLET (50 MG TOTAL) BY MOUTH NIGHTLY AS NEEDED FOR INSOMNIA., Disp: 30 tablet, Rfl: 0    vitamin E 400 UNIT capsule, Take 400 Units by mouth once daily., Disp: , Rfl:     Current Facility-Administered Medications:     lidocaine (PF) 20 mg/mL (2 %) injection 2 mL, 2 mL, Intradermal, Once, Kalyan Han MD    Past Medical History:   Diagnosis Date    Allergy     Arthritis     Chronic lower back pain     Depression     Fever blister     GERD (gastroesophageal reflux disease)     Hemorrhoids, internal 11/5/2014    Hyperlipidemia     Hypertension     Joint pain     Morbid  obesity with BMI of 40.0-44.9, adult 2/24/2015    Multiple gastric ulcers 12/14/2017    TIMMY (obstructive sleep apnea)     does not use CPAP    Skin cancer        Past Surgical History:   Procedure Laterality Date    ANTERIOR CERVICAL CORPECTOMY W/ FUSION  1988    ARTHROPLASTY-HIP Right 4/28/2016    [facility]formed by Fermín [facility]    ARTHROPLASTY-KNEE Right 3/14/2018    Performed by Yunier Combs MD [facility]    BILATERAL SHOULDER ARTICULAR BRANCH BLOCK Bilateral 8/1/2019    Performed by Lydia Velásquez Jr., MD at [facility] PAIN MGT    COLONOSCOPY N/A 11/2/2016    Performed by Viji Dewitt [facility] at [facility] ENDO    COLONOSCOPY N/A 11/5/2014    Performed by Pastora Olvera [facility] at [facility] ENDO    ESOPHAGOGASTRODUODENOSCOPY (EGD) N/A 12/14/2017    Performed by El Dewitt MD at [facility] ENDO    ESOPHAGOGASTRODUODENOSCOPY (EGD) N/A 10/20/2017    Performed by El [facility]itt [facility] at [facility] ENDO    Injection-steroid-epidural-caudal N/A 1/10/2019    Performed by L[facility] Jr[facility] at [facility] PAIN T    Injection-steroid-epidural-lumbar-L2-3 (LEFT > RIGHT) N/A 11/8/2018    Performed by L[facility] Jr[facility] at [facility] PAIN T    KNEE ARTHROSCOPY W/ MENISCAL REPAIR Right 2012    LUMBAR LAMINECTOMY  06/19/2015    SKIN CANCER EXCISION      forehead does not recall date    TOTAL HIP ARTHROPLASTY Right 04/28/2016         TUBAL LIGATION  1980       Family History   Problem Relation Age of Onset    Arthritis Mother     Cancer Mother         breast ca    Breast cancer Mother     Kidney disease Father     Alzheimer's disease Maternal Grandfather     Cancer Paternal Grandmother         colon ca    Colon cancer Paternal Grandmother        Social History     Socioeconomic History    Marital status:      Spouse name: Not on file    Number of children: Not on file    Years of education: 12    Highest education level: Not on file   Occupational History     Employer: [employer]   Social Needs    Financial resource strain: Not on file    Food insecurity:     Worry: Not on file     Inability: Not on file    Transportation needs:     Medical: Not on file     Non-medical: Not on file   Tobacco Use    Smoking status: Never Smoker    Smokeless tobacco: Never Used   Substance and Sexual Activity    Alcohol use: No     Alcohol/week: 0.0 oz    Drug use: No    Sexual activity: Never   Lifestyle    Physical activity:     Days per week: Not on file     Minutes per session: Not on file    Stress: Not on file   Relationships    Social connections:     Talks on phone: Not on file     Gets together: Not on file     Attends Voodoo service: Not on file     Active member of club or organization: Not on file     Attends meetings of clubs or organizations: Not on file     Relationship status: Not on file   Other Topics Concern    Are you pregnant or think you may be? Not Asked    Breast-feeding Not Asked   Social History Narrative    Not on file       Review of patient's allergies indicates:  No Known Allergies    Physical Exam:  Vitals:    08/15/19 0900   BP: 124/74   Pulse: 73     General    Nursing note and vitals reviewed.  Constitutional: She is oriented to person, place, and time. She appears well-developed and well-nourished. No distress.   HENT:   Head: Normocephalic and atraumatic.   Nose: Nose normal.   Eyes: Conjunctivae and EOM are normal. Pupils are equal, round, and reactive to light. Right eye exhibits no discharge. Left eye exhibits no discharge. No scleral icterus.   Neck: No JVD present.   Cardiovascular: Intact distal pulses.    Pulmonary/Chest: Effort normal. No respiratory distress.   Abdominal: She exhibits no distension.   Neurological: She is alert and oriented to person, place, and time. Coordination normal.   Psychiatric: She has a normal mood and affect. Her behavior is normal. Judgment and thought content normal.     General Musculoskeletal Exam   Gait: normal      Back (L-Spine & T-Spine) / Neck (C-Spine) Exam     Tenderness Right paramedian tenderness of the Lower L-Spine. Left paramedian tenderness of the Lower L-Spine.     Back (L-Spine & T-Spine) Range of Motion   Back extension: facet loading is positive and exacerabtes/reproduces the patient's typical low back pain    Back flexion: limited ROM but partial relief of low back pain noted.     Spinal Sensation   Right Side Sensation  L-Spine Level: normal  Left Side Sensation  L-Spine Level: normal    Other She has no scoliosis .    Comments:  + SLR on right      Muscle Strength   Right Lower Extremity   Hip Flexion: 5/5   Hip Extensors: 5/5  Quadriceps:  5/5   Hamstrin/5   Gastrocsoleus:  5/5/5  Left Lower Extremity   Hip Flexion: 5/5   Hip Extensors: 5/5  Quadriceps:  5/5   Hamstrin/5   Gastrocsoleus:  5/5/5    Reflexes     Left Side  Quadriceps:  2+  Achilles:  2+    Right Side   Quadriceps:  2+  Achilles:  2+      Imaging:  MRI Lumbar Spine Without Contrast 10/31/2018   1. Severe multifactorial central canal spinal stenosis at the L3-4 disc space since the previous study.  There is also a 8 mm anterolisthesis of L3 on L4 since the previous study.  2. Postoperative changes from L4-5 discectomy with improvement in the anterolisthesis.  There is however at least a moderate central canal spinal stenosis related to hypertrophic changes of the dorsal elements.  3. Spondylotic changes T12-L1 disc space as above.  4. Rest of the findings as above.       Labs:  BMP  Lab Results   Component Value Date     2019    K 3.6 2019     2019    CO2 31 (H) 2019    BUN 22 2019    CREATININE 1.0 2019    CALCIUM 11.3 (H) 2019    ANIONGAP 8 2019    ESTGFRAFRICA >60.0 2019    EGFRNONAA 57.6 (A) 2019     Lab Results   Component Value Date    ALT 23 2019    AST 24 2019    ALKPHOS 55 2019    BILITOT 0.5 2019       Assessment:  Problem List  Items Addressed This Visit        Neuro    Lumbar degenerative disc disease    Relevant Orders    Ambulatory Referral to Neurosurgery    Spinal stenosis of lumbar region with neurogenic claudication - Primary    Relevant Orders    Ambulatory Referral to Neurosurgery    Chronic pain       Endocrine    Morbid obesity with BMI of 40.0-44.9, adult (Chronic)       Orthopedic    Spondylolisthesis, lumbar region    Relevant Orders    Ambulatory Referral to Neurosurgery    Chronic bilateral low back pain with bilateral sciatica    Shoulder arthritis    Chronic pain of both shoulders      Other Visit Diagnoses     Primary osteoarthritis of both shoulders        Status post lumbar discectomy        Relevant Orders    Ambulatory Referral to Neurosurgery          10/24/18 - 67-year-old female with medical problems consisting of morbid obesity, GERD, hypertension, a multiple gastric ulcers who presents complaining of chronic low back pain with radiculopathy.  She had a lumbar surgery 4 years ago with fusion of the lumbar spine using hardware.  Following that surgery, radicular symptoms improved; however, over the past year she has experienced increasing radicular pain associated with numbness and tingling in the feet bilaterally.  The pain is made worse with prolonged sitting or lying in bed in certain positions.  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  There is no current imaging of her lumbar spine and I will place an order for x-ray lumbar spine as well as MRI lumbar spine today.  I will have her return to clinic immediately after obtaining the MRI lumbar spine so that we can make an assessment and recommend interventional therapies.  I will likely recommend an epidural steroid injection but MRI is required to ensure safe access into the epidural space.    10/31/18 - Patient returned to discuss imaging which shows anterolisthesis of L3  on L4 with central canal stenosis.  There is also multilevel neuroforaminal stenosis as well.  I recommend Lumbar ELINA at L2-3 for therapeutic purposes.  Once pain is controlled, she will be referred to PT.  She may also need eval by neurosurgery as this qualifies as adjacent level disease with her Hx of L4-5 fusion with hardware for anterolisthesis at that level.    12/10/1889-34-rhlj-old female status post lumbar ELINA at L2-3 with 20% relief, she has history of a fusion at L4-5, previous clinic encounter with Dr. Velásquez suggested that we attempt a Caudal ELINA if relief was minimal with L2-3 IESI. I will s/f Caudal today, explained procedure in detail with patient, pt states she understood and agreed.     6/10/19 - while we have been treating her for chronic low back pain radiculopathy, patient presents today complaining of bilateral shoulder pain. She was referred by her orthopedic surgery team for consideration of bilateral articular branch block of the shoulder and possible radiofrequency ablation.  If she responds well to a single block, will move forward with the ablation.  Imaging of the shoulders was reviewed today demonstrating degenerative joint disease.    08/15/1958-04-fgef-old female presents with her  today she is status post bilateral suprascapular nerve articular branch block she reports 90% relief of her pain and her left and right shoulder she states her range of motion has been better following the block, she also states she was able to perform more of her ADLs and that she would like to move forward with the RFA of the left shoulder.  She states she would like to hold off on the RFA of the right shoulder due to cost and co pays.  Discussed that I recommend we move forward with the left suprascapular nerve articular RFA RFAs have shown to give sustained relief for 12 18 months.  Patient also complained of low back and bilateral leg pain she is status post x2 lumbar ELINA ties with our office  with minimal relief she is status post L4-5 discectomy years ago, MRI shows severe multifactorial central canal stenosis at L3-4 since her previous study she also has a 8 mm anterolisthesis of L3 on L4 since the previous study considering she has not benefit from x2 ELINA ties I will refer to Neurosurgery for further assessment patient agreed and understood.  : Not applicable    Treatment Plan:   Procedures:  Left Shoulder articular branch RFA.   PT/OT/HEP:  Consider following RFA if pain is under better control  Medications:  Continue with all medications as they were prescribed  Referral:  Referral to Neurosurgery today  Labs: reviewed and medications are appropriately dosed for current hepatorenal function.  Imaging: Reviewed with patient today.  Follow Up: RTC 2-3 weeks    JANI MacarioC  Interventional Pain Management    Disclaimer: This note was partly generated using dictation software which may occasionally result in transcription errors.

## 2019-08-16 ENCOUNTER — TELEPHONE (OUTPATIENT)
Dept: PAIN MEDICINE | Facility: CLINIC | Age: 69
End: 2019-08-16

## 2019-08-16 DIAGNOSIS — G89.29 CHRONIC LEFT SHOULDER PAIN: Primary | ICD-10-CM

## 2019-08-16 DIAGNOSIS — M19.012 OSTEOARTHRITIS OF LEFT SHOULDER, UNSPECIFIED OSTEOARTHRITIS TYPE: ICD-10-CM

## 2019-08-16 DIAGNOSIS — M25.512 CHRONIC LEFT SHOULDER PAIN: Primary | ICD-10-CM

## 2019-08-28 ENCOUNTER — TELEPHONE (OUTPATIENT)
Dept: INTERNAL MEDICINE | Facility: CLINIC | Age: 69
End: 2019-08-28

## 2019-08-28 NOTE — TELEPHONE ENCOUNTER
----- Message from Yoly Veliz sent at 8/28/2019 11:42 AM CDT -----  Contact: [phone]-self  Patient states she's returning your call.

## 2019-08-28 NOTE — TELEPHONE ENCOUNTER
Spoke with pt she informed me that [facility] did not receive husbands RX. I informed her I will submit again

## 2019-08-28 NOTE — TELEPHONE ENCOUNTER
----- Message from Andrew Ann sent at 8/28/2019 10:38 AM CDT -----  Contact: patient  Patient called to speak with your office in regard to a message she left on last week and received no response.    Please call [phone] to discuss today.

## 2019-08-30 ENCOUNTER — OFFICE VISIT (OUTPATIENT)
Dept: NEUROSURGERY | Facility: CLINIC | Age: 69
End: 2019-08-30
Payer: MEDICARE

## 2019-08-30 VITALS
HEART RATE: 81 BPM | DIASTOLIC BLOOD PRESSURE: 78 MMHG | TEMPERATURE: 99 F | HEIGHT: 61 IN | BODY MASS INDEX: 43.46 KG/M2 | SYSTOLIC BLOOD PRESSURE: 143 MMHG | WEIGHT: 230.19 LBS

## 2019-08-30 DIAGNOSIS — E66.01 MORBID OBESITY WITH BMI OF 40.0-44.9, ADULT: Chronic | ICD-10-CM

## 2019-08-30 DIAGNOSIS — M51.36 DEGENERATIVE DISC DISEASE, LUMBAR: ICD-10-CM

## 2019-08-30 DIAGNOSIS — M43.16 SPONDYLOLISTHESIS OF LUMBAR REGION: ICD-10-CM

## 2019-08-30 DIAGNOSIS — Z98.1 STATUS POST LUMBAR SPINAL FUSION: ICD-10-CM

## 2019-08-30 DIAGNOSIS — M48.062 SPINAL STENOSIS OF LUMBAR REGION WITH NEUROGENIC CLAUDICATION: Primary | ICD-10-CM

## 2019-08-30 DIAGNOSIS — M47.26 OTHER SPONDYLOSIS WITH RADICULOPATHY, LUMBAR REGION: ICD-10-CM

## 2019-08-30 PROCEDURE — 1101F PR PT FALLS ASSESS DOC 0-1 FALLS W/OUT INJ PAST YR: ICD-10-PCS | Mod: CPTII,S$GLB,, | Performed by: PHYSICIAN ASSISTANT

## 2019-08-30 PROCEDURE — 1101F PT FALLS ASSESS-DOCD LE1/YR: CPT | Mod: CPTII,S$GLB,, | Performed by: PHYSICIAN ASSISTANT

## 2019-08-30 PROCEDURE — 99204 PR OFFICE/OUTPT VISIT, NEW, LEVL IV, 45-59 MIN: ICD-10-PCS | Mod: S$GLB,,, | Performed by: PHYSICIAN ASSISTANT

## 2019-08-30 PROCEDURE — 99499 UNLISTED E&M SERVICE: CPT | Mod: S$GLB,,, | Performed by: PHYSICIAN ASSISTANT

## 2019-08-30 PROCEDURE — 3077F PR MOST RECENT SYSTOLIC BLOOD PRESSURE >= 140 MM HG: ICD-10-PCS | Mod: CPTII,S$GLB,, | Performed by: PHYSICIAN ASSISTANT

## 2019-08-30 PROCEDURE — 99999 PR PBB SHADOW E&M-EST. PATIENT-LVL V: ICD-10-PCS | Mod: PBBFAC,,, | Performed by: PHYSICIAN ASSISTANT

## 2019-08-30 PROCEDURE — 3078F DIAST BP <80 MM HG: CPT | Mod: CPTII,S$GLB,, | Performed by: PHYSICIAN ASSISTANT

## 2019-08-30 PROCEDURE — 3078F PR MOST RECENT DIASTOLIC BLOOD PRESSURE < 80 MM HG: ICD-10-PCS | Mod: CPTII,S$GLB,, | Performed by: PHYSICIAN ASSISTANT

## 2019-08-30 PROCEDURE — 99499 RISK ADDL DX/OHS AUDIT: ICD-10-PCS | Mod: S$GLB,,, | Performed by: PHYSICIAN ASSISTANT

## 2019-08-30 PROCEDURE — 3077F SYST BP >= 140 MM HG: CPT | Mod: CPTII,S$GLB,, | Performed by: PHYSICIAN ASSISTANT

## 2019-08-30 PROCEDURE — 99204 OFFICE O/P NEW MOD 45 MIN: CPT | Mod: S$GLB,,, | Performed by: PHYSICIAN ASSISTANT

## 2019-08-30 PROCEDURE — 99999 PR PBB SHADOW E&M-EST. PATIENT-LVL V: CPT | Mod: PBBFAC,,, | Performed by: PHYSICIAN ASSISTANT

## 2019-08-30 NOTE — LETTER
August 30, 2019      Curt Chambers, FNP  [address]  [address]  [facility] [address]           [facility]  [address], [address]  [facility] [address]  Phone: [phone]          Patient: Monique Trujillo   MR Number: [id number]   YOB: 1950   Date of Visit: 8/30/2019       Dear Curt Chambers:    Thank you for referring Monique Trujillo to me for evaluation. Attached you will find relevant portions of my assessment and plan of care.    If you have questions, please do not hesitate to call me. I look forward to following Monique Trujillo along with you.    Sincerely,    Paco Dyson PA-C    Enclosure  CC:  No Recipients    If you would like to receive this communication electronically, please contact [email or web address] or [phone] to request more information on "[employer] Link access.    For providers and/or their staff who would like to refer a patient to [facility], please contact us through our one-stop-shop provider referral line, [facility] , at [phone].    If you feel you have received this communication in error or would no longer like to receive these types of communications, please e-mail [email or web address]

## 2019-08-30 NOTE — PROGRESS NOTES
History & Physical    SUBJECTIVE:     Chief Complaint:  Back pain    History of Present Illness:  Monique Trujillo is 68 y.o.  female with history of nonsmoking, osteopenia L-spine (05/08/2018 DEXA-on vitamin-D and calcium supplements), GERD, depression, hypertension, hyperlipidemia, morbid obesity BMI 43.9, TIMMY, bladder spasms, insomnia, status post previous L4-5  TLIF 06/19/2015 with Dr. Morales, status post previous ACDF in 1988, chronic left shoulder pain (refusing shoulder surgery), status post right knee arthroplasty 03/14/2018, and status post right hip arthroplasty 04/20/2016 who presents for neurosurgical evaluation, referred by Curt Chambers NP in pain management.    Patient states she had improvement in back pain from previous lumbar surgery in 2015 but did develop residual right foot numbness.  However, back and leg pain has progressively worsened ain last 3-4 months.  She is mostly complaining midline low back pain that radiates down bilateral legs (right greater than left) into both feet.  Back pain is constant throbbing rated 6-8/10 and leg pain is numbness/tingling.  She has constant numbness/tingling in both feet.  Back pain worsens with walking greater than 50 ft or changing position (from sitting/lying to standing).  Occasionally, her legs will give out.  She feels off balance, walking with a limp, and leg limb length discrepancy since right hip replacement in 2016.  She has been walking with rolling walker/cane for support.  She had multiple lumbar ELINA with no significant relief. Patient taking tramadol 50 mg b.i.d., celecoxib 200 mg daily and gabapentin 800 mg q.h.s per pain management.  She has not tried any physical therapy for her back; previous knee PT after surgery 2018 worsened back pain.      Previous interventional pain procedure:  -Bilateral Suprascapular Nerve Articular Branch Block on 8/1/19 with 90% relief.  -caudal ELINA 01/10/2019  -Lumbar Epidural Steroid Injection at L2-3 on 11/8/18  with 20% relief.    Low Back Pain Scale  R Low Back-Pain Score: 8  R Low Back-Pain Intensity: Pain killers have no effect on the pain and I do not use them  R Low Back-Pain Score: I need some help, but manage most of my personal care  Low Back-Lifting: Pain prevents me from lifting heavy weights  but I can manage light weights if they are conveniently placed   Low Back-Walking: I can only walk using a cane or crutches   Low Back-Sitting: Pain prevents me from sitting more than 30 minutes   Low Back-Standing: I cannot stand for longer than 1 hour without increasing pain   Low Back-Sleeping: Because of pain my normal nights sleep is reduced by less than three quarters   Low Back-Social Life: Pain has restricted my social life and I do not go out very often   Low Back-Traveling: Pain restricts me to short necessary journeys under 30 minutes   Low Back-Changing Degree of Pain: My pain is gradually worsening           Review of patient's allergies indicates:  No Known Allergies    Current Outpatient Medications   Medication Sig Dispense Refill    amLODIPine (NORVASC) 10 MG tablet TAKE 1 TABLET BY MOUTH EVERY DAY 90 tablet 3    ascorbic acid (VITAMIN C) 1000 MG tablet Take 1,000 mg by mouth once daily.      atenolol (TENORMIN) 25 MG tablet TAKE 1 TABLET BY MOUTH EVERY DAY 90 tablet 3    calcium-vitamin D3 (CALCIUM 500 + D) 500 mg(1,250mg) -200 unit per tablet Take 1 tablet by mouth 2 (two) times daily with meals.      co-enzyme Q-10 30 mg capsule Take 30 mg by mouth once daily.       econazole nitrate 1 % cream Use bid for rash 85 g 2    etodolac (LODINE) 200 MG Cap Take 2 capsules (400 mg total) by mouth 2 (two) times daily. 60 capsule 1    fenofibric acid (FIBRICOR) 135 mg CpDR Take 1 capsule (135 mg total) by mouth once daily. 90 capsule 3    fish oil-omega-3 fatty acids 300-1,000 mg capsule Take 2 g by mouth once daily.      gabapentin (NEURONTIN) 800 MG tablet Take 1 tablet (800 mg total) by mouth every  evening. (Patient taking differently: Take 800 mg by mouth every evening. ) 90 tablet 3    GLUCOSA KHAN 2KCL/CHONDROITIN KHAN (GLUCOSAMINE SULF-CHONDROITIN ORAL) Take 1 tablet by mouth once daily.      metronidazole 0.75% (METROCREAM) 0.75 % Crea Apply topically once daily to affected area 45 g 1    multivitamin capsule Take 1 capsule by mouth once daily.      OPW TEST CLAIM - DO NOT FILL Take by mouth. OPW test claim. Do not fill. 11 tablet 0    oxybutynin (DITROPAN XL) 15 MG TR24 TAKE 1 TABLET (15 MG TOTAL) BY MOUTH ONCE DAILY. 90 tablet 3    pantoprazole (PROTONIX) 20 MG tablet TAKE 1 TABLET BY MOUTH EVERY DAY 90 tablet 3    polymyxin B sulf-trimethoprim (POLYTRIM) 10,000 unit- 1 mg/mL Drop Place 1 drop into both eyes every 4 (four) hours. 10 mL 0    pravastatin (PRAVACHOL) 40 MG tablet TAKE 1 TABLET BY MOUTH EVERY DAY 90 tablet 3    sertraline (ZOLOFT) 25 MG tablet TAKE 1 TABLET BY MOUTH EVERY DAY 30 tablet 0    traZODone (DESYREL) 50 MG tablet TAKE 1 TABLET (50 MG TOTAL) BY MOUTH NIGHTLY AS NEEDED FOR INSOMNIA. 30 tablet 0    vitamin E 400 UNIT capsule Take 400 Units by mouth once daily.       Current Facility-Administered Medications   Medication Dose Route Frequency Provider Last Rate Last Dose    lidocaine (PF) 20 mg/mL (2 %) injection 2 mL  2 mL Intradermal Once Kalyan Marvin Han MD           Past Medical History:   Diagnosis Date    Allergy     Arthritis     Chronic lower back pain     Depression     Fever blister     GERD (gastroesophageal reflux disease)     Hemorrhoids, internal 11/5/2014    Hyperlipidemia     Hypertension     Joint pain     Morbid obesity with BMI of 40.0-44.9, adult 2/24/2015    Multiple gastric ulcers 12/14/2017    TIMMY (obstructive sleep apnea)     does not use CPAP    Skin cancer      Past Surgical History:   Procedure Laterality Date    ANTERIOR CERVICAL CORPECTOMY W/ FUSION  1988    ARTHROPLASTY-HIP Right 4/28/2016    [facility]    ARTHROPLASTY-KNEE Right 3/14/2018    [facility]    BILATERAL SHOULDER ARTICULAR BRANCH BLOCK Bilateral 8/1/2019    Performed [facility] Lydia [facility]quez Jr., [facility] at [facility] PAIN MGT    COLONOSCOPY N/A 11/2/2016    [facility]itt [facility] at [facility] ENDO    COLONOSCOPY N/A 11/5/2014    [facility] Past[facility] Olvera [facility] at [facility] ENDO    ESOPHAGOGASTRODUODENOSCOPY (EGD) N/A 12/14/2017    [facility]formed by El [facility]itt [facility] at [facility] ENDO    ESOPHAGOGASTRODUODENOSCOPY (EGD) N/A 10/20/2017    [facility] ENDO    Injection-steroid-epidural-caudal N/A 1/10/2019    Per[facility] L[facility] Jr[facility] at [facility] PAIN MGT    Injection-steroid-epidural-lumbar-L2-3 (LEFT > RIGHT) N/A 11/8/2018    [facility] at [facility] PAIN T    KNEE ARTHROSCOPY W/ MENISCAL REPAIR Right 2012    LUMBAR LAMINECTOMY  06/19/2015    SKIN CANCER EXCISION      forehead does not recall date    TOTAL HIP ARTHROPLASTY Right 04/28/2016         TUBAL LIGATION  1980     Family History     Problem Relation (Age of Onset)    Alzheimer's disease Maternal Grandfather    Arthritis Mother    Breast cancer Mother    Cancer Mother, Paternal Grandmother    Colon cancer Paternal Grandmother    Kidney disease Father        Social History     Socioeconomic History    Marital status:      Spouse name: Not on file    Number of children: Not on file    Years of education: 12    Highest education level: Not on file   Occupational History     Employer: [employer]   Social Needs    Financial resource strain: Not on file    Food insecurity:     Worry: Not on file     Inability: Not on file    Transportation needs:     Medical: Not on file     Non-medical: Not on file   Tobacco Use    Smoking status: Never Smoker    Smokeless tobacco: Never Used   Substance and Sexual Activity    Alcohol use: No     Alcohol/week: 0.0 oz    Drug use: No     "Sexual activity: Never   Lifestyle    Physical activity:     Days per week: Not on file     Minutes per session: Not on file    Stress: Not on file   Relationships    Social connections:     Talks on phone: Not on file     Gets together: Not on file     Attends Hindu service: Not on file     Active member of club or organization: Not on file     Attends meetings of clubs or organizations: Not on file     Relationship status: Not on file   Other Topics Concern    Are you pregnant or think you may be? Not Asked    Breast-feeding Not Asked   Social History Narrative    Not on file       Review of Systems:  Review of Systems    Constitutional: no fever, chills or night sweats. No changes in weight   Eyes: no visual changes   ENT: no nasal congestion or sore throat   Respiratory: no cough or shortness of breath   Cardiovascular: no chest pain or palpitations   Gastrointestinal: no nausea or vomiting   Genitourinary: no hematuria or dysuria   Integument/Breast: no rash or pruritis   Hematologic/Lymphatic: no easy bruising or lymphadenopathy   Musculoskeletal: no arthralgias or myalgias.  Positive for back pain and radicular bilateral leg pain.  Neurological: no seizures or tremors. No weakness.  Positive for bilateral leg paresthesia.  Positive for gait instability.  Behavioral/Psych: no auditory or visual hallucinations   Endocrine: no heat or cold intolerance       OBJECTIVE:     Vital Signs  Temp: 98.5 °F (36.9 °C)  Pulse: 81  BP: (!) 143/78  Pain Score:   8  Height: 5' 1" (154.9 cm)  Weight: 104.4 kg (230 lb 2.6 oz)  Body mass index is 43.49 kg/m².      Physical Exam:  Neurosurgery Physical Exam    General: well developed, well nourished, no distress.  Morbidly obese.  Neurologic: Awake, alert and oriented x3. Thought content appropriate.  GCS: Motor: 6/Verbal: 5/Eyes: 4 GCS Total: 15  Cranial nerves: II-XII grossly intact. PERRLA. EOMI without nystagmus. Face symmetric and sensation intact to light touch, " tongue midline, shoulder shrug symmetric bilaterally.  Hearing equal bilaterally to finger rub. Palate and uvula rise and fall normally in midline.    Language: no aphasia  Speech: no dysarthria   Neck: supple, without obvious masses    Sensory: intact to light touch B/L UE and LE  Motor Strength: Moves all extremities spontaneously with good tone. Full strength upper and lower extremities. No abnormal movements seen.    Strength  Deltoids Triceps Biceps Wrist Extension Wrist Flexion Hand    Upper: R 5/5 5/5 5/5 5/5 5/5 5/5    L 5/5 5/5 5/5 5/5 5/5 5/5     Iliopsoas Quadriceps Knee  Flexion Tibialis  anterior Gastro- cnemius EHL   Lower: R 5/5 5/5 5/5 5/5 5/5 5/5    L 5/5 5/5 5/5 5/5 5/5 5/5     Interossi muscle strength- intact    DTR's - 1 + and symmetric in UE and LE  Arreola's - Negative        Lhermitte's - Negative  Spurlings-   Negative         Ankle Clonus - Negative           Babinski  - Negative     SLR - Negative   Gait - slightly antalgic Tandem Gait - mild difficulty but able to perform      Able to walk/stand on heels & toes  Cervical ROM - full; no pain with all ROM  Lumbar ROM - limited; no pain with all ROM  No focal numbness or weakness  tenderness to palpation of lower lumbar spine midline  No difficulty transitioning from seated to standing position or vice versa.  ENT: normal hearing with finger rub  Heart: RRR, no cyanosis, pallor, or edema.   Lungs- normal respiratory effort  Abdomen-  soft, symmetric and nontender  Skin: grossly intact in all 4 extremities without obvious rashes or lesions  Extremities: warm with no cyanosis or edema, or clubbing  Pulses: palpable distal pulses    SI Joint tenderness - positive bilaterally  Distraction test-negative  Compression test-  negative      Greater Trochanter Joint tenderness - Negative  Dev's Test - Negative   Pain on Hip ROM - Negative    Posture-  No obvious kyphosis with standing posture.  With bending posture, no obvious scapula  wing        Diagnostic Results:  All imaging personally reviewed    MRI lumbar spine without contrast dated 10/31/2018  Postoperative changes of previous L4-5 laminectomy.  L3-4 degenerative disc disease, anterolisthesis, and facet arthropathy causing severe central canal stenosis.    X-ray lumbar 10/24/2018  L4-5 interbody fusion with posterior instrumentation.  L3-4 anterolisthesis.  ASSESSMENT/PLAN:     68 y.o.  female with history of nonsmoking, osteopenia L-spine (05/08/2018 DEXA-on vitamin-D and calcium supplements), GERD, depression, hypertension, hyperlipidemia, morbid obesity BMI 43.9, TIMMY, bladder spasms, insomnia, status post previous L4-5  TLIF 06/19/2015 with Dr. Morales, status post previous ACDF in 1988, chronic left shoulder pain (refusing shoulder surgery), status post right knee arthroplasty 03/14/2018, and status post right hip arthroplasty 04/20/2016 who presents with worsening back pain and bilateral leg paresthesias.  Full strength on exam but does have antalgic gait.  Imaging October 2018 showed stable postoperative changes of previous L4-5TLIF; however, there is borderline grade 2 L3-4 anterolisthesis, DDD, and facet arthropathy with corresponding moderate central canal stenosis and bilateral neural foraminal stenosis.  This is consistent with her exam/symptoms.  Sacroiliitis may be referred pain from her lumbar spine.    I will refer patient to physical therapy for her low back and gait training.  Recommended LSO brace p.r.n. physical activity (she will use her ), and obtain updated MRI L-spine since she has worsening symptoms.  I will also refer patient to see Dr. Brantley to discuss possible surgical treatment options since she has failed conservative treatment.  In the interim, she can continue her p.o. Regimen per pain management.    We also discussed diet, exercise, and weight loss to improve pain and prevent further spinal damage given obesity. Patient was referred to bariatric  Medicine.        All imaging were reviewed with patientand staff. All questions were answered.  Patient verbalized understanding and agreed with the above plan of care.  Patient was encouraged to call clinic with any future concerns prior to follow up appt.    Please call with any questions.    Discussed with Dr. Brantley and he agreed with the above plan of care.     Paco Dyson PA-C  Neurosurgery      Note dictated with voice recognition software, please excuse any grammatical errors.

## 2019-09-06 RX ORDER — TRAMADOL HYDROCHLORIDE 50 MG/1
50 TABLET ORAL
Qty: 30 TABLET | Refills: 3 | Status: ON HOLD | OUTPATIENT
Start: 2019-09-06 | End: 2020-01-07 | Stop reason: HOSPADM

## 2019-09-09 DIAGNOSIS — F32.A DEPRESSION, UNSPECIFIED DEPRESSION TYPE: ICD-10-CM

## 2019-09-09 DIAGNOSIS — F41.9 ANXIETY: ICD-10-CM

## 2019-09-09 RX ORDER — SERTRALINE HYDROCHLORIDE 25 MG/1
TABLET, FILM COATED ORAL
Qty: 30 TABLET | Refills: 0 | Status: SHIPPED | OUTPATIENT
Start: 2019-09-09 | End: 2019-10-06 | Stop reason: SDUPTHER

## 2019-09-12 ENCOUNTER — HOSPITAL ENCOUNTER (EMERGENCY)
Facility: HOSPITAL | Age: 69
Discharge: HOME | End: 2019-09-12
Attending: EMERGENCY MEDICINE
Payer: MEDICARE

## 2019-09-12 VITALS
TEMPERATURE: 99 F | BODY MASS INDEX: 43.43 KG/M2 | SYSTOLIC BLOOD PRESSURE: 124 MMHG | OXYGEN SATURATION: 96 % | WEIGHT: 230 LBS | HEIGHT: 61 IN | DIASTOLIC BLOOD PRESSURE: 65 MMHG | RESPIRATION RATE: 19 BRPM | HEART RATE: 76 BPM

## 2019-09-12 DIAGNOSIS — M25.572 LEFT ANKLE PAIN: Primary | ICD-10-CM

## 2019-09-12 DIAGNOSIS — S99.922A INJURY OF LEFT FOOT: ICD-10-CM

## 2019-09-12 DIAGNOSIS — S92.352A CLOSED DISPLACED FRACTURE OF FIFTH METATARSAL BONE OF LEFT FOOT, INITIAL ENCOUNTER: ICD-10-CM

## 2019-09-12 PROCEDURE — 25000003 PHARM REV CODE 250: Performed by: PHYSICIAN ASSISTANT

## 2019-09-12 PROCEDURE — 99283 EMERGENCY DEPT VISIT LOW MDM: CPT | Mod: 25

## 2019-09-12 PROCEDURE — 29515 APPLICATION SHORT LEG SPLINT: CPT | Mod: LT

## 2019-09-12 RX ORDER — NAPROXEN 250 MG/1
250 TABLET ORAL 2 TIMES DAILY WITH MEALS
Qty: 10 TABLET | Refills: 0 | Status: SHIPPED | OUTPATIENT
Start: 2019-09-12 | End: 2019-09-17

## 2019-09-12 RX ORDER — ACETAMINOPHEN 500 MG
1000 TABLET ORAL
Status: COMPLETED | OUTPATIENT
Start: 2019-09-12 | End: 2019-09-12

## 2019-09-12 RX ADMIN — ACETAMINOPHEN 1000 MG: 500 TABLET ORAL at 10:09

## 2019-09-12 NOTE — ED NOTES
Pt able to move toes. Pt has positive sensation in left foot and left lower extremity. Pt denies pain to ankle. Bruising noted to left foot.

## 2019-09-12 NOTE — ED TRIAGE NOTES
Pt presents to ED with C/O L foot pain. She states she was walking [place] when her L ankle twisted. Pt with noted bruising to the L lateral aspect for the foot. Tender to touch and difficulty baring wt. Pt states pain is 8/10.

## 2019-09-13 ENCOUNTER — OFFICE VISIT (OUTPATIENT)
Dept: ORTHOPEDICS | Facility: CLINIC | Age: 69
End: 2019-09-13
Payer: MEDICARE

## 2019-09-13 VITALS — WEIGHT: 230 LBS | HEIGHT: 61 IN | BODY MASS INDEX: 43.43 KG/M2

## 2019-09-13 DIAGNOSIS — S92.355A CLOSED NONDISPLACED FRACTURE OF FIFTH METATARSAL BONE OF LEFT FOOT, INITIAL ENCOUNTER: Primary | ICD-10-CM

## 2019-09-13 DIAGNOSIS — M25.572 JOINT PAIN OF ANKLE AND FOOT, LEFT: ICD-10-CM

## 2019-09-13 DIAGNOSIS — S93.402A SPRAIN OF LEFT ANKLE, UNSPECIFIED LIGAMENT, INITIAL ENCOUNTER: ICD-10-CM

## 2019-09-13 PROCEDURE — 99213 OFFICE O/P EST LOW 20 MIN: CPT | Mod: S$GLB,,, | Performed by: PHYSICIAN ASSISTANT

## 2019-09-13 PROCEDURE — 1101F PT FALLS ASSESS-DOCD LE1/YR: CPT | Mod: CPTII,S$GLB,, | Performed by: PHYSICIAN ASSISTANT

## 2019-09-13 PROCEDURE — 99213 PR OFFICE/OUTPT VISIT, EST, LEVL III, 20-29 MIN: ICD-10-PCS | Mod: S$GLB,,, | Performed by: PHYSICIAN ASSISTANT

## 2019-09-13 PROCEDURE — 99999 PR PBB SHADOW E&M-EST. PATIENT-LVL II: ICD-10-PCS | Mod: PBBFAC,,, | Performed by: PHYSICIAN ASSISTANT

## 2019-09-13 PROCEDURE — 99999 PR PBB SHADOW E&M-EST. PATIENT-LVL II: CPT | Mod: PBBFAC,,, | Performed by: PHYSICIAN ASSISTANT

## 2019-09-13 PROCEDURE — 1101F PR PT FALLS ASSESS DOC 0-1 FALLS W/OUT INJ PAST YR: ICD-10-PCS | Mod: CPTII,S$GLB,, | Performed by: PHYSICIAN ASSISTANT

## 2019-09-13 NOTE — PROGRESS NOTES
SUBJECTIVE:     Chief Complaint & History of Present Illness:  Monique Trujillo is a 68 y.o. year old female, new patient, who presents for evaluation of constant left ankle pain which started 5 days ago. There is a history of trauma. Patient was walking out of her house when she missed a step and inverted her left ankle. The pain is located in the lateral aspect of the ankle.  The pain is described as sharp, 5/10.  It is aggravated by direct pressure and weight bearing.  Associated symptoms include ability to bear weight, but with some pain, pain with inversion of the foot and pain with eversion of the foot.  There is radiation into the foot.  Previous treatments include RICE, Naproxen, and walking boot which have provided adequate relief.  There is not a history of previous surgery to the ankle.  The patient does use an assistive device.    Monique Trujillo is a 68 y.o. year old female here for constant left foot pain which has been present for 5 days.  There is a history of injury. Injury mechanism is the same as the ankle. Patient was seen in ED yesterday for ankle and foot pain.  She had imaging performed that demonstrated a fracture of the base of the 5th metatarsal.  She was placed in a walking boot and referred to Orthopedics. The pain is located in the lateral aspect of the foot.  The pain is described as sharp, 7/10.  It is aggravated by direct pressure and weight bearing.  There is radiation, numbness or tingling into the toes.  Associated symptoms include stiffness, swelling. Previous treatments include RICE, Naproxen and a walking boot which have provided adequate relief.  There is not a history of previous injury or surgery to the foot.   The patient does use an assistive device.    Review of patient's allergies indicates:  No Known Allergies      Current Outpatient Medications   Medication Sig Dispense Refill    amLODIPine (NORVASC) 10 MG tablet TAKE 1 TABLET BY MOUTH EVERY DAY 90 tablet 3     ascorbic acid (VITAMIN C) 1000 MG tablet Take 1,000 mg by mouth once daily.      atenolol (TENORMIN) 25 MG tablet TAKE 1 TABLET BY MOUTH EVERY DAY 90 tablet 3    calcium-vitamin D3 (CALCIUM 500 + D) 500 mg(1,250mg) -200 unit per tablet Take 1 tablet by mouth 2 (two) times daily with meals.      co-enzyme Q-10 30 mg capsule Take 30 mg by mouth once daily.       econazole nitrate 1 % cream Use bid for rash 85 g 2    fenofibric acid (FIBRICOR) 135 mg CpDR Take 1 capsule (135 mg total) by mouth once daily. 90 capsule 3    fish oil-omega-3 fatty acids 300-1,000 mg capsule Take 2 g by mouth once daily.      gabapentin (NEURONTIN) 800 MG tablet Take 1 tablet (800 mg total) by mouth every evening. (Patient taking differently: Take 800 mg by mouth every evening. ) 90 tablet 3    GLUCOSA KHAN 2KCL/CHONDROITIN KHAN (GLUCOSAMINE SULF-CHONDROITIN ORAL) Take 1 tablet by mouth once daily.      metronidazole 0.75% (METROCREAM) 0.75 % Crea Apply topically once daily to affected area 45 g 1    multivitamin capsule Take 1 capsule by mouth once daily.      naproxen (NAPROSYN) 250 MG tablet Take 1 tablet (250 mg total) by mouth 2 (two) times daily with meals. for 5 days 10 tablet 0    OPW TEST CLAIM - DO NOT FILL Take by mouth. OPW test claim. Do not fill. 11 tablet 0    oxybutynin (DITROPAN XL) 15 MG TR24 TAKE 1 TABLET (15 MG TOTAL) BY MOUTH ONCE DAILY. 90 tablet 3    pantoprazole (PROTONIX) 20 MG tablet TAKE 1 TABLET BY MOUTH EVERY DAY 90 tablet 3    polymyxin B sulf-trimethoprim (POLYTRIM) 10,000 unit- 1 mg/mL Drop Place 1 drop into both eyes every 4 (four) hours. 10 mL 0    pravastatin (PRAVACHOL) 40 MG tablet TAKE 1 TABLET BY MOUTH EVERY DAY 90 tablet 3    sertraline (ZOLOFT) 25 MG tablet TAKE 1 TABLET BY MOUTH EVERY DAY 30 tablet 0    traMADol (ULTRAM) 50 mg tablet Take 1 tablet (50 mg total) by mouth every 24 hours as needed for Pain. 30 tablet 3    traZODone (DESYREL) 50 MG tablet TAKE 1 TABLET (50 MG TOTAL) BY  MOUTH NIGHTLY AS NEEDED FOR INSOMNIA. 30 tablet 0    vitamin E 400 UNIT capsule Take 400 Units by mouth once daily.       Current Facility-Administered Medications   Medication Dose Route Frequency Provider Last Rate Last Dose    lidocaine (PF) 20 mg/mL (2 %) injection 2 mL  2 mL Intradermal Once Kalyan Han MD           Past Medical History:   Diagnosis Date    Allergy     Arthritis     Chronic lower back pain     Depression     Fever blister     GERD (gastroesophageal reflux disease)     Hemorrhoids, internal 11/5/2014    Hyperlipidemia     Hypertension     Joint pain     Morbid obesity with BMI of 40.0-44.9, adult 2/24/2015    Multiple gastric ulcers 12/14/2017    TIMMY (obstructive sleep apnea)     does not use CPAP    Skin cancer        Past Surgical History:   Procedure Laterality Date    ANTERIOR CERVICAL CORPECTOMY W/ FUSION  1988    ARTHROPLASTY-HIP Right 4/28/2016    [facility] OR    ARTHROPLASTY-KNEE Right 3/14/2018    [facility] OR    BILATERAL SHOULDER ARTICULAR BRANCH BLOCK Bilateral 8/1/2019    [facility]formed by L[facility] Jr., [facility] at [facility] PAIN T    COLONOSCOPY N/A 11/2/2016    [facility]itt [facility] at [facility] ENDO    COLONOSCOPY N/A 11/5/2014    [facility] Past[facility]a [facility] at [facility] ENDO    ESOPHAGOGASTRODUODENOSCOPY (EGD) N/A 12/14/2017    [facility]formed by El [facility]itt [facility] at [facility] ENDO    ESOPHAGOGASTRODUODENOSCOPY (EGD) N/A 10/20/2017    [facility] by El [facility] at [facility] ENDO    Injection-steroid-epidural-caudal N/A 1/10/2019    Performed by L[facility] Jr[facility] at [facility] PAIN MGT    Injection-steroid-epidural-lumbar-L2-3 (LEFT > RIGHT) N/A 11/8/2018    [facility]    KNEE ARTHROSCOPY W/ MENISCAL REPAIR Right 2012    LUMBAR LAMINECTOMY  06/19/2015    SKIN CANCER EXCISION      forehead does not recall date    TOTAL HIP  ARTHROPLASTY Right 04/28/2016         TUBAL LIGATION  1980       Vital Signs (Most Recent)  There were no vitals filed for this visit.    Review of Systems:  ROS:  Constitutional: no fever or chills  Eyes: no visual changes  ENT: no nasal congestion or sore throat  Respiratory: no cough or shortness of breath  Cardiovascular: no chest pain or palpitations  Gastrointestinal: no nausea or vomiting, tolerating diet  Genitourinary: no hematuria or dysuria  Integument/Breast: no rash or pruritis  Hematologic/Lymphatic: no easy bruising or lymphadenopathy  Musculoskeletal: positive for left foot and ankle pain  Neurological: no seizures or tremors  Behavioral/Psych: no auditory or visual hallucinations  Endocrine: no heat or cold intolerance      OBJECTIVE:     PHYSICAL EXAM:     , General Appearance: Well nourished, well developed, in no acute distress.  CV: 2+ UE and LE distal pulses bilaterally  RESP: Respirations equal and unlabored  GI: Abdomen soft and non-tender  Neurological: Mood & affect are normal.  left  Foot/Ankle  Skin: bruising lateral malleolus along with dorsal aspect of foot  Swelling: moderate to lateral malleolus and dorsal aspect of foot  Warmth: no warmth  Tenderness: mild over lateral foot  ROM: 10 degrees dorsiflexion, 15 degrees plantarflexion, 5 degrees inversion and 5 degrees eversion  Strength: 5 on 5 tibialis anterior strength, 5 of 5 gastroc-soleus strength, 3 of 5 peroneus longus, 3 of 5 peroneus brevis and 4 of 5 tibialis posterior  Gait: in walking boot  Stability: stable to testing  Neurological Exam: normal  Vascular Exam: normal    RADIOGRAPHS:  Xray of left ankle taken on 9/12/19, images reviewed by me, demonstrates mild narrowing of joint spaces without evidence of fracture or dislocation.    Xray of left foot taken on 9/12/19, images reviewed by me, demonstrates closed, nondisplaced fracture of base of 5th metatarsal.    ASSESSMENT/PLAN:     Plan:  Patient to stay in walking boot  with all ambulation at this time.  She is to rest, ice and elevate along with naproxen as needed for pain and swelling.  She is asking if she can go to work ([employer] is her job).  I advised that she can work, but will likely need periodic breaks.  She is to follow up in one week for re-evaluation and repeat imaging of left foot and ankle (xray left foot complete and xray left ankle complete). Patient verbalized understanding.    Final Diagnosis: Closed, nondisplaced fracture of 5th metatarsal, left. Left foot and ankle pain. Ankle sprain, left.

## 2019-09-16 ENCOUNTER — PATIENT OUTREACH (OUTPATIENT)
Dept: ADMINISTRATIVE | Facility: OTHER | Age: 69
End: 2019-09-16

## 2019-09-18 ENCOUNTER — PATIENT OUTREACH (OUTPATIENT)
Dept: ADMINISTRATIVE | Facility: OTHER | Age: 69
End: 2019-09-18

## 2019-09-18 ENCOUNTER — TELEPHONE (OUTPATIENT)
Dept: PAIN MEDICINE | Facility: CLINIC | Age: 69
End: 2019-09-18

## 2019-09-18 DIAGNOSIS — M25.512 CHRONIC LEFT SHOULDER PAIN: Primary | ICD-10-CM

## 2019-09-18 DIAGNOSIS — G89.29 CHRONIC LEFT SHOULDER PAIN: Primary | ICD-10-CM

## 2019-09-19 ENCOUNTER — PATIENT OUTREACH (OUTPATIENT)
Dept: ADMINISTRATIVE | Facility: OTHER | Age: 69
End: 2019-09-19

## 2019-09-23 ENCOUNTER — HOSPITAL ENCOUNTER (OUTPATIENT)
Dept: RADIOLOGY | Facility: HOSPITAL | Age: 69
Discharge: HOME | End: 2019-09-23
Attending: PHYSICIAN ASSISTANT
Payer: MEDICARE

## 2019-09-23 ENCOUNTER — OFFICE VISIT (OUTPATIENT)
Dept: ORTHOPEDICS | Facility: CLINIC | Age: 69
End: 2019-09-23
Payer: MEDICARE

## 2019-09-23 VITALS
DIASTOLIC BLOOD PRESSURE: 73 MMHG | HEIGHT: 61 IN | WEIGHT: 230 LBS | BODY MASS INDEX: 43.43 KG/M2 | SYSTOLIC BLOOD PRESSURE: 126 MMHG | HEART RATE: 73 BPM

## 2019-09-23 DIAGNOSIS — S92.355D CLOSED NONDISPLACED FRACTURE OF FIFTH METATARSAL BONE OF LEFT FOOT WITH ROUTINE HEALING, SUBSEQUENT ENCOUNTER: Primary | ICD-10-CM

## 2019-09-23 DIAGNOSIS — S92.355A CLOSED NONDISPLACED FRACTURE OF FIFTH METATARSAL BONE OF LEFT FOOT, INITIAL ENCOUNTER: ICD-10-CM

## 2019-09-23 DIAGNOSIS — M25.572 JOINT PAIN OF ANKLE AND FOOT, LEFT: ICD-10-CM

## 2019-09-23 DIAGNOSIS — S93.402D SPRAIN OF LEFT ANKLE, UNSPECIFIED LIGAMENT, SUBSEQUENT ENCOUNTER: ICD-10-CM

## 2019-09-23 DIAGNOSIS — M48.062 SPINAL STENOSIS OF LUMBAR REGION WITH NEUROGENIC CLAUDICATION: ICD-10-CM

## 2019-09-23 PROCEDURE — 73630 X-RAY EXAM OF FOOT: CPT | Mod: TC,LT

## 2019-09-23 PROCEDURE — 73610 XR ANKLE COMPLETE 3 VIEW LEFT: ICD-10-PCS | Mod: 26,LT,, | Performed by: RADIOLOGY

## 2019-09-23 PROCEDURE — 99999 PR PBB SHADOW E&M-EST. PATIENT-LVL III: ICD-10-PCS | Mod: PBBFAC,,, | Performed by: PHYSICIAN ASSISTANT

## 2019-09-23 PROCEDURE — 99213 PR OFFICE/OUTPT VISIT, EST, LEVL III, 20-29 MIN: ICD-10-PCS | Mod: S$GLB,,, | Performed by: PHYSICIAN ASSISTANT

## 2019-09-23 PROCEDURE — 73630 XR FOOT COMPLETE 3 VIEW LEFT: ICD-10-PCS | Mod: 26,LT,, | Performed by: RADIOLOGY

## 2019-09-23 PROCEDURE — 3074F SYST BP LT 130 MM HG: CPT | Mod: CPTII,S$GLB,, | Performed by: PHYSICIAN ASSISTANT

## 2019-09-23 PROCEDURE — 1101F PR PT FALLS ASSESS DOC 0-1 FALLS W/OUT INJ PAST YR: ICD-10-PCS | Mod: CPTII,S$GLB,, | Performed by: PHYSICIAN ASSISTANT

## 2019-09-23 PROCEDURE — 73630 X-RAY EXAM OF FOOT: CPT | Mod: 26,LT,, | Performed by: RADIOLOGY

## 2019-09-23 PROCEDURE — 72148 MRI LUMBAR SPINE W/O DYE: CPT | Mod: TC

## 2019-09-23 PROCEDURE — 73610 X-RAY EXAM OF ANKLE: CPT | Mod: TC,LT

## 2019-09-23 PROCEDURE — 99213 OFFICE O/P EST LOW 20 MIN: CPT | Mod: S$GLB,,, | Performed by: PHYSICIAN ASSISTANT

## 2019-09-23 PROCEDURE — 99999 PR PBB SHADOW E&M-EST. PATIENT-LVL III: CPT | Mod: PBBFAC,,, | Performed by: PHYSICIAN ASSISTANT

## 2019-09-23 PROCEDURE — 3074F PR MOST RECENT SYSTOLIC BLOOD PRESSURE < 130 MM HG: ICD-10-PCS | Mod: CPTII,S$GLB,, | Performed by: PHYSICIAN ASSISTANT

## 2019-09-23 PROCEDURE — 1101F PT FALLS ASSESS-DOCD LE1/YR: CPT | Mod: CPTII,S$GLB,, | Performed by: PHYSICIAN ASSISTANT

## 2019-09-23 PROCEDURE — 72148 MRI LUMBAR SPINE WITHOUT CONTRAST: ICD-10-PCS | Mod: 26,,, | Performed by: RADIOLOGY

## 2019-09-23 PROCEDURE — 3078F PR MOST RECENT DIASTOLIC BLOOD PRESSURE < 80 MM HG: ICD-10-PCS | Mod: CPTII,S$GLB,, | Performed by: PHYSICIAN ASSISTANT

## 2019-09-23 PROCEDURE — 72148 MRI LUMBAR SPINE W/O DYE: CPT | Mod: 26,,, | Performed by: RADIOLOGY

## 2019-09-23 PROCEDURE — 73610 X-RAY EXAM OF ANKLE: CPT | Mod: 26,LT,, | Performed by: RADIOLOGY

## 2019-09-23 PROCEDURE — 3078F DIAST BP <80 MM HG: CPT | Mod: CPTII,S$GLB,, | Performed by: PHYSICIAN ASSISTANT

## 2019-09-23 RX ORDER — NAPROXEN 500 MG/1
500 TABLET ORAL 2 TIMES DAILY WITH MEALS
Qty: 60 TABLET | Refills: 0 | Status: SHIPPED | OUTPATIENT
Start: 2019-09-23 | End: 2019-10-25 | Stop reason: SDUPTHER

## 2019-09-23 NOTE — PROGRESS NOTES
SUBJECTIVE:     Chief Complaint & History of Present Illness:  Monique Trujillo is a 68 y.o. year old female, established patient, here for follow up of left foot 5th metatarsal fx and left ankle sprain follow up.  She was last seen in clinic for this complaint on 9/13/19 where we discussed that she should stay in the walking boot with all ambulation.  Since last visit, she states she has been doing well.  She was able to go to work in her walking boot without too much discomfort.  She does notice that she still has some swelling and bruising present.  She describes pain as discomfort, 2/10 in clinic today located more so on the dorsal aspect of her foot.  She also states she is out of the Naproxen she was prescribed at  which seemed to help with the swelling and pain.     Review of patient's allergies indicates:  No Known Allergies      Current Outpatient Medications   Medication Sig Dispense Refill    amLODIPine (NORVASC) 10 MG tablet TAKE 1 TABLET BY MOUTH EVERY DAY 90 tablet 3    ascorbic acid (VITAMIN C) 1000 MG tablet Take 1,000 mg by mouth once daily.      atenolol (TENORMIN) 25 MG tablet TAKE 1 TABLET BY MOUTH EVERY DAY 90 tablet 3    calcium-vitamin D3 (CALCIUM 500 + D) 500 mg(1,250mg) -200 unit per tablet Take 1 tablet by mouth 2 (two) times daily with meals.      co-enzyme Q-10 30 mg capsule Take 30 mg by mouth once daily.       econazole nitrate 1 % cream Use bid for rash 85 g 2    fenofibric acid (FIBRICOR) 135 mg CpDR Take 1 capsule (135 mg total) by mouth once daily. 90 capsule 3    fish oil-omega-3 fatty acids 300-1,000 mg capsule Take 2 g by mouth once daily.      gabapentin (NEURONTIN) 800 MG tablet Take 1 tablet (800 mg total) by mouth every evening. (Patient taking differently: Take 800 mg by mouth every evening. ) 90 tablet 3    GLUCOSA KHAN 2KCL/CHONDROITIN KHAN (GLUCOSAMINE SULF-CHONDROITIN ORAL) Take 1 tablet by mouth once daily.      metronidazole 0.75% (METROCREAM) 0.75 % Crea  Apply topically once daily to affected area 45 g 1    multivitamin capsule Take 1 capsule by mouth once daily.      OPW TEST CLAIM - DO NOT FILL Take by mouth. OPW test claim. Do not fill. 11 tablet 0    oxybutynin (DITROPAN XL) 15 MG TR24 TAKE 1 TABLET (15 MG TOTAL) BY MOUTH ONCE DAILY. 90 tablet 3    pantoprazole (PROTONIX) 20 MG tablet TAKE 1 TABLET BY MOUTH EVERY DAY 90 tablet 3    polymyxin B sulf-trimethoprim (POLYTRIM) 10,000 unit- 1 mg/mL Drop Place 1 drop into both eyes every 4 (four) hours. 10 mL 0    pravastatin (PRAVACHOL) 40 MG tablet TAKE 1 TABLET BY MOUTH EVERY DAY 90 tablet 3    sertraline (ZOLOFT) 25 MG tablet TAKE 1 TABLET BY MOUTH EVERY DAY 30 tablet 0    traMADol (ULTRAM) 50 mg tablet Take 1 tablet (50 mg total) by mouth every 24 hours as needed for Pain. 30 tablet 3    traZODone (DESYREL) 50 MG tablet TAKE 1 TABLET (50 MG TOTAL) BY MOUTH NIGHTLY AS NEEDED FOR INSOMNIA. 30 tablet 0    vitamin E 400 UNIT capsule Take 400 Units by mouth once daily.       Current Facility-Administered Medications   Medication Dose Route Frequency Provider Last Rate Last Dose    lidocaine (PF) 20 mg/mL (2 %) injection 2 mL  2 mL Intradermal Once Kalyan Marvin Han MD           Past Medical History:   Diagnosis Date    Allergy     Arthritis     Chronic lower back pain     Depression     Fever blister     GERD (gastroesophageal reflux disease)     Hemorrhoids, internal 11/5/2014    Hyperlipidemia     Hypertension     Joint pain     Morbid obesity with BMI of 40.0-44.9, adult 2/24/2015    Multiple gastric ulcers 12/14/2017    TIMMY (obstructive sleep apnea)     does not use CPAP    Skin cancer        Past Surgical History:   Procedure Laterality Date    ANTERIOR CERVICAL CORPECTOMY W/ FUSION  1988    ARTHROPLASTY-HIP Right 4/28/2016    [facility] Ferm[facility]    ARTHROPLASTY-KNEE Right 3/14/2018    Performed by Yunier Combs [facility]    BILATERAL SHOULDER  ARTICULAR BRANCH BLOCK Bilateral 8/1/2019    Performed by Lydia Velásquez Jr., [facility]    COLONOSCOPY N/A 11/2/2016    [facility]    COLONOSCOPY N/A 11/5/2014    [facility] by Pastora Olvera [facility] at [facility]    ESOPHAGOGASTRODUODENOSCOPY (EGD) N/A 12/14/2017    Performed by El [facility]itt [facility] at [facility]    ESOPHAGOGASTRODUODENOSCOPY (EGD) N/A 10/20/2017    [facility] El [facility]    Injection-steroid-epidural-caudal N/A 1/10/2019    [facility] at [facility]    Injection-steroid-epidural-lumbar-L2-3 (LEFT > RIGHT) N/A 11/8/2018    [facility] at [facility]    KNEE ARTHROSCOPY W/ MENISCAL REPAIR Right 2012    LUMBAR LAMINECTOMY  06/19/2015    SKIN CANCER EXCISION      forehead does not recall date    TOTAL HIP ARTHROPLASTY Right 04/28/2016         TUBAL LIGATION  1980       Vital Signs (Most Recent)  There were no vitals filed for this visit.    Review of Systems:  ROS:  Constitutional: no fever or chills  Eyes: no visual changes  ENT: no nasal congestion or sore throat  Respiratory: no cough or shortness of breath  Cardiovascular: no chest pain or palpitations  Gastrointestinal: no nausea or vomiting, tolerating diet  Genitourinary: no hematuria or dysuria  Integument/Breast: no rash or pruritis  Hematologic/Lymphatic: no easy bruising or lymphadenopathy  Musculoskeletal: positive for left foot and ankle pain  Neurological: no seizures or tremors  Behavioral/Psych: no auditory or visual hallucinations  Endocrine: no heat or cold intolerance    OBJECTIVE:     PHYSICAL EXAM:     , General Appearance: Well nourished, well developed, in no acute distress.  CV: 2+ UE and LE distal pulses bilaterally  RESP: Respirations equal and unlabored  GI: Abdomen soft and non-tender  Neurological: Mood & affect are normal.  left  Foot/Ankle  Skin: bruising over the 2nd-3rd  toe  Swelling: moderate to lateral malleolus and dorsal aspect of foot  Warmth: no warmth  Tenderness: mild over dorsal aspect of foot  ROM: 10 degrees dorsiflexion, 15 degrees plantarflexion, 5 degrees inversion and 5 degrees eversion  Strength: 5 on 5 tibialis anterior strength, 5 of 5 gastroc-soleus strength, 3 of 5 peroneus longus, 3 of 5 peroneus brevis and 4 of 5 tibialis posterior  Gait: in walking boot  Stability: stable to testing  Neurological Exam: normal  Vascular Exam: normal    RADIOGRAPHS:  Xray of left ankle taken in clinic today, images reviewed by me, demonstrates mild narrowing of joint spaces without evidence of fracture or dislocation.     Xray of left foot taken in clinic today, images reviewed by me, demonstrates closed, nondisplaced fracture of base of 5th metatarsal.    ASSESSMENT/PLAN:     Plan:  Patient to stay in walking boot with all ambulation.  She is to continue rest, ice and elevation of her foot/ankle.  I again emphasized the importance of elevating her leg above her heart level.  Prescribed Naproxen 500mg PO BID to be taken for pain and swelling.  She is to take this medicine with food to reduce gastric side effects. She is to follow up in 4 weeks for re-evaluation and repeat imaging (xray left foot complete).  At her follow up visit, I will likely have her fitted and placed in a post-op shoe.  Patient verbalized understanding.    Final Diagnosis: Closed, nondisplaced fracture of 5th metatarsal, left. Left foot and ankle pain. Ankle sprain, left.

## 2019-10-01 ENCOUNTER — PATIENT OUTREACH (OUTPATIENT)
Dept: ADMINISTRATIVE | Facility: OTHER | Age: 69
End: 2019-10-01

## 2019-10-06 DIAGNOSIS — F32.A DEPRESSION, UNSPECIFIED DEPRESSION TYPE: ICD-10-CM

## 2019-10-06 DIAGNOSIS — F41.9 ANXIETY: ICD-10-CM

## 2019-10-07 RX ORDER — SERTRALINE HYDROCHLORIDE 25 MG/1
TABLET, FILM COATED ORAL
Qty: 30 TABLET | Refills: 0 | Status: SHIPPED | OUTPATIENT
Start: 2019-10-07 | End: 2019-10-11 | Stop reason: SDUPTHER

## 2019-10-08 ENCOUNTER — PATIENT OUTREACH (OUTPATIENT)
Dept: ADMINISTRATIVE | Facility: OTHER | Age: 69
End: 2019-10-08

## 2019-10-09 ENCOUNTER — HOSPITAL ENCOUNTER (OUTPATIENT)
Dept: RADIOLOGY | Facility: HOSPITAL | Age: 69
Discharge: HOME | End: 2019-10-09
Attending: NEUROLOGICAL SURGERY
Payer: MEDICARE

## 2019-10-09 ENCOUNTER — OFFICE VISIT (OUTPATIENT)
Dept: NEUROSURGERY | Facility: CLINIC | Age: 69
End: 2019-10-09
Payer: MEDICARE

## 2019-10-09 VITALS
DIASTOLIC BLOOD PRESSURE: 92 MMHG | BODY MASS INDEX: 43.43 KG/M2 | SYSTOLIC BLOOD PRESSURE: 148 MMHG | WEIGHT: 230 LBS | HEIGHT: 61 IN

## 2019-10-09 DIAGNOSIS — M54.16 SPINAL STENOSIS OF LUMBAR REGION WITH RADICULOPATHY: ICD-10-CM

## 2019-10-09 DIAGNOSIS — M48.061 SPINAL STENOSIS OF LUMBAR REGION WITH RADICULOPATHY: ICD-10-CM

## 2019-10-09 DIAGNOSIS — F32.A DEPRESSION, UNSPECIFIED DEPRESSION TYPE: ICD-10-CM

## 2019-10-09 DIAGNOSIS — Z98.1 STATUS POST LUMBAR SPINAL FUSION: ICD-10-CM

## 2019-10-09 DIAGNOSIS — F41.9 ANXIETY: ICD-10-CM

## 2019-10-09 DIAGNOSIS — Z98.1 STATUS POST LUMBAR SPINAL FUSION: Primary | ICD-10-CM

## 2019-10-09 DIAGNOSIS — M43.16 SPONDYLOLISTHESIS AT L3-L4 LEVEL: ICD-10-CM

## 2019-10-09 PROCEDURE — 3288F PR FALLS RISK ASSESSMENT DOCUMENTED: ICD-10-PCS | Mod: CPTII,S$GLB,, | Performed by: NEUROLOGICAL SURGERY

## 2019-10-09 PROCEDURE — 1100F PR PT FALLS ASSESS DOC 2+ FALLS/FALL W/INJURY/YR: ICD-10-PCS | Mod: CPTII,S$GLB,, | Performed by: NEUROLOGICAL SURGERY

## 2019-10-09 PROCEDURE — 72082 X-RAY EXAM ENTIRE SPI 2/3 VW: CPT | Mod: 26,,, | Performed by: RADIOLOGY

## 2019-10-09 PROCEDURE — 3077F PR MOST RECENT SYSTOLIC BLOOD PRESSURE >= 140 MM HG: ICD-10-PCS | Mod: CPTII,S$GLB,, | Performed by: NEUROLOGICAL SURGERY

## 2019-10-09 PROCEDURE — 3077F SYST BP >= 140 MM HG: CPT | Mod: CPTII,S$GLB,, | Performed by: NEUROLOGICAL SURGERY

## 2019-10-09 PROCEDURE — 99999 PR PBB SHADOW E&M-EST. PATIENT-LVL IV: ICD-10-PCS | Mod: PBBFAC,,, | Performed by: NEUROLOGICAL SURGERY

## 2019-10-09 PROCEDURE — 99214 OFFICE O/P EST MOD 30 MIN: CPT | Mod: S$GLB,,, | Performed by: NEUROLOGICAL SURGERY

## 2019-10-09 PROCEDURE — 3080F DIAST BP >= 90 MM HG: CPT | Mod: CPTII,S$GLB,, | Performed by: NEUROLOGICAL SURGERY

## 2019-10-09 PROCEDURE — 99214 PR OFFICE/OUTPT VISIT, EST, LEVL IV, 30-39 MIN: ICD-10-PCS | Mod: S$GLB,,, | Performed by: NEUROLOGICAL SURGERY

## 2019-10-09 PROCEDURE — 1100F PTFALLS ASSESS-DOCD GE2>/YR: CPT | Mod: CPTII,S$GLB,, | Performed by: NEUROLOGICAL SURGERY

## 2019-10-09 PROCEDURE — 72082 XR SCOLIOSIS COMPLETE: ICD-10-PCS | Mod: 26,,, | Performed by: RADIOLOGY

## 2019-10-09 PROCEDURE — 72082 X-RAY EXAM ENTIRE SPI 2/3 VW: CPT | Mod: TC,FY

## 2019-10-09 PROCEDURE — 99499 RISK ADDL DX/OHS AUDIT: ICD-10-PCS | Mod: S$GLB,,, | Performed by: NEUROLOGICAL SURGERY

## 2019-10-09 PROCEDURE — 3080F PR MOST RECENT DIASTOLIC BLOOD PRESSURE >= 90 MM HG: ICD-10-PCS | Mod: CPTII,S$GLB,, | Performed by: NEUROLOGICAL SURGERY

## 2019-10-09 PROCEDURE — 99499 UNLISTED E&M SERVICE: CPT | Mod: S$GLB,,, | Performed by: NEUROLOGICAL SURGERY

## 2019-10-09 PROCEDURE — 99999 PR PBB SHADOW E&M-EST. PATIENT-LVL IV: CPT | Mod: PBBFAC,,, | Performed by: NEUROLOGICAL SURGERY

## 2019-10-09 PROCEDURE — 3288F FALL RISK ASSESSMENT DOCD: CPT | Mod: CPTII,S$GLB,, | Performed by: NEUROLOGICAL SURGERY

## 2019-10-09 NOTE — PROGRESS NOTES
NEUROSURGICAL PROGRESS NOTE    DATE OF SERVICE:  10/09/2019    ATTENDING PHYSICIAN:  James Brantley MD    SUBJECTIVE:    INTERIM HISTORY:    This is a very pleasant 68 y.o. female, who had a L4-5 transforaminal interbody fusion with instrumentation 2015 with Dr. Morales at [facility] for spondylolisthesis with chronic low back pain.  After the surgery she reports having a new onset of right L5 distribution numbness and pain.  The numbness has not improved since her last surgery.  In the last year she has developed worsening difficulty walking.  She has bilateral leg numbness involving the whole leg.  She has urinary frequency and infrequent urinary incontinence is.  She has worsening low back pain.  The pain is constant.  She is still working 2 days a week as a .  She has difficulty walking.  Shef recently fell and broke her left foot which is being treated with a boot.  She tried physical therapy in the past and spinal injection without significant pain relief.    Low Back Pain Scale  R Low Back-Pain Score: 6  R Low Back-Pain Intensity: Pain killers give moderate relief from pain  R Low Back-Pain Score: It is painful to look after myself and I am slow and careful  Low Back-Lifting: Pain prevents me from lifting heavy weights  but I can manage light weights if they are conveniently placed   Low Back-Walking: Pain prevents me walking more than .25 mile   Low Back-Sitting: I can sit only in my favorite chair as long as I like   Low Back-Standing: I cannot stand for longer than 1 hour without increasing pain   Low Back-Sleeping: I have pain in bed but it does not prevent me from sleeping well   Low Back-Social Life: Pain has no significant effect on my social life apart from limiting my more en   Low Back-Traveling: (n/a)   Low Back-Changing Degree of Pain: (n/a)         PAST MEDICAL HISTORY:  Active Ambulatory Problems     Diagnosis Date Noted    Osteopenia of multiple sites 08/05/2014    Morbid obesity with BMI of  40.0-44.9, adult 02/24/2015    Mixed hyperlipidemia 02/24/2015    Mood disorder 02/24/2015    Mixed stress and urge urinary incontinence 02/24/2015    Osteoarthritis of spine with radiculopathy, lumbar region 09/04/2015    Essential hypertension 09/04/2015    Primary osteoarthritis of right hip 04/28/2016    SI (sacroiliac) joint dysfunction 09/12/2016    Spondylolisthesis, lumbar region 09/12/2016    Lumbar degenerative disc disease 09/12/2016    History of colon polyps 11/02/2016    Status post right hip replacement 12/20/2016    Lumbar radiculopathy 12/20/2016    Dysphagia 10/05/2017    Bilateral primary osteoarthritis of knee 10/18/2017    Osteoarthritis of left shoulder 10/18/2017    Multiple gastric ulcers 12/14/2017    TIMMY (obstructive sleep apnea)     Primary osteoarthritis of both knees 03/14/2018    Decreased ROM of right knee 04/10/2018    Decreased mobility and endurance 04/10/2018    Right knee pain 04/10/2018    Decreased strength 04/10/2018    Chronic bilateral low back pain with bilateral sciatica 10/24/2018    Spinal stenosis of lumbar region with neurogenic claudication 10/31/2018    Chronic pain 11/08/2018    Shoulder arthritis 02/28/2019    Chronic pain of both shoulders 06/10/2019    Status post lumbar spinal fusion 08/30/2019     Resolved Ambulatory Problems     Diagnosis Date Noted    Obesity 08/05/2014    Hemorrhoids, internal 11/05/2014    Screen for colon cancer 11/05/2014    Osteoarthritis 11/10/2014    Hypertension 02/24/2015    Absence of bladder continence 09/04/2015     Past Medical History:   Diagnosis Date    Allergy     Arthritis     Chronic lower back pain     Depression     Fever blister     GERD (gastroesophageal reflux disease)     Hyperlipidemia     Joint pain     Skin cancer        PAST SURGICAL HISTORY:  Past Surgical History:   Procedure Laterality Date    ANTERIOR CERVICAL CORPECTOMY W/ FUSION  1988    CAUDAL EPIDURAL STEROID  INJECTION N/A 1/10/2019    Procedure: Injection-steroid-epidural-caudal;  Surgeon: Lydia Velásquez Jr., MD;  Location: [facility];  Service: Pain Management;  Laterality: N/A;    COLONOSCOPY N/A 11/2/2016    Procedure: COLONOSCOPY;  Surgeon: Viji Dewitt MD;  Location: [facility];  Service: Endoscopy;  Laterality: N/A;    EPIDURAL STEROID INJECTION INTO LUMBAR SPINE N/A 11/8/2018    Procedure: Injection-steroid-epidural-lumbar-L2-3 (LEFT > RIGHT);  Surgeon: Lydia Velásquez Jr., MD;  Location: [facility];  Service: Pain Management;  Laterality: N/A;    INJECTION OF ANESTHETIC AGENT AROUND GENITOFEMORAL NERVE Bilateral 8/1/2019    Procedure: BILATERAL SHOULDER ARTICULAR BRANCH BLOCK;  Surgeon: Lydia Velásquez Jr., MD;  Location: [facility] [place];  Service: Pain Management;  Laterality: Bilateral;    KNEE ARTHROSCOPY W/ MENISCAL REPAIR Right 2012    LUMBAR LAMINECTOMY  06/19/2015    SKIN CANCER EXCISION      forehead does not recall date    TOTAL HIP ARTHROPLASTY Right 04/28/2016         TUBAL LIGATION  1980       SOCIAL HISTORY:   Social History     Socioeconomic History    Marital status:      Spouse name: Not on file    Number of children: Not on file    Years of education: 12    Highest education level: Not on file   Occupational History     Employer: [employer]   Social Needs    Financial resource strain: Not on file    Food insecurity:     Worry: Not on file     Inability: Not on file    Transportation needs:     Medical: Not on file     Non-medical: Not on file   Tobacco Use    Smoking status: Never Smoker    Smokeless tobacco: Never Used   Substance and Sexual Activity    Alcohol use: No     Alcohol/week: 0.0 standard drinks    Drug use: No    Sexual activity: Never   Lifestyle    Physical activity:     Days per week: Not on file     Minutes per session: Not on file    Stress: Not on file   Relationships    Social connections:     Talks on phone: Not on file     Gets  together: Not on file     Attends Ca[employer] service: Not on file     Active member of club or organization: Not on file     Attends meetings of clubs or organizations: Not on file     Relationship status: Not on file   Other Topics Concern    Are you pregnant or think you may be? Not Asked    Breast-feeding Not Asked   Social History Narrative    Not on file       FAMILY HISTORY:  Family History   Problem Relation Age of Onset    Arthritis Mother     Cancer Mother         breast ca    Breast cancer Mother     Kidney disease Father     Alzheimer's disease Maternal Grandfather     Cancer Paternal Grandmother         colon ca    Colon cancer Paternal Grandmother        CURRENTS MEDICATIONS:  Current Outpatient Medications on File Prior to Visit   Medication Sig Dispense Refill    amLODIPine (NORVASC) 10 MG tablet TAKE 1 TABLET BY MOUTH EVERY DAY 90 tablet 3    ascorbic acid (VITAMIN C) 1000 MG tablet Take 1,000 mg by mouth once daily.      atenolol (TENORMIN) 25 MG tablet TAKE 1 TABLET BY MOUTH EVERY DAY 90 tablet 3    calcium-vitamin D3 (CALCIUM 500 + D) 500 mg(1,250mg) -200 unit per tablet Take 1 tablet by mouth 2 (two) times daily with meals.      co-enzyme Q-10 30 mg capsule Take 30 mg by mouth once daily.       fenofibric acid (FIBRICOR) 135 mg CpDR Take 1 capsule (135 mg total) by mouth once daily. 90 capsule 3    fish oil-omega-3 fatty acids 300-1,000 mg capsule Take 2 g by mouth once daily.      gabapentin (NEURONTIN) 800 MG tablet Take 1 tablet (800 mg total) by mouth every evening. (Patient taking differently: Take 800 mg by mouth every evening. ) 90 tablet 3    GLUCOSA KHAN 2KCL/CHONDROITIN KHAN (GLUCOSAMINE SULF-CHONDROITIN ORAL) Take 1 tablet by mouth once daily.      multivitamin capsule Take 1 capsule by mouth once daily.      naproxen (NAPROSYN) 500 MG tablet Take 1 tablet (500 mg total) by mouth 2 (two) times daily with meals. 60 tablet 0    oxybutynin (DITROPAN XL) 15 MG TR24 TAKE 1  TABLET (15 MG TOTAL) BY MOUTH ONCE DAILY. 90 tablet 3    pantoprazole (PROTONIX) 20 MG tablet TAKE 1 TABLET BY MOUTH EVERY DAY 90 tablet 3    polymyxin B sulf-trimethoprim (POLYTRIM) 10,000 unit- 1 mg/mL Drop Place 1 drop into both eyes every 4 (four) hours. 10 mL 0    pravastatin (PRAVACHOL) 40 MG tablet TAKE 1 TABLET BY MOUTH EVERY DAY 90 tablet 3    sertraline (ZOLOFT) 25 MG tablet TAKE 1 TABLET BY MOUTH EVERY DAY 30 tablet 0    traMADol (ULTRAM) 50 mg tablet Take 1 tablet (50 mg total) by mouth every 24 hours as needed for Pain. 30 tablet 3    vitamin E 400 UNIT capsule Take 400 Units by mouth once daily.      econazole nitrate 1 % cream Use bid for rash (Patient not taking: Reported on 10/9/2019) 85 g 2    metronidazole 0.75% (METROCREAM) 0.75 % Crea Apply topically once daily to affected area 45 g 1    OPW TEST CLAIM - DO NOT FILL Take by mouth. OPW test claim. Do not fill. 11 tablet 0     Current Facility-Administered Medications on File Prior to Visit   Medication Dose Route Frequency Provider Last Rate Last Dose    lidocaine (PF) 20 mg/mL (2 %) injection 2 mL  2 mL Intradermal Once Kalyan Han MD           ALLERGIES:  Review of patient's allergies indicates:  No Known Allergies    REVIEW OF SYSTEMS:  Review of Systems   Constitutional: Negative for diaphoresis, fever and weight loss.   Respiratory: Negative for shortness of breath.    Cardiovascular: Negative for chest pain.   Gastrointestinal: Negative for blood in stool.   Genitourinary: Negative for hematuria.   Endo/Heme/Allergies: Does not bruise/bleed easily.   All other systems reviewed and are negative.        OBJECTIVE:    PHYSICAL EXAMINATION:   Vitals:    10/09/19 1509   BP: (!) 148/92       Physical Exam:  Vitals reviewed.    Constitutional: She appears well-developed and well-nourished.     Eyes: Pupils are equal, round, and reactive to light. Conjunctivae and EOM are normal.     Cardiovascular: Normal distal pulses and no edema.      Abdominal: Soft.     Skin: Skin displays no rash on trunk and no rash on extremities. Skin displays no lesions on trunk and no lesions on extremities.     Psych/Behavior: She is alert. She is oriented to person, place, and time. She has a normal mood and affect.     Musculoskeletal:        Neck: Range of motion is limited.     Neurological:        DTRs: Tricep reflexes are 2+ on the right side and 2+ on the left side. Bicep reflexes are 2+ on the right side and 2+ on the left side. Brachioradialis reflexes are 2+ on the right side and 2+ on the left side. Patellar reflexes are 2+ on the right side and 2+ on the left side. Achilles reflexes are 0 on the right side and 0 on the left side.       Back Exam     Tenderness   The patient is experiencing tenderness in the lumbar.    Range of Motion   Extension: abnormal   Flexion: normal   Lateral bend right: abnormal   Lateral bend left: abnormal   Rotation right: abnormal   Rotation left: abnormal     Muscle Strength   Right Quadriceps:  5/5   Left Quadriceps:  5/5   Right Hamstrings:  5/5   Left Hamstrings:  5/5     Tests   Straight leg raise right: negative  Straight leg raise left: negative              Neurologic Exam     Mental Status   Oriented to person, place, and time.   Speech: speech is normal   Level of consciousness: alert    Cranial Nerves   Cranial nerves II through XII intact.     CN III, IV, VI   Pupils are equal, round, and reactive to light.  Extraocular motions are normal.     Motor Exam   Muscle bulk: normal  Overall muscle tone: normal    Strength   Right deltoid: 5/5  Left deltoid: 5/5  Right biceps: 5/5  Left biceps: 5/5  Right triceps: 5/5  Left triceps: 5/5  Right wrist flexion: 5/5  Left wrist flexion: 5/5  Right wrist extension: 5/5  Left wrist extension: 5/5  Right interossei: 5/5  Left interossei: 5/5  Right iliopsoas: 5/5  Left iliopsoas: 5/5  Right quadriceps: 5/5  Left quadriceps: 5/5  Right hamstrin/5  Left hamstrin/5  Right  anterior tibial: 4/5  Left anterior tibial: 4/5  Right posterior tibial: 5/5  Left posterior tibial: 5/5  Right peroneal: 4/5  Left peroneal: 4/5  Right gastroc: 5/5  Left gastroc: 5/5    Sensory Exam   Light touch normal.   Pinprick normal.     Gait, Coordination, and Reflexes     Gait  Gait: (Antalgic)    Reflexes   Right brachioradialis: 2+  Left brachioradialis: 2+  Right biceps: 2+  Left biceps: 2+  Right triceps: 2+  Left triceps: 2+  Right patellar: 2+  Left patellar: 2+  Right achilles: 0  Left achilles: 0  Right plantar: normal  Left plantar: normal  Right Arreola: absent  Left Arreola: absent  Right ankle clonus: absent  Left ankle clonus: absent        DIAGNOSTIC DATA:  I personally interpreted the following imaging:   Lumbar spine MRI 2019 compared to 2014:  L4-5 fusion with posterior artery where and interbody device, right L4-5 lateral recess stenosis and foraminal stenosis.  L3-4 worsened degenerative spondylolisthesis with severe central canal stenosis and foraminal stenosis    ASSESMENT:  This is a 68 y.o. female with     Problem List Items Addressed This Visit        Orthopedic    Status post lumbar spinal fusion - Primary    Relevant Orders    X-Ray Scoliosis Complete Including Supine And Erect      Other Visit Diagnoses     Adjacent segment disease with spinal stenosis        Relevant Orders    X-Ray Scoliosis Complete Including Supine And Erect    Spondylolisthesis at L3-L4 level        Relevant Orders    X-Ray Scoliosis Complete Including Supine And Erect    Spinal stenosis of lumbar region with radiculopathy        Relevant Orders    X-Ray Scoliosis Complete Including Supine And Erect            PLAN:  I explained the natural history of the disease and all treatment options. I recommended a left L3-4 oblique interbody fusion with placement of a interbody spacer and allograft followed by a open L3-4 and L4-5 laminectomy, medial facetectomy, foraminotomy, removal of L4-5 spinal hardware, L3-4  posterolateral arthrodesis with autograft harvested from the same incision and L3-L5 posterior segmental instrumentation.  The goals of the surgery is to decompress the neural elements in order to improve her neurogenic claudication, radiculopathy symptoms and prevent worsening nerve damage.      We have discussed the risks of surgery including bleeding, infection, failure of surgery, CSF leak, nerve root injury, spinal cord injury, ureter injury, weakness, paralysis, peripheral neuropathy, malplaced hardware, migration of hardware, non-union, need for reoperation. Patient understands the risks and would like to proceed with surgery.      The patient has increase perioperative risks because of these comorbidities:  Morbid obesity with BMI of 43, essential hypertension, obstructive sleep apnea.         James Brantley MD  Cell:[phone]

## 2019-10-11 RX ORDER — SERTRALINE HYDROCHLORIDE 25 MG/1
TABLET, FILM COATED ORAL
Qty: 30 TABLET | Refills: 0 | Status: SHIPPED | OUTPATIENT
Start: 2019-10-11 | End: 2019-11-07

## 2019-10-14 ENCOUNTER — IMMUNIZATION (OUTPATIENT)
Dept: PHARMACY | Facility: CLINIC | Age: 69
End: 2019-10-14
Payer: MEDICARE

## 2019-10-15 ENCOUNTER — TELEPHONE (OUTPATIENT)
Dept: NEUROSURGERY | Facility: CLINIC | Age: 69
End: 2019-10-15

## 2019-10-15 DIAGNOSIS — M48.061 SPINAL STENOSIS, LUMBAR REGION, WITHOUT NEUROGENIC CLAUDICATION: Primary | ICD-10-CM

## 2019-10-15 DIAGNOSIS — Z98.1 S/P LUMBAR SPINAL FUSION: ICD-10-CM

## 2019-10-15 DIAGNOSIS — M43.16 SPONDYLOLISTHESIS OF LUMBAR REGION: ICD-10-CM

## 2019-10-15 RX ORDER — CITALOPRAM 40 MG/1
TABLET, FILM COATED ORAL
Qty: 90 TABLET | Refills: 3 | OUTPATIENT
Start: 2019-10-15

## 2019-10-19 DIAGNOSIS — I10 ESSENTIAL HYPERTENSION: ICD-10-CM

## 2019-10-21 ENCOUNTER — HOSPITAL ENCOUNTER (OUTPATIENT)
Dept: RADIOLOGY | Facility: HOSPITAL | Age: 69
Discharge: HOME | End: 2019-10-21
Attending: PHYSICIAN ASSISTANT
Payer: MEDICARE

## 2019-10-21 ENCOUNTER — OFFICE VISIT (OUTPATIENT)
Dept: ORTHOPEDICS | Facility: CLINIC | Age: 69
End: 2019-10-21
Payer: MEDICARE

## 2019-10-21 VITALS
BODY MASS INDEX: 43.43 KG/M2 | WEIGHT: 230 LBS | HEART RATE: 76 BPM | HEIGHT: 61 IN | DIASTOLIC BLOOD PRESSURE: 77 MMHG | SYSTOLIC BLOOD PRESSURE: 124 MMHG

## 2019-10-21 DIAGNOSIS — M25.572 JOINT PAIN OF ANKLE AND FOOT, LEFT: ICD-10-CM

## 2019-10-21 DIAGNOSIS — S93.402D SPRAIN OF LEFT ANKLE, UNSPECIFIED LIGAMENT, SUBSEQUENT ENCOUNTER: ICD-10-CM

## 2019-10-21 DIAGNOSIS — S92.355D CLOSED NONDISPLACED FRACTURE OF FIFTH METATARSAL BONE OF LEFT FOOT WITH ROUTINE HEALING, SUBSEQUENT ENCOUNTER: Primary | ICD-10-CM

## 2019-10-21 DIAGNOSIS — S92.355D CLOSED NONDISPLACED FRACTURE OF FIFTH METATARSAL BONE OF LEFT FOOT WITH ROUTINE HEALING, SUBSEQUENT ENCOUNTER: ICD-10-CM

## 2019-10-21 PROCEDURE — 73630 X-RAY EXAM OF FOOT: CPT | Mod: 26,LT,, | Performed by: RADIOLOGY

## 2019-10-21 PROCEDURE — 73630 XR FOOT COMPLETE 3 VIEW LEFT: ICD-10-PCS | Mod: 26,LT,, | Performed by: RADIOLOGY

## 2019-10-21 PROCEDURE — 3074F PR MOST RECENT SYSTOLIC BLOOD PRESSURE < 130 MM HG: ICD-10-PCS | Mod: CPTII,S$GLB,, | Performed by: PHYSICIAN ASSISTANT

## 2019-10-21 PROCEDURE — 99213 PR OFFICE/OUTPT VISIT, EST, LEVL III, 20-29 MIN: ICD-10-PCS | Mod: S$GLB,,, | Performed by: PHYSICIAN ASSISTANT

## 2019-10-21 PROCEDURE — 3074F SYST BP LT 130 MM HG: CPT | Mod: CPTII,S$GLB,, | Performed by: PHYSICIAN ASSISTANT

## 2019-10-21 PROCEDURE — 99999 PR PBB SHADOW E&M-EST. PATIENT-LVL III: CPT | Mod: PBBFAC,,, | Performed by: PHYSICIAN ASSISTANT

## 2019-10-21 PROCEDURE — 73630 X-RAY EXAM OF FOOT: CPT | Mod: TC,LT

## 2019-10-21 PROCEDURE — 1100F PR PT FALLS ASSESS DOC 2+ FALLS/FALL W/INJURY/YR: ICD-10-PCS | Mod: CPTII,S$GLB,, | Performed by: PHYSICIAN ASSISTANT

## 2019-10-21 PROCEDURE — 3078F DIAST BP <80 MM HG: CPT | Mod: CPTII,S$GLB,, | Performed by: PHYSICIAN ASSISTANT

## 2019-10-21 PROCEDURE — 3078F PR MOST RECENT DIASTOLIC BLOOD PRESSURE < 80 MM HG: ICD-10-PCS | Mod: CPTII,S$GLB,, | Performed by: PHYSICIAN ASSISTANT

## 2019-10-21 PROCEDURE — 3288F PR FALLS RISK ASSESSMENT DOCUMENTED: ICD-10-PCS | Mod: CPTII,S$GLB,, | Performed by: PHYSICIAN ASSISTANT

## 2019-10-21 PROCEDURE — 3288F FALL RISK ASSESSMENT DOCD: CPT | Mod: CPTII,S$GLB,, | Performed by: PHYSICIAN ASSISTANT

## 2019-10-21 PROCEDURE — 1100F PTFALLS ASSESS-DOCD GE2>/YR: CPT | Mod: CPTII,S$GLB,, | Performed by: PHYSICIAN ASSISTANT

## 2019-10-21 PROCEDURE — 99213 OFFICE O/P EST LOW 20 MIN: CPT | Mod: S$GLB,,, | Performed by: PHYSICIAN ASSISTANT

## 2019-10-21 PROCEDURE — 99999 PR PBB SHADOW E&M-EST. PATIENT-LVL III: ICD-10-PCS | Mod: PBBFAC,,, | Performed by: PHYSICIAN ASSISTANT

## 2019-10-21 RX ORDER — ATENOLOL 25 MG/1
TABLET ORAL
Qty: 90 TABLET | Refills: 3 | Status: SHIPPED | OUTPATIENT
Start: 2019-10-21 | End: 2020-04-22 | Stop reason: SDUPTHER

## 2019-10-21 NOTE — PROGRESS NOTES
SUBJECTIVE:     Chief Complaint & History of Present Illness:  Monique Trujillo is a 68 y.o. year old female, established patient, here for follow up of left foot 5th metatarsal fx and left ankle sprain.  Her initial injury occurred on 9/8/19.  She was last seen in clinic for this complaint on 9/23/19 where we discussed staying in the walking boot with all ambulation along with taking Naproxen 500mg PO BID as needed for pain.  Since last visit, she states that she is not having any pain in her foot, but this is from damage to her nerves in her back.  She did have one incident where she had to urgently help her  at night when she did not have the boot on.  She hurried out of bed to help her  and stepped on her foot without the boot.  She states the swelling is still present, but denies any other associated symptoms.     Review of patient's allergies indicates:  No Known Allergies      Current Outpatient Medications   Medication Sig Dispense Refill    amLODIPine (NORVASC) 10 MG tablet TAKE 1 TABLET BY MOUTH EVERY DAY 90 tablet 3    ascorbic acid (VITAMIN C) 1000 MG tablet Take 1,000 mg by mouth once daily.      atenolol (TENORMIN) 25 MG tablet TAKE 1 TABLET BY MOUTH EVERY DAY 90 tablet 3    calcium-vitamin D3 (CALCIUM 500 + D) 500 mg(1,250mg) -200 unit per tablet Take 1 tablet by mouth 2 (two) times daily with meals.      co-enzyme Q-10 30 mg capsule Take 30 mg by mouth once daily.       econazole nitrate 1 % cream Use bid for rash 85 g 2    fenofibric acid (FIBRICOR) 135 mg CpDR Take 1 capsule (135 mg total) by mouth once daily. 90 capsule 3    fish oil-omega-3 fatty acids 300-1,000 mg capsule Take 2 g by mouth once daily.      gabapentin (NEURONTIN) 800 MG tablet Take 1 tablet (800 mg total) by mouth every evening. (Patient taking differently: Take 800 mg by mouth every evening. ) 90 tablet 3    GLUCOSA KHAN 2KCL/CHONDROITIN KHAN (GLUCOSAMINE SULF-CHONDROITIN ORAL) Take 1 tablet by mouth once  daily.      metronidazole 0.75% (METROCREAM) 0.75 % Crea Apply topically once daily to affected area 45 g 1    multivitamin capsule Take 1 capsule by mouth once daily.      naproxen (NAPROSYN) 500 MG tablet Take 1 tablet (500 mg total) by mouth 2 (two) times daily with meals. 60 tablet 0    OPW TEST CLAIM - DO NOT FILL Take by mouth. OPW test claim. Do not fill. 11 tablet 0    oxybutynin (DITROPAN XL) 15 MG TR24 TAKE 1 TABLET (15 MG TOTAL) BY MOUTH ONCE DAILY. 90 tablet 3    pantoprazole (PROTONIX) 20 MG tablet TAKE 1 TABLET BY MOUTH EVERY DAY 90 tablet 3    polymyxin B sulf-trimethoprim (POLYTRIM) 10,000 unit- 1 mg/mL Drop Place 1 drop into both eyes every 4 (four) hours. 10 mL 0    pravastatin (PRAVACHOL) 40 MG tablet TAKE 1 TABLET BY MOUTH EVERY DAY 90 tablet 3    sertraline (ZOLOFT) 25 MG tablet TAKE 1 TABLET BY MOUTH EVERY DAY 30 tablet 0    traMADol (ULTRAM) 50 mg tablet Take 1 tablet (50 mg total) by mouth every 24 hours as needed for Pain. 30 tablet 3    vitamin E 400 UNIT capsule Take 400 Units by mouth once daily.       Current Facility-Administered Medications   Medication Dose Route Frequency Provider Last Rate Last Dose    lidocaine (PF) 20 mg/mL (2 %) injection 2 mL  2 mL Intradermal Once Kalyan Han MD           Past Medical History:   Diagnosis Date    Allergy     Arthritis     Chronic lower back pain     Depression     Fever blister     GERD (gastroesophageal reflux disease)     Hemorrhoids, internal 11/5/2014    Hyperlipidemia     Hypertension     Joint pain     Morbid obesity with BMI of 40.0-44.9, adult 2/24/2015    Multiple gastric ulcers 12/14/2017    TIMMY (obstructive sleep apnea)     does not use CPAP    Skin cancer        Past Surgical History:   Procedure Laterality Date    ANTERIOR CERVICAL CORPECTOMY W/ FUSION  1988    CAUDAL EPIDURAL STEROID INJECTION N/A 1/10/2019    Procedure: Injection-steroid-epidural-caudal;  Surgeon: Lydia Velásquez Jr., MD;  Location:  "[facility] PAIN MGT;  Service: Pain Management;  Laterality: N/A;    COLONOSCOPY N/A 11/2/2016    Procedure: COLONOSCOPY;  Surgeon: Viji Dewitt MD;  [facility]: [facility];  Service: Endoscopy;  Laterality: N/A;    EPIDURAL STEROID INJECTION INTO LUMBAR SPINE N/A 11/8/2018    Procedure: Injection-steroid-epidural-lumbar-L2-3 (LEFT > RIGHT);  Surgeon: Lydia Velásquez Jr., MD;  Location: [facility] PAIN T;  Service: Pain Management;  Laterality: N/A;    INJECTION OF ANESTHETIC AGENT AROUND GENITOFEMORAL NERVE Bilateral 8/1/2019    Procedure: BILATERAL SHOULDER ARTICULAR BRANCH BLOCK;  Surgeon: Lydia Velásquez Jr., MD;  Location: [facility];  Service: Pain Management;  Laterality: Bilateral;    KNEE ARTHROSCOPY W/ MENISCAL REPAIR Right 2012    LUMBAR LAMINECTOMY  06/19/2015    SKIN CANCER EXCISION      forehead does not recall date    TOTAL HIP ARTHROPLASTY Right 04/28/2016         TUBAL LIGATION  1980       Vital Signs (Most Recent)  Vitals:    10/21/19 1034   BP: 124/77   Pulse: 76       Review of Systems:  ROS:  Constitutional: no fever or chills  Eyes: no visual changes  ENT: no nasal congestion or sore throat  Respiratory: no cough or shortness of breath  Cardiovascular: no chest pain or palpitations  Gastrointestinal: no nausea or vomiting, tolerating diet  Genitourinary: no hematuria or dysuria  Integument/Breast: no rash or pruritis  Hematologic/Lymphatic: no easy bruising or lymphadenopathy  Musculoskeletal: positive for left foot and ankle pain  Neurological: no seizures or tremors  Behavioral/Psych: no auditory or visual hallucinations  Endocrine: no heat or cold intolerance    OBJECTIVE:     PHYSICAL EXAM:  Height: 5' 1" (154.9 cm) Weight: 104.3 kg (230 lb), General Appearance: Well nourished, well developed, in no acute distress.  CV: 2+ UE and LE distal pulses bilaterally  RESP: Respirations equal and unlabored  GI: Abdomen soft and non-tender  Neurological: Mood & affect are " normal.  left  Foot/Ankle  Skin: intact  Swelling: moderate to lateral malleolus and dorsal aspect of foot  Warmth: no warmth  Tenderness: no pain to palpation  ROM: 10 degrees dorsiflexion, 15 degrees plantarflexion, 5 degrees inversion and 5 degrees eversion  Strength: 5 on 5 tibialis anterior strength, 5 of 5 gastroc-soleus strength, 4 of 5 peroneus longus, 4 of 5 peroneus brevis and 5 of 5 tibialis posterior  Gait: in walking boot  Stability: stable to testing  Neurological Exam: normal  Vascular Exam: normal    RADIOGRAPHS:  Xray of left foot taken in clinic today, images reviewed by me, demonstrates closed, minimally displaced fracture of base of 5th metatarsal.    ASSESSMENT/PLAN:     Plan:  Patient with slightly displacement from previous imaging of base of 5th metatarsal due to incident of having to assist her .      1. Remain in walking boot with all ambulation.  2. May come back to clinic in order to obtain a post-op shoe to wear at night in the event she has to urgently assist her  without having the chance of putting on the walking boot.  3. RICE treatment  4. Naproxen as needed for pain  5. Follow up in 2 weeks with Hakan Carlin NP, Orthopedic Trauma for re-evaluation and repeat imaging.    Final Diagnosis: Closed, nondisplaced fracture of 5th metatarsal, left. Left foot and ankle pain. Ankle sprain, left.   [id number] Comprehensive

## 2019-10-25 DIAGNOSIS — M25.572 JOINT PAIN OF ANKLE AND FOOT, LEFT: ICD-10-CM

## 2019-10-25 DIAGNOSIS — S92.355D CLOSED NONDISPLACED FRACTURE OF FIFTH METATARSAL BONE OF LEFT FOOT WITH ROUTINE HEALING, SUBSEQUENT ENCOUNTER: ICD-10-CM

## 2019-10-25 RX ORDER — NAPROXEN 500 MG/1
TABLET ORAL
Qty: 60 TABLET | Refills: 0 | Status: SHIPPED | OUTPATIENT
Start: 2019-10-25 | End: 2020-03-05

## 2019-11-01 DIAGNOSIS — E78.1 HYPERTRIGLYCERIDEMIA: ICD-10-CM

## 2019-11-01 RX ORDER — FENOFIBRIC ACID 135 MG/1
CAPSULE, DELAYED RELEASE ORAL
Qty: 90 CAPSULE | Refills: 2 | Status: SHIPPED | OUTPATIENT
Start: 2019-11-01 | End: 2022-04-20

## 2019-11-04 ENCOUNTER — OFFICE VISIT (OUTPATIENT)
Dept: ORTHOPEDICS | Facility: CLINIC | Age: 69
End: 2019-11-04
Payer: MEDICARE

## 2019-11-04 ENCOUNTER — TELEPHONE (OUTPATIENT)
Dept: ORTHOPEDICS | Facility: CLINIC | Age: 69
End: 2019-11-04

## 2019-11-04 ENCOUNTER — HOSPITAL ENCOUNTER (OUTPATIENT)
Dept: RADIOLOGY | Facility: HOSPITAL | Age: 69
Discharge: HOME | End: 2019-11-04
Attending: PHYSICIAN ASSISTANT
Payer: MEDICARE

## 2019-11-04 DIAGNOSIS — S92.355D CLOSED NONDISPLACED FRACTURE OF FIFTH METATARSAL BONE OF LEFT FOOT WITH ROUTINE HEALING, SUBSEQUENT ENCOUNTER: ICD-10-CM

## 2019-11-04 DIAGNOSIS — S92.355D CLOSED NONDISPLACED FRACTURE OF FIFTH METATARSAL BONE OF LEFT FOOT WITH ROUTINE HEALING, SUBSEQUENT ENCOUNTER: Primary | ICD-10-CM

## 2019-11-04 PROCEDURE — 73630 X-RAY EXAM OF FOOT: CPT | Mod: TC,LT

## 2019-11-04 PROCEDURE — 1100F PTFALLS ASSESS-DOCD GE2>/YR: CPT | Mod: CPTII,S$GLB,, | Performed by: PHYSICIAN ASSISTANT

## 2019-11-04 PROCEDURE — 99213 OFFICE O/P EST LOW 20 MIN: CPT | Mod: S$GLB,,, | Performed by: PHYSICIAN ASSISTANT

## 2019-11-04 PROCEDURE — 99999 PR PBB SHADOW E&M-EST. PATIENT-LVL IV: ICD-10-PCS | Mod: PBBFAC,,, | Performed by: PHYSICIAN ASSISTANT

## 2019-11-04 PROCEDURE — 73630 XR FOOT COMPLETE 3 VIEW LEFT: ICD-10-PCS | Mod: 26,LT,, | Performed by: RADIOLOGY

## 2019-11-04 PROCEDURE — 73630 X-RAY EXAM OF FOOT: CPT | Mod: 26,LT,, | Performed by: RADIOLOGY

## 2019-11-04 PROCEDURE — 3288F PR FALLS RISK ASSESSMENT DOCUMENTED: ICD-10-PCS | Mod: CPTII,S$GLB,, | Performed by: PHYSICIAN ASSISTANT

## 2019-11-04 PROCEDURE — 1100F PR PT FALLS ASSESS DOC 2+ FALLS/FALL W/INJURY/YR: ICD-10-PCS | Mod: CPTII,S$GLB,, | Performed by: PHYSICIAN ASSISTANT

## 2019-11-04 PROCEDURE — 99213 PR OFFICE/OUTPT VISIT, EST, LEVL III, 20-29 MIN: ICD-10-PCS | Mod: S$GLB,,, | Performed by: PHYSICIAN ASSISTANT

## 2019-11-04 PROCEDURE — 99999 PR PBB SHADOW E&M-EST. PATIENT-LVL IV: CPT | Mod: PBBFAC,,, | Performed by: PHYSICIAN ASSISTANT

## 2019-11-04 PROCEDURE — 3288F FALL RISK ASSESSMENT DOCD: CPT | Mod: CPTII,S$GLB,, | Performed by: PHYSICIAN ASSISTANT

## 2019-11-04 NOTE — TELEPHONE ENCOUNTER
Spoke with pt.   Pt was a previous pt of Kamron Pena PA-C.  Pt was scheduled to follow up with Hakan Carlin NP today.  Hakan will not be in the office today.  Pt was rescheduled to see Ashley Lombardi PA-C today at 1 pm  Pt verbalized understanding

## 2019-11-05 NOTE — PROGRESS NOTES
SUBJECTIVE:     Chief Complaint & History of Present Illness:  Monique Trujillo is a 68 y.o. year old female, established patient, here for follow up of left foot 5th metatarsal fx and left ankle sprain.  Her initial injury occurred on 9/8/19.  She was last seen in clinic for this complaint on 9/23/19 where we discussed staying in the walking boot with all ambulation along with taking Naproxen 500mg PO BID as needed for pain.  Since last visit, she states that she is not having any pain in her foot, but this is from damage to her nerves in her back.  .  She states the swelling is still present, but denies any other associated symptoms.     Review of patient's allergies indicates:  No Known Allergies      Current Outpatient Medications   Medication Sig Dispense Refill    amLODIPine (NORVASC) 10 MG tablet TAKE 1 TABLET BY MOUTH EVERY DAY 90 tablet 3    ascorbic acid (VITAMIN C) 1000 MG tablet Take 1,000 mg by mouth once daily.      atenolol (TENORMIN) 25 MG tablet TAKE 1 TABLET BY MOUTH EVERY DAY 90 tablet 3    calcium-vitamin D3 (CALCIUM 500 + D) 500 mg(1,250mg) -200 unit per tablet Take 1 tablet by mouth 2 (two) times daily with meals.      co-enzyme Q-10 30 mg capsule Take 30 mg by mouth once daily.       econazole nitrate 1 % cream Use bid for rash 85 g 2    fenofibric acid (FIBRICOR) 135 mg CpDR TAKE ONE CAPSULE BY MOUTH EVERY DAY 90 capsule 2    fish oil-omega-3 fatty acids 300-1,000 mg capsule Take 2 g by mouth once daily.      gabapentin (NEURONTIN) 800 MG tablet Take 1 tablet (800 mg total) by mouth every evening. (Patient taking differently: Take 800 mg by mouth every evening. ) 90 tablet 3    GLUCOSA KHAN 2KCL/CHONDROITIN KHAN (GLUCOSAMINE SULF-CHONDROITIN ORAL) Take 1 tablet by mouth once daily.      metronidazole 0.75% (METROCREAM) 0.75 % Crea Apply topically once daily to affected area 45 g 1    multivitamin capsule Take 1 capsule by mouth once daily.      naproxen (NAPROSYN) 500 MG tablet  TAKE 1 TABLET BY MOUTH TWICE A DAY WITH MEALS 60 tablet 0    OPW TEST CLAIM - DO NOT FILL Take by mouth. OPW test claim. Do not fill. 11 tablet 0    oxybutynin (DITROPAN XL) 15 MG TR24 TAKE 1 TABLET (15 MG TOTAL) BY MOUTH ONCE DAILY. 90 tablet 3    pantoprazole (PROTONIX) 20 MG tablet TAKE 1 TABLET BY MOUTH EVERY DAY 90 tablet 3    polymyxin B sulf-trimethoprim (POLYTRIM) 10,000 unit- 1 mg/mL Drop Place 1 drop into both eyes every 4 (four) hours. 10 mL 0    pravastatin (PRAVACHOL) 40 MG tablet TAKE 1 TABLET BY MOUTH EVERY DAY 90 tablet 3    sertraline (ZOLOFT) 25 MG tablet TAKE 1 TABLET BY MOUTH EVERY DAY 30 tablet 0    traMADol (ULTRAM) 50 mg tablet Take 1 tablet (50 mg total) by mouth every 24 hours as needed for Pain. 30 tablet 3    vitamin E 400 UNIT capsule Take 400 Units by mouth once daily.       Current Facility-Administered Medications   Medication Dose Route Frequency Provider Last Rate Last Dose    lidocaine (PF) 20 mg/mL (2 %) injection 2 mL  2 mL Intradermal Neville Han MD           Past Medical History:   Diagnosis Date    Allergy     Arthritis     Chronic lower back pain     Depression     Fever blister     GERD (gastroesophageal reflux disease)     Hemorrhoids, internal 11/5/2014    Hyperlipidemia     Hypertension     Joint pain     Morbid obesity with BMI of 40.0-44.9, adult 2/24/2015    Multiple gastric ulcers 12/14/2017    TIMMY (obstructive sleep apnea)     does not use CPAP    Skin cancer        Past Surgical History:   Procedure Laterality Date    ANTERIOR CERVICAL CORPECTOMY W/ FUSION  1988    CAUDAL EPIDURAL STEROID INJECTION N/A 1/10/2019    Procedure: Injection-steroid-epidural-caudal;  Surgeon: Lydia Velásquez Jr., MD;  Location: [facility] PAIN MGT;  Service: Pain Management;  Laterality: N/A;    COLONOSCOPY N/A 11/2/2016    Procedure: COLONOSCOPY;  Surgeon: Viji Dewitt MD;  Location: [facility];  Service: Endoscopy;  Laterality: N/A;    EPIDURAL STEROID  INJECTION INTO LUMBAR SPINE N/A 11/8/2018    Procedure: Injection-steroid-epidural-lumbar-L2-3 (LEFT > RIGHT);  Surgeon: Lydia Velásquez Jr., MD;  Location: [facility];  Service: Pain Management;  Laterality: N/A;    INJECTION OF ANESTHETIC AGENT AROUND GENITOFEMORAL NERVE Bilateral 8/1/2019    Procedure: BILATERAL SHOULDER ARTICULAR BRANCH BLOCK;  Surgeon: Lydia Velásquez Jr., MD;  Location: [facility];  Service: Pain Management;  Laterality: Bilateral;    KNEE ARTHROSCOPY W/ MENISCAL REPAIR Right 2012    LUMBAR LAMINECTOMY  06/19/2015    SKIN CANCER EXCISION      forehead does not recall date    TOTAL HIP ARTHROPLASTY Right 04/28/2016         TUBAL LIGATION  1980       Vital Signs (Most Recent)  There were no vitals filed for this visit.    Review of Systems:  ROS:  Constitutional: no fever or chills  Eyes: no visual changes  ENT: no nasal congestion or sore throat  Respiratory: no cough or shortness of breath  Cardiovascular: no chest pain or palpitations  Gastrointestinal: no nausea or vomiting, tolerating diet  Genitourinary: no hematuria or dysuria  Integument/Breast: no rash or pruritis  Hematologic/Lymphatic: no easy bruising or lymphadenopathy  Musculoskeletal: positive for left foot and ankle pain  Neurological: no seizures or tremors  Behavioral/Psych: no auditory or visual hallucinations  Endocrine: no heat or cold intolerance    OBJECTIVE:     PHYSICAL EXAM:  Height: (nwb) Weight: (nwb), General Appearance: Well nourished, well developed, in no acute distress.  CV: 2+ UE and LE distal pulses bilaterally  RESP: Respirations equal and unlabored  GI: Abdomen soft and non-tender  Neurological: Mood & affect are normal.  left  Foot/Ankle  Skin: intact  Swelling: moderate to lateral malleolus and dorsal aspect of foot  Warmth: no warmth  Tenderness: no pain to palpation  ROM: 10 degrees dorsiflexion, 15 degrees plantarflexion, 5 degrees inversion and 5 degrees eversion  Strength: 5 on 5  tibialis anterior strength, 5 of 5 gastroc-soleus strength, 4 of 5 peroneus longus, 4 of 5 peroneus brevis and 5 of 5 tibialis posterior  Gait: in walking boot  Stability: stable to testing  Neurological Exam: decreased sensation   Vascular Exam: normal    RADIOGRAPHS:  Xray of left foot taken in clinic today, images reviewed by me, demonstrates closed, minimally displaced fracture of base of 5th metatarsal.    ASSESSMENT/PLAN:     Plan:  Patient with similar x-ray to previous imaging of base of 5th metatarsal.       1. Remain in post op shoe with all ambulation.  2. RICE treatment  3. Naproxen as needed for pain  4. Follow up in 4 weeks re-evaluation and repeat imaging.    Final Diagnosis: Closed, nondisplaced fracture of 5th metatarsal, left. Left foot and ankle pain. Ankle sprain, left.

## 2019-11-07 ENCOUNTER — OFFICE VISIT (OUTPATIENT)
Dept: INTERNAL MEDICINE | Facility: CLINIC | Age: 69
End: 2019-11-07
Payer: MEDICARE

## 2019-11-07 VITALS
OXYGEN SATURATION: 94 % | SYSTOLIC BLOOD PRESSURE: 134 MMHG | WEIGHT: 223.13 LBS | BODY MASS INDEX: 42.13 KG/M2 | DIASTOLIC BLOOD PRESSURE: 76 MMHG | HEART RATE: 81 BPM | HEIGHT: 61 IN

## 2019-11-07 DIAGNOSIS — F41.9 ANXIETY AND DEPRESSION: Primary | ICD-10-CM

## 2019-11-07 DIAGNOSIS — M48.00 CENTRAL STENOSIS OF SPINAL CANAL: ICD-10-CM

## 2019-11-07 DIAGNOSIS — K21.9 GASTROESOPHAGEAL REFLUX DISEASE, ESOPHAGITIS PRESENCE NOT SPECIFIED: ICD-10-CM

## 2019-11-07 DIAGNOSIS — F32.A ANXIETY AND DEPRESSION: Primary | ICD-10-CM

## 2019-11-07 DIAGNOSIS — Z01.00 EYE EXAM, ROUTINE: ICD-10-CM

## 2019-11-07 DIAGNOSIS — Z63.6 CAREGIVER BURDEN: ICD-10-CM

## 2019-11-07 DIAGNOSIS — J30.9 ALLERGIC RHINITIS, UNSPECIFIED SEASONALITY, UNSPECIFIED TRIGGER: ICD-10-CM

## 2019-11-07 DIAGNOSIS — H10.13 ALLERGIC CONJUNCTIVITIS OF BOTH EYES: ICD-10-CM

## 2019-11-07 PROCEDURE — 1101F PT FALLS ASSESS-DOCD LE1/YR: CPT | Mod: CPTII,S$GLB,, | Performed by: INTERNAL MEDICINE

## 2019-11-07 PROCEDURE — 1101F PR PT FALLS ASSESS DOC 0-1 FALLS W/OUT INJ PAST YR: ICD-10-PCS | Mod: CPTII,S$GLB,, | Performed by: INTERNAL MEDICINE

## 2019-11-07 PROCEDURE — 99999 PR PBB SHADOW E&M-EST. PATIENT-LVL IV: CPT | Mod: PBBFAC,,, | Performed by: INTERNAL MEDICINE

## 2019-11-07 PROCEDURE — 3078F PR MOST RECENT DIASTOLIC BLOOD PRESSURE < 80 MM HG: ICD-10-PCS | Mod: CPTII,S$GLB,, | Performed by: INTERNAL MEDICINE

## 2019-11-07 PROCEDURE — 99214 PR OFFICE/OUTPT VISIT, EST, LEVL IV, 30-39 MIN: ICD-10-PCS | Mod: S$GLB,,, | Performed by: INTERNAL MEDICINE

## 2019-11-07 PROCEDURE — 99214 OFFICE O/P EST MOD 30 MIN: CPT | Mod: S$GLB,,, | Performed by: INTERNAL MEDICINE

## 2019-11-07 PROCEDURE — 99999 PR PBB SHADOW E&M-EST. PATIENT-LVL IV: ICD-10-PCS | Mod: PBBFAC,,, | Performed by: INTERNAL MEDICINE

## 2019-11-07 PROCEDURE — 3078F DIAST BP <80 MM HG: CPT | Mod: CPTII,S$GLB,, | Performed by: INTERNAL MEDICINE

## 2019-11-07 PROCEDURE — 3075F SYST BP GE 130 - 139MM HG: CPT | Mod: CPTII,S$GLB,, | Performed by: INTERNAL MEDICINE

## 2019-11-07 PROCEDURE — 3075F PR MOST RECENT SYSTOLIC BLOOD PRESS GE 130-139MM HG: ICD-10-PCS | Mod: CPTII,S$GLB,, | Performed by: INTERNAL MEDICINE

## 2019-11-07 RX ORDER — LEVOCETIRIZINE DIHYDROCHLORIDE 5 MG/1
5 TABLET, FILM COATED ORAL NIGHTLY
Qty: 30 TABLET | Refills: 5 | Status: SHIPPED | OUTPATIENT
Start: 2019-11-07 | End: 2020-05-05

## 2019-11-07 RX ORDER — PANTOPRAZOLE SODIUM 40 MG/1
40 TABLET, DELAYED RELEASE ORAL DAILY
Qty: 90 TABLET | Refills: 3 | Status: SHIPPED | OUTPATIENT
Start: 2019-11-07 | End: 2021-04-21 | Stop reason: SDUPTHER

## 2019-11-07 RX ORDER — FLUTICASONE PROPIONATE 50 MCG
2 SPRAY, SUSPENSION (ML) NASAL DAILY
Qty: 16 G | Refills: 5 | Status: SHIPPED | OUTPATIENT
Start: 2019-11-07 | End: 2019-12-07

## 2019-11-07 RX ORDER — SERTRALINE HYDROCHLORIDE 50 MG/1
50 TABLET, FILM COATED ORAL DAILY
Qty: 30 TABLET | Refills: 2 | Status: SHIPPED | OUTPATIENT
Start: 2019-11-07 | End: 2020-02-04

## 2019-11-07 SDOH — SOCIAL DETERMINANTS OF HEALTH (SDOH): DEPENDENT RELATIVE NEEDING CARE AT HOME: Z63.6

## 2019-11-08 ENCOUNTER — OFFICE VISIT (OUTPATIENT)
Dept: OPTOMETRY | Facility: CLINIC | Age: 69
End: 2019-11-08
Payer: MEDICARE

## 2019-11-08 DIAGNOSIS — Z13.5 GLAUCOMA SCREENING: ICD-10-CM

## 2019-11-08 DIAGNOSIS — H25.13 NUCLEAR SCLEROSIS, BILATERAL: ICD-10-CM

## 2019-11-08 DIAGNOSIS — H04.123 DRY EYES, BILATERAL: Primary | ICD-10-CM

## 2019-11-08 PROCEDURE — 92004 PR EYE EXAM, NEW PATIENT,COMPREHESV: ICD-10-PCS | Mod: S$GLB,,, | Performed by: OPTOMETRIST

## 2019-11-08 PROCEDURE — 92004 COMPRE OPH EXAM NEW PT 1/>: CPT | Mod: S$GLB,,, | Performed by: OPTOMETRIST

## 2019-11-08 PROCEDURE — 99999 PR PBB SHADOW E&M-EST. PATIENT-LVL II: CPT | Mod: PBBFAC,,, | Performed by: OPTOMETRIST

## 2019-11-08 PROCEDURE — 99999 PR PBB SHADOW E&M-EST. PATIENT-LVL II: ICD-10-PCS | Mod: PBBFAC,,, | Performed by: OPTOMETRIST

## 2019-11-08 NOTE — PROGRESS NOTES
"HPI     Concerns About Ocular Health      Additional comments: White discharge OU              Comments     Patient states she has been having a "white discharge" in OU that has   been going on for for several months now. No pain. Her va becomes blurry   at times as well.    Polymyxen B QD OU              Last edited by Aj Perdomo, OD on 11/8/2019 10:53 AM. (History)        ROS     Negative for: Constitutional, Gastrointestinal, Neurological, Skin,   Genitourinary, Musculoskeletal, HENT, Endocrine, Cardiovascular, Eyes,   Respiratory, Psychiatric, Allergic/Imm, Heme/Lymph    Last edited by Aj Perdomo, OD on 11/8/2019 10:53 AM. (History)        Assessment /Plan     For exam results, see Encounter Report.    Dry eyes, bilateral    Nuclear sclerosis, bilateral    Glaucoma screening      1. Small Cats OU--pt wishes to defer refraction  2. Dry eye/rosacea sx    PLAN:    1. DC Polymyxin--start SOOTHE XP ATs QID  2. Discussed chronic nature of condition  3. If no relief w ATs pt will call and I will do short course of steroids.  Otherwise rtc 1 yr                 "

## 2019-11-08 NOTE — LETTER
November 8, 2019      Katelynn Laura MD  [address]  [place] [address]           [place] [facility]  [address]  [place] [address]  Phone: [phone]  Fax: [phone]          Patient: Monique Trujillo   MR Number: [id number]   YOB: 1950   Date of Visit: 11/8/2019       Dear Dr. Katelynn Laura:    Thank you for referring Monique Trujillo to me for evaluation. Attached you will find relevant portions of my assessment and plan of care.    If you have questions, please do not hesitate to call me. I look forward to following Monique Trujillo along with you.    Sincerely,    Aj Perdomo, OD    Enclosure  CC:  No Recipients    If you would like to receive this communication electronically, please contact [email or web address] or [phone] to request more information on [email or web address] Link access.    For providers and/or their staff who would like to refer a patient to [facility], please contact us through our one-stop-shop provider referral line, [facility], at [phone].    If you feel you have received this communication in error or would no longer like to receive these types of communications, please e-mail [email or web address]

## 2019-11-14 ENCOUNTER — SSC ENCOUNTER (OUTPATIENT)
Dept: ADMINISTRATIVE | Facility: OTHER | Age: 69
End: 2019-11-14

## 2019-11-14 NOTE — PROGRESS NOTES
Please note the following patient's information was forwarded to [facility] ([facility]) for case management and/or  on 11/14/2019.    Please see the media section of patient's chart for additional details.    Please contact Ext. [phone] with any questions.    Thank you,    Radha Baxter, [facility]  [phone]

## 2019-11-18 DIAGNOSIS — E78.5 HYPERLIPIDEMIA: ICD-10-CM

## 2019-11-18 RX ORDER — PRAVASTATIN SODIUM 40 MG/1
TABLET ORAL
Qty: 90 TABLET | Refills: 3 | Status: SHIPPED | OUTPATIENT
Start: 2019-11-18 | End: 2021-04-21 | Stop reason: SDUPTHER

## 2019-11-26 DIAGNOSIS — I10 ESSENTIAL HYPERTENSION: ICD-10-CM

## 2019-11-27 RX ORDER — AMLODIPINE BESYLATE 10 MG/1
TABLET ORAL
Qty: 90 TABLET | Refills: 3 | Status: SHIPPED | OUTPATIENT
Start: 2019-11-27 | End: 2020-08-19 | Stop reason: SDUPTHER

## 2019-11-29 DIAGNOSIS — S92.355D CLOSED NONDISPLACED FRACTURE OF FIFTH METATARSAL BONE OF LEFT FOOT WITH ROUTINE HEALING, SUBSEQUENT ENCOUNTER: ICD-10-CM

## 2019-11-29 DIAGNOSIS — M25.572 JOINT PAIN OF ANKLE AND FOOT, LEFT: ICD-10-CM

## 2019-11-29 RX ORDER — NAPROXEN 500 MG/1
TABLET ORAL
Qty: 60 TABLET | Refills: 0 | OUTPATIENT
Start: 2019-11-29

## 2019-12-12 ENCOUNTER — HOSPITAL ENCOUNTER (OUTPATIENT)
Dept: RADIOLOGY | Facility: HOSPITAL | Age: 69
Discharge: HOME | End: 2019-12-12
Attending: INTERNAL MEDICINE
Payer: MEDICARE

## 2019-12-12 ENCOUNTER — OFFICE VISIT (OUTPATIENT)
Dept: INTERNAL MEDICINE | Facility: CLINIC | Age: 69
End: 2019-12-12
Payer: MEDICARE

## 2019-12-12 VITALS
DIASTOLIC BLOOD PRESSURE: 88 MMHG | HEART RATE: 79 BPM | HEIGHT: 61 IN | BODY MASS INDEX: 42.37 KG/M2 | WEIGHT: 224.44 LBS | TEMPERATURE: 99 F | SYSTOLIC BLOOD PRESSURE: 112 MMHG | OXYGEN SATURATION: 98 %

## 2019-12-12 DIAGNOSIS — Z01.818 PREOPERATIVE EXAMINATION: ICD-10-CM

## 2019-12-12 DIAGNOSIS — R60.9 EDEMA, UNSPECIFIED TYPE: ICD-10-CM

## 2019-12-12 DIAGNOSIS — Z01.818 PREOPERATIVE EXAMINATION: Primary | ICD-10-CM

## 2019-12-12 DIAGNOSIS — G47.33 OSA (OBSTRUCTIVE SLEEP APNEA): ICD-10-CM

## 2019-12-12 DIAGNOSIS — N18.9 CHRONIC KIDNEY DISEASE, UNSPECIFIED CKD STAGE: ICD-10-CM

## 2019-12-12 LAB
BILIRUB UR QL STRIP: NEGATIVE
CLARITY UR: ABNORMAL
COLOR UR: YELLOW
GLUCOSE UR QL STRIP: NEGATIVE
HGB UR QL STRIP: NEGATIVE
KETONES UR QL STRIP: NEGATIVE
LEUKOCYTE ESTERASE UR QL STRIP: NEGATIVE
NITRITE UR QL STRIP: NEGATIVE
PH UR STRIP: 6 [PH] (ref 5–8)
PROT UR QL STRIP: ABNORMAL
SP GR UR STRIP: 1.02 (ref 1–1.03)
URN SPEC COLLECT METH UR: ABNORMAL
UROBILINOGEN UR STRIP-ACNC: NEGATIVE EU/DL

## 2019-12-12 PROCEDURE — 1125F AMNT PAIN NOTED PAIN PRSNT: CPT | Mod: S$GLB,,, | Performed by: INTERNAL MEDICINE

## 2019-12-12 PROCEDURE — 1159F MED LIST DOCD IN RCRD: CPT | Mod: S$GLB,,, | Performed by: INTERNAL MEDICINE

## 2019-12-12 PROCEDURE — 3079F DIAST BP 80-89 MM HG: CPT | Mod: CPTII,S$GLB,, | Performed by: INTERNAL MEDICINE

## 2019-12-12 PROCEDURE — 93005 ELECTROCARDIOGRAM TRACING: CPT | Mod: S$GLB,,, | Performed by: INTERNAL MEDICINE

## 2019-12-12 PROCEDURE — 3079F PR MOST RECENT DIASTOLIC BLOOD PRESSURE 80-89 MM HG: ICD-10-PCS | Mod: CPTII,S$GLB,, | Performed by: INTERNAL MEDICINE

## 2019-12-12 PROCEDURE — 93010 EKG 12-LEAD: ICD-10-PCS | Mod: S$GLB,,, | Performed by: INTERNAL MEDICINE

## 2019-12-12 PROCEDURE — 1101F PR PT FALLS ASSESS DOC 0-1 FALLS W/OUT INJ PAST YR: ICD-10-PCS | Mod: CPTII,S$GLB,, | Performed by: INTERNAL MEDICINE

## 2019-12-12 PROCEDURE — 99214 PR OFFICE/OUTPT VISIT, EST, LEVL IV, 30-39 MIN: ICD-10-PCS | Mod: S$GLB,,, | Performed by: INTERNAL MEDICINE

## 2019-12-12 PROCEDURE — 93005 EKG 12-LEAD: ICD-10-PCS | Mod: S$GLB,,, | Performed by: INTERNAL MEDICINE

## 2019-12-12 PROCEDURE — 1101F PT FALLS ASSESS-DOCD LE1/YR: CPT | Mod: CPTII,S$GLB,, | Performed by: INTERNAL MEDICINE

## 2019-12-12 PROCEDURE — 3074F SYST BP LT 130 MM HG: CPT | Mod: CPTII,S$GLB,, | Performed by: INTERNAL MEDICINE

## 2019-12-12 PROCEDURE — 99999 PR PBB SHADOW E&M-EST. PATIENT-LVL IV: CPT | Mod: PBBFAC,,, | Performed by: INTERNAL MEDICINE

## 2019-12-12 PROCEDURE — 81000 URINALYSIS NONAUTO W/SCOPE: CPT | Mod: PO

## 2019-12-12 PROCEDURE — 3074F PR MOST RECENT SYSTOLIC BLOOD PRESSURE < 130 MM HG: ICD-10-PCS | Mod: CPTII,S$GLB,, | Performed by: INTERNAL MEDICINE

## 2019-12-12 PROCEDURE — 93010 ELECTROCARDIOGRAM REPORT: CPT | Mod: S$GLB,,, | Performed by: INTERNAL MEDICINE

## 2019-12-12 PROCEDURE — 71046 X-RAY EXAM CHEST 2 VIEWS: CPT | Mod: TC,FY,PO

## 2019-12-12 PROCEDURE — 99999 PR PBB SHADOW E&M-EST. PATIENT-LVL IV: ICD-10-PCS | Mod: PBBFAC,,, | Performed by: INTERNAL MEDICINE

## 2019-12-12 PROCEDURE — 99214 OFFICE O/P EST MOD 30 MIN: CPT | Mod: S$GLB,,, | Performed by: INTERNAL MEDICINE

## 2019-12-12 PROCEDURE — 1159F PR MEDICATION LIST DOCUMENTED IN MEDICAL RECORD: ICD-10-PCS | Mod: S$GLB,,, | Performed by: INTERNAL MEDICINE

## 2019-12-12 PROCEDURE — 71046 X-RAY EXAM CHEST 2 VIEWS: CPT | Mod: 26,,, | Performed by: RADIOLOGY

## 2019-12-12 PROCEDURE — 99499 UNLISTED E&M SERVICE: CPT | Mod: S$GLB,,, | Performed by: INTERNAL MEDICINE

## 2019-12-12 PROCEDURE — 1125F PR PAIN SEVERITY QUANTIFIED, PAIN PRESENT: ICD-10-PCS | Mod: S$GLB,,, | Performed by: INTERNAL MEDICINE

## 2019-12-12 PROCEDURE — 99499 RISK ADDL DX/OHS AUDIT: ICD-10-PCS | Mod: S$GLB,,, | Performed by: INTERNAL MEDICINE

## 2019-12-12 PROCEDURE — 71046 XR CHEST PA AND LATERAL: ICD-10-PCS | Mod: 26,,, | Performed by: RADIOLOGY

## 2019-12-12 NOTE — PROGRESS NOTES
Subjective:       Patient ID: Monique Trujillo is a 69 y.o. female.    Chief Complaint: Pre-op Exam    HPI Mrs. Trujillo is a 69-year-old female with anxiety/depression, hypertension, hyperlipidemia, obesity, pulmonary hypertension, chronic kidney disease stage 3, possible sleep apnea who presents for preoperative evaluation.  Patient plans to undergo open L3-L4, L4-L5 laminectomy with removal of hardware and reinsertion of new hardware via posterior approach.  She will do this surgery with neurosurgeon  on January 3rd.  She has no acute complaints or concerns today.  Patient has undergone numerous operations in the past.  She denies any adverse effects to general anesthesia or any bleeding/blood clotting issues intraoperatively or postoperatively.  Patient is able to walk up a flight of stairs without experiencing any chest pain; although she does experience some shortness of breath with exertion.  Blood pressure and cholesterol are well controlled.  Discussed the issue of possible sleep apnea again today.  Now patient states that sleep apnea was suspected and she did a sleep apnea test a few years ago, but she thinks the results were normal.    Review of Systems   All other systems reviewed and are negative.      Objective:      Physical Exam   Constitutional: She is oriented to person, place, and time. She appears well-developed and well-nourished. No distress.   HENT:   Head: Normocephalic and atraumatic.   Right Ear: External ear normal.   Left Ear: External ear normal.   Nose: Nose normal.   Eyes: Conjunctivae and EOM are normal.   Cardiovascular: Normal rate, regular rhythm, normal heart sounds and intact distal pulses. Exam reveals no gallop and no friction rub.   No murmur heard.  Pulmonary/Chest: Effort normal and breath sounds normal. No stridor. No respiratory distress. She has no wheezes.   Neurological: She is alert and oriented to person, place, and time.   Skin: Skin is warm and dry. She is  not diaphoretic.   Psychiatric: She has a normal mood and affect. Her behavior is normal. Judgment and thought content normal.   Vitals reviewed.      Assessment:       1. Preoperative examination Active   2. Edema, unspecified type  Chronic   3. Chronic kidney disease, unspecified CKD stage  Active   4. TIMMY (obstructive sleep apnea)        Plan:     Labs reviewed. UA shows signs of possible kidney stone formation with casts and calcium oxalate crystals. UA also appears contaminated and shows protein. Will need to re-evaluate the proteinuria on a future visit. CMP shows mild decrease in renal function (CKD stage 3) and mild elevation in glucose but otherwise normal. PT/INR, aptt normal. CBC values all within a normal range. EKG is normal. CXR is normal.  Overall this patient is at low risk for any adverse events occurring during this surgical procedure.   I will caution that we do not know if she has sleep apnea or not, so I recommend close monitoring of respiratory function after extubation. May require CPAP use in the hospital.    Monique was seen today for pre-op exam.    Diagnoses and all orders for this visit:    Preoperative examination  -     IN OFFICE EKG 12-LEAD (to Muse)  -     Comprehensive metabolic panel; Future  -     CBC auto differential; Future  -     Protime-INR; Future  -     APTT; Future  -     Urinalysis  -     X-Ray Chest PA And Lateral; Future    Edema, unspecified type   -     Protime-INR; Future    Chronic kidney disease, unspecified CKD stage   -     APTT; Future    TIMMY (obstructive sleep apnea)  Comments:  Status unknown.  Referral placed to Sleep Medicine for further evaluation.  Orders:  -     Ambulatory referral to Sleep Disorders    Other orders  -     Urinalysis Microscopic    25 minutes spent in room face to face with patient with >50% of time spent in discussion of diagnoses and upcoming surgical procedure as stated above      Will discuss a return to clinic appt to evaluate  proteinuria with the patient

## 2019-12-13 LAB
BACTERIA #/AREA URNS AUTO: ABNORMAL /HPF
CAOX CRY UR QL COMP ASSIST: ABNORMAL
HYALINE CASTS UR QL AUTO: 15 /LPF
MICROSCOPIC COMMENT: ABNORMAL
NON-SQ EPI CELLS #/AREA URNS AUTO: 1 /HPF
RBC #/AREA URNS AUTO: 3 /HPF (ref 0–4)
SQUAMOUS #/AREA URNS AUTO: 14 /HPF
WBC #/AREA URNS AUTO: 9 /HPF (ref 0–5)

## 2019-12-27 ENCOUNTER — HOSPITAL ENCOUNTER (OUTPATIENT)
Dept: PREADMISSION TESTING | Facility: HOSPITAL | Age: 69
Discharge: HOME | End: 2019-12-27
Attending: NEUROLOGICAL SURGERY
Payer: MEDICARE

## 2019-12-27 ENCOUNTER — ANESTHESIA EVENT (OUTPATIENT)
Dept: SURGERY | Facility: HOSPITAL | Age: 69
DRG: 454 | End: 2019-12-27
Payer: MEDICARE

## 2019-12-27 VITALS
HEIGHT: 61 IN | OXYGEN SATURATION: 98 % | HEART RATE: 62 BPM | DIASTOLIC BLOOD PRESSURE: 78 MMHG | RESPIRATION RATE: 18 BRPM | WEIGHT: 226 LBS | BODY MASS INDEX: 42.67 KG/M2 | SYSTOLIC BLOOD PRESSURE: 175 MMHG

## 2019-12-27 NOTE — DISCHARGE INSTRUCTIONS
· Arrive on 1/3/2020 at  5:30 am.  · Report to the 2nd floor Procedural Check In Room .   · Nothing to eat or drink after midnight the night before your procedure.                                                         · Please remove all body piercings and leave all your jewelery and valuables at home .  · Please remove your contact lenses.   · Wear loose comfortable clothing.  · You will not be able to drive that day, please make arrangement for transportation to and from your procedure.  · You cannot be alone for 24 hours after anesthesia. Make arrangements as needed.  · Shower the night before your procedure and the morning of your procedure with Hibiclens antibacterial solution.  · No lotions, creams or powders  · Do not shave 3 days prior to procedure  · Report any signs or symptoms of an infection to your surgeon. Examples: urinary (bladder) infection, upper respiratory infection, skin boils.   · Practice good hand washing before, during, and after procedure.   · Stop Aspirin, Ibuprofen, Advil, Motrin, Aleve, Nuprin, Naprosyn (all NSAID Medication) or any other blood thinners 5 days before your procedure unless directed by your physician.  Also hold all over the counter vitamins and herbal supplements for 5 days prior to your procedure.  · You may take Tylenol or Acetaminophen up the day of surgery for any pain.        I have read or had read and explained to me, and understand the above information.    Additional comments or instructions:  For additional questions call [phone]        Pre-Op Bathing Instructions    Before surgery, you can play an important role in your own health.    Because skin is not sterile, we need to be sure that your skin is as free of germs as possible. By following the instructions below, you can reduce the number of germs on your skin before surgery.    IMPORTANT: You will need to shower with a special soap called Hibiclens*, available at any pharmacy, over the counter usually in  the first aid isle.  If you are allergic to Chlorhexidine (the antiseptic in Hibiclens), use an antibacterial soap such as Dial Soap for your preoperative showers.  You will shower with Hibiclens the night before and the morning of surgery. Both the night before your surgery and the morning of your surgery see steps 2 and 3.   Do not use Hibiclens on the head, face or genitals to avoid injury to those areas.    STEP #1  1.  Shower with Hibiclens solution night before and the morning of surgery.      STEP #2: THE NIGHT BEFORE YOUR SURGERY     1. Do not shave the area of your body where your surgery will be performed.  2. Wash your hair as usual with your normal  Shampoo. Wash body shoulder to toes with your normal soap.  3. Squeeze Hibiclens into hand apply to surgical site.   4. Wash the site gently for five (5) minutes. Do not scrub your skin too hard.   5. Do not wash with your regular soap after Hibiclens is used.  6. Rinse your body thoroughly.  7. Pat yourself dry with a clean, soft towel.  8. Do not use lotion, cream, or powder.  9. Wear clean clothes.    STEP #3: THE MORNING OF YOUR SURGERY     1. Repeat Step #2.    * Not to be used by people allergic to Chlorhexidine.          ANESTHESIA  -For the first 24 hours after surgery:  Do not drive, use heavy equipment, make important decisions, or drink alcohol  -It is normal to feel sleepy for several hours.  Rest until you are more awake.  -Have someone stay with you, if needed.  They can watch for problems and help keep you safe.  -Some possible post anesthesia side effects include: nausea and vomiting, sore throat and hoarseness, sleepiness, and dizziness.

## 2019-12-27 NOTE — PLAN OF CARE
· Arrive on 1/3/2020 at  5:30 am.  · Report to the 2nd floor Procedural Check In Room .   · Nothing to eat or drink after midnight the night before your procedure.                                                         · Please remove all body piercings and leave all your jewelery and valuables at home .  · Please remove your contact lenses.   · Wear loose comfortable clothing.  · You will not be able to drive that day, please make arrangement for transportation to and from your procedure.  · You cannot be alone for 24 hours after anesthesia. Make arrangements as needed.  · Shower the night before your procedure and the morning of your procedure with Hibiclens antibacterial solution.  · No lotions, creams or powders  · Do not shave 3 days prior to procedure  · Report any signs or symptoms of an infection to your surgeon. Examples: urinary (bladder) infection, upper respiratory infection, skin boils.   · Practice good hand washing before, during, and after procedure.   · Stop Aspirin, Ibuprofen, Advil, Motrin, Aleve, Nuprin, Naprosyn (all NSAID Medication) or any other blood thinners 5 days before your procedure unless directed by your physician.  Also hold all over the counter vitamins and herbal supplements for 5 days prior to your procedure.  · You may take Tylenol or Acetaminophen up the day of surgery for any pain.        I have read or had read and explained to me, and understand the above information.    Additional comments or instructions:  For additional questions call [phone]        Pre-Op Bathing Instructions    Before surgery, you can play an important role in your own health.    Because skin is not sterile, we need to be sure that your skin is as free of germs as possible. By following the instructions below, you can reduce the number of germs on your skin before surgery.    IMPORTANT: You will need to shower with a special soap called Hibiclens*, available at any pharmacy, over the counter usually in  the first aid isle.  If you are allergic to Chlorhexidine (the antiseptic in Hibiclens), use an antibacterial soap such as Dial Soap for your preoperative showers.  You will shower with Hibiclens the night before and the morning of surgery. Both the night before your surgery and the morning of your surgery see steps 2 and 3.   Do not use Hibiclens on the head, face or genitals to avoid injury to those areas.    STEP #1  1.  Shower with Hibiclens solution night before and the morning of surgery.      STEP #2: THE NIGHT BEFORE YOUR SURGERY     1. Do not shave the area of your body where your surgery will be performed.  2. Wash your hair as usual with your normal  Shampoo. Wash body shoulder to toes with your normal soap.  3. Squeeze Hibiclens into hand apply to surgical site.   4. Wash the site gently for five (5) minutes. Do not scrub your skin too hard.   5. Do not wash with your regular soap after Hibiclens is used.  6. Rinse your body thoroughly.  7. Pat yourself dry with a clean, soft towel.  8. Do not use lotion, cream, or powder.  9. Wear clean clothes.    STEP #3: THE MORNING OF YOUR SURGERY     1. Repeat Step #2.    * Not to be used by people allergic to Chlorhexidine.          ANESTHESIA  -For the first 24 hours after surgery:  Do not drive, use heavy equipment, make important decisions, or drink alcohol  -It is normal to feel sleepy for several hours.  Rest until you are more awake.  -Have someone stay with you, if needed.  They can watch for problems and help keep you safe.  -Some possible post anesthesia side effects include: nausea and vomiting, sore throat and hoarseness, sleepiness, and dizziness.

## 2020-01-02 NOTE — HOSPITAL COURSE
1/3/20: OR for left L3-4 oblique interbody fusion with placement of a interbody spacer and allograft followed by a open L3-4 and L4-5 laminectomy, medial facetectomy, foraminotomy, removal of L4-5 spinal hardware, L3-4 posterolateral arthrodesis with autograft harvested from the same incision and L3-L5 posterior segmental instrumentation.  1/4/20: post-op day one L3-4 OLIF, L3-5 laminectomy and revision of posterior instrumentation and fusion. Doing well, walker with PT. 3/10 of back pain. No leg pain. Legs numbness unchanged.    1/5/20: status post L3-4 OLIF, L3-5 posterior instrumented fusion. Doing well. No headache. Mobilizing OOB by herself. Walking with the walker. Mild pain  1/6/20: status post-op day 3 L3-5 fusion. Took a nap this PM and woke up with dizziness, headache. No nausea. No dysuria. No SOB. No chest pain. No abdominal pain.    01/07/2020:  No acute events overnight.  Patient had low-grade fever yesterday afternoon 100.6 that improved.  Likely secondary to atelectasis given O2 sats 92%.  UA negative.  No leukocytosis.  Continues to complain of intermittent positional headache that started yesterday.  Headache improves with rest and Fioricet.  Taking very little p.r.n. Robaxin, tramadol, and subcu Dilaudid.  Therapy recommended home health therapy with hip hip and bedside commode; patient already has rolling walker at home.  Plan to DC home today.  Patient will follow up with Neurosurgery in 2 weeks for wound check and 6 weeks with Dr. Brantley.

## 2020-01-02 NOTE — HPI
Monique Trujillo is a very pleasant 68 y.o. female, with history of nonsmoking, osteopenia L-spine (05/08/2018 DEXA-on vitamin-D and calcium supplements), GERD, depression, hypertension, hyperlipidemia, morbid obesity BMI 43.9, TIMMY, bladder spasms, insomnia, status post previous ACDF in 1988, chronic left shoulder pain (refusing shoulder surgery), status post right knee arthroplasty 03/14/2018, and status post right hip arthroplasty 04/20/2016, who had a L4-5 transforaminal interbody fusion with instrumentation 2015 with Dr. Mor[facility] [place] for spondylolisthesis with chronic low back pain.  After the surgery she reports having a new onset of right L5 distribution numbness and pain.  The numbness has not improved since her last surgery.  In the last year she has developed worsening difficulty walking.  She has bilateral leg numbness involving the whole leg.  She has urinary frequency and infrequent urinary incontinence is.  She has worsening low back pain.  The pain is constant.  She is still working 2 days a week as a .  She has difficulty walking.  Shef recently fell and broke her left foot which is being treated with a boot.  She tried physical therapy in the past and spinal injection without significant pain relief.    Lumbar spine MRI 2019 compared to 2014:  L4-5 fusion with posterior artery where and interbody device, right L4-5 lateral recess stenosis and foraminal stenosis.  L3-4 worsened degenerative spondylolisthesis with severe central canal stenosis and foraminal stenosis     She was recommended a left L3-4 oblique interbody fusion with placement of a interbody spacer and allograft followed by a open L3-4 and L4-5 laminectomy, medial facetectomy, foraminotomy, removal of L4-5 spinal hardware, L3-4 posterolateral arthrodesis with autograft harvested from the same incision and L3-L5 posterior segmental instrumentation.  The goals of the surgery is to decompress the neural elements in order to improve  her neurogenic claudication, radiculopathy symptoms and prevent worsening nerve damage.       We have discussed the risks of surgery including bleeding, infection, failure of surgery, CSF leak, nerve root injury, spinal cord injury, ureter injury, weakness, paralysis, peripheral neuropathy, malplaced hardware, migration of hardware, non-union, need for reoperation. Patient understands the risks and would like to proceed with surgery.        The patient has increase perioperative risks because of these comorbidities:  Morbid obesity with BMI of 43, essential hypertension, obstructive sleep apnea.

## 2020-01-03 ENCOUNTER — HOSPITAL ENCOUNTER (INPATIENT)
Facility: HOSPITAL | Age: 70
LOS: 4 days | Discharge: HOME HEALTH | DRG: 454 | End: 2020-01-07
Attending: NEUROLOGICAL SURGERY | Admitting: NEUROLOGICAL SURGERY
Payer: MEDICARE

## 2020-01-03 ENCOUNTER — ANESTHESIA (OUTPATIENT)
Dept: SURGERY | Facility: HOSPITAL | Age: 70
DRG: 454 | End: 2020-01-03
Payer: MEDICARE

## 2020-01-03 DIAGNOSIS — M48.061 SPINAL STENOSIS OF LUMBAR REGION WITH RADICULOPATHY: Primary | ICD-10-CM

## 2020-01-03 DIAGNOSIS — M54.16 SPINAL STENOSIS OF LUMBAR REGION WITH RADICULOPATHY: Primary | ICD-10-CM

## 2020-01-03 DIAGNOSIS — R50.82 POSTOPERATIVE FEVER: ICD-10-CM

## 2020-01-03 PROCEDURE — 20936 SP BONE AGRFT LOCAL ADD-ON: CPT | Mod: ,,, | Performed by: NEUROLOGICAL SURGERY

## 2020-01-03 PROCEDURE — 22558 ARTHRD ANT NTRBD MIN DSC LUM: CPT | Mod: 51,,, | Performed by: NEUROLOGICAL SURGERY

## 2020-01-03 PROCEDURE — 88300 SURGICAL PATH GROSS: CPT | Performed by: PATHOLOGY

## 2020-01-03 PROCEDURE — 25000003 PHARM REV CODE 250: Performed by: NURSE ANESTHETIST, CERTIFIED REGISTERED

## 2020-01-03 PROCEDURE — 71000039 HC RECOVERY, EACH ADD'L HOUR: Performed by: NEUROLOGICAL SURGERY

## 2020-01-03 PROCEDURE — 20930 PR ALLOGRAFT FOR SPINE SURGERY ONLY MORSELIZED: ICD-10-PCS | Mod: ,,, | Performed by: NEUROLOGICAL SURGERY

## 2020-01-03 PROCEDURE — 25000003 PHARM REV CODE 250: Performed by: PHYSICIAN ASSISTANT

## 2020-01-03 PROCEDURE — 37000008 HC ANESTHESIA 1ST 15 MINUTES: Performed by: NEUROLOGICAL SURGERY

## 2020-01-03 PROCEDURE — 22614 PR ARTHRODESIS, POST/POSTLAT, SNGL INTERSPACE, EA ADDTL: ICD-10-PCS | Mod: ,,, | Performed by: NEUROLOGICAL SURGERY

## 2020-01-03 PROCEDURE — 99900035 HC TECH TIME PER 15 MIN (STAT)

## 2020-01-03 PROCEDURE — 36000710: Performed by: NEUROLOGICAL SURGERY

## 2020-01-03 PROCEDURE — C1769 GUIDE WIRE: HCPCS | Performed by: NEUROLOGICAL SURGERY

## 2020-01-03 PROCEDURE — 88300 PR  SURG PATH,GROSS,LEVEL I: ICD-10-PCS | Mod: 26,,, | Performed by: PATHOLOGY

## 2020-01-03 PROCEDURE — 22842 INSERT SPINE FIXATION DEVICE: CPT | Mod: ,,, | Performed by: NEUROLOGICAL SURGERY

## 2020-01-03 PROCEDURE — 22842 PR POSTERIOR SEGMENTAL INSTRUMENTATION 3-6 VRT SEG: ICD-10-PCS | Mod: ,,, | Performed by: NEUROLOGICAL SURGERY

## 2020-01-03 PROCEDURE — 27000221 HC OXYGEN, UP TO 24 HOURS

## 2020-01-03 PROCEDURE — C1751 CATH, INF, PER/CENT/MIDLINE: HCPCS | Performed by: ANESTHESIOLOGY

## 2020-01-03 PROCEDURE — 63600175 PHARM REV CODE 636 W HCPCS: Performed by: PHYSICIAN ASSISTANT

## 2020-01-03 PROCEDURE — 88300 SURGICAL PATH GROSS: CPT | Mod: 26,,, | Performed by: PATHOLOGY

## 2020-01-03 PROCEDURE — 27201423 OPTIME MED/SURG SUP & DEVICES STERILE SUPPLY: Performed by: NEUROLOGICAL SURGERY

## 2020-01-03 PROCEDURE — 63047 PR LAMINEC/FACETECT/FORAMIN,LUMBAR 1 SEG: ICD-10-PCS | Mod: 51,,, | Performed by: NEUROLOGICAL SURGERY

## 2020-01-03 PROCEDURE — 27200677 HC TRANSDUCER MONITOR KIT SINGLE: Performed by: ANESTHESIOLOGY

## 2020-01-03 PROCEDURE — 22853 PR INSERT BIOMECH DEV W/INTERBODY ARTHRODESIS, EA CONTIGUOUS DEFECT: ICD-10-PCS | Mod: ,,, | Performed by: NEUROLOGICAL SURGERY

## 2020-01-03 PROCEDURE — 22614 ARTHRD PST TQ 1NTRSPC EA ADD: CPT | Mod: ,,, | Performed by: NEUROLOGICAL SURGERY

## 2020-01-03 PROCEDURE — 94799 UNLISTED PULMONARY SVC/PX: CPT

## 2020-01-03 PROCEDURE — 22558 PR ARTHRODESIS ANT INTERBODY MIN DISCECTOMY,LUMBAR: ICD-10-PCS | Mod: 51,,, | Performed by: NEUROLOGICAL SURGERY

## 2020-01-03 PROCEDURE — 63600175 PHARM REV CODE 636 W HCPCS: Performed by: NEUROLOGICAL SURGERY

## 2020-01-03 PROCEDURE — 71000033 HC RECOVERY, INTIAL HOUR: Performed by: NEUROLOGICAL SURGERY

## 2020-01-03 PROCEDURE — 63600175 PHARM REV CODE 636 W HCPCS: Performed by: NURSE ANESTHETIST, CERTIFIED REGISTERED

## 2020-01-03 PROCEDURE — 22612 ARTHRD PST TQ 1NTRSPC LUMBAR: CPT | Mod: 22,,, | Performed by: NEUROLOGICAL SURGERY

## 2020-01-03 PROCEDURE — 25000003 PHARM REV CODE 250: Performed by: NEUROLOGICAL SURGERY

## 2020-01-03 PROCEDURE — 63048 LAM FACETEC &FORAMOT EA ADDL: CPT | Mod: ,,, | Performed by: NEUROLOGICAL SURGERY

## 2020-01-03 PROCEDURE — C1713 ANCHOR/SCREW BN/BN,TIS/BN: HCPCS | Performed by: NEUROLOGICAL SURGERY

## 2020-01-03 PROCEDURE — 20936 PR AUTOGRAFT SPINE SURGERY LOCAL FROM SAME INCISION: ICD-10-PCS | Mod: ,,, | Performed by: NEUROLOGICAL SURGERY

## 2020-01-03 PROCEDURE — C1729 CATH, DRAINAGE: HCPCS | Performed by: NEUROLOGICAL SURGERY

## 2020-01-03 PROCEDURE — 27800903 OPTIME MED/SURG SUP & DEVICES OTHER IMPLANTS: Performed by: NEUROLOGICAL SURGERY

## 2020-01-03 PROCEDURE — 36000711: Performed by: NEUROLOGICAL SURGERY

## 2020-01-03 PROCEDURE — 63047 LAM FACETEC & FORAMOT LUMBAR: CPT | Mod: 51,,, | Performed by: NEUROLOGICAL SURGERY

## 2020-01-03 PROCEDURE — 63048 PR LAMINECT/FACETECT/FORAMINOT, EA ADDTL VERTEBRAL SEGM: ICD-10-PCS | Mod: ,,, | Performed by: NEUROLOGICAL SURGERY

## 2020-01-03 PROCEDURE — 37000009 HC ANESTHESIA EA ADD 15 MINS: Performed by: NEUROLOGICAL SURGERY

## 2020-01-03 PROCEDURE — 22612 PR ARTHRODESIS, POST/POSTLAT, SNGL INTERSPACE, LUMBAR: ICD-10-PCS | Mod: 22,,, | Performed by: NEUROLOGICAL SURGERY

## 2020-01-03 PROCEDURE — 27100025 HC TUBING, SET FLUID WARMER: Performed by: ANESTHESIOLOGY

## 2020-01-03 PROCEDURE — 11000001 HC ACUTE MED/SURG PRIVATE ROOM

## 2020-01-03 PROCEDURE — 99900038 HC OT GENERIC THERAPY SCREENING (STAT)

## 2020-01-03 PROCEDURE — 22853 INSJ BIOMECHANICAL DEVICE: CPT | Mod: ,,, | Performed by: NEUROLOGICAL SURGERY

## 2020-01-03 PROCEDURE — 20930 SP BONE ALGRFT MORSEL ADD-ON: CPT | Mod: ,,, | Performed by: NEUROLOGICAL SURGERY

## 2020-01-03 DEVICE — IMPLANTABLE DEVICE: Type: IMPLANTABLE DEVICE | Site: BACK | Status: FUNCTIONAL

## 2020-01-03 DEVICE — SCREW CREO TREADED 6.5X55MM: Type: IMPLANTABLE DEVICE | Site: BACK | Status: FUNCTIONAL

## 2020-01-03 DEVICE — CAP SPINAL LOCK THRD CREO 5.5: Type: IMPLANTABLE DEVICE | Site: BACK | Status: FUNCTIONAL

## 2020-01-03 DEVICE — ALLOGRAFT TRIFECTA 1CC: Type: IMPLANTABLE DEVICE | Site: BACK | Status: FUNCTIONAL

## 2020-01-03 DEVICE — SCREW BONE SPINAL 7.5X45MM: Type: IMPLANTABLE DEVICE | Site: BACK | Status: FUNCTIONAL

## 2020-01-03 DEVICE — PUTTY DBX 10CC: Type: IMPLANTABLE DEVICE | Site: BACK | Status: FUNCTIONAL

## 2020-01-03 RX ORDER — PRAVASTATIN SODIUM 20 MG/1
40 TABLET ORAL DAILY
Status: DISCONTINUED | OUTPATIENT
Start: 2020-01-03 | End: 2020-01-07 | Stop reason: HOSPADM

## 2020-01-03 RX ORDER — CETIRIZINE HYDROCHLORIDE 5 MG/1
5 TABLET ORAL DAILY
Status: DISCONTINUED | OUTPATIENT
Start: 2020-01-03 | End: 2020-01-07 | Stop reason: HOSPADM

## 2020-01-03 RX ORDER — HEPARIN SODIUM 5000 [USP'U]/ML
5000 INJECTION, SOLUTION INTRAVENOUS; SUBCUTANEOUS EVERY 8 HOURS
Status: DISCONTINUED | OUTPATIENT
Start: 2020-01-03 | End: 2020-01-07 | Stop reason: HOSPADM

## 2020-01-03 RX ORDER — CEFAZOLIN SODIUM 2 G/50ML
2 SOLUTION INTRAVENOUS
Status: COMPLETED | OUTPATIENT
Start: 2020-01-03 | End: 2020-01-03

## 2020-01-03 RX ORDER — DEXTROSE MONOHYDRATE, SODIUM CHLORIDE, AND POTASSIUM CHLORIDE 50; 1.49; 9 G/1000ML; G/1000ML; G/1000ML
INJECTION, SOLUTION INTRAVENOUS CONTINUOUS
Status: DISCONTINUED | OUTPATIENT
Start: 2020-01-03 | End: 2020-01-04

## 2020-01-03 RX ORDER — GABAPENTIN 400 MG/1
800 CAPSULE ORAL 2 TIMES DAILY
Status: DISCONTINUED | OUTPATIENT
Start: 2020-01-03 | End: 2020-01-07 | Stop reason: HOSPADM

## 2020-01-03 RX ORDER — BUPIVACAINE HYDROCHLORIDE AND EPINEPHRINE 5; 5 MG/ML; UG/ML
INJECTION, SOLUTION EPIDURAL; INTRACAUDAL; PERINEURAL
Status: DISCONTINUED | OUTPATIENT
Start: 2020-01-03 | End: 2020-01-03 | Stop reason: HOSPADM

## 2020-01-03 RX ORDER — ACETAMINOPHEN 500 MG
1000 TABLET ORAL
Status: COMPLETED | OUTPATIENT
Start: 2020-01-03 | End: 2020-01-03

## 2020-01-03 RX ORDER — MIDAZOLAM HYDROCHLORIDE 1 MG/ML
INJECTION, SOLUTION INTRAMUSCULAR; INTRAVENOUS
Status: DISCONTINUED | OUTPATIENT
Start: 2020-01-03 | End: 2020-01-03

## 2020-01-03 RX ORDER — SERTRALINE HYDROCHLORIDE 50 MG/1
50 TABLET, FILM COATED ORAL DAILY
Status: DISCONTINUED | OUTPATIENT
Start: 2020-01-03 | End: 2020-01-07 | Stop reason: HOSPADM

## 2020-01-03 RX ORDER — SODIUM CHLORIDE 9 MG/ML
INJECTION, SOLUTION INTRAVENOUS CONTINUOUS PRN
Status: DISCONTINUED | OUTPATIENT
Start: 2020-01-03 | End: 2020-01-03

## 2020-01-03 RX ORDER — GLYCOPYRROLATE 0.2 MG/ML
INJECTION INTRAMUSCULAR; INTRAVENOUS
Status: DISCONTINUED | OUTPATIENT
Start: 2020-01-03 | End: 2020-01-03

## 2020-01-03 RX ORDER — OXYCODONE HCL 10 MG/1
10 TABLET, FILM COATED, EXTENDED RELEASE ORAL
Status: COMPLETED | OUTPATIENT
Start: 2020-01-03 | End: 2020-01-03

## 2020-01-03 RX ORDER — FENOFIBRATE 145 MG/1
145 TABLET, FILM COATED ORAL DAILY
Status: DISCONTINUED | OUTPATIENT
Start: 2020-01-04 | End: 2020-01-07 | Stop reason: HOSPADM

## 2020-01-03 RX ORDER — FENOFIBRATE 160 MG/1
160 TABLET ORAL DAILY
Status: DISCONTINUED | OUTPATIENT
Start: 2020-01-03 | End: 2020-01-03

## 2020-01-03 RX ORDER — CYCLOBENZAPRINE HCL 10 MG
10 TABLET ORAL
Status: COMPLETED | OUTPATIENT
Start: 2020-01-03 | End: 2020-01-03

## 2020-01-03 RX ORDER — FENTANYL CITRATE 50 UG/ML
INJECTION, SOLUTION INTRAMUSCULAR; INTRAVENOUS
Status: DISCONTINUED | OUTPATIENT
Start: 2020-01-03 | End: 2020-01-03

## 2020-01-03 RX ORDER — ONDANSETRON 2 MG/ML
INJECTION INTRAMUSCULAR; INTRAVENOUS
Status: DISCONTINUED | OUTPATIENT
Start: 2020-01-03 | End: 2020-01-03

## 2020-01-03 RX ORDER — SUCCINYLCHOLINE CHLORIDE 20 MG/ML
INJECTION INTRAMUSCULAR; INTRAVENOUS
Status: DISCONTINUED | OUTPATIENT
Start: 2020-01-03 | End: 2020-01-03

## 2020-01-03 RX ORDER — TRAMADOL HYDROCHLORIDE 50 MG/1
50 TABLET ORAL EVERY 6 HOURS
Status: DISCONTINUED | OUTPATIENT
Start: 2020-01-03 | End: 2020-01-07 | Stop reason: HOSPADM

## 2020-01-03 RX ORDER — METHOCARBAMOL 500 MG/1
500 TABLET, FILM COATED ORAL 3 TIMES DAILY PRN
Status: DISCONTINUED | OUTPATIENT
Start: 2020-01-03 | End: 2020-01-07 | Stop reason: HOSPADM

## 2020-01-03 RX ORDER — ONDANSETRON 2 MG/ML
4 INJECTION INTRAMUSCULAR; INTRAVENOUS ONCE AS NEEDED
Status: DISCONTINUED | OUTPATIENT
Start: 2020-01-03 | End: 2020-01-03 | Stop reason: HOSPADM

## 2020-01-03 RX ORDER — AMOXICILLIN 250 MG
2 CAPSULE ORAL NIGHTLY PRN
Status: DISCONTINUED | OUTPATIENT
Start: 2020-01-03 | End: 2020-01-07 | Stop reason: HOSPADM

## 2020-01-03 RX ORDER — PREGABALIN 75 MG/1
75 CAPSULE ORAL
Status: COMPLETED | OUTPATIENT
Start: 2020-01-03 | End: 2020-01-03

## 2020-01-03 RX ORDER — AMLODIPINE BESYLATE 5 MG/1
10 TABLET ORAL DAILY
Status: DISCONTINUED | OUTPATIENT
Start: 2020-01-03 | End: 2020-01-07 | Stop reason: HOSPADM

## 2020-01-03 RX ORDER — ACETAMINOPHEN 325 MG/1
650 TABLET ORAL EVERY 6 HOURS
Status: DISCONTINUED | OUTPATIENT
Start: 2020-01-03 | End: 2020-01-06

## 2020-01-03 RX ORDER — CELECOXIB 100 MG/1
200 CAPSULE ORAL 2 TIMES DAILY
Status: COMPLETED | OUTPATIENT
Start: 2020-01-03 | End: 2020-01-05

## 2020-01-03 RX ORDER — HYDROMORPHONE HYDROCHLORIDE 2 MG/ML
1 INJECTION, SOLUTION INTRAMUSCULAR; INTRAVENOUS; SUBCUTANEOUS
Status: DISCONTINUED | OUTPATIENT
Start: 2020-01-03 | End: 2020-01-07 | Stop reason: HOSPADM

## 2020-01-03 RX ORDER — EPHEDRINE SULFATE 50 MG/ML
INJECTION, SOLUTION INTRAVENOUS
Status: DISCONTINUED | OUTPATIENT
Start: 2020-01-03 | End: 2020-01-03

## 2020-01-03 RX ORDER — CELECOXIB 100 MG/1
200 CAPSULE ORAL
Status: COMPLETED | OUTPATIENT
Start: 2020-01-03 | End: 2020-01-03

## 2020-01-03 RX ORDER — DEXAMETHASONE SODIUM PHOSPHATE 4 MG/ML
INJECTION, SOLUTION INTRA-ARTICULAR; INTRALESIONAL; INTRAMUSCULAR; INTRAVENOUS; SOFT TISSUE
Status: DISCONTINUED | OUTPATIENT
Start: 2020-01-03 | End: 2020-01-03

## 2020-01-03 RX ORDER — ROCURONIUM BROMIDE 10 MG/ML
INJECTION, SOLUTION INTRAVENOUS
Status: DISCONTINUED | OUTPATIENT
Start: 2020-01-03 | End: 2020-01-03

## 2020-01-03 RX ORDER — SODIUM CHLORIDE 0.9 % (FLUSH) 0.9 %
10 SYRINGE (ML) INJECTION
Status: DISCONTINUED | OUTPATIENT
Start: 2020-01-03 | End: 2020-01-07 | Stop reason: HOSPADM

## 2020-01-03 RX ORDER — PROPOFOL 10 MG/ML
VIAL (ML) INTRAVENOUS
Status: DISCONTINUED | OUTPATIENT
Start: 2020-01-03 | End: 2020-01-03

## 2020-01-03 RX ORDER — ONDANSETRON 8 MG/1
8 TABLET, ORALLY DISINTEGRATING ORAL EVERY 6 HOURS PRN
Status: DISCONTINUED | OUTPATIENT
Start: 2020-01-03 | End: 2020-01-07 | Stop reason: HOSPADM

## 2020-01-03 RX ORDER — PROPOFOL 10 MG/ML
VIAL (ML) INTRAVENOUS CONTINUOUS PRN
Status: DISCONTINUED | OUTPATIENT
Start: 2020-01-03 | End: 2020-01-03

## 2020-01-03 RX ORDER — MUPIROCIN 20 MG/G
1 OINTMENT TOPICAL 2 TIMES DAILY
Status: DISCONTINUED | OUTPATIENT
Start: 2020-01-03 | End: 2020-01-07 | Stop reason: HOSPADM

## 2020-01-03 RX ORDER — HYDROMORPHONE HYDROCHLORIDE 2 MG/ML
0.5 INJECTION, SOLUTION INTRAMUSCULAR; INTRAVENOUS; SUBCUTANEOUS EVERY 5 MIN PRN
Status: DISCONTINUED | OUTPATIENT
Start: 2020-01-03 | End: 2020-01-03 | Stop reason: HOSPADM

## 2020-01-03 RX ORDER — GENTAMICIN SULFATE 80 MG/100ML
80 INJECTION, SOLUTION INTRAVENOUS
Status: DISCONTINUED | OUTPATIENT
Start: 2020-01-03 | End: 2020-01-03

## 2020-01-03 RX ORDER — LIDOCAINE HCL/PF 100 MG/5ML
SYRINGE (ML) INTRAVENOUS
Status: DISCONTINUED | OUTPATIENT
Start: 2020-01-03 | End: 2020-01-03

## 2020-01-03 RX ORDER — VANCOMYCIN HYDROCHLORIDE 1 G/20ML
INJECTION, POWDER, LYOPHILIZED, FOR SOLUTION INTRAVENOUS
Status: DISCONTINUED | OUTPATIENT
Start: 2020-01-03 | End: 2020-01-03 | Stop reason: HOSPADM

## 2020-01-03 RX ORDER — BACITRACIN 50000 [IU]/1
INJECTION, POWDER, FOR SOLUTION INTRAMUSCULAR
Status: DISCONTINUED | OUTPATIENT
Start: 2020-01-03 | End: 2020-01-03 | Stop reason: HOSPADM

## 2020-01-03 RX ORDER — BISACODYL 10 MG
10 SUPPOSITORY, RECTAL RECTAL DAILY
Status: DISCONTINUED | OUTPATIENT
Start: 2020-01-03 | End: 2020-01-04

## 2020-01-03 RX ORDER — OXYBUTYNIN CHLORIDE 5 MG/1
15 TABLET, EXTENDED RELEASE ORAL DAILY
Status: DISCONTINUED | OUTPATIENT
Start: 2020-01-03 | End: 2020-01-07 | Stop reason: HOSPADM

## 2020-01-03 RX ORDER — MAG HYDROX/ALUMINUM HYD/SIMETH 200-200-20
30 SUSPENSION, ORAL (FINAL DOSE FORM) ORAL EVERY 4 HOURS PRN
Status: DISCONTINUED | OUTPATIENT
Start: 2020-01-03 | End: 2020-01-07 | Stop reason: HOSPADM

## 2020-01-03 RX ORDER — PANTOPRAZOLE SODIUM 40 MG/1
40 TABLET, DELAYED RELEASE ORAL DAILY
Status: DISCONTINUED | OUTPATIENT
Start: 2020-01-03 | End: 2020-01-07 | Stop reason: HOSPADM

## 2020-01-03 RX ORDER — ATENOLOL 25 MG/1
25 TABLET ORAL DAILY
Status: DISCONTINUED | OUTPATIENT
Start: 2020-01-03 | End: 2020-01-07 | Stop reason: HOSPADM

## 2020-01-03 RX ORDER — NEOSTIGMINE METHYLSULFATE 1 MG/ML
INJECTION, SOLUTION INTRAVENOUS
Status: DISCONTINUED | OUTPATIENT
Start: 2020-01-03 | End: 2020-01-03

## 2020-01-03 RX ADMIN — POTASSIUM CHLORIDE, DEXTROSE MONOHYDRATE AND SODIUM CHLORIDE: 150; 5; 900 INJECTION, SOLUTION INTRAVENOUS at 02:01

## 2020-01-03 RX ADMIN — GABAPENTIN 800 MG: 400 CAPSULE ORAL at 08:01

## 2020-01-03 RX ADMIN — PROPOFOL 150 MG: 10 INJECTION, EMULSION INTRAVENOUS at 07:01

## 2020-01-03 RX ADMIN — KETAMINE HYDROCHLORIDE 5 MCG/KG/MIN: 50 INJECTION, SOLUTION, CONCENTRATE INTRAMUSCULAR; INTRAVENOUS at 07:01

## 2020-01-03 RX ADMIN — ACETAMINOPHEN 650 MG: 325 TABLET ORAL at 11:01

## 2020-01-03 RX ADMIN — SODIUM CHLORIDE: 0.9 INJECTION, SOLUTION INTRAVENOUS at 07:01

## 2020-01-03 RX ADMIN — PRAVASTATIN SODIUM 40 MG: 20 TABLET ORAL at 04:01

## 2020-01-03 RX ADMIN — SERTRALINE HYDROCHLORIDE 50 MG: 50 TABLET ORAL at 04:01

## 2020-01-03 RX ADMIN — CELECOXIB 200 MG: 100 CAPSULE ORAL at 06:01

## 2020-01-03 RX ADMIN — CYCLOBENZAPRINE 10 MG: 10 TABLET, FILM COATED ORAL at 06:01

## 2020-01-03 RX ADMIN — FENTANYL CITRATE 150 MCG: 50 INJECTION, SOLUTION INTRAMUSCULAR; INTRAVENOUS at 07:01

## 2020-01-03 RX ADMIN — OXYBUTYNIN CHLORIDE 15 MG: 5 TABLET, EXTENDED RELEASE ORAL at 04:01

## 2020-01-03 RX ADMIN — CEFAZOLIN SODIUM 2 G: 2 SOLUTION INTRAVENOUS at 02:01

## 2020-01-03 RX ADMIN — PROPOFOL 50 MCG/KG/MIN: 10 INJECTION, EMULSION INTRAVENOUS at 08:01

## 2020-01-03 RX ADMIN — MUPIROCIN 1 G: 20 OINTMENT TOPICAL at 08:01

## 2020-01-03 RX ADMIN — EPHEDRINE SULFATE 15 MG: 50 INJECTION, SOLUTION INTRAMUSCULAR; INTRAVENOUS; SUBCUTANEOUS at 09:01

## 2020-01-03 RX ADMIN — DEXAMETHASONE SODIUM PHOSPHATE 8 MG: 4 INJECTION, SOLUTION INTRAMUSCULAR; INTRAVENOUS at 08:01

## 2020-01-03 RX ADMIN — CELECOXIB 200 MG: 100 CAPSULE ORAL at 08:01

## 2020-01-03 RX ADMIN — ACETAMINOPHEN 650 MG: 325 TABLET ORAL at 05:01

## 2020-01-03 RX ADMIN — ROCURONIUM BROMIDE 15 MG: 10 INJECTION, SOLUTION INTRAVENOUS at 07:01

## 2020-01-03 RX ADMIN — PROPOFOL 150 MCG/KG/MIN: 10 INJECTION, EMULSION INTRAVENOUS at 07:01

## 2020-01-03 RX ADMIN — POTASSIUM CHLORIDE, DEXTROSE MONOHYDRATE AND SODIUM CHLORIDE: 150; 5; 900 INJECTION, SOLUTION INTRAVENOUS at 11:01

## 2020-01-03 RX ADMIN — NEOSTIGMINE METHYLSULFATE 3 MG: 1 INJECTION INTRAVENOUS at 06:01

## 2020-01-03 RX ADMIN — GENTAMICIN SULFATE 80 MG: 80 INJECTION, SOLUTION INTRAVENOUS at 07:01

## 2020-01-03 RX ADMIN — VANCOMYCIN HYDROCHLORIDE 1250 MG: 1 INJECTION, POWDER, LYOPHILIZED, FOR SOLUTION INTRAVENOUS at 07:01

## 2020-01-03 RX ADMIN — SUCCINYLCHOLINE CHLORIDE 110 MG: 20 INJECTION, SOLUTION INTRAMUSCULAR; INTRAVENOUS at 07:01

## 2020-01-03 RX ADMIN — HEPARIN SODIUM 5000 UNITS: 5000 INJECTION, SOLUTION INTRAVENOUS; SUBCUTANEOUS at 02:01

## 2020-01-03 RX ADMIN — ACETAMINOPHEN 1000 MG: 500 TABLET ORAL at 06:01

## 2020-01-03 RX ADMIN — SODIUM CHLORIDE: 0.9 INJECTION, SOLUTION INTRAVENOUS at 06:01

## 2020-01-03 RX ADMIN — OXYCODONE HYDROCHLORIDE 10 MG: 10 TABLET, FILM COATED, EXTENDED RELEASE ORAL at 06:01

## 2020-01-03 RX ADMIN — TRAMADOL HYDROCHLORIDE 50 MG: 50 TABLET, FILM COATED ORAL at 11:01

## 2020-01-03 RX ADMIN — TRAMADOL HYDROCHLORIDE 50 MG: 50 TABLET, FILM COATED ORAL at 05:01

## 2020-01-03 RX ADMIN — CETIRIZINE HYDROCHLORIDE 5 MG: 5 TABLET ORAL at 04:01

## 2020-01-03 RX ADMIN — CEFAZOLIN SODIUM 2 G: 2 SOLUTION INTRAVENOUS at 10:01

## 2020-01-03 RX ADMIN — ATENOLOL 25 MG: 25 TABLET ORAL at 04:01

## 2020-01-03 RX ADMIN — PANTOPRAZOLE SODIUM 40 MG: 40 TABLET, DELAYED RELEASE ORAL at 04:01

## 2020-01-03 RX ADMIN — HEPARIN SODIUM 5000 UNITS: 5000 INJECTION, SOLUTION INTRAVENOUS; SUBCUTANEOUS at 09:01

## 2020-01-03 RX ADMIN — GLYCOPYRROLATE 0.6 MG: 0.2 INJECTION, SOLUTION INTRAMUSCULAR; INTRAVENOUS at 06:01

## 2020-01-03 RX ADMIN — MIDAZOLAM 2 MG: 1 INJECTION INTRAMUSCULAR; INTRAVENOUS at 07:01

## 2020-01-03 RX ADMIN — ONDANSETRON 8 MG: 2 INJECTION, SOLUTION INTRAMUSCULAR; INTRAVENOUS at 11:01

## 2020-01-03 RX ADMIN — ACETAMINOPHEN 650 MG: 325 TABLET ORAL at 01:01

## 2020-01-03 RX ADMIN — LIDOCAINE HYDROCHLORIDE 75 MG: 20 INJECTION, SOLUTION INTRAVENOUS at 07:01

## 2020-01-03 RX ADMIN — PREGABALIN 75 MG: 75 CAPSULE ORAL at 06:01

## 2020-01-03 RX ADMIN — AMLODIPINE BESYLATE 10 MG: 5 TABLET ORAL at 04:01

## 2020-01-03 NOTE — TRANSFER OF CARE
"Anesthesia Transfer of Care Note    Patient: Monique Trujillo    Procedure(s) Performed: Procedure(s) (LRB):  FUSION, SPINE, WITH INSTRUMENTATION Stage 1 Left L3-4 LOIF Rise L Stage 2 Open L3-4 L4-5 Laminectomy Removal of L4-5 Hardware, L3-5 Posterior Instrumentation and extraction of Fusion at L3-4 (Left)    Patient location: PACU    Anesthesia Type: general    Transport from OR: Transported from OR on 6-10 L/min O2 by face mask with adequate spontaneous ventilation    Post pain: adequate analgesia    Post assessment: no apparent anesthetic complications    Post vital signs: stable    Level of consciousness: responds to stimulation and sedated    Nausea/Vomiting: no nausea/vomiting    Complications: none    Transfer of care protocol was followed      Last vitals:   Visit Vitals  BP (!) 180/87 (BP Location: Left arm, Patient Position: Lying)   Pulse 73   Temp 37.3 °C (99.1 °F) (Skin)   Resp 18   Ht 5' 1" (1.549 m)   Wt 101.2 kg (223 lb)   SpO2 96%   Breastfeeding? No   BMI 42.14 kg/m²     "

## 2020-01-03 NOTE — PT/OT/SLP PROGRESS
"Occupational Therapy  Screen     Patient Name:  Monique Trujillo   MRN:  [id number]    Patient reporting she remains under effects of anesthesia at this time. States, " I still can't feel my legs or lift my arms." Having difficulty maintaining wakefulness while speaking with therapists.     Pt reports living with spouse and dghtr in 2 story home, threshold to enter, bed and t/s combo downstairs. Pt does not have to use upstairs of home. She reports independence/mod I as PLOF with use of rollator for ambulation, TTB for bathing, and access to BSC. Works a few days a week as a .     Pt and dghtr educated on log rolling for bed mobility and impt of position changing throughout the night for comfort and pain relief.     Alena Marinelli, OT  1/3/2020  "

## 2020-01-03 NOTE — ANESTHESIA POSTPROCEDURE EVALUATION
Anesthesia Post Evaluation    Patient: Monique Trujillo    Procedure(s) Performed: Procedure(s) (LRB):  FUSION, SPINE, WITH INSTRUMENTATION Stage 1 Left L3-4 LOIF Rise L Stage 2 Open L3-4 L4-5 Laminectomy Removal of L4-5 Hardware, L3-5 Posterior Instrumentation and extraction of Fusion at L3-4 (Left)    Final Anesthesia Type: general    Patient location during evaluation: PACU  Patient participation: Yes- Able to Participate  Level of consciousness: awake and alert  Post-procedure vital signs: reviewed and stable  Pain management: adequate  Airway patency: patent    PONV status at discharge: No PONV  Anesthetic complications: no      Cardiovascular status: blood pressure returned to baseline  Respiratory status: unassisted  Hydration status: euvolemic  Follow-up not needed.          Vitals Value Taken Time   /86 1/3/2020  2:55 PM   Temp 36.3 °C (97.3 °F) 1/3/2020  2:55 PM   Pulse 85 1/3/2020  3:30 PM   Resp 15 1/3/2020  3:30 PM   SpO2 98 % 1/3/2020  3:30 PM         Event Time     Out of Recovery 14:34:25          Pain/Lindsey Score: Pain Rating Prior to Med Admin: 6 (1/3/2020  1:57 PM)  Lindsey Score: 8 (1/3/2020  2:20 PM)

## 2020-01-03 NOTE — PT/OT/SLP PROGRESS
Physical Therapy      Patient Name:  Monique Trujillo   MRN:  [id number]    Patient lethargic with family member reports she can not feel her legs and can hardly move her L shoulder. Patient requested to be seen in am. Will follow up as able    James Casarez, PT

## 2020-01-03 NOTE — H&P
NEUROSURGICAL PROGRESS NOTE   DATE OF SERVICE:   01/03/2020  ATTENDING PHYSICIAN:   James Brantley MD   SUBJECTIVE:   INTERIM HISTORY:   This is a very pleasant 68 y.o. female, who had a L4-5 transforaminal interbody fusion with instrumentation 2015 with Dr. Morales at [facility] for spondylolisthesis with chronic low back pain. After the surgery she reports having a new onset of right L5 distribution numbness and pain. The numbness has not improved since her last surgery. In the last year she has developed worsening difficulty walking. She has bilateral leg numbness involving the whole leg. She has urinary frequency and infrequent urinary incontinence is. She has worsening low back pain. The pain is constant. She is still working 2 days a week as a . She has difficulty walking. Shef recently fell and broke her left foot which is being treated with a boot. She tried physical therapy in the past and spinal injection without significant pain relief.   Low Back Pain Scale   R Low Back-Pain Score: 6   R Low Back-Pain Intensity: Pain killers give moderate relief from pain   R Low Back-Pain Score: It is painful to look after myself and I am slow and careful   Low Back-Lifting: Pain prevents me from lifting heavy weights but I can manage light weights if they are conveniently placed   Low Back-Walking: Pain prevents me walking more than .25 mile   Low Back-Sitting: I can sit only in my favorite chair as long as I like   Low Back-Standing: I cannot stand for longer than 1 hour without increasing pain   Low Back-Sleeping: I have pain in bed but it does not prevent me from sleeping well   Low Back-Social Life: Pain has no significant effect on my social life apart from limiting my more en   Low Back-Traveling: (n/a)   Low Back-Changing Degree of Pain: (n/a)     PAST MEDICAL HISTORY:        Active Ambulatory Problems    Diagnosis Date Noted    Osteopenia of multiple sites 08/05/2014    Morbid obesity with BMI of 40.0-44.9,  adult 02/24/2015    Mixed hyperlipidemia 02/24/2015    Mood disorder 02/24/2015    Mixed stress and urge urinary incontinence 02/24/2015    Osteoarthritis of spine with radiculopathy, lumbar region 09/04/2015    Essential hypertension 09/04/2015    Primary osteoarthritis of right hip 04/28/2016    SI (sacroiliac) joint dysfunction 09/12/2016    Spondylolisthesis, lumbar region 09/12/2016    Lumbar degenerative disc disease 09/12/2016    History of colon polyps 11/02/2016    Status post right hip replacement 12/20/2016    Lumbar radiculopathy 12/20/2016    Dysphagia 10/05/2017    Bilateral primary osteoarthritis of knee 10/18/2017    Osteoarthritis of left shoulder 10/18/2017    Multiple gastric ulcers 12/14/2017    TIMMY (obstructive sleep apnea)     Primary osteoarthritis of both knees 03/14/2018    Decreased ROM of right knee 04/10/2018    Decreased mobility and endurance 04/10/2018    Right knee pain 04/10/2018    Decreased strength 04/10/2018    Chronic bilateral low back pain with bilateral sciatica 10/24/2018    Spinal stenosis of lumbar region with neurogenic claudication 10/31/2018    Chronic pain 11/08/2018    Shoulder arthritis 02/28/2019    Chronic pain of both shoulders 06/10/2019    Status post lumbar spinal fusion 08/30/2019          Resolved Ambulatory Problems    Diagnosis Date Noted    Obesity 08/05/2014    Hemorrhoids, internal 11/05/2014    Screen for colon cancer 11/05/2014    Osteoarthritis 11/10/2014    Hypertension 02/24/2015    Absence of bladder continence 09/04/2015          Past Medical History:   Diagnosis Date    Allergy     Arthritis     Chronic lower back pain     Depression     Fever blister     GERD (gastroesophageal reflux disease)     Hyperlipidemia     Joint pain     Skin cancer      PAST SURGICAL HISTORY:         Past Surgical History:   Procedure Laterality Date    ANTERIOR CERVICAL CORPECTOMY W/ FUSION  1988    CAUDAL EPIDURAL STEROID  INJECTION N/A 1/10/2019    Procedure: Injection-steroid-epidural-caudal; Surgeon: Lydia Velásquez Jr., MD; Location: [facility]IN [facility]GT; Service: Pain Management; Laterality: N/A;    COLONOSCOPY N/A 11/2/2016    Procedure: COLONOSCOPY; Surgeon: Viji Dewitt MD; Location: [facility]; Service: Endoscopy; Laterality: N/A;    EPIDURAL STEROID INJECTION INTO LUMBAR SPINE N/A 11/8/2018    Procedure: Injection-steroid-epidural-lumbar-L2-3 (LEFT > RIGHT); Surgeon: Lydia Velásquez Jr., MD; Location: [facility] PAIN [facility]GT; Service: Pain Management; Laterality: N/A;    INJECTION OF ANESTHETIC AGENT AROUND GENITOFEMORAL NERVE Bilateral 8/1/2019    Procedure: BILATERAL SHOULDER ARTICULAR BRANCH BLOCK; Surgeon: Lydia Velásquez Jr., MD; Location: [facility] PAIN [facility]GT; Service: Pain Management; Laterality: Bilateral;    KNEE ARTHROSCOPY W/ MENISCAL REPAIR Right 2012    LUMBAR LAMINECTOMY  06/19/2015    SKIN CANCER EXCISION      forehead does not recall date    TOTAL HIP ARTHROPLASTY Right 04/28/2016        TUBAL LIGATION  1980     SOCIAL HISTORY:   Social History                                                                                                                                                                                                                                                                 FAMILY HISTORY:         Family History   Problem Relation Age of Onset    Arthritis Mother     Cancer Mother     breast ca    Breast cancer Mother     Kidney disease Father     Alzheimer's disease Maternal Grandfather     Cancer Paternal Grandmother     colon ca    Colon cancer Paternal Grandmother      CURRENTS MEDICATIONS:          Current Outpatient Medications on File Prior to Visit   Medication Sig Dispense Refill    amLODIPine (NORVASC) 10 MG tablet TAKE 1 TABLET BY MOUTH EVERY DAY 90 tablet 3    ascorbic acid (VITAMIN C) 1000 MG tablet Take 1,000 mg by mouth once daily.      atenolol (TENORMIN) 25 MG  tablet TAKE 1 TABLET BY MOUTH EVERY DAY 90 tablet 3    calcium-vitamin D3 (CALCIUM 500 + D) 500 mg(1,250mg) -200 unit per tablet Take 1 tablet by mouth 2 (two) times daily with meals.      co-enzyme Q-10 30 mg capsule Take 30 mg by mouth once daily.       fenofibric acid (FIBRICOR) 135 mg CpDR Take 1 capsule (135 mg total) by mouth once daily. 90 capsule 3    fish oil-omega-3 fatty acids 300-1,000 mg capsule Take 2 g by mouth once daily.      gabapentin (NEURONTIN) 800 MG tablet Take 1 tablet (800 mg total) by mouth every evening. (Patient taking differently: Take 800 mg by mouth every evening. ) 90 tablet 3    GLUCOSA KHAN 2KCL/CHONDROITIN KHAN (GLUCOSAMINE SULF-CHONDROITIN ORAL) Take 1 tablet by mouth once daily.      multivitamin capsule Take 1 capsule by mouth once daily.      naproxen (NAPROSYN) 500 MG tablet Take 1 tablet (500 mg total) by mouth 2 (two) times daily with meals. 60 tablet 0    oxybutynin (DITROPAN XL) 15 MG TR24 TAKE 1 TABLET (15 MG TOTAL) BY MOUTH ONCE DAILY. 90 tablet 3    pantoprazole (PROTONIX) 20 MG tablet TAKE 1 TABLET BY MOUTH EVERY DAY 90 tablet 3    polymyxin B sulf-trimethoprim (POLYTRIM) 10,000 unit- 1 mg/mL Drop Place 1 drop into both eyes every 4 (four) hours. 10 mL 0    pravastatin (PRAVACHOL) 40 MG tablet TAKE 1 TABLET BY MOUTH EVERY DAY 90 tablet 3    sertraline (ZOLOFT) 25 MG tablet TAKE 1 TABLET BY MOUTH EVERY DAY 30 tablet 0    traMADol (ULTRAM) 50 mg tablet Take 1 tablet (50 mg total) by mouth every 24 hours as needed for Pain. 30 tablet 3    vitamin E 400 UNIT capsule Take 400 Units by mouth once daily.      econazole nitrate 1 % cream Use bid for rash (Patient not taking: Reported on 10/9/2019) 85 g 2    metronidazole 0.75% (METROCREAM) 0.75 % Crea Apply topically once daily to affected area 45 g 1    OPW TEST CLAIM - DO NOT FILL Take by mouth. OPW test claim. Do not fill. 11 tablet 0               Current Facility-Administered Medications on File Prior to  Visit   Medication Dose Route Frequency Provider Last Rate Last Dose    lidocaine (PF) 20 mg/mL (2 %) injection 2 mL 2 mL Intradermal Once Kalyan Han MD       ALLERGIES:   Review of patient's allergies indicates:   No Known Allergies   REVIEW OF SYSTEMS:   Review of Systems   Constitutional: Negative for diaphoresis, fever and weight loss.   Respiratory: Negative for shortness of breath.   Cardiovascular: Negative for chest pain.   Gastrointestinal: Negative for blood in stool.   Genitourinary: Negative for hematuria.   Endo/Heme/Allergies: Does not bruise/bleed easily.   All other systems reviewed and are negative.     OBJECTIVE:   PHYSICAL EXAMINATION:       Vitals:    10/09/19 1509   BP: (!) 148/92     Physical Exam:   Vitals reviewed.   Constitutional: She appears well-developed and well-nourished.   Eyes: Pupils are equal, round, and reactive to light. Conjunctivae and EOM are normal.   Cardiovascular: Normal distal pulses and no edema.   Abdominal: Soft.   Skin: Skin displays no rash on trunk and no rash on extremities. Skin displays no lesions on trunk and no lesions on extremities.     Psych/Behavior: She is alert. She is oriented to person, place, and time. She has a normal mood and affect.     Musculoskeletal:   Neck: Range of motion is limited.     Neurological:   DTRs: Tricep reflexes are 2+ on the right side and 2+ on the left side. Bicep reflexes are 2+ on the right side and 2+ on the left side. Brachioradialis reflexes are 2+ on the right side and 2+ on the left side. Patellar reflexes are 2+ on the right side and 2+ on the left side. Achilles reflexes are 0 on the right side and 0 on the left side.     Back Exam   Tenderness   The patient is experiencing tenderness in the lumbar.   Range of Motion   Extension: abnormal   Flexion: normal   Lateral bend right: abnormal   Lateral bend left: abnormal   Rotation right: abnormal   Rotation left: abnormal   Muscle Strength   Right Quadriceps: 5/5   Left  Quadriceps: 5/5   Right Hamstrings: 5/5   Left Hamstrings: 5/5   Tests   Straight leg raise right: negative   Straight leg raise left: negative     Neurologic Exam   Mental Status   Oriented to person, place, and time.   Speech: speech is normal   Level of consciousness: alert   Cranial Nerves   Cranial nerves II through XII intact.   CN III, IV, VI   Pupils are equal, round, and reactive to light.  Extraocular motions are normal.   Motor Exam   Muscle bulk: normal  Overall muscle tone: normal   Strength   Right deltoid: 5/5   Left deltoid: 5/5   Right biceps: 5/5   Left biceps: 5/5   Right triceps: 5/5   Left triceps: 5/5   Right wrist flexion: 5/5   Left wrist flexion: 5/5   Right wrist extension: 5/5   Left wrist extension: 5/5   Right interossei: 5/5   Left interossei: 5/5   Right iliopsoas: 5/5   Left iliopsoas: 5/5   Right quadriceps: 5/5   Left quadriceps: 5/5   Right hamstrin/5   Left hamstrin/5   Right anterior tibial: 4/5   Left anterior tibial: 4/5   Right posterior tibial: 5/5   Left posterior tibial: 5/5   Right peroneal: 4/5   Left peroneal: 4/5   Right gastroc: 5/5   Left gastroc: 5/5   Sensory Exam   Light touch normal.   Pinprick normal.   Gait, Coordination, and Reflexes   Gait   Gait: (Antalgic)   Reflexes   Right brachioradialis: 2+  Left brachioradialis: 2+  Right biceps: 2+  Left biceps: 2+  Right triceps: 2+  Left triceps: 2+  Right patellar: 2+  Left patellar: 2+  Right achilles: 0  Left achilles: 0  Right plantar: normal  Left plantar: normal  Right Arreola: absent  Left Arreola: absent  Right ankle clonus: absent  Left ankle clonus: absent     DIAGNOSTIC DATA:   I personally interpreted the following imaging:   Lumbar spine MRI 2019 compared to 2014: L4-5 fusion with posterior artery where and interbody device, right L4-5 lateral recess stenosis and foraminal stenosis. L3-4 worsened degenerative spondylolisthesis with severe central canal stenosis and foraminal stenosis   ASSESMENT:    This is a 68 y.o. female with        Problem List Items Addressed This Visit                 Orthopedic     Status post lumbar spinal fusion - Primary     Relevant Orders     X-Ray Scoliosis Complete Including Supine And Erect                   Other Visit Diagnoses       Adjacent segment disease with spinal stenosis     Relevant Orders    X-Ray Scoliosis Complete Including Supine And Erect    Spondylolisthesis at L3-L4 level     Relevant Orders    X-Ray Scoliosis Complete Including Supine And Erect    Spinal stenosis of lumbar region with radiculopathy     Relevant Orders    X-Ray Scoliosis Complete Including Supine And Erect          PLAN:   I explained the natural history of the disease and all treatment options. I recommended a left L3-4 oblique interbody fusion with placement of a interbody spacer and allograft followed by a open L3-4 and L4-5 laminectomy, medial facetectomy, foraminotomy, removal of L4-5 spinal hardware, L3-4 posterolateral arthrodesis with autograft harvested from the same incision and L3-L5 posterior segmental instrumentation. The goals of the surgery is to decompress the neural elements in order to improve her neurogenic claudication, radiculopathy symptoms and prevent worsening nerve damage.   We have discussed the risks of surgery including bleeding, infection, failure of surgery, CSF leak, nerve root injury, spinal cord injury, ureter injury, weakness, paralysis, peripheral neuropathy, malplaced hardware, migration of hardware, non-union, need for reoperation. Patient understands the risks and would like to proceed with surgery.   The patient has increase perioperative risks because of these comorbidities: Morbid obesity with BMI of 43, essential hypertension, obstructive sleep apnea.     James Brantley MD   Cell:[phone]

## 2020-01-03 NOTE — OP NOTE
DATE OF PROCEDURE: 01/03/2019     PREOPERATIVE DIAGNOSES:  1.  Spondylolisthesis with degenerative disc disease at L3-4  2.  Status post L4-5 transforaminal interbody fusion with posterior instrumentation  3.  L3-4 severe stenosis with radiculopathy  4.  Residual right L4-5 lateral recess stenosis with radiculopathy  5.  Morbid obesity with BMI of 42.14     POSTOPERATIVE DIAGNOSES:  1.  Spondylolisthesis with degenerative disc disease at L3-4  2.  Status post L4-5 transforaminal interbody fusion with posterior instrumentation  3.  L3-4 severe stenosis with radiculopathy  4.  Residual right L4-5 lateral recess stenosis with radiculopathy  5.  Morbid obesity with BMI of 42.14    PROCEDURES:   1. Left anterior to the psoas minimally invasive access to the lumbar spine  2.  L3-4 oblique interbody fusion with placement of expandable hyperlordotic Rise-L cage post-filled with allograft Trifecta mix with Kinex allograft  3. Open L3-4 and L4-5 posterior access   4.  L3-4 and L4-5 laminectomy, medial facetectomy, bilateral foraminotomy  5.  Removal of posterior hardware at L4-5 and placement of segmental posterior instrumentation at L3-4 and L4-5 using Globus open screw system  6. Posterolateral arthrodesis at L3-4 and L4-5 using autograft harvested from the same incision, allograft bone chips mixed with DBX 6. Fluoroscopy.  7. Microscope assistance     PRIMARY SURGEON: James Brantley M.D.     ASSISTANT SURGEON:  none       INDICATIONS: This is a 69 year-old female who had a prior L4-5 posterior minimally invasive transforaminal interbody fusion with posterior instrumentation.  She developed adjacent level disease with L3-4 with degenerative disc disease and spondylolisthesis with severe spinal stenosis and radiculopathy.  She also had residual right L4-5 lateral recess stenosis with radiculopathy.  The risks of the procedure include hemorrhage, infection, paralysis, loss of sensation, loss of sphincter functions, CSF leak,  death and postoperative medical complication.      Complexity:  This was deemed to be a more complex procedure requiring 1 hr more because of the morbid obesity of the patient with a BMI of 42.14.  More time was required to expose the posterior spine, do the decompression and for the closure.          DESCRIPTION OF THE PROCEDURE: The patient was intubated under general anesthesia.  She was placed in lateral decubitus right side down on the sliding table. All the pressure points were carefully padded. Fluoroscopic localization was then utilized to identify the L3-4 level. An oblique incision measuring 4 cm was then planned using fluoroscopy in front of the iliac crest at L3-4. The patient was prepped and draped in the typical sterile fashion. After injection local anesthesia with Lidocaine with epi, and incision was made with a #15 blade. The subcutaneous tissue was then dissected bluntly. Hemostasis was carried out with the bipolar. The external oblique, internal oblique and transversalis were then dissected bluntly.     Using fluoroscopy, we then localized the L3-4 level. Serial dilators were then passed through the initial probe and a final retractor was placed. Under direct look the disk annulus was opened and diskectomy was carried out using rongeurs. A Camara elevator was then inserted in the disk space and the contralateral annulus was opened to increase the disk height at that level. The discectomy was completed using serial job and the endplates were scrapped. We the dissected the anterior pre-vertebral space and placed a manual retractor to protect the iliac vessels.  We were able to insert in the L3-4 disc space a 7 mm tall trial. Once the endplates were cleaned from cartilage and decorticated we placed an expandable 10 degrees lordotic 10 x 17 mm x 18 x  45 mm cage post-filled with allograft Trifecta and Kinex.  The cage was expanded and post feel with more allograft.     Hemostasis in the psoas muscle  and the retractor was removed under direct look.       We irrigated. We added 0.5 g on vancomycin powder. The retroperitoneal space was closed by closing the external oblique fascia with 0 Vicryl. The subcutaneous layer was closed with 2-0 Vicryl inverted suture. The skin was closed with staples. The wound was dressed.      He was then transferred prone on the Shimon table frame. All pressure points   were carefully padded.     All pressure points were carefully padded. After local anesthesia with Marcaine 0.5% we planned a midline incision from L3-L5.  Incision and we placed orthostatic retractors and divided the thoracolumbar fascia on the midline. The spinous processes of L3, L4 and L5 were identified and superiosteal dissection was performed with the Bovie.     We identified the posterior instrumentation at L4-5.  His rash a shunt was dissected and removed.  Briefly the screw caps the rods and all the screws at L4-5 bilaterally were removed.       Using lateral and AP fluoroscopy we cannulated the pedicles of L3 bilaterally.  The trajectory was verified with the ball probe tip.  We then placed pedicle screws measuring 6.5 mm x 50 mm bilaterally.  Using the prior trajectory at L4 and L5 we placed 6.5 x 45 mm screws bilaterally. Good trajectory of the screws was confirmed with fluoroscopy.     We removed the spinous processes of L3, L4 and L5.  The bone was harvested for autograft.  We drilled the lamina of L3-4 and L4-5 and removed the yellow ligament between each level.   we did bilateral medial facetectomy at L3-4 and L4-5.  The yellow the get ligament was dissected from the dura and resected to achieve a decompression.  The bilateral L4 nerve root and L5 nerve root were decompressed in the lateral recesses.  There was significant scar tissue on the right side at L4-5 and we dissected that scar tissue to decompress the right L5 nerve root.  A durotomy was observed and repaired primarily with for 0 Nurolon and  the dural defect was then covered with DuraSeal.   We then used bilateral pre contoured titanium rods.  The rods were reduced on the screws heads and fixated with screw caps.  The screw caps were torqued.     Abundant irrigation with saline mixed with bacitracin was carried out.     We then decorticated with the drill the facet and transverse process bilaterally at L3-4 and L4-5 bilaterally.  We then used a mixture of autograft harvested from the same incision and bone chips and DBX  placed the bilaterally where the bone was decorticated to achieve the arthrodesis L3-4 and L4-5 bilaterally.     We then placed a Hemovac drain in the epidural space and the drain was tunneled superiorly right side.  Brisk bleeding was observed after passing the drain and hemostasis was completed.     Incision was closed in 3 layers.  The muscle and fascia was closed with 0 Vicryl.  The fat was closed with 0 Vicryl.  The dermal layer was closed with inverted interrupted 2 0 Vicryl.  The skin was closed with staples.  We put some vancomycin powder above the fascia before closing that layer.  Blood loss was 300 cc.  There was no complications.  Patient left the OR under the care of the anesthesiologist.

## 2020-01-03 NOTE — PLAN OF CARE
Patient has met PACU discharge criteria, VSS, pain well controlled. Family updated by phone. Released from PACU by Dr. Matias

## 2020-01-03 NOTE — ANESTHESIA PROCEDURE NOTES
Arterial    Diagnosis: spinal stenosis  Doctor requesting consult: Fercho    Patient location during procedure: done in OR  Procedure start time: 1/3/2020 7:35 AM  Timeout: 1/3/2020 7:35 AM  Procedure end time: 1/3/2020 7:42 AM    Staffing  Authorizing Provider: Deysi Newton MD  Performing Provider: Dawn Whitmore CRNA    Anesthesiologist was present at the time of the procedure.    Preanesthetic Checklist  Completed: patient identified, site marked, surgical consent, pre-op evaluation, timeout performed, IV checked, risks and benefits discussed, monitors and equipment checked and anesthesia consent givenArterial  Skin Prep: chlorhexidine gluconate  Local Infiltration: none  Orientation: left  Location: radial  Catheter Size: 20 G  Catheter placement by Ultrasound guidance. Heme positive aspiration all ports.  Vessel Caliber: medium, patent, compressibility normal  Vascular Doppler:  not done  Needle advanced into vessel with real time Ultrasound guidance.Insertion Attempts: 1  Assessment  Dressing: secured with tape and tegaderm  Patient: Tolerated well  Additional Notes  Procedure completed by Payal EVANS with assistance from Dr. Matias.

## 2020-01-04 PROCEDURE — 94761 N-INVAS EAR/PLS OXIMETRY MLT: CPT

## 2020-01-04 PROCEDURE — 94799 UNLISTED PULMONARY SVC/PX: CPT

## 2020-01-04 PROCEDURE — 97162 PT EVAL MOD COMPLEX 30 MIN: CPT

## 2020-01-04 PROCEDURE — 25000003 PHARM REV CODE 250: Performed by: NEUROLOGICAL SURGERY

## 2020-01-04 PROCEDURE — 11000001 HC ACUTE MED/SURG PRIVATE ROOM

## 2020-01-04 PROCEDURE — 97535 SELF CARE MNGMENT TRAINING: CPT

## 2020-01-04 PROCEDURE — 97165 OT EVAL LOW COMPLEX 30 MIN: CPT

## 2020-01-04 PROCEDURE — 63600175 PHARM REV CODE 636 W HCPCS: Performed by: NEUROLOGICAL SURGERY

## 2020-01-04 PROCEDURE — 27000221 HC OXYGEN, UP TO 24 HOURS

## 2020-01-04 RX ORDER — BISACODYL 10 MG
10 SUPPOSITORY, RECTAL RECTAL DAILY PRN
Status: DISCONTINUED | OUTPATIENT
Start: 2020-01-04 | End: 2020-01-07 | Stop reason: HOSPADM

## 2020-01-04 RX ADMIN — HEPARIN SODIUM 5000 UNITS: 5000 INJECTION, SOLUTION INTRAVENOUS; SUBCUTANEOUS at 05:01

## 2020-01-04 RX ADMIN — CELECOXIB 200 MG: 100 CAPSULE ORAL at 09:01

## 2020-01-04 RX ADMIN — AMLODIPINE BESYLATE 10 MG: 5 TABLET ORAL at 09:01

## 2020-01-04 RX ADMIN — OXYBUTYNIN CHLORIDE 15 MG: 5 TABLET, EXTENDED RELEASE ORAL at 09:01

## 2020-01-04 RX ADMIN — POTASSIUM CHLORIDE, DEXTROSE MONOHYDRATE AND SODIUM CHLORIDE: 150; 5; 900 INJECTION, SOLUTION INTRAVENOUS at 09:01

## 2020-01-04 RX ADMIN — ACETAMINOPHEN 650 MG: 325 TABLET ORAL at 12:01

## 2020-01-04 RX ADMIN — MUPIROCIN 1 G: 20 OINTMENT TOPICAL at 09:01

## 2020-01-04 RX ADMIN — ACETAMINOPHEN 650 MG: 325 TABLET ORAL at 06:01

## 2020-01-04 RX ADMIN — SERTRALINE HYDROCHLORIDE 50 MG: 50 TABLET ORAL at 09:01

## 2020-01-04 RX ADMIN — GABAPENTIN 800 MG: 400 CAPSULE ORAL at 09:01

## 2020-01-04 RX ADMIN — ACETAMINOPHEN 650 MG: 325 TABLET ORAL at 11:01

## 2020-01-04 RX ADMIN — TRAMADOL HYDROCHLORIDE 50 MG: 50 TABLET, FILM COATED ORAL at 06:01

## 2020-01-04 RX ADMIN — ATENOLOL 25 MG: 25 TABLET ORAL at 09:01

## 2020-01-04 RX ADMIN — TRAMADOL HYDROCHLORIDE 50 MG: 50 TABLET, FILM COATED ORAL at 11:01

## 2020-01-04 RX ADMIN — METHOCARBAMOL TABLETS 500 MG: 500 TABLET, COATED ORAL at 09:01

## 2020-01-04 RX ADMIN — TRAMADOL HYDROCHLORIDE 50 MG: 50 TABLET, FILM COATED ORAL at 05:01

## 2020-01-04 RX ADMIN — HEPARIN SODIUM 5000 UNITS: 5000 INJECTION, SOLUTION INTRAVENOUS; SUBCUTANEOUS at 09:01

## 2020-01-04 RX ADMIN — CETIRIZINE HYDROCHLORIDE 5 MG: 5 TABLET ORAL at 09:01

## 2020-01-04 RX ADMIN — PANTOPRAZOLE SODIUM 40 MG: 40 TABLET, DELAYED RELEASE ORAL at 09:01

## 2020-01-04 RX ADMIN — FENOFIBRATE 145 MG: 145 TABLET ORAL at 09:01

## 2020-01-04 RX ADMIN — PRAVASTATIN SODIUM 40 MG: 20 TABLET ORAL at 09:01

## 2020-01-04 RX ADMIN — TRAMADOL HYDROCHLORIDE 50 MG: 50 TABLET, FILM COATED ORAL at 12:01

## 2020-01-04 RX ADMIN — HEPARIN SODIUM 5000 UNITS: 5000 INJECTION, SOLUTION INTRAVENOUS; SUBCUTANEOUS at 03:01

## 2020-01-04 NOTE — PLAN OF CARE
VN cued into room.  Pt resting comfortably in chair with call light and personal belongings within reach, IVF running, yellow socks and ava hose on.  NADN.  No family at bedside.  Pt reports no pain.  No other needs or complaints at this time.  Reminded pt to use call light as needed.  VN will continue to follow and be available as needed.

## 2020-01-04 NOTE — PT/OT/SLP EVAL
Occupational Therapy   Evaluation    Name: Monique Trujillo  MRN: [id number]  Admitting Diagnosis:  <principal problem not specified> 1 Day Post-Op    Recommendations:     Discharge Recommendations: home health OT, home health PT  Discharge Equipment Recommendations:  bedside commode, hip kit  Barriers to discharge:  Decreased caregiver support    Assessment:     Monique Trujillo is a 69 y.o. female with a medical diagnosis of <principal problem not specified>.  She presents with the following performance deficits affecting function: weakness, impaired endurance, impaired sensation, impaired self care skills, impaired functional mobilty, impaired balance, decreased safety awareness, pain, decreased lower extremity function, decreased ROM, impaired skin, edema, orthopedic precautions.  Pt was agreeable to OT evaluation.  Goals established to assist pt with returning to OF regarding ADLs and func mobility.  Pt reports performing ADLS and func mobility with mod I using AD prior to hospitalization.  Pt is currently performing functional mobility with CGA to min A using RW and ADLs with min to max A (for LB ADLS) due to spinal precautions.  Pt will benefit from skilled OT services in order to increase her level of safety and independence with ADLs and mobility.        Rehab Prognosis: Good; patient would benefit from acute skilled OT services to address these deficits and reach maximum level of function.       Plan:     Patient to be seen 5 x/week to address the above listed problems via self-care/home management, therapeutic activities, therapeutic exercises, neuromuscular re-education  · Plan of Care Expires: 02/03/20  · Plan of Care Reviewed with: patient    Subjective     Chief Complaint: pain in back - pt felt much more comfortable in recliner as she is accustomed to sleeping in recliner at home  Patient/Family Comments/goals: gain strength and function in order to return home to care for       Occupational  Profile:  Living Environment: Pt lives with  and adult daughter in 2SH and threshold entrance - bed and bath are downstairs - bathes in step in shower with built in shower bench - 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  Previous level of function:  Roles and Routines: mod I using AD - is caregiver for her   Equipment Used at Home:  walker, rolling, rollator, shower chair  Assistance upon Discharge: daughter is able to assist on weekends and after work on weekdays - pt does not have assistance when daughter is at work and is caregiver for spouse    Pain/Comfort:  · Pain Rating 1: 5/10  · Location - Orientation 1: lower  · Location 1: back  · Pain Addressed 1: Pre-medicate for activity, Reposition  · Pain Rating Post-Intervention 1: 3/10    Patients cultural, spiritual, Hindu conflicts given the current situation:      Objective:     Communicated with: nurse prior to session.  Patient found HOB elevated with telemetry, bed alarm, peripheral IV(drain) upon OT entry to room.    General Precautions: Standard, fall   Orthopedic Precautions:spinal precautions   Braces: LSO     Occupational Performance:    Bed Mobility:    · Patient completed Rolling/Turning to Right with minimum assistance and for log roll  · Patient completed Scooting/Bridging with stand by assistance  · Patient completed Supine to Sit with minimum assistance and to keep shoulders and knee alligned for spinal precautions - needed Min A to lift trunk    Functional Mobility/Transfers:  · Patient completed Sit <> Stand Transfer with contact guard assistance  with  rolling walker   · Patient completed Bed <> Chair Transfer using Step Transfer technique with contact guard assistance with rolling walker  · Patient completed Toilet Transfer Step Transfer technique with stand by assistance with  grab bars  · Functional Mobility: pt able to walk from bedside <-> bathroom using RW with CGA-SBA    Activities of Daily Living:  · Grooming:  stand by assistance standing at sink  · Upper Body Dressing: minimum assistance with hospital gown and LSO  · Lower Body Dressing: maximal assistance unable to chaim/doff socks or attends due to spinal precautions  · Toileting: moderate assistance needed A with hygiene - unable to wipe without twisting/bending    Cognitive/Visual Perceptual:  Cognitive/Psychosocial Skills:     -       Oriented to: Person, Place, Time and Situation   -       Follows Commands/attention:Follows two-step commands  -       Communication: clear/fluent  -       Memory: No Deficits noted  -       Safety awareness/insight to disability: intact   -       Mood/Affect/Coping skills/emotional control: Appropriate to situation    Physical Exam:  Balance: -       Good sitting - fair+ standing  Postural examination/scapula alignment:    -       Rounded shoulders  -       Forward head  Skin integrity: surgical incision at lower back with drain  Edema:  Mild hands and LEs  Sensation: -       Impaired  decreased sensation in B LEs from thighs to feet - decreased light touch in thighs - decreased pressure and light touch in feet  Dominant hand: -       right  Upper Extremity Range of Motion:     -       Right Upper Extremity: WFL  -       Left Upper Extremity: WFL except shoulder  Upper Extremity Strength:    -       Right Upper Extremity: WFL  -       Left Upper Extremity: WFL except shoulder   Strength:    -       Right Upper Extremity: WFL  -       Left Upper Extremity: WFL  Fine Motor Coordination: -       Intact  Gross motor coordination: WFL    AMPAC 6 Click ADL:  AMPAC Total Score: 15    Treatment & Education:  · Pt completed ADLs and func mobility activities for tx session as noted above  · Pt would like to return home and have HH due to wanting to be home to care for spouse.  OT expressed concerns for her caring for spouse after surgery, especially with spinal precautions.  Pt required assistance with ADLs due to spinal precautions.  OT is  recommending a hip kit to address LB ADLs while maintaining spinal precautions.  · Pt educated on spinal precautions - educated on log roll and getting OOB while avoiding twisting of lower back.   · Pt educated on role of OT and POC    Education:    Patient left up in chair with all lines intact and call button in reach    GOALS:   Multidisciplinary Problems     Occupational Therapy Goals        Problem: Occupational Therapy Goal    Goal Priority Disciplines Outcome Interventions   Occupational Therapy Goal     OT, PT/OT Ongoing, Progressing    Description:  Goals to be met by: 02/04/20       Patient will increase functional independence with ADLs by performing:    UE Dressing with Modified [place].  LE Dressing with Modified [place] and Assistive Devices as needed.  Grooming while standing at sink with Supervision.  Toileting from bedside commode with Supervision for hygiene and clothing management.   Toilet transfer to bedside commode with Supervision.  Maintain spinal precautions during ADLs for 3/3 trials                      History:     Past Medical History:   Diagnosis Date    Allergy     Arthritis     Chronic lower back pain     Depression     Fever blister     GERD (gastroesophageal reflux disease)     Hemorrhoids, internal 11/5/2014    Hyperlipidemia     Hypertension     Joint pain     Morbid obesity with BMI of 40.0-44.9, adult 2/24/2015    Multiple gastric ulcers 12/14/2017    TIMMY (obstructive sleep apnea)     does not use CPAP    Pulmonary hypertension     Skin cancer        Past Surgical History:   Procedure Laterality Date    ANTERIOR CERVICAL CORPECTOMY W/ FUSION  1988    CAUDAL EPIDURAL STEROID INJECTION N/A 1/10/2019    Procedure: Injection-steroid-epidural-caudal;  Surgeon: Lydia Velásquez Jr., MD;  Location: [facility]IN [facility];  Service: Pain Management;  Laterality: N/A;    COLONOSCOPY N/A 11/2/2016    Procedure: COLONOSCOPY;  Surgeon: Viji Dewitt MD;  [facility];  Service: Endoscopy;  Laterality: N/A;    EPIDURAL STEROID INJECTION INTO LUMBAR SPINE N/A 11/8/2018    Procedure: Injection-steroid-epidural-lumbar-L2-3 (LEFT > RIGHT);  Surgeon: Lydia Velásquez Jr., MD;  Location: [facility] PAIN MGT;  Service: Pain Management;  Laterality: N/A;    INJECTION OF ANESTHETIC AGENT AROUND GENITOFEMORAL NERVE Bilateral 8/1/2019    Procedure: BILATERAL SHOULDER ARTICULAR BRANCH BLOCK;  Surgeon: Lydia Velásquez Jr., MD;  Location: [facility] PAIN MGT;  Service: Pain Management;  Laterality: Bilateral;    KNEE ARTHROSCOPY W/ MENISCAL REPAIR Right 2012    LUMBAR LAMINECTOMY  06/19/2015    SKIN CANCER EXCISION      forehead does not recall date    TOTAL HIP ARTHROPLASTY Right 04/28/2016         TUBAL LIGATION  1980       Time Tracking:     OT Date of Treatment: 01/04/20  OT Start Time: 1021  OT Stop Time: 1056  OT Total Time (min): 35 min    Billable Minutes:Evaluation 15  Self Care/Home Management 20    Lauren Ch OT  1/4/2020

## 2020-01-04 NOTE — PLAN OF CARE
Problem: Physical Therapy Goal  Goal: Physical Therapy Goal  Description  Goals to be met by: 2/3/20     Patient will increase functional independence with mobility by performin. Supine to sit with Set-up [place]  2. Sit to stand transfer with Supervision  3. Gait  x 200 feet with Supervision using Rolling Walker.    PT Evaluation completed. Patient up in chair upon entry and left up in chair with all needs in reach.       Outcome: Ongoing, Progressing

## 2020-01-04 NOTE — PLAN OF CARE
Pt AAOx4, no family members at bedside throughout shift. Pt complains of moderate pain but denies n/v/d and SOB. Regular diet tolerated well, IVF infusing per MAR to PIV. Cruz catheter intact and draining clear yellow urine. Hemovac drain intact to back and draining bloody drainage. Safety maintained, bed alarm on - will cont to monitor.

## 2020-01-04 NOTE — PLAN OF CARE
Problem: Occupational Therapy Goal  Goal: Occupational Therapy Goal  Description  Goals to be met by: 02/04/20       Patient will increase functional independence with ADLs by performing:    UE Dressing with Modified [place].  LE Dressing with Modified [place] and Assistive Devices as needed.  Grooming while standing at sink with Supervision.  Toileting from bedside commode with Supervision for hygiene and clothing management.   Toilet transfer to bedside commode with Supervision.  Maintain spinal precautions during ADLs for 3/3 trials     Outcome: Ongoing, Progressing    Pt was agreeable to OT evaluation.  Goals established to assist pt with returning to OF regarding ADLs and func mobility.  Pt will benefit from skilled OT services in order to increase her level of safety and independence with ADLs and mobility.      Lauren Ch, OT  1/4/2020

## 2020-01-04 NOTE — PROGRESS NOTES
NEUROSURGICAL POST-OPERATIVE PROGRESS NOTE    DATE OF SERVICE:  01/04/2020      ATTENDING PHYSICIAN:  James Brantley MD    SUBJECTIVE:    INTERIM HISTORY:    This is a very pleasant 69 y.o. y.o. female, who is status post-op day one L3-4 OLIF, L3-5 laminectomy and revision of posterior instrumentation and fusion. Doing well, walker with PT. 3/10 of back pain. No leg pain. Legs numbness unchanged.                OBJECTIVE:    PHYSICAL EXAMINATION:   Vitals:    01/04/20 1142   BP: (!) 106/58   Pulse: 72   Resp: 15   Temp: 97.8 °F (36.6 °C)       Neurosurgery Physical Exam    Ortho Exam    Neurologic Exam     Motor Exam     Strength   Strength 5/5 throughout.       Dressing CDI    DIAGNOSTIC DATA:    X-ray of the lumbar spine pending    ASSESMENT:    This is a 69 y.o. female who is s/p L3-5 fusion and laminectomy with good outcome.     Problem List Items Addressed This Visit        Neuro    Spinal stenosis of lumbar region with radiculopathy    Relevant Orders    Vital signs    Neurovascular checks    Intake and output    Chlorohexidine Gluconate Bath    PT evaluate and treat    OT evaluate and treat    Pulse Oximetry Q4H    Incentive spirometry    PT assistance with ambulation    Discontinue Cruz Catheter (1 day after surgery)    Hemovac drain    Document drain output    Drain - full suction    Diet Adult Regular (IDDSI Level 7)    X-Ray Lumbar Spine Ap And Lateral          PLAN:    DC home with home health most likely on Monday  All questions answered.        James Brantley MD  Pager: [phone]

## 2020-01-04 NOTE — PT/OT/SLP EVAL
Physical Therapy Evaluation    Patient Name:  Monique Trujillo   MRN:  [id number]    Recommendations:     Discharge Recommendations:  home health PT, home health OT   Discharge Equipment Recommendations: bedside commode, hip kit   Barriers to discharge: Decreased caregiver support    Assessment:     Monique Trujillo is a 69 y.o. female admitted with a medical diagnosis of <principal problem not specified>.  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    Rehab Prognosis: Good; patient would benefit from acute skilled PT services to address these deficits and reach maximum level of function.    Recent Surgery: Procedure(s) (LRB):  FUSION, SPINE, WITH INSTRUMENTATION Stage 1 Left L3-4 LOIF Rise L Stage 2 Open L3-4 L4-5 Laminectomy Removal of L4-5 Hardware, L3-5 Posterior Instrumentation and extraction of Fusion at L3-4 (Left) 1 Day Post-Op    Plan:     During this hospitalization, patient to be seen daily to address the identified rehab impairments via gait training, therapeutic activities, therapeutic exercises, neuromuscular re-education and progress toward the following goals:    · Plan of Care Expires:  02/03/20    Subjective     Chief Complaint: back pain  Patient/Family Comments/goals: return home  Pain/Comfort:  · Pain Rating 1: 3/10  · Location - Side 1: Bilateral  · Location - Orientation 1: lower  · Location 1: back  · Pain Addressed 1: Reposition  · Pain Rating Post-Intervention 1: 3/10    Patients cultural, spiritual, Zoroastrianism conflicts given the current situation: no    Living Environment:  Patient lives with spouse in a 2 story home with 1 step to enter.  Prior to admission, patients level of function was mod i.  Equipment used at home: walker,  rolling, rollator, bath bench.  DME owned (not currently used): none.  Upon discharge, patient will have assistance from no one.    Objective:     Communicated with RN prior to session.  Patient found up in chair with telemetry, peripheral IV(drain)  upon PT entry to room.    General Precautions: Standard, fall   Orthopedic Precautions:spinal precautions   Braces: LSO     Exams:  · Cognitive Exam:  Patient is oriented to Person, Place, Time and Situation  · RLE Strength: WFL  · LLE Strength: WFL    Functional Mobility:  · Bed Mobility:  Rolling Left:  minimum assistance  · Rolling Right: minimum assistance  · Supine to Sit: minimum assistance  · Sit to Supine: minimum assistance  · Transfers:  Sit to Stand:  contact guard assistance with rolling walker  · Gait: Patient ambulated SBA with RW and 100 ft      Therapeutic Activities and Exercises:   Bed mobility per OT secondary to patient being and returning to chair.    AM-PAC 6 CLICK MOBILITY  Total Score:17     Patient left up in chair with all lines intact and call button in reach.    GOALS:   Multidisciplinary Problems     Physical Therapy Goals        Problem: Physical Therapy Goal    Goal Priority Disciplines Outcome Goal Variances Interventions   Physical Therapy Goal     PT, PT/OT Ongoing, Progressing     Description:  Goals to be met by: 2/3/20     Patient will increase functional independence with mobility by performin. Supine to sit with Set-up [place]  2. Sit to stand transfer with Supervision  3. Gait  x 200 feet with Supervision using Rolling Walker.    PT Evaluation completed. Patient up in chair upon entry and left up in chair with all needs in reach.                        History:     Past Medical History:   Diagnosis Date    Allergy     Arthritis     Chronic lower back pain     Depression     Fever blister     GERD (gastroesophageal reflux disease)     Hemorrhoids, internal 2014    Hyperlipidemia     Hypertension     Joint  pain     Morbid obesity with BMI of 40.0-44.9, adult 2/24/2015    Multiple gastric ulcers 12/14/2017    TIMMY (obstructive sleep apnea)     does not use CPAP    Pulmonary hypertension     Skin cancer        Past Surgical History:   Procedure Laterality Date    ANTERIOR CERVICAL CORPECTOMY W/ FUSION  1988    CAUDAL EPIDURAL STEROID INJECTION N/A 1/10/2019    Procedure: Injection-steroid-epidural-caudal;  Surgeon: Lydia Velásquez Jr., MD;  Location[facility];  Service: Pain Management;  Laterality: N/A;    COLONOSCOPY N/A 11/2/2016    Procedure: COLONOSCOPY;  Surgeon: Viji Dewitt MD;  Location: [facility];  Service: Endoscopy;  Laterality: N/A;    EPIDURAL STEROID INJECTION INTO LUMBAR SPINE N/A 11/8/2018    Procedure: Injection-steroid-epidural-lumbar-L2-3 (LEFT > RIGHT);  Surgeon: Lydia Velásquez Jr., MD;  Location: [facility] PAIN [facility]GT;  Service: Pain Management;  Laterality: N/A;    INJECTION OF ANESTHETIC AGENT AROUND GENITOFEMORAL NERVE Bilateral 8/1/2019    Procedure: BILATERAL SHOULDER ARTICULAR BRANCH BLOCK;  Surgeon: Lydia Velásquez Jr., MD;  Location: [facility] PAIN T;  Service: Pain Management;  Laterality: Bilateral;    KNEE ARTHROSCOPY W/ MENISCAL REPAIR Right 2012    LUMBAR LAMINECTOMY  06/19/2015    SKIN CANCER EXCISION      forehead does not recall date    TOTAL HIP ARTHROPLASTY Right 04/28/2016         TUBAL LIGATION  1980       Time Tracking:     PT Received On: 01/04/20  PT Start Time: 1219     PT Stop Time: 1229  PT Total Time (min): 10 min     Billable Minutes: Evaluation 10      More Saldaña PT  01/04/2020

## 2020-01-05 PROCEDURE — 94761 N-INVAS EAR/PLS OXIMETRY MLT: CPT

## 2020-01-05 PROCEDURE — 97530 THERAPEUTIC ACTIVITIES: CPT | Performed by: PHYSICAL THERAPIST

## 2020-01-05 PROCEDURE — 11000001 HC ACUTE MED/SURG PRIVATE ROOM

## 2020-01-05 PROCEDURE — 97116 GAIT TRAINING THERAPY: CPT | Performed by: PHYSICAL THERAPIST

## 2020-01-05 PROCEDURE — 25000003 PHARM REV CODE 250: Performed by: NEUROLOGICAL SURGERY

## 2020-01-05 PROCEDURE — 63600175 PHARM REV CODE 636 W HCPCS: Performed by: NEUROLOGICAL SURGERY

## 2020-01-05 RX ADMIN — GABAPENTIN 800 MG: 400 CAPSULE ORAL at 09:01

## 2020-01-05 RX ADMIN — PANTOPRAZOLE SODIUM 40 MG: 40 TABLET, DELAYED RELEASE ORAL at 09:01

## 2020-01-05 RX ADMIN — METHOCARBAMOL TABLETS 500 MG: 500 TABLET, COATED ORAL at 09:01

## 2020-01-05 RX ADMIN — PRAVASTATIN SODIUM 40 MG: 20 TABLET ORAL at 09:01

## 2020-01-05 RX ADMIN — HEPARIN SODIUM 5000 UNITS: 5000 INJECTION, SOLUTION INTRAVENOUS; SUBCUTANEOUS at 03:01

## 2020-01-05 RX ADMIN — TRAMADOL HYDROCHLORIDE 50 MG: 50 TABLET, FILM COATED ORAL at 05:01

## 2020-01-05 RX ADMIN — HEPARIN SODIUM 5000 UNITS: 5000 INJECTION, SOLUTION INTRAVENOUS; SUBCUTANEOUS at 05:01

## 2020-01-05 RX ADMIN — HEPARIN SODIUM 5000 UNITS: 5000 INJECTION, SOLUTION INTRAVENOUS; SUBCUTANEOUS at 09:01

## 2020-01-05 RX ADMIN — CELECOXIB 200 MG: 100 CAPSULE ORAL at 09:01

## 2020-01-05 RX ADMIN — MUPIROCIN 1 G: 20 OINTMENT TOPICAL at 09:01

## 2020-01-05 RX ADMIN — SENNOSIDES AND DOCUSATE SODIUM 2 TABLET: 8.6; 5 TABLET ORAL at 09:01

## 2020-01-05 RX ADMIN — TRAMADOL HYDROCHLORIDE 50 MG: 50 TABLET, FILM COATED ORAL at 12:01

## 2020-01-05 RX ADMIN — OXYBUTYNIN CHLORIDE 15 MG: 5 TABLET, EXTENDED RELEASE ORAL at 09:01

## 2020-01-05 RX ADMIN — FENOFIBRATE 145 MG: 145 TABLET ORAL at 09:01

## 2020-01-05 RX ADMIN — ACETAMINOPHEN 650 MG: 325 TABLET ORAL at 05:01

## 2020-01-05 RX ADMIN — SERTRALINE HYDROCHLORIDE 50 MG: 50 TABLET ORAL at 09:01

## 2020-01-05 RX ADMIN — CETIRIZINE HYDROCHLORIDE 5 MG: 5 TABLET ORAL at 09:01

## 2020-01-05 RX ADMIN — ACETAMINOPHEN 325 MG: 325 TABLET ORAL at 12:01

## 2020-01-05 RX ADMIN — ATENOLOL 25 MG: 25 TABLET ORAL at 09:01

## 2020-01-05 RX ADMIN — AMLODIPINE BESYLATE 10 MG: 5 TABLET ORAL at 09:01

## 2020-01-05 NOTE — PLAN OF CARE
Problem: Physical Therapy Goal  Goal: Physical Therapy Goal  Description  Goals to be met by: 2/3/20     Patient will increase functional independence with mobility by performin. Supine to sit with Set-up [place]  2. Sit to stand transfer with Supervision  3. Gait  x 200 feet with Supervision using Rolling Walker.       Outcome: Ongoing, Progressing

## 2020-01-05 NOTE — PLAN OF CARE
VN cued into room.  Pt resting comfortably in chair with call light and personal belongings within reach, yellow socks and teds on, lumbar brace on.  NADN.  No family at bedside.  Pt reports no pain.  No other needs or complaints at this time.  Reminded pt to use call light as needed.  VN will continue to follow and be available as needed.

## 2020-01-05 NOTE — PROGRESS NOTES
NEUROSURGICAL POST-OPERATIVE PROGRESS NOTE    DATE OF SERVICE:  01/05/2020      ATTENDING PHYSICIAN:  James Brantley MD    SUBJECTIVE:    INTERIM HISTORY:    This is a very pleasant 69 y.o. y.o. female, who is status post L3-4 OLIF, L3-5 posterior instrumented fusion.    Doing well. No headache. Mobilizing OOB by herself. Walking with the walker. Mild pain            OBJECTIVE:    PHYSICAL EXAMINATION:   Vitals:    01/05/20 0756   BP: (!) 117/58   Pulse: 75   Resp: 16   Temp: 97.1 °F (36.2 °C)       Neurosurgery Physical Exam    Ortho Exam    Neurologic Exam    Dressing CDI    DIAGNOSTIC DATA:  Drain 300 cc  X-ray of the lumbar spine, AP and lateral views reveals the fusion hardware is intact. No loosening of screws or migration of hardware.    ASSESMENT:    This is a 69 y.o. female who is s/p day 2 L3-5 fusion doing very well.    Problem List Items Addressed This Visit        Neuro    * (Principal) Spinal stenosis of lumbar region with radiculopathy    Relevant Orders    Vital signs    Neurovascular checks    Intake and output    Chlorohexidine Gluconate Bath    PT evaluate and treat    OT evaluate and treat    Pulse Oximetry Q4H    Incentive spirometry    PT assistance with ambulation    Discontinue Cruz Catheter (1 day after surgery)    Hemovac drain    Document drain output    Drain - full suction    Diet Adult Regular (IDDSI Level 7)    X-Ray Lumbar Spine Ap And Lateral (Completed)    Saline lock IV          PLAN:    DC drain tomorrow and DC home tomorrow with home health PT-OT        James Brantley MD  Pager: [phone]

## 2020-01-05 NOTE — PT/OT/SLP PROGRESS
Physical Therapy Treatment    Patient Name:  Monique Trujillo   MRN:  [id number]    Recommendations:     Discharge Recommendations:  home health OT, home health PT   Discharge Equipment Recommendations: bedside commode, hip kit   Barriers to discharge: Decreased caregiver support and decreased functional mobility    Assessment:     Monique Trujillo is a 69 y.o. female admitted with a medical diagnosis of Spinal stenosis of lumbar region with radiculopathy.  She presents with the following impairments/functional limitations:  weakness, gait instability, impaired cardiopulmonary response to activity, impaired endurance, impaired balance, decreased lower extremity function, impaired sensation, impaired self care skills, pain, impaired functional mobilty. Pt was already sitting at EOB with LSO donned.  Pt ambulated 110' with RW with CG/supervision.  Pt sit to supine wearing LSO brace with CG/sup.  Pt scooted to HOB in left sidelying with supervision.      Rehab Prognosis: Good; patient would benefit from acute skilled PT services to address these deficits and reach maximum level of function.    Recent Surgery: Procedure(s) (LRB):  FUSION, SPINE, WITH INSTRUMENTATION Stage 1 Left L3-4 LOIF Rise L Stage 2 Open L3-4 L4-5 Laminectomy Removal of L4-5 Hardware, L3-5 Posterior Instrumentation and extraction of Fusion at L3-4 (Left) 2 Days Post-Op    Plan:     During this hospitalization, patient to be seen daily to address the identified rehab impairments via gait training, therapeutic activities, therapeutic exercises, neuromuscular re-education and progress toward the following goals:    · Plan of Care Expires:  02/03/20    Subjective     Chief Complaint: feet are numb  Patient/Family Comments/goals: go home to care for her   Pain/Comfort:  · Pain Rating 1: 4/10  · Location 1: lumbar spine  · Pain Addressed 1: Pre-medicate for activity  · Pain Rating Post-Intervention 1: 3/10      Objective:     Communicated with  nurse prior to session.  Patient found sitting at EOB with telemetry, bed alarm upon PT entry to room.     General Precautions: Standard, fall   Orthopedic Precautions:spinal precautions   Braces: LSO     Functional Mobility:     Pt was already sitting at EOB with LSO donned.  Pt ambulated 110' with RW with CG/supervision.  Pt sit to supine wearing LSO brace with CG/sup.  Pt scooted to HOB in left sidelying with supervision  AM-PAC 6 CLICK MOBILITY  Turning over in bed (including adjusting bedclothes, sheets and blankets)?: 3  Sitting down on and standing up from a chair with arms (e.g., wheelchair, bedside commode, etc.): 3  Moving from lying on back to sitting on the side of the bed?: 3  Moving to and from a bed to a chair (including a wheelchair)?: 3  Need to walk in hospital room?: 3  Climbing 3-5 steps with a railing?: 3  Basic Mobility Total Score: 18       Patient left HOB elevated with all lines intact, call button in reach, bed alarm on and nurse notified..    GOALS:   Multidisciplinary Problems     Physical Therapy Goals        Problem: Physical Therapy Goal    Goal Priority Disciplines Outcome Goal Variances Interventions   Physical Therapy Goal     PT, PT/OT Ongoing, Progressing     Description:  Goals to be met by: 2/3/20     Patient will increase functional independence with mobility by performin. Supine to sit with Set-up [place]  2. Sit to stand transfer with Supervision  3. Gait  x 200 feet with Supervision using Rolling Walker.    PT Evaluation completed. Patient up in chair upon entry and left up in chair with all needs in reach.                        Time Tracking:     PT Received On: 20  PT Start Time: 1121     PT Stop Time: 1146  PT Total Time (min): 25 min     Billable Minutes: Gait Training 15 and Therapeutic Activity 10    Treatment Type: Treatment  PT/PTA: PT     PTA Visit Number: 0     Sangeetha Case, PT  2020

## 2020-01-05 NOTE — PLAN OF CARE
Patient awake and alert.  Minimal complaints of pain managed with scheduled Tylenol and Tramadol.  Ambulated around room.  Hemovac emptied as needed.  Safety maintained.

## 2020-01-05 NOTE — PLAN OF CARE
Pt received on RA.  SPO2  94%.  Pt in no apparent respiratory distress.  Will continue to monitor.

## 2020-01-06 LAB
ANION GAP SERPL CALC-SCNC: 10 MMOL/L (ref 8–16)
BASOPHILS # BLD AUTO: 0.03 K/UL (ref 0–0.2)
BASOPHILS NFR BLD: 0.3 % (ref 0–1.9)
BILIRUB UR QL STRIP: NEGATIVE
BUN SERPL-MCNC: 12 MG/DL (ref 8–23)
CALCIUM SERPL-MCNC: 8.8 MG/DL (ref 8.7–10.5)
CHLORIDE SERPL-SCNC: 104 MMOL/L (ref 95–110)
CLARITY UR: CLEAR
CO2 SERPL-SCNC: 25 MMOL/L (ref 23–29)
COLOR UR: YELLOW
CREAT SERPL-MCNC: 0.8 MG/DL (ref 0.5–1.4)
DIFFERENTIAL METHOD: ABNORMAL
EOSINOPHIL # BLD AUTO: 0.2 K/UL (ref 0–0.5)
EOSINOPHIL NFR BLD: 2.1 % (ref 0–8)
ERYTHROCYTE [DISTWIDTH] IN BLOOD BY AUTOMATED COUNT: 13.8 % (ref 11.5–14.5)
EST. GFR  (AFRICAN AMERICAN): >60 ML/MIN/1.73 M^2
EST. GFR  (NON AFRICAN AMERICAN): >60 ML/MIN/1.73 M^2
GLUCOSE SERPL-MCNC: 212 MG/DL (ref 70–110)
GLUCOSE UR QL STRIP: NEGATIVE
HCT VFR BLD AUTO: 29 % (ref 37–48.5)
HGB BLD-MCNC: 9.4 G/DL (ref 12–16)
HGB UR QL STRIP: NEGATIVE
KETONES UR QL STRIP: NEGATIVE
LEUKOCYTE ESTERASE UR QL STRIP: NEGATIVE
LYMPHOCYTES # BLD AUTO: 1.5 K/UL (ref 1–4.8)
LYMPHOCYTES NFR BLD: 14.6 % (ref 18–48)
MCH RBC QN AUTO: 30.8 PG (ref 27–31)
MCHC RBC AUTO-ENTMCNC: 32.4 G/DL (ref 32–36)
MCV RBC AUTO: 95 FL (ref 82–98)
MONOCYTES # BLD AUTO: 1.1 K/UL (ref 0.3–1)
MONOCYTES NFR BLD: 11.4 % (ref 4–15)
NEUTROPHILS # BLD AUTO: 7.1 K/UL (ref 1.8–7.7)
NEUTROPHILS NFR BLD: 71.6 % (ref 38–73)
NITRITE UR QL STRIP: NEGATIVE
PH UR STRIP: 6 [PH] (ref 5–8)
PLATELET # BLD AUTO: 263 K/UL (ref 150–350)
PMV BLD AUTO: 9.9 FL (ref 9.2–12.9)
POTASSIUM SERPL-SCNC: 4 MMOL/L (ref 3.5–5.1)
PROT UR QL STRIP: NEGATIVE
RBC # BLD AUTO: 3.05 M/UL (ref 4–5.4)
SODIUM SERPL-SCNC: 139 MMOL/L (ref 136–145)
SP GR UR STRIP: 1.01 (ref 1–1.03)
URN SPEC COLLECT METH UR: NORMAL
UROBILINOGEN UR STRIP-ACNC: NEGATIVE EU/DL
WBC # BLD AUTO: 9.94 K/UL (ref 3.9–12.7)

## 2020-01-06 PROCEDURE — 25000003 PHARM REV CODE 250: Performed by: NEUROLOGICAL SURGERY

## 2020-01-06 PROCEDURE — 81003 URINALYSIS AUTO W/O SCOPE: CPT

## 2020-01-06 PROCEDURE — 85025 COMPLETE CBC W/AUTO DIFF WBC: CPT

## 2020-01-06 PROCEDURE — 63600175 PHARM REV CODE 636 W HCPCS: Performed by: NEUROLOGICAL SURGERY

## 2020-01-06 PROCEDURE — 97116 GAIT TRAINING THERAPY: CPT

## 2020-01-06 PROCEDURE — 94761 N-INVAS EAR/PLS OXIMETRY MLT: CPT

## 2020-01-06 PROCEDURE — 36415 COLL VENOUS BLD VENIPUNCTURE: CPT

## 2020-01-06 PROCEDURE — 97110 THERAPEUTIC EXERCISES: CPT | Mod: CQ

## 2020-01-06 PROCEDURE — 11000001 HC ACUTE MED/SURG PRIVATE ROOM

## 2020-01-06 PROCEDURE — 80048 BASIC METABOLIC PNL TOTAL CA: CPT

## 2020-01-06 PROCEDURE — 97535 SELF CARE MNGMENT TRAINING: CPT

## 2020-01-06 PROCEDURE — 97530 THERAPEUTIC ACTIVITIES: CPT

## 2020-01-06 RX ORDER — BUTALBITAL, ACETAMINOPHEN AND CAFFEINE 50; 325; 40 MG/1; MG/1; MG/1
1 TABLET ORAL EVERY 6 HOURS PRN
Status: DISCONTINUED | OUTPATIENT
Start: 2020-01-06 | End: 2020-01-07 | Stop reason: HOSPADM

## 2020-01-06 RX ADMIN — GABAPENTIN 800 MG: 400 CAPSULE ORAL at 08:01

## 2020-01-06 RX ADMIN — GABAPENTIN 800 MG: 400 CAPSULE ORAL at 11:01

## 2020-01-06 RX ADMIN — TRAMADOL HYDROCHLORIDE 50 MG: 50 TABLET, FILM COATED ORAL at 05:01

## 2020-01-06 RX ADMIN — ACETAMINOPHEN 650 MG: 325 TABLET ORAL at 11:01

## 2020-01-06 RX ADMIN — CETIRIZINE HYDROCHLORIDE 5 MG: 5 TABLET ORAL at 11:01

## 2020-01-06 RX ADMIN — ACETAMINOPHEN 650 MG: 325 TABLET ORAL at 01:01

## 2020-01-06 RX ADMIN — PANTOPRAZOLE SODIUM 40 MG: 40 TABLET, DELAYED RELEASE ORAL at 11:01

## 2020-01-06 RX ADMIN — AMLODIPINE BESYLATE 10 MG: 5 TABLET ORAL at 11:01

## 2020-01-06 RX ADMIN — OXYBUTYNIN CHLORIDE 15 MG: 5 TABLET, EXTENDED RELEASE ORAL at 11:01

## 2020-01-06 RX ADMIN — SERTRALINE HYDROCHLORIDE 50 MG: 50 TABLET ORAL at 11:01

## 2020-01-06 RX ADMIN — TRAMADOL HYDROCHLORIDE 50 MG: 50 TABLET, FILM COATED ORAL at 01:01

## 2020-01-06 RX ADMIN — TRAMADOL HYDROCHLORIDE 50 MG: 50 TABLET, FILM COATED ORAL at 06:01

## 2020-01-06 RX ADMIN — MUPIROCIN 1 G: 20 OINTMENT TOPICAL at 11:01

## 2020-01-06 RX ADMIN — FENOFIBRATE 145 MG: 145 TABLET ORAL at 11:01

## 2020-01-06 RX ADMIN — PRAVASTATIN SODIUM 40 MG: 20 TABLET ORAL at 11:01

## 2020-01-06 RX ADMIN — TRAMADOL HYDROCHLORIDE 50 MG: 50 TABLET, FILM COATED ORAL at 11:01

## 2020-01-06 RX ADMIN — ACETAMINOPHEN 650 MG: 325 TABLET ORAL at 05:01

## 2020-01-06 RX ADMIN — BUTALBITAL, ACETAMINOPHEN, AND CAFFEINE 1 TABLET: 50; 325; 40 TABLET ORAL at 10:01

## 2020-01-06 RX ADMIN — ACETAMINOPHEN 650 MG: 325 TABLET ORAL at 06:01

## 2020-01-06 RX ADMIN — HEPARIN SODIUM 5000 UNITS: 5000 INJECTION, SOLUTION INTRAVENOUS; SUBCUTANEOUS at 10:01

## 2020-01-06 RX ADMIN — HEPARIN SODIUM 5000 UNITS: 5000 INJECTION, SOLUTION INTRAVENOUS; SUBCUTANEOUS at 06:01

## 2020-01-06 RX ADMIN — ATENOLOL 25 MG: 25 TABLET ORAL at 11:01

## 2020-01-06 RX ADMIN — HEPARIN SODIUM 5000 UNITS: 5000 INJECTION, SOLUTION INTRAVENOUS; SUBCUTANEOUS at 03:01

## 2020-01-06 NOTE — PLAN OF CARE
Patient awake and alert.  Mild complaints of pain managed with scheduled Tylenol and Tramadol.  Ambulated around room.  Hemovac emptied as needed.  Safety maintained.

## 2020-01-06 NOTE — PT/OT/SLP PROGRESS
Physical Therapy Treatment    Patient Name:  Monique Trujillo   MRN:  [id number]    Recommendations:     Discharge Recommendations:  home with home health   Discharge Equipment Recommendations: bedside commode, hip kit   Barriers to discharge: decreased mobility,strength and endurance    Assessment:     Monique Trujillo is a 69 y.o. female admitted with a medical diagnosis of Spinal stenosis of lumbar region with radiculopathy.  She presents with the following impairments/functional limitations:  weakness, impaired endurance, impaired functional mobilty, gait instability, impaired balance, decreased lower extremity function, pain, decreased ROM, impaired coordination, impaired skin, orthopedic precautions,pt with improving status and remains with decreased mobility and endurance,pt will benefit from  services upon discharge.    Rehab Prognosis: Good; patient would benefit from acute skilled PT services to address these deficits and reach maximum level of function.    Recent Surgery: Procedure(s) (LRB):  FUSION, SPINE, WITH INSTRUMENTATION Stage 1 Left L3-4 LOIF Rise L Stage 2 Open L3-4 L4-5 Laminectomy Removal of L4-5 Hardware, L3-5 Posterior Instrumentation and extraction of Fusion at L3-4 (Left) 3 Days Post-Op    Plan:     During this hospitalization, patient to be seen daily to address the identified rehab impairments via gait training, therapeutic activities, therapeutic exercises, neuromuscular re-education and progress toward the following goals:    · Plan of Care Expires:  02/03/20    Subjective     Chief Complaint: n/a  Patient/Family Comments/goals: pt is able to chaim/doff LSO brace.  Pain/Comfort:  · Pain Rating 1: 4/10  · Location - Side 1: Bilateral  · Location - Orientation 1: lower  · Location 1: lumbar spine  · Pain Addressed 1: Pre-medicate for activity  · Pain Rating Post-Intervention 1: 4/10      Objective:     Communicated with nsg prior to session.  Patient found supine with bed alarm,  telemetry, RAYMUNDO drain upon PT entry to room.     General Precautions: Standard, fall   Orthopedic Precautions:spinal precautions   Braces: LSO     Functional Mobility:  · Bed Mobility:     · Supine to Sit: minimum assistance and via logroll  · Sit to Supine: contact guard assistance  · Transfers:     · Sit to Stand:  supervision with rolling walker  · Gait: amb ~120' X 1 with RW and S with LSO brace donned  · Balance: fair standing balance with RW      AM-PAC 6 CLICK MOBILITY  Turning over in bed (including adjusting bedclothes, sheets and blankets)?: 3  Sitting down on and standing up from a chair with arms (e.g., wheelchair, bedside commode, etc.): 3  Moving from lying on back to sitting on the side of the bed?: 3  Moving to and from a bed to a chair (including a wheelchair)?: 3  Need to walk in hospital room?: 3  Climbing 3-5 steps with a railing?: 3  Basic Mobility Total Score: 18       Therapeutic Activities and Exercises: le supine ex's X 10-12 reps inc: ap,qs,hs,abd/add,pt donned/doffed LSO brace with S.       Patient left supine with all lines intact, call button in reach and bed alarm on..    GOALS: see general POC  Multidisciplinary Problems     Physical Therapy Goals        Problem: Physical Therapy Goal    Goal Priority Disciplines Outcome Goal Variances Interventions   Physical Therapy Goal     PT, PT/OT Ongoing, Progressing     Description:  Goals to be met by: 2/3/20     Patient will increase functional independence with mobility by performin. Supine to sit with Set-up [place]  2. Sit to stand transfer with Supervision  MET 20  3. Gait  x 200 feet with Supervision using Rolling Walker.    PT Evaluation completed. Patient up in chair upon entry and left up in chair with all needs in reach.                         Time Tracking:     PT Received On: 20  PT Start Time: 0945     PT Stop Time: 1010  PT Total Time (min): 25 min     Billable Minutes: Gait Training 16 and Therapeutic  Exercise 9    Treatment Type: Treatment  PT/PTA: PTA     PTA Visit Number: 1     Gerber Serrato, PTA  01/06/2020

## 2020-01-06 NOTE — PLAN OF CARE
Problem: Occupational Therapy Goal  Goal: Occupational Therapy Goal  Description  Goals to be met by: 02/04/20       Patient will increase functional independence with ADLs by performing:    UE Dressing with Modified [place].  LE Dressing with Modified [place] and Assistive Devices as needed.  Grooming while standing at sink with Supervision.  Toileting from bedside commode with Supervision for hygiene and clothing management.   Toilet transfer to bedside commode with Supervision.  Maintain spinal precautions during ADLs for 3/3 trials     Outcome: Ongoing, Progressing      Monique Trujillo is a 69 y.o. female with a medical diagnosis of Spinal stenosis of lumbar region with radiculopathy.  Performance deficits affecting function are weakness, impaired endurance, impaired self care skills, impaired functional mobilty, gait instability, impaired balance, decreased lower extremity function, decreased safety awareness, pain, impaired skin, orthopedic precautions. Pt found in bed, agreeable to therapy.  Pt tolerated tx well.  She is progressing towards goals.  Pt reports pain 7/10 in lower back. She would benefit from a hip kit to increase [place] with LB drsg while maintaining hip precautions. She was able to perform LB drsg with Min A using sock aid and reacher. Continue OT services to address functional goals, progressing as able.    CALVIN Berry

## 2020-01-06 NOTE — PLAN OF CARE
Problem: Adult Inpatient Plan of Care  Goal: Plan of Care Review  Outcome: Ongoing, Progressing  Goal: Patient-Specific Goal (Individualization)  Outcome: Ongoing, Progressing  Goal: Absence of Hospital-Acquired Illness or Injury  Outcome: Ongoing, Progressing  Goal: Optimal Comfort and Wellbeing  Outcome: Ongoing, Progressing  Goal: Readiness for Transition of Care  Outcome: Ongoing, Progressing  Goal: Rounds/Family Conference  Outcome: Ongoing, Progressing     Problem: Fall Injury Risk  Goal: Absence of Fall and Fall-Related Injury  Outcome: Ongoing, Progressing     Problem: Functional Ability Impaired (Spinal Surgery)  Goal: Optimal Functional Ability  Outcome: Ongoing, Progressing     Problem: Infection (Spinal Surgery)  Goal: Absence of Infection Signs/Symptoms  Outcome: Ongoing, Progressing   Pt AAO x 4. VSS. NAD. Tolerating regular diet. Pain controlled with scheduled meds. Up with assist. No complaints of nausea/vomiting. Drain in place, output recorded in flowsheet. Safety maintained. Will continue to monitor.

## 2020-01-06 NOTE — PLAN OF CARE
Problem: Physical Therapy Goal  Goal: Physical Therapy Goal  Description  Goals to be met by: 2/3/20     Patient will increase functional independence with mobility by performin. Supine to sit with Set-up [place]  2. Sit to stand transfer with Supervision  MET 20  3. Gait  x 200 feet with Supervision using Rolling Walker.    PT Evaluation completed. Patient up in chair upon entry and left up in chair with all needs in reach.        Outcome: Ongoing, Progressing   Goal 2 met

## 2020-01-06 NOTE — PT/OT/SLP PROGRESS
Occupational Therapy   Treatment    Name: Monique Trujillo  MRN: [id number]  Admitting Diagnosis:  Spinal stenosis of lumbar region with radiculopathy  3 Days Post-Op    Recommendations:     Discharge Recommendations: home with home health, home health PT, home health OT  Discharge Equipment Recommendations:  bedside commode, hip kit  Barriers to discharge:  Decreased caregiver support    Assessment:     Monique Trujillo is a 69 y.o. female with a medical diagnosis of Spinal stenosis of lumbar region with radiculopathy.  Performance deficits affecting function are weakness, impaired endurance, impaired self care skills, impaired functional mobilty, gait instability, impaired balance, decreased lower extremity function, decreased safety awareness, pain, impaired skin, orthopedic precautions. Pt found in bed, agreeable to therapy.  Pt tolerated tx well.  She is progressing towards goals.  Pt reports pain 7/10 in lower back. She would benefit from a hip kit to increase [place] with LB drsg while maintaining hip precautions. She was able to perform LB drsg with Min A using sock aid and reacher. Continue OT services to address functional goals, progressing as able.      Rehab Prognosis:  Good; patient would benefit from acute skilled OT services to address these deficits and reach maximum level of function.       Plan:     Patient to be seen 5 x/week to address the above listed problems via self-care/home management, therapeutic activities, therapeutic exercises  · Plan of Care Expires: 02/03/20  · Plan of Care Reviewed with: patient    Subjective     Pain/Comfort:  · Pain Rating 1: 7/10  · Location - Side 1: Bilateral  · Location - Orientation 1: lower  · Location 1: back  · Pain Addressed 1: Pre-medicate for activity, Distraction  · Pain Rating Post-Intervention 1: 5/10    Objective:     Communicated with: RN prior to session.  Patient found HOB elevated with telemetry, peripheral IV, RAYMUNDO drain upon OT entry to  room.    General Precautions: Standard, fall   Orthopedic Precautions:spinal precautions   Braces: LSO     Occupational Performance:     Bed Mobility:    · Patient completed Rolling/Turning to Left with  stand by assistance and with side rail  · Patient completed Scooting/Bridging with stand by assistance and scoot seated to EOB  · Patient completed Supine to Sit with minimum assistance and log roll technique     Functional Mobility/Transfers:  · Patient completed Sit <> Stand Transfer with contact guard assistance  with  rolling walker and vc's for hand placement   · Patient completed Bed <> Chair Transfer using Stand Pivot technique with stand by assistance and contact guard assistance with rolling walker  · Functional Mobility: Pt took 4 steps to chair with CGA-SBA using RW.    Activities of Daily Living:  · Lower Body Dressing: minimum assistance using reacher and sock aid      AMPA 6 Click ADL: 18    Treatment & Education:  Educated pt on:   -home environment modification to prevent falls   -RW safety during transfers and ADL's   -LB drsg using AE (reacher, sock aid), bathing (long handle sponge), and toileting technique while maintaining spinal precautions.      Patient left up in chair with all lines intact, call button in reach and RN notifiedEducation:      GOALS:   Multidisciplinary Problems     Occupational Therapy Goals        Problem: Occupational Therapy Goal    Goal Priority Disciplines Outcome Interventions   Occupational Therapy Goal     OT, PT/OT Ongoing, Progressing    Description:  Goals to be met by: 02/04/20       Patient will increase functional independence with ADLs by performing:    UE Dressing with Modified [place].  LE Dressing with Modified [place] and Assistive Devices as needed.  Grooming while standing at sink with Supervision.  Toileting from bedside commode with Supervision for hygiene and clothing management.   Toilet transfer to bedside commode with  Supervision.  Maintain spinal precautions during ADLs for 3/3 trials                      Time Tracking:     OT Date of Treatment: 01/06/20  OT Start Time: 1150  OT Stop Time: 1221  OT Total Time (min): 31 min    Billable Minutes:Self Care/Home Management 21  Therapeutic Activity 10    CALVIN Berry  1/6/2020

## 2020-01-07 VITALS
OXYGEN SATURATION: 96 % | RESPIRATION RATE: 18 BRPM | SYSTOLIC BLOOD PRESSURE: 119 MMHG | DIASTOLIC BLOOD PRESSURE: 58 MMHG | TEMPERATURE: 99 F | HEIGHT: 61 IN | WEIGHT: 229.25 LBS | BODY MASS INDEX: 43.28 KG/M2 | HEART RATE: 96 BPM

## 2020-01-07 PROCEDURE — 99024 POSTOP FOLLOW-UP VISIT: CPT | Mod: ,,, | Performed by: PHYSICIAN ASSISTANT

## 2020-01-07 PROCEDURE — 27000646 HC AEROBIKA DEVICE

## 2020-01-07 PROCEDURE — 97535 SELF CARE MNGMENT TRAINING: CPT | Mod: CO

## 2020-01-07 PROCEDURE — 25000242 PHARM REV CODE 250 ALT 637 W/ HCPCS: Performed by: PHYSICIAN ASSISTANT

## 2020-01-07 PROCEDURE — 94664 DEMO&/EVAL PT USE INHALER: CPT

## 2020-01-07 PROCEDURE — 97116 GAIT TRAINING THERAPY: CPT | Mod: CQ

## 2020-01-07 PROCEDURE — 94640 AIRWAY INHALATION TREATMENT: CPT

## 2020-01-07 PROCEDURE — 94799 UNLISTED PULMONARY SVC/PX: CPT

## 2020-01-07 PROCEDURE — 99900035 HC TECH TIME PER 15 MIN (STAT)

## 2020-01-07 PROCEDURE — 25000003 PHARM REV CODE 250: Performed by: NEUROLOGICAL SURGERY

## 2020-01-07 PROCEDURE — 63600175 PHARM REV CODE 636 W HCPCS: Performed by: NEUROLOGICAL SURGERY

## 2020-01-07 PROCEDURE — 94761 N-INVAS EAR/PLS OXIMETRY MLT: CPT

## 2020-01-07 PROCEDURE — 97530 THERAPEUTIC ACTIVITIES: CPT | Mod: CQ

## 2020-01-07 PROCEDURE — 99024 PR POST-OP FOLLOW-UP VISIT: ICD-10-PCS | Mod: ,,, | Performed by: PHYSICIAN ASSISTANT

## 2020-01-07 RX ORDER — IPRATROPIUM BROMIDE AND ALBUTEROL SULFATE 2.5; .5 MG/3ML; MG/3ML
3 SOLUTION RESPIRATORY (INHALATION) EVERY 6 HOURS
Status: DISCONTINUED | OUTPATIENT
Start: 2020-01-07 | End: 2020-01-07

## 2020-01-07 RX ORDER — OXYCODONE HYDROCHLORIDE 5 MG/1
5 TABLET ORAL EVERY 6 HOURS PRN
Qty: 60 TABLET | Refills: 0 | Status: SHIPPED | OUTPATIENT
Start: 2020-01-07 | End: 2020-01-17

## 2020-01-07 RX ORDER — BUTALBITAL, ACETAMINOPHEN AND CAFFEINE 50; 325; 40 MG/1; MG/1; MG/1
1 TABLET ORAL EVERY 6 HOURS PRN
Qty: 60 TABLET | Refills: 1 | Status: SHIPPED | OUTPATIENT
Start: 2020-01-07 | End: 2020-04-22 | Stop reason: SDUPTHER

## 2020-01-07 RX ORDER — IPRATROPIUM BROMIDE AND ALBUTEROL SULFATE 2.5; .5 MG/3ML; MG/3ML
3 SOLUTION RESPIRATORY (INHALATION) EVERY 4 HOURS
Status: DISCONTINUED | OUTPATIENT
Start: 2020-01-07 | End: 2020-01-07 | Stop reason: HOSPADM

## 2020-01-07 RX ORDER — METHOCARBAMOL 500 MG/1
500 TABLET, FILM COATED ORAL 3 TIMES DAILY PRN
Qty: 60 TABLET | Refills: 1 | Status: SHIPPED | OUTPATIENT
Start: 2020-01-07 | End: 2020-01-17 | Stop reason: SDUPTHER

## 2020-01-07 RX ADMIN — CETIRIZINE HYDROCHLORIDE 5 MG: 5 TABLET ORAL at 09:01

## 2020-01-07 RX ADMIN — SENNOSIDES AND DOCUSATE SODIUM 2 TABLET: 8.6; 5 TABLET ORAL at 09:01

## 2020-01-07 RX ADMIN — ATENOLOL 25 MG: 25 TABLET ORAL at 09:01

## 2020-01-07 RX ADMIN — METHOCARBAMOL TABLETS 500 MG: 500 TABLET, COATED ORAL at 09:01

## 2020-01-07 RX ADMIN — HEPARIN SODIUM 5000 UNITS: 5000 INJECTION, SOLUTION INTRAVENOUS; SUBCUTANEOUS at 05:01

## 2020-01-07 RX ADMIN — OXYBUTYNIN CHLORIDE 15 MG: 5 TABLET, EXTENDED RELEASE ORAL at 09:01

## 2020-01-07 RX ADMIN — HYDROMORPHONE HYDROCHLORIDE 1 MG: 2 INJECTION, SOLUTION INTRAMUSCULAR; INTRAVENOUS; SUBCUTANEOUS at 11:01

## 2020-01-07 RX ADMIN — GABAPENTIN 800 MG: 400 CAPSULE ORAL at 09:01

## 2020-01-07 RX ADMIN — AMLODIPINE BESYLATE 10 MG: 5 TABLET ORAL at 09:01

## 2020-01-07 RX ADMIN — MUPIROCIN 1 G: 20 OINTMENT TOPICAL at 09:01

## 2020-01-07 RX ADMIN — TRAMADOL HYDROCHLORIDE 50 MG: 50 TABLET, FILM COATED ORAL at 12:01

## 2020-01-07 RX ADMIN — PANTOPRAZOLE SODIUM 40 MG: 40 TABLET, DELAYED RELEASE ORAL at 09:01

## 2020-01-07 RX ADMIN — IPRATROPIUM BROMIDE AND ALBUTEROL SULFATE 3 ML: .5; 3 SOLUTION RESPIRATORY (INHALATION) at 08:01

## 2020-01-07 RX ADMIN — IPRATROPIUM BROMIDE AND ALBUTEROL SULFATE 3 ML: .5; 3 SOLUTION RESPIRATORY (INHALATION) at 04:01

## 2020-01-07 RX ADMIN — HYDROMORPHONE HYDROCHLORIDE 1 MG: 2 INJECTION, SOLUTION INTRAMUSCULAR; INTRAVENOUS; SUBCUTANEOUS at 05:01

## 2020-01-07 RX ADMIN — TRAMADOL HYDROCHLORIDE 50 MG: 50 TABLET, FILM COATED ORAL at 05:01

## 2020-01-07 RX ADMIN — FENOFIBRATE 145 MG: 145 TABLET ORAL at 09:01

## 2020-01-07 RX ADMIN — SERTRALINE HYDROCHLORIDE 50 MG: 50 TABLET ORAL at 09:01

## 2020-01-07 RX ADMIN — PRAVASTATIN SODIUM 40 MG: 20 TABLET ORAL at 09:01

## 2020-01-07 NOTE — PLAN OF CARE
Problem: Occupational Therapy Goal  Goal: Occupational Therapy Goal  Description  Goals to be met by: 02/04/20       Patient will increase functional independence with ADLs by performing:    UE Dressing with Modified [place].  LE Dressing with Modified [place] and Assistive Devices as needed.  Grooming while standing at sink with Supervision.  Toileting from bedside commode with Supervision for hygiene and clothing management.   Toilet transfer to bedside commode with Supervision.  Maintain spinal precautions during ADLs for 3/3 trials     Outcome: Ongoing, Progressing    Monique Trujillo is a 69 y.o. female with a medical diagnosis of Spinal stenosis of lumbar region with radiculopathy.  Performance deficits affecting function are weakness, impaired endurance, impaired self care skills, impaired functional mobilty, gait instability, decreased lower extremity function, decreased safety awareness, pain, impaired skin, orthopedic precautions. Pt found in bathroom on commode without LSO donned.  Pt reports she needed to get to bathroom quickly however she did not make it in time and urinated on floor some.  She reports she has difficulty controlling bladder.  Pt able to transfer with Min A.  Donned LSO in standing with Mod A.  She requires vc's to maintain spinal precautions during BADL's and transfers. Pt reports high level of pain 7/10 and is moaning on and off throughout tx. Continue OT services to address functional goals, progressing as able.    HARSHAL Berry/L

## 2020-01-07 NOTE — PLAN OF CARE
Problem: Adult Inpatient Plan of Care  Goal: Plan of Care Review  Outcome: Ongoing, Progressing  Flowsheets (Taken 1/7/2020 0243)  Plan of Care Reviewed With: patient  Goal: Patient-Specific Goal (Individualization)  Outcome: Ongoing, Progressing  Goal: Absence of Hospital-Acquired Illness or Injury  Outcome: Ongoing, Progressing  Goal: Optimal Comfort and Wellbeing  Outcome: Ongoing, Progressing  Goal: Readiness for Transition of Care  Outcome: Ongoing, Progressing  Goal: Rounds/Family Conference  Outcome: Ongoing, Progressing     Problem: Fall Injury Risk  Goal: Absence of Fall and Fall-Related Injury  Outcome: Ongoing, Progressing     Problem: Infection  Goal: Infection Symptom Resolution  Outcome: Ongoing, Progressing     Problem: Bleeding (Spinal Surgery)  Goal: Absence of Bleeding  Outcome: Ongoing, Progressing     Problem: Bowel Elimination Impaired (Spinal Surgery)  Goal: Effective Bowel Elimination  Outcome: Ongoing, Progressing     Problem: Functional Ability Impaired (Spinal Surgery)  Goal: Optimal Functional Ability  Outcome: Ongoing, Progressing     Problem: Infection (Spinal Surgery)  Goal: Absence of Infection Signs/Symptoms  Outcome: Ongoing, Progressing     Problem: Neurologic Impairment (Spinal Surgery)  Goal: Optimal Neurologic Function  Outcome: Ongoing, Progressing     Problem: Pain (Spinal Surgery)  Goal: Acceptable Pain Control  Outcome: Ongoing, Progressing     Problem: Postoperative Nausea and Vomiting (Spinal Surgery)  Goal: Nausea and Vomiting Relief  Outcome: Ongoing, Progressing     Problem: Postoperative Urinary Retention (Spinal Surgery)  Goal: Effective Urinary Elimination  Outcome: Ongoing, Progressing

## 2020-01-07 NOTE — PLAN OF CARE
Problem: Physical Therapy Goal  Goal: Physical Therapy Goal  Description  Goals to be met by: 2/3/20     Patient will increase functional independence with mobility by performin. Supine to sit with Set-up [place]  2. Sit to stand transfer with Supervision  MET 20  3. Gait  x 200 feet with Supervision using Rolling Walker.    PT Evaluation completed. Patient up in chair upon entry and left up in chair with all needs in reach.        Outcome: Ongoing, Progressing   Goals ongoing

## 2020-01-07 NOTE — SUBJECTIVE & OBJECTIVE
Interval History: No acute events overnight.  Patient had low-grade fever yesterday afternoon 100.6 that improved.  Likely secondary to atelectasis given O2 sats 92%.  UA negative.  No leukocytosis.  Continues to complain of intermittent positional headache that started yesterday.  Headache improves with rest and Fioricet.  Taking very little p.r.n. Robaxin, tramadol, and subcu Dilaudid.  Therapy recommended home health therapy with hip hip and bedside commode; patient already has rolling walker at home.  Plan to DC home today.  Patient will follow up with Neurosurgery in 2 weeks for wound check and 6 weeks with Dr. Brantley.    Medications:  Continuous Infusions:  Scheduled Meds:   albuterol-ipratropium  3 mL Nebulization Q4H    amLODIPine  10 mg Oral Daily    atenolol  25 mg Oral Daily    cetirizine  5 mg Oral Daily    fenofibrate  145 mg Oral Daily    gabapentin  800 mg Oral BID    heparin (porcine)  5,000 Units Subcutaneous Q8H    mupirocin  1 g Nasal BID    oxybutynin  15 mg Oral Daily    pantoprazole  40 mg Oral Daily    pravastatin  40 mg Oral Daily    sertraline  50 mg Oral Daily    traMADol  50 mg Oral Q6H     PRN Meds:aluminum-magnesium hydroxide-simethicone, bisacodyl, butalbital-acetaminophen-caffeine -40 mg, HYDROmorphone, methocarbamol, ondansetron, promethazine (PHENERGAN) IVPB, senna-docusate 8.6-50 mg, sodium chloride 0.9%, sodium chloride 0.9%     Review of Systems  Objective:     Weight: 104 kg (229 lb 4.5 oz)  Body mass index is 43.32 kg/m².  Vital Signs (Most Recent):  Temp: 98.6 °F (37 °C) (01/07/20 0753)  Pulse: 91 (01/07/20 0753)  Resp: 16 (01/07/20 0753)  BP: (!) 147/67 (01/07/20 0753)  SpO2: (!) 93 % (01/07/20 0423) Vital Signs (24h Range):  Temp:  [97.2 °F (36.2 °C)-100.6 °F (38.1 °C)] 98.6 °F (37 °C)  Pulse:  [81-91] 91  Resp:  [15-19] 16  SpO2:  [92 %-96 %] 93 %  BP: (115-147)/(58-69) 147/67                          Neurosurgery Physical Exam   General: well developed,  well nourished. no acute distress.  Comfortable.  Neurologic: Awake, alert and oriented x3. Thought content appropriate.  Head: normocephalic, atraumatic   GCS: Motor: 6/Verbal: 5/Eyes: 4 GCS Total: 15  Cranial nerves:face symmetric and sensation intact bilaterally, tongue midline, pupils equal, round, reactive to light with accomodation, extraocular muscles intact. CN II-XII grossly intact. Shoulder shrug strength equal. Palate rises symmetrically.  Uvula midline. Hearing equal with finger rub. Gag and taste not tested.     Language: no aphasia  Speech: no dysarthria     Sensory: response to light touch throughout  Motor Strength: Moves all extremities spontaneously with good tone. Full strength upper and lower extremities. No abnormal movements seen.        Strength  Deltoids Triceps Biceps Wrist Extension Wrist Flexion Hand    Upper: R 5/5 5/5 5/5 5/5 5/5 5/5    L 5/5 5/5 5/5 5/5 5/5 5/5     Iliopsoas Quadriceps Knee  Flexion Tibialis  anterior Gastro- cnemius EHL   Lower: R 5/5 5/5 5/5 5/5 5/5 5/5    L 5/5 5/5 5/5 5/5 5/5 5/5     No focal numbness or weakness  ENT: normal hearing with finger rub  Heart: RRR, no cyanosis, pallor, or edema.   Lungs:  normal respiratory effort  Abdomen: soft, non-tender and symmetric  Extremities: warm with no cyanosis, edema, or clubbing  Pulses: palpable distal pulses  Skin: warm, dry and intact. No visible rashes or lesions.     Posterior lumbar incision:  wound is c/d/i, no signs of infection, no erythema, warmth, edema, ttp,  no drainage.  wound edges are well approximated staples.        Significant Labs:  Recent Labs   Lab 01/06/20 1912   *      K 4.0      CO2 25   BUN 12   CREATININE 0.8   CALCIUM 8.8     Recent Labs   Lab 01/06/20 1912   WBC 9.94   HGB 9.4*   HCT 29.0*        No results for input(s): LABPT, INR, APTT in the last 48 hours.  Microbiology Results (last 7 days)     ** No results found for the last 168 hours. **             Significant Diagnostics:  Postop lumbar x-ray was appropriate L3-5 fusion

## 2020-01-07 NOTE — PT/OT/SLP PROGRESS
Occupational Therapy   Treatment    Name: Monique Trujillo  MRN: [id number]  Admitting Diagnosis:  Spinal stenosis of lumbar region with radiculopathy  4 Days Post-Op    Recommendations:     Discharge Recommendations: home with home health, home health OT, home health PT  Discharge Equipment Recommendations:  bedside commode, hip kit  Barriers to discharge:  Decreased caregiver support    Assessment:     Monique Trujillo is a 69 y.o. female with a medical diagnosis of Spinal stenosis of lumbar region with radiculopathy.  Performance deficits affecting function are weakness, impaired endurance, impaired self care skills, impaired functional mobilty, gait instability, decreased lower extremity function, decreased safety awareness, pain, impaired skin, orthopedic precautions. Pt found in bathroom on commode without LSO donned.  Pt reports she needed to get to bathroom quickly however she did not make it in time and urinated on floor some.  She reports she has difficulty controlling bladder.  Pt able to transfer with Min A.  Donned LSO in standing with Mod A. She requires vc's to maintain spinal precautions during BADL's and transfers.  Pt reports high level of pain 7/10 and is moaning on and off throughout tx. Continue OT services to address functional goals, progressing as able.      Rehab Prognosis:  Good; patient would benefit from acute skilled OT services to address these deficits and reach maximum level of function.       Plan:     Patient to be seen 5 x/week to address the above listed problems via self-care/home management, therapeutic activities, therapeutic exercises  · Plan of Care Expires: 02/03/20  · Plan of Care Reviewed with: patient    Subjective     Pain/Comfort:  · Pain Rating 1: 7/10  · Location - Side 1: Bilateral  · Location - Orientation 1: lower  · Location 1: back  · Pain Addressed 1: Pre-medicate for activity, Reposition, Distraction, Cessation of Activity, Nurse notified  · Pain Rating  Post-Intervention 1: 7/10    Objective:     Communicated with: RN prior to session.  Patient found on commode with telemetry, peripheral IV upon OT entry to room.    General Precautions: Standard, fall   Orthopedic Precautions:spinal precautions   Braces: LSO     Occupational Performance:     Functional Mobility/Transfers:  · Patient completed Sit <> Stand Transfer with contact guard assistance and minimum assistance  with  rolling walker and vc's for hand placment x 2 trials.  · Patient completed Toilet Transfer Stand Pivot technique with contact guard assistance and minimum assistance with  rolling walker  · Functional Mobility: Pt ambulated room distances with CGA/SBA using RW.  Slow pace.     Activities of Daily Living:  · Lower Body Dressing: minimum assistance and maximal assistance Pt doffed and donned diaper with Min A.  Doffed and donned socks with Max A 2/2 AE not availble. Pt able to perform LB drsg with SBA/Min A when using AE.    · Toileting: stand by assistance perineal care. VC's not to side bend or twist for hygiene.   · Grooming: stand by assistance standing at sink      [facility] 6 Click ADL: 19    Treatment & Education:  Reiterated importance of LSO donned when out of bed and calling for assistance when needing to get to bathroom or back to bed.  Pt verbalizes understanding.     Patient left up in chair with all lines intact, call button in reach and RN notifiedEducation:      GOALS:   Multidisciplinary Problems     Occupational Therapy Goals        Problem: Occupational Therapy Goal    Goal Priority Disciplines Outcome Interventions   Occupational Therapy Goal     OT, PT/OT Ongoing, Progressing    Description:  Goals to be met by: 02/04/20       Patient will increase functional independence with ADLs by performing:    UE Dressing with Modified [place].  LE Dressing with Modified [place] and Assistive Devices as needed.  Grooming while standing at sink with Supervision.  Toileting from  bedside commode with Supervision for hygiene and clothing management.   Toilet transfer to bedside commode with Supervision.  Maintain spinal precautions during ADLs for 3/3 trials                      Time Tracking:     OT Date of Treatment: 01/07/20  OT Start Time: 0955  OT Stop Time: 1027  OT Total Time (min): 32 min    Billable Minutes:Self Care/Home Management 32    CALVIN Berry  1/7/2020

## 2020-01-07 NOTE — PROGRESS NOTES
[facility]  Neurosurgery  Progress Note    Subjective:     History of Present Illness: Monique Trujillo is a very pleasant 68 y.o. female,  with history of nonsmoking, osteopenia L-spine (05/08/2018 DEXA-on vitamin-D and calcium supplements), GERD, depression, hypertension, hyperlipidemia, morbid obesity BMI 43.9, TIMMY, bladder spasms, insomnia, status post previous ACDF in 1988, chronic left shoulder pain (refusing shoulder surgery), status post right knee arthroplasty 03/14/2018, and status post right hip arthroplasty 04/20/2016, who had a L4-5 transforaminal interbody fusion with instrumentation 2015 with Dr. Morales at [facility] for spondylolisthesis with chronic low back pain.  After the surgery she reports having a new onset of right L5 distribution numbness and pain.  The numbness has not improved since her last surgery.  In the last year she has developed worsening difficulty walking.  She has bilateral leg numbness involving the whole leg.  She has urinary frequency and infrequent urinary incontinence is.  She has worsening low back pain.  The pain is constant.  She is still working 2 days a week as a .  She has difficulty walking.  Shef recently fell and broke her left foot which is being treated with a boot.  She tried physical therapy in the past and spinal injection without significant pain relief.    Lumbar spine MRI 2019 compared to 2014:  L4-5 fusion with posterior artery where and interbody device, right L4-5 lateral recess stenosis and foraminal stenosis.  L3-4 worsened degenerative spondylolisthesis with severe central canal stenosis and foraminal stenosis     She was recommended a left L3-4 oblique interbody fusion with placement of a interbody spacer and allograft followed by a open L3-4 and L4-5 laminectomy, medial facetectomy, foraminotomy, removal of L4-5 spinal hardware, L3-4 posterolateral arthrodesis with autograft harvested from the same incision and L3-L5 posterior  segmental instrumentation.  The goals of the surgery is to decompress the neural elements in order to improve her neurogenic claudication, radiculopathy symptoms and prevent worsening nerve damage.       We have discussed the risks of surgery including bleeding, infection, failure of surgery, CSF leak, nerve root injury, spinal cord injury, ureter injury, weakness, paralysis, peripheral neuropathy, malplaced hardware, migration of hardware, non-union, need for reoperation. Patient understands the risks and would like to proceed with surgery.        The patient has increase perioperative risks because of these comorbidities:  Morbid obesity with BMI of 43, essential hypertension, obstructive sleep apnea.           Post-Op Info:  Procedure(s) (LRB):  FUSION, SPINE, WITH INSTRUMENTATION Stage 1 Left L3-4 LOIF Rise L Stage 2 Open L3-4 L4-5 Laminectomy Removal of L4-5 Hardware, L3-5 Posterior Instrumentation and extraction of Fusion at L3-4 (Left)   4 Days Post-Op     Interval History: No acute events overnight.  Patient had low-grade fever yesterday afternoon 100.6 that improved.  Likely secondary to atelectasis given O2 sats 92%.  UA negative.  No leukocytosis.  Continues to complain of intermittent positional headache that started yesterday.  Headache improves with rest and Fioricet.  Taking very little p.r.n. Robaxin, tramadol, and subcu Dilaudid.  Therapy recommended home health therapy with hip hip and bedside commode; patient already has rolling walker at home.  Plan to DC home today.  Patient will follow up with Neurosurgery in 2 weeks for wound check and 6 weeks with Dr. Brantley.    Medications:  Continuous Infusions:  Scheduled Meds:   albuterol-ipratropium  3 mL Nebulization Q4H    amLODIPine  10 mg Oral Daily    atenolol  25 mg Oral Daily    cetirizine  5 mg Oral Daily    fenofibrate  145 mg Oral Daily    gabapentin  800 mg Oral BID    heparin (porcine)  5,000 Units Subcutaneous Q8H    mupirocin  1 g  Nasal BID    oxybutynin  15 mg Oral Daily    pantoprazole  40 mg Oral Daily    pravastatin  40 mg Oral Daily    sertraline  50 mg Oral Daily    traMADol  50 mg Oral Q6H     PRN Meds:aluminum-magnesium hydroxide-simethicone, bisacodyl, butalbital-acetaminophen-caffeine -40 mg, HYDROmorphone, methocarbamol, ondansetron, promethazine (PHENERGAN) IVPB, senna-docusate 8.6-50 mg, sodium chloride 0.9%, sodium chloride 0.9%     Review of Systems  Objective:     Weight: 104 kg (229 lb 4.5 oz)  Body mass index is 43.32 kg/m².  Vital Signs (Most Recent):  Temp: 98.6 °F (37 °C) (01/07/20 0753)  Pulse: 91 (01/07/20 0753)  Resp: 16 (01/07/20 0753)  BP: (!) 147/67 (01/07/20 0753)  SpO2: (!) 93 % (01/07/20 0423) Vital Signs (24h Range):  Temp:  [97.2 °F (36.2 °C)-100.6 °F (38.1 °C)] 98.6 °F (37 °C)  Pulse:  [81-91] 91  Resp:  [15-19] 16  SpO2:  [92 %-96 %] 93 %  BP: (115-147)/(58-69) 147/67                          Neurosurgery Physical Exam   General: well developed, well nourished. no acute distress.  Comfortable.  Neurologic: Awake, alert and oriented x3. Thought content appropriate.  Head: normocephalic, atraumatic   GCS: Motor: 6/Verbal: 5/Eyes: 4 GCS Total: 15  Cranial nerves:face symmetric and sensation intact bilaterally, tongue midline, pupils equal, round, reactive to light with accomodation, extraocular muscles intact. CN II-XII grossly intact. Shoulder shrug strength equal. Palate rises symmetrically.  Uvula midline. Hearing equal with finger rub. Gag and taste not tested.     Language: no aphasia  Speech: no dysarthria     Sensory: response to light touch throughout  Motor Strength: Moves all extremities spontaneously with good tone. Full strength upper and lower extremities. No abnormal movements seen.        Strength  Deltoids Triceps Biceps Wrist Extension Wrist Flexion Hand    Upper: R 5/5 5/5 5/5 5/5 5/5 5/5    L 5/5 5/5 5/5 5/5 5/5 5/5     Iliopsoas Quadriceps Knee  Flexion Tibialis  anterior  Gastro- cnemius EHL   Lower: R 5/5 5/5 5/5 5/5 5/5 5/5    L 5/5 5/5 5/5 5/5 5/5 5/5     No focal numbness or weakness  ENT: normal hearing with finger rub  Heart: RRR, no cyanosis, pallor, or edema.   Lungs:  normal respiratory effort  Abdomen: soft, non-tender and symmetric  Extremities: warm with no cyanosis, edema, or clubbing  Pulses: palpable distal pulses  Skin: warm, dry and intact. No visible rashes or lesions.     Posterior lumbar incision:  wound is c/d/i, no signs of infection, no erythema, warmth, edema, ttp,  no drainage.  wound edges are well approximated staples.        Significant Labs:  Recent Labs   Lab 01/06/20 1912   *      K 4.0      CO2 25   BUN 12   CREATININE 0.8   CALCIUM 8.8     Recent Labs   Lab 01/06/20 1912   WBC 9.94   HGB 9.4*   HCT 29.0*        No results for input(s): LABPT, INR, APTT in the last 48 hours.  Microbiology Results (last 7 days)     ** No results found for the last 168 hours. **            Significant Diagnostics:  Postop lumbar x-ray was appropriate L3-5 fusion    Assessment/Plan:     * Spinal stenosis of lumbar region with radiculopathy  69 year old female who is status post left L3-4 oblique interbody fusion with placement of a interbody spacer and allograft followed by a open L3-4 and L4-5 laminectomy, medial facetectomy, foraminotomy, removal of L4-5 spinal hardware, L3-4 posterolateral arthrodesis with autograft harvested from the same incision and L3-L5 posterior segmental instrumentation on 01/03/2020.    -Patient is neurologically stable on exam.  She has expected postop incisional pain; preoperative bilateral leg numbness unchanged.   -Postop image with appropriate hardware placement L3-L5.  -Pain controlled on current regimen.  -Surgical drain removed 01/03/2020  -LSO when OOB or with activity  -PT/OT/OOB daily; therapy recommending home health therapy  -Patient must be OOB for atleast 6 hours daily (may be in intervals: 2 hours in  chair with each meal)  -IS to bed side. Patient to use atleast 10x every hour.  -Continue bowel regimen daily.  -Continue SQH, Teds and SCDs for DVTP.  -PPI for GI prophylaxis  -Restarted all home meds for her hypertension, hyperlipidemia, depression, overactive bladder, and seasonal allergies.  -headaches:  Worse with position.  Improved with Fioricet.  Encouraged increased caffeine intake.  -low-grade fever likely secondary to atelectasis. O2 sats 92-96%.  No leukocytosis.  Scheduled dual nebs, and wishing treatment, and CPT q.4 hours.  Encouraged continued incentive spirometer use upon discharge.      Dispo: DC home with home health therapy and [place]/hip kit. Patient has 2 wk wound check with NSR and 6 week follow up with Dr. Brantley    Discussed with Dr. Fercho Dyson PA-C  Neurosurgery  [facility]

## 2020-01-07 NOTE — PT/OT/SLP PROGRESS
Physical Therapy Treatment    Patient Name:  Monique Trujillo   MRN:  [id number]    Recommendations:     Discharge Recommendations:  home with home health   Discharge Equipment Recommendations: bedside commode, hip kit   Barriers to discharge: decreased mobility,strength and endurance    Assessment:     Monique Trujillo is a 69 y.o. female admitted with a medical diagnosis of Spinal stenosis of lumbar region with radiculopathy.  She presents with the following impairments/functional limitations:  weakness, impaired endurance, impaired functional mobilty, gait instability, impaired balance, decreased upper extremity function, decreased lower extremity function, pain, decreased ROM, impaired coordination, impaired skin, orthopedic precautions,pt with improving status and limited by drowsiness from pain shot,pt remains with decreased mobility and endurance and will benefit from  services upon discharge.    Rehab Prognosis: Good; patient would benefit from acute skilled PT services to address these deficits and reach maximum level of function.    Recent Surgery: Procedure(s) (LRB):  FUSION, SPINE, WITH INSTRUMENTATION Stage 1 Left L3-4 LOIF Rise L Stage 2 Open L3-4 L4-5 Laminectomy Removal of L4-5 Hardware, L3-5 Posterior Instrumentation and extraction of Fusion at L3-4 (Left) 4 Days Post-Op    Plan:     During this hospitalization, patient to be seen daily to address the identified rehab impairments via gait training, therapeutic exercises, therapeutic activities, neuromuscular re-education and progress toward the following goals:    · Plan of Care Expires:  02/03/20    Subjective     Chief Complaint: n/a  Patient/Family Comments/goals: pt is leaving today.  Pain/Comfort:  · Pain Rating 1: (no c/o's)  · Pain Addressed 1: Pre-medicate for activity, Reposition, Distraction      Objective:     Communicated with nsg prior to session.  Patient found up in chair with (chair alarm and LSO) upon PT entry to room.     General  Precautions: Standard, fall   Orthopedic Precautions:spinal precautions   Braces: LSO     Functional Mobility:  · Bed Mobility:     · Sit to Supine: moderate assistance and at le's  · Transfers:     · Sit to Stand:  moderate assistance and from decreased surface with rolling walker  · Gait: amb ~18' X 1 with RW and Min A with LSO donned and decreased pace  · Balance: fair standing balance with RW      AM-PAC 6 CLICK MOBILITY  Turning over in bed (including adjusting bedclothes, sheets and blankets)?: 3  Sitting down on and standing up from a chair with arms (e.g., wheelchair, bedside commode, etc.): 2(decreased surface)  Moving from lying on back to sitting on the side of the bed?: 3  Moving to and from a bed to a chair (including a wheelchair)?: 3  Need to walk in hospital room?: 3  Climbing 3-5 steps with a railing?: 2(groggy from pain shot)  Basic Mobility Total Score: 16       Therapeutic Activities and Exercises: increased time required with activities this PM from pain shot.       Patient left supine with all lines intact, call button in reach, bed alarm on and nsg notified..    GOALS: see general POC  Multidisciplinary Problems     Physical Therapy Goals        Problem: Physical Therapy Goal    Goal Priority Disciplines Outcome Goal Variances Interventions   Physical Therapy Goal     PT, PT/OT Ongoing, Progressing     Description:  Goals to be met by: 2/3/20     Patient will increase functional independence with mobility by performin. Supine to sit with Set-up Elsa  2. Sit to stand transfer with Supervision  MET 20  3. Gait  x 200 feet with Supervision using Rolling Walker.    PT Evaluation completed. Patient up in chair upon entry and left up in chair with all needs in reach.                         Time Tracking:     PT Received On: 20  PT Start Time: 1330     PT Stop Time: 1353  PT Total Time (min): 23 min     Billable Minutes: Gait Training 14 and Therapeutic Activity  9    Treatment Type: Treatment  PT/PTA: PTA     PTA Visit Number: 2     Gerber Serrato, PTA  01/07/2020

## 2020-01-07 NOTE — PLAN OF CARE
The proper method of use, as well as anticipated side effects, of this aerosol treatment are discussed and demonstrated to the patient.   Pt on RA with documented sats.  Will continue to monitor.

## 2020-01-07 NOTE — DISCHARGE SUMMARY
[facility]  Neurosurgery  Discharge Summary      Patient Name: Monique Trujillo  MRN: [id number]  Admission Date: 1/3/2020  Hospital Length of Stay: 4 days  Discharge Date and Time:  01/07/2020 8:24 AM  Attending Physician: James Brantley MD   Discharging Provider: Paco Dyson PA-C  Primary Care Provider: Katelynn Laura MD    HPI:   Monique Trujillo is a very pleasant 68 y.o. female,  with history of nonsmoking, osteopenia L-spine (05/08/2018 DEXA-on vitamin-D and calcium supplements), GERD, depression, hypertension, hyperlipidemia, morbid obesity BMI 43.9, TIMMY, bladder spasms, insomnia, status post previous ACDF in 1988, chronic left shoulder pain (refusing shoulder surgery), status post right knee arthroplasty 03/14/2018, and status post right hip arthroplasty 04/20/2016, who had a L4-5 transforaminal interbody fusion with instrumentation 2015 with Dr. Morales at [facility] for spondylolisthesis with chronic low back pain.  After the surgery she reports having a new onset of right L5 distribution numbness and pain.  The numbness has not improved since her last surgery.  In the last year she has developed worsening difficulty walking.  She has bilateral leg numbness involving the whole leg.  She has urinary frequency and infrequent urinary incontinence is.  She has worsening low back pain.  The pain is constant.  She is still working 2 days a week as a .  She has difficulty walking.  Shef recently fell and broke her left foot which is being treated with a boot.  She tried physical therapy in the past and spinal injection without significant pain relief.    Lumbar spine MRI 2019 compared to 2014:  L4-5 fusion with posterior artery where and interbody device, right L4-5 lateral recess stenosis and foraminal stenosis.  L3-4 worsened degenerative spondylolisthesis with severe central canal stenosis and foraminal stenosis     She was recommended a left L3-4 oblique interbody fusion with  placement of a interbody spacer and allograft followed by a open L3-4 and L4-5 laminectomy, medial facetectomy, foraminotomy, removal of L4-5 spinal hardware, L3-4 posterolateral arthrodesis with autograft harvested from the same incision and L3-L5 posterior segmental instrumentation.  The goals of the surgery is to decompress the neural elements in order to improve her neurogenic claudication, radiculopathy symptoms and prevent worsening nerve damage.       We have discussed the risks of surgery including bleeding, infection, failure of surgery, CSF leak, nerve root injury, spinal cord injury, ureter injury, weakness, paralysis, peripheral neuropathy, malplaced hardware, migration of hardware, non-union, need for reoperation. Patient understands the risks and would like to proceed with surgery.        The patient has increase perioperative risks because of these comorbidities:  Morbid obesity with BMI of 43, essential hypertension, obstructive sleep apnea.           Procedure(s) (LRB):  FUSION, SPINE, WITH INSTRUMENTATION Stage 1 Left L3-4 LOIF Rise L Stage 2 Open L3-4 L4-5 Laminectomy Removal of L4-5 Hardware, L3-5 Posterior Instrumentation and extraction of Fusion at L3-4 (Left)     Hospital Course: 1/3/20: OR for left L3-4 oblique interbody fusion with placement of a interbody spacer and allograft followed by a open L3-4 and L4-5 laminectomy, medial facetectomy, foraminotomy, removal of L4-5 spinal hardware, L3-4 posterolateral arthrodesis with autograft harvested from the same incision and L3-L5 posterior segmental instrumentation.  1/4/20: post-op day one L3-4 OLIF, L3-5 laminectomy and revision of posterior instrumentation and fusion. Doing well, walker with PT. 3/10 of back pain. No leg pain. Legs numbness unchanged.    1/5/20: status post L3-4 OLIF, L3-5 posterior instrumented fusion. Doing well. No headache. Mobilizing OOB by herself. Walking with the walker. Mild pain  1/6/20: status post-op day 3 L3-5  "fusion. Took a nap this PM and woke up with dizziness, headache. No nausea. No dysuria. No SOB. No chest pain. No abdominal pain.    01/07/2020:  No acute events overnight.  Patient had low-grade fever yesterday afternoon 100.6 that improved.  Likely secondary to atelectasis given O2 sats 92%.  UA negative.  No leukocytosis.  Continues to complain of intermittent positional headache that started yesterday.  Headache improves with rest and Fioricet.  Taking very little p.r.n. Robaxin, tramadol, and subcu Dilaudid.  Therapy recommended home health therapy with hip hip and bedside commode; patient already has rolling walker at home.  Plan to DC home today.  Patient will follow up with Neurosurgery in 2 weeks for wound check and 6 weeks with Dr. Brantley.        Pending Diagnostic Studies:     Procedure Component Value Units Date/Time    Specimen to Pathology, Surgery Gross only [[id number]] Collected:  01/03/20 1247    Order Status:  Sent Lab Status:  In process Updated:  01/03/20 1410        Final Active Diagnoses:    Diagnosis Date Noted POA    PRINCIPAL PROBLEM:  Spinal stenosis of lumbar region with radiculopathy [M48.061, M54.16] 01/03/2020 Yes      Problems Resolved During this Admission:      Discharged Condition: good    Disposition: Home-Health Care Svc    Follow Up:  2 weeks wound check and 6 week follow-up    Patient Instructions:      HIP KIT FOR HOME USE     Order Specific Question Answer Comments   Height: 5' 1" (1.549 m)    Weight: 104 kg (229 lb 4.5 oz)    Does patient have medical equipment at home? shower chair    Does patient have medical equipment at home? rollator    Does patient have medical equipment at home? walker, rolling    Length of need (1-99 months): 99    Type: [place] Hip Kit      COMMODE FOR HOME USE     Order Specific Question Answer Comments   Type: Standard    Height: 5' 1" (1.549 m)    Weight: 104 kg (229 lb 4.5 oz)    Does patient have medical equipment at home? shower chair    Does " patient have medical equipment at home? rollator    Does patient have medical equipment at home? walker, rolling    Length of need (1-99 months): 99      Ambulatory referral to Home Health   Referral Priority: Routine Referral Type: Home Health   Referral Reason: Specialty Services Required   Requested Specialty: Home Health Services   Number of Visits Requested: 1     Medications:  Reconciled Home Medications:      Medication List      START taking these medications    butalbital-acetaminophen-caffeine -40 mg -40 mg per tablet  Commonly known as:  FIORICET, ESGIC  Take 1 tablet by mouth every 6 (six) hours as needed for Headaches.     methocarbamol 500 MG Tab  Commonly known as:  ROBAXIN  Take 1 tablet (500 mg total) by mouth 3 (three) times daily as needed.     oxyCODONE 5 MG immediate release tablet  Commonly known as:  ROXICODONE  Take 1 tablet (5 mg total) by mouth every 6 (six) hours as needed for Pain.        CONTINUE taking these medications    amLODIPine 10 MG tablet  Commonly known as:  NORVASC  TAKE 1 TABLET BY MOUTH EVERY DAY     atenolol 25 MG tablet  Commonly known as:  TENORMIN  TAKE 1 TABLET BY MOUTH EVERY DAY     Calcium 500 + D 500 mg(1,250mg) -200 unit per tablet  Generic drug:  calcium-vitamin D3  Take 1 tablet by mouth 2 (two) times daily with meals.     co-enzyme Q-10 30 mg capsule  Take 30 mg by mouth once daily.     econazole nitrate 1 % cream  Use bid for rash     fenofibric acid 135 mg Cpdr  Commonly known as:  FIBRICOR  TAKE ONE CAPSULE BY MOUTH EVERY DAY     fish oil-omega-3 fatty acids 300-1,000 mg capsule  Take 2 g by mouth once daily.     gabapentin 800 MG tablet  Commonly known as:  NEURONTIN  Take 1 tablet (800 mg total) by mouth every evening.     GLUCOSAMINE SULF-CHONDROITIN ORAL  Take 1 tablet by mouth once daily.     levocetirizine 5 MG tablet  Commonly known as:  XYZAL  Take 1 tablet (5 mg total) by mouth every evening.     metronidazole 0.75% 0.75 % Crea  Commonly  known as:  METROCREAM  Apply topically once daily to affected area     multivitamin capsule  Take 1 capsule by mouth once daily.     naproxen 500 MG tablet  Commonly known as:  NAPROSYN  TAKE 1 TABLET BY MOUTH TWICE A DAY WITH MEALS     OPW TEST CLAIM - DO NOT FILL  Take by mouth. OPW test claim. Do not fill.     oxybutynin 15 MG Tr24  Commonly known as:  DITROPAN XL  TAKE 1 TABLET (15 MG TOTAL) BY MOUTH ONCE DAILY.     pantoprazole 40 MG tablet  Commonly known as:  PROTONIX  Take 1 tablet (40 mg total) by mouth once daily.     polymyxin B sulf-trimethoprim 10,000 unit- 1 mg/mL Drop  Commonly known as:  POLYTRIM  Place 1 drop into both eyes every 4 (four) hours.     pravastatin 40 MG tablet  Commonly known as:  PRAVACHOL  TAKE 1 TABLET BY MOUTH EVERY DAY     sertraline 50 MG tablet  Commonly known as:  ZOLOFT  Take 1 tablet (50 mg total) by mouth once daily.     Vitamin C 1000 MG tablet  Generic drug:  ascorbic acid (vitamin C)  Take 1,000 mg by mouth once daily.     vitamin E 400 UNIT capsule  Take 400 Units by mouth once daily.        STOP taking these medications    traMADol 50 mg tablet  Commonly known as:  RANDELL Dyson PA-C  Neurosurgery  [facility]

## 2020-01-07 NOTE — PROGRESS NOTES
Pt drain removed by MD Brantley. Informed dr of pt temp of 100.6, scheduled tylenol given. Urince sample ordered,collected and sent.

## 2020-01-07 NOTE — PROGRESS NOTES
NEUROSURGICAL POST-OPERATIVE PROGRESS NOTE    DATE OF SERVICE:  01/06/2020      ATTENDING PHYSICIAN:  James Brantley MD    SUBJECTIVE:    INTERIM HISTORY:    This is a very pleasant 69 y.o. y.o. female, who is status post-op day 3 L3-5 fusion. Took a nap this PM and woke up with dizziness, headache. No nausea. No dysuria. No SOB. No chest pain. No abdominal pain.                OBJECTIVE:    PHYSICAL EXAMINATION:   Vitals:    01/06/20 1739   BP: (!) 124/58   Pulse: 89   Resp: 15   Temp: 100.1 °F (37.8 °C)       Neurosurgery Physical Exam    Ortho Exam    Neurologic Exam     Motor Exam     Strength   Strength 5/5 throughout.       Wound has healed.    DIAGNOSTIC DATA:    X-ray of the lumbar spine, AP and lateral views reveals the fusion hardware is intact. No loosening of screws or migration of hardware.    ASSESMENT:    This is a 69 y.o. female who is s/p day 3 L3-5 fusion. Dizziness with high temp    Problem List Items Addressed This Visit        Neuro    * (Principal) Spinal stenosis of lumbar region with radiculopathy    Relevant Orders    Vital signs    Neurovascular checks    Intake and output    Chlorohexidine Gluconate Bath    PT evaluate and treat    OT evaluate and treat    Pulse Oximetry Q4H    Incentive spirometry    PT assistance with ambulation    Discontinue Cruz Catheter (1 day after surgery)    Hemovac drain    Document drain output    Drain - full suction    Diet Adult Regular (IDDSI Level 7)    X-Ray Lumbar Spine Ap And Lateral (Completed)    Saline lock IV    Urinalysis, Reflex to Urine Culture Urine, Clean Catch    CBC auto differential    Basic metabolic panel      Other Visit Diagnoses     Postoperative fever    -  Primary    Relevant Orders    Urinalysis, Reflex to Urine Culture Urine, Clean Catch    CBC auto differential    Basic metabolic panel          PLAN:    UA  CBC, BMP  Drain removed. Bed rest for tonight. Can get up to use restroom.           James Brantley MD  Pager:  [phone]

## 2020-01-07 NOTE — ASSESSMENT & PLAN NOTE
69 year old female who is status post left L3-4 oblique interbody fusion with placement of a interbody spacer and allograft followed by a open L3-4 and L4-5 laminectomy, medial facetectomy, foraminotomy, removal of L4-5 spinal hardware, L3-4 posterolateral arthrodesis with autograft harvested from the same incision and L3-L5 posterior segmental instrumentation on 01/03/2020.    -Patient is neurologically stable on exam.  She has expected postop incisional pain; preoperative bilateral leg numbness unchanged.   -Postop image with appropriate hardware placement L3-L5.  -Pain controlled on current regimen.  -Surgical drain removed 01/03/2020  -LSO when OOB or with activity  -PT/OT/OOB daily; therapy recommending home health therapy  -Patient must be OOB for atleast 6 hours daily (may be in intervals: 2 hours in chair with each meal)  -IS to bed side. Patient to use atleast 10x every hour.  -Continue bowel regimen daily.  -Continue SQH, Teds and SCDs for DVTP.  -PPI for GI prophylaxis  -Restarted all home meds for her hypertension, hyperlipidemia, depression, overactive bladder, and seasonal allergies.  -headaches:  Worse with position.  Improved with Fioricet.  Encouraged increased caffeine intake.  -low-grade fever likely secondary to atelectasis. O2 sats 92-96%.  No leukocytosis.  Scheduled dual nebs, and wishing treatment, and CPT q.4 hours.  Encouraged continued incentive spirometer use upon discharge.      Dispo: DC home with home health therapy and [facility]/hip kit. Patient has 2 wk wound check with NSR and 6 week follow up with Dr. Brantley    Discussed with Dr. Brantley

## 2020-01-07 NOTE — PLAN OF CARE
TN met with patient at bedside. Currently patient lives at home with spouse and daughter. Prior to admission, patient was independent of ADL's and used a rolling walker with ambulation. No other DME or HH noted. Upon discharge, patients states that her family will provide transportation home and to follow up appointments. Also, patient's family will be available to provide help for the patient.      TN  updated whiteboard with contact information. Blue discharge folder and discharge brochure given to patient. TN instructed patient to contact TN if she has any further questions or concerns.  TN will continue to follow.       01/06/20 1700   Discharge Assessment   Assessment Type Discharge Planning Assessment   Confirmed/corrected address and phone number on facesheet? Yes   Assessment information obtained from? Patient;Medical Record   Expected Length of Stay (days) 4   Prior to hospitilization cognitive status: Alert/Oriented   Prior to hospitalization functional status: Independent   Current cognitive status: Alert/Oriented   Current Functional Status: Assistive Equipment   Lives With spouse;child(eleanor), adult   Able to Return to Prior Arrangements yes   Is patient able to care for self after discharge? No  (Will have help from daughters)   Patient's perception of discharge disposition home health   Patient currently being followed by outpatient case management? No   Patient currently receives any other outside agency services? No   Equipment Currently Used at Home walker, rolling   Do you have any problems affording any of your prescribed medications? No   Is the patient taking medications as prescribed? yes   Does the patient have transportation home? Yes   Transportation Anticipated family or friend will provide   Does the patient receive services at the [facility]? No   Discharge Plan A Home with family;Home Health   Discharge Plan B Skilled Nursing Facility   DME Needed Upon Discharge  bedside commode;hip  kit   Patient/Family in Agreement with Plan yes   Does the patient have transportation to healthcare appointments? Yes

## 2020-01-07 NOTE — DISCHARGE INSTRUCTIONS
Please follow ONLY the instructions that are checked below.    Activity Restrictions:  [x]  Return to work will be determined on an individual basis.  [x]  No lifting greater than 5-10 pounds.  [x]  Avoid bending and twisting the area of your surgery more than 45 degrees from neutral position in any direction.  [x]  No driving or operating machinery:  [x]  until cleared by your surgeon.  [x]  while taking narcotic pain medications or muscle relaxants.  [x]  Wear LSO brace at all times except when flat in bed.  [x]  Do not sit at 90 degree angle for more than 30 mins at each setting.     [x]  Increase ambulation over the next 2 weeks so that you are walking 2 miles per day at 2 weeks post-operatively.  [x]  Walk on paved surfaces only. It is okay to walk up and down stairs while holding onto a side rail.      Discharge Medication/Follow-up:  [x]  Please refer to discharge medication reconciliation form.  [x]  Prescriptions for appropriate medication will be given upon discharge.   [x]  Pain control:  Oxycodone every 6-8 hours as needed for severe pain; supplement Robaxin 3 times daily for muscle spasms.  May take Fioricet every 6 8 hr as needed for headaches.  Please monitor added his side effects of multiple pain regimen.  [x]  Take docusate (Colace 100 mg): take one capsule a day as needed for constipation. You can get this over the counter.  [x]  Follow-up appointment:  [x]  2 weeks post-op for wound check by physician assistant   [x]  An appointment will be mailed to you.    Wound Care:  [x]  Remove outer clear dressing  2  days after surgery. Apply bacitracin to incision twice a day until follow up visit. And, keep your incision open to the air.       [x]  You may shower on the 2nd day after your surgery. Have the force of water hit you opposite from the incision. Pat the incision dry after your shower; do not scrub the incision.  [x]  You cannot take a bath until 8 weeks after surgery.    Call your doctor or go  to the Emergency Room for any signs of infection, including: increased redness, drainage, pain, or fever (temperature ?101.5 for 24 hours). Call your doctor or go to the Emergency Room if there are any localized neurological changes; problems with speech, vision, numbness, tingling, weakness, or severe headache; or for other concerns.    Special Instructions:  [x]  No use of tobacco products.  [x]  Diet: Please eat a regular diet as tolerated.  []  Other diet:              Specific physician instructions:  Please increase intake of caffeine to improve headaches; may lay down on side/flat if needed to improve headaches.      Physicians need 3 days' notice for pain medicine to be refilled. Pain medicine will only be refilled between 8 AM and 5 PM, Monday through Friday, due to Food and Drug Administration regulation of documentation.    If you have any questions about this form, please call [phone] at [facility] (if unable to contact [facility]).      If you have any questions about this form, please call [phone] at [facility].    Form No. 97340 (Revised 10/31/2013)

## 2020-01-07 NOTE — PLAN OF CARE
VN reviewed discharge instructions with pt.  AVS printed and handed to pt by bedside nurse.  Reviewed follow-up appointments, medications, diet, and importance of medication compliance.  Reviewed home care instructions, treatment plan, self-management, and when to seek medical attention.  Allowed time for questions.  All questions answered.  Patient verbalized complete understanding of discharge instructions and voices no concerns.     Discharge instructions complete.  Bedside delivery done.  Temp 100.5 noted.  Bedside nurse Natasha  notified.

## 2020-01-08 ENCOUNTER — TELEPHONE (OUTPATIENT)
Dept: NEUROSURGERY | Facility: CLINIC | Age: 70
End: 2020-01-08

## 2020-01-08 ENCOUNTER — PATIENT OUTREACH (OUTPATIENT)
Dept: ADMINISTRATIVE | Facility: CLINIC | Age: 70
End: 2020-01-08

## 2020-01-08 PROCEDURE — G0180 PR HOME HEALTH MD CERTIFICATION: ICD-10-PCS | Mod: ,,, | Performed by: NEUROLOGICAL SURGERY

## 2020-01-08 PROCEDURE — G0180 MD CERTIFICATION HHA PATIENT: HCPCS | Mod: ,,, | Performed by: NEUROLOGICAL SURGERY

## 2020-01-08 NOTE — PATIENT INSTRUCTIONS
Wound Check After Surgery, No Complication  Surgery involves cutting through layers of skin, fatty tissue, muscle, and sometimes bone and cartilage. Sutures (stitches) or staples are used to close all layers of the wound. The sutures on the inside will dissolve in about 2 to 3 weeks. Any sutures or staples used on the outside need to be removed in about 7 to 14 days, depending on the location.  It is normal to have some clear or bloody discharge on the wound covering or bandage (dressing) for the first few days after surgery. If your wound was sutured (sewn) closed, you should not have to change the dressing more than twice a day in the first few days. Bleeding or discharge requiring more frequent dressing changes can be a sign of a problem.  It is normal to feel pain at the incision site. The pain decreases as the wound heals. Most of the pain and soreness from the skin incision should go away by the time the sutures or staples are removed. Soreness and pain from deeper tissues may last another week or two.  Pain that continues more than a few weeks after surgery or pain that worsens anytime after surgery can be a sign of a problem, such as:  · Infection  · Separation of wound edges  · Collection of blood or other below the skin  Home care  Different types of surgery require different types of care and dressing changes. It is important to follow all instructions and advice from your surgeon, as well as other members of your healthcare team.  Wound care  · Keep the wound clean, as directed by your healthcare provider.  · Change the dressing as directed. Change the dressing sooner if it becomes wet or stained with blood or fluid from the wound.  · Bathe with a sponge (no shower or tub baths) for the first few days after surgery, or until there is no more drainage from the wound. Unless you received different instructions from your surgeon, you can then shower. Do not soak the area in water (no baths or swimming)  until the tape, sutures, or staples are removed and any wound opening has dried out and healed.  Changing the dressing  · Wash your hands before changing the dressings.  · Carefully remove the dressing and tape; dont just yank it off. If it sticks to the wound, you may need to wet it a little to remove it, unless your healthcare provider told you not to wet it.  · Wash your hands again before putting on a new, clean dressing.  · Gently clean the wound with clean water (or saline) using gauze or a clean washcloth. Do not rub it or pick at it.  · Do not use soap, alcohol, hydrogen peroxide, or any other cleanser.  · If you were told to dry the wound before putting on a new dressing, gently pat it dry. Do not rub.  · Throw out the old dressing. Do not reuse it!  · Wash your hands again when you are done.  Types of dressings  Your healthcare team will tell you what type of dressing to put on your wound. Follow your healthcare teams instructions carefully, and contact them if you have any questions. Two common types of dressings are described below. You may have one of these or another type.  · Dry dressing. Use dry gauze. If the wound is still draining, use a nonadherent dressing, which shouldnt stick to the wound.  · Wet-to-dry dressing. Wet the gauze, and squeeze out the excess water (or saline), before putting it on. Then, cover this with a dry pad.  Medicines  · If you were given antibiotics, take them until they are used up or your healthcare provider tells you to stop. It is important to finish the antibiotics even though you feel better, to make sure the infection has cleared.  · You can take acetaminophen or ibuprofen for pain, unless you were given a different pain medicine to use. (Note: If you have chronic liver or kidney disease, or have ever had a stomach ulcer or gastrointestinal bleeding, or are taking blood thinner medicines, talk with your healthcare provider before using these  medicines.)  · Aspirin should never be used in anyone under 18 years of age who is ill with a fever. It may cause severe liver damage.  Follow-up care  Follow up with your healthcare provider, or as advised, for your next wound check or removal of your sutures, staples, or tape.  · If a culture was done, you will be notified if the results will affect your treatment. You can call as directed for the results.  · If imaging tests, such as X-rays, an ultrasound, or CT scan were done, they will be reviewed by a specialist. You will be notified of the results, especially if they affect treatment.  Call 911  Call emergency services right away if any of these occur:  · Trouble breathing or swallowing, wheezing  · Hoarse voice or trouble speaking  · Extreme confusion  · Extreme drowsiness or trouble awakening  · Fainting or loss of consciousness  · Rapid heart rate or very slow heart rate  · Vomiting blood, or large amounts of blood in stool  · Discomfort in the center of the chest that feels like pressure, squeezing, a sense of fullness, or pain.  · Discomfort or pain in other upper body areas, such as the back, one or both arms, neck, jaw, or stomach  · Stroke symptoms (spot a stroke FAST)  ¨ F: Face drooping. One side of the face is numb or droops.  ¨ A: Arm weakness. One arm feels weak or numb.  ¨ S: Speech difficulty: Speech is slurred, or the person is unable to speak.  ¨ T: Time to call 911. Even if symptoms go away, call 911.  When to seek medical advice  Call your healthcare provider right away if any of the following occur:  · Increasing pain at the site of surgery  · Fever over 100.4º F (38º C)  · Redness around the wound  · Fluid, pus, or blood draining from the wound  · Vomiting, constipation, or diarrhea  Date Last Reviewed: 9/27/2015  © 6475-5680 [employer]. [address] [place] [address], [place] [address]. All rights reserved. This information is not intended as a substitute for professional  medical care. Always follow your healthcare professional's instructions.

## 2020-01-08 NOTE — TELEPHONE ENCOUNTER
----- Message from Jennifer Martinez sent at 1/8/2020  2:26 PM CST -----  Contact: Sharonda verduzco/[facility] [phone]  Darya verduzco/[facility] is requesting a verbal order for a home health aid.   Please call back to assist at [phone]

## 2020-01-15 ENCOUNTER — TELEPHONE (OUTPATIENT)
Dept: HOME HEALTH SERVICES | Facility: HOSPITAL | Age: 70
End: 2020-01-15

## 2020-01-17 ENCOUNTER — HOSPITAL ENCOUNTER (OUTPATIENT)
Dept: RADIOLOGY | Facility: HOSPITAL | Age: 70
Discharge: HOME | End: 2020-01-17
Attending: NEUROLOGICAL SURGERY
Payer: MEDICARE

## 2020-01-17 ENCOUNTER — OFFICE VISIT (OUTPATIENT)
Dept: NEUROSURGERY | Facility: CLINIC | Age: 70
End: 2020-01-17
Payer: MEDICARE

## 2020-01-17 VITALS
WEIGHT: 229 LBS | BODY MASS INDEX: 43.23 KG/M2 | TEMPERATURE: 99 F | HEIGHT: 61 IN | DIASTOLIC BLOOD PRESSURE: 74 MMHG | HEART RATE: 73 BPM | SYSTOLIC BLOOD PRESSURE: 130 MMHG

## 2020-01-17 DIAGNOSIS — Z98.1 S/P LUMBAR SPINAL FUSION: ICD-10-CM

## 2020-01-17 DIAGNOSIS — M54.16 SPINAL STENOSIS OF LUMBAR REGION WITH RADICULOPATHY: Primary | ICD-10-CM

## 2020-01-17 DIAGNOSIS — M48.061 SPINAL STENOSIS OF LUMBAR REGION WITH RADICULOPATHY: Primary | ICD-10-CM

## 2020-01-17 PROCEDURE — 99024 POSTOP FOLLOW-UP VISIT: CPT | Mod: S$GLB,,, | Performed by: PHYSICIAN ASSISTANT

## 2020-01-17 PROCEDURE — 99999 PR PBB SHADOW E&M-EST. PATIENT-LVL V: ICD-10-PCS | Mod: PBBFAC,,, | Performed by: PHYSICIAN ASSISTANT

## 2020-01-17 PROCEDURE — 72100 X-RAY EXAM L-S SPINE 2/3 VWS: CPT | Mod: TC,PN

## 2020-01-17 PROCEDURE — 99499 RISK ADDL DX/OHS AUDIT: ICD-10-PCS | Mod: S$GLB,,, | Performed by: PHYSICIAN ASSISTANT

## 2020-01-17 PROCEDURE — 99999 PR PBB SHADOW E&M-EST. PATIENT-LVL V: CPT | Mod: PBBFAC,,, | Performed by: PHYSICIAN ASSISTANT

## 2020-01-17 PROCEDURE — 99499 UNLISTED E&M SERVICE: CPT | Mod: S$GLB,,, | Performed by: PHYSICIAN ASSISTANT

## 2020-01-17 PROCEDURE — 99024 PR POST-OP FOLLOW-UP VISIT: ICD-10-PCS | Mod: S$GLB,,, | Performed by: PHYSICIAN ASSISTANT

## 2020-01-17 PROCEDURE — 72100 XR LUMBAR SPINE AP AND LATERAL: ICD-10-PCS | Mod: 26,,, | Performed by: RADIOLOGY

## 2020-01-17 PROCEDURE — 72100 X-RAY EXAM L-S SPINE 2/3 VWS: CPT | Mod: 26,,, | Performed by: RADIOLOGY

## 2020-01-17 RX ORDER — MUPIROCIN 20 MG/G
OINTMENT TOPICAL
Status: ON HOLD | COMMUNITY
Start: 2019-12-20 | End: 2022-05-25 | Stop reason: ALTCHOICE

## 2020-01-17 RX ORDER — GEL BASE NO.184
GEL (GRAM) TOPICAL
Status: ON HOLD | COMMUNITY
Start: 2019-12-20 | End: 2022-05-25 | Stop reason: ALTCHOICE

## 2020-01-17 RX ORDER — LIDOCAINE 50 MG/G
OINTMENT TOPICAL
Status: ON HOLD | COMMUNITY
Start: 2019-12-23 | End: 2022-05-25 | Stop reason: ALTCHOICE

## 2020-01-17 RX ORDER — METHOCARBAMOL 500 MG/1
500 TABLET, FILM COATED ORAL 3 TIMES DAILY PRN
Qty: 60 TABLET | Refills: 1 | Status: SHIPPED | OUTPATIENT
Start: 2020-01-17 | End: 2022-04-20

## 2020-01-17 RX ORDER — OXYCODONE AND ACETAMINOPHEN 5; 325 MG/1; MG/1
1 TABLET ORAL EVERY 8 HOURS PRN
Qty: 42 TABLET | Refills: 0 | Status: SHIPPED | OUTPATIENT
Start: 2020-01-17 | End: 2021-05-28

## 2020-01-17 NOTE — PROGRESS NOTES
Neurosurgery Wound Check Progress Note    HPI  Monique Trujillo is 69 y.o. with history of nonsmoking, osteopenia L-spine (05/08/2018 DEXA-on vitamin-D and calcium supplements), GERD, depression, hypertension, hyperlipidemia, morbid obesity BMI 43.9, TIMMY, bladder spasms, insomnia, status post previous ACDF in 1988, chronic left shoulder pain (refusing shoulder surgery), status post right knee arthroplasty 03/14/2018, and status post right hip arthroplasty 04/20/2016, who had a L4-5 transforaminal interbody fusion with instrumentation 2015 with Dr. Morales at [facility] but later developed worsening low back and right leg pain.  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  She subsequently underwent L3-4 oblique interbody fusion with open L3-5 posterior instrumentation with laminectomy with Dr. Brantley on 01/03/2020.  She is here with her daughter for 2 weeks wound check.    Daughter reports the patient is not very mobile at home.  She is not walking and mostly lays in bed.  Patient feels like her sensation her legs are returning.  She continues have bilateral ft numbness unchanged since surgery.  She has incisional back pain.  She is taking Percocet every 6 hr and taking Robaxin only at night.  Compliant with her bone stimulator and brace; she is undergoing home health therapy.  Denies any fever, chills, incisional issues, or new deficits.        Low Back Pain Scale  R Low Back-Pain Score: 1  R Low Back-Pain Intensity: I can tolerate the pain I have without having to use pain killers  R Low Back-Pain Score: I need some help, but manage most of my personal care  Low Back-Lifting: I can only lift very light weights   Low Back-Walking: Pain prevents me walking more than .25 mile   Low Back-Sitting: I can sit only in my favorite chair as long as I like   Low Back-Standing: I cannot stand for longer than 10 minutes with increasing pain   Low Back-Sleeping: Because of pain my normal nights sleep is reduced by less than  one half   Low Back-Social Life: Pain has restricted my social life and I do not go out very often   Low Back-Traveling: Pain restricts me to short necessary journeys under 30 minutes   Low Back-Changing Degree of Pain: My pain fluctuates but is definitely getting better           EXAM  Vitals:    01/17/20 1017   BP: 130/74   Pulse: 73   Temp: 98.7 °F (37.1 °C)     Body mass index is 43.27 kg/m².      General: well developed, well nourished. no acute distress. Comfortable.  Morbidly obese.  Neurologic: Awake, alert and oriented x3. Thought content appropriate.  Head: normocephalic, atraumatic    GCS: Motor: 6/Verbal: 5/Eyes: 4 GCS Total: 15  Cranial nerves: face symmetric, tongue midline, pupils equal, round, reactive to light with accomodation, extraocular muscles intact. CN II-XII grossly intact.    Sensory: response to light touch throughout  Motor Strength: Moves all extremities spontaneously with good tone. Full strength upper and lower extremities. No abnormal movements seen.      Strength   Deltoids Triceps Biceps Wrist Extension Wrist Flexion Hand    Upper: R 5/5 5/5 5/5 5/5 5/5 5/5     L 5/5 5/5 5/5 5/5 5/5 5/5       Iliopsoas Quadriceps Knee  Flexion Tibialis  anterior Gastro- cnemius EHL   Lower: R 5/5 5/5 5/5 5/5 5/5 5/5     L 5/5 5/5 5/5 5/5 5/5 5/5     Negative straight leg raise  No midline or paraspinal tenderness to palpation  Gait is normal.       Heart: RRR, no cyanosis, pallor, or edema.    Lungs: normal respiratory effort  Abdomen: soft, non-tender and symmetric  Extremities: warm with no cyanosis, edema, or clubbing  Skin: warm, dry and intact. No visible rashes or lesions.        Left lateral and posterior lumbar Surgical incision:  C/D/I without any signs of infection.  Incision is healing well / slowly.  No erythema, warmth, edema, TTP.  No drainage.  Wound edges are well approximated.    Staples/ steri strips intact and removed without difficulty.          IMAGING/LABS  Lumbar x-rays  01/17/2020 personally reviewed and compared to previous imaging   Appropriate hardware placement at L3-5 interbody fusion with posterior instrumentation.      ASSESSMENT/PLAN    69-year-old female who is 2 weeks status post lateral L3-5 interbody fusion with posterior instrumentation and laminectomy    -Patient has expected postop myofascial pain and weakness from immobilization.  Incision is healing well. Imaging stable. I have reiterated wound care, activity restrictions, and follow up appts as below.       -continue home health therapy and bone stimulator  -Rx given for Percocet and Robaxin.  Patient knows to wean Percocet 3 times a day  -May start using lidocaine and diclofenac gel p.r.n. muscle pain.  Do not place on incision directly.  -Do not submerge incision for another 6 weeks. Pat the incision dry after your shower.  -Patient to continue using bacitracin twice a day for another 1-2 weeks.  -Keep incision open to air. Turn q2h to promote appropriate wound healing.  -LSO brace when OOB.    -Continue p.r.n. stool softener and miralax to prevent constipation with pain med use.   -Increase ambulation. No heavy lifting >10lbs.   No over bending or twisting at incisional site.  Fall precautions.  -Patient has follow up with Dr. Brantley in 4-6 weeks with lumbar xrays.     All questions were answered. Patient was encouraged to call clinic with any future concerns prior to follow up appt. If any worsening symptoms, patient should report to ED.     Please call with any questions.    DARREN Ibanez Neurosurgery      Note dictated with voice recognition software, please excuse any grammatical errors.

## 2020-02-03 LAB
FINAL PATHOLOGIC DIAGNOSIS: NORMAL
GROSS: NORMAL

## 2020-02-04 DIAGNOSIS — F41.9 ANXIETY AND DEPRESSION: ICD-10-CM

## 2020-02-04 DIAGNOSIS — F32.A ANXIETY AND DEPRESSION: ICD-10-CM

## 2020-02-04 RX ORDER — SERTRALINE HYDROCHLORIDE 50 MG/1
TABLET, FILM COATED ORAL
Qty: 90 TABLET | Refills: 0 | Status: SHIPPED | OUTPATIENT
Start: 2020-02-04 | End: 2020-05-04

## 2020-02-17 ENCOUNTER — EXTERNAL HOME HEALTH (OUTPATIENT)
Dept: HOME HEALTH SERVICES | Facility: HOSPITAL | Age: 70
End: 2020-02-17
Payer: MEDICARE

## 2020-02-17 ENCOUNTER — OFFICE VISIT (OUTPATIENT)
Dept: NEUROSURGERY | Facility: CLINIC | Age: 70
End: 2020-02-17
Payer: MEDICARE

## 2020-02-17 ENCOUNTER — HOSPITAL ENCOUNTER (OUTPATIENT)
Dept: RADIOLOGY | Facility: HOSPITAL | Age: 70
Discharge: HOME | End: 2020-02-17
Attending: NEUROLOGICAL SURGERY
Payer: MEDICARE

## 2020-02-17 VITALS
BODY MASS INDEX: 43.23 KG/M2 | WEIGHT: 229 LBS | TEMPERATURE: 99 F | DIASTOLIC BLOOD PRESSURE: 77 MMHG | SYSTOLIC BLOOD PRESSURE: 142 MMHG | HEIGHT: 61 IN | HEART RATE: 86 BPM

## 2020-02-17 DIAGNOSIS — Z98.1 S/P LUMBAR SPINAL FUSION: ICD-10-CM

## 2020-02-17 DIAGNOSIS — Z98.1 STATUS POST LUMBAR SPINAL FUSION: Primary | ICD-10-CM

## 2020-02-17 PROCEDURE — 99024 PR POST-OP FOLLOW-UP VISIT: ICD-10-PCS | Mod: S$GLB,,, | Performed by: NEUROLOGICAL SURGERY

## 2020-02-17 PROCEDURE — 99024 POSTOP FOLLOW-UP VISIT: CPT | Mod: S$GLB,,, | Performed by: NEUROLOGICAL SURGERY

## 2020-02-17 PROCEDURE — 99499 RISK ADDL DX/OHS AUDIT: ICD-10-PCS | Mod: S$GLB,,, | Performed by: NEUROLOGICAL SURGERY

## 2020-02-17 PROCEDURE — 72100 X-RAY EXAM L-S SPINE 2/3 VWS: CPT | Mod: 26,,, | Performed by: RADIOLOGY

## 2020-02-17 PROCEDURE — 99999 PR PBB SHADOW E&M-EST. PATIENT-LVL IV: CPT | Mod: PBBFAC,,, | Performed by: NEUROLOGICAL SURGERY

## 2020-02-17 PROCEDURE — 99999 PR PBB SHADOW E&M-EST. PATIENT-LVL IV: ICD-10-PCS | Mod: PBBFAC,,, | Performed by: NEUROLOGICAL SURGERY

## 2020-02-17 PROCEDURE — 72100 X-RAY EXAM L-S SPINE 2/3 VWS: CPT | Mod: TC,PN

## 2020-02-17 PROCEDURE — 99499 UNLISTED E&M SERVICE: CPT | Mod: S$GLB,,, | Performed by: NEUROLOGICAL SURGERY

## 2020-02-17 PROCEDURE — 72100 XR LUMBAR SPINE AP AND LATERAL: ICD-10-PCS | Mod: 26,,, | Performed by: RADIOLOGY

## 2020-02-17 NOTE — PROGRESS NOTES
NEUROSURGICAL POST-OPERATIVE PROGRESS NOTE    DATE OF SERVICE:  02/17/2020      ATTENDING PHYSICIAN:  James Brantley MD    SUBJECTIVE:    INTERIM HISTORY:    This is a very pleasant 69 y.o. y.o. female, who is status 6 weeks L3-4 oblique interbody fusion with removal of posterior hardware at L4-5 and placement of new posterior segmental fixation at L3-4 and L4-5 with L3-4 and L4-5 laminectomy and posterolateral arthrodesis.  Patient is doing well with no significant back pain.  Her leg pain has significantly improved but she still has bilateral feet numbness.  She is using a Rollator to keep her balance.  She was to return to work.  She is very satisfied with her outcome              OBJECTIVE:    PHYSICAL EXAMINATION:   Vitals:    02/17/20 1556   BP: (!) 142/77   Pulse: 86   Temp: 98.6 °F (37 °C)       Neurosurgery Physical Exam    Ortho Exam    Neurologic Exam    Wound has healed.  Small scab over the left abdominal incision. Delayed healing without sign of infection.    DIAGNOSTIC DATA:    X-ray of the lumbar spine, AP and lateral views reveals the fusion hardware is intact. No loosening of screws or migration of hardware.    ASSESMENT:    This is a 69 y.o. female who is s/p 6 weeks L3-4 anterior interbody fusion with placement of interbody spacer and L3-5 segmental instrumentation with laminectomy and posterolateral arthrodesis.  Doing very well with improved back pain and leg symptoms.    Problem List Items Addressed This Visit        Orthopedic    Status post lumbar spinal fusion - Primary          PLAN:    Okay to resume work.  Follow-up in 6 weeks with repeat lumbar x-ray.  Continue home physical therapy exercises.  Okay to stop work back brace.        James Brantley MD  Pager: [phone]

## 2020-02-18 NOTE — PROGRESS NOTES
Patient, Monique Trujillo (MRN #[id number]), presented with a recorded BMI of 43.27 kg/m^2 consistent with the definition of morbid obesity (ICD-10 E66.01). The patient's morbid obesity was monitored, evaluated, addressed and/or treated. This addendum to the medical record is made on 02/18/2020.

## 2020-02-21 ENCOUNTER — TELEPHONE (OUTPATIENT)
Dept: SLEEP MEDICINE | Facility: CLINIC | Age: 70
End: 2020-02-21

## 2020-02-21 NOTE — TELEPHONE ENCOUNTER
----- Message from Laurence Villela sent at 2/21/2020  8:22 AM CST -----  Contact: pt   Pt needing a call back regarding canceling her appt that's on 3/26     Pt contact # [phone]

## 2020-03-05 DIAGNOSIS — M25.572 JOINT PAIN OF ANKLE AND FOOT, LEFT: ICD-10-CM

## 2020-03-05 DIAGNOSIS — S92.355D CLOSED NONDISPLACED FRACTURE OF FIFTH METATARSAL BONE OF LEFT FOOT WITH ROUTINE HEALING, SUBSEQUENT ENCOUNTER: ICD-10-CM

## 2020-03-05 RX ORDER — NAPROXEN 500 MG/1
TABLET ORAL
Qty: 60 TABLET | Refills: 0 | Status: SHIPPED | OUTPATIENT
Start: 2020-03-05 | End: 2020-03-19

## 2020-03-19 ENCOUNTER — TELEPHONE (OUTPATIENT)
Dept: INTERNAL MEDICINE | Facility: CLINIC | Age: 70
End: 2020-03-19

## 2020-03-19 DIAGNOSIS — M79.89 LEG SWELLING: Primary | ICD-10-CM

## 2020-03-19 RX ORDER — FUROSEMIDE 40 MG/1
TABLET ORAL
Qty: 3 TABLET | Refills: 0 | Status: SHIPPED | OUTPATIENT
Start: 2020-03-19 | End: 2020-04-28 | Stop reason: SDUPTHER

## 2020-03-19 NOTE — TELEPHONE ENCOUNTER
----- Message from Katelynn Laura MD sent at 3/19/2020  2:49 PM CDT -----  Please schedule patient for BMP and BNP labs next week (any day after Monday).  In regards to her , Conor, please call his pharmacy [facility] [phone]. Apparently the pharmacist there is trying to find some alternative/cheaper options for some of the patients medications.     Thanks  Dr. Laura

## 2020-03-20 ENCOUNTER — TELEPHONE (OUTPATIENT)
Dept: INTERNAL MEDICINE | Facility: CLINIC | Age: 70
End: 2020-03-20

## 2020-03-20 NOTE — TELEPHONE ENCOUNTER
Spoke to patient on phone yesterday for 30 min.  Encouraged patient and her  NOT to come into clinic due to their age, comorbidities, and recent coronavirus outbreak.  Patient states she recently had back surgery.  Since the back surgery, she has noticed swelling in her feet.  Onset was a couple of weeks ago after the back surgery.  It is located in both feet and extends to mid calf on both legs.  She feels that it is getting worse.  Elevation does help with the swelling some.  But today it is worse than it has been on previous days.  She thinks this is likely related to nerve damage from her back surgery.    She says her  has had some aggressive behavior at nighttime due to his dementia. He has an area of skin breakdown on his buttocks. They are using Keegan's butt paste as barrier protection. He is other barry well. Mrs. Amaya is trying to work with [facility] to find cheaper options for his medications, specifically his insulin.     Plan for leg swelling: Low salt diet, compression stockings, Lasix every other day X 3, check labs (BMP and BNP) after lasix. If labs normal, will get venous and arterial ultrasounds of legs for further evaluation    Discussed with her that unfortunately he will have episodes of abnormal behavior at nighttime due to his dementia.  Agree with using barrier cream for areas of the beginnings of skin breakdown.  Will reach out to [facility] to try to assist with cheaper medications.    All questions answered.

## 2020-03-25 ENCOUNTER — LAB VISIT (OUTPATIENT)
Dept: LAB | Facility: HOSPITAL | Age: 70
End: 2020-03-25
Attending: INTERNAL MEDICINE
Payer: MEDICARE

## 2020-03-25 DIAGNOSIS — M79.89 LEG SWELLING: ICD-10-CM

## 2020-03-25 LAB
ANION GAP SERPL CALC-SCNC: 10 MMOL/L (ref 8–16)
BNP SERPL-MCNC: 12 PG/ML (ref 0–99)
BUN SERPL-MCNC: 19 MG/DL (ref 8–23)
CALCIUM SERPL-MCNC: 9.6 MG/DL (ref 8.7–10.5)
CHLORIDE SERPL-SCNC: 103 MMOL/L (ref 95–110)
CO2 SERPL-SCNC: 27 MMOL/L (ref 23–29)
CREAT SERPL-MCNC: 0.8 MG/DL (ref 0.5–1.4)
EST. GFR  (AFRICAN AMERICAN): >60 ML/MIN/1.73 M^2
EST. GFR  (NON AFRICAN AMERICAN): >60 ML/MIN/1.73 M^2
GLUCOSE SERPL-MCNC: 101 MG/DL (ref 70–110)
POTASSIUM SERPL-SCNC: 3.9 MMOL/L (ref 3.5–5.1)
SODIUM SERPL-SCNC: 140 MMOL/L (ref 136–145)

## 2020-03-25 PROCEDURE — 80048 BASIC METABOLIC PNL TOTAL CA: CPT

## 2020-03-25 PROCEDURE — 36415 COLL VENOUS BLD VENIPUNCTURE: CPT | Mod: PO

## 2020-03-25 PROCEDURE — 83880 ASSAY OF NATRIURETIC PEPTIDE: CPT

## 2020-03-30 ENCOUNTER — TELEPHONE (OUTPATIENT)
Dept: PAIN MEDICINE | Facility: CLINIC | Age: 70
End: 2020-03-30

## 2020-03-30 PROBLEM — M25.661 DECREASED ROM OF RIGHT KNEE: Status: RESOLVED | Noted: 2018-04-10 | Resolved: 2020-03-30

## 2020-03-30 PROBLEM — R53.1 DECREASED STRENGTH: Status: RESOLVED | Noted: 2018-04-10 | Resolved: 2020-03-30

## 2020-03-30 PROBLEM — Z74.09 DECREASED MOBILITY AND ENDURANCE: Status: RESOLVED | Noted: 2018-04-10 | Resolved: 2020-03-30

## 2020-03-30 PROBLEM — M25.561 RIGHT KNEE PAIN: Status: RESOLVED | Noted: 2018-04-10 | Resolved: 2020-03-30

## 2020-03-30 NOTE — TELEPHONE ENCOUNTER
----- Message from Deborah Matias MA sent at 3/30/2020 11:26 AM CDT -----  Contact: Nazario  [facility]       ----- Message -----  From: Keith Harvey  Sent: 3/30/2020  10:55 AM CDT  To: Zee Flowers LPN, Jaymie Arzola RN, #    Pt requesting a refill for gabapentin (NEURONTIN) 800 MG tablet     Contact Pharmacy at [phone]

## 2020-04-03 DIAGNOSIS — M54.17 LUMBOSACRAL RADICULOPATHY: ICD-10-CM

## 2020-04-03 RX ORDER — GABAPENTIN 800 MG/1
800 TABLET ORAL NIGHTLY
Qty: 90 TABLET | Refills: 3 | Status: SHIPPED | OUTPATIENT
Start: 2020-04-03 | End: 2020-04-08 | Stop reason: SDUPTHER

## 2020-04-03 NOTE — TELEPHONE ENCOUNTER
----- Message from Sully Dowling sent at 4/3/2020  9:13 AM CDT -----  Contact: [phone]-self  Refill request for:  gabapentin (NEURONTIN) 800 MG tablet A 90 Day Supply    Be sent to:  [phone]

## 2020-04-08 DIAGNOSIS — M54.17 LUMBOSACRAL RADICULOPATHY: ICD-10-CM

## 2020-04-08 RX ORDER — GABAPENTIN 800 MG/1
800 TABLET ORAL NIGHTLY
Qty: 90 TABLET | Refills: 3 | Status: SHIPPED | OUTPATIENT
Start: 2020-04-08 | End: 2020-05-18

## 2020-04-09 ENCOUNTER — TELEPHONE (OUTPATIENT)
Dept: SLEEP MEDICINE | Facility: CLINIC | Age: 70
End: 2020-04-09

## 2020-04-22 DIAGNOSIS — I10 ESSENTIAL HYPERTENSION: ICD-10-CM

## 2020-04-22 RX ORDER — ATENOLOL 25 MG/1
25 TABLET ORAL DAILY
Qty: 90 TABLET | Refills: 3 | Status: SHIPPED | OUTPATIENT
Start: 2020-04-22 | End: 2020-08-19 | Stop reason: SDUPTHER

## 2020-04-22 NOTE — TELEPHONE ENCOUNTER
----- Message from Lauren NICHOLSONLindsay Babingoldie sent at 4/22/2020 10:13 AM CDT -----  Contact: [phone] self   REFILLS: Pt states that the pharmacy been trying to get in contact with your office for over a week and has not received a response.      Patient is requesting a medication refill.    RX name: atenolol (TENORMIN) 25 MG tablet    Strength: 25 mg     Directions:TAKE 1 TABLET BY MOUTH EVERY DAY     Pharmacy name: [facility] - [address] [facility]

## 2020-04-24 RX ORDER — BUTALBITAL, ACETAMINOPHEN AND CAFFEINE 50; 325; 40 MG/1; MG/1; MG/1
1 TABLET ORAL EVERY 6 HOURS PRN
Qty: 60 TABLET | Refills: 1 | Status: SHIPPED | OUTPATIENT
Start: 2020-04-24 | End: 2020-07-19 | Stop reason: SDUPTHER

## 2020-04-28 ENCOUNTER — TELEPHONE (OUTPATIENT)
Dept: INTERNAL MEDICINE | Facility: CLINIC | Age: 70
End: 2020-04-28

## 2020-04-28 DIAGNOSIS — G47.33 OSA (OBSTRUCTIVE SLEEP APNEA): Primary | ICD-10-CM

## 2020-04-28 DIAGNOSIS — I27.20 PULMONARY HYPERTENSION: ICD-10-CM

## 2020-04-28 DIAGNOSIS — M79.89 LEG SWELLING: ICD-10-CM

## 2020-04-28 RX ORDER — FUROSEMIDE 40 MG/1
TABLET ORAL
Qty: 45 TABLET | Refills: 1 | Status: SHIPPED | OUTPATIENT
Start: 2020-04-28 | End: 2020-08-19 | Stop reason: SDUPTHER

## 2020-04-28 NOTE — TELEPHONE ENCOUNTER
Called Mrs. Trujillo to ask how her  is doing (he is also my patient and is currently being transferred from hospital to SNF for non-op hip fracture). She states that he is doing well.  She plans to bring him home with her after he completes rehab at the SNF.   She continues to complain of leg swelling.  It is bilateral.  She states it is better after she has been lying down to sleep.  It worsens throughout the day as she is on her feet.  We reviewed her recent labs which showed a normal BNP and normal kidney function.  We also reviewed her echo from 2017 which shows pulmonary hypertension.  She states she was unaware of the diagnosis of pulmonary hypertension.  Upon questioning, states she has not been evaluated for sleep apnea.  She had an appointment scheduled with sleep medicine to evaluate for this issue, but it was canceled due to COVID-19.    I discussed with her that I would like to obtain a repeat echo (most likely in the summertime due to COVID-19 pandemic).  I would also like to get her scheduled back with sleep medicine.  We will use Lasix every other day as needed for swelling and recheck kidney function in 1 month.    Patient understands and agrees to plan.

## 2020-04-29 ENCOUNTER — TELEPHONE (OUTPATIENT)
Dept: NEUROSURGERY | Facility: CLINIC | Age: 70
End: 2020-04-29

## 2020-04-29 NOTE — TELEPHONE ENCOUNTER
----- Message from Sheba Morrow sent at 4/29/2020 10:09 AM CDT -----  Contact: self [phone]  Patient called in returning your call. Please advise.

## 2020-05-04 DIAGNOSIS — F41.9 ANXIETY AND DEPRESSION: ICD-10-CM

## 2020-05-04 DIAGNOSIS — F32.A ANXIETY AND DEPRESSION: ICD-10-CM

## 2020-05-04 RX ORDER — SERTRALINE HYDROCHLORIDE 50 MG/1
TABLET, FILM COATED ORAL
Qty: 90 TABLET | Refills: 0 | Status: SHIPPED | OUTPATIENT
Start: 2020-05-04 | End: 2020-07-28

## 2020-05-05 ENCOUNTER — TELEPHONE (OUTPATIENT)
Dept: PODIATRY | Facility: CLINIC | Age: 70
End: 2020-05-05

## 2020-05-05 DIAGNOSIS — J30.9 ALLERGIC RHINITIS, UNSPECIFIED SEASONALITY, UNSPECIFIED TRIGGER: ICD-10-CM

## 2020-05-05 DIAGNOSIS — H10.13 ALLERGIC CONJUNCTIVITIS OF BOTH EYES: ICD-10-CM

## 2020-05-05 RX ORDER — LEVOCETIRIZINE DIHYDROCHLORIDE 5 MG/1
TABLET, FILM COATED ORAL
Qty: 90 TABLET | Refills: 1 | Status: SHIPPED | OUTPATIENT
Start: 2020-05-05 | End: 2020-11-09

## 2020-05-05 NOTE — TELEPHONE ENCOUNTER
----- Message from Laurence Villela sent at 5/5/2020  8:39 AM CDT -----  Contact: pt   Pt needing a call back regarding rescheduling her missed appt asap  . Please contact pt       Pt contact# [phone]

## 2020-05-06 DIAGNOSIS — R07.81 RIB PAIN: Primary | ICD-10-CM

## 2020-05-07 ENCOUNTER — HOSPITAL ENCOUNTER (OUTPATIENT)
Dept: RADIOLOGY | Facility: HOSPITAL | Age: 70
Discharge: HOME | End: 2020-05-07
Attending: INTERNAL MEDICINE
Payer: MEDICARE

## 2020-05-07 DIAGNOSIS — R07.81 RIB PAIN: ICD-10-CM

## 2020-05-07 PROCEDURE — 71046 X-RAY EXAM CHEST 2 VIEWS: CPT | Mod: 26,,, | Performed by: RADIOLOGY

## 2020-05-07 PROCEDURE — 71046 XR CHEST PA AND LATERAL: ICD-10-PCS | Mod: 26,,, | Performed by: RADIOLOGY

## 2020-05-07 PROCEDURE — 71046 X-RAY EXAM CHEST 2 VIEWS: CPT | Mod: TC,FY,PO

## 2020-05-08 ENCOUNTER — TELEPHONE (OUTPATIENT)
Dept: INTERNAL MEDICINE | Facility: CLINIC | Age: 70
End: 2020-05-08

## 2020-05-08 NOTE — TELEPHONE ENCOUNTER
----- Message from Jennifer Martinez sent at 5/8/2020 12:38 PM CDT -----  Contact: [phone]/self  Type:  Patient Returning Call    Who Called:PATIENT   Who Left Message for Patient:SUNIL  Would the patient rather a call back or a response via [facility]? CALLBACK   Best Call Back Number:[phone]  Additional Information: NONE

## 2020-05-08 NOTE — TELEPHONE ENCOUNTER
----- Message from Jennifer Martinez sent at 5/8/2020 12:01 PM CDT -----  Contact: [phone]/self  Patient calling regarding test results  Please advise

## 2020-05-18 ENCOUNTER — OFFICE VISIT (OUTPATIENT)
Dept: NEUROSURGERY | Facility: CLINIC | Age: 70
End: 2020-05-18
Payer: MEDICARE

## 2020-05-18 ENCOUNTER — OFFICE VISIT (OUTPATIENT)
Dept: PODIATRY | Facility: CLINIC | Age: 70
End: 2020-05-18
Payer: MEDICARE

## 2020-05-18 ENCOUNTER — HOSPITAL ENCOUNTER (OUTPATIENT)
Dept: RADIOLOGY | Facility: HOSPITAL | Age: 70
Discharge: HOME | End: 2020-05-18
Attending: NEUROLOGICAL SURGERY
Payer: MEDICARE

## 2020-05-18 VITALS
HEIGHT: 61 IN | SYSTOLIC BLOOD PRESSURE: 127 MMHG | BODY MASS INDEX: 43.23 KG/M2 | WEIGHT: 229 LBS | HEART RATE: 85 BPM | DIASTOLIC BLOOD PRESSURE: 74 MMHG

## 2020-05-18 VITALS — BODY MASS INDEX: 43.23 KG/M2 | WEIGHT: 229 LBS | HEIGHT: 61 IN

## 2020-05-18 DIAGNOSIS — R20.2 NUMBNESS AND TINGLING OF BOTH FEET: ICD-10-CM

## 2020-05-18 DIAGNOSIS — Z98.1 S/P LUMBAR SPINAL FUSION: ICD-10-CM

## 2020-05-18 DIAGNOSIS — R20.0 NUMBNESS AND TINGLING OF BOTH FEET: ICD-10-CM

## 2020-05-18 DIAGNOSIS — M54.16 CHRONIC LUMBAR RADICULOPATHY: Primary | ICD-10-CM

## 2020-05-18 DIAGNOSIS — M54.16 CHRONIC LUMBAR RADICULOPATHY: ICD-10-CM

## 2020-05-18 DIAGNOSIS — M20.12 VALGUS DEFORMITY OF BOTH GREAT TOES: ICD-10-CM

## 2020-05-18 DIAGNOSIS — M20.11 VALGUS DEFORMITY OF BOTH GREAT TOES: ICD-10-CM

## 2020-05-18 DIAGNOSIS — Z98.1 S/P LUMBAR FUSION: Primary | ICD-10-CM

## 2020-05-18 PROCEDURE — 1101F PR PT FALLS ASSESS DOC 0-1 FALLS W/OUT INJ PAST YR: ICD-10-PCS | Mod: CPTII,S$GLB,, | Performed by: PODIATRIST

## 2020-05-18 PROCEDURE — 1101F PT FALLS ASSESS-DOCD LE1/YR: CPT | Mod: CPTII,S$GLB,, | Performed by: PODIATRIST

## 2020-05-18 PROCEDURE — 3074F PR MOST RECENT SYSTOLIC BLOOD PRESSURE < 130 MM HG: ICD-10-PCS | Mod: CPTII,S$GLB,, | Performed by: NEUROLOGICAL SURGERY

## 2020-05-18 PROCEDURE — 1159F MED LIST DOCD IN RCRD: CPT | Mod: S$GLB,,, | Performed by: NEUROLOGICAL SURGERY

## 2020-05-18 PROCEDURE — 1159F PR MEDICATION LIST DOCUMENTED IN MEDICAL RECORD: ICD-10-PCS | Mod: S$GLB,,, | Performed by: PODIATRIST

## 2020-05-18 PROCEDURE — 1101F PR PT FALLS ASSESS DOC 0-1 FALLS W/OUT INJ PAST YR: ICD-10-PCS | Mod: CPTII,S$GLB,, | Performed by: NEUROLOGICAL SURGERY

## 2020-05-18 PROCEDURE — 72100 X-RAY EXAM L-S SPINE 2/3 VWS: CPT | Mod: 26,,, | Performed by: RADIOLOGY

## 2020-05-18 PROCEDURE — 1159F MED LIST DOCD IN RCRD: CPT | Mod: S$GLB,,, | Performed by: PODIATRIST

## 2020-05-18 PROCEDURE — 72100 X-RAY EXAM L-S SPINE 2/3 VWS: CPT | Mod: TC,PN

## 2020-05-18 PROCEDURE — 99203 PR OFFICE/OUTPT VISIT, NEW, LEVL III, 30-44 MIN: ICD-10-PCS | Mod: S$GLB,,, | Performed by: PODIATRIST

## 2020-05-18 PROCEDURE — 1126F AMNT PAIN NOTED NONE PRSNT: CPT | Mod: S$GLB,,, | Performed by: NEUROLOGICAL SURGERY

## 2020-05-18 PROCEDURE — 1101F PT FALLS ASSESS-DOCD LE1/YR: CPT | Mod: CPTII,S$GLB,, | Performed by: NEUROLOGICAL SURGERY

## 2020-05-18 PROCEDURE — 99213 OFFICE O/P EST LOW 20 MIN: CPT | Mod: S$GLB,,, | Performed by: NEUROLOGICAL SURGERY

## 2020-05-18 PROCEDURE — 99999 PR PBB SHADOW E&M-EST. PATIENT-LVL III: CPT | Mod: PBBFAC,,, | Performed by: PODIATRIST

## 2020-05-18 PROCEDURE — 99999 PR PBB SHADOW E&M-EST. PATIENT-LVL III: ICD-10-PCS | Mod: PBBFAC,,, | Performed by: PODIATRIST

## 2020-05-18 PROCEDURE — 99999 PR PBB SHADOW E&M-EST. PATIENT-LVL III: ICD-10-PCS | Mod: PBBFAC,,, | Performed by: NEUROLOGICAL SURGERY

## 2020-05-18 PROCEDURE — 3078F DIAST BP <80 MM HG: CPT | Mod: CPTII,S$GLB,, | Performed by: NEUROLOGICAL SURGERY

## 2020-05-18 PROCEDURE — 1159F PR MEDICATION LIST DOCUMENTED IN MEDICAL RECORD: ICD-10-PCS | Mod: S$GLB,,, | Performed by: NEUROLOGICAL SURGERY

## 2020-05-18 PROCEDURE — 99203 OFFICE O/P NEW LOW 30 MIN: CPT | Mod: S$GLB,,, | Performed by: PODIATRIST

## 2020-05-18 PROCEDURE — 99999 PR PBB SHADOW E&M-EST. PATIENT-LVL III: CPT | Mod: PBBFAC,,, | Performed by: NEUROLOGICAL SURGERY

## 2020-05-18 PROCEDURE — 72100 XR LUMBAR SPINE AP AND LATERAL: ICD-10-PCS | Mod: 26,,, | Performed by: RADIOLOGY

## 2020-05-18 PROCEDURE — 1126F PR PAIN SEVERITY QUANTIFIED, NO PAIN PRESENT: ICD-10-PCS | Mod: S$GLB,,, | Performed by: NEUROLOGICAL SURGERY

## 2020-05-18 PROCEDURE — 3074F SYST BP LT 130 MM HG: CPT | Mod: CPTII,S$GLB,, | Performed by: NEUROLOGICAL SURGERY

## 2020-05-18 PROCEDURE — 3078F PR MOST RECENT DIASTOLIC BLOOD PRESSURE < 80 MM HG: ICD-10-PCS | Mod: CPTII,S$GLB,, | Performed by: NEUROLOGICAL SURGERY

## 2020-05-18 PROCEDURE — 99213 PR OFFICE/OUTPT VISIT, EST, LEVL III, 20-29 MIN: ICD-10-PCS | Mod: S$GLB,,, | Performed by: NEUROLOGICAL SURGERY

## 2020-05-18 RX ORDER — GABAPENTIN 600 MG/1
600 TABLET ORAL 3 TIMES DAILY
Qty: 90 TABLET | Refills: 11 | Status: SHIPPED | OUTPATIENT
Start: 2020-05-18 | End: 2021-06-23 | Stop reason: SDUPTHER

## 2020-05-18 RX ORDER — FENOFIBRATE 134 MG/1
134 CAPSULE ORAL DAILY
COMMUNITY
Start: 2020-03-23 | End: 2020-09-15 | Stop reason: SDUPTHER

## 2020-05-18 NOTE — PATIENT INSTRUCTIONS
Recommend toe separator bunion splint    Also discussed appropriate sizing of shoes and allow a thumb with from the and a shoe to the and the big toe

## 2020-05-18 NOTE — PROGRESS NOTES
NEUROSURGICAL PROGRESS NOTE    DATE OF SERVICE:  05/18/2020    ATTENDING PHYSICIAN:  James Brantley MD    SUBJECTIVE:    INTERIM HISTORY:    This is a very pleasant 69 y.o. female, who is status post 4 months L3-4 oblique interbody fusion with open posterior L3-4 and L4-5 laminectomy with reinstrumentation from L3-L5.  Patient reports complete relief of back pain and leg pain since the surgery.  However she is complaining of increased bilateral feet numbness and difficulty walking.  She has not done physical therapy since her surgery.  She is not walking regularly.  She was seen by Dr. Solo in podiatry who agrees that the numbness is caused by chronic radiculopathy.  She reports that she is wearing left heel lift due to leg length discrepancy.  No new onset of weakness.  She has chronic urinary incontinence.    Low Back Pain Scale  R Low Back-Pain Score: 0  R Low Back-Pain Intensity: I can tolerate the pain I have without having to use pain killers  Personal Care : I need some help, but manage most of my personal care.  Lifting: I can only lift very light weights.  Walking: I can only walk using a stick or crutches.  Sitting: I can sit only in my favorite chair as long as I like.   Low Back-Standing: (n/a)   Low Back-Sleeping: Because of pain my normal nights sleep is reduced by less than three quarters  Social Life: Pain has restricted my social life, and I do not go out as often.   Low Back-Traveling: (n/a)   Low Back-Changing Degree of Pain: My pain is gradually worsening         PAST MEDICAL HISTORY:  Active Ambulatory Problems     Diagnosis Date Noted    Osteopenia of multiple sites 08/05/2014    Morbid obesity with BMI of 40.0-44.9, adult 02/24/2015    Mixed hyperlipidemia 02/24/2015    Mood disorder 02/24/2015    Mixed stress and urge urinary incontinence 02/24/2015    Osteoarthritis of spine with radiculopathy, lumbar region 09/04/2015    Essential hypertension 09/04/2015    Primary osteoarthritis  of right hip 04/28/2016    SI (sacroiliac) joint dysfunction 09/12/2016    Spondylolisthesis, lumbar region 09/12/2016    Lumbar degenerative disc disease 09/12/2016    History of colon polyps 11/02/2016    Status post right hip replacement 12/20/2016    Lumbar radiculopathy 12/20/2016    Dysphagia 10/05/2017    Bilateral primary osteoarthritis of knee 10/18/2017    Osteoarthritis of left shoulder 10/18/2017    Multiple gastric ulcers 12/14/2017    TIMMY (obstructive sleep apnea)     Primary osteoarthritis of both knees 03/14/2018    Chronic bilateral low back pain with bilateral sciatica 10/24/2018    Spinal stenosis of lumbar region with neurogenic claudication 10/31/2018    Chronic pain 11/08/2018    Shoulder arthritis 02/28/2019    Chronic pain of both shoulders 06/10/2019    Status post lumbar spinal fusion 08/30/2019    Spinal stenosis of lumbar region with radiculopathy 01/03/2020     Resolved Ambulatory Problems     Diagnosis Date Noted    Obesity 08/05/2014    Hemorrhoids, internal 11/05/2014    Screen for colon cancer 11/05/2014    Osteoarthritis 11/10/2014    Hypertension 02/24/2015    Absence of bladder continence 09/04/2015    Decreased ROM of right knee 04/10/2018    Decreased mobility and endurance 04/10/2018    Right knee pain 04/10/2018    Decreased strength 04/10/2018     Past Medical History:   Diagnosis Date    Allergy     Arthritis     Chronic lower back pain     Depression     Fever blister     GERD (gastroesophageal reflux disease)     Hyperlipidemia     Joint pain     Pulmonary hypertension     Skin cancer        PAST SURGICAL HISTORY:  Past Surgical History:   Procedure Laterality Date    ANTERIOR CERVICAL CORPECTOMY W/ FUSION  1988    CAUDAL EPIDURAL STEROID INJECTION N/A 1/10/2019    Procedure: Injection-steroid-epidural-caudal;  Surgeon: Lydia Velásquez Jr., MD;  Location: [facility];  Service: Pain Management;  Laterality: N/A;    COLONOSCOPY  N/A 11/2/2016    Procedure: COLONOSCOPY;  Surgeon: Viji Dewitt MD;  Location: [facility];  Service: Endoscopy;  Laterality: N/A;    EPIDURAL STEROID INJECTION INTO LUMBAR SPINE N/A 11/8/2018    Procedure: Injection-steroid-epidural-lumbar-L2-3 (LEFT > RIGHT);  Surgeon: Lydia Velásquez Jr., MD;  Location: [facility];  Service: Pain Management;  Laterality: N/A;    FUSION OF SPINE WITH INSTRUMENTATION Left 1/3/2020    Procedure: FUSION, SPINE, WITH INSTRUMENTATION Stage 1 Left L3-4 LOIF Rise L Stage 2 Open L3-4 L4-5 Laminectomy Removal of L4-5 Hardware, L3-5 Posterior Instrumentation and extraction of Fusion at L3-4;  Surgeon: James Brantley MD;  Location: [facility] OR;  Service: Neurosurgery;  Laterality: Left;  Procedure: Stage 1 Left L3-4 LOIF Rise L  Stage 2 Open L3-4 L4-5 Laminectomy Removal of L4-5 Hardware,     INJECTION OF ANESTHETIC AGENT AROUND GENITOFEMORAL NERVE Bilateral 8/1/2019    Procedure: BILATERAL SHOULDER ARTICULAR BRANCH BLOCK;  Surgeon: Lydia Velásquez Jr., MD;  Location: [facility];  Service: Pain Management;  Laterality: Bilateral;    KNEE ARTHROSCOPY W/ MENISCAL REPAIR Right 2012    LUMBAR LAMINECTOMY  06/19/2015    SKIN CANCER EXCISION      forehead does not recall date    TOTAL HIP ARTHROPLASTY Right 04/28/2016         TUBAL LIGATION  1980       SOCIAL HISTORY:   Social History     Socioeconomic History    Marital status:      Spouse name: Not on file    Number of children: Not on file    Years of education: 12    Highest education level: Not on file   Occupational History     Employer: [employer]   Social Needs    Financial resource strain: Not on file    Food insecurity:     Worry: Not on file     Inability: Not on file    Transportation needs:     Medical: Not on file     Non-medical: Not on file   Tobacco Use    Smoking status: Never Smoker    Smokeless tobacco: Never Used   Substance and Sexual Activity    Alcohol use: No     Alcohol/week: 0.0  standard drinks    Drug use: No    Sexual activity: Never   Lifestyle    Physical activity:     Days per week: Not on file     Minutes per session: Not on file    Stress: Not on file   Relationships    Social connections:     Talks on phone: Not on file     Gets together: Not on file     Attends Latter day service: Not on file     Active member of club or organization: Not on file     Attends meetings of clubs or organizations: Not on file     Relationship status: Not on file   Other Topics Concern    Are you pregnant or think you may be? Not Asked    Breast-feeding Not Asked   Social History Narrative    Not on file       FAMILY HISTORY:  Family History   Problem Relation Age of Onset    Arthritis Mother     Cancer Mother         breast ca    Breast cancer Mother     Kidney disease Father     Alzheimer's disease Maternal Grandfather     Cancer Paternal Grandmother         colon ca    Colon cancer Paternal Grandmother        CURRENTS MEDICATIONS:  Current Outpatient Medications on File Prior to Visit   Medication Sig Dispense Refill    amLODIPine (NORVASC) 10 MG tablet TAKE 1 TABLET BY MOUTH EVERY DAY 90 tablet 3    ascorbic acid (VITAMIN C) 1000 MG tablet Take 1,000 mg by mouth once daily.      atenoloL (TENORMIN) 25 MG tablet Take 1 tablet (25 mg total) by mouth once daily. 90 tablet 3    calcium-vitamin D3 (CALCIUM 500 + D) 500 mg(1,250mg) -200 unit per tablet Take 1 tablet by mouth 2 (two) times daily with meals.      co-enzyme Q-10 30 mg capsule Take 30 mg by mouth once daily.       econazole nitrate 1 % cream Use bid for rash 85 g 2    fenofibrate micronized (LOFIBRA) 134 MG Cap Take 134 mg by mouth once daily.      fenofibric acid (FIBRICOR) 135 mg CpDR TAKE ONE CAPSULE BY MOUTH EVERY DAY 90 capsule 2    fish oil-omega-3 fatty acids 300-1,000 mg capsule Take 2 g by mouth once daily.      furosemide (LASIX) 40 MG tablet Take lasix 40 mg PO every other day PRN swelling 45 tablet 1     GLUCOSA KHAN 2KCL/CHONDROITIN KHAN (GLUCOSAMINE SULF-CHONDROITIN ORAL) Take 1 tablet by mouth once daily.      levocetirizine (XYZAL) 5 MG tablet TAKE 1 TABLET BY MOUTH EVERY DAY IN THE EVENING 90 tablet 1    lidocaine (XYLOCAINE) 5 % Oint ointment APPLY 2.5 GRAMS TO THE AFFECTED AREA 3-4 TIMES DAILY STARTING 2 WEEKS POST SURGERY.      methocarbamol (ROBAXIN) 500 MG Tab Take 1 tablet (500 mg total) by mouth 3 (three) times daily as needed. 60 tablet 1    metronidazole 0.75% (METROCREAM) 0.75 % Crea Apply topically once daily to affected area 45 g 1    multivitamin capsule Take 1 capsule by mouth once daily.      OPW TEST CLAIM - DO NOT FILL Take by mouth. OPW test claim. Do not fill. 11 tablet 0    oxybutynin (DITROPAN XL) 15 MG TR24 TAKE 1 TABLET (15 MG TOTAL) BY MOUTH ONCE DAILY. 90 tablet 3    pantoprazole (PROTONIX) 40 MG tablet Take 1 tablet (40 mg total) by mouth once daily. 90 tablet 3    polymyxin B sulf-trimethoprim (POLYTRIM) 10,000 unit- 1 mg/mL Drop Place 1 drop into both eyes every 4 (four) hours. 10 mL 0    pravastatin (PRAVACHOL) 40 MG tablet TAKE 1 TABLET BY MOUTH EVERY DAY 90 tablet 3    sertraline (ZOLOFT) 50 MG tablet TAKE 1 TABLET BY MOUTH EVERY DAY 90 tablet 0    vitamin E 400 UNIT capsule Take 400 Units by mouth once daily.      [DISCONTINUED] gabapentin (NEURONTIN) 800 MG tablet Take 1 tablet (800 mg total) by mouth every evening. 90 tablet 3    butalbital-acetaminophen-caffeine -40 mg (FIORICET, ESGIC) -40 mg per tablet Take 1 tablet by mouth every 6 (six) hours as needed for Headaches. (Patient not taking: Reported on 5/18/2020) 60 tablet 1    mupirocin (BACTROBAN) 2 % ointment APPLY 1/2 GRAM TO EACH NOSTRIL TWICE A DAY FOR UP TO 5 DAYS PRIOR TO SURGERY OR AS DIRECTED BY YOUR PHYSICIAN.      oxyCODONE-acetaminophen (PERCOCET) 5-325 mg per tablet Take 1 tablet by mouth every 8 (eight) hours as needed for Pain. (Patient not taking: Reported on 5/18/2020) 42 tablet 0     SANARE ADV SCAR THERAPY BASE Gel APPLY 1/2 GRAM (1 PUMP) TO AFFECTED AREAS TWICE DAILY STARTING 1 MONTH POST SURGERY       Current Facility-Administered Medications on File Prior to Visit   Medication Dose Route Frequency Provider Last Rate Last Dose    lidocaine (PF) 20 mg/mL (2 %) injection 2 mL  2 mL Intradermal Once Kalyan Han MD           ALLERGIES:  Review of patient's allergies indicates:  No Known Allergies    REVIEW OF SYSTEMS:  Review of Systems   Constitutional: Negative for diaphoresis, fever and weight loss.   Respiratory: Negative for shortness of breath.    Cardiovascular: Negative for chest pain.   Gastrointestinal: Negative for blood in stool.   Genitourinary: Negative for hematuria.   Endo/Heme/Allergies: Does not bruise/bleed easily.   All other systems reviewed and are negative.        OBJECTIVE:    PHYSICAL EXAMINATION:   Vitals:    05/18/20 1612   BP: 127/74   Pulse: 85       Neurosurgery Physical Exam    Back Exam     Muscle Strength   Right Quadriceps:  5/5   Left Quadriceps:  5/5                 Neurologic Exam     Motor Exam     Strength   Right quadriceps: 5/5  Left quadriceps: 5/5  Right anterior tibial: 5/5  Left anterior tibial: 5/5  Right gastroc: 5/5  Left gastroc: 5/5        DIAGNOSTIC DATA:  I personally interpreted the following imaging:   Lumbar x-ray done today shows L3-5 fusion without cage subsidence or lucency around hardware    ASSESMENT:  This is a 69 y.o. female with     Problem List Items Addressed This Visit     None      Visit Diagnoses     S/P lumbar fusion    -  Primary    Relevant Orders    Ambulatory referral/consult to Physical/Occupational Therapy    Chronic lumbar radiculopathy        Relevant Orders    Ambulatory referral/consult to Physical/Occupational Therapy            PLAN:  Lumbar physical therapy for gait work out, balance improvement exercises  Changing gabapentin to 600 mg 3 times daily  The patient was advised to follow-up on my [facility] for  gabapentin dose adjustment  Follow-up in 2 months  All questions answered        James Brantley MD  Cell:[phone]

## 2020-05-18 NOTE — PROGRESS NOTES
"Subjective:      Patient ID: Monique Trujillo is a 69 y.o. female.    Chief Complaint: Foot Problem (numbness in feet and stabbing pain and eletric shock sometimes )    Presents complaining of worsening numbness and tingling to both feet and intermittent sharp pain that travels the feet since her lumbar spinal fusion in January 2020.  Relates that she has constant numbness however the intermittent sharp pain occurs mostly at rest when laying down or bending over.  Also complains of her big toe push up against the 2nd toe on both feet right greater than left.  Wears tennis shoes.  Denies any trauma.    Vitals:    05/18/20 1509   Weight: 103.9 kg (229 lb)   Height: 5' 1" (1.549 m)      Past Medical History:   Diagnosis Date    Allergy     Arthritis     Chronic lower back pain     Depression     Fever blister     GERD (gastroesophageal reflux disease)     Hemorrhoids, internal 11/5/2014    Hyperlipidemia     Hypertension     Joint pain     Morbid obesity with BMI of 40.0-44.9, adult 2/24/2015    Multiple gastric ulcers 12/14/2017    TIMMY (obstructive sleep apnea)     does not use CPAP    Pulmonary hypertension     Skin cancer        Past Surgical History:   Procedure Laterality Date    ANTERIOR CERVICAL CORPECTOMY W/ FUSION  1988    CAUDAL EPIDURAL STEROID INJECTION N/A 1/10/2019    Procedure: Injection-steroid-epidural-caudal;  Surgeon: Lydia Velásquez Jr., MD;  Location: [facility];  Service: Pain Management;  Laterality: N/A;    COLONOSCOPY N/A 11/2/2016    Procedure: COLONOSCOPY;  Surgeon: Viji Dewitt MD;  Location: [facility];  Service: Endoscopy;  Laterality: N/A;    EPIDURAL STEROID INJECTION INTO LUMBAR SPINE N/A 11/8/2018    Procedure: Injection-steroid-epidural-lumbar-L2-3 (LEFT > RIGHT);  Surgeon: Lydia Velásquez Jr., MD;  Location: [facility];  Service: Pain Management;  Laterality: N/A;    FUSION OF SPINE WITH INSTRUMENTATION Left 1/3/2020    Procedure: FUSION, SPINE, " WITH INSTRUMENTATION Stage 1 Left L3-4 LOIF Rise L Stage 2 Open L3-4 L4-5 Laminectomy Removal of L4-5 Hardware, L3-5 Posterior Instrumentation and extraction of Fusion at L3-4;  Surgeon: James Brantley MD;  Location: [facility] OR;  Service: Neurosurgery;  Laterality: Left;  Procedure: Stage 1 Left L3-4 LOIF Rise L  Stage 2 Open L3-4 L4-5 Laminectomy Removal of L4-5 Hardware,     INJECTION OF ANESTHETIC AGENT AROUND GENITOFEMORAL NERVE Bilateral 8/1/2019    Procedure: BILATERAL SHOULDER ARTICULAR BRANCH BLOCK;  Surgeon: Lydia Velásquez Jr., MD;  Location: [facility];  Service: Pain Management;  Laterality: Bilateral;    KNEE ARTHROSCOPY W/ MENISCAL REPAIR Right 2012    LUMBAR LAMINECTOMY  06/19/2015    SKIN CANCER EXCISION      forehead does not recall date    TOTAL HIP ARTHROPLASTY Right 04/28/2016         TUBAL LIGATION  1980       Family History   Problem Relation Age of Onset    Arthritis Mother     Cancer Mother         breast ca    Breast cancer Mother     Kidney disease Father     Alzheimer's disease Maternal Grandfather     Cancer Paternal Grandmother         colon ca    Colon cancer Paternal Grandmother        Social History     Socioeconomic History    Marital status:      Spouse name: Not on file    Number of children: Not on file    Years of education: 12    Highest education level: Not on file   Occupational History     Employer: [employer]   Social Needs    Financial resource strain: Not on file    Food insecurity:     Worry: Not on file     Inability: Not on file    Transportation needs:     Medical: Not on file     Non-medical: Not on file   Tobacco Use    Smoking status: Never Smoker    Smokeless tobacco: Never Used   Substance and Sexual Activity    Alcohol use: No     Alcohol/week: 0.0 standard drinks    Drug use: No    Sexual activity: Never   Lifestyle    Physical activity:     Days per week: Not on file     Minutes per session: Not on file    Stress: Not on  file   Relationships    Social connections:     Talks on phone: Not on file     Gets together: Not on file     Attends Mandaeism service: Not on file     Active member of club or organization: Not on file     Attends meetings of clubs or organizations: Not on file     Relationship status: Not on file   Other Topics Concern    Are you pregnant or think you may be? Not Asked    Breast-feeding Not Asked   Social History Narrative    Not on file       Current Outpatient Medications   Medication Sig Dispense Refill    amLODIPine (NORVASC) 10 MG tablet TAKE 1 TABLET BY MOUTH EVERY DAY 90 tablet 3    ascorbic acid (VITAMIN C) 1000 MG tablet Take 1,000 mg by mouth once daily.      atenoloL (TENORMIN) 25 MG tablet Take 1 tablet (25 mg total) by mouth once daily. 90 tablet 3    butalbital-acetaminophen-caffeine -40 mg (FIORICET, ESGIC) -40 mg per tablet Take 1 tablet by mouth every 6 (six) hours as needed for Headaches. 60 tablet 1    calcium-vitamin D3 (CALCIUM 500 + D) 500 mg(1,250mg) -200 unit per tablet Take 1 tablet by mouth 2 (two) times daily with meals.      co-enzyme Q-10 30 mg capsule Take 30 mg by mouth once daily.       econazole nitrate 1 % cream Use bid for rash 85 g 2    fenofibric acid (FIBRICOR) 135 mg CpDR TAKE ONE CAPSULE BY MOUTH EVERY DAY 90 capsule 2    fish oil-omega-3 fatty acids 300-1,000 mg capsule Take 2 g by mouth once daily.      furosemide (LASIX) 40 MG tablet Take lasix 40 mg PO every other day PRN swelling 45 tablet 1    gabapentin (NEURONTIN) 800 MG tablet Take 1 tablet (800 mg total) by mouth every evening. 90 tablet 3    GLUCOSA KHAN 2KCL/CHONDROITIN KHAN (GLUCOSAMINE SULF-CHONDROITIN ORAL) Take 1 tablet by mouth once daily.      levocetirizine (XYZAL) 5 MG tablet TAKE 1 TABLET BY MOUTH EVERY DAY IN THE EVENING 90 tablet 1    lidocaine (XYLOCAINE) 5 % Oint ointment APPLY 2.5 GRAMS TO THE AFFECTED AREA 3-4 TIMES DAILY STARTING 2 WEEKS POST SURGERY.       methocarbamol (ROBAXIN) 500 MG Tab Take 1 tablet (500 mg total) by mouth 3 (three) times daily as needed. 60 tablet 1    metronidazole 0.75% (METROCREAM) 0.75 % Crea Apply topically once daily to affected area 45 g 1    multivitamin capsule Take 1 capsule by mouth once daily.      mupirocin (BACTROBAN) 2 % ointment APPLY 1/2 GRAM TO EACH NOSTRIL TWICE A DAY FOR UP TO 5 DAYS PRIOR TO SURGERY OR AS DIRECTED BY YOUR PHYSICIAN.      OPW TEST CLAIM - DO NOT FILL Take by mouth. OPW test claim. Do not fill. 11 tablet 0    oxybutynin (DITROPAN XL) 15 MG TR24 TAKE 1 TABLET (15 MG TOTAL) BY MOUTH ONCE DAILY. 90 tablet 3    oxyCODONE-acetaminophen (PERCOCET) 5-325 mg per tablet Take 1 tablet by mouth every 8 (eight) hours as needed for Pain. (Patient not taking: Reported on 5/18/2020) 42 tablet 0    pantoprazole (PROTONIX) 40 MG tablet Take 1 tablet (40 mg total) by mouth once daily. 90 tablet 3    polymyxin B sulf-trimethoprim (POLYTRIM) 10,000 unit- 1 mg/mL Drop Place 1 drop into both eyes every 4 (four) hours. 10 mL 0    pravastatin (PRAVACHOL) 40 MG tablet TAKE 1 TABLET BY MOUTH EVERY DAY 90 tablet 3    SANARE ADV SCAR THERAPY BASE Gel APPLY 1/2 GRAM (1 PUMP) TO AFFECTED AREAS TWICE DAILY STARTING 1 MONTH POST SURGERY      sertraline (ZOLOFT) 50 MG tablet TAKE 1 TABLET BY MOUTH EVERY DAY 90 tablet 0    vitamin E 400 UNIT capsule Take 400 Units by mouth once daily.       Current Facility-Administered Medications   Medication Dose Route Frequency Provider Last Rate Last Dose    lidocaine (PF) 20 mg/mL (2 %) injection 2 mL  2 mL Intradermal Once Kalyan Han MD           Review of patient's allergies indicates:  No Known Allergies      Review of Systems   Constitution: Negative for chills and fever.   HENT: Negative for congestion and hearing loss.    Cardiovascular: Negative for chest pain and claudication.   Respiratory: Negative for cough and shortness of breath.    Skin: Negative for color change, itching,  poor wound healing and rash.   Musculoskeletal: Positive for arthritis, back pain, joint pain and stiffness.   Gastrointestinal: Negative for nausea and vomiting.   Neurological: Positive for numbness and paresthesias.           Objective:      Physical Exam   Constitutional: She is oriented to person, place, and time. No distress.   Cardiovascular:   Pulses:       Dorsalis pedis pulses are 2+ on the right side, and 2+ on the left side.        Posterior tibial pulses are 2+ on the right side, and 2+ on the left side.   Mild nonpitting edema to lower extremity bilateral.   Musculoskeletal:   Manual muscle strength testing appears adequate to lower extremity bilateral.    Track bound hallux valgus with mild underlap the 2nd toe bilateral.  No clicking or popping with 1 MTP range of motion bilateral foot.    Mild cavus foot structure bilateral foot.    + equinus that reduces with knee bent bilateral.    No pain with ROM or MMT bilateral lower extremity.    Negative Tinel's and provocation sign to the lower extremity nerves bilateral.     Neurological: She is alert and oriented to person, place, and time. A sensory deficit is present.   Dorsiflexion of the foot and toes 4/5 bilateral lower extremity.  Remaining muscle groups 5/5 bilateral lower extremity.   Skin: Skin is warm, dry and intact. Capillary refill takes less than 2 seconds. No ecchymosis and no rash noted. She is not diaphoretic. No cyanosis or erythema. No pallor. Nails show no clubbing.   No open lesions or macerations of foot bilateral.             Assessment:       Encounter Diagnoses   Name Primary?    Chronic lumbar radiculopathy Yes    Numbness and tingling of both feet     Valgus deformity of both great toes          Plan:       Monique was seen today for foot problem.    Diagnoses and all orders for this visit:    Chronic lumbar radiculopathy    Numbness and tingling of both feet    Valgus deformity of both great toes      I counseled the  patient on her conditions, their implications and medical management.    Discussed from a clinical standpoint there symptoms are most likely coming from her lumbar region.  I would recommend she follow up with her neurosurgeon for further evaluation.  We discussed that most likely could take a year to a year and half 1st symptoms improved as much as possible.    We discussed appropriate sizing shoes in order to avoid pressure to her bilateral foot bunions and to prevent her toes from rubbing together.  Also recommend avoiding barefoot walking in order to help reduce the pressure to the lumbar spine.    Also discussed returning to see interventional pain management for treatment options to help control the tingling to both feet.  She has not tolerate gabapentin well.    Due to her neurological deficit of both feet we did discussed potentially returning p.r.n. for routine foot care since she is unable to reach her toes and trim the toenails.    RTC p.r.n. as discussed  .

## 2020-05-27 ENCOUNTER — OFFICE VISIT (OUTPATIENT)
Dept: OPHTHALMOLOGY | Facility: CLINIC | Age: 70
End: 2020-05-27
Payer: MEDICARE

## 2020-05-27 ENCOUNTER — LAB VISIT (OUTPATIENT)
Dept: LAB | Facility: HOSPITAL | Age: 70
End: 2020-05-27
Attending: INTERNAL MEDICINE
Payer: MEDICARE

## 2020-05-27 ENCOUNTER — CLINICAL SUPPORT (OUTPATIENT)
Dept: REHABILITATION | Facility: HOSPITAL | Age: 70
End: 2020-05-27
Attending: NEUROLOGICAL SURGERY
Payer: MEDICARE

## 2020-05-27 DIAGNOSIS — M79.89 LEG SWELLING: ICD-10-CM

## 2020-05-27 DIAGNOSIS — M54.16 CHRONIC LUMBAR RADICULOPATHY: ICD-10-CM

## 2020-05-27 DIAGNOSIS — H02.88A MEIBOMIAN GLAND DYSFUNCTION (MGD) OF UPPER AND LOWER LIDS OF BOTH EYES: ICD-10-CM

## 2020-05-27 DIAGNOSIS — H02.88B MEIBOMIAN GLAND DYSFUNCTION (MGD) OF UPPER AND LOWER LIDS OF BOTH EYES: ICD-10-CM

## 2020-05-27 DIAGNOSIS — H11.31 NON-TRAUMATIC SUBCONJUNCTIVAL HEMORRHAGE OF RIGHT EYE: Primary | ICD-10-CM

## 2020-05-27 DIAGNOSIS — Z98.1 S/P LUMBAR FUSION: ICD-10-CM

## 2020-05-27 DIAGNOSIS — Z98.1 STATUS POST LUMBAR SPINAL FUSION: Primary | ICD-10-CM

## 2020-05-27 DIAGNOSIS — M54.31 BILATERAL SCIATICA: ICD-10-CM

## 2020-05-27 DIAGNOSIS — M54.32 BILATERAL SCIATICA: ICD-10-CM

## 2020-05-27 LAB
ANION GAP SERPL CALC-SCNC: 8 MMOL/L (ref 8–16)
BUN SERPL-MCNC: 21 MG/DL (ref 8–23)
CALCIUM SERPL-MCNC: 9.6 MG/DL (ref 8.7–10.5)
CHLORIDE SERPL-SCNC: 104 MMOL/L (ref 95–110)
CO2 SERPL-SCNC: 28 MMOL/L (ref 23–29)
CREAT SERPL-MCNC: 1 MG/DL (ref 0.5–1.4)
EST. GFR  (AFRICAN AMERICAN): >60 ML/MIN/1.73 M^2
EST. GFR  (NON AFRICAN AMERICAN): 57.2 ML/MIN/1.73 M^2
GLUCOSE SERPL-MCNC: 97 MG/DL (ref 70–110)
POTASSIUM SERPL-SCNC: 3.9 MMOL/L (ref 3.5–5.1)
SODIUM SERPL-SCNC: 140 MMOL/L (ref 136–145)

## 2020-05-27 PROCEDURE — 97110 THERAPEUTIC EXERCISES: CPT | Mod: PN

## 2020-05-27 PROCEDURE — 99999 PR PBB SHADOW E&M-EST. PATIENT-LVL II: CPT | Mod: PBBFAC,,, | Performed by: OPHTHALMOLOGY

## 2020-05-27 PROCEDURE — 97161 PT EVAL LOW COMPLEX 20 MIN: CPT | Mod: PN

## 2020-05-27 PROCEDURE — 80048 BASIC METABOLIC PNL TOTAL CA: CPT

## 2020-05-27 PROCEDURE — 99999 PR PBB SHADOW E&M-EST. PATIENT-LVL II: ICD-10-PCS | Mod: PBBFAC,,, | Performed by: OPHTHALMOLOGY

## 2020-05-27 PROCEDURE — 92012 INTRM OPH EXAM EST PATIENT: CPT | Mod: S$GLB,,, | Performed by: OPHTHALMOLOGY

## 2020-05-27 PROCEDURE — 36415 COLL VENOUS BLD VENIPUNCTURE: CPT | Mod: PO

## 2020-05-27 PROCEDURE — 92012 PR EYE EXAM, EST PATIENT,INTERMED: ICD-10-PCS | Mod: S$GLB,,, | Performed by: OPHTHALMOLOGY

## 2020-05-27 NOTE — PATIENT INSTRUCTIONS
Continue Soothe as desired.  Warm compresses for 10-15 minutes up to four times daily followed by gentle eyelid compression.  Eyelid cleaning with warm water and baby shampoo every other day.  Continued eye care with Dr. Perdomo.

## 2020-05-27 NOTE — PROGRESS NOTES
HPI     Triage pt  Hx of discharge x few months using Sooth drops per   Headaches x 2 weeks, then notice OD broken blood vessel x 4 days ago.  Sometimes a little pain, but no pain today.    I have personally interviewed the patient, reviewed the history and   examined the patient and agree with the technician's exam.    Last edited by Gerardo Leon MD on 5/27/2020  8:43 AM. (History)            Assessment /Plan     For exam results, see Encounter Report.    Non-traumatic subconjunctival hemorrhage of right eye    Meibomian gland dysfunction (MGD) of upper and lower lids of both eyes      Continue Soothe as desired.  Warm compresses for 10-15 minutes up to four times daily followed by gentle eyelid compression.  Eyelid cleaning with warm water and baby shampoo every other day.  Continued eye care with Dr. Perdomo.

## 2020-05-31 PROBLEM — M54.31 BILATERAL SCIATICA: Status: ACTIVE | Noted: 2020-05-31

## 2020-05-31 PROBLEM — M54.32 BILATERAL SCIATICA: Status: ACTIVE | Noted: 2020-05-31

## 2020-06-01 NOTE — PLAN OF CARE
[facility]  Physical Therapy Initial Evaluation    Date: 5/27/2020   Name: Monique Trujillo  Clinic Number: [id number]    Therapy Diagnosis:   Encounter Diagnoses   Name Primary?    S/P lumbar fusion     Chronic lumbar radiculopathy     Bilateral sciatica     Status post lumbar spinal fusion Yes     Physician: James Brantley MD  Physician Orders: PT Eval and Treat: Post-op 4 months L3-4, L4-5 laminectomy and fusion for severe spinal stenosis. Persistent difficulty walking and foot numbness. Walking and balance exercises 3 times a week for 6 weeks.  Medical Diagnosis from Referral:   Z98.1 (ICD-10-CM) - S/P lumbar fusion   M54.16 (ICD-10-CM) - Chronic lumbar radiculopathy   Evaluation Date: 5/27/2020  Authorization Period Expiration: 06/18/2021  Plan of Care Expiration: 07/15/2020  Visit # / Visits authorized: 1/1    Time In: 3:30 pm  Time Out: 4:15 pm  Total Appointment Time (timed & untimed codes): 45 minutes (1 eval - low) (1 TE)    Precautions: Standard    Subjective   Date of onset: 1/03/2020 - S/p lumbar fusion at L3-L5  History of current condition - Monique reports: she has had chronic low back pain with sciatica leading her to a lumbar fusion surgery on 1/03/2020 at L3-L5. Patient reports her low back is no longer hurting, but she has numbness, tingling, and stabbing pain down bilateral lower extremities. Because of this, she is having difficulty with balance and walking due to not being able to feel her feet. She feels a constant tingling/numbness feeling which is very uncomfortable for her.      Medical History:   Past Medical History:   Diagnosis Date    Allergy     Arthritis     Chronic lower back pain     Depression     Fever blister     GERD (gastroesophageal reflux disease)     Hemorrhoids, internal 11/5/2014    Hyperlipidemia     Hypertension     Joint pain     Morbid obesity with BMI of 40.0-44.9, adult 2/24/2015    Multiple gastric ulcers 12/14/2017     TIMMY (obstructive sleep apnea)     does not use CPAP    Pulmonary hypertension     Skin cancer        Surgical History:   Monique Trujillo  has a past surgical history that includes Knee arthroscopy w/ meniscal repair (Right, 2012); Tubal ligation (1980); Colonoscopy (N/A, 11/2/2016); Anterior cervical corpectomy w/ fusion (1988); Lumbar laminectomy (06/19/2015); Total hip arthroplasty (Right, 04/28/2016); Skin cancer excision; Epidural steroid injection into lumbar spine (N/A, 11/8/2018); Caudal epidural steroid injection (N/A, 1/10/2019); Injection of anesthetic agent around genitofemoral nerve (Bilateral, 8/1/2019); and Fusion of spine with instrumentation (Left, 1/3/2020).    Medications:   Monique has a current medication list which includes the following prescription(s): amlodipine, ascorbic acid (vitamin c), atenolol, butalbital-acetaminophen-caffeine -40 mg, calcium-vitamin d3, co-enzyme q-10, econazole nitrate, fenofibrate micronized, fenofibric acid, fish oil-omega-3 fatty acids, furosemide, gabapentin, glucosa kelley 2kcl/chondroitin kelley, levocetirizine, lidocaine, methocarbamol, metronidazole 0.75%, multivitamin, mupirocin, OPW TEST CLAIM - DO NOT FILL, oxybutynin, oxycodone-acetaminophen, pantoprazole, polymyxin b sulf-trimethoprim, pravastatin, sanare adv scar therapy base, sertraline, and vitamin e, and the following Facility-Administered Medications: lidocaine (pf).    Allergies:   Review of patient's allergies indicates:  No Known Allergies     Imaging  X-ray Findings: Interval extension of the instrumented posterior spinal fusion, which now spans the L3 through L5 levels.  There is also been interval placement of a interbody disc spacer at L3-4.  Orthopedic hardware appears intact and in reasonable position.  Improved sagittal alignment with slightly decreased anterolisthesis at L3-4.  No new fracture or dislocation.  Otherwise stable alignment.  Posterior midline staples with surgical  drain in place.    Prior Therapy: In the past for hip/knee  Social History: Currently lives with her daughter   Occupation: Was working as a  at [employer] but doesn't know if she will be working again   Prior Level of Function: Limited with pain   Current Level of Function: More limited with a rollaid     Pain:  Current: 5/10 (more numbness/aggravation > physical pain; Worst: 8/10; Best: 0/10   Location:   Description: Burning, Throbbing, Tingling, Numb, Sharp, Electric and Shooting  Aggravating Factors: Sitting  Easing Factors: rest, propping feet up    Pt's goals:    1. Get back to work pain free    2. Walking pain free   Objective   Observation:    Attitude: good demeanor, frustrated with numbness and tingling    Body type: > BMI   Single leg balance deficits bilaterally (increased balance issues due to sensation deficits)    Gait:   Uses a rollaid       Lumbar Range of Motion:    Degrees Pain   Flexion 90   No pain        Extension 20   No pain        Left Side Bending Limited No pain        Right Side Bending Limited No pain        Left rotation   WNL No pain        Right Rotation   WNL No pain             Lower Extremity Strength  Right LE  Left LE    Knee extension: 4/5 Knee extension: 4/5   Knee flexion: 4/5 Knee flexion: 4/5   Hip flexion: 4/5 Hip flexion: 4/5   Hip extension:  3+/5 Hip extension: 3+/5   Hip abduction: 3+/5 Hip abduction: 3+/5   Hip adduction: 4/5 Hip adduction 4/5   Ankle dorsiflexion: 4+/5 Ankle dorsiflexion: 4+/5   Ankle plantarflexion: 4+/5 Ankle plantarflexion: 4+/5         Special Tests:  - Repeated Flexion: negative   - Repeated Ext: negative  - Supine to long sit test: negative      DTR:   Right Left Comment   Patellar (L3-4) 0 0    Achilles (S1) 0 0        Neuro Dynamic Testing:    Sciatic nerve:      SLR:   R = (+)      L = (+)       Palpation:    QL - normal   Glute med - normal   Piriformis - normal   Lumbar paraspinals - hypotonic     Sensation:    Patient displays  "sensation deficits bilaterally   Displays the ability to sense light touch bilaterally, although, patient reports sensation "feels different"  distal to bilateral ankles        Flexibility:   Right   Hip flexor tightness   Quadriceps- WNL   Hamstrings tightness   Piriformis tightness   Lumbar erector tightness     Left   Hip flexor tightness   Quadriceps- WNL   Hamstrings tightness   Piriformis tightness   Lumbar erector tightness    Limitation/Restriction for FOTO Lumbar Survey    Therapist reviewed FOTO scores for Monique Trujillo on 5/27/2020.   FOTO documents entered into [facility] - see Media section.    Limitation Score: 64%  Predicted Limitation Score: 56%         TREATMENT   Treatment Time In: 04:00 pm  Treatment Time Out: 04:15 pm  Total Treatment time (time-based codes) separate from Evaluation: 15 minutes    Monique received therapeutic exercises to develop strength, endurance, ROM, flexibility, posture and core stabilization for 15 minutes including:   LTR      20 x   Piriformis stretch    3 x 30"   Hamstring stretch with ankle pump  2 x 10    Monique received the following manual therapy techniques: Joint mobilizations, Manual traction, Myofacial release and Soft tissue Mobilization were applied to the: low back for 0 minutes, including:   N/A    Education provided:   - HEP    Written Home Exercises Provided: yes.  Exercises were reviewed and Monique was able to demonstrate them prior to the end of the session.  Monique demonstrated good  understanding of the education provided.     See EMR under Media for exercises provided 5/27/2020.    Assessment   Monique is a 69 y.o. female referred to outpatient Physical Therapy status post L3-L5 lumbar fusion. Pt presents with very minimal low back pain, but she is experiencing bilateral numbness/tingling and stabbing pain down her lower extremities. Patient's patellar and achilles DTR were diminished, and she displays sensation deficits distal to the ankle " bilaterally. Due to these deficits, patient displays balance impairments and difficulty walking.     Pt prognosis is Fair.   Pt will benefit from skilled outpatient Physical Therapy to address the deficits stated above and in the chart below, provide pt/family education, and to maximize pt's level of independence.     Plan of care discussed with patient: Yes  Pt's spiritual, cultural and educational needs considered and patient is agreeable to the plan of care and goals as stated below:     Anticipated Barriers for therapy: Age, > BMI, chronic pain    Medical Necessity is demonstrated by the following  History  Co-morbidities and personal factors that may impact the plan of care Co-morbidities:   advanced age, depression, high BMI and HTN    Personal Factors:   age  lifestyle     low   Examination  Body Structures and Functions, activity limitations and participation restrictions that may impact the plan of care Body Regions:   back  lower extremities  trunk    Body Systems:    gross symmetry  ROM  strength  gross coordinated movement  balance  gait  transfers  motor control  motor learning    Participation Restrictions:   Walking    Activity limitations:   Learning and applying knowledge  no deficits    General Tasks and Commands  no deficits    Communication  no deficits    Mobility  lifting and carrying objects  walking    Self care  washing oneself (bathing, drying, washing hands)  toileting    Domestic Life  shopping  doing house work (cleaning house, washing dishes, laundry)  assisting others    Interactions/Relationships  no deficits    Life Areas  no deficits    Community and Social Life  no deficits         low   Clinical Presentation evolving clinical presentation with changing clinical characteristics moderate   Decision Making/ Complexity Score: low     Goals:  Short Term Goals: 4 weeks  1. Patient will be independent with HEP in order to supplement pain free lumbar ROM - PROGRESSING, NOT MET  2. Pt will  improve hip flexor flexibility to WNL to promote functional mobility - PROGRESSING, NOT MET  3. Patient will improve Modified Oswestry Disability Index score from 55.2% to <= 10% limitation in order to supplement functional independence- PROGRESSING, NOT MET     Long Term Goals: 8 weeks   1. Pt will improve lumbar FOTO survey to </= 56% limited in order to return to ADLs without limitation - PROGRESSING, NOT MET  2. Patient will improve hip abduction strength from 3+/5 to a 5/5 bilaterally in order to increase hip abductor strengthening for improved trunk support. - PROGRESSING, NOT MET  3. Pt will report no pain during lumbar AROM in order to promote functional mobility - PROGRESSING, NOT MET    Plan   Plan of care Certification: 5/27/2020 to 07/15/2020    Outpatient Physical Therapy 3 times weekly for 6 weeks to include the following interventions: Gait Training, Manual Therapy, Moist Heat/ Ice, Neuromuscular Re-ed, Patient Education, Self Care, Therapeutic Activites and Therapeutic Exercise.     Sameer Wang, PT

## 2020-06-03 ENCOUNTER — CLINICAL SUPPORT (OUTPATIENT)
Dept: REHABILITATION | Facility: HOSPITAL | Age: 70
End: 2020-06-03
Attending: NEUROLOGICAL SURGERY
Payer: MEDICARE

## 2020-06-03 DIAGNOSIS — M54.32 BILATERAL SCIATICA: ICD-10-CM

## 2020-06-03 DIAGNOSIS — M54.31 BILATERAL SCIATICA: ICD-10-CM

## 2020-06-03 PROCEDURE — 97110 THERAPEUTIC EXERCISES: CPT | Mod: PN,CQ

## 2020-06-03 NOTE — PROGRESS NOTES
"  Physical Therapy Treatment Note     Name: Monique Trujillo  Clinic Number: [id number]    Therapy Diagnosis:   Encounter Diagnosis   Name Primary?    Bilateral sciatica      Physician: James Brantley MD    Visit Date: 6/3/2020    Physician Orders: PT Eval and Treat: Post-op 4 months L3-4, L4-5 laminectomy and fusion for severe spinal stenosis. Persistent difficulty walking and foot numbness. Walking and balance exercises 3 times a week for 6 weeks.  Medical Diagnosis from Referral:   Z98.1 (ICD-10-CM) - S/P lumbar fusion   M54.16 (ICD-10-CM) - Chronic lumbar radiculopathy   Evaluation Date: 5/27/2020  Authorization Period Expiration: 06/18/2021  Plan of Care Expiration: 07/15/2020  Visit # / Visits authorized: 2/1  Time In: 10:00 AM   Time Out: 10:45 AM   Total Billable Time: 45 minutes 3TE    Precautions: Standard    Subjective     Pt reports: Agreeable to PT session. " I still get the numbness in both of my feet". Pt reports difficulty entering / exiting vehicle.   She was compliant with home exercise program.  Response to previous treatment: none reported  Functional change: no change in status since previous session     Pain: 0/10  Location: bilateral foot numbness,     Objective     Monique received therapeutic exercises to develop strength, endurance, ROM, flexibility and core stabilization for 30 minutes including:    Lumbar bridges 2x10  Hamstring st  20"x3 w/ strap B  Piriformis st  20"x3 B (with assistance)  Hip adduction Iso Yellow T ball 5"x 20  Clamshell   Supine RTB 2x10  SLR   2x10 B  Quad / Gluteal sets 5"x15 B    Nu step x 7 min for B UE/ LE reciprocal AROM       Home Exercises Provided and Patient Education Provided     Education provided:   - cont HEP    Written Home Exercises Provided: Patient instructed to cont prior HEP.  Exercises were reviewed and Monique was able to demonstrate them prior to the end of the session.  Monique demonstrated good  understanding of the education provided. "     See EMR under Patient Instructions for exercises provided prior visit.    Assessment     Pt able to complete session with no reports of pain and no reports of B LE numbness worsening. She was able to follow verbal instructions on proper technique and safety of therex.   Monique is progressing well towards her goals.   Pt prognosis is Good.     Pt will continue to benefit from skilled outpatient physical therapy to address the deficits listed in the problem list box on initial evaluation, provide pt/family education and to maximize pt's level of independence in the home and community environment.     Pt's spiritual, cultural and educational needs considered and pt agreeable to plan of care and goals.     Anticipated barriers to physical therapy: see precautions    Goals:  Short Term Goals: 4 weeks  1. Patient will be independent with HEP in order to supplement pain free lumbar ROM - PROGRESSING, NOT MET  2. Pt will improve hip flexor flexibility to WNL to promote functional mobility - PROGRESSING, NOT MET  3. Patient will improve Modified Oswestry Disability Index score from 55.2% to <= 10% limitation in order to supplement functional independence- PROGRESSING, NOT MET     Long Term Goals: 8 weeks   1. Pt will improve lumbar FOTO survey to </= 56% limited in order to return to ADLs without limitation - PROGRESSING, NOT MET  2. Patient will improve hip abduction strength from 3+/5 to a 5/5 bilaterally in order to increase hip abductor strengthening for improved trunk support. - PROGRESSING, NOT MET  3. Pt will report no pain during lumbar AROM in order to promote functional mobility - PROGRESSING, NOT MET    Plan   Cont to advance PT as per MEY Balderas, LORI

## 2020-06-10 ENCOUNTER — CLINICAL SUPPORT (OUTPATIENT)
Dept: REHABILITATION | Facility: HOSPITAL | Age: 70
End: 2020-06-10
Attending: NEUROLOGICAL SURGERY
Payer: MEDICARE

## 2020-06-10 DIAGNOSIS — M54.32 BILATERAL SCIATICA: ICD-10-CM

## 2020-06-10 DIAGNOSIS — M54.31 BILATERAL SCIATICA: ICD-10-CM

## 2020-06-10 PROCEDURE — 97110 THERAPEUTIC EXERCISES: CPT | Mod: PN

## 2020-06-10 NOTE — PROGRESS NOTES
"  Physical Therapy Treatment Note     Name: Monique Trujillo  Clinic Number: [id number]    Therapy Diagnosis:   Encounter Diagnosis   Name Primary?    Bilateral sciatica      Physician: James Brantley MD    Visit Date: 6/10/2020    Physician Orders: PT Eval and Treat: Post-op 4 months L3-4, L4-5 laminectomy and fusion for severe spinal stenosis. Persistent difficulty walking and foot numbness. Walking and balance exercises 3 times a week for 6 weeks.  Medical Diagnosis from Referral:   Z98.1 (ICD-10-CM) - S/P lumbar fusion   M54.16 (ICD-10-CM) - Chronic lumbar radiculopathy   Evaluation Date: 5/27/2020  Authorization Period Expiration: 06/18/2021  Plan of Care Expiration: 07/15/2020  Visit # / Visits authorized: 3/12  Time In: 12:45 pm - PT showed up late to appt.  Time Out: 1:30 pm  Total Billable Time: 30 minutes (2 TE)    Visit: 60.64  Total: 264.8    Precautions: Standard    Subjective     Pt reports: her low back is doing well but she still has bilateral numbness in her feet.  She was compliant with home exercise program.  Response to previous treatment: none reported  Functional change: no change in status since previous session     Pain: 0/10  Location: bilateral foot numbness    Objective     Monique received therapeutic exercises to develop strength, endurance, ROM, flexibility and core stabilization for 30 minutes including:    Sciatic nerve glides 15 x  Hamstring st  20"x3 w/ strap B  Piriformis st  20"x3 B (with assistance)  Hip adduction Iso Yellow T ball 5"x 20  Clamshell   Supine RTB 2 x 10  SLR    2 x 10        Home Exercises Provided and Patient Education Provided     Education provided:   - cont HEP    Written Home Exercises Provided: Patient instructed to cont prior HEP.  Exercises were reviewed and Monique was able to demonstrate them prior to the end of the session.  Monique demonstrated good  understanding of the education provided.     See EMR under Patient Instructions for exercises " provided prior visit.    Assessment     Pt able to complete session with no reports of pain and no reports of B LE numbness worsening. She was able to follow verbal instructions on proper technique and safety of therex. Patient reported slight decrease in foot numbness following tx.   Monique is progressing well towards her goals.   Pt prognosis is Good.     Pt will continue to benefit from skilled outpatient physical therapy to address the deficits listed in the problem list box on initial evaluation, provide pt/family education and to maximize pt's level of independence in the home and community environment.     Pt's spiritual, cultural and educational needs considered and pt agreeable to plan of care and goals.     Anticipated barriers to physical therapy: see precautions    Goals:  Short Term Goals: 4 weeks  1. Patient will be independent with HEP in order to supplement pain free lumbar ROM - PROGRESSING, NOT MET  2. Pt will improve hip flexor flexibility to WNL to promote functional mobility - PROGRESSING, NOT MET  3. Patient will improve Modified Oswestry Disability Index score from 55.2% to <= 10% limitation in order to supplement functional independence- PROGRESSING, NOT MET     Long Term Goals: 8 weeks   1. Pt will improve lumbar FOTO survey to </= 56% limited in order to return to ADLs without limitation - PROGRESSING, NOT MET  2. Patient will improve hip abduction strength from 3+/5 to a 5/5 bilaterally in order to increase hip abductor strengthening for improved trunk support. - PROGRESSING, NOT MET  3. Pt will report no pain during lumbar AROM in order to promote functional mobility - PROGRESSING, NOT MET    Plan   Cont to advance PT as per POC    Sameer Wang, PT

## 2020-06-12 ENCOUNTER — CLINICAL SUPPORT (OUTPATIENT)
Dept: REHABILITATION | Facility: HOSPITAL | Age: 70
End: 2020-06-12
Attending: NEUROLOGICAL SURGERY
Payer: MEDICARE

## 2020-06-12 DIAGNOSIS — M54.31 BILATERAL SCIATICA: ICD-10-CM

## 2020-06-12 DIAGNOSIS — M54.32 BILATERAL SCIATICA: ICD-10-CM

## 2020-06-12 PROCEDURE — 97110 THERAPEUTIC EXERCISES: CPT | Mod: PN

## 2020-06-12 NOTE — PROGRESS NOTES
"  Physical Therapy Treatment Note     Name: Monique Trujillo  Clinic Number: [id number]    Therapy Diagnosis:   Encounter Diagnosis   Name Primary?    Bilateral sciatica      Physician: James Brantley MD    Visit Date: 6/12/2020    Physician Orders: PT Eval and Treat: Post-op 4 months L3-4, L4-5 laminectomy and fusion for severe spinal stenosis. Persistent difficulty walking and foot numbness. Walking and balance exercises 3 times a week for 6 weeks.  Medical Diagnosis from Referral:   Z98.1 (ICD-10-CM) - S/P lumbar fusion   M54.16 (ICD-10-CM) - Chronic lumbar radiculopathy   Evaluation Date: 5/27/2020  Authorization Period Expiration: 06/18/2021  Plan of Care Expiration: 07/15/2020  Visit # / Visits authorized: 4/12  Time In: 11:15 am   Time Out: 12:00 pm  Total Billable Time: 45 minutes 3TE    Visit: 90.96  Total: 355.76    Precautions: Standard    Subjective     Pt reports: her low back is doing well but she still has bilateral numbness in her feet.    She was compliant with home exercise program.  Response to previous treatment: none reported  Functional change: no change in status since previous session     Pain: 0/10  Location: bilateral foot numbness    Objective     Monique received therapeutic exercises to develop strength, endurance, ROM, flexibility and core stabilization for 45 minutes including:    Sciatic nerve glides  15 x  Hamstring st   20"x3 w/ strap B  Piriformis st   20"x3 B (with assistance)  Hip adduction Iso  Yellow T ball 5"x 20  Clamshell    Supine RTB 2x10  SLR    2x10 B  Quad / Gluteal sets  5"x15 B  Hooklying Hip ABD- RTB 2 x 10  Nu step    x 7 min for B UE/ LE reciprocal AROM       Home Exercises Provided and Patient Education Provided     Education provided:   - cont HEP    Written Home Exercises Provided: Patient instructed to cont prior HEP.  Exercises were reviewed and Monique was able to demonstrate them prior to the end of the session.  Monique demonstrated good  understanding " of the education provided.     See EMR under Patient Instructions for exercises provided prior visit.    Assessment     Pt able to complete session with no reports of pain and no reports of B LE numbness worsening. She was able to follow verbal instructions on proper technique and safety of therex. Patient reported a slight decrease in bilateral foot numbness following treatment.  Monique is progressing well towards her goals.   Pt prognosis is Good.     Pt will continue to benefit from skilled outpatient physical therapy to address the deficits listed in the problem list box on initial evaluation, provide pt/family education and to maximize pt's level of independence in the home and community environment.     Pt's spiritual, cultural and educational needs considered and pt agreeable to plan of care and goals.     Anticipated barriers to physical therapy: see precautions    Goals:  Short Term Goals: 4 weeks  1. Patient will be independent with HEP in order to supplement pain free lumbar ROM - MET  2. Pt will improve hip flexor flexibility to WNL to promote functional mobility - PROGRESSING, NOT MET  3. Patient will improve Modified Oswestry Disability Index score from 55.2% to <= 10% limitation in order to supplement functional independence- PROGRESSING, NOT MET     Long Term Goals: 8 weeks   1. Pt will improve lumbar FOTO survey to </= 56% limited in order to return to ADLs without limitation - PROGRESSING, NOT MET  2. Patient will improve hip abduction strength from 3+/5 to a 5/5 bilaterally in order to increase hip abductor strengthening for improved trunk support. - PROGRESSING, NOT MET  3. Pt will report no pain during lumbar AROM in order to promote functional mobility - PROGRESSING, NOT MET    Plan   Cont to advance PT as per POC    Sameer Wang, PT

## 2020-06-19 ENCOUNTER — CLINICAL SUPPORT (OUTPATIENT)
Dept: REHABILITATION | Facility: HOSPITAL | Age: 70
End: 2020-06-19
Attending: NEUROLOGICAL SURGERY
Payer: MEDICARE

## 2020-06-19 DIAGNOSIS — M54.31 BILATERAL SCIATICA: ICD-10-CM

## 2020-06-19 DIAGNOSIS — M54.32 BILATERAL SCIATICA: ICD-10-CM

## 2020-06-19 PROCEDURE — 97110 THERAPEUTIC EXERCISES: CPT | Mod: PN

## 2020-06-19 NOTE — PROGRESS NOTES
"  Physical Therapy Treatment Note     [facility]  Cl[facility] Number: [id number]    Therapy Diagnosis:   Encounter Diagnosis   Name Primary?    Bilateral sciatica      Physician: James Brantley MD    Visit Date: 6/19/2020    Physician Orders: PT Eval and Treat: Post-op 4 months L3-4, L4-5 laminectomy and fusion for severe spinal stenosis. Persistent difficulty walking and foot numbness. Walking and balance exercises 3 times a week for 6 weeks.  Medical Diagnosis from Referral:   Z98.1 (ICD-10-CM) - S/P lumbar fusion   M54.16 (ICD-10-CM) - Chronic lumbar radiculopathy   Evaluation Date: 5/27/2020  Authorization Period Expiration: 06/18/2021  Plan of Care Expiration: 07/15/2020  Visit # / Visits authorized: 5/12  Time In: 10:37 am   Time Out: 11:15 am  Total Billable Time: 38 minutes (3 TE)    Visit: 90.96  Total: 446.72    Precautions: Standard    Subjective     Pt reports: her low back is doing well but she still has bilateral numbness in her feet.    She was compliant with home exercise program.  Response to previous treatment: none reported  Functional change: no change in status since previous session     Pain: 0/10  Location: bilateral foot numbness    Objective     Monique received therapeutic exercises to develop strength, endurance, ROM, flexibility and core stabilization for 38 minutes including:    Nu step      x 8 min for B UE/ LE reciprocal AROM   Swiss ball rollouts     15 x  Gastroc Fitter stretch   3 x 30"  Sciatic nerve glides (with assistance) 15 x  Hamstring st     20"x3 w/ strap B  Piriformis st     20"x3 B (with assistance)  Hip adduction Iso    Yellow T ball 5"x 20  Clamshell      Supine RTB 2x10  SLR      2x10 B  Quad / Gluteal sets    5"x15 B  Hooklying Hip ABD- RTB   2 x 10        Home Exercises Provided and Patient Education Provided     Education provided:   - cont HEP    Written Home Exercises Provided: Patient instructed to cont prior HEP.  Exercises were reviewed and Monique " was able to demonstrate them prior to the end of the session.  Monique demonstrated good  understanding of the education provided.     See EMR under Patient Instructions for exercises provided prior visit.    Assessment     Pt able to complete session with no reports of pain and no reports of B LE numbness worsening. She was able to follow verbal instructions on proper technique and safety of therex. Patient is still reporting (B) foot numbness. Patient is partially compliant with HEP and will continue to benefit from PT to address physical deconditioning, core weakness, and flexibility limitations. Patient also presents with (B) swelling/edema in LE.   Monique is progressing well towards her goals.   Pt prognosis is Good.     Pt will continue to benefit from skilled outpatient physical therapy to address the deficits listed in the problem list box on initial evaluation, provide pt/family education and to maximize pt's level of independence in the home and community environment.     Pt's spiritual, cultural and educational needs considered and pt agreeable to plan of care and goals.     Anticipated barriers to physical therapy: see precautions    Goals:  Short Term Goals: 4 weeks  1. Patient will be independent with HEP in order to supplement pain free lumbar ROM - MET  2. Pt will improve hip flexor flexibility to WNL to promote functional mobility - PROGRESSING, NOT MET  3. Patient will improve Modified Oswestry Disability Index score from 55.2% to <= 10% limitation in order to supplement functional independence- PROGRESSING, NOT MET     Long Term Goals: 8 weeks   1. Pt will improve lumbar FOTO survey to </= 56% limited in order to return to ADLs without limitation - PROGRESSING, NOT MET  2. Patient will improve hip abduction strength from 3+/5 to a 5/5 bilaterally in order to increase hip abductor strengthening for improved trunk support. - PROGRESSING, NOT MET  3. Pt will report no pain during lumbar AROM in  order to promote functional mobility - PROGRESSING, NOT MET    Plan   Cont to advance PT as per POC    Sameer Wang, PT

## 2020-06-22 ENCOUNTER — CLINICAL SUPPORT (OUTPATIENT)
Dept: REHABILITATION | Facility: HOSPITAL | Age: 70
End: 2020-06-22
Attending: NEUROLOGICAL SURGERY
Payer: MEDICARE

## 2020-06-22 DIAGNOSIS — M54.31 BILATERAL SCIATICA: ICD-10-CM

## 2020-06-22 DIAGNOSIS — M54.32 BILATERAL SCIATICA: ICD-10-CM

## 2020-06-22 PROCEDURE — 97110 THERAPEUTIC EXERCISES: CPT | Mod: PN

## 2020-06-22 NOTE — PROGRESS NOTES
"  Physical Therapy Treatment Note     Name: Monique Trujillo  Clinic Number: [id number]    Therapy Diagnosis:   Encounter Diagnosis   Name Primary?    Bilateral sciatica      Physician: James Brantley MD    Visit Date: 6/22/2020    Physician Orders: PT Eval and Treat: Post-op 4 months L3-4, L4-5 laminectomy and fusion for severe spinal stenosis. Persistent difficulty walking and foot numbness. Walking and balance exercises 3 times a week for 6 weeks.  Medical Diagnosis from Referral:   Z98.1 (ICD-10-CM) - S/P lumbar fusion   M54.16 (ICD-10-CM) - Chronic lumbar radiculopathy   Evaluation Date: 5/27/2020  Authorization Period Expiration: 06/18/2021  Plan of Care Expiration: 07/15/2020  Visit # / Visits authorized: 6/12  Time In: 12:00 pm   Time Out: 12:45 pm  Total Billable Time: 38 minutes (3 TE)    Visit: 90.96  Total: 537.68    Precautions: Standard    Subjective     Pt reports: her low back is doing well but she still has bilateral numbness in her feet. Pt went to work the other day. Standing on her feet all day was difficult.     She was compliant with home exercise program.  Response to previous treatment: none reported  Functional change: no change in status since previous session     Pain: 0/10  Location: bilateral foot numbness    Objective     Monique received therapeutic exercises to develop strength, endurance, ROM, flexibility and core stabilization for 38 minutes including:    Nu step      x 8 min for B UE/ LE reciprocal AROM   Swiss ball rollouts     20 x  Gastroc Fitter stretch    3 x 30"  Sciatic nerve glides (with assistance) 15 x  Hamstring st     20"x3 w/ strap B  Piriformis st     20"x3 B (with assistance)  Hip adduction Iso    Yellow T ball 5"x 20  Clamshell      Supine RTB 2x10  SLR      2x10 B  Quad / Gluteal sets    5"x15 B  Abdominal iso with swiss ball  10" x 10        Home Exercises Provided and Patient Education Provided     Education provided:   - cont HEP    Written Home Exercises " Provided: Patient instructed to cont prior HEP.  Exercises were reviewed and Monique was able to demonstrate them prior to the end of the session.  Monique demonstrated good  understanding of the education provided.     See EMR under Patient Instructions for exercises provided prior visit.    Assessment     Pt able to complete session with no reports of pain and no reports of B LE numbness worsening. She was able to follow verbal instructions on proper technique and safety of therex. Patient is still reporting (B) foot numbness. Patient is partially compliant with HEP and will continue to benefit from PT to address physical deconditioning, core weakness, and flexibility limitations. Patient also presents with (B) swelling/edema in LE.   Monique is progressing well towards her goals.   Pt prognosis is Good.     Pt will continue to benefit from skilled outpatient physical therapy to address the deficits listed in the problem list box on initial evaluation, provide pt/family education and to maximize pt's level of independence in the home and community environment.     Pt's spiritual, cultural and educational needs considered and pt agreeable to plan of care and goals.     Anticipated barriers to physical therapy: see precautions    Goals:  Short Term Goals: 4 weeks  1. Patient will be independent with HEP in order to supplement pain free lumbar ROM - MET  2. Pt will improve hip flexor flexibility to WNL to promote functional mobility - PROGRESSING, NOT MET  3. Patient will improve Modified Oswestry Disability Index score from 55.2% to <= 10% limitation in order to supplement functional independence- PROGRESSING, NOT MET     Long Term Goals: 8 weeks   1. Pt will improve lumbar FOTO survey to </= 56% limited in order to return to ADLs without limitation - PROGRESSING, NOT MET  2. Patient will improve hip abduction strength from 3+/5 to a 5/5 bilaterally in order to increase hip abductor strengthening for improved  trunk support. - PROGRESSING, NOT MET  3. Pt will report no pain during lumbar AROM in order to promote functional mobility - PROGRESSING, NOT MET    Plan   Cont to advance PT as per POC    Sameer Wang, PT

## 2020-06-26 ENCOUNTER — CLINICAL SUPPORT (OUTPATIENT)
Dept: REHABILITATION | Facility: HOSPITAL | Age: 70
End: 2020-06-26
Attending: NEUROLOGICAL SURGERY
Payer: MEDICARE

## 2020-06-26 DIAGNOSIS — M54.31 BILATERAL SCIATICA: ICD-10-CM

## 2020-06-26 DIAGNOSIS — M54.32 BILATERAL SCIATICA: ICD-10-CM

## 2020-06-26 PROCEDURE — 97110 THERAPEUTIC EXERCISES: CPT | Mod: PN

## 2020-06-26 NOTE — PROGRESS NOTES
"  Physical Therapy Treatment Note     Name: Monique Trujillo  Clinic Number: [id number]    Therapy Diagnosis:   Encounter Diagnosis   Name Primary?    Bilateral sciatica      Physician: James Brantley MD    Visit Date: 6/26/2020    Physician Orders: PT Eval and Treat: Post-op 4 months L3-4, L4-5 laminectomy and fusion for severe spinal stenosis. Persistent difficulty walking and foot numbness. Walking and balance exercises 3 times a week for 6 weeks.  Medical Diagnosis from Referral:   Z98.1 (ICD-10-CM) - S/P lumbar fusion   M54.16 (ICD-10-CM) - Chronic lumbar radiculopathy   Evaluation Date: 5/27/2020  Authorization Period Expiration: 06/18/2021  Plan of Care Expiration: 07/15/2020  Visit # / Visits authorized: 7/12  Time In: 10:35 am   Time Out: 11:15 am  Total Billable Time: 40 minutes (3 TE)    Visit: 90.96  Total: 628.64    Precautions: Standard    Subjective     Pt reports: her low back is doing well but she still has bilateral numbness in her feet. This week she had to miss appointment due to arthritis hurting in her hands and hips.   She was compliant with home exercise program.  Response to previous treatment: none reported  Functional change: no change in status since previous session     Pain: 0/10  Location: bilateral foot numbness    Objective     Monique received therapeutic exercises to develop strength, endurance, ROM, flexibility and core stabilization for 40 minutes including:    Nu step      x 8 min for B UE/ LE reciprocal AROM   Swiss ball rollouts     20 x  Gastroc Fitter stretch    3 x 30"  Sciatic nerve glides (with assistance) 15 x  Hamstring st     20"x3 w/ strap B  Piriformis st     20"x3 B (with assistance)  Hip adduction Iso    Yellow T ball 5"x 20  Clamshell      Supine RTB 2x10  SLR      2x10 B  Gluteal sets     10"x10 B  Abdominal iso with swiss ball   5" x 15  Sit to stand     10 x   Heel raises     3 x 10          Home Exercises Provided and Patient Education Provided "     Education provided:   - cont HEP    Written Home Exercises Provided: Patient instructed to cont prior HEP.  Exercises were reviewed and Monique was able to demonstrate them prior to the end of the session.  Monique demonstrated good  understanding of the education provided.     See EMR under Patient Instructions for exercises provided prior visit.    Assessment     Pt able to complete session with no reports of pain and no reports of B LE numbness worsening. She was able to follow verbal instructions on proper technique and safety of therex. Patient is still reporting (B) foot numbness. Patient is partially compliant with HEP and will continue to benefit from PT to address physical deconditioning, core weakness, and flexibility limitations. Patient also presents with (B) swelling/edema in LE.   Monique is progressing well towards her goals.   Pt prognosis is Good.     Pt will continue to benefit from skilled outpatient physical therapy to address the deficits listed in the problem list box on initial evaluation, provide pt/family education and to maximize pt's level of independence in the home and community environment.     Pt's spiritual, cultural and educational needs considered and pt agreeable to plan of care and goals.     Anticipated barriers to physical therapy: see precautions    Goals:  Short Term Goals: 4 weeks  1. Patient will be independent with HEP in order to supplement pain free lumbar ROM - MET  2. Pt will improve hip flexor flexibility to WNL to promote functional mobility - PROGRESSING, NOT MET  3. Patient will improve Modified Oswestry Disability Index score from 55.2% to <= 10% limitation in order to supplement functional independence- PROGRESSING, NOT MET     Long Term Goals: 8 weeks   1. Pt will improve lumbar FOTO survey to </= 56% limited in order to return to ADLs without limitation - PROGRESSING, NOT MET  2. Patient will improve hip abduction strength from 3+/5 to a 5/5 bilaterally  in order to increase hip abductor strengthening for improved trunk support. - PROGRESSING, NOT MET  3. Pt will report no pain during lumbar AROM in order to promote functional mobility - PROGRESSING, NOT MET    Plan   Cont to advance PT as per POC    Sameer Wang, PT

## 2020-06-29 ENCOUNTER — HOSPITAL ENCOUNTER (OUTPATIENT)
Dept: CARDIOLOGY | Facility: HOSPITAL | Age: 70
Discharge: HOME | End: 2020-06-29
Attending: INTERNAL MEDICINE
Payer: MEDICARE

## 2020-06-29 VITALS — BODY MASS INDEX: 43.23 KG/M2 | HEIGHT: 61 IN | WEIGHT: 229 LBS

## 2020-06-29 DIAGNOSIS — G47.33 OSA (OBSTRUCTIVE SLEEP APNEA): ICD-10-CM

## 2020-06-29 DIAGNOSIS — I27.20 PULMONARY HYPERTENSION: ICD-10-CM

## 2020-06-29 LAB
AORTIC ROOT ANNULUS: 2.68 CM
ASCENDING AORTA: 2.94 CM
AV INDEX (PROSTH): 0.63
AV MEAN GRADIENT: 4 MMHG
AV PEAK GRADIENT: 7 MMHG
AV VALVE AREA: 2.16 CM2
AV VELOCITY RATIO: 0.6
BSA FOR ECHO PROCEDURE: 2.11 M2
CV ECHO LV RWT: 0.34 CM
DOP CALC AO PEAK VEL: 1.32 M/S
DOP CALC AO VTI: 27.46 CM
DOP CALC LVOT AREA: 3.4 CM2
DOP CALC LVOT DIAMETER: 2.09 CM
DOP CALC LVOT PEAK VEL: 0.79 M/S
DOP CALC LVOT STROKE VOLUME: 59.18 CM3
DOP CALCLVOT PEAK VEL VTI: 17.26 CM
E WAVE DECELERATION TIME: 133.27 MSEC
E/A RATIO: 0.87
E/E' RATIO: 10.43 M/S
ECHO LV POSTERIOR WALL: 0.92 CM (ref 0.6–1.1)
FRACTIONAL SHORTENING: 26 % (ref 28–44)
INTERVENTRICULAR SEPTUM: 0.97 CM (ref 0.6–1.1)
LA MAJOR: 4.56 CM
LA WIDTH: 3.29 CM
LEFT ATRIUM SIZE: 4.55 CM
LEFT INTERNAL DIMENSION IN SYSTOLE: 3.97 CM (ref 2.1–4)
LEFT VENTRICLE DIASTOLIC VOLUME INDEX: 70.07 ML/M2
LEFT VENTRICLE DIASTOLIC VOLUME: 140.22 ML
LEFT VENTRICLE MASS INDEX: 95 G/M2
LEFT VENTRICLE SYSTOLIC VOLUME INDEX: 34.3 ML/M2
LEFT VENTRICLE SYSTOLIC VOLUME: 68.64 ML
LEFT VENTRICULAR INTERNAL DIMENSION IN DIASTOLE: 5.38 CM (ref 3.5–6)
LEFT VENTRICULAR MASS: 190.73 G
LV LATERAL E/E' RATIO: 12.17 M/S
LV SEPTAL E/E' RATIO: 9.13 M/S
MV PEAK A VEL: 0.84 M/S
MV PEAK E VEL: 0.73 M/S
MV STENOSIS PRESSURE HALF TIME: 38.65 MS
MV VALVE AREA P 1/2 METHOD: 5.69 CM2
PISA TR MAX VEL: 1.18 M/S
PULM VEIN S/D RATIO: 1.47
PV PEAK D VEL: 0.38 M/S
PV PEAK S VEL: 0.56 M/S
RA MAJOR: 4.13 CM
RA PRESSURE: 3 MMHG
RA WIDTH: 3.08 CM
STJ: 2.63 CM
TDI LATERAL: 0.06 M/S
TDI SEPTAL: 0.08 M/S
TDI: 0.07 M/S
TR MAX PG: 6 MMHG
TRICUSPID ANNULAR PLANE SYSTOLIC EXCURSION: 1.98 CM
TV REST PULMONARY ARTERY PRESSURE: 9 MMHG

## 2020-06-29 PROCEDURE — 93306 ECHO (CUPID ONLY): ICD-10-PCS | Mod: 26,,, | Performed by: INTERNAL MEDICINE

## 2020-06-29 PROCEDURE — 93306 TTE W/DOPPLER COMPLETE: CPT | Mod: 26,,, | Performed by: INTERNAL MEDICINE

## 2020-06-29 PROCEDURE — 93306 TTE W/DOPPLER COMPLETE: CPT

## 2020-07-02 ENCOUNTER — TELEPHONE (OUTPATIENT)
Dept: INTERNAL MEDICINE | Facility: CLINIC | Age: 70
End: 2020-07-02

## 2020-07-02 NOTE — TELEPHONE ENCOUNTER
----- Message from Kerry Posey sent at 7/1/2020  4:24 PM CDT -----  Pt is requesting a call from a nurse to [phone]    Thanks

## 2020-07-02 NOTE — TELEPHONE ENCOUNTER
Patient called inquiring if her ECHO was in the EMR for  to view. Informed patient that it is. Pt also stated that she would like to be seen by  for pimples on her face. Informed and offered patient first available appointment in September, but also advised patient that she could be seen at an urgent care facility if the pimples were bothersome. Patient expressed an understanding.

## 2020-07-08 ENCOUNTER — CLINICAL SUPPORT (OUTPATIENT)
Dept: REHABILITATION | Facility: HOSPITAL | Age: 70
End: 2020-07-08
Payer: MEDICARE

## 2020-07-08 DIAGNOSIS — M54.31 BILATERAL SCIATICA: ICD-10-CM

## 2020-07-08 DIAGNOSIS — M54.32 BILATERAL SCIATICA: ICD-10-CM

## 2020-07-08 PROCEDURE — 97110 THERAPEUTIC EXERCISES: CPT | Mod: PN

## 2020-07-08 NOTE — PROGRESS NOTES
"  Physical Therapy Treatment Note     Name: Monique Trujillo  Clinic Number: [id number]    Therapy Diagnosis:   Encounter Diagnosis   Name Primary?    Bilateral sciatica      Physician: James Brantley MD    Visit Date: 7/8/2020    Physician Orders: PT Eval and Treat: Post-op 4 months L3-4, L4-5 laminectomy and fusion for severe spinal stenosis. Persistent difficulty walking and foot numbness. Walking and balance exercises 3 times a week for 6 weeks.  Medical Diagnosis from Referral:   Z98.1 (ICD-10-CM) - S/P lumbar fusion   M54.16 (ICD-10-CM) - Chronic lumbar radiculopathy   Evaluation Date: 5/27/2020  Authorization Period Expiration: 06/18/2021  Plan of Care Expiration: 07/15/2020  Visit # / Visits authorized: 8/12  Time In: 9:33 am   Time Out: 10:15 am  Total Billable Time: 42 minutes (3 TE)    Visit: 90.96  Total: 719.6    Precautions: Standard    Subjective     Pt reports: her low back is doing well but she still has bilateral numbness in her feet  She was compliant with home exercise program.  Response to previous treatment: none reported  Functional change: no change in status since previous session     Pain: 0/10  Location: bilateral foot numbness    Objective     Monique received therapeutic exercises to develop strength, endurance, ROM, flexibility and core stabilization for 40 minutes including:    Nu step      Level 3 x 10 min   Swiss ball rollouts     15 x  Gastroc Fitter stretch    3 x 30"  Hamstring st     20" x 3 w/ strap B  Piriformis st     20" x 3 B (with assistance)  Hip adduction Iso    Yellow T ball 5" x 20  Clamshell      Supine RTB 2 x 10  SLR      2 x 10 B  Gluteal sets     10" x 10 B  Abdominal iso with swiss ball   5" x 15  Sit to stand     2 x 10   Heel raises     2 x 10          Home Exercises Provided and Patient Education Provided     Education provided:   - cont HEP    Written Home Exercises Provided: Patient instructed to cont prior HEP.  Exercises were reviewed and Monique was " able to demonstrate them prior to the end of the session.  Monique demonstrated good  understanding of the education provided.     See EMR under Patient Instructions for exercises provided prior visit.    Assessment     Pt able to complete session with no reports of pain and no reports of B LE numbness worsening. She was able to follow verbal instructions on proper technique and safety of therex. Patient is still reporting (B) foot numbness. Patient is partially compliant with HEP and will continue to benefit from PT to address physical deconditioning, core weakness, and flexibility limitations. Patient also presents with (B) swelling/edema in LE.   Monique is progressing well towards her goals.   Pt prognosis is Good.     Pt will continue to benefit from skilled outpatient physical therapy to address the deficits listed in the problem list box on initial evaluation, provide pt/family education and to maximize pt's level of independence in the home and community environment.     Pt's spiritual, cultural and educational needs considered and pt agreeable to plan of care and goals.     Anticipated barriers to physical therapy: see precautions    Goals:  Short Term Goals: 4 weeks  1. Patient will be independent with HEP in order to supplement pain free lumbar ROM - MET  2. Pt will improve hip flexor flexibility to WNL to promote functional mobility - PROGRESSING, NOT MET  3. Patient will improve Modified Oswestry Disability Index score from 55.2% to <= 10% limitation in order to supplement functional independence- PROGRESSING, NOT MET     Long Term Goals: 8 weeks   1. Pt will improve lumbar FOTO survey to </= 56% limited in order to return to ADLs without limitation - PROGRESSING, NOT MET  2. Patient will improve hip abduction strength from 3+/5 to a 5/5 bilaterally in order to increase hip abductor strengthening for improved trunk support. - PROGRESSING, NOT MET  3. Pt will report no pain during lumbar AROM in order  to promote functional mobility - PROGRESSING, NOT MET    Plan   Cont to advance PT as per POC    Sameer Wang, PT

## 2020-07-10 ENCOUNTER — CLINICAL SUPPORT (OUTPATIENT)
Dept: REHABILITATION | Facility: HOSPITAL | Age: 70
End: 2020-07-10
Payer: MEDICARE

## 2020-07-10 DIAGNOSIS — M54.31 BILATERAL SCIATICA: ICD-10-CM

## 2020-07-10 DIAGNOSIS — M54.32 BILATERAL SCIATICA: ICD-10-CM

## 2020-07-10 PROCEDURE — 97110 THERAPEUTIC EXERCISES: CPT | Mod: PN

## 2020-07-10 PROCEDURE — 97140 MANUAL THERAPY 1/> REGIONS: CPT | Mod: PN

## 2020-07-19 RX ORDER — BUTALBITAL, ACETAMINOPHEN AND CAFFEINE 50; 325; 40 MG/1; MG/1; MG/1
1 TABLET ORAL EVERY 6 HOURS PRN
Qty: 60 TABLET | Refills: 1 | Status: SHIPPED | OUTPATIENT
Start: 2020-07-19 | End: 2020-09-12 | Stop reason: SDUPTHER

## 2020-07-24 ENCOUNTER — CLINICAL SUPPORT (OUTPATIENT)
Dept: REHABILITATION | Facility: HOSPITAL | Age: 70
End: 2020-07-24
Payer: MEDICARE

## 2020-07-24 DIAGNOSIS — M54.31 BILATERAL SCIATICA: ICD-10-CM

## 2020-07-24 DIAGNOSIS — M54.32 BILATERAL SCIATICA: ICD-10-CM

## 2020-07-24 PROCEDURE — 97110 THERAPEUTIC EXERCISES: CPT | Mod: PN

## 2020-07-24 NOTE — PROGRESS NOTES
"  Physical Therapy Treatment Note     Name: Monique Trujillo  Clinic Number: [id number]    Therapy Diagnosis:   Encounter Diagnosis   Name Primary?    Bilateral sciatica      Physician: James Brantley MD    Visit Date: 7/24/2020    Physician Orders: PT Eval and Treat: Post-op 4 months L3-4, L4-5 laminectomy and fusion for severe spinal stenosis. Persistent difficulty walking and foot numbness. Walking and balance exercises 3 times a week for 6 weeks.  Medical Diagnosis from Referral:   Z98.1 (ICD-10-CM) - S/P lumbar fusion   M54.16 (ICD-10-CM) - Chronic lumbar radiculopathy   Evaluation Date: 5/27/2020  Authorization Period Expiration: 06/18/2021  Plan of Care Expiration: 07/15/2020  Visit # / Visits authorized: 10/12  Time In: 11:15 am   Time Out: 11:55 pm  Total Billable Time: 40 minutes (3 TE)     Visit: 90.96  Total: 898.66    Precautions: Standard    Subjective     Pt reports: her low back is doing well but she still has bilateral numbness in her feet. Her (L) knee has been bothering her recently, about a 4/10 on pain scale.   She was compliant with home exercise program.  Response to previous treatment: none reported  Functional change: no change in status since previous session     Pain: 0/10  Location: bilateral foot numbness    Objective     Monique received therapeutic exercises to develop strength, endurance, ROM, flexibility and core stabilization for 40 minutes including:    Nu step      Level 3 x 9 min for endurance and reciprocal motion   Hip adduction Iso    Yellow T ball 5" x 20  Clamshell      Supine RTB 5" x 20  SLR      2 x 10 B  Gluteal sets     10" x 10 B  Supine Ab iso with swiss ball- alt UE/LE 5" x 15   Standing marching     20 x  Standing hip ABD - RTB   2 x 10  Standing hip EXT - RTB   2 x 10  Heel raises     20 x   Sit to stand     2 x 10         Home Exercises Provided and Patient Education Provided     Education provided:   - cont HEP    Written Home Exercises Provided: Patient " instructed to cont prior HEP.  Exercises were reviewed and Monique was able to demonstrate them prior to the end of the session.  Monique demonstrated good  understanding of the education provided.     See EMR under Patient Instructions for exercises provided prior visit.    Assessment     Pt able to complete session with no reports of pain and no reports of B LE numbness worsening. She was able to follow verbal instructions on proper technique and safety of therex. Patient is still reporting (B) foot numbness. Patient is partially compliant with HEP and will continue to benefit from PT to address physical deconditioning, core weakness, and flexibility limitations. Pt's (L) knee began hurting her recently from being on her feet all weekend. No reports of increased knee or low back pain.     Monique is progressing well towards her goals.   Pt prognosis is Good.     Pt will continue to benefit from skilled outpatient physical therapy to address the deficits listed in the problem list box on initial evaluation, provide pt/family education and to maximize pt's level of independence in the home and community environment.     Pt's spiritual, cultural and educational needs considered and pt agreeable to plan of care and goals.     Anticipated barriers to physical therapy: see precautions    Goals:  Short Term Goals: 4 weeks  1. Patient will be independent with HEP in order to supplement pain free lumbar ROM - MET  2. Pt will improve hip flexor flexibility to WNL to promote functional mobility - PROGRESSING, NOT MET  3. Patient will improve Modified Oswestry Disability Index score from 55.2% to <= 10% limitation in order to supplement functional independence- PROGRESSING, NOT MET     Long Term Goals: 8 weeks   1. Pt will improve lumbar FOTO survey to </= 56% limited in order to return to ADLs without limitation - PROGRESSING, NOT MET  2. Patient will improve hip abduction strength from 3+/5 to a 5/5 bilaterally in order  to increase hip abductor strengthening for improved trunk support. - PROGRESSING, NOT MET  3. Pt will report no pain during lumbar AROM in order to promote functional mobility - PROGRESSING, NOT MET    Plan   Cont to advance PT as per POC    Sameer Wang, PT

## 2020-07-28 ENCOUNTER — PATIENT OUTREACH (OUTPATIENT)
Dept: ADMINISTRATIVE | Facility: OTHER | Age: 70
End: 2020-07-28

## 2020-07-28 DIAGNOSIS — Z12.31 ENCOUNTER FOR SCREENING MAMMOGRAM FOR MALIGNANT NEOPLASM OF BREAST: Primary | ICD-10-CM

## 2020-07-28 NOTE — PROGRESS NOTES
Requested updates within Care Everywhere.  Patient's chart was reviewed for overdue [facility] topics.  Immunizations reconciled.    Orders placed: Mammogram  Tasked appts: Mammogram  Labs Linked:

## 2020-07-29 ENCOUNTER — OFFICE VISIT (OUTPATIENT)
Dept: SLEEP MEDICINE | Facility: CLINIC | Age: 70
End: 2020-07-29
Payer: MEDICARE

## 2020-07-29 VITALS
HEART RATE: 66 BPM | WEIGHT: 233 LBS | SYSTOLIC BLOOD PRESSURE: 131 MMHG | HEIGHT: 61 IN | BODY MASS INDEX: 43.99 KG/M2 | DIASTOLIC BLOOD PRESSURE: 79 MMHG | TEMPERATURE: 98 F

## 2020-07-29 DIAGNOSIS — G47.30 SLEEP APNEA, UNSPECIFIED TYPE: Primary | ICD-10-CM

## 2020-07-29 DIAGNOSIS — G47.33 OSA (OBSTRUCTIVE SLEEP APNEA): ICD-10-CM

## 2020-07-29 PROCEDURE — 99999 PR PBB SHADOW E&M-EST. PATIENT-LVL IV: ICD-10-PCS | Mod: PBBFAC,,, | Performed by: PSYCHIATRY & NEUROLOGY

## 2020-07-29 PROCEDURE — 99999 PR PBB SHADOW E&M-EST. PATIENT-LVL IV: CPT | Mod: PBBFAC,,, | Performed by: PSYCHIATRY & NEUROLOGY

## 2020-07-29 PROCEDURE — 99203 PR OFFICE/OUTPT VISIT, NEW, LEVL III, 30-44 MIN: ICD-10-PCS | Mod: S$PBB,,, | Performed by: PSYCHIATRY & NEUROLOGY

## 2020-07-29 PROCEDURE — 99203 OFFICE O/P NEW LOW 30 MIN: CPT | Mod: S$PBB,,, | Performed by: PSYCHIATRY & NEUROLOGY

## 2020-07-29 NOTE — LETTER
August 5, 2020      Katelynn Laura MD  [address]  [place] [address]           [facility]  [address]  [place] [address]  Phone: [phone]  Fax: [phone]          Patient: Monique Trujlilo   MR Number: [id number]   YOB: 1950   Date of Visit: 7/29/2020       Dear Dr. Katelynn Laura:    Thank you for referring Monique Trujillo to me for evaluation. Attached you will find relevant portions of my assessment and plan of care.    If you have questions, please do not hesitate to call me. I look forward to following Monique Trujillo along with you.    Sincerely,    Florecita Mendez MD    Enclosure  CC:  No Recipients    If you would like to receive this communication electronically, please contact [email or web address] or [phone] to request more information on [employer] Link access.    For providers and/or their staff who would like to refer a patient to [facility], please contact us through our one-stop-shop provider referral line, [facility], at [phone].    If you feel you have received this communication in error or would no longer like to receive these types of communications, please e-mail [email or web address]

## 2020-07-29 NOTE — PROGRESS NOTES
Monique Trujillo  was seen at the request of  Katelynn Laura MD for sleep evaluation.    07/29/2020 INITIAL HISTORY OF PRESENT ILLNESS:  Monique Trujillo is a 69 y.o. female is here to be evaluated for a sleep disorder.       CHIEF COMPLAINT:      The patient's complaints include excessive daytime sleepiness, excessive daytime fatigue, snoring and interrupted sleep since several years ago    Monique Trujillo denied   witnessed breathing pauses and  gasping for air in sleep.    She frequently wakes up with headaches and sore throat.     ESS 13/24.    Reports difficulty falling and staying asleep.      Denies symptoms concerning for parasomnia except for occasional somniloquy.  she Denies cataplexy, sleep paralysis, hallucinations surrounding sleep time.     Monique Trujillo reports the need to move her feet - needs to move her feet to avoid tingling. Worse in the evening.   Has chronic back pain, peripheral neuropathy with the loss of balance. + incontinence.  ESS 13/24, PHQ9 - 12, GAD7-12      EPWORTH SLEEPINESS SCALE 7/29/2020   Sitting and reading 2   Watching TV 2   Sitting, inactive in a public place (e.g. a theatre or a meeting) 1   As a passenger in a car for an hour without a break 1   Lying down to rest in the afternoon when circumstances permit 3   Sitting and talking to someone 1   Sitting quietly after a lunch without alcohol 2   In a car, while stopped for a few minutes in traffic 1   Total score 13       PHQ9 7/29/2020   Little interest or pleasure in doing things: Several days   Feeling down, depressed or hopeless: Several days   Trouble falling asleep, staying asleep, or sleeping too much: Nearly every day   Feeling tired or having little energy: More than half the days   Poor appetite or overeating: Several days   Feeling bad about yourself- or that you are a failure or have let yourself or family down Several days   Trouble concentrating on things, such as reading the newspaper or  watching television: More than half the days   Moving or speaking so slowly that other people could have noticed. Or the opposite- being so fidgety or restless that you have been moving around a lot more than usual: Several days   Thoughts that you would be better off dead or hurting yourself in some way: Not at all   If you indicated you have experienced any of the aforementioned problems, how difficult have these problems made it for you to do your work, take care of things at home or get along with other people? Somewhat difficult   Total Score 12     GAD7 7/29/2020   1. Feeling nervous, anxious, or on edge? 2   2. Not being able to stop or control worrying? 3   3. Worrying too much about different things? 2   4. Trouble relaxing? 1   5. Being so restless that it is hard to sit still? 3   6. Becoming easily annoyed or irritable? 1   7. Feeling afraid as if something awful might happen? 0   8. If you checked off any problems, how difficult have these problems made it for you to do your work, take care of things at home, or get along with other people? 1   OSCAR-7 Score 12         SLEEP ROUTINE AND LIFESTYLE 07/29/2020 :    Sleep Clinic New Patient 7/29/2020   What time do you go to bed on a week day? (Give a range) 11:00p   What time do you go to bed on a day off? (Give a range) 11:00p   How long does it take you to fall asleep? (Give a range) 30 mins   On average, how many times per night do you wake up? 2-3   How long does it take you to fall back into sleep? (Give a range) not long   What time do you wake up to start your day on a week day? (Give a range) 8:00a-10:00a   What time do you wake up to start your day on a day off? (Give a range) 8:00a-10:00a   What time do you get out of bed? (Give a range) immediately   On average, how many hours do you sleep? 4-6   On average, how many naps do you take per day? 1   Rate your sleep quality from 0 to 5 (0-poor, 5-great). 0   Have you experienced:  Weight gain?   How  much weight have you lost or gained (in lbs.) in the last year? 10 lbs   On average, how many times per night do you go to the bathroom?  3   Have you ever had a sleep study/CPAP machine/surgery for sleep apnea? Yes   Have you ever had a CPAP machine for sleep apnea? No   Have you ever had surgery for sleep apnea? No       Sleep Clinic ROS  7/29/2020   Difficulty breathing through the nose?  Sometimes   Sore throat or dry mouth in the morning? Sometimes   Irregular or very fast heart beat?  Sometimes   Shortness of breath?  Yes   Acid reflux? Yes   Body aches and pains?  Yes   Morning headaches? Sometimes   Dizziness? Yes   Mood changes?  Sometimes   Do you exercise?  No   Do you feel like moving your legs a lot?  Yes            PREVIOUS SLEEP STUDIES:     none  2DECHO:   · Normal left ventricular systolic function. The estimated ejection fraction is 55%.  · Normal LV diastolic function.  · Normal right ventricular systolic function.  · Normal central venous pressure (3 mmHg).  · Inadequate TR jet to estimate PASP         DME:       PAST MEDICAL HISTORY:    Active Ambulatory Problems     Diagnosis Date Noted    Osteopenia of multiple sites 08/05/2014    Morbid obesity with BMI of 40.0-44.9, adult 02/24/2015    Mixed hyperlipidemia 02/24/2015    Mood disorder 02/24/2015    Mixed stress and urge urinary incontinence 02/24/2015    Osteoarthritis of spine with radiculopathy, lumbar region 09/04/2015    Essential hypertension 09/04/2015    Primary osteoarthritis of right hip 04/28/2016    SI (sacroiliac) joint dysfunction 09/12/2016    Spondylolisthesis, lumbar region 09/12/2016    Lumbar degenerative disc disease 09/12/2016    History of colon polyps 11/02/2016    Status post right hip replacement 12/20/2016    Lumbar radiculopathy 12/20/2016    Dysphagia 10/05/2017    Bilateral primary osteoarthritis of knee 10/18/2017    Osteoarthritis of left shoulder 10/18/2017    Multiple gastric ulcers 12/14/2017     TIMMY (obstructive sleep apnea)     Primary osteoarthritis of both knees 03/14/2018    Chronic bilateral low back pain with bilateral sciatica 10/24/2018    Spinal stenosis of lumbar region with neurogenic claudication 10/31/2018    Chronic pain 11/08/2018    Shoulder arthritis 02/28/2019    Chronic pain of both shoulders 06/10/2019    Status post lumbar spinal fusion 08/30/2019    Spinal stenosis of lumbar region with radiculopathy 01/03/2020    Bilateral sciatica 05/31/2020     Resolved Ambulatory Problems     Diagnosis Date Noted    Obesity 08/05/2014    Hemorrhoids, internal 11/05/2014    Screen for colon cancer 11/05/2014    Osteoarthritis 11/10/2014    Hypertension 02/24/2015    Absence of bladder continence 09/04/2015    Decreased ROM of right knee 04/10/2018    Decreased mobility and endurance 04/10/2018    Right knee pain 04/10/2018    Decreased strength 04/10/2018     Past Medical History:   Diagnosis Date    Allergy     Arthritis     Chronic lower back pain     Depression     Fever blister     GERD (gastroesophageal reflux disease)     Hyperlipidemia     Joint pain     Pulmonary hypertension     Skin cancer                 PAST SURGICAL HISTORY:    Past Surgical History:   Procedure Laterality Date    ANTERIOR CERVICAL CORPECTOMY W/ FUSION  1988    CAUDAL EPIDURAL STEROID INJECTION N/A 1/10/2019    Procedure: Injection-steroid-epidural-caudal;  Surgeon: Lydia Velásquez Jr., MD;  [place] [facility] [place] [facility] [place];  Service: Pain Management;  Laterality: N/A;    COLONOSCOPY N/A 11/2/2016    Procedure: COLONOSCOPY;  Surgeon: Viji Dewitt MD;  Location: [facility];  Service: Endoscopy;  Laterality: N/A;    EPIDURAL STEROID INJECTION INTO LUMBAR SPINE N/A 11/8/2018    Procedure: Injection-steroid-epidural-lumbar-L2-3 (LEFT > RIGHT);  Surgeon: Lydia Velásquez Jr., MD;  Location: [facility] PAIN T;  Service: Pain Management;  Laterality: N/A;    FUSION OF SPINE WITH INSTRUMENTATION  Left 1/3/2020    Procedure: FUSION, SPINE, WITH INSTRUMENTATION Stage 1 Left L3-4 LOIF Rise L Stage 2 Open L3-4 L4-5 Laminectomy Removal of L4-5 Hardware, L3-5 Posterior Instrumentation and extraction of Fusion at L3-4;  Surgeon: James Brantley MD;  Location: [facility] OR;  Service: Neurosurgery;  Laterality: Left;  Procedure: Stage 1 Left L3-4 LOIF Rise L  Stage 2 Open L3-4 L4-5 Laminectomy Removal of L4-5 Hardware,     INJECTION OF ANESTHETIC AGENT AROUND GENITOFEMORAL NERVE Bilateral 8/1/2019    Procedure: BILATERAL SHOULDER ARTICULAR BRANCH BLOCK;  Surgeon: Lydia Velásquez Jr., MD;  Location: [facility] PAIN MGT;  Service: Pain Management;  Laterality: Bilateral;    KNEE ARTHROSCOPY W/ MENISCAL REPAIR Right 2012    LUMBAR LAMINECTOMY  06/19/2015    SKIN CANCER EXCISION      forehead does not recall date    TOTAL HIP ARTHROPLASTY Right 04/28/2016         TUBAL LIGATION  1980         FAMILY HISTORY:                Family History   Problem Relation Age of Onset    Arthritis Mother     Cancer Mother         breast ca    Breast cancer Mother     Kidney disease Father     Alzheimer's disease Maternal Grandfather     Cancer Paternal Grandmother         colon ca    Colon cancer Paternal Grandmother        SOCIAL HISTORY:          Tobacco:   Social History     Tobacco Use   Smoking Status Never Smoker   Smokeless Tobacco Never Used       alcohol use:    Social History     Substance and Sexual Activity   Alcohol Use No    Alcohol/week: 0.0 standard drinks                   ALLERGIES:  Review of patient's allergies indicates:  No Known Allergies    CURRENT MEDICATIONS:    Current Outpatient Medications   Medication Sig Dispense Refill    amLODIPine (NORVASC) 10 MG tablet TAKE 1 TABLET BY MOUTH EVERY DAY 90 tablet 3    ascorbic acid (VITAMIN C) 1000 MG tablet Take 1,000 mg by mouth once daily.      atenoloL (TENORMIN) 25 MG tablet Take 1 tablet (25 mg total) by mouth once daily. 90 tablet 3     butalbital-acetaminophen-caffeine -40 mg (FIORICET, ESGIC) -40 mg per tablet Take 1 tablet by mouth every 6 (six) hours as needed for Headaches. 60 tablet 1    calcium-vitamin D3 (CALCIUM 500 + D) 500 mg(1,250mg) -200 unit per tablet Take 1 tablet by mouth 2 (two) times daily with meals.      co-enzyme Q-10 30 mg capsule Take 30 mg by mouth once daily.       econazole nitrate 1 % cream Use bid for rash 85 g 2    fenofibrate micronized (LOFIBRA) 134 MG Cap Take 134 mg by mouth once daily.      fenofibric acid (FIBRICOR) 135 mg CpDR TAKE ONE CAPSULE BY MOUTH EVERY DAY 90 capsule 2    fish oil-omega-3 fatty acids 300-1,000 mg capsule Take 2 g by mouth once daily.      furosemide (LASIX) 40 MG tablet Take lasix 40 mg PO every other day PRN swelling 45 tablet 1    gabapentin (NEURONTIN) 600 MG tablet Take 1 tablet (600 mg total) by mouth 3 (three) times daily. 90 tablet 11    GLUCOSA KHAN 2KCL/CHONDROITIN KHAN (GLUCOSAMINE SULF-CHONDROITIN ORAL) Take 1 tablet by mouth once daily.      levocetirizine (XYZAL) 5 MG tablet TAKE 1 TABLET BY MOUTH EVERY DAY IN THE EVENING 90 tablet 1    lidocaine (XYLOCAINE) 5 % Oint ointment APPLY 2.5 GRAMS TO THE AFFECTED AREA 3-4 TIMES DAILY STARTING 2 WEEKS POST SURGERY.      methocarbamol (ROBAXIN) 500 MG Tab Take 1 tablet (500 mg total) by mouth 3 (three) times daily as needed. 60 tablet 1    metronidazole 0.75% (METROCREAM) 0.75 % Crea Apply topically once daily to affected area 45 g 1    multivitamin capsule Take 1 capsule by mouth once daily.      mupirocin (BACTROBAN) 2 % ointment APPLY 1/2 GRAM TO EACH NOSTRIL TWICE A DAY FOR UP TO 5 DAYS PRIOR TO SURGERY OR AS DIRECTED BY YOUR PHYSICIAN.      OPW TEST CLAIM - DO NOT FILL Take by mouth. OPW test claim. Do not fill. 11 tablet 0    oxyCODONE-acetaminophen (PERCOCET) 5-325 mg per tablet Take 1 tablet by mouth every 8 (eight) hours as needed for Pain. 42 tablet 0    pantoprazole (PROTONIX) 40 MG tablet Take 1  tablet (40 mg total) by mouth once daily. 90 tablet 3    polymyxin B sulf-trimethoprim (POLYTRIM) 10,000 unit- 1 mg/mL Drop Place 1 drop into both eyes every 4 (four) hours. 10 mL 0    pravastatin (PRAVACHOL) 40 MG tablet TAKE 1 TABLET BY MOUTH EVERY DAY 90 tablet 3    SANARE ADV SCAR THERAPY BASE Gel APPLY 1/2 GRAM (1 PUMP) TO AFFECTED AREAS TWICE DAILY STARTING 1 MONTH POST SURGERY      sertraline (ZOLOFT) 50 MG tablet TAKE 1 TABLET BY MOUTH EVERY DAY 90 tablet 0    vitamin E 400 UNIT capsule Take 400 Units by mouth once daily.      oxybutynin (DITROPAN XL) 15 MG TR24 TAKE 1 TABLET (15 MG TOTAL) BY MOUTH ONCE DAILY. 90 tablet 3     Current Facility-Administered Medications   Medication Dose Route Frequency Provider Last Rate Last Dose    lidocaine (PF) 20 mg/mL (2 %) injection 2 mL  2 mL Intradermal Once Kalyan Han MD                          PHYSICAL EXAM:  There were no vitals taken for this visit.  GENERAL: Normal development, well groomed.  HEENT:   HEENT:  Conjunctivae are non-erythematous; Pupils equal, round, and reactive to light; Nose is symmetrical; Nasal mucosa is pink and moist; Septum is midline; Inferior turbinates are normal; Nasal airflow is normal; Posterior pharynx is pink; Modified Mallampati:III-IV; Posterior palate is low; Tonsils not visualized; Uvula is wide and elongated;Tongue is enlarged; Dentition is fair; No TMJ tenderness; Jaw opening and protrusion without click and without discomfort.  NECK: Supple. Neck circumference is  inches. No thyromegaly. No palpable nodes.     SKIN: On face and neck: No abrasions, no rashes, no lesions.  No subcutaneous nodules are palpable.  RESPIRATORY: Chest is clear to auscultation.  Normal chest expansion and non-labored breathing at rest.  CARDIOVASCULAR: Normal S1, S2.  No murmurs, gallops or rubs. No carotid bruits bilaterally.  No edema. No clubbing. No cyanosis.    NEURO: Oriented to time, place and person. Normal attention span and  "concentration. Gait normal.    PSYCH: Affect is full. Mood is normal  MUSCULOSKELETAL: Moves 4 extremities. Gait normal.         Using My [facility]: yes      ASSESSMENT:    1. Sleep Apnea NEC. The patient symptomatically has  excessive daytime sleepiness, snoring, excessive daytime fatigue, interrupted sleep and nocturia  with exam findings of "a crowded oral airway and elevated body mass index. The patient has medical co-morbidities of HTN,  which can be worsened by TIMMY. This warrants further investigation for possible obstructive sleep apnea.    2. Insomnia NEC. Multi-factorial -  excess time in bed, poor sleep hygiene, and likely paradoxical insomnia play a role        PLAN:    Diagnostic: Polysomnogram in lab. The nature of this procedure and its indication was discussed with the patient. she would  like to come discuss PSG results.    Weight loss strategies were discussed in detail      More than 15 minutes of this 30 minutes visit was spent in counseling: and coordination of care.     during our discussion today, we talked about the etiology of  TIMMY as well as the potential ramifications of untreated sleep apnea, which could include daytime sleepiness, hypertension, heart disease and/or stroke.  We discussed potential treatment options, which could include weight loss, body positioning, continuous positive airway pressure (CPAP), or referral for surgical consideration. Meanwhile, she  is urged to avoid supine sleep, weight gain and alcoholic beverages since all of these can worsen TIMMY.     Precautions: The patient was advised to abstain from driving should he feel sleepy or drowsy.    Follow up: MD after the sleep study has been completed.     Thank you for allowing me the opportunity to participate in the care of your patient.    This visit summary will be sent to referring provider via inbasket        "

## 2020-07-29 NOTE — PATIENT INSTRUCTIONS
SLEEP LAB ([facility]) will contact you to schedulethe sleep study. Their number is [phone] (ext 2). Please call them if you do not hear from them in 2 weeks from now.  The [facility] Lab is located on 7th floor of the [facility]; [place] lab is located in [facility].    SLEEP CLINIC (my assistant) will call you when the sleep study results are ready - if you have not heard from us by 2 weeks from the date of the study, please call [phone] (ext 1) or you can use [facility] to contact me.    You are advised to abstain from driving should you feel sleepy or drowsy.

## 2020-07-31 ENCOUNTER — CLINICAL SUPPORT (OUTPATIENT)
Dept: REHABILITATION | Facility: HOSPITAL | Age: 70
End: 2020-07-31
Payer: MEDICARE

## 2020-07-31 DIAGNOSIS — M54.32 BILATERAL SCIATICA: ICD-10-CM

## 2020-07-31 DIAGNOSIS — M54.31 BILATERAL SCIATICA: ICD-10-CM

## 2020-07-31 PROCEDURE — 97110 THERAPEUTIC EXERCISES: CPT | Mod: PN

## 2020-07-31 NOTE — PROGRESS NOTES
Physical Therapy Treatment Note / Discharge      Name: Monique Trujillo  Clinic Number: [id number]    Therapy Diagnosis:   Encounter Diagnosis   Name Primary?    Bilateral sciatica      Physician: James Brantley MD    Visit Date: 7/31/2020    Physician Orders: PT Eval and Treat: Post-op 4 months L3-4, L4-5 laminectomy and fusion for severe spinal stenosis. Persistent difficulty walking and foot numbness. Walking and balance exercises 3 times a week for 6 weeks.  Medical Diagnosis from Referral:   Z98.1 (ICD-10-CM) - S/P lumbar fusion   M54.16 (ICD-10-CM) - Chronic lumbar radiculopathy   Evaluation Date: 5/27/2020  Authorization Period Expiration: 06/18/2021  Plan of Care Expiration: 07/15/2020  Visit # / Visits authorized: 11/12  Time In: 11:22 am - pt arrived late   Time Out: 12:00 pm  Total Billable Time: 38 minutes (3 TE)     Visit: 90.96  Total: 989.62    Precautions: Standard    Subjective     Pt reports: her low back is doing well but she still has bilateral numbness in her feet. Her (L) knee has been bothering her recently, about a 4/10 on pain scale.   She was compliant with home exercise program.  Response to previous treatment: none reported  Functional change: no change in status since previous session     Pain: 0/10  Location: bilateral foot numbness    Objective     Lower Extremity Strength  Right LE   Left LE     Knee extension: 5/5 Knee extension: 5/5   Knee flexion: 5/5 Knee flexion: 5/5   Hip flexion: 5/5 Hip flexion: 5/5   Hip extension:  4/5 Hip extension: 4/5   Hip abduction: 4/5 Hip abduction: 4/5   Hip adduction: 5/5 Hip adduction 5/5   Ankle dorsiflexion: 5/5 Ankle dorsiflexion: 5/5   Ankle plantarflexion: 5/5 Ankle plantarflexion: 5/5         Lumbar Range of Motion:     Degrees Pain   Flexion 105    No pain         Extension 35    No pain         Left Side Bending WNL No pain         Right Side Bending WNL No pain         Left rotation    WNL No pain         Right Rotation    WNL No  "pain              Monique received therapeutic exercises to develop strength, endurance, ROM, flexibility and core stabilization for 38 minutes including:    Objective measures for discharge     Nu step      Level 3 x 5 min for endurance and reciprocal motion   Hip adduction Iso    Yellow T ball 5" x 20  Clamshell      Supine RTB 5" x 20  SLR      2 x 10 B  Gluteal sets     10" x 10 B  Supine Ab iso with swiss ball- alt UE/LE 5" x 15   Standing marching     20 x  Standing hip ABD - RTB   2 x 10  Standing hip EXT - RTB   2 x 10          Home Exercises Provided and Patient Education Provided     Education provided:   - cont HEP    Written Home Exercises Provided: Patient instructed to cont prior HEP.  Exercises were reviewed and Monique was able to demonstrate them prior to the end of the session.  Monique demonstrated good  understanding of the education provided.     See EMR under Patient Instructions for exercises provided prior visit.    Assessment     Pt able to complete session with no reports of pain and no reports of B LE numbness worsening. Patient has shown improvements in lumbar range of motion and LE strength since initial evaluation. Patient's only complaint at this time is the bilateral numbness in her feet. Patient partially met all of her goals and was D/C from outpatient Physical Therapy today with a HEP to continue with at home.     Monique is progressing well towards her goals.   Pt prognosis is Good.     Pt will continue to benefit from skilled outpatient physical therapy to address the deficits listed in the problem list box on initial evaluation, provide pt/family education and to maximize pt's level of independence in the home and community environment.     Pt's spiritual, cultural and educational needs considered and pt agreeable to plan of care and goals.     Anticipated barriers to physical therapy: see precautions    Goals:  Short Term Goals: 4 weeks  1. Patient will be independent with " HEP in order to supplement pain free lumbar ROM - MET  2. Pt will improve hip flexor flexibility to WNL to promote functional mobility - MET  3. Patient will improve Modified Oswestry Disability Index score from 55.2% to <= 10% limitation in order to supplement functional independence- PROGRESSING, NOT MET (29.8)     Long Term Goals: 8 weeks   1. Pt will improve lumbar FOTO survey to </= 56% limited in order to return to ADLs without limitation - MET  2. Patient will improve hip abduction strength from 3+/5 to a 5/5 bilaterally in order to increase hip abductor strengthening for improved trunk support. - PARTIALLY MET  3. Pt will report no pain during lumbar AROM in order to promote functional mobility - MET    Plan   Cont to advance PT as per POC    Sameer Wang, PT

## 2020-07-31 NOTE — PLAN OF CARE
Outpatient Therapy Updated Plan of Care     Visit Date: 7/31/2020  Name: Monique Trujillo  Clinic Number: [id number]    Therapy Diagnosis:   Encounter Diagnosis   Name Primary?    Bilateral sciatica      Physician: James Brantley MD    Physician Orders: PT Eval and Treat: Post-op 4 months L3-4, L4-5 laminectomy and fusion for severe spinal stenosis. Persistent difficulty walking and foot numbness. Walking and balance exercises 3 times a week for 6 weeks.  Medical Diagnosis from Referral:   Z98.1 (ICD-10-CM) - S/P lumbar fusion   M54.16 (ICD-10-CM) - Chronic lumbar radiculopathy   Evaluation Date: 5/27/2020    Total Visits Received: 11      Current Certification Period:  5/27/2020 to 07/15/2020  Visits from Evaluation Date:  10  Functional Level Prior to Evaluation:  Limited ambulating with rollaid due to muscle weakness    Subjective     Update: Pt reports she is doing well overall, and her only issue at this point is her bilateral numbness in her feet    Objective     Update:   Lower Extremity Strength  Right LE   Left LE     Knee extension: 5/5 Knee extension: 5/5   Knee flexion: 5/5 Knee flexion: 5/5   Hip flexion: 5/5 Hip flexion: 5/5   Hip extension:  4/5 Hip extension: 4/5   Hip abduction: 4/5 Hip abduction: 4/5   Hip adduction: 5/5 Hip adduction 5/5   Ankle dorsiflexion: 5/5 Ankle dorsiflexion: 5/5   Ankle plantarflexion: 5/5 Ankle plantarflexion: 5/5         Lumbar Range of Motion:     Degrees Pain   Flexion 105    No pain         Extension 35    No pain         Left Side Bending WNL No pain         Right Side Bending WNL No pain         Left rotation    WNL No pain         Right Rotation    WNL No pain                Assessment     Update: Pt able to complete session with no reports of pain and no reports of B LE numbness worsening. Patient has shown improvements in lumbar range of motion and LE strength since initial evaluation. Patient's only complaint at this time is the bilateral numbness in her  feet. Patient partially met all of her goals and was D/C from outpatient Physical Therapy today with a HEP to continue with at home.     Previous Short Term Goals Status: partially met  Long Term Goal Status:   continue per initial plan of care.  Reasons for Recertification of Therapy: POC update    Plan     Updated Certification Period: 7/16/2020 to 7/31/2020  Recommended Treatment Plan: 1 times per week for 2 weeks: Gait Training, Neuromuscular Re-ed, Patient Education, Self Care, Therapeutic Activites and Therapeutic Exercise      Sameer Wang, PT  7/31/2020      I CERTIFY THE NEED FOR THESE SERVICES FURNISHED UNDER THIS PLAN OF TREATMENT AND WHILE UNDER MY CARE    Physician's comments:        Physician's Signature: ___________________________________________________

## 2020-08-13 ENCOUNTER — TELEPHONE (OUTPATIENT)
Dept: SLEEP MEDICINE | Facility: CLINIC | Age: 70
End: 2020-08-13

## 2020-08-13 NOTE — TELEPHONE ENCOUNTER
----- Message from Kat Ann sent at 8/13/2020  2:26 PM CDT -----  Regarding: sleep study  Name of Who is Calling: MEE CHEEMA [[id number]]      What is the request in detail: r Patient is requesting to have her sleep study schedule she states she has a new insurance company now she has [employer] now       Can the clinic reply by [facility]: no      What [facility] Back [facility]: [phone]

## 2020-08-14 ENCOUNTER — TELEPHONE (OUTPATIENT)
Dept: SLEEP MEDICINE | Facility: OTHER | Age: 70
End: 2020-08-14

## 2020-08-18 ENCOUNTER — TELEPHONE (OUTPATIENT)
Dept: SLEEP MEDICINE | Facility: OTHER | Age: 70
End: 2020-08-18

## 2020-08-19 DIAGNOSIS — I10 ESSENTIAL HYPERTENSION: ICD-10-CM

## 2020-08-19 DIAGNOSIS — M79.89 LEG SWELLING: ICD-10-CM

## 2020-08-19 DIAGNOSIS — I27.20 PULMONARY HYPERTENSION: ICD-10-CM

## 2020-08-19 RX ORDER — FUROSEMIDE 40 MG/1
TABLET ORAL
Qty: 45 TABLET | Refills: 1 | Status: SHIPPED | OUTPATIENT
Start: 2020-08-19 | End: 2020-11-03 | Stop reason: SDUPTHER

## 2020-08-19 RX ORDER — ATENOLOL 25 MG/1
25 TABLET ORAL DAILY
Qty: 90 TABLET | Refills: 3 | Status: SHIPPED | OUTPATIENT
Start: 2020-08-19 | End: 2021-06-23 | Stop reason: SDUPTHER

## 2020-08-19 RX ORDER — AMLODIPINE BESYLATE 10 MG/1
10 TABLET ORAL DAILY
Qty: 90 TABLET | Refills: 3 | Status: SHIPPED | OUTPATIENT
Start: 2020-08-19 | End: 2021-03-08 | Stop reason: SDUPTHER

## 2020-08-19 NOTE — TELEPHONE ENCOUNTER
----- Message from Sully Dowling sent at 8/19/2020  8:15 AM CDT -----  Contact: Leia @ [facility] [phone]  Type:  Pharmacy Calling to Clarify an RX    Name of Caller: Leia  Pharmacy Name: [facility]   Prescription Name:amLODIPine (NORVASC) 10 MG tablet,atenoloL (TENORMIN) 25 MG tablet and furosemide (LASIX) 40 MG tablet  What do they need to clarify?: regarding following up on a request for the Medications  Best Call Back Number:[phone]

## 2020-08-24 ENCOUNTER — PATIENT OUTREACH (OUTPATIENT)
Dept: ADMINISTRATIVE | Facility: HOSPITAL | Age: 70
End: 2020-08-24

## 2020-09-12 RX ORDER — BUTALBITAL, ACETAMINOPHEN AND CAFFEINE 50; 325; 40 MG/1; MG/1; MG/1
1 TABLET ORAL EVERY 6 HOURS PRN
Qty: 60 TABLET | Refills: 1 | Status: SHIPPED | OUTPATIENT
Start: 2020-09-12 | End: 2020-11-11 | Stop reason: SDUPTHER

## 2020-09-14 NOTE — TELEPHONE ENCOUNTER
----- Message from Radha Albarado sent at 9/14/2020  4:12 PM CDT -----  Contact: Nazario from [facility]  Type:  RX Refill Request    Who Called:  Nazario  Refill or New Rx: refill  RX Name and Strength: fenofibrate micronized (LOFIBRA) 134 MG Cap  How is the patient currently taking it? (ex. 1XDay): 1 x day   Is this a 30 day or 90 day RX: 90 day   Preferred Pharmacy with phone number: [facility] [place] [address] [phone] (Phone)  [phone] (Fax)  Local or Mail Order: local   Ordering Provider:    Would the patient rather a call back or a response via [facility]?  Call back   Best Call Back Number: [phone]  Additional Information: none

## 2020-09-15 RX ORDER — FENOFIBRATE 134 MG/1
134 CAPSULE ORAL DAILY
Qty: 90 CAPSULE | Refills: 1 | OUTPATIENT
Start: 2020-09-15

## 2020-09-15 NOTE — TELEPHONE ENCOUNTER
----- Message from Sully Dowling sent at 9/15/2020  2:15 PM CDT -----  Contact: Nazario @ [facility] [phone]  Type:  Pharmacy Calling to Clarify an RX    Name of Caller: Nazario  Pharmacy Name: [facility]  Prescription Name:fenofibrate micronized (LOFIBRA) 134 MG Cap  What do they need to clarify?:regarding a refill request  Best Call Back Number:[phone]

## 2020-09-16 RX ORDER — FENOFIBRATE 134 MG/1
134 CAPSULE ORAL DAILY
Qty: 90 CAPSULE | Refills: 3 | Status: SHIPPED | OUTPATIENT
Start: 2020-09-16 | End: 2021-12-17 | Stop reason: SDUPTHER

## 2020-09-16 NOTE — TELEPHONE ENCOUNTER
Spoke with Nazario from pharmacy and let him know that refill request is pending. Nazario voices understanding.

## 2020-09-16 NOTE — TELEPHONE ENCOUNTER
----- Message from Shelley Arrington sent at 9/16/2020 12:18 PM CDT -----  Regarding: Refill Status  Contact: Nazario/ [phone]  Nazario with [facility] called in fenofibrate micronized (LOFIBRA) 134 MG Cap. He wanted to kno\w the status of it being sent. Please call.

## 2020-09-17 ENCOUNTER — OFFICE VISIT (OUTPATIENT)
Dept: INTERNAL MEDICINE | Facility: CLINIC | Age: 70
End: 2020-09-17
Payer: MEDICARE

## 2020-09-17 ENCOUNTER — LAB VISIT (OUTPATIENT)
Dept: LAB | Facility: HOSPITAL | Age: 70
End: 2020-09-17
Attending: INTERNAL MEDICINE
Payer: MEDICARE

## 2020-09-17 VITALS
TEMPERATURE: 98 F | HEIGHT: 61 IN | OXYGEN SATURATION: 97 % | WEIGHT: 239.44 LBS | DIASTOLIC BLOOD PRESSURE: 74 MMHG | BODY MASS INDEX: 45.21 KG/M2 | SYSTOLIC BLOOD PRESSURE: 128 MMHG | HEART RATE: 87 BPM

## 2020-09-17 DIAGNOSIS — L71.9 ROSACEA: ICD-10-CM

## 2020-09-17 DIAGNOSIS — I27.20 PULMONARY HYPERTENSION: ICD-10-CM

## 2020-09-17 DIAGNOSIS — Z23 NEED FOR INFLUENZA VACCINATION: ICD-10-CM

## 2020-09-17 DIAGNOSIS — Z74.09 IMPAIRED MOBILITY: ICD-10-CM

## 2020-09-17 DIAGNOSIS — D22.9 ATYPICAL NEVI: Primary | ICD-10-CM

## 2020-09-17 DIAGNOSIS — R60.0 BILATERAL LOWER EXTREMITY EDEMA: ICD-10-CM

## 2020-09-17 DIAGNOSIS — F32.1 CURRENT MODERATE EPISODE OF MAJOR DEPRESSIVE DISORDER WITHOUT PRIOR EPISODE: Primary | ICD-10-CM

## 2020-09-17 DIAGNOSIS — H04.123 DRY EYES: ICD-10-CM

## 2020-09-17 LAB
ALBUMIN SERPL BCP-MCNC: 4.1 G/DL (ref 3.5–5.2)
ALP SERPL-CCNC: 60 U/L (ref 55–135)
ALT SERPL W/O P-5'-P-CCNC: 23 U/L (ref 10–44)
ANION GAP SERPL CALC-SCNC: 16 MMOL/L (ref 8–16)
AST SERPL-CCNC: 30 U/L (ref 10–40)
BASOPHILS # BLD AUTO: 0.07 K/UL (ref 0–0.2)
BASOPHILS NFR BLD: 0.9 % (ref 0–1.9)
BILIRUB SERPL-MCNC: 0.3 MG/DL (ref 0.1–1)
BUN SERPL-MCNC: 21 MG/DL (ref 8–23)
CALCIUM SERPL-MCNC: 9.4 MG/DL (ref 8.7–10.5)
CHLORIDE SERPL-SCNC: 103 MMOL/L (ref 95–110)
CO2 SERPL-SCNC: 22 MMOL/L (ref 23–29)
CREAT SERPL-MCNC: 0.9 MG/DL (ref 0.5–1.4)
CRP SERPL-MCNC: 4.3 MG/L (ref 0–8.2)
DIFFERENTIAL METHOD: ABNORMAL
EOSINOPHIL # BLD AUTO: 0.1 K/UL (ref 0–0.5)
EOSINOPHIL NFR BLD: 1.2 % (ref 0–8)
ERYTHROCYTE [DISTWIDTH] IN BLOOD BY AUTOMATED COUNT: 14.2 % (ref 11.5–14.5)
ERYTHROCYTE [SEDIMENTATION RATE] IN BLOOD BY WESTERGREN METHOD: 25 MM/HR (ref 0–36)
EST. GFR  (AFRICAN AMERICAN): >60 ML/MIN/1.73 M^2
EST. GFR  (NON AFRICAN AMERICAN): >60 ML/MIN/1.73 M^2
GLUCOSE SERPL-MCNC: 150 MG/DL (ref 70–110)
HCT VFR BLD AUTO: 45.2 % (ref 37–48.5)
HGB BLD-MCNC: 13.8 G/DL (ref 12–16)
IMM GRANULOCYTES # BLD AUTO: 0.02 K/UL (ref 0–0.04)
IMM GRANULOCYTES NFR BLD AUTO: 0.2 % (ref 0–0.5)
LYMPHOCYTES # BLD AUTO: 1.8 K/UL (ref 1–4.8)
LYMPHOCYTES NFR BLD: 22.2 % (ref 18–48)
MCH RBC QN AUTO: 28.7 PG (ref 27–31)
MCHC RBC AUTO-ENTMCNC: 30.5 G/DL (ref 32–36)
MCV RBC AUTO: 94 FL (ref 82–98)
MONOCYTES # BLD AUTO: 0.8 K/UL (ref 0.3–1)
MONOCYTES NFR BLD: 9.4 % (ref 4–15)
NEUTROPHILS # BLD AUTO: 5.3 K/UL (ref 1.8–7.7)
NEUTROPHILS NFR BLD: 66.1 % (ref 38–73)
NRBC BLD-RTO: 0 /100 WBC
PLATELET # BLD AUTO: 359 K/UL (ref 150–350)
PMV BLD AUTO: 10.3 FL (ref 9.2–12.9)
POTASSIUM SERPL-SCNC: 3.6 MMOL/L (ref 3.5–5.1)
PROT SERPL-MCNC: 8.1 G/DL (ref 6–8.4)
RBC # BLD AUTO: 4.81 M/UL (ref 4–5.4)
SODIUM SERPL-SCNC: 141 MMOL/L (ref 136–145)
WBC # BLD AUTO: 8.07 K/UL (ref 3.9–12.7)

## 2020-09-17 PROCEDURE — 1100F PTFALLS ASSESS-DOCD GE2>/YR: CPT | Mod: HCNC,CPTII,S$GLB, | Performed by: INTERNAL MEDICINE

## 2020-09-17 PROCEDURE — G0008 ADMIN INFLUENZA VIRUS VAC: HCPCS | Mod: HCNC,S$GLB,, | Performed by: INTERNAL MEDICINE

## 2020-09-17 PROCEDURE — 1100F PR PT FALLS ASSESS DOC 2+ FALLS/FALL W/INJURY/YR: ICD-10-PCS | Mod: HCNC,CPTII,S$GLB, | Performed by: INTERNAL MEDICINE

## 2020-09-17 PROCEDURE — 99214 PR OFFICE/OUTPT VISIT, EST, LEVL IV, 30-39 MIN: ICD-10-PCS | Mod: 25,HCNC,S$GLB, | Performed by: INTERNAL MEDICINE

## 2020-09-17 PROCEDURE — 86140 C-REACTIVE PROTEIN: CPT | Mod: HCNC

## 2020-09-17 PROCEDURE — 3074F PR MOST RECENT SYSTOLIC BLOOD PRESSURE < 130 MM HG: ICD-10-PCS | Mod: HCNC,CPTII,S$GLB, | Performed by: INTERNAL MEDICINE

## 2020-09-17 PROCEDURE — 90694 FLU VACCINE - QUADRIVALENT - ADJUVANTED: ICD-10-PCS | Mod: HCNC,S$GLB,, | Performed by: INTERNAL MEDICINE

## 2020-09-17 PROCEDURE — 99499 UNLISTED E&M SERVICE: CPT | Mod: HCNC,S$GLB,, | Performed by: INTERNAL MEDICINE

## 2020-09-17 PROCEDURE — 99499 RISK ADDL DX/OHS AUDIT: ICD-10-PCS | Mod: HCNC,S$GLB,, | Performed by: INTERNAL MEDICINE

## 2020-09-17 PROCEDURE — 85025 COMPLETE CBC W/AUTO DIFF WBC: CPT | Mod: HCNC

## 2020-09-17 PROCEDURE — 3288F PR FALLS RISK ASSESSMENT DOCUMENTED: ICD-10-PCS | Mod: HCNC,CPTII,S$GLB, | Performed by: INTERNAL MEDICINE

## 2020-09-17 PROCEDURE — G0008 PR ADMIN INFLUENZA VIRUS VAC: ICD-10-PCS | Mod: HCNC,S$GLB,, | Performed by: INTERNAL MEDICINE

## 2020-09-17 PROCEDURE — 86431 RHEUMATOID FACTOR QUANT: CPT | Mod: HCNC

## 2020-09-17 PROCEDURE — 3288F FALL RISK ASSESSMENT DOCD: CPT | Mod: HCNC,CPTII,S$GLB, | Performed by: INTERNAL MEDICINE

## 2020-09-17 PROCEDURE — 1159F MED LIST DOCD IN RCRD: CPT | Mod: HCNC,S$GLB,, | Performed by: INTERNAL MEDICINE

## 2020-09-17 PROCEDURE — 86200 CCP ANTIBODY: CPT | Mod: HCNC

## 2020-09-17 PROCEDURE — 3008F BODY MASS INDEX DOCD: CPT | Mod: HCNC,CPTII,S$GLB, | Performed by: INTERNAL MEDICINE

## 2020-09-17 PROCEDURE — 3078F PR MOST RECENT DIASTOLIC BLOOD PRESSURE < 80 MM HG: ICD-10-PCS | Mod: HCNC,CPTII,S$GLB, | Performed by: INTERNAL MEDICINE

## 2020-09-17 PROCEDURE — 1125F AMNT PAIN NOTED PAIN PRSNT: CPT | Mod: HCNC,S$GLB,, | Performed by: INTERNAL MEDICINE

## 2020-09-17 PROCEDURE — 3008F PR BODY MASS INDEX (BMI) DOCUMENTED: ICD-10-PCS | Mod: HCNC,CPTII,S$GLB, | Performed by: INTERNAL MEDICINE

## 2020-09-17 PROCEDURE — 86038 ANTINUCLEAR ANTIBODIES: CPT | Mod: HCNC

## 2020-09-17 PROCEDURE — 3078F DIAST BP <80 MM HG: CPT | Mod: HCNC,CPTII,S$GLB, | Performed by: INTERNAL MEDICINE

## 2020-09-17 PROCEDURE — 36415 COLL VENOUS BLD VENIPUNCTURE: CPT | Mod: HCNC,PO

## 2020-09-17 PROCEDURE — 1159F PR MEDICATION LIST DOCUMENTED IN MEDICAL RECORD: ICD-10-PCS | Mod: HCNC,S$GLB,, | Performed by: INTERNAL MEDICINE

## 2020-09-17 PROCEDURE — 85652 RBC SED RATE AUTOMATED: CPT | Mod: HCNC

## 2020-09-17 PROCEDURE — 80053 COMPREHEN METABOLIC PANEL: CPT | Mod: HCNC

## 2020-09-17 PROCEDURE — 1125F PR PAIN SEVERITY QUANTIFIED, PAIN PRESENT: ICD-10-PCS | Mod: HCNC,S$GLB,, | Performed by: INTERNAL MEDICINE

## 2020-09-17 PROCEDURE — 99999 PR PBB SHADOW E&M-EST. PATIENT-LVL V: ICD-10-PCS | Mod: PBBFAC,HCNC,, | Performed by: INTERNAL MEDICINE

## 2020-09-17 PROCEDURE — 99214 OFFICE O/P EST MOD 30 MIN: CPT | Mod: 25,HCNC,S$GLB, | Performed by: INTERNAL MEDICINE

## 2020-09-17 PROCEDURE — 99999 PR PBB SHADOW E&M-EST. PATIENT-LVL V: CPT | Mod: PBBFAC,HCNC,, | Performed by: INTERNAL MEDICINE

## 2020-09-17 PROCEDURE — 90694 VACC AIIV4 NO PRSRV 0.5ML IM: CPT | Mod: HCNC,S$GLB,, | Performed by: INTERNAL MEDICINE

## 2020-09-17 PROCEDURE — 3074F SYST BP LT 130 MM HG: CPT | Mod: HCNC,CPTII,S$GLB, | Performed by: INTERNAL MEDICINE

## 2020-09-17 RX ORDER — SERTRALINE HYDROCHLORIDE 100 MG/1
100 TABLET, FILM COATED ORAL DAILY
Qty: 30 TABLET | Refills: 2 | Status: SHIPPED | OUTPATIENT
Start: 2020-09-17 | End: 2021-01-19 | Stop reason: SDUPTHER

## 2020-09-17 RX ORDER — CYCLOSPORINE 0.5 MG/ML
1 EMULSION OPHTHALMIC 2 TIMES DAILY
Qty: 24 ML | Refills: 11 | Status: SHIPPED | OUTPATIENT
Start: 2020-09-17 | End: 2022-05-25

## 2020-09-17 RX ORDER — METRONIDAZOLE 10 MG/G
GEL TOPICAL DAILY
Qty: 60 G | Refills: 2 | Status: ON HOLD | OUTPATIENT
Start: 2020-09-17 | End: 2022-05-25 | Stop reason: ALTCHOICE

## 2020-09-17 NOTE — PROGRESS NOTES
Patient ID: Monique Trujillo is a 69 y.o. female.    Chief Complaint: Rash (face )    HPI Monique is a 69 y.o. female with depression, rosacea,, hypertension, hyperlipidemia, obstructive sleep apnea, pulmonary hypertension presents with multiple complaints today.  1.  Patient complains of rash on her face.  She is concerned that she has lupus due to this rash.  Says she has pimples in skin is sensitive to touch.  2.  Complains of dry eyes.  Using over-the-counter eyedrops, but with no relief.  3.  Complains of gaining weight.  Admits that she eats a lot of frozen dinners and sandwiches.  4.  Has had worsening swelling in her legs and put herself back on daily Lasix for this.  5.  Left knee pain.  Upon questioning, patient states she has not been doing well since her  passed away in May.  She is still taking Zoloft 50 mg p.o. daily.    Review of Systems   Constitutional: Positive for unexpected weight change (weight gain).   Eyes:        Dry eyes   Skin: Positive for rash.   Psychiatric/Behavioral: Positive for dysphoric mood.   All other systems reviewed and are negative.      Objective:     Vitals:    09/17/20 1123   BP: 128/74   Pulse: 87   Temp: 98.2 °F (36.8 °C)        Physical Exam  Vitals signs reviewed.   Constitutional:       General: She is not in acute distress.     Appearance: She is well-developed. She is not diaphoretic.   HENT:      Head: Normocephalic and atraumatic.      Right Ear: External ear normal.      Left Ear: External ear normal.      Nose: Nose normal.   Eyes:      General: No scleral icterus.        Right eye: No discharge.         Left eye: No discharge.      Extraocular Movements: Extraocular movements intact.      Conjunctiva/sclera: Conjunctivae normal.   Cardiovascular:      Rate and Rhythm: Normal rate and regular rhythm.      Heart sounds: Normal heart sounds. No murmur. No friction rub. No gallop.    Pulmonary:      Effort: Pulmonary effort is normal. No respiratory  distress.      Breath sounds: Normal breath sounds. No stridor. No wheezing, rhonchi or rales.   Musculoskeletal:      Right lower leg: Edema (1+) present.      Left lower leg: Edema (1+) present.   Skin:     General: Skin is warm and dry.   Neurological:      General: No focal deficit present.      Mental Status: She is alert and oriented to person, place, and time. Mental status is at baseline.   Psychiatric:         Mood and Affect: Mood normal.         Behavior: Behavior normal.         Thought Content: Thought content normal.         Judgment: Judgment normal.         Assessment:       1. Current moderate episode of major depressive disorder without prior episode Active   2. Need for influenza vaccination    3. Dry eyes Active   4. Rosacea Active   5. Bilateral lower extremity edema Active   6. Pulmonary hypertension Active       Plan:         Current moderate episode of major depressive disorder without prior episode  Comments:  Increase Zoloft to 100 mg p.o. daily.  Orders:  -     sertraline (ZOLOFT) 100 MG tablet; Take 1 tablet (100 mg total) by mouth once daily.  Dispense: 30 tablet; Refill: 2    Need for influenza vaccination  -     Influenza (FLUAD) - Quadrivalent (Adjuvanted) *Preferred* (65+) (PF)    Dry eyes  -     cycloSPORINE (RESTASIS) 0.05 % ophthalmic emulsion; Place 1 drop into both eyes 2 (two) times daily.  Dispense: 24 mL; Refill: 11    Rosacea  -     metronidazole 1% (METROGEL) 1 % Gel; Apply topically once daily.  Dispense: 60 g; Refill: 2    Bilateral lower extremity edema  Comments:  Likely secondary to pulmonary hypertension  Orders:  -     CBC auto differential; Future; Expected date: 09/17/2020  -     Comprehensive metabolic panel; Future; Expected date: 09/17/2020  -     Sedimentation rate; Future; Expected date: 09/17/2020  -     C-reactive protein; Future; Expected date: 09/17/2020  -     JUNAID; Future; Expected date: 09/17/2020  -     Cyclic citrul peptide antibody, IgG; Future;  Expected date: 09/17/2020  -     Rheumatoid factor; Future; Expected date: 09/17/2020    Pulmonary hypertension  Comments:  Continue diuretic     25 min spent in room with patient with greater than 50% of time spent in discussion of current complaints such as swelling, depression, and rosacea.    RTC 6 weeks, f/u depression        Warning signs discussed, patient to call with any further issues or worsening of symptoms.       Parts of the above note were dictated using a voice dictation software. Please excuse any grammatical or typographical errors.

## 2020-09-17 NOTE — PATIENT INSTRUCTIONS
We need to focus on diet. Frozen meals are typically high in fat and sodium. I recommend stopping the frozen meals. We need 3 meals per day (each with lean protein such as eggs, fish, chicken --baked or grilled. Never fried foods. Each meal should contain fruit or vegetable).  We need to move as much as possible for exercise even if that just means legs lifts and arm lifts while sitting.

## 2020-09-18 ENCOUNTER — HOSPITAL ENCOUNTER (OUTPATIENT)
Dept: SLEEP MEDICINE | Facility: OTHER | Age: 70
Discharge: HOME | End: 2020-09-18
Attending: PSYCHIATRY & NEUROLOGY
Payer: MEDICARE

## 2020-09-18 ENCOUNTER — TELEPHONE (OUTPATIENT)
Dept: FAMILY MEDICINE | Facility: CLINIC | Age: 70
End: 2020-09-18

## 2020-09-18 DIAGNOSIS — G47.30 SLEEP APNEA, UNSPECIFIED TYPE: ICD-10-CM

## 2020-09-18 DIAGNOSIS — L71.9 ROSACEA: Primary | ICD-10-CM

## 2020-09-18 DIAGNOSIS — G47.33 OSA (OBSTRUCTIVE SLEEP APNEA): ICD-10-CM

## 2020-09-18 LAB
ANA SER QL IF: NORMAL
CCP AB SER IA-ACNC: <0.5 U/ML
RHEUMATOID FACT SERPL-ACNC: <10 IU/ML (ref 0–15)

## 2020-09-18 PROCEDURE — 95810 POLYSOM 6/> YRS 4/> PARAM: CPT | Mod: 26,HCNC,, | Performed by: PSYCHIATRY & NEUROLOGY

## 2020-09-18 PROCEDURE — 95810 POLYSOM 6/> YRS 4/> PARAM: CPT | Mod: HCNC

## 2020-09-18 PROCEDURE — 95810 PR POLYSOMNOGRAPHY, 4 OR MORE: ICD-10-PCS | Mod: 26,HCNC,, | Performed by: PSYCHIATRY & NEUROLOGY

## 2020-09-18 RX ORDER — AZELAIC ACID 0.15 G/G
GEL TOPICAL
Qty: 50 G | Refills: 2 | Status: SHIPPED | OUTPATIENT
Start: 2020-09-18 | End: 2021-09-18

## 2020-09-18 NOTE — TELEPHONE ENCOUNTER
----- Message from Jacque Garcia sent at 9/18/2020 10:06 AM CDT -----  Regarding: medication  Contact: pharmacy  [facility] called in regards to following medication  would like to know if they can change to clindamycin gel      metronidazole 1% (METROGEL) 1 % Gel 60 g 2 9/17/2020 9/17/2021 --  Sig - Route: Apply topically once daily. - Topical (Top)  Sent to pharmacy as: metronidazole 1% (METROGEL) 1 % Gel  Class: Normal  Order: [id number]  Date/Time Signed:          [facility] pharmacy [phone]

## 2020-09-19 NOTE — PROGRESS NOTES
Patient was educated about the purpose of the wires and what data was being collected. Patient was informed that the technician may need to enter the room during the night to fix leads or make adjustments to the equipment. Patient did not qualify for cpap titration. Respiratory events were observed throughout the study. Patient complained of leg pains and reflux. Head of bed elevated to 15 degrees for reflux.    EKG appears to be NSR       Follow up instructions were provided to the patient.

## 2020-09-24 ENCOUNTER — PATIENT OUTREACH (OUTPATIENT)
Dept: ADMINISTRATIVE | Facility: OTHER | Age: 70
End: 2020-09-24

## 2020-09-24 NOTE — PROGRESS NOTES
Care Everywhere:   Immunization: updated  Health Maintenance: updated  Media Review:   Legacy Review:   Order placed:   Upcoming appts:mammogram 9/28/2020

## 2020-09-25 ENCOUNTER — OFFICE VISIT (OUTPATIENT)
Dept: DERMATOLOGY | Facility: CLINIC | Age: 70
End: 2020-09-25
Payer: MEDICARE

## 2020-09-25 ENCOUNTER — TELEPHONE (OUTPATIENT)
Dept: DERMATOLOGY | Facility: CLINIC | Age: 70
End: 2020-09-25

## 2020-09-25 VITALS — BODY MASS INDEX: 45.16 KG/M2 | WEIGHT: 239 LBS

## 2020-09-25 DIAGNOSIS — L71.9 ACNE ROSACEA: Primary | ICD-10-CM

## 2020-09-25 DIAGNOSIS — D22.9 ATYPICAL NEVI: ICD-10-CM

## 2020-09-25 PROCEDURE — 99214 PR OFFICE/OUTPT VISIT, EST, LEVL IV, 30-39 MIN: ICD-10-PCS | Mod: HCNC,25,S$GLB, | Performed by: DERMATOLOGY

## 2020-09-25 PROCEDURE — 1125F PR PAIN SEVERITY QUANTIFIED, PAIN PRESENT: ICD-10-PCS | Mod: HCNC,S$GLB,, | Performed by: DERMATOLOGY

## 2020-09-25 PROCEDURE — 3008F BODY MASS INDEX DOCD: CPT | Mod: HCNC,CPTII,S$GLB, | Performed by: DERMATOLOGY

## 2020-09-25 PROCEDURE — 1101F PR PT FALLS ASSESS DOC 0-1 FALLS W/OUT INJ PAST YR: ICD-10-PCS | Mod: HCNC,CPTII,S$GLB, | Performed by: DERMATOLOGY

## 2020-09-25 PROCEDURE — 1125F AMNT PAIN NOTED PAIN PRSNT: CPT | Mod: HCNC,S$GLB,, | Performed by: DERMATOLOGY

## 2020-09-25 PROCEDURE — 99214 OFFICE O/P EST MOD 30 MIN: CPT | Mod: HCNC,25,S$GLB, | Performed by: DERMATOLOGY

## 2020-09-25 PROCEDURE — 99999 PR PBB SHADOW E&M-EST. PATIENT-LVL V: ICD-10-PCS | Mod: PBBFAC,HCNC,, | Performed by: DERMATOLOGY

## 2020-09-25 PROCEDURE — 99999 PR PBB SHADOW E&M-EST. PATIENT-LVL V: CPT | Mod: PBBFAC,HCNC,, | Performed by: DERMATOLOGY

## 2020-09-25 PROCEDURE — 1159F PR MEDICATION LIST DOCUMENTED IN MEDICAL RECORD: ICD-10-PCS | Mod: HCNC,S$GLB,, | Performed by: DERMATOLOGY

## 2020-09-25 PROCEDURE — 17110 DESTRUCTION B9 LES UP TO 14: CPT | Mod: HCNC,S$GLB,, | Performed by: DERMATOLOGY

## 2020-09-25 PROCEDURE — 17110 PR DESTRUCTION BENIGN LESIONS UP TO 14: ICD-10-PCS | Mod: HCNC,S$GLB,, | Performed by: DERMATOLOGY

## 2020-09-25 PROCEDURE — 3008F PR BODY MASS INDEX (BMI) DOCUMENTED: ICD-10-PCS | Mod: HCNC,CPTII,S$GLB, | Performed by: DERMATOLOGY

## 2020-09-25 PROCEDURE — 1159F MED LIST DOCD IN RCRD: CPT | Mod: HCNC,S$GLB,, | Performed by: DERMATOLOGY

## 2020-09-25 PROCEDURE — 1101F PT FALLS ASSESS-DOCD LE1/YR: CPT | Mod: HCNC,CPTII,S$GLB, | Performed by: DERMATOLOGY

## 2020-09-25 RX ORDER — AMMONIUM LACTATE 12 G/100G
LOTION TOPICAL
Qty: 225 BOTTLE | Refills: 6 | Status: SHIPPED | OUTPATIENT
Start: 2020-09-25 | End: 2020-09-25 | Stop reason: CLARIF

## 2020-09-25 RX ORDER — CLINDAMYCIN PHOSPHATE 10 UG/ML
LOTION TOPICAL
Qty: 60 ML | Refills: 3 | Status: SHIPPED | OUTPATIENT
Start: 2020-09-25

## 2020-09-25 NOTE — LETTER
September 25, 2020      Katelynn Laura MD  [address]  [place] [address]           [place] [facility]  [address]  [place] [address]  Phone: [phone]  Fax: [phone]          Patient: Monique Trujillo   MR Number: [id number]   YOB: 1950   Date of Visit: 9/25/2020       Dear Dr. Katelynn Laura:    Thank you for referring Monique Trujillo to me for evaluation. Attached you will find relevant portions of my assessment and plan of care.    If you have questions, please do not hesitate to call me. I look forward to following Monique Trujillo along with you.    Sincerely,    Loree Pro MD    Enclosure  CC:  No Recipients    If you would like to receive this communication electronically, please contact [email or web address] or [phone] to request more information on [employer] Link access.    For providers and/or their staff who would like to refer a patient to [facility], please contact us through our one-stop-shop provider referral line, [facility], at [phone].    If you feel you have received this communication in error or would no longer like to receive these types of communications, please e-mail [email or web address]

## 2020-09-25 NOTE — PROGRESS NOTES
"  Subjective:       Patient ID:  Monique Trujillo is a 69 y.o. female who presents for   Chief Complaint   Patient presents with    Skin Discoloration     face     9/17 hx: "1.  Patient complains of rash on her face.  She is concerned that she has lupus due to this rash.  Says she has pimples in skin is sensitive to touch.  Rosacea  -     metronidazole 1% (METROGEL) 1 % Gel; Apply topically once daily.  Dispense: 60 g; Refill: 2"    Not using any tx the metrogel and the finacea were too expensive.    Also 2 lesions on right forehead irritated, peel some no tx.        Skin Discoloration - Initial  Affected locations: face  Signs / symptoms: itching, pain and irritated  Aggravated by: nothing  Relieving factors/Treatments tried: nothing    Mole        Review of Systems   Constitutional: Negative for fever, chills, weight loss, weight gain, fatigue, night sweats and malaise.   Skin: Negative for daily sunscreen use, activity-related sunscreen use and wears hat.        Objective:    Physical Exam   Constitutional: She appears well-developed and well-nourished.   Neurological: She is alert and oriented to person, place, and time.   Psychiatric: She has a normal mood and affect.   Skin:   Areas Examined (abnormalities noted in diagram):   Head / Face Inspection Performed  Neck Inspection Performed  Back Inspection Performed  RUE Inspected  LUE Inspection Performed  Nails and Digits Inspection Performed              Diagram Legend     Erythematous scaling macule/papule c/w actinic keratosis       Vascular papule c/w angioma      Pigmented verrucoid papule/plaque c/w seborrheic keratosis      Yellow umbilicated papule c/w sebaceous hyperplasia      Irregularly shaped tan macule c/w lentigo     1-2 mm smooth white papules consistent with Milia      Movable subcutaneous cyst with punctum c/w epidermal inclusion cyst      Subcutaneous movable cyst c/w pilar cyst      Firm pink to brown papule c/w dermatofibroma      " Pedunculated fleshy papule(s) c/w skin tag(s)      Evenly pigmented macule c/w junctional nevus     Mildly variegated pigmented, slightly irregular-bordered macule c/w mildly atypical nevus      Flesh colored to evenly pigmented papule c/w intradermal nevus       Pink pearly papule/plaque c/w basal cell carcinoma      Erythematous hyperkeratotic cursted plaque c/w SCC      Surgical scar with no sign of skin cancer recurrence      Open and closed comedones      Inflammatory papules and pustules      Verrucoid papule consistent consistent with wart     Erythematous eczematous patches and plaques     Dystrophic onycholytic nail with subungual debris c/w onychomycosis     Umbilicated papule    Erythematous-base heme-crusted tan verrucoid plaque consistent with inflamed seborrheic keratosis     Erythematous Silvery Scaling Plaque c/w Psoriasis     See annotation      Assessment / Plan:      seborrheic keratoses  Cryosurgery procedure note:    Verbal consent from the patient is obtained including, but not limited to, risk of hypopigmentation/hyperpigmentation, scar, recurrence of lesion. Liquid nitrogen cryosurgery is applied to 2 lesions to produce a freeze injury, is instructed to call if lesions do not completely resolve.  Brochure provided      Acne rosacea  -     clindamycin (CLEOCIN T) 1 % lotion; Use hs on face  Dispense: 60 mL; Refill: 3  eucerin redness relief cleanser am          Atypical nevi  -     Ambulatory referral/consult to Dermatology               Follow up if symptoms worsen or fail to improve.

## 2020-09-28 ENCOUNTER — TELEPHONE (OUTPATIENT)
Dept: SLEEP MEDICINE | Facility: CLINIC | Age: 70
End: 2020-09-28

## 2020-09-28 DIAGNOSIS — G47.33 OSA (OBSTRUCTIVE SLEEP APNEA): Primary | ICD-10-CM

## 2020-09-28 NOTE — TELEPHONE ENCOUNTER
CB,      Please call the patient to inform re: recent sleep study was positive for significant TIMMY  We were able to figure out CPAP settings  We can order CPAP machine (in that case order the follow up visit in 6 weeks), or does she want to come now to discuss sleep study with MD/NP?    Thanks!    Please forward the message back to me if you could not reach the patient and could not leave a voicemail.

## 2020-09-28 NOTE — TELEPHONE ENCOUNTER
Please place order for Cpap machine. Pt is scheduled for a f/u on 12/02/2020 @ 10:00a w/ Dr. Mendez.

## 2020-09-28 NOTE — PROCEDURES
"See imported Detailed Sleep Study Reports with raw data in  "Chart Review" under the "Media tab".      (This Sleep Study was interpreted by a Board Certified Sleep Specialist who conducted an epoch-by-epoch review of the entire raw data recording.)     (The indication for this sleep study was reviewed and deemed appropriate by AASM Practice Parameters or other reasons by a Board Certified Sleep Specialist.)  ___________________       Sleep architecture: This is a baseline polysomnogram. At lights out, the patient fell asleep in 16.0 minutes and slept for 72.9% of the time. Total sleep time (TST) was 309.3 minutes. 14.2% of TST was in Stage N1 sleep, 37.4% TST in slow wave sleep, and 22.6% TST in REM sleep. The REM latency was 54.5 minutes. Sleep architecture was significantly disrupted due to underlying sleep apnea.   Respiratory: Mild snoring was present. The overall AHI was 17.5 with an oxygen elijah of 77.0%. The supine AHI was 21.9 and the REM AHI was 57.4. The patient did not qualify for a split night study due to an insufficient number of events in the first half of the study. RDI (Respiratory Disturbance Index) was 21.9.   Motor movement / Parasomnia: There were no significant limb movements of sleep noted. The total limb movement index was 128.6 (12.6with arousal).   Cardiac: Cardiac rhythm monitoring revealed a sinus rhythm.   Patient perception: On a post-sleep study questionnaire, the patient indicated that sleep was the same in the lab than compared to home.   IMPRESSION:   1. Obstructive Sleep Apnea (G47.33), moderate based on AHI criteria.     RECOMMENDATION:   1. CPAP titration study, if the patient is interested in this treatment modality.   2. In the interim, the patient should avoid supine position sleep, since sleep disordered breathing was most prevalent in this sleeping position.     "

## 2020-09-29 ENCOUNTER — PATIENT MESSAGE (OUTPATIENT)
Dept: OTHER | Facility: OTHER | Age: 70
End: 2020-09-29

## 2020-11-03 ENCOUNTER — OFFICE VISIT (OUTPATIENT)
Dept: INTERNAL MEDICINE | Facility: CLINIC | Age: 70
End: 2020-11-03
Payer: MEDICARE

## 2020-11-03 VITALS
BODY MASS INDEX: 44.53 KG/M2 | WEIGHT: 235.88 LBS | OXYGEN SATURATION: 96 % | DIASTOLIC BLOOD PRESSURE: 74 MMHG | HEART RATE: 87 BPM | TEMPERATURE: 97 F | SYSTOLIC BLOOD PRESSURE: 132 MMHG | HEIGHT: 61 IN

## 2020-11-03 DIAGNOSIS — F32.A DEPRESSION, UNSPECIFIED DEPRESSION TYPE: ICD-10-CM

## 2020-11-03 DIAGNOSIS — H04.123 DRY EYES: ICD-10-CM

## 2020-11-03 DIAGNOSIS — M79.89 LEG SWELLING: ICD-10-CM

## 2020-11-03 DIAGNOSIS — G44.229 CHRONIC TENSION-TYPE HEADACHE, NOT INTRACTABLE: ICD-10-CM

## 2020-11-03 DIAGNOSIS — I27.20 PULMONARY HYPERTENSION: ICD-10-CM

## 2020-11-03 DIAGNOSIS — L72.3 SEBACEOUS CYST: ICD-10-CM

## 2020-11-03 DIAGNOSIS — L71.9 ROSACEA: ICD-10-CM

## 2020-11-03 PROCEDURE — 3075F PR MOST RECENT SYSTOLIC BLOOD PRESS GE 130-139MM HG: ICD-10-PCS | Mod: HCNC,CPTII,S$GLB, | Performed by: INTERNAL MEDICINE

## 2020-11-03 PROCEDURE — 99999 PR PBB SHADOW E&M-EST. PATIENT-LVL V: CPT | Mod: PBBFAC,HCNC,, | Performed by: INTERNAL MEDICINE

## 2020-11-03 PROCEDURE — 1101F PT FALLS ASSESS-DOCD LE1/YR: CPT | Mod: HCNC,CPTII,S$GLB, | Performed by: INTERNAL MEDICINE

## 2020-11-03 PROCEDURE — 3075F SYST BP GE 130 - 139MM HG: CPT | Mod: HCNC,CPTII,S$GLB, | Performed by: INTERNAL MEDICINE

## 2020-11-03 PROCEDURE — 1101F PR PT FALLS ASSESS DOC 0-1 FALLS W/OUT INJ PAST YR: ICD-10-PCS | Mod: HCNC,CPTII,S$GLB, | Performed by: INTERNAL MEDICINE

## 2020-11-03 PROCEDURE — 1159F PR MEDICATION LIST DOCUMENTED IN MEDICAL RECORD: ICD-10-PCS | Mod: HCNC,S$GLB,, | Performed by: INTERNAL MEDICINE

## 2020-11-03 PROCEDURE — 99999 PR PBB SHADOW E&M-EST. PATIENT-LVL V: ICD-10-PCS | Mod: PBBFAC,HCNC,, | Performed by: INTERNAL MEDICINE

## 2020-11-03 PROCEDURE — 3008F PR BODY MASS INDEX (BMI) DOCUMENTED: ICD-10-PCS | Mod: HCNC,CPTII,S$GLB, | Performed by: INTERNAL MEDICINE

## 2020-11-03 PROCEDURE — 3078F PR MOST RECENT DIASTOLIC BLOOD PRESSURE < 80 MM HG: ICD-10-PCS | Mod: HCNC,CPTII,S$GLB, | Performed by: INTERNAL MEDICINE

## 2020-11-03 PROCEDURE — 1126F AMNT PAIN NOTED NONE PRSNT: CPT | Mod: HCNC,S$GLB,, | Performed by: INTERNAL MEDICINE

## 2020-11-03 PROCEDURE — 3078F DIAST BP <80 MM HG: CPT | Mod: HCNC,CPTII,S$GLB, | Performed by: INTERNAL MEDICINE

## 2020-11-03 PROCEDURE — 1159F MED LIST DOCD IN RCRD: CPT | Mod: HCNC,S$GLB,, | Performed by: INTERNAL MEDICINE

## 2020-11-03 PROCEDURE — 1126F PR PAIN SEVERITY QUANTIFIED, NO PAIN PRESENT: ICD-10-PCS | Mod: HCNC,S$GLB,, | Performed by: INTERNAL MEDICINE

## 2020-11-03 PROCEDURE — 3008F BODY MASS INDEX DOCD: CPT | Mod: HCNC,CPTII,S$GLB, | Performed by: INTERNAL MEDICINE

## 2020-11-03 PROCEDURE — 99214 PR OFFICE/OUTPT VISIT, EST, LEVL IV, 30-39 MIN: ICD-10-PCS | Mod: HCNC,S$GLB,, | Performed by: INTERNAL MEDICINE

## 2020-11-03 PROCEDURE — 99214 OFFICE O/P EST MOD 30 MIN: CPT | Mod: HCNC,S$GLB,, | Performed by: INTERNAL MEDICINE

## 2020-11-03 RX ORDER — BUTALBITAL, ACETAMINOPHEN AND CAFFEINE 50; 325; 40 MG/1; MG/1; MG/1
1 TABLET ORAL DAILY PRN
Qty: 15 TABLET | Refills: 2 | Status: SHIPPED | OUTPATIENT
Start: 2020-11-03 | End: 2020-12-03

## 2020-11-03 RX ORDER — FUROSEMIDE 40 MG/1
TABLET ORAL
Qty: 45 TABLET | Refills: 3 | Status: SHIPPED | OUTPATIENT
Start: 2020-11-03

## 2020-11-03 RX ORDER — AMMONIUM LACTATE 12 G/100G
LOTION TOPICAL
COMMUNITY
Start: 2020-09-26

## 2020-11-03 RX ORDER — AZELAIC ACID 0.15 G/G
GEL TOPICAL
Qty: 50 G | Refills: 3 | Status: ON HOLD | OUTPATIENT
Start: 2020-11-03 | End: 2022-05-25 | Stop reason: ALTCHOICE

## 2020-11-03 NOTE — PROGRESS NOTES
Patient ID: Monique Trujillo is a 69 y.o. female.    Chief Complaint: Depression    HPI Monique is a 69 y.o. female with depression, rosacea, and dry eyes who presents for follow-up of these medical conditions.  She also complains today of headaches.  Onset of headaches for a few weeks ago.  There a constant problem in duration.  They occur every day.  She feels that they may be related to tension.  There located all over her head, not in any spot.  She has been taking ibuprofen and Excedrin and these do relieve her pain temporarily.  She denies any associated sinus symptoms.  Denies any visual aura or vision changes during the headaches.  Patient states that the medication given for dry eyes did not help.  But she does have an upcoming appointment with optometrist.  She was given a prescription for Flagyl for rosacea but this did not help.  She followed up with dermatology and they treated with topical clindamycin.  She has not noted any relief after over 1 month of use.  At last visit, her dose of Zoloft was increased from  mg daily.  She feels that this has been helpful for her depression symptoms.  She feels she is doing well with her depression at this time.  She is not currently doing counseling.  She also has a bump on her elbow that she would like for me to look at today. It has not grown significantly since first noticed a few weeks ago. It is not painful.    Review of Systems   Eyes:        Dry eyes     Skin:        Rosacea     Neurological: Positive for headaches.   All other systems reviewed and are negative.        Objective:     Vitals:    11/03/20 1055   BP: 132/74   Pulse: 87   Temp: 97.3 °F (36.3 °C)        Physical Exam  Vitals signs reviewed.   Constitutional:       General: She is not in acute distress.     Appearance: Normal appearance. She is obese. She is not ill-appearing, toxic-appearing or diaphoretic.   HENT:      Head: Normocephalic and atraumatic.      Right Ear: External ear  normal.      Left Ear: External ear normal.      Nose: Nose normal.   Eyes:      Extraocular Movements: Extraocular movements intact.      Conjunctiva/sclera: Conjunctivae normal.   Pulmonary:      Effort: Pulmonary effort is normal. No respiratory distress.   Skin:     General: Skin is warm and dry.      Comments: Soft mobile round mass in extensor surface area of left elbow, not adherent to underlying structures, not tender, not red, not warm, consistent with cyst or lipoma   Neurological:      General: No focal deficit present.      Mental Status: She is alert and oriented to person, place, and time. Mental status is at baseline.   Psychiatric:         Mood and Affect: Mood normal.         Behavior: Behavior normal.         Thought Content: Thought content normal.         Judgment: Judgment normal.         Assessment:       1. Chronic tension-type headache, not intractable Active   2. Rosacea Active   3. Leg swelling Chronic   4. Pulmonary hypertension Chronic   5. Sebaceous cyst Active   6. Dry eyes Active   7. Depression, unspecified depression type Well controlled       Plan:         Chronic tension-type headache, not intractable  Comments:  Avoid NSAIDS. Get adequate sleep and stay well hydrated  Orders:  -     butalbital-acetaminophen-caffeine -40 mg (FIORICET, ESGIC) -40 mg per tablet; Take 1 tablet by mouth daily as needed for Headaches. Do not use more than 3 days per week  Dispense: 15 tablet; Refill: 2    Rosacea  -     azelaic acid (AZELEX) 15 % gel; Apply to face twice daily. Alert MD if no improvement after 12 weeks of use.  Dispense: 50 g; Refill: 3    Leg swelling  -     furosemide (LASIX) 40 MG tablet; Take lasix 40 mg PO every other day PRN swelling  Dispense: 45 tablet; Refill: 3    Pulmonary hypertension  -     furosemide (LASIX) 40 MG tablet; Take lasix 40 mg PO every other day PRN swelling  Dispense: 45 tablet; Refill: 3    Sebaceous cyst  Comments:  No signs of infection; nothing  to do at this time.Alert me for rapid growth or associated symptoms of pain, redness, warmth    Dry eyes  Comments:  F/u with optometry    Depression, unspecified depression type  Comments:  Cont current medication        RTC 3 months     Warning signs discussed, patient to call with any further issues or worsening of symptoms.       Parts of the above note were dictated using a voice dictation software. Please excuse any grammatical or typographical errors.

## 2020-11-03 NOTE — PATIENT INSTRUCTIONS
Rosacea  Rosacea is a long-lasting (chronic) skin condition affecting the face. In the early stages it causes easy flushing or blushing. Redness may become long-term (permanent) as the small blood vessels of the face widen (dilate). There may be small, red, pus-filled bumps (pustules). It looks like a bad case of acne, and has been called adult acne. But it is not caused by the same things that cause acne.  Rosacea is a chronic illness. You will have flare-ups that come and go. This may happen every few weeks or every few months. Experts don't know what causes rosacea. If not treated, it tends to get worse over time.  Some men with a more severe form of rosacea develop a condition called rhinophyma. The oil glands on the skin of the nose become blocked and the nose gets bigger. The cheeks also become puffy. Alcohol may increase the flushing. But this condition is not caused by alcohol use.  Rosacea can be treated with topical gels and creams. Oral antibiotics are used for more severe cases. You should see improvement in the first 4 weeks. Dilated blood vessels can be treated with a small electric needle or laser surgery. Rhinophyma can be treated with surgery. Or it may be treated by surgically scraping the skin (dermabrasion).  Home care  · Avoid things that make your face red or flushed. These include hot drinks, spicy foods, caffeine, and alcohol.  · Exercise indoors or in a cool area to avoid getting overheated.  · Avoid excess sun exposure. Use a sunscreen with at least SPF 15 or higher.  · Avoid extreme hot or extreme cold weather.  · Don't scrub your face. That will irritate the skin and increase redness.  · Avoid harsh soaps or moisturizers with irritating ingredients. You may need to use hypoallergenic cosmetics.  · Avoid over-the-counter treatments unless instructed by your healthcare provider. Some of these treatments may make rosacea worse.  · Try to figure out what triggers your flares (such as  stress, sun exposure, or certain foods).  Follow-up care  Follow up with your healthcare provider, or as advised. Contact your provider if your condition is not responding to the medicines you were given. Getting treatment early can stop it from getting worse.  When to seek medical advice  Call your healthcare provider right away if you have redness, burning, or a gritty feeling in your eyes.  Date Last Reviewed: 8/1/2016 © 2000-2017 [employer]. [address] [place] [address], [place] [address]. All rights reserved. This information is not intended as a substitute for professional medical care. Always follow your healthcare professional's instructions.        Tension Headache    A muscle tension headache is a very common cause of head pain. Its also called a stress headache. When some people are under stress, they tense the muscles of their shoulder, neck, and scalp without knowing it. If this tension lasts long enough, a headache can occur. A tension headache can be quite painful. It can last for hours or even days.  Home care  Follow these tips when caring for yourself at home:  · Dont drive yourself home if you were given pain medicine for your headache. Instead, have someone else drive you home. Try to sleep when you get home. You should feel much better when you wake up.  · Put heat on the back of your neck to help ease neck spasm.  · Drink only clear liquids or eat a light diet until your symptoms get better. This will help you avoid nausea or vomiting.  How to prevent headaches  · Figure out what is causing stress in your life. Learn new ways to handle your stress. Ideas include regular exercise, biofeedback, self-hypnosis, yoga, and meditation. Talk with your healthcare provider to find out more information about managing stress. Many books and digital media are also available on this subject.  · Take time out at the first sign of a tension headache, if possible. Take yourself out of the stressful  situation. Find a quiet, comfortable place to sit or lie down and let yourself relax. Heat and deep massage of the tight areas in the neck and shoulders may help ease muscle spasm. You may also get relief from a medicine like ibuprofen or a prescribed muscle relaxant.  Follow-up care  Follow up with your healthcare provider, or as advised. Talk with your provider if you have frequent headaches. He or she can figure out a treatment plan. Ask if you can have medicine to take at home the next time you get a bad headache. This may keep you from having to visit the emergency department in the future. You may need to see a headache specialist (neurologist) if you continue to have headaches.  When to seek medical advice  Call your healthcare provider right away if any of these occur:  · Your head pain gets worse during sexual intercourse or strenuous activity  · Your head pain doesnt get better within 24 hours  · You arent able to keep liquids down (repeated vomiting)  · Fever of 100.4ºF (38ºC) or higher, or as directed by your healthcare provider  · Stiff neck  · Extreme drowsiness, confusion, or fainting  · Dizziness or dizziness with spinning sensation (vertigo)  · Weakness in an arm or leg or one side of your face  · You have difficulty speaking  · Your vision changes  Date Last Reviewed: 8/1/2016 © 2000-2017 [employer]. [address] [place] [address], [place] [address]. All rights reserved. This information is not intended as a substitute for professional medical care. Always follow your healthcare professional's instructions.        Rebound Headache     Overuse of pain medications can lead to rebound headaches.   You use pain medicines called analgesics to treat your headaches. You are now having more frequent or intense headaches (rebound headaches). They are your bodys response to too much pain medicine. Prescription pain medicines can cause these headaches. But so can over-the-counter medicines like  acetaminophen or ibuprofen. A drug that contains caffeine or butalbital is most likely to cause rebound headache.  Symptoms of rebound headache include:  · Mild to moderate headache for 15 or more days each month for 3 months or more  · Headache when you wake up that continues most of the day  · Headaches getting worse over time  · Need for more and more medicine to treat headaches  Rebound headaches are most often diagnosed by your symptoms and medicine history. You may need tests to rule out other causes of your headaches. In the emergency room, you may be given a non-analgesic pain medicine to treat the pain or stop future headaches.  Home care  Treatment involves stopping use of your pain medicines. Your healthcare provider can tell you how to safely do this. You may be able to stop right away. Or you may need to take less and less over time (taper off). This will depend on the medicines you have been taking. To do this, follow the schedule that your provider gives you. If you are taking pain medicines for other types of pain at the same time, your healthcare provider may need other specialists to participate in your care.  · For the first week or so after stopping, the headaches will likely get worse. You may also have withdrawal symptoms. These often include nausea, vomiting, and trouble sleeping. You may be given a medicine to help relieve pain and withdrawal symptoms. Take this exactly as you have been told. It is vital to avoid taking daily pain medicine. If you do so, rebound headaches will continue.  · Caffeine can make rebound headaches worse. If you have caffeinated drinks every day, slowly cut your intake.  · Keep a written log of your headaches. This can help you and your healthcare provider track your progress.  · Be patient. It can take about 2 to 6 months to stop having rebound headaches.  · Once you have broken the headache cycle, be careful not to start it again. Work with your provider to make a  treatment plan for headache pain that has low risk of causing rebound headaches.  · Relaxation can help lower tension and relieve pain. Try a massage, meditation, yoga, or other relaxation techniques. Or make time for a relaxing hobby that you enjoy.  Follow-up care  Follow up with your healthcare provider, or as advised.  When to seek medical advice  Call your healthcare provider right away if any of these occur:  · Fever of 100.4°F (38°C) or higher  · Headaches that wake you from sleep  · Repeated vomiting or visual problems that don't go away  · Headache with a stiff neck, rash, confusion, weakness, numbness, seizure (convulsion), or trouble talking  · Headache that starts after a head injury or fall  · A type of headache you have never had before  · Headache that gets worse despite rest and medicine  Date Last Reviewed: 11/20/2015  © 7073-5615 [employer]. [address] [place] [address], [place] [address]. All rights reserved. This information is not intended as a substitute for professional medical care. Always follow your healthcare professional's instructions.

## 2020-11-12 RX ORDER — BUTALBITAL, ACETAMINOPHEN AND CAFFEINE 50; 325; 40 MG/1; MG/1; MG/1
1 TABLET ORAL EVERY 6 HOURS PRN
Qty: 60 TABLET | Refills: 1 | Status: SHIPPED | OUTPATIENT
Start: 2020-11-12 | End: 2020-12-31 | Stop reason: SDUPTHER

## 2020-11-18 ENCOUNTER — PATIENT OUTREACH (OUTPATIENT)
Dept: ADMINISTRATIVE | Facility: OTHER | Age: 70
End: 2020-11-18

## 2020-11-24 PROBLEM — M54.31 BILATERAL SCIATICA: Status: RESOLVED | Noted: 2020-05-31 | Resolved: 2020-11-24

## 2020-11-24 PROBLEM — M54.32 BILATERAL SCIATICA: Status: RESOLVED | Noted: 2020-05-31 | Resolved: 2020-11-24

## 2020-11-30 ENCOUNTER — PATIENT MESSAGE (OUTPATIENT)
Dept: SLEEP MEDICINE | Facility: CLINIC | Age: 70
End: 2020-11-30

## 2020-12-02 ENCOUNTER — OFFICE VISIT (OUTPATIENT)
Dept: SLEEP MEDICINE | Facility: CLINIC | Age: 70
End: 2020-12-02
Payer: MEDICARE

## 2020-12-02 VITALS
SYSTOLIC BLOOD PRESSURE: 131 MMHG | WEIGHT: 242.38 LBS | TEMPERATURE: 98 F | DIASTOLIC BLOOD PRESSURE: 70 MMHG | BODY MASS INDEX: 45.76 KG/M2 | HEIGHT: 61 IN | HEART RATE: 82 BPM

## 2020-12-02 DIAGNOSIS — G47.33 OSA (OBSTRUCTIVE SLEEP APNEA): Primary | ICD-10-CM

## 2020-12-02 PROCEDURE — 3008F PR BODY MASS INDEX (BMI) DOCUMENTED: ICD-10-PCS | Mod: HCNC,CPTII,S$GLB, | Performed by: PSYCHIATRY & NEUROLOGY

## 2020-12-02 PROCEDURE — 99214 PR OFFICE/OUTPT VISIT, EST, LEVL IV, 30-39 MIN: ICD-10-PCS | Mod: HCNC,S$GLB,, | Performed by: PSYCHIATRY & NEUROLOGY

## 2020-12-02 PROCEDURE — 3075F SYST BP GE 130 - 139MM HG: CPT | Mod: HCNC,CPTII,S$GLB, | Performed by: PSYCHIATRY & NEUROLOGY

## 2020-12-02 PROCEDURE — 1159F MED LIST DOCD IN RCRD: CPT | Mod: HCNC,S$GLB,, | Performed by: PSYCHIATRY & NEUROLOGY

## 2020-12-02 PROCEDURE — 3078F DIAST BP <80 MM HG: CPT | Mod: HCNC,CPTII,S$GLB, | Performed by: PSYCHIATRY & NEUROLOGY

## 2020-12-02 PROCEDURE — 99999 PR PBB SHADOW E&M-EST. PATIENT-LVL V: CPT | Mod: PBBFAC,HCNC,, | Performed by: PSYCHIATRY & NEUROLOGY

## 2020-12-02 PROCEDURE — 3078F PR MOST RECENT DIASTOLIC BLOOD PRESSURE < 80 MM HG: ICD-10-PCS | Mod: HCNC,CPTII,S$GLB, | Performed by: PSYCHIATRY & NEUROLOGY

## 2020-12-02 PROCEDURE — 99214 OFFICE O/P EST MOD 30 MIN: CPT | Mod: HCNC,S$GLB,, | Performed by: PSYCHIATRY & NEUROLOGY

## 2020-12-02 PROCEDURE — 1159F PR MEDICATION LIST DOCUMENTED IN MEDICAL RECORD: ICD-10-PCS | Mod: HCNC,S$GLB,, | Performed by: PSYCHIATRY & NEUROLOGY

## 2020-12-02 PROCEDURE — 1126F AMNT PAIN NOTED NONE PRSNT: CPT | Mod: HCNC,S$GLB,, | Performed by: PSYCHIATRY & NEUROLOGY

## 2020-12-02 PROCEDURE — 99999 PR PBB SHADOW E&M-EST. PATIENT-LVL V: ICD-10-PCS | Mod: PBBFAC,HCNC,, | Performed by: PSYCHIATRY & NEUROLOGY

## 2020-12-02 PROCEDURE — 3075F PR MOST RECENT SYSTOLIC BLOOD PRESS GE 130-139MM HG: ICD-10-PCS | Mod: HCNC,CPTII,S$GLB, | Performed by: PSYCHIATRY & NEUROLOGY

## 2020-12-02 PROCEDURE — 3008F BODY MASS INDEX DOCD: CPT | Mod: HCNC,CPTII,S$GLB, | Performed by: PSYCHIATRY & NEUROLOGY

## 2020-12-02 PROCEDURE — 1126F PR PAIN SEVERITY QUANTIFIED, NO PAIN PRESENT: ICD-10-PCS | Mod: HCNC,S$GLB,, | Performed by: PSYCHIATRY & NEUROLOGY

## 2020-12-02 NOTE — PROGRESS NOTES
"    EPWORTH SLEEPINESS SCALE TOTAL SCORE  12/2/2020 7/29/2020   Total score 6 13       Monique Trujillo is a 70 y.o. female seen today for CPAP follow up. Last seen on 7/29/2020    The patient reports improved sleep continuity and daytime sleepiness on PAP. ESS today is 6/24.  Denies break through snoring.  No dry mouth.   No aerophagia or air hunger. No significant mask leaks and discomfort.    Planning to move to [place]    APAP 8-20 cm H2O  90% - 9 cm    Likes her Dreamwear mask    Bedtime: midnight;  Sleep latency - 30 min  Awakenings -0 (her sleep is no longer interrupted)  Waking up: 8 AM    Still needs occasional naps  Therapy Event Summary Date Range: 11/2/2020 - 12/1/2020        Compliance Summary  Apnea Indices  Ventilator Statistics      Average Breath Rate:  N/A    Days with Device Usage:  30 days  Average AHI:  2.9  Average % Patient Triggered Breaths:  N/A    Percentage of Days >=4 Hours:  90.0%  Average OA Index:  1.0  Average Tidal Volume:  N/A    Average Usage (Days Used):  5 hrs. 15 mins. 26 secs.  Average CA Index:  0.1  Average Minute Vent:  N/A    Average Usage (All Days):  5 hrs. 15 mins. 26 secs.    Average % Flow Limited Breaths:  N/A        Large Leak  Periodic Breathing     Average Time in Large Leak:  0 secs.  Average % of Night in PB:  0.4%     Average % of Night in Large Leak:  0.0%                Medications pertinent to sleep disorders: Melatonin in the form of "Sleepies" gummies - 1/2 per night    Sleep Studies:PSG 351954: Significant Obstructive sleep apnea (TIMMY); REM predominant with  with AHI (apnea hypopnea Index) of 17.5; RDI 21  and SaO2 of 77% (weight  239 lbs). Could be underestimated due to head of the bed elevation.         ASSESSMENT:    1.TIMMY; moderate; REM predominant.  The patient symptomatically has  excessive daytime sleepiness, snoring, excessive daytime fatigue, interrupted sleep and nocturia  with exam findings of "a crowded oral airway and elevated body mass " index. The patient has medical co-morbidities of HTN,  which can be worsened by TIMMY. This warrants further investigation for possible obstructive sleep apnea.    2. Insomnia NEC. Multi-factorial -  excess time in bed, poor sleep hygiene, and likely paradoxical insomnia play a role        PLAN:      Treatment: Continue  for auto CPAP at 8-20 cm H2O  cm with the mask of a patient's choice was given to the patient.    Education: During our discussion today, we talked about the etiology of obstructive sleep apnea as well as the potential ramifications of untreated sleep apnea, which could include daytime sleepiness, hypertension, heart disease and/or stroke.   Sleep study were discussed during this tele-visit with graphs and chart demonstration, all the questions were answered.     Discussed purpose of PAP therapy, health benefits of CPAP, as well as the potential ramifications of untreated sleep apnea, which could include daytime sleepiness, hypertension, heart disease and/or stroke. An AHI of 15 is associated with increased risk CVD.     Regular replacement of CPAP mask, tubing and filter was recommended.    The patient was given open opportunity to ask questions and/or express concerns about treatment plan. Two point patient identifier confirmed.     Precautions: The patient was advised to abstain from driving should he feel sleepy or drowsy.    Follow up: me after 31 days  of PAP use.  31-minute visit. >50% spent counseling patient and coordination of care.

## 2020-12-11 ENCOUNTER — PATIENT MESSAGE (OUTPATIENT)
Dept: OTHER | Facility: OTHER | Age: 70
End: 2020-12-11

## 2021-01-01 RX ORDER — BUTALBITAL, ACETAMINOPHEN AND CAFFEINE 50; 325; 40 MG/1; MG/1; MG/1
1 TABLET ORAL EVERY 6 HOURS PRN
Qty: 60 TABLET | Refills: 1 | Status: SHIPPED | OUTPATIENT
Start: 2021-01-01 | End: 2021-02-13

## 2021-01-05 ENCOUNTER — PATIENT MESSAGE (OUTPATIENT)
Dept: ADMINISTRATIVE | Facility: HOSPITAL | Age: 71
End: 2021-01-05

## 2021-01-07 ENCOUNTER — PATIENT OUTREACH (OUTPATIENT)
Dept: ADMINISTRATIVE | Facility: OTHER | Age: 71
End: 2021-01-07

## 2021-01-19 DIAGNOSIS — F32.1 CURRENT MODERATE EPISODE OF MAJOR DEPRESSIVE DISORDER WITHOUT PRIOR EPISODE: ICD-10-CM

## 2021-01-19 RX ORDER — SERTRALINE HYDROCHLORIDE 100 MG/1
100 TABLET, FILM COATED ORAL DAILY
Qty: 30 TABLET | Refills: 2 | Status: SHIPPED | OUTPATIENT
Start: 2021-01-19 | End: 2021-04-21 | Stop reason: SDUPTHER

## 2021-02-12 ENCOUNTER — IMMUNIZATION (OUTPATIENT)
Dept: PRIMARY CARE CLINIC | Facility: CLINIC | Age: 71
End: 2021-02-12
Payer: MEDICARE

## 2021-02-12 DIAGNOSIS — Z23 NEED FOR VACCINATION: Primary | ICD-10-CM

## 2021-02-12 PROCEDURE — 0001A COVID-19, MRNA, LNP-S, PF, 30 MCG/0.3 ML DOSE VACCINE: ICD-10-PCS | Mod: CV19,S$GLB,, | Performed by: FAMILY MEDICINE

## 2021-02-12 PROCEDURE — 91300 COVID-19, MRNA, LNP-S, PF, 30 MCG/0.3 ML DOSE VACCINE: CPT | Mod: S$GLB,,, | Performed by: FAMILY MEDICINE

## 2021-02-12 PROCEDURE — 0001A COVID-19, MRNA, LNP-S, PF, 30 MCG/0.3 ML DOSE VACCINE: CPT | Mod: CV19,S$GLB,, | Performed by: FAMILY MEDICINE

## 2021-02-12 PROCEDURE — 91300 COVID-19, MRNA, LNP-S, PF, 30 MCG/0.3 ML DOSE VACCINE: ICD-10-PCS | Mod: S$GLB,,, | Performed by: FAMILY MEDICINE

## 2021-02-19 ENCOUNTER — OFFICE VISIT (OUTPATIENT)
Dept: INTERNAL MEDICINE | Facility: CLINIC | Age: 71
End: 2021-02-19
Payer: MEDICARE

## 2021-02-19 ENCOUNTER — HOSPITAL ENCOUNTER (OUTPATIENT)
Dept: RADIOLOGY | Facility: HOSPITAL | Age: 71
Discharge: HOME | End: 2021-02-19
Attending: INTERNAL MEDICINE
Payer: MEDICARE

## 2021-02-19 VITALS
BODY MASS INDEX: 45.37 KG/M2 | TEMPERATURE: 97 F | DIASTOLIC BLOOD PRESSURE: 70 MMHG | SYSTOLIC BLOOD PRESSURE: 128 MMHG | OXYGEN SATURATION: 98 % | WEIGHT: 240.31 LBS | HEIGHT: 61 IN | HEART RATE: 77 BPM

## 2021-02-19 DIAGNOSIS — Z12.31 SCREENING MAMMOGRAM, ENCOUNTER FOR: ICD-10-CM

## 2021-02-19 DIAGNOSIS — R51.9 CHRONIC NONINTRACTABLE HEADACHE, UNSPECIFIED HEADACHE TYPE: Primary | ICD-10-CM

## 2021-02-19 DIAGNOSIS — G89.29 CHRONIC NONINTRACTABLE HEADACHE, UNSPECIFIED HEADACHE TYPE: Primary | ICD-10-CM

## 2021-02-19 DIAGNOSIS — M19.019 SHOULDER ARTHRITIS: ICD-10-CM

## 2021-02-19 DIAGNOSIS — I27.20 PULMONARY HYPERTENSION: ICD-10-CM

## 2021-02-19 DIAGNOSIS — I70.0 ATHEROSCLEROSIS OF AORTA: ICD-10-CM

## 2021-02-19 PROCEDURE — 99499 RISK ADDL DX/OHS AUDIT: ICD-10-PCS | Mod: S$GLB,,, | Performed by: INTERNAL MEDICINE

## 2021-02-19 PROCEDURE — 3288F FALL RISK ASSESSMENT DOCD: CPT | Mod: HCNC,CPTII,S$GLB, | Performed by: INTERNAL MEDICINE

## 2021-02-19 PROCEDURE — 99999 PR PBB SHADOW E&M-EST. PATIENT-LVL V: CPT | Mod: PBBFAC,HCNC,, | Performed by: INTERNAL MEDICINE

## 2021-02-19 PROCEDURE — 3074F SYST BP LT 130 MM HG: CPT | Mod: HCNC,CPTII,S$GLB, | Performed by: INTERNAL MEDICINE

## 2021-02-19 PROCEDURE — 3288F PR FALLS RISK ASSESSMENT DOCUMENTED: ICD-10-PCS | Mod: HCNC,CPTII,S$GLB, | Performed by: INTERNAL MEDICINE

## 2021-02-19 PROCEDURE — 3078F PR MOST RECENT DIASTOLIC BLOOD PRESSURE < 80 MM HG: ICD-10-PCS | Mod: HCNC,CPTII,S$GLB, | Performed by: INTERNAL MEDICINE

## 2021-02-19 PROCEDURE — 99999 PR PBB SHADOW E&M-EST. PATIENT-LVL V: ICD-10-PCS | Mod: PBBFAC,HCNC,, | Performed by: INTERNAL MEDICINE

## 2021-02-19 PROCEDURE — 3008F BODY MASS INDEX DOCD: CPT | Mod: HCNC,CPTII,S$GLB, | Performed by: INTERNAL MEDICINE

## 2021-02-19 PROCEDURE — 3008F PR BODY MASS INDEX (BMI) DOCUMENTED: ICD-10-PCS | Mod: HCNC,CPTII,S$GLB, | Performed by: INTERNAL MEDICINE

## 2021-02-19 PROCEDURE — 1125F AMNT PAIN NOTED PAIN PRSNT: CPT | Mod: HCNC,S$GLB,, | Performed by: INTERNAL MEDICINE

## 2021-02-19 PROCEDURE — 99499 UNLISTED E&M SERVICE: CPT | Mod: S$GLB,,, | Performed by: INTERNAL MEDICINE

## 2021-02-19 PROCEDURE — 99213 PR OFFICE/OUTPT VISIT, EST, LEVL III, 20-29 MIN: ICD-10-PCS | Mod: HCNC,S$GLB,, | Performed by: INTERNAL MEDICINE

## 2021-02-19 PROCEDURE — 1125F PR PAIN SEVERITY QUANTIFIED, PAIN PRESENT: ICD-10-PCS | Mod: HCNC,S$GLB,, | Performed by: INTERNAL MEDICINE

## 2021-02-19 PROCEDURE — 77063 BREAST TOMOSYNTHESIS BI: CPT | Mod: 26,HCNC,, | Performed by: RADIOLOGY

## 2021-02-19 PROCEDURE — 1101F PT FALLS ASSESS-DOCD LE1/YR: CPT | Mod: HCNC,CPTII,S$GLB, | Performed by: INTERNAL MEDICINE

## 2021-02-19 PROCEDURE — 3074F PR MOST RECENT SYSTOLIC BLOOD PRESSURE < 130 MM HG: ICD-10-PCS | Mod: HCNC,CPTII,S$GLB, | Performed by: INTERNAL MEDICINE

## 2021-02-19 PROCEDURE — 1101F PR PT FALLS ASSESS DOC 0-1 FALLS W/OUT INJ PAST YR: ICD-10-PCS | Mod: HCNC,CPTII,S$GLB, | Performed by: INTERNAL MEDICINE

## 2021-02-19 PROCEDURE — 77067 SCR MAMMO BI INCL CAD: CPT | Mod: 26,HCNC,, | Performed by: RADIOLOGY

## 2021-02-19 PROCEDURE — 3078F DIAST BP <80 MM HG: CPT | Mod: HCNC,CPTII,S$GLB, | Performed by: INTERNAL MEDICINE

## 2021-02-19 PROCEDURE — 1159F MED LIST DOCD IN RCRD: CPT | Mod: HCNC,S$GLB,, | Performed by: INTERNAL MEDICINE

## 2021-02-19 PROCEDURE — 77063 MAMMO DIGITAL SCREENING BILAT WITH TOMO: ICD-10-PCS | Mod: 26,HCNC,, | Performed by: RADIOLOGY

## 2021-02-19 PROCEDURE — 77067 MAMMO DIGITAL SCREENING BILAT WITH TOMO: ICD-10-PCS | Mod: 26,HCNC,, | Performed by: RADIOLOGY

## 2021-02-19 PROCEDURE — 77067 SCR MAMMO BI INCL CAD: CPT | Mod: TC,HCNC

## 2021-02-19 PROCEDURE — 1159F PR MEDICATION LIST DOCUMENTED IN MEDICAL RECORD: ICD-10-PCS | Mod: HCNC,S$GLB,, | Performed by: INTERNAL MEDICINE

## 2021-02-19 PROCEDURE — 99213 OFFICE O/P EST LOW 20 MIN: CPT | Mod: HCNC,S$GLB,, | Performed by: INTERNAL MEDICINE

## 2021-02-19 RX ORDER — BUTALBITAL, ACETAMINOPHEN AND CAFFEINE 50; 325; 40 MG/1; MG/1; MG/1
1 TABLET ORAL EVERY 6 HOURS PRN
Qty: 60 TABLET | Refills: 2 | Status: SHIPPED | OUTPATIENT
Start: 2021-02-19 | End: 2021-03-03 | Stop reason: SDUPTHER

## 2021-02-19 RX ORDER — TOPIRAMATE 50 MG/1
50 TABLET, FILM COATED ORAL NIGHTLY
Qty: 180 TABLET | Refills: 0 | Status: SHIPPED | OUTPATIENT
Start: 2021-02-19 | End: 2021-05-28

## 2021-03-05 ENCOUNTER — IMMUNIZATION (OUTPATIENT)
Dept: PRIMARY CARE CLINIC | Facility: CLINIC | Age: 71
End: 2021-03-05
Payer: MEDICARE

## 2021-03-05 DIAGNOSIS — Z23 NEED FOR VACCINATION: Primary | ICD-10-CM

## 2021-03-05 PROCEDURE — 0002A COVID-19, MRNA, LNP-S, PF, 30 MCG/0.3 ML DOSE VACCINE: CPT | Mod: CV19,S$GLB,, | Performed by: FAMILY MEDICINE

## 2021-03-05 PROCEDURE — 0002A COVID-19, MRNA, LNP-S, PF, 30 MCG/0.3 ML DOSE VACCINE: ICD-10-PCS | Mod: CV19,S$GLB,, | Performed by: FAMILY MEDICINE

## 2021-03-05 PROCEDURE — 91300 COVID-19, MRNA, LNP-S, PF, 30 MCG/0.3 ML DOSE VACCINE: ICD-10-PCS | Mod: S$GLB,,, | Performed by: FAMILY MEDICINE

## 2021-03-05 PROCEDURE — 91300 COVID-19, MRNA, LNP-S, PF, 30 MCG/0.3 ML DOSE VACCINE: CPT | Mod: S$GLB,,, | Performed by: FAMILY MEDICINE

## 2021-03-08 DIAGNOSIS — I10 ESSENTIAL HYPERTENSION: ICD-10-CM

## 2021-03-08 RX ORDER — AMLODIPINE BESYLATE 10 MG/1
10 TABLET ORAL DAILY
Qty: 90 TABLET | Refills: 1 | Status: SHIPPED | OUTPATIENT
Start: 2021-03-08 | End: 2021-12-14 | Stop reason: SDUPTHER

## 2021-03-15 ENCOUNTER — HOSPITAL ENCOUNTER (OUTPATIENT)
Dept: RADIOLOGY | Facility: HOSPITAL | Age: 71
Discharge: HOME | End: 2021-03-15
Attending: ORTHOPAEDIC SURGERY
Payer: MEDICARE

## 2021-03-15 ENCOUNTER — OFFICE VISIT (OUTPATIENT)
Dept: ORTHOPEDICS | Facility: CLINIC | Age: 71
End: 2021-03-15
Payer: MEDICARE

## 2021-03-15 ENCOUNTER — TELEPHONE (OUTPATIENT)
Dept: ORTHOPEDICS | Facility: CLINIC | Age: 71
End: 2021-03-15

## 2021-03-15 VITALS — BODY MASS INDEX: 45.37 KG/M2 | WEIGHT: 240.31 LBS | HEIGHT: 61 IN

## 2021-03-15 DIAGNOSIS — M19.011 BILATERAL SHOULDER REGION ARTHRITIS: ICD-10-CM

## 2021-03-15 DIAGNOSIS — M19.019 SHOULDER ARTHRITIS: ICD-10-CM

## 2021-03-15 DIAGNOSIS — M19.012 BILATERAL SHOULDER REGION ARTHRITIS: ICD-10-CM

## 2021-03-15 DIAGNOSIS — G89.29 CHRONIC PAIN OF BOTH SHOULDERS: Primary | ICD-10-CM

## 2021-03-15 DIAGNOSIS — M25.512 CHRONIC PAIN OF BOTH SHOULDERS: Primary | ICD-10-CM

## 2021-03-15 DIAGNOSIS — M25.511 CHRONIC PAIN OF BOTH SHOULDERS: Primary | ICD-10-CM

## 2021-03-15 PROCEDURE — 73030 XR SHOULDER COMPLETE 2 OR MORE VIEWS BILATERAL: ICD-10-PCS | Mod: 26,50,, | Performed by: RADIOLOGY

## 2021-03-15 PROCEDURE — 99213 PR OFFICE/OUTPT VISIT, EST, LEVL III, 20-29 MIN: ICD-10-PCS | Mod: 25,S$GLB,, | Performed by: ORTHOPAEDIC SURGERY

## 2021-03-15 PROCEDURE — 1101F PT FALLS ASSESS-DOCD LE1/YR: CPT | Mod: CPTII,S$GLB,, | Performed by: ORTHOPAEDIC SURGERY

## 2021-03-15 PROCEDURE — 99213 OFFICE O/P EST LOW 20 MIN: CPT | Mod: 25,S$GLB,, | Performed by: ORTHOPAEDIC SURGERY

## 2021-03-15 PROCEDURE — 99999 PR PBB SHADOW E&M-EST. PATIENT-LVL V: ICD-10-PCS | Mod: PBBFAC,,, | Performed by: ORTHOPAEDIC SURGERY

## 2021-03-15 PROCEDURE — 1125F PR PAIN SEVERITY QUANTIFIED, PAIN PRESENT: ICD-10-PCS | Mod: S$GLB,,, | Performed by: ORTHOPAEDIC SURGERY

## 2021-03-15 PROCEDURE — 73030 X-RAY EXAM OF SHOULDER: CPT | Mod: TC,50,PN

## 2021-03-15 PROCEDURE — 3288F PR FALLS RISK ASSESSMENT DOCUMENTED: ICD-10-PCS | Mod: CPTII,S$GLB,, | Performed by: ORTHOPAEDIC SURGERY

## 2021-03-15 PROCEDURE — 73030 X-RAY EXAM OF SHOULDER: CPT | Mod: 26,50,, | Performed by: RADIOLOGY

## 2021-03-15 PROCEDURE — 20610 PR DRAIN/INJECT LARGE JOINT/BURSA: ICD-10-PCS | Mod: 50,S$GLB,, | Performed by: ORTHOPAEDIC SURGERY

## 2021-03-15 PROCEDURE — 1125F AMNT PAIN NOTED PAIN PRSNT: CPT | Mod: S$GLB,,, | Performed by: ORTHOPAEDIC SURGERY

## 2021-03-15 PROCEDURE — 1101F PR PT FALLS ASSESS DOC 0-1 FALLS W/OUT INJ PAST YR: ICD-10-PCS | Mod: CPTII,S$GLB,, | Performed by: ORTHOPAEDIC SURGERY

## 2021-03-15 PROCEDURE — 3008F BODY MASS INDEX DOCD: CPT | Mod: CPTII,S$GLB,, | Performed by: ORTHOPAEDIC SURGERY

## 2021-03-15 PROCEDURE — 3288F FALL RISK ASSESSMENT DOCD: CPT | Mod: CPTII,S$GLB,, | Performed by: ORTHOPAEDIC SURGERY

## 2021-03-15 PROCEDURE — 1159F MED LIST DOCD IN RCRD: CPT | Mod: S$GLB,,, | Performed by: ORTHOPAEDIC SURGERY

## 2021-03-15 PROCEDURE — 20610 DRAIN/INJ JOINT/BURSA W/O US: CPT | Mod: 50,S$GLB,, | Performed by: ORTHOPAEDIC SURGERY

## 2021-03-15 PROCEDURE — 3008F PR BODY MASS INDEX (BMI) DOCUMENTED: ICD-10-PCS | Mod: CPTII,S$GLB,, | Performed by: ORTHOPAEDIC SURGERY

## 2021-03-15 PROCEDURE — 99999 PR PBB SHADOW E&M-EST. PATIENT-LVL V: CPT | Mod: PBBFAC,,, | Performed by: ORTHOPAEDIC SURGERY

## 2021-03-15 PROCEDURE — 1159F PR MEDICATION LIST DOCUMENTED IN MEDICAL RECORD: ICD-10-PCS | Mod: S$GLB,,, | Performed by: ORTHOPAEDIC SURGERY

## 2021-03-15 RX ORDER — TRIAMCINOLONE ACETONIDE 40 MG/ML
40 INJECTION, SUSPENSION INTRA-ARTICULAR; INTRAMUSCULAR
Status: COMPLETED | OUTPATIENT
Start: 2021-03-15 | End: 2021-03-15

## 2021-03-15 RX ADMIN — TRIAMCINOLONE ACETONIDE 40 MG: 40 INJECTION, SUSPENSION INTRA-ARTICULAR; INTRAMUSCULAR at 01:03

## 2021-04-21 DIAGNOSIS — K21.9 GASTROESOPHAGEAL REFLUX DISEASE: ICD-10-CM

## 2021-04-21 DIAGNOSIS — E78.5 HYPERLIPIDEMIA: ICD-10-CM

## 2021-04-21 DIAGNOSIS — F32.1 CURRENT MODERATE EPISODE OF MAJOR DEPRESSIVE DISORDER WITHOUT PRIOR EPISODE: ICD-10-CM

## 2021-04-21 RX ORDER — PRAVASTATIN SODIUM 40 MG/1
40 TABLET ORAL DAILY
Qty: 90 TABLET | Refills: 1 | Status: SHIPPED | OUTPATIENT
Start: 2021-04-21 | End: 2021-11-11 | Stop reason: SDUPTHER

## 2021-04-21 RX ORDER — SERTRALINE HYDROCHLORIDE 100 MG/1
100 TABLET, FILM COATED ORAL DAILY
Qty: 30 TABLET | Refills: 2 | Status: SHIPPED | OUTPATIENT
Start: 2021-04-21 | End: 2022-05-27 | Stop reason: SDUPTHER

## 2021-04-21 RX ORDER — PANTOPRAZOLE SODIUM 40 MG/1
40 TABLET, DELAYED RELEASE ORAL DAILY
Qty: 90 TABLET | Refills: 3 | Status: SHIPPED | OUTPATIENT
Start: 2021-04-21 | End: 2022-04-06 | Stop reason: SDUPTHER

## 2021-05-17 DIAGNOSIS — G89.29 CHRONIC NONINTRACTABLE HEADACHE, UNSPECIFIED HEADACHE TYPE: ICD-10-CM

## 2021-05-17 DIAGNOSIS — R51.9 CHRONIC NONINTRACTABLE HEADACHE, UNSPECIFIED HEADACHE TYPE: ICD-10-CM

## 2021-05-17 RX ORDER — BUTALBITAL, ACETAMINOPHEN AND CAFFEINE 50; 325; 40 MG/1; MG/1; MG/1
1 TABLET ORAL EVERY 6 HOURS PRN
Qty: 60 TABLET | Refills: 2 | Status: SHIPPED | OUTPATIENT
Start: 2021-05-17 | End: 2021-07-16 | Stop reason: SDUPTHER

## 2021-05-20 NOTE — TELEPHONE ENCOUNTER
butalbital-acetaminophen-caffeine -40 mg (FIORICET, ESGIC) -40 mg per tablet          Sig: Take 1 tablet by mouth every 6 (six) hours as needed for Headaches.    Disp:  60 tablet    Refills:  1    Start: 4/22/2020 - 5/22/2020    Class: Normal    Last ordered: 3 months ago by Paco Dyson PA-C Last dispensed: 3/30/2020    Rx #: [id number]       
Statement Selected

## 2021-05-28 ENCOUNTER — LAB VISIT (OUTPATIENT)
Dept: LAB | Facility: HOSPITAL | Age: 71
End: 2021-05-28
Attending: INTERNAL MEDICINE
Payer: MEDICARE

## 2021-05-28 ENCOUNTER — OFFICE VISIT (OUTPATIENT)
Dept: INTERNAL MEDICINE | Facility: CLINIC | Age: 71
End: 2021-05-28
Payer: MEDICARE

## 2021-05-28 VITALS
HEART RATE: 84 BPM | SYSTOLIC BLOOD PRESSURE: 138 MMHG | WEIGHT: 241.63 LBS | DIASTOLIC BLOOD PRESSURE: 72 MMHG | OXYGEN SATURATION: 97 % | BODY MASS INDEX: 45.65 KG/M2

## 2021-05-28 DIAGNOSIS — Z78.0 POSTMENOPAUSAL: ICD-10-CM

## 2021-05-28 DIAGNOSIS — R73.9 HYPERGLYCEMIA: ICD-10-CM

## 2021-05-28 DIAGNOSIS — M85.80 OSTEOPENIA, UNSPECIFIED LOCATION: ICD-10-CM

## 2021-05-28 DIAGNOSIS — E78.5 HYPERLIPIDEMIA, UNSPECIFIED HYPERLIPIDEMIA TYPE: ICD-10-CM

## 2021-05-28 DIAGNOSIS — R79.89 ABNORMAL CBC MEASUREMENT: ICD-10-CM

## 2021-05-28 DIAGNOSIS — G44.229 CHRONIC TENSION-TYPE HEADACHE, NOT INTRACTABLE: ICD-10-CM

## 2021-05-28 DIAGNOSIS — I10 ESSENTIAL HYPERTENSION: ICD-10-CM

## 2021-05-28 DIAGNOSIS — H93.13 TINNITUS OF BOTH EARS: Primary | ICD-10-CM

## 2021-05-28 DIAGNOSIS — M19.011 PRIMARY OSTEOARTHRITIS OF BOTH SHOULDERS: ICD-10-CM

## 2021-05-28 DIAGNOSIS — M19.012 PRIMARY OSTEOARTHRITIS OF BOTH SHOULDERS: ICD-10-CM

## 2021-05-28 DIAGNOSIS — M17.0 PRIMARY OSTEOARTHRITIS OF BOTH KNEES: ICD-10-CM

## 2021-05-28 LAB
ALBUMIN SERPL BCP-MCNC: 4 G/DL (ref 3.5–5.2)
ALP SERPL-CCNC: 63 U/L (ref 55–135)
ALT SERPL W/O P-5'-P-CCNC: 23 U/L (ref 10–44)
ANION GAP SERPL CALC-SCNC: 11 MMOL/L (ref 8–16)
AST SERPL-CCNC: 25 U/L (ref 10–40)
BILIRUB SERPL-MCNC: 0.4 MG/DL (ref 0.1–1)
BUN SERPL-MCNC: 21 MG/DL (ref 8–23)
CALCIUM SERPL-MCNC: 10 MG/DL (ref 8.7–10.5)
CHLORIDE SERPL-SCNC: 106 MMOL/L (ref 95–110)
CO2 SERPL-SCNC: 24 MMOL/L (ref 23–29)
CREAT SERPL-MCNC: 1.1 MG/DL (ref 0.5–1.4)
EST. GFR  (AFRICAN AMERICAN): 58.4 ML/MIN/1.73 M^2
EST. GFR  (NON AFRICAN AMERICAN): 50.6 ML/MIN/1.73 M^2
GLUCOSE SERPL-MCNC: 148 MG/DL (ref 70–110)
POTASSIUM SERPL-SCNC: 4 MMOL/L (ref 3.5–5.1)
PROT SERPL-MCNC: 7.8 G/DL (ref 6–8.4)
SODIUM SERPL-SCNC: 141 MMOL/L (ref 136–145)

## 2021-05-28 PROCEDURE — 3008F PR BODY MASS INDEX (BMI) DOCUMENTED: ICD-10-PCS | Mod: CPTII,S$GLB,, | Performed by: INTERNAL MEDICINE

## 2021-05-28 PROCEDURE — 99214 PR OFFICE/OUTPT VISIT, EST, LEVL IV, 30-39 MIN: ICD-10-PCS | Mod: S$GLB,,, | Performed by: INTERNAL MEDICINE

## 2021-05-28 PROCEDURE — 3288F PR FALLS RISK ASSESSMENT DOCUMENTED: ICD-10-PCS | Mod: CPTII,S$GLB,, | Performed by: INTERNAL MEDICINE

## 2021-05-28 PROCEDURE — 1125F PR PAIN SEVERITY QUANTIFIED, PAIN PRESENT: ICD-10-PCS | Mod: S$GLB,,, | Performed by: INTERNAL MEDICINE

## 2021-05-28 PROCEDURE — 99999 PR PBB SHADOW E&M-EST. PATIENT-LVL V: CPT | Mod: PBBFAC,,, | Performed by: INTERNAL MEDICINE

## 2021-05-28 PROCEDURE — 80053 COMPREHEN METABOLIC PANEL: CPT | Performed by: INTERNAL MEDICINE

## 2021-05-28 PROCEDURE — 3288F FALL RISK ASSESSMENT DOCD: CPT | Mod: CPTII,S$GLB,, | Performed by: INTERNAL MEDICINE

## 2021-05-28 PROCEDURE — 1101F PR PT FALLS ASSESS DOC 0-1 FALLS W/OUT INJ PAST YR: ICD-10-PCS | Mod: CPTII,S$GLB,, | Performed by: INTERNAL MEDICINE

## 2021-05-28 PROCEDURE — 1159F PR MEDICATION LIST DOCUMENTED IN MEDICAL RECORD: ICD-10-PCS | Mod: S$GLB,,, | Performed by: INTERNAL MEDICINE

## 2021-05-28 PROCEDURE — 83036 HEMOGLOBIN GLYCOSYLATED A1C: CPT | Performed by: INTERNAL MEDICINE

## 2021-05-28 PROCEDURE — 1125F AMNT PAIN NOTED PAIN PRSNT: CPT | Mod: S$GLB,,, | Performed by: INTERNAL MEDICINE

## 2021-05-28 PROCEDURE — 99214 OFFICE O/P EST MOD 30 MIN: CPT | Mod: S$GLB,,, | Performed by: INTERNAL MEDICINE

## 2021-05-28 PROCEDURE — 99499 RISK ADDL DX/OHS AUDIT: ICD-10-PCS | Mod: S$GLB,,, | Performed by: INTERNAL MEDICINE

## 2021-05-28 PROCEDURE — 99499 UNLISTED E&M SERVICE: CPT | Mod: S$GLB,,, | Performed by: INTERNAL MEDICINE

## 2021-05-28 PROCEDURE — 36415 COLL VENOUS BLD VENIPUNCTURE: CPT | Mod: PO | Performed by: INTERNAL MEDICINE

## 2021-05-28 PROCEDURE — 3008F BODY MASS INDEX DOCD: CPT | Mod: CPTII,S$GLB,, | Performed by: INTERNAL MEDICINE

## 2021-05-28 PROCEDURE — 99999 PR PBB SHADOW E&M-EST. PATIENT-LVL V: ICD-10-PCS | Mod: PBBFAC,,, | Performed by: INTERNAL MEDICINE

## 2021-05-28 PROCEDURE — 1159F MED LIST DOCD IN RCRD: CPT | Mod: S$GLB,,, | Performed by: INTERNAL MEDICINE

## 2021-05-28 PROCEDURE — 1101F PT FALLS ASSESS-DOCD LE1/YR: CPT | Mod: CPTII,S$GLB,, | Performed by: INTERNAL MEDICINE

## 2021-05-29 LAB
ESTIMATED AVG GLUCOSE: 131 MG/DL (ref 68–131)
HBA1C MFR BLD: 6.2 % (ref 4–5.6)

## 2021-06-23 DIAGNOSIS — J30.9 ALLERGIC RHINITIS, UNSPECIFIED SEASONALITY, UNSPECIFIED TRIGGER: ICD-10-CM

## 2021-06-23 DIAGNOSIS — I10 ESSENTIAL HYPERTENSION: ICD-10-CM

## 2021-06-23 DIAGNOSIS — H10.13 ALLERGIC CONJUNCTIVITIS OF BOTH EYES: ICD-10-CM

## 2021-06-24 RX ORDER — LEVOCETIRIZINE DIHYDROCHLORIDE 5 MG/1
5 TABLET, FILM COATED ORAL NIGHTLY
Qty: 90 TABLET | Refills: 1 | Status: SHIPPED | OUTPATIENT
Start: 2021-06-24 | End: 2021-12-17 | Stop reason: SDUPTHER

## 2021-06-24 RX ORDER — ATENOLOL 25 MG/1
25 TABLET ORAL DAILY
Qty: 90 TABLET | Refills: 1 | Status: SHIPPED | OUTPATIENT
Start: 2021-06-24 | End: 2021-12-17 | Stop reason: SDUPTHER

## 2021-06-24 RX ORDER — GABAPENTIN 600 MG/1
600 TABLET ORAL 3 TIMES DAILY
Qty: 90 TABLET | Refills: 11 | Status: SHIPPED | OUTPATIENT
Start: 2021-06-24 | End: 2022-02-04 | Stop reason: SDUPTHER

## 2021-07-01 ENCOUNTER — PATIENT MESSAGE (OUTPATIENT)
Dept: ADMINISTRATIVE | Facility: OTHER | Age: 71
End: 2021-07-01

## 2021-07-16 DIAGNOSIS — G89.29 CHRONIC NONINTRACTABLE HEADACHE, UNSPECIFIED HEADACHE TYPE: ICD-10-CM

## 2021-07-16 DIAGNOSIS — R51.9 CHRONIC NONINTRACTABLE HEADACHE, UNSPECIFIED HEADACHE TYPE: ICD-10-CM

## 2021-07-16 RX ORDER — BUTALBITAL, ACETAMINOPHEN AND CAFFEINE 50; 325; 40 MG/1; MG/1; MG/1
1 TABLET ORAL EVERY 6 HOURS PRN
Qty: 60 TABLET | Refills: 2 | Status: CANCELLED | OUTPATIENT
Start: 2021-07-16 | End: 2021-08-15

## 2021-07-16 RX ORDER — BUTALBITAL, ACETAMINOPHEN AND CAFFEINE 50; 325; 40 MG/1; MG/1; MG/1
1 TABLET ORAL EVERY 6 HOURS PRN
Qty: 60 TABLET | Refills: 2 | Status: SHIPPED | OUTPATIENT
Start: 2021-07-16 | End: 2021-09-20 | Stop reason: SDUPTHER

## 2021-07-23 ENCOUNTER — PATIENT OUTREACH (OUTPATIENT)
Dept: ADMINISTRATIVE | Facility: OTHER | Age: 71
End: 2021-07-23

## 2021-08-18 ENCOUNTER — TELEPHONE (OUTPATIENT)
Dept: SLEEP MEDICINE | Facility: CLINIC | Age: 71
End: 2021-08-18

## 2021-09-12 ENCOUNTER — PATIENT OUTREACH (OUTPATIENT)
Dept: ADMINISTRATIVE | Facility: OTHER | Age: 71
End: 2021-09-12

## 2021-09-13 ENCOUNTER — TELEPHONE (OUTPATIENT)
Dept: SLEEP MEDICINE | Facility: CLINIC | Age: 71
End: 2021-09-13

## 2021-09-20 DIAGNOSIS — R51.9 CHRONIC NONINTRACTABLE HEADACHE, UNSPECIFIED HEADACHE TYPE: ICD-10-CM

## 2021-09-20 DIAGNOSIS — G89.29 CHRONIC NONINTRACTABLE HEADACHE, UNSPECIFIED HEADACHE TYPE: ICD-10-CM

## 2021-09-20 RX ORDER — BUTALBITAL, ACETAMINOPHEN AND CAFFEINE 50; 325; 40 MG/1; MG/1; MG/1
1 TABLET ORAL EVERY 6 HOURS PRN
Qty: 60 TABLET | Refills: 2 | Status: SHIPPED | OUTPATIENT
Start: 2021-09-20 | End: 2021-12-06 | Stop reason: SDUPTHER

## 2021-11-11 ENCOUNTER — TELEPHONE (OUTPATIENT)
Dept: INTERNAL MEDICINE | Facility: CLINIC | Age: 71
End: 2021-11-11
Payer: MEDICARE

## 2021-11-11 DIAGNOSIS — E78.5 HYPERLIPIDEMIA: ICD-10-CM

## 2021-11-12 RX ORDER — PRAVASTATIN SODIUM 40 MG/1
40 TABLET ORAL DAILY
Qty: 90 TABLET | Refills: 1 | Status: SHIPPED | OUTPATIENT
Start: 2021-11-12 | End: 2022-07-19 | Stop reason: SDUPTHER

## 2021-11-24 ENCOUNTER — TELEPHONE (OUTPATIENT)
Dept: SLEEP MEDICINE | Facility: CLINIC | Age: 71
End: 2021-11-24
Payer: MEDICARE

## 2021-11-30 ENCOUNTER — LAB VISIT (OUTPATIENT)
Dept: LAB | Facility: HOSPITAL | Age: 71
End: 2021-11-30
Attending: INTERNAL MEDICINE
Payer: MEDICARE

## 2021-11-30 DIAGNOSIS — E78.5 HYPERLIPIDEMIA, UNSPECIFIED HYPERLIPIDEMIA TYPE: ICD-10-CM

## 2021-11-30 DIAGNOSIS — R79.89 ABNORMAL CBC MEASUREMENT: ICD-10-CM

## 2021-11-30 DIAGNOSIS — I10 ESSENTIAL HYPERTENSION: ICD-10-CM

## 2021-11-30 DIAGNOSIS — R73.9 HYPERGLYCEMIA: ICD-10-CM

## 2021-11-30 LAB
ALBUMIN SERPL BCP-MCNC: 4 G/DL (ref 3.5–5.2)
ALP SERPL-CCNC: 65 U/L (ref 55–135)
ALT SERPL W/O P-5'-P-CCNC: 18 U/L (ref 10–44)
ANION GAP SERPL CALC-SCNC: 13 MMOL/L (ref 8–16)
AST SERPL-CCNC: 29 U/L (ref 10–40)
BASOPHILS # BLD AUTO: 0.06 K/UL (ref 0–0.2)
BASOPHILS NFR BLD: 0.8 % (ref 0–1.9)
BILIRUB SERPL-MCNC: 0.5 MG/DL (ref 0.1–1)
BUN SERPL-MCNC: 18 MG/DL (ref 8–23)
CALCIUM SERPL-MCNC: 10.4 MG/DL (ref 8.7–10.5)
CHLORIDE SERPL-SCNC: 104 MMOL/L (ref 95–110)
CHOLEST SERPL-MCNC: 162 MG/DL (ref 120–199)
CHOLEST/HDLC SERPL: 3 {RATIO} (ref 2–5)
CO2 SERPL-SCNC: 25 MMOL/L (ref 23–29)
CREAT SERPL-MCNC: 1 MG/DL (ref 0.5–1.4)
DIFFERENTIAL METHOD: ABNORMAL
EOSINOPHIL # BLD AUTO: 0.1 K/UL (ref 0–0.5)
EOSINOPHIL NFR BLD: 1.8 % (ref 0–8)
ERYTHROCYTE [DISTWIDTH] IN BLOOD BY AUTOMATED COUNT: 13.2 % (ref 11.5–14.5)
EST. GFR  (AFRICAN AMERICAN): >60 ML/MIN/1.73 M^2
EST. GFR  (NON AFRICAN AMERICAN): 56.4 ML/MIN/1.73 M^2
ESTIMATED AVG GLUCOSE: 146 MG/DL (ref 68–131)
GLUCOSE SERPL-MCNC: 164 MG/DL (ref 70–110)
HBA1C MFR BLD: 6.7 % (ref 4–5.6)
HCT VFR BLD AUTO: 44.3 % (ref 37–48.5)
HDLC SERPL-MCNC: 54 MG/DL (ref 40–75)
HDLC SERPL: 33.3 % (ref 20–50)
HGB BLD-MCNC: 13.8 G/DL (ref 12–16)
IMM GRANULOCYTES # BLD AUTO: 0.02 K/UL (ref 0–0.04)
IMM GRANULOCYTES NFR BLD AUTO: 0.3 % (ref 0–0.5)
LDLC SERPL CALC-MCNC: 80 MG/DL (ref 63–159)
LYMPHOCYTES # BLD AUTO: 1.4 K/UL (ref 1–4.8)
LYMPHOCYTES NFR BLD: 18 % (ref 18–48)
MCH RBC QN AUTO: 30.7 PG (ref 27–31)
MCHC RBC AUTO-ENTMCNC: 31.2 G/DL (ref 32–36)
MCV RBC AUTO: 99 FL (ref 82–98)
MONOCYTES # BLD AUTO: 0.8 K/UL (ref 0.3–1)
MONOCYTES NFR BLD: 9.4 % (ref 4–15)
NEUTROPHILS # BLD AUTO: 5.6 K/UL (ref 1.8–7.7)
NEUTROPHILS NFR BLD: 69.7 % (ref 38–73)
NONHDLC SERPL-MCNC: 108 MG/DL
NRBC BLD-RTO: 0 /100 WBC
PLATELET # BLD AUTO: 287 K/UL (ref 150–450)
PMV BLD AUTO: 10.8 FL (ref 9.2–12.9)
POTASSIUM SERPL-SCNC: 3.9 MMOL/L (ref 3.5–5.1)
PROT SERPL-MCNC: 7.8 G/DL (ref 6–8.4)
RBC # BLD AUTO: 4.49 M/UL (ref 4–5.4)
SODIUM SERPL-SCNC: 142 MMOL/L (ref 136–145)
TRIGL SERPL-MCNC: 140 MG/DL (ref 30–150)
WBC # BLD AUTO: 7.99 K/UL (ref 3.9–12.7)

## 2021-11-30 PROCEDURE — 85025 COMPLETE CBC W/AUTO DIFF WBC: CPT | Performed by: INTERNAL MEDICINE

## 2021-11-30 PROCEDURE — 80061 LIPID PANEL: CPT | Performed by: INTERNAL MEDICINE

## 2021-11-30 PROCEDURE — 80053 COMPREHEN METABOLIC PANEL: CPT | Performed by: INTERNAL MEDICINE

## 2021-11-30 PROCEDURE — 83036 HEMOGLOBIN GLYCOSYLATED A1C: CPT | Performed by: INTERNAL MEDICINE

## 2021-11-30 PROCEDURE — 36415 COLL VENOUS BLD VENIPUNCTURE: CPT | Mod: PO | Performed by: INTERNAL MEDICINE

## 2021-12-06 DIAGNOSIS — R51.9 CHRONIC NONINTRACTABLE HEADACHE, UNSPECIFIED HEADACHE TYPE: ICD-10-CM

## 2021-12-06 DIAGNOSIS — G89.29 CHRONIC NONINTRACTABLE HEADACHE, UNSPECIFIED HEADACHE TYPE: ICD-10-CM

## 2021-12-07 RX ORDER — BUTALBITAL, ACETAMINOPHEN AND CAFFEINE 50; 325; 40 MG/1; MG/1; MG/1
1 TABLET ORAL EVERY 6 HOURS PRN
Qty: 60 TABLET | Refills: 2 | Status: SHIPPED | OUTPATIENT
Start: 2021-12-07 | End: 2022-02-28 | Stop reason: SDUPTHER

## 2021-12-14 DIAGNOSIS — I10 ESSENTIAL HYPERTENSION: ICD-10-CM

## 2021-12-14 RX ORDER — TOPIRAMATE 50 MG/1
TABLET, FILM COATED ORAL
COMMUNITY
Start: 2021-11-30 | End: 2022-04-20

## 2021-12-14 RX ORDER — TOPIRAMATE 50 MG/1
TABLET, FILM COATED ORAL
OUTPATIENT
Start: 2021-12-14

## 2021-12-14 RX ORDER — AMLODIPINE BESYLATE 10 MG/1
10 TABLET ORAL DAILY
Qty: 90 TABLET | Refills: 1 | Status: SHIPPED | OUTPATIENT
Start: 2021-12-14 | End: 2022-09-27 | Stop reason: SDUPTHER

## 2021-12-17 DIAGNOSIS — H10.13 ALLERGIC CONJUNCTIVITIS OF BOTH EYES: ICD-10-CM

## 2021-12-17 DIAGNOSIS — J30.9 ALLERGIC RHINITIS, UNSPECIFIED SEASONALITY, UNSPECIFIED TRIGGER: ICD-10-CM

## 2021-12-17 DIAGNOSIS — I10 ESSENTIAL HYPERTENSION: ICD-10-CM

## 2021-12-20 RX ORDER — LEVOCETIRIZINE DIHYDROCHLORIDE 5 MG/1
5 TABLET, FILM COATED ORAL NIGHTLY
Qty: 90 TABLET | Refills: 1 | Status: SHIPPED | OUTPATIENT
Start: 2021-12-20 | End: 2022-09-27 | Stop reason: SDUPTHER

## 2021-12-20 RX ORDER — ATENOLOL 25 MG/1
25 TABLET ORAL DAILY
Qty: 90 TABLET | Refills: 1 | Status: SHIPPED | OUTPATIENT
Start: 2021-12-20 | End: 2022-09-27 | Stop reason: SDUPTHER

## 2021-12-20 RX ORDER — FENOFIBRATE 134 MG/1
134 CAPSULE ORAL DAILY
Qty: 90 CAPSULE | Refills: 3 | Status: SHIPPED | OUTPATIENT
Start: 2021-12-20 | End: 2023-01-27

## 2021-12-21 ENCOUNTER — TELEPHONE (OUTPATIENT)
Dept: INTERNAL MEDICINE | Facility: CLINIC | Age: 71
End: 2021-12-21
Payer: MEDICARE

## 2021-12-27 ENCOUNTER — OFFICE VISIT (OUTPATIENT)
Dept: INTERNAL MEDICINE | Facility: CLINIC | Age: 71
End: 2021-12-27
Payer: MEDICARE

## 2021-12-27 DIAGNOSIS — G62.9 PERIPHERAL POLYNEUROPATHY: ICD-10-CM

## 2021-12-27 DIAGNOSIS — M25.511 CHRONIC PAIN OF BOTH SHOULDERS: ICD-10-CM

## 2021-12-27 DIAGNOSIS — E78.5 HYPERLIPIDEMIA ASSOCIATED WITH TYPE 2 DIABETES MELLITUS: ICD-10-CM

## 2021-12-27 DIAGNOSIS — G89.29 CHRONIC PAIN OF BOTH SHOULDERS: ICD-10-CM

## 2021-12-27 DIAGNOSIS — E11.69 HYPERLIPIDEMIA ASSOCIATED WITH TYPE 2 DIABETES MELLITUS: ICD-10-CM

## 2021-12-27 DIAGNOSIS — L71.9 ROSACEA: Primary | ICD-10-CM

## 2021-12-27 DIAGNOSIS — E11.42 TYPE 2 DIABETES MELLITUS WITH DIABETIC POLYNEUROPATHY, WITHOUT LONG-TERM CURRENT USE OF INSULIN: ICD-10-CM

## 2021-12-27 DIAGNOSIS — M25.512 CHRONIC PAIN OF BOTH SHOULDERS: ICD-10-CM

## 2021-12-27 PROCEDURE — 99441 PR PHYSICIAN TELEPHONE EVALUATION 5-10 MIN: ICD-10-PCS | Mod: 95,,, | Performed by: INTERNAL MEDICINE

## 2021-12-27 PROCEDURE — 3044F PR MOST RECENT HEMOGLOBIN A1C LEVEL <7.0%: ICD-10-PCS | Mod: CPTII,95,, | Performed by: INTERNAL MEDICINE

## 2021-12-27 PROCEDURE — 99441 PR PHYSICIAN TELEPHONE EVALUATION 5-10 MIN: CPT | Mod: 95,,, | Performed by: INTERNAL MEDICINE

## 2021-12-27 PROCEDURE — 3044F HG A1C LEVEL LT 7.0%: CPT | Mod: CPTII,95,, | Performed by: INTERNAL MEDICINE

## 2021-12-27 RX ORDER — IVERMECTIN 10 MG/G
CREAM TOPICAL
Qty: 45 G | Refills: 5 | Status: ON HOLD | OUTPATIENT
Start: 2021-12-27 | End: 2022-05-25 | Stop reason: ALTCHOICE

## 2022-02-04 NOTE — TELEPHONE ENCOUNTER
No new care gaps identified.  Powered by [employer]. Reference number: [id number].   2/04/2022 3:22:25 PM CST

## 2022-02-07 RX ORDER — GABAPENTIN 600 MG/1
600 TABLET ORAL 3 TIMES DAILY
Qty: 90 TABLET | Refills: 11 | Status: SHIPPED | OUTPATIENT
Start: 2022-02-07 | End: 2023-11-30 | Stop reason: SDUPTHER

## 2022-02-16 ENCOUNTER — TELEPHONE (OUTPATIENT)
Dept: DERMATOLOGY | Facility: CLINIC | Age: 72
End: 2022-02-16
Payer: MEDICAID

## 2022-02-16 NOTE — TELEPHONE ENCOUNTER
Attempted to contact patient to to go over the referral information, no answer, left voicemail for a return call.       Marta Pace Staff; DIANA Hernández Staff  Managed Care Dept. If the patient is seen at any other location, there is NO guarantee that the claims will be paid with or without an authorization on file, because there is no contract agreement. The patient will need to be rescheduled at the [facility] or [facility] location. If the patient chooses NOT to have services rendered at that location all charges incurred will be the patient's responsibility. If the provider should have any questions or concerns regarding the contract agreement with the [facility], please reach out to the Managed Care Dept for further information. Accepted at [facility].

## 2022-02-23 ENCOUNTER — TELEPHONE (OUTPATIENT)
Dept: DERMATOLOGY | Facility: CLINIC | Age: 72
End: 2022-02-23
Payer: MEDICAID

## 2022-02-23 NOTE — TELEPHONE ENCOUNTER
----- Message from Oralia Greer sent at 2/23/2022  1:17 PM CST -----  Regarding: returning call  Contact: patient  Type:  Patient Returning Call    Who Called:  patient   Who Left Message for Patient:  Enedina  Does the patient know what this is regarding?:  no  Best Call Back Number:  [phone] (home)

## 2022-02-25 ENCOUNTER — PATIENT OUTREACH (OUTPATIENT)
Dept: ADMINISTRATIVE | Facility: OTHER | Age: 72
End: 2022-02-25
Payer: MEDICAID

## 2022-02-25 DIAGNOSIS — Z12.31 ENCOUNTER FOR SCREENING MAMMOGRAM FOR MALIGNANT NEOPLASM OF BREAST: Primary | ICD-10-CM

## 2022-02-25 NOTE — PROGRESS NOTES
Health Maintenance Due   Topic Date Due    Shingles Vaccine (2 of 3) 04/22/2015    DEXA Scan  05/08/2021    COVID-19 Vaccine (3 - Booster for Pfizer series) 08/05/2021    Influenza Vaccine (1) 09/01/2021    Mammogram  02/19/2022     Updates were requested from care everywhere.  Chart was reviewed for overdue Proactive [facility] Encounters ([facility]) topics (CRS, Breast Cancer Screening, Eye exam)  Health Maintenance has been updated.  LINKS immunization registry triggered.  Immunizations were reconciled.

## 2022-02-28 DIAGNOSIS — R51.9 CHRONIC NONINTRACTABLE HEADACHE, UNSPECIFIED HEADACHE TYPE: ICD-10-CM

## 2022-02-28 DIAGNOSIS — G89.29 CHRONIC NONINTRACTABLE HEADACHE, UNSPECIFIED HEADACHE TYPE: ICD-10-CM

## 2022-03-02 RX ORDER — BUTALBITAL, ACETAMINOPHEN AND CAFFEINE 50; 325; 40 MG/1; MG/1; MG/1
1 TABLET ORAL EVERY 6 HOURS PRN
Qty: 60 TABLET | Refills: 2 | Status: SHIPPED | OUTPATIENT
Start: 2022-03-02 | End: 2022-05-05 | Stop reason: SDUPTHER

## 2022-03-23 ENCOUNTER — OFFICE VISIT (OUTPATIENT)
Dept: DERMATOLOGY | Facility: CLINIC | Age: 72
End: 2022-03-23
Payer: MEDICARE

## 2022-03-23 DIAGNOSIS — L71.9 ROSACEA: ICD-10-CM

## 2022-03-23 PROCEDURE — 99999 PR PBB SHADOW E&M-EST. PATIENT-LVL II: CPT | Mod: PBBFAC,,, | Performed by: STUDENT IN AN ORGANIZED HEALTH CARE EDUCATION/TRAINING PROGRAM

## 2022-03-23 PROCEDURE — 1126F AMNT PAIN NOTED NONE PRSNT: CPT | Mod: CPTII,S$GLB,, | Performed by: STUDENT IN AN ORGANIZED HEALTH CARE EDUCATION/TRAINING PROGRAM

## 2022-03-23 PROCEDURE — 99999 PR PBB SHADOW E&M-EST. PATIENT-LVL II: ICD-10-PCS | Mod: PBBFAC,,, | Performed by: STUDENT IN AN ORGANIZED HEALTH CARE EDUCATION/TRAINING PROGRAM

## 2022-03-23 PROCEDURE — 99214 OFFICE O/P EST MOD 30 MIN: CPT | Mod: S$GLB,,, | Performed by: STUDENT IN AN ORGANIZED HEALTH CARE EDUCATION/TRAINING PROGRAM

## 2022-03-23 PROCEDURE — 1101F PR PT FALLS ASSESS DOC 0-1 FALLS W/OUT INJ PAST YR: ICD-10-PCS | Mod: CPTII,S$GLB,, | Performed by: STUDENT IN AN ORGANIZED HEALTH CARE EDUCATION/TRAINING PROGRAM

## 2022-03-23 PROCEDURE — 3288F FALL RISK ASSESSMENT DOCD: CPT | Mod: CPTII,S$GLB,, | Performed by: STUDENT IN AN ORGANIZED HEALTH CARE EDUCATION/TRAINING PROGRAM

## 2022-03-23 PROCEDURE — 1159F MED LIST DOCD IN RCRD: CPT | Mod: CPTII,S$GLB,, | Performed by: STUDENT IN AN ORGANIZED HEALTH CARE EDUCATION/TRAINING PROGRAM

## 2022-03-23 PROCEDURE — 1126F PR PAIN SEVERITY QUANTIFIED, NO PAIN PRESENT: ICD-10-PCS | Mod: CPTII,S$GLB,, | Performed by: STUDENT IN AN ORGANIZED HEALTH CARE EDUCATION/TRAINING PROGRAM

## 2022-03-23 PROCEDURE — 1160F RVW MEDS BY RX/DR IN RCRD: CPT | Mod: CPTII,S$GLB,, | Performed by: STUDENT IN AN ORGANIZED HEALTH CARE EDUCATION/TRAINING PROGRAM

## 2022-03-23 PROCEDURE — 1159F PR MEDICATION LIST DOCUMENTED IN MEDICAL RECORD: ICD-10-PCS | Mod: CPTII,S$GLB,, | Performed by: STUDENT IN AN ORGANIZED HEALTH CARE EDUCATION/TRAINING PROGRAM

## 2022-03-23 PROCEDURE — 1160F PR REVIEW ALL MEDS BY PRESCRIBER/CLIN PHARMACIST DOCUMENTED: ICD-10-PCS | Mod: CPTII,S$GLB,, | Performed by: STUDENT IN AN ORGANIZED HEALTH CARE EDUCATION/TRAINING PROGRAM

## 2022-03-23 PROCEDURE — 1101F PT FALLS ASSESS-DOCD LE1/YR: CPT | Mod: CPTII,S$GLB,, | Performed by: STUDENT IN AN ORGANIZED HEALTH CARE EDUCATION/TRAINING PROGRAM

## 2022-03-23 PROCEDURE — 3288F PR FALLS RISK ASSESSMENT DOCUMENTED: ICD-10-PCS | Mod: CPTII,S$GLB,, | Performed by: STUDENT IN AN ORGANIZED HEALTH CARE EDUCATION/TRAINING PROGRAM

## 2022-03-23 PROCEDURE — 99214 PR OFFICE/OUTPT VISIT, EST, LEVL IV, 30-39 MIN: ICD-10-PCS | Mod: S$GLB,,, | Performed by: STUDENT IN AN ORGANIZED HEALTH CARE EDUCATION/TRAINING PROGRAM

## 2022-03-23 RX ORDER — DOXYCYCLINE HYCLATE 100 MG
TABLET ORAL
Qty: 60 TABLET | Refills: 2 | Status: SHIPPED | OUTPATIENT
Start: 2022-03-23 | End: 2022-05-05

## 2022-03-23 NOTE — PROGRESS NOTES
Subjective:       Patient ID:  Monique Trujillo is a 71 y.o. female who presents for   Chief Complaint   Patient presents with    Rash     face     LOV 9/25/20 Ray    Patient here today for rash on face x approx 2 years  Patient states it is scaly.   She uses antibacterial soap with tea tree oil, she says it helps a little.   She has used azelaic acid, clindamycin lotion, and metrogel in the past, did not resolve    Derm HX:  uncertain of type, forehead      Review of Systems   Constitutional: Negative for fever, chills and fatigue.   Respiratory: Negative for cough and shortness of breath.    Gastrointestinal: Negative for nausea and vomiting.        Objective:    Physical Exam   Constitutional: She appears well-developed and well-nourished.   Neurological: She is alert and oriented to person, place, and time.   Psychiatric: She has a normal mood and affect.   Skin:   Areas Examined (abnormalities noted in diagram):   Abdomen Inspection Performed                   Diagram Legend     Erythematous scaling macule/papule c/w actinic keratosis       Vascular papule c/w angioma      Pigmented verrucoid papule/plaque c/w seborrheic keratosis      Yellow umbilicated papule c/w sebaceous hyperplasia      Irregularly shaped tan macule c/w lentigo     1-2 mm smooth white papules consistent with Milia      Movable subcutaneous cyst with punctum c/w epidermal inclusion cyst      Subcutaneous movable cyst c/w pilar cyst      Firm pink to brown papule c/w dermatofibroma      Pedunculated fleshy papule(s) c/w skin tag(s)      Evenly pigmented macule c/w junctional nevus     Mildly variegated pigmented, slightly irregular-bordered macule c/w mildly atypical nevus      Flesh colored to evenly pigmented papule c/w intradermal nevus       Pink pearly papule/plaque c/w basal cell carcinoma      Erythematous hyperkeratotic cursted plaque c/w SCC      Surgical scar with no sign of skin cancer recurrence      Open and closed  comedones      Inflammatory papules and pustules      Verrucoid papule consistent consistent with wart     Erythematous eczematous patches and plaques     Dystrophic onycholytic nail with subungual debris c/w onychomycosis     Umbilicated papule    Erythematous-base heme-crusted tan verrucoid plaque consistent with inflamed seborrheic keratosis     Erythematous Silvery Scaling Plaque c/w Psoriasis     See annotation          Assessment / Plan:        Rosacea  Comments:  Wants to establish care with Dermatology on the [place]  Orders:  -     Ambulatory referral/consult to Dermatology  -     doxycycline (VIBRA-TABS) 100 MG tablet; Take 1 po BID with food and not within 1 hour prior to lying down  Dispense: 60 tablet; Refill: 2  sent Rx for triple cream to [facility] - use on face qday (30g $45/60g $70)  Discussed that SM will not be covered by insurance  - component of demodex  - cerave sunscreen daily  - gentle cleanser  - take doxycycline 100mg BID x 1 month then 100mg daily x 2 weeks when flaring    Cyst of skin, left abdomen  -Reassurance given to patient. No treatment is necessary.   Discussed treatment options - excision vs observation. Cysts may recur with excision. Would like to be scheduled for excision           No follow-ups on file.

## 2022-04-06 DIAGNOSIS — K21.9 GASTROESOPHAGEAL REFLUX DISEASE: ICD-10-CM

## 2022-04-06 NOTE — TELEPHONE ENCOUNTER
----- Message from Crissy Bonilla sent at 4/6/2022  2:01 PM CDT -----  Contact: [phone]/ pharm  Type:  RX Refill Request    Who Called: Viridiana  Refill or New Rx: Refill  RX Name and Strength:topiramate (TOPAMAX) 50 MG tablet  How is the patient currently taking it? (ex. 1XDay): 1 a day   Is this a 30 day or 90 day RX: 90  Preferred Pharmacy with phone number: [address]  Local or Mail Order: Local  Ordering Provider:Dr. Laura  Would the patient rather a call back or a response via [facility]? Call back  Best Call Back Number: [phone]  Additional Information: N/a    Type:  RX Refill Request    Who Called: Viridiana  Refill or New Rx: refill  RX Name and Strength:pantoprazole (PROTONIX) 40 MG tablet  How is the patient currently taking it? (ex. 1XDay): 1 a day  Is this a 30 day or 90 day RX: 90 day  Preferred Pharmacy with phone number:[facility] [address]  Local or Mail Order: local  Ordering Provider: Dr. Laura  Would the patient rather a call back or a response via [facility]? Call back  Best Call Back Number: [phone]  Additional Information: n/a

## 2022-04-06 NOTE — TELEPHONE ENCOUNTER
No new care gaps identified.  Powered by [employer]. Reference number: [id number].   4/06/2022 4:10:39 PM CDT

## 2022-04-07 ENCOUNTER — PROCEDURE VISIT (OUTPATIENT)
Dept: DERMATOLOGY | Facility: CLINIC | Age: 72
End: 2022-04-07
Payer: MEDICARE

## 2022-04-07 DIAGNOSIS — L72.0 EIC (EPIDERMAL INCLUSION CYST): Primary | ICD-10-CM

## 2022-04-07 PROCEDURE — 88304 TISSUE EXAM BY PATHOLOGIST: CPT | Performed by: PATHOLOGY

## 2022-04-07 PROCEDURE — 99499 NO LOS: ICD-10-PCS | Mod: S$GLB,,, | Performed by: STUDENT IN AN ORGANIZED HEALTH CARE EDUCATION/TRAINING PROGRAM

## 2022-04-07 PROCEDURE — 88304 PR  SURG PATH,LEVEL III: ICD-10-PCS | Mod: 26,,, | Performed by: PATHOLOGY

## 2022-04-07 PROCEDURE — 12031 INTMD RPR S/A/T/EXT 2.5 CM/<: CPT | Mod: 51,S$GLB,, | Performed by: STUDENT IN AN ORGANIZED HEALTH CARE EDUCATION/TRAINING PROGRAM

## 2022-04-07 PROCEDURE — 88304 TISSUE EXAM BY PATHOLOGIST: CPT | Mod: 26,,, | Performed by: PATHOLOGY

## 2022-04-07 PROCEDURE — 12031 PR LAYR CLOS WND TRUNK,ARM,LEG <2.5 CM: ICD-10-PCS | Mod: 51,S$GLB,, | Performed by: STUDENT IN AN ORGANIZED HEALTH CARE EDUCATION/TRAINING PROGRAM

## 2022-04-07 PROCEDURE — 11401 PR EXC SKIN BENIG 0.6-1 CM TRUNK,ARM,LEG: ICD-10-PCS | Mod: S$GLB,,, | Performed by: STUDENT IN AN ORGANIZED HEALTH CARE EDUCATION/TRAINING PROGRAM

## 2022-04-07 PROCEDURE — 99499 UNLISTED E&M SERVICE: CPT | Mod: S$GLB,,, | Performed by: STUDENT IN AN ORGANIZED HEALTH CARE EDUCATION/TRAINING PROGRAM

## 2022-04-07 PROCEDURE — 11401 EXC TR-EXT B9+MARG 0.6-1 CM: CPT | Mod: S$GLB,,, | Performed by: STUDENT IN AN ORGANIZED HEALTH CARE EDUCATION/TRAINING PROGRAM

## 2022-04-07 NOTE — PROGRESS NOTES
Subjective:       Patient ID:  Monique Trujillo is a 71 y.o. female who presents for   Chief Complaint   Patient presents with    Cyst     Patient presents for E&S of cyst of left abdomen.       Review of Systems   Constitutional: Negative for fever and chills.   Respiratory: Negative for shortness of breath.    Hematologic/Lymphatic: Does not bruise/bleed easily.        Objective:    Physical Exam   Constitutional: She appears well-developed and well-nourished. No distress.   Neurological: She is alert and oriented to person, place, and time. She is not disoriented.   Psychiatric: She has a normal mood and affect.   Skin:   Areas Examined (abnormalities noted in diagram):   Abdomen Inspection Performed              Diagram Legend     Erythematous scaling macule/papule c/w actinic keratosis       Vascular papule c/w angioma      Pigmented verrucoid papule/plaque c/w seborrheic keratosis      Yellow umbilicated papule c/w sebaceous hyperplasia      Irregularly shaped tan macule c/w lentigo     1-2 mm smooth white papules consistent with Milia      Movable subcutaneous cyst with punctum c/w epidermal inclusion cyst      Subcutaneous movable cyst c/w pilar cyst      Firm pink to brown papule c/w dermatofibroma      Pedunculated fleshy papule(s) c/w skin tag(s)      Evenly pigmented macule c/w junctional nevus     Mildly variegated pigmented, slightly irregular-bordered macule c/w mildly atypical nevus      Flesh colored to evenly pigmented papule c/w intradermal nevus       Pink pearly papule/plaque c/w basal cell carcinoma      Erythematous hyperkeratotic cursted plaque c/w SCC      Surgical scar with no sign of skin cancer recurrence      Open and closed comedones      Inflammatory papules and pustules      Verrucoid papule consistent consistent with wart     Erythematous eczematous patches and plaques     Dystrophic onycholytic nail with subungual debris c/w onychomycosis     Umbilicated papule     Erythematous-base heme-crusted tan verrucoid plaque consistent with inflamed seborrheic keratosis     Erythematous Silvery Scaling Plaque c/w Psoriasis     See annotation          Assessment / Plan:      Pathology Orders:     Normal Orders This Visit    Specimen to Pathology, Dermatology     Comments:    Number of Specimens:->1  ------------------------->-------------------------  Spec 1 Procedure:->Excision >2cm  Spec 1 Clinical Impression:->eic  Spec 1 Source:->left abdomen    Questions:    Procedure Type: Dermatology and skin neoplasms    Number of Specimens: 1    ------------------------: -------------------------    Spec 1 Procedure: Excision >2cm    Spec 1 Clinical Impression: eic    Spec 1 Source: left abdomen    Release to patient:         EIC (epidermal inclusion cyst)  -     Specimen to Pathology, Dermatology  PROCEDURE: Elliptical excision with intermediate layered repair in order to decrease dead space, decrease tension and close large gap.    ANESTHETIC: 6 cc 1% Xylocaine with Epinephrine 1:100,000, buffered    SURGEON: Carol Garrett MD  ASSISTANTS: Armando Hanson LPN    PREOPERATIVE DIAGNOSIS:  EIC    POSTOPERATIVE DIAGNOSIS:  Same as preoperative diagnosis    PATHOLOGIC DIAGNOSIS: Pending    LOCATION: left abdomen    INITIAL LESION SIZE: 1.1 cm    EXCISED DIAMETER: 1.1cm    PREPARATION: The diagnosis, procedure, alternatives, benefits and risks, including but not limited to: infection, bleeding/bruising, drug reactions, pain, scar or cosmetic defect, local sensation disturbances, wound dehiscence (separation of wound edges after sutures removed) and/or recurrence of present condition were explained to the patient. The patient elected to proceed.  Patient's identity was verified using 2 patient identifiers and the side and site was verified.  Time out period with surgeon, assistant and patient in surgical suite was taken.    PROCEDURE: The location noted above was prepped and draped in the usual  sterile fashion. The area was anesthetized with intradermal buffered xylocaine.  A fusiform elliptical excision was done with #15 blade carried down completely through the dermis into the subcutaneous tissues to the level of the subcutaneous fat, and dissection was carried out in that plane.  Electrocoagulation was used to obtain hemostasis. Blood loss was minimal. The wound was then approximated in a layered fashion with subcutaneous and intradermal sutures of 4.0 Monocryl, approximately 2 in number, and the wound was then superficially closed with running sutures of 4.0 Prolene.    The patient tolerated the procedure well.    The area was cleaned and dressed appropriately and the patient was given wound care instructions, as well as an appointment for follow-up evaluation.    LENGTH OF REPAIR: 2.0 cm                 No follow-ups on file.

## 2022-04-07 NOTE — PATIENT INSTRUCTIONS

## 2022-04-08 RX ORDER — PANTOPRAZOLE SODIUM 40 MG/1
40 TABLET, DELAYED RELEASE ORAL DAILY
Qty: 90 TABLET | Refills: 3 | Status: SHIPPED | OUTPATIENT
Start: 2022-04-08 | End: 2022-04-20

## 2022-04-08 RX ORDER — TOPIRAMATE 50 MG/1
TABLET, FILM COATED ORAL
OUTPATIENT
Start: 2022-04-08

## 2022-04-18 LAB
FINAL PATHOLOGIC DIAGNOSIS: NORMAL
GROSS: NORMAL
Lab: NORMAL
MICROSCOPIC EXAM: NORMAL

## 2022-04-20 ENCOUNTER — LAB VISIT (OUTPATIENT)
Dept: LAB | Facility: HOSPITAL | Age: 72
End: 2022-04-20
Attending: STUDENT IN AN ORGANIZED HEALTH CARE EDUCATION/TRAINING PROGRAM
Payer: MEDICARE

## 2022-04-20 ENCOUNTER — CLINICAL SUPPORT (OUTPATIENT)
Dept: FAMILY MEDICINE | Facility: CLINIC | Age: 72
End: 2022-04-20
Attending: STUDENT IN AN ORGANIZED HEALTH CARE EDUCATION/TRAINING PROGRAM
Payer: MEDICARE

## 2022-04-20 ENCOUNTER — OFFICE VISIT (OUTPATIENT)
Dept: FAMILY MEDICINE | Facility: CLINIC | Age: 72
End: 2022-04-20
Payer: MEDICARE

## 2022-04-20 VITALS
TEMPERATURE: 99 F | OXYGEN SATURATION: 99 % | BODY MASS INDEX: 45.37 KG/M2 | HEART RATE: 92 BPM | WEIGHT: 240.31 LBS | DIASTOLIC BLOOD PRESSURE: 84 MMHG | SYSTOLIC BLOOD PRESSURE: 136 MMHG | RESPIRATION RATE: 16 BRPM | HEIGHT: 61 IN

## 2022-04-20 DIAGNOSIS — B35.3 TINEA PEDIS, UNSPECIFIED LATERALITY: ICD-10-CM

## 2022-04-20 DIAGNOSIS — K21.9 GASTROESOPHAGEAL REFLUX DISEASE, UNSPECIFIED WHETHER ESOPHAGITIS PRESENT: ICD-10-CM

## 2022-04-20 DIAGNOSIS — Z00.00 HEALTHCARE MAINTENANCE: ICD-10-CM

## 2022-04-20 DIAGNOSIS — M19.012 OSTEOARTHRITIS OF BOTH SHOULDERS, UNSPECIFIED OSTEOARTHRITIS TYPE: Primary | ICD-10-CM

## 2022-04-20 DIAGNOSIS — E11.59 HYPERTENSION ASSOCIATED WITH DIABETES: ICD-10-CM

## 2022-04-20 DIAGNOSIS — G89.29 CHRONIC MIDLINE LOW BACK PAIN WITH BILATERAL SCIATICA: ICD-10-CM

## 2022-04-20 DIAGNOSIS — M54.9 DORSALGIA, UNSPECIFIED: ICD-10-CM

## 2022-04-20 DIAGNOSIS — E11.65 TYPE 2 DIABETES MELLITUS WITH HYPERGLYCEMIA, WITHOUT LONG-TERM CURRENT USE OF INSULIN: ICD-10-CM

## 2022-04-20 DIAGNOSIS — R79.9 ABNORMAL FINDING OF BLOOD CHEMISTRY, UNSPECIFIED: ICD-10-CM

## 2022-04-20 DIAGNOSIS — M19.011 OSTEOARTHRITIS OF BOTH SHOULDERS, UNSPECIFIED OSTEOARTHRITIS TYPE: Primary | ICD-10-CM

## 2022-04-20 DIAGNOSIS — N18.31 CHRONIC KIDNEY DISEASE, STAGE 3A: ICD-10-CM

## 2022-04-20 DIAGNOSIS — I15.2 HYPERTENSION ASSOCIATED WITH DIABETES: ICD-10-CM

## 2022-04-20 DIAGNOSIS — E11.69 HYPERLIPIDEMIA ASSOCIATED WITH TYPE 2 DIABETES MELLITUS: ICD-10-CM

## 2022-04-20 DIAGNOSIS — E78.5 HYPERLIPIDEMIA ASSOCIATED WITH TYPE 2 DIABETES MELLITUS: ICD-10-CM

## 2022-04-20 DIAGNOSIS — K31.83 ACHLORHYDRIA: ICD-10-CM

## 2022-04-20 DIAGNOSIS — G47.30 SLEEP APNEA, UNSPECIFIED TYPE: ICD-10-CM

## 2022-04-20 DIAGNOSIS — R53.83 FATIGUE, UNSPECIFIED TYPE: ICD-10-CM

## 2022-04-20 DIAGNOSIS — M54.41 CHRONIC MIDLINE LOW BACK PAIN WITH BILATERAL SCIATICA: ICD-10-CM

## 2022-04-20 DIAGNOSIS — N39.46 MIXED STRESS AND URGE URINARY INCONTINENCE: ICD-10-CM

## 2022-04-20 DIAGNOSIS — M54.42 CHRONIC MIDLINE LOW BACK PAIN WITH BILATERAL SCIATICA: ICD-10-CM

## 2022-04-20 DIAGNOSIS — H93.19 TINNITUS, UNSPECIFIED LATERALITY: ICD-10-CM

## 2022-04-20 DIAGNOSIS — Z12.31 ENCOUNTER FOR SCREENING MAMMOGRAM FOR MALIGNANT NEOPLASM OF BREAST: ICD-10-CM

## 2022-04-20 DIAGNOSIS — E66.01 MORBID OBESITY: ICD-10-CM

## 2022-04-20 LAB
ALBUMIN SERPL BCP-MCNC: 4.1 G/DL (ref 3.5–5.2)
ALBUMIN/CREAT UR: 1310.4 UG/MG (ref 0–30)
ALP SERPL-CCNC: 58 U/L (ref 55–135)
ALT SERPL W/O P-5'-P-CCNC: 14 U/L (ref 10–44)
ANION GAP SERPL CALC-SCNC: 16 MMOL/L (ref 8–16)
AST SERPL-CCNC: 21 U/L (ref 10–40)
BASOPHILS # BLD AUTO: 0.07 K/UL (ref 0–0.2)
BASOPHILS NFR BLD: 1.1 % (ref 0–1.9)
BILIRUB SERPL-MCNC: 0.4 MG/DL (ref 0.1–1)
BUN SERPL-MCNC: 27 MG/DL (ref 8–23)
CALCIUM SERPL-MCNC: 10.2 MG/DL (ref 8.7–10.5)
CHLORIDE SERPL-SCNC: 103 MMOL/L (ref 95–110)
CHOLEST SERPL-MCNC: 158 MG/DL (ref 120–199)
CHOLEST/HDLC SERPL: 2.9 {RATIO} (ref 2–5)
CO2 SERPL-SCNC: 21 MMOL/L (ref 23–29)
CREAT SERPL-MCNC: 0.8 MG/DL (ref 0.5–1.4)
CREAT UR-MCNC: 106 MG/DL (ref 15–325)
DIFFERENTIAL METHOD: NORMAL
EOSINOPHIL # BLD AUTO: 0.1 K/UL (ref 0–0.5)
EOSINOPHIL NFR BLD: 2.3 % (ref 0–8)
ERYTHROCYTE [DISTWIDTH] IN BLOOD BY AUTOMATED COUNT: 12.9 % (ref 11.5–14.5)
EST. GFR  (AFRICAN AMERICAN): >60 ML/MIN/1.73 M^2
EST. GFR  (NON AFRICAN AMERICAN): >60 ML/MIN/1.73 M^2
ESTIMATED AVG GLUCOSE: 140 MG/DL (ref 68–131)
FOLATE SERPL-MCNC: 12.3 NG/ML (ref 4–24)
GLUCOSE SERPL-MCNC: 135 MG/DL (ref 70–110)
HBA1C MFR BLD: 6.5 % (ref 4–5.6)
HCT VFR BLD AUTO: 40.9 % (ref 37–48.5)
HDLC SERPL-MCNC: 55 MG/DL (ref 40–75)
HDLC SERPL: 34.8 % (ref 20–50)
HGB BLD-MCNC: 13.4 G/DL (ref 12–16)
IMM GRANULOCYTES # BLD AUTO: 0.02 K/UL (ref 0–0.04)
IMM GRANULOCYTES NFR BLD AUTO: 0.3 % (ref 0–0.5)
LDLC SERPL CALC-MCNC: 79.6 MG/DL (ref 63–159)
LYMPHOCYTES # BLD AUTO: 1.2 K/UL (ref 1–4.8)
LYMPHOCYTES NFR BLD: 19.6 % (ref 18–48)
MCH RBC QN AUTO: 30.5 PG (ref 27–31)
MCHC RBC AUTO-ENTMCNC: 32.8 G/DL (ref 32–36)
MCV RBC AUTO: 93 FL (ref 82–98)
MICROALBUMIN UR DL<=1MG/L-MCNC: 1389 UG/ML
MONOCYTES # BLD AUTO: 0.6 K/UL (ref 0.3–1)
MONOCYTES NFR BLD: 9.2 % (ref 4–15)
NEUTROPHILS # BLD AUTO: 4.2 K/UL (ref 1.8–7.7)
NEUTROPHILS NFR BLD: 67.5 % (ref 38–73)
NONHDLC SERPL-MCNC: 103 MG/DL
NRBC BLD-RTO: 0 /100 WBC
PLATELET # BLD AUTO: 296 K/UL (ref 150–450)
PMV BLD AUTO: 10.6 FL (ref 9.2–12.9)
POTASSIUM SERPL-SCNC: 4.1 MMOL/L (ref 3.5–5.1)
PROT SERPL-MCNC: 7.7 G/DL (ref 6–8.4)
RBC # BLD AUTO: 4.39 M/UL (ref 4–5.4)
SODIUM SERPL-SCNC: 140 MMOL/L (ref 136–145)
TRIGL SERPL-MCNC: 117 MG/DL (ref 30–150)
TSH SERPL DL<=0.005 MIU/L-ACNC: 2.25 UIU/ML (ref 0.4–4)
WBC # BLD AUTO: 6.21 K/UL (ref 3.9–12.7)

## 2022-04-20 PROCEDURE — 36415 COLL VENOUS BLD VENIPUNCTURE: CPT | Mod: PO | Performed by: STUDENT IN AN ORGANIZED HEALTH CARE EDUCATION/TRAINING PROGRAM

## 2022-04-20 PROCEDURE — 99999 PR PBB SHADOW E&M-EST. PATIENT-LVL V: ICD-10-PCS | Mod: PBBFAC,,, | Performed by: STUDENT IN AN ORGANIZED HEALTH CARE EDUCATION/TRAINING PROGRAM

## 2022-04-20 PROCEDURE — 3288F FALL RISK ASSESSMENT DOCD: CPT | Mod: CPTII,S$GLB,, | Performed by: STUDENT IN AN ORGANIZED HEALTH CARE EDUCATION/TRAINING PROGRAM

## 2022-04-20 PROCEDURE — 99215 PR OFFICE/OUTPT VISIT, EST, LEVL V, 40-54 MIN: ICD-10-PCS | Mod: S$GLB,,, | Performed by: STUDENT IN AN ORGANIZED HEALTH CARE EDUCATION/TRAINING PROGRAM

## 2022-04-20 PROCEDURE — 3079F DIAST BP 80-89 MM HG: CPT | Mod: CPTII,S$GLB,, | Performed by: STUDENT IN AN ORGANIZED HEALTH CARE EDUCATION/TRAINING PROGRAM

## 2022-04-20 PROCEDURE — 1101F PR PT FALLS ASSESS DOC 0-1 FALLS W/OUT INJ PAST YR: ICD-10-PCS | Mod: CPTII,S$GLB,, | Performed by: STUDENT IN AN ORGANIZED HEALTH CARE EDUCATION/TRAINING PROGRAM

## 2022-04-20 PROCEDURE — 82570 ASSAY OF URINE CREATININE: CPT | Performed by: STUDENT IN AN ORGANIZED HEALTH CARE EDUCATION/TRAINING PROGRAM

## 2022-04-20 PROCEDURE — 82607 VITAMIN B-12: CPT | Performed by: STUDENT IN AN ORGANIZED HEALTH CARE EDUCATION/TRAINING PROGRAM

## 2022-04-20 PROCEDURE — 1159F MED LIST DOCD IN RCRD: CPT | Mod: CPTII,S$GLB,, | Performed by: STUDENT IN AN ORGANIZED HEALTH CARE EDUCATION/TRAINING PROGRAM

## 2022-04-20 PROCEDURE — 80061 LIPID PANEL: CPT | Performed by: STUDENT IN AN ORGANIZED HEALTH CARE EDUCATION/TRAINING PROGRAM

## 2022-04-20 PROCEDURE — 1125F PR PAIN SEVERITY QUANTIFIED, PAIN PRESENT: ICD-10-PCS | Mod: CPTII,S$GLB,, | Performed by: STUDENT IN AN ORGANIZED HEALTH CARE EDUCATION/TRAINING PROGRAM

## 2022-04-20 PROCEDURE — 99999 PR PBB SHADOW E&M-EST. PATIENT-LVL V: CPT | Mod: PBBFAC,,, | Performed by: STUDENT IN AN ORGANIZED HEALTH CARE EDUCATION/TRAINING PROGRAM

## 2022-04-20 PROCEDURE — 99499 RISK ADDL DX/OHS AUDIT: ICD-10-PCS | Mod: S$GLB,,, | Performed by: STUDENT IN AN ORGANIZED HEALTH CARE EDUCATION/TRAINING PROGRAM

## 2022-04-20 PROCEDURE — 92228 IMG RTA DETC/MNTR DS PHY/QHP: CPT | Mod: 26,S$GLB,, | Performed by: OPTOMETRIST

## 2022-04-20 PROCEDURE — 3079F PR MOST RECENT DIASTOLIC BLOOD PRESSURE 80-89 MM HG: ICD-10-PCS | Mod: CPTII,S$GLB,, | Performed by: STUDENT IN AN ORGANIZED HEALTH CARE EDUCATION/TRAINING PROGRAM

## 2022-04-20 PROCEDURE — 84443 ASSAY THYROID STIM HORMONE: CPT | Performed by: STUDENT IN AN ORGANIZED HEALTH CARE EDUCATION/TRAINING PROGRAM

## 2022-04-20 PROCEDURE — 99499 UNLISTED E&M SERVICE: CPT | Mod: S$GLB,,, | Performed by: STUDENT IN AN ORGANIZED HEALTH CARE EDUCATION/TRAINING PROGRAM

## 2022-04-20 PROCEDURE — 82043 UR ALBUMIN QUANTITATIVE: CPT | Performed by: STUDENT IN AN ORGANIZED HEALTH CARE EDUCATION/TRAINING PROGRAM

## 2022-04-20 PROCEDURE — 3075F PR MOST RECENT SYSTOLIC BLOOD PRESS GE 130-139MM HG: ICD-10-PCS | Mod: CPTII,S$GLB,, | Performed by: STUDENT IN AN ORGANIZED HEALTH CARE EDUCATION/TRAINING PROGRAM

## 2022-04-20 PROCEDURE — 80053 COMPREHEN METABOLIC PANEL: CPT | Performed by: STUDENT IN AN ORGANIZED HEALTH CARE EDUCATION/TRAINING PROGRAM

## 2022-04-20 PROCEDURE — 92228 DIABETIC EYE SCREENING PHOTO: ICD-10-PCS | Mod: 26,S$GLB,, | Performed by: OPTOMETRIST

## 2022-04-20 PROCEDURE — 82746 ASSAY OF FOLIC ACID SERUM: CPT | Performed by: STUDENT IN AN ORGANIZED HEALTH CARE EDUCATION/TRAINING PROGRAM

## 2022-04-20 PROCEDURE — 85025 COMPLETE CBC W/AUTO DIFF WBC: CPT | Performed by: STUDENT IN AN ORGANIZED HEALTH CARE EDUCATION/TRAINING PROGRAM

## 2022-04-20 PROCEDURE — 3288F PR FALLS RISK ASSESSMENT DOCUMENTED: ICD-10-PCS | Mod: CPTII,S$GLB,, | Performed by: STUDENT IN AN ORGANIZED HEALTH CARE EDUCATION/TRAINING PROGRAM

## 2022-04-20 PROCEDURE — 3075F SYST BP GE 130 - 139MM HG: CPT | Mod: CPTII,S$GLB,, | Performed by: STUDENT IN AN ORGANIZED HEALTH CARE EDUCATION/TRAINING PROGRAM

## 2022-04-20 PROCEDURE — 86677 HELICOBACTER PYLORI ANTIBODY: CPT | Performed by: STUDENT IN AN ORGANIZED HEALTH CARE EDUCATION/TRAINING PROGRAM

## 2022-04-20 PROCEDURE — 3008F BODY MASS INDEX DOCD: CPT | Mod: CPTII,S$GLB,, | Performed by: STUDENT IN AN ORGANIZED HEALTH CARE EDUCATION/TRAINING PROGRAM

## 2022-04-20 PROCEDURE — 1101F PT FALLS ASSESS-DOCD LE1/YR: CPT | Mod: CPTII,S$GLB,, | Performed by: STUDENT IN AN ORGANIZED HEALTH CARE EDUCATION/TRAINING PROGRAM

## 2022-04-20 PROCEDURE — 1159F PR MEDICATION LIST DOCUMENTED IN MEDICAL RECORD: ICD-10-PCS | Mod: CPTII,S$GLB,, | Performed by: STUDENT IN AN ORGANIZED HEALTH CARE EDUCATION/TRAINING PROGRAM

## 2022-04-20 PROCEDURE — 99215 OFFICE O/P EST HI 40 MIN: CPT | Mod: S$GLB,,, | Performed by: STUDENT IN AN ORGANIZED HEALTH CARE EDUCATION/TRAINING PROGRAM

## 2022-04-20 PROCEDURE — 1125F AMNT PAIN NOTED PAIN PRSNT: CPT | Mod: CPTII,S$GLB,, | Performed by: STUDENT IN AN ORGANIZED HEALTH CARE EDUCATION/TRAINING PROGRAM

## 2022-04-20 PROCEDURE — 3008F PR BODY MASS INDEX (BMI) DOCUMENTED: ICD-10-PCS | Mod: CPTII,S$GLB,, | Performed by: STUDENT IN AN ORGANIZED HEALTH CARE EDUCATION/TRAINING PROGRAM

## 2022-04-20 PROCEDURE — 83036 HEMOGLOBIN GLYCOSYLATED A1C: CPT | Performed by: STUDENT IN AN ORGANIZED HEALTH CARE EDUCATION/TRAINING PROGRAM

## 2022-04-20 RX ORDER — DICLOFENAC SODIUM 10 MG/G
2 GEL TOPICAL DAILY
COMMUNITY

## 2022-04-20 RX ORDER — FAMOTIDINE 40 MG/1
40 TABLET, FILM COATED ORAL DAILY
Qty: 90 TABLET | Refills: 1 | Status: SHIPPED | OUTPATIENT
Start: 2022-04-20 | End: 2022-10-26 | Stop reason: SDUPTHER

## 2022-04-20 NOTE — PROGRESS NOTES
[facility] Note    Subjective:    The HPI and pertinent ROS is included in the Diagnostic Impression Remarks section at the end of the note. Please see below for further details. Chief complaint is at end of note.     Data reviewed 274}  Previous medical records reviewed and summarized in plan section at end of note.      The following portions of the patient's history were reviewed and updated as appropriate: allergies, current medications, past family history, past medical history, past social history, past surgical history and problem list.    She  has a past medical history of Allergy, Arthritis, Chronic lower back pain, Depression, Fever blister, GERD (gastroesophageal reflux disease), Hemorrhoids, internal (11/05/2014), Hyperlipidemia, Hypertension, Joint pain, Morbid obesity with BMI of 40.0-44.9, adult (02/24/2015), Multiple gastric ulcers (12/14/2017), TIMMY (obstructive sleep apnea), Pulmonary hypertension, Skin cancer, and Type 2 diabetes mellitus with hyperglycemia, without long-term current use of insulin (4/20/2022).  She  has a past surgical history that includes Knee arthroscopy w/ meniscal repair (Right, 2012); Tubal ligation (1980); Colonoscopy (N/A, 11/2/2016); Anterior cervical corpectomy w/ fusion (1988); Lumbar laminectomy (06/19/2015); Total hip arthroplasty (Right, 04/28/2016); Skin cancer excision; Epidural steroid injection into lumbar spine (N/A, 11/8/2018); Caudal epidural steroid injection (N/A, 1/10/2019); Injection of anesthetic agent around genitofemoral nerve (Bilateral, 8/1/2019); and Fusion of spine with instrumentation (Left, 1/3/2020).    She  reports that she has never smoked. She has never used smokeless tobacco. She reports that she does not drink alcohol and does not use drugs.  She family history includes Alzheimer's disease in her maternal grandfather; Arthritis in her mother; Breast cancer in her mother; Cancer in her mother and paternal grandmother; Colon  "cancer in her paternal grandmother; Kidney disease in her father.    Review of patient's allergies indicates:  No Known Allergies    Tobacco Use: Low Risk     Smoking Tobacco Use: Never Smoker    Smokeless Tobacco Use: Never Used     Physical Examination  Wt Readings from Last 3 Encounters:   04/20/22 109 kg (240 lb 4.8 oz)   05/28/21 109.6 kg (241 lb 10 oz)   03/15/21 109 kg (240 lb 4.8 oz)                Estimated body mass index is 45.4 kg/m² as calculated from the following:    Height as of this encounter: 5' 1" (1.549 m).    Weight as of this encounter: 109 kg (240 lb 4.8 oz).     General appearance: alert, cooperative, no distress  Neck: no thyromegaly, no neck stiffness  Lungs: clear to auscultation, no wheezes, rales or rhonchi, symmetric air entry  Heart: normal rate, regular rhythm, normal S1, S2, no murmurs, rubs, clicks or gallops  Abdomen: soft, nontender, nondistended, no rigidity, rebound, or guarding.   Back: no point tenderness over spine  Extremities: peripheral pulses normal, no unilateral leg swelling or calf tenderness   Neurological:alert, oriented, normal speech, no new focal findings or movement disorder noted from baseline    No obvious deformities, moving all extremities, normal gait. Full range of motion and strength 5 out of 5 bilateral upper and lower extremities.  Intact distal pulses with only mild decreased sensation on bilateral plantar feet.  No midline tenderness palpation of the cervical or thoracic regions.  No tenderness palpation to the lumbar spine.  No paraspinal muscle tenderness palpation to the back.  No bony deformities or tenderness palpation to the upper or lower extremities.     Protective Sensation (w/ 10 gram monofilament):  Right: Decreased  Left: Decreased    Visual Inspection:  Dry Skin -  Bilateral    Pedal Pulses:   Right: Present  Left: Present    Posterior tibialis:   Right:Present  Left: Present        BP Readings from Last 3 Encounters:   04/20/22 136/84 " "  05/28/21 138/72   02/19/21 128/70     /84 (BP Location: Right arm, Patient Position: Sitting, BP Method: Large (Manual))   Pulse 92   Temp 99.2 °F (37.3 °C) (Oral)   Resp 16   Ht 5' 1" (1.549 m)   Wt 109 kg (240 lb 4.8 oz)   LMP  (LMP Unknown)   SpO2 99%   BMI 45.40 kg/m²    274}  Laboratory: I have reviewed old labs below:    274}    Lab Results   Component Value Date    WBC 7.99 11/30/2021    HGB 13.8 11/30/2021    HCT 44.3 11/30/2021    MCV 99 (H) 11/30/2021     11/30/2021     11/30/2021    K 3.9 11/30/2021     11/30/2021    CALCIUM 10.4 11/30/2021    PHOS 3.3 05/02/2018    CO2 25 11/30/2021     (H) 11/30/2021    BUN 18 11/30/2021    CREATININE 1.0 11/30/2021    ANIONGAP 13 11/30/2021    ESTGFRAFRICA >60.0 11/30/2021    EGFRNONAA 56.4 (A) 11/30/2021    PROT 7.8 11/30/2021    ALBUMIN 4.0 11/30/2021    BILITOT 0.5 11/30/2021    ALKPHOS 65 11/30/2021    ALT 18 11/30/2021    AST 29 11/30/2021    INR 0.9 12/12/2019    CHOL 162 11/30/2021    TRIG 140 11/30/2021    HDL 54 11/30/2021    LDLCALC 80.0 11/30/2021    TSH 1.662 05/02/2018    HGBA1C 6.7 (H) 11/30/2021     Lab reviewed by me: Particular labs of significance that I will monitor, workup, or treat to improve are mentioned below in diagnostic impression remarks.    Imaging/EKG: I have reviewed the pertinent results and my findings are noted in remarks.  274}    CC:   Chief Complaint   Patient presents with    Establish Care    Hyperlipidemia    Otalgia        274}    Assessment/Plan  Monique Trujillo is a 71 y.o. female who presents to clinic with:  1. Osteoarthritis of both shoulders, unspecified osteoarthritis type    2. Sleep apnea, unspecified type    3. Healthcare maintenance    4. Type 2 diabetes mellitus with hyperglycemia, without long-term current use of insulin    5. Hypertension associated with diabetes    6. Hyperlipidemia associated with type 2 diabetes mellitus    7. Morbid obesity    8. Abnormal finding " "of blood chemistry, unspecified    9. Achlorhydria    10. Tinnitus, unspecified laterality    11. Gastroesophageal reflux disease, unspecified whether esophagitis present    12. Mixed stress and urge urinary incontinence    13. Chronic midline low back pain with bilateral sciatica    14. Dorsalgia, unspecified    15. Encounter for screening mammogram for malignant neoplasm of breast    16. Tinea pedis, unspecified laterality    17. Fatigue, unspecified type    18. Chronic kidney disease, stage 3a       274}  Diagnostic Impression Remarks + HPI     Documentation entered by me for this encounter may have been done in part using speech-recognition technology. Although I have made an effort to ensure accuracy, "sound like" errors may exist and should be interpreted in context.      arthritis both shoulders-patient has bilateral chronic pain for years with x-ray showing severe osteoarthritis recommend avoid or NSAIDs due to kidney disease has failed injections will put in referral for Ortho for possible discussion about surgery other options   Sleep apnea-needs referral for Sleep Clinic for adjustment will put in referral recommend weight loss   Diabetes-needs improvement does have a well poor diet and recommend to work on this will start semaglutide for weight loss benefit and will check kidney function before sending in metformin.  Will also get eye exam and urine   Hypertension-stable continue current medications   Hyperlipidemia-stable recommend healthy diet and continue statin   GERD-needs improvement is not taking a PPI will avoid this due to CKD will start famotidine check at [facility] she reports no pain with meals unintentional weight loss fever night sweats and is a nonsmoker but if it does not improve will put a referral for scope   Tinnitus-chronic both ears will put a referral ENT   Urinary incontinence-needs improvement been present for "over 6 years" and has not acutely worsened but is still " bothersome no fever chills no flank pain no blood will put a referral for Urology does have chronic back pain with sciatica and this is chronic but she reports no decreased sensation when she wipes or in her saddle region.   She denies  urinary retention, saddle anesthesia, and unilateral weakness. No weight loss, fever, chills, or illicit IV drug use. She denies chest pain and diaphoresis. She does not endorse a ripping or tearing sensation. No dysuria or flank pain.    Chronic back pain-patient has chronic back pain bilateral scattered cuff for years no acute worsening but it is always present takes ibuprofen for this no fever chills new falls or trauma has chronic urinary incontinence but no saddle anesthesia unilateral weakness unilateral numbness but regardless we get MRI to look at spine as well to see if this is contributing but it does not appear to be a new issue and her urinary issues have been going on for years   CKD-needs improved recheck kidney function work on improving diabetes continue blood pressure medications will follow-up on blood work avoid oral ibuprofen which she takes and is taking in the past   Morbid obesity-needs improvement start G LP 1 agonist increase fiber work on diet   Diabetic neuropathy-patient reports some decreased sensation in her feet this is chronic will check B12 likely related diabetes her chronic back pain can continue gabapentin will get MRI in the meantime   Health maintenance-needs mammogram will put in or also needs eye exam will get this will check urine also on blood work    At this point in time the patient is considered a low risk as determined by her history, physical, and the absence of red flags. I have a low suspicion of cord compression since she does not have saddle anesthesia, new or acute worsening of bowel or bladder incontinence, abnormal reflexes, weakness, or unilateral numbness in her arms or legs. Infection is low on my differential since she  denies fever, chills, night sweats, IV drug use, dysuria, flank pain, and recent surgery. I did not appreciate bony tenderness, CVA tenderness, or an infectious source. My suspicion for cancer is low since she did not endorse fever, night sweats, or unintentional weight loss. Aortic dissection is low on the differential since she denies a ripping or tearing sensation chest pain, chest pain that radiates to the back, and sudden severe abdominal pain. On my exam there was no pulse deficit or pulsatile abdominal mass. Based on the history and examination, I feel that the likelihood of a high risk condition is so low that no hospitalization is warranted at this point in time.     We discussed at length the warning symptoms in order for the patient to return to clinic and/or present to the ED if unable to reach the clinic within a timely manner including but not limited to: increased pain, change in gait, change in sensation around the rectum or perineum, bowel or bladder issues/incontinent, urinary retention, and fever. Via teach back mechanism, the patient voiced understanding of the aforementioned recommendations/instructions.      Altogether 60 minutes were spent over half of which was spent in counseling on diagnosis, prognosis, and treatment options.I also counseled her on common and the most usual side effect of prescribed medications. Risk and benefits of the medication were also discussed. I reviewed previous notes to better coordinate patient's care. She test results and lifestyle changes were also discussed. All questions were answered, and patient voiced understanding.     I have analyzed and reviewed an extensive amount of data along with records prior to the patient visit. This patient has a high number of complex of problems that I addressed today that will require further monitoring and workup.   This is the extent of the patient's complaints at this present time. She denies chest pain upon exertion,  dyspnea, nausea, vomiting, diaphoresis, and syncope. No pleuritic chest pain, unilateral leg swelling, calf tenderness, or calf pain.     Monique will return to clinic in a few months for further workup and reassessment or sooner as needed. She was instructed to call the clinic or go to the emergency department if her symptoms do not improve, worsens, or if new symptoms develop. As we discussed that symptoms could worsen over the next 24 hours she was advised that if any increased swelling, pain, or numbness arise to go immediately to the ED. Patient knows to call any time if an emergency arises. Shared decision making occurred and she verbalized understanding in agreement with this plan.     I discussed imaging findings, diagnosis, possibilities, treatment options, medications, risks, and benefits. She had many questions regarding the options and long-term effects. All questions were answered. She expressed understanding after counseling regarding the diagnosis and recommendations. She was capable and demonstrated competence with understanding of these options. Shared decision making was performed resulting in her choosing the current treatment plan. Patient handout was given with instructions and recommendations. Advised the patient that if they become pregnant to alert us immediately to assess for medication changes.     I also discussed the importance of close follow up to discuss labs, change or modify her medications if needed, monitor side effects, and further evaluation of medical problems.     Additional workup planned: see labs ordered below.    See below for labs and meds ordered with associated diagnosis      1. Osteoarthritis of both shoulders, unspecified osteoarthritis type  - Ambulatory referral/consult to Orthopedics; Future    2. Sleep apnea, unspecified type  - Ambulatory referral/consult to Pulmonology; Future    3. Healthcare maintenance    4. Type 2 diabetes mellitus with hyperglycemia,  without long-term current use of insulin  - Hemoglobin A1C; Future  - semaglutide (RYBELSUS) 3 mg tablet; Take 1 tablet (3 mg total) by mouth once daily.  Dispense: 30 tablet; Refill: 0  - semaglutide (RYBELSUS) 7 mg tablet; Take 1 tablet (7 mg total) by mouth once daily.  Dispense: 30 tablet; Refill: 2  - Diabetic Eye Screening Photo; Future  - MICROALBUMIN / CREATININE RATIO URINE  - Ambulatory referral/consult to Diabetes Education; Future    5. Hypertension associated with diabetes    6. Hyperlipidemia associated with type 2 diabetes mellitus    7. Morbid obesity    8. Abnormal finding of blood chemistry, unspecified  - CBC Auto Differential; Future  - Comprehensive Metabolic Panel; Future  - Hemoglobin A1C; Future  - Lipid Panel; Future  - TSH; Future  - Vitamin B12 Deficiency Panel; Future  - Folate; Future    9. Achlorhydria  - Folate; Future    10. Tinnitus, unspecified laterality  - Ambulatory referral/consult to ENT; Future    11. Gastroesophageal reflux disease, unspecified whether esophagitis present  - famotidine (PEPCID) 40 MG tablet; Take 1 tablet (40 mg total) by mouth once daily.  Dispense: 90 tablet; Refill: 1  - H. PYLORI ANTIBODY, IGG; Future    12. Mixed stress and urge urinary incontinence  - Ambulatory referral/consult to Urology; Future    13. Chronic midline low back pain with bilateral sciatica  - Ambulatory referral/consult to Back & Spine Clinic; Future  - MRI Lumbar Spine Without Contrast; Future    14. Dorsalgia, unspecified  - MRI Lumbar Spine Without Contrast; Future    15. Encounter for screening mammogram for malignant neoplasm of breast  - Mammo Digital Screening Bilat; Future    16. Tinea pedis, unspecified laterality  - Ambulatory referral/consult to Podiatry; Future    17. Fatigue, unspecified type  - TSH; Future    18. Chronic kidney disease, stage 3a    Medication List with Changes/Refills   New Medications    FAMOTIDINE (PEPCID) 40 MG TABLET    Take 1 tablet (40 mg total) by  mouth once daily.    SEMAGLUTIDE (RYBELSUS) 3 MG TABLET    Take 1 tablet (3 mg total) by mouth once daily.    SEMAGLUTIDE (RYBELSUS) 7 MG TABLET    Take 1 tablet (7 mg total) by mouth once daily.   Current Medications    AMLODIPINE (NORVASC) 10 MG TABLET    Take 1 tablet (10 mg total) by mouth once daily.    AMMONIUM LACTATE (LAC-HYDRIN) 12 % LOTION    USE ON FEET AS NEEDED    ASCORBIC ACID, VITAMIN C, (VITAMIN C) 1000 MG TABLET    Take 1,000 mg by mouth once daily.    ATENOLOL (TENORMIN) 25 MG TABLET    Take 1 tablet (25 mg total) by mouth once daily.    AZELAIC ACID (AZELEX) 15 % GEL    Apply to face twice daily. Alert MD if no improvement after 12 weeks of use.    BUTALBITAL-ACETAMINOPHEN-CAFFEINE -40 MG (FIORICET, ESGIC) -40 MG PER TABLET    Take 1 tablet by mouth every 6 (six) hours as needed for Headaches. Do not take for more than 2 consecutive days    CALCIUM-VITAMIN D3 (OS-KATERIN 500 + D3) 500 MG-5 MCG (200 UNIT) PER TABLET    Take 1 tablet by mouth 2 (two) times daily with meals.    CLINDAMYCIN (CLEOCIN T) 1 % LOTION    Use hs on face    CO-ENZYME Q-10 30 MG CAPSULE    Take 30 mg by mouth once daily.     CYCLOSPORINE (RESTASIS) 0.05 % OPHTHALMIC EMULSION    Place 1 drop into both eyes 2 (two) times daily.    DICLOFENAC SODIUM (VOLTAREN ARTHRITIS PAIN) 1 % GEL    Apply 2 g topically once daily.    DOXYCYCLINE (VIBRA-TABS) 100 MG TABLET    Take 1 po BID with food and not within 1 hour prior to lying down    ECONAZOLE NITRATE 1 % CREAM    Use bid for rash    FENOFIBRATE MICRONIZED (LOFIBRA) 134 MG CAP    Take 1 capsule (134 mg total) by mouth once daily.    FUROSEMIDE (LASIX) 40 MG TABLET    Take lasix 40 mg PO every other day PRN swelling    GABAPENTIN (NEURONTIN) 600 MG TABLET    Take 1 tablet (600 mg total) by mouth 3 (three) times daily.    GLUCOSA KHAN 2KCL/CHONDROITIN KHAN (GLUCOSAMINE SULF-CHONDROITIN ORAL)    Take 1 tablet by mouth once daily.    IVERMECTIN (SOOLANTRA) 1 % CREA    Apply topically  to face once daily    LEVOCETIRIZINE (XYZAL) 5 MG TABLET    Take 1 tablet (5 mg total) by mouth every evening.    LIDOCAINE (XYLOCAINE) 5 % OINT OINTMENT    APPLY 2.5 GRAMS TO THE AFFECTED AREA 3-4 TIMES DAILY STARTING 2 WEEKS POST SURGERY.    METRONIDAZOLE 1% (METROGEL) 1 % GEL    Apply topically once daily.    MULTIVITAMIN CAPSULE    Take 1 capsule by mouth once daily.    MUPIROCIN (BACTROBAN) 2 % OINTMENT    APPLY 1/2 GRAM TO EACH NOSTRIL TWICE A DAY FOR UP TO 5 DAYS PRIOR TO SURGERY OR AS DIRECTED BY YOUR PHYSICIAN.    OPW TEST CLAIM - DO NOT FILL    Take by mouth. OPW test claim. Do not fill.    OXYBUTYNIN (DITROPAN XL) 15 MG TR24    TAKE 1 TABLET (15 MG TOTAL) BY MOUTH ONCE DAILY.    POLYMYXIN B SULF-TRIMETHOPRIM (POLYTRIM) 10,000 UNIT- 1 MG/ML DROP    Place 1 drop into both eyes every 4 (four) hours.    PRAVASTATIN (PRAVACHOL) 40 MG TABLET    Take 1 tablet (40 mg total) by mouth once daily.    SANARE ADV SCAR THERAPY BASE GEL    APPLY 1/2 GRAM (1 PUMP) TO AFFECTED AREAS TWICE DAILY STARTING 1 MONTH POST SURGERY    SERTRALINE (ZOLOFT) 100 MG TABLET    Take 1 tablet (100 mg total) by mouth once daily.    VITAMIN E 400 UNIT CAPSULE    Take 400 Units by mouth once daily.   Discontinued Medications    FENOFIBRIC ACID (FIBRICOR) 135 MG CPDR    TAKE ONE CAPSULE BY MOUTH EVERY DAY    FISH OIL-OMEGA-3 FATTY ACIDS 300-1,000 MG CAPSULE    Take 2 g by mouth once daily.    METHOCARBAMOL (ROBAXIN) 500 MG TAB    Take 1 tablet (500 mg total) by mouth 3 (three) times daily as needed.    PANTOPRAZOLE (PROTONIX) 40 MG TABLET    Take 1 tablet (40 mg total) by mouth once daily.    TOPIRAMATE (TOPAMAX) 50 MG TABLET    SMARTSI Tablet(s) By Mouth Every Evening     Modified Medications    No medications on file       Milton Mahmood MD   274}  2022     This note was completed with dictation software and grammatical errors may exist.    If you are due for any health screening(s) below please notify me so we can arrange them  to be ordered and scheduled to maintain your health.     Health Maintenance Due   Topic Date Due    Diabetes Urine Screening  Never done    Shingles Vaccine (2 of 3) 04/22/2015    DEXA Scan  05/08/2021    Eye Exam  05/27/2021    COVID-19 Vaccine (3 - Booster for Pfizer series) 08/05/2021    Influenza Vaccine (1) 09/01/2021    Mammogram  02/19/2022

## 2022-04-20 NOTE — PATIENT INSTRUCTIONS
Rik Amaya, Please read below to learn more on healthy choices, screenings, and useful information related to your health.  Most of my patients read it. Most of my healthy patients complete their cancer screenings. Don't lose out on improving your health.     Table of Contents:     Overdue health maintenance   Cancer prevention  Explanation on the different components of your blood work and interpretation  Frequently asked questions     If you are due for any health screening(s) below please notify me so we can arrange them to be ordered and scheduled to maintain your health. Most healthy patients at your age complete them.   Health Maintenance Due   Topic    Shingles Vaccine (2 of 3)    DEXA Scan     COVID-19 Vaccine (3 - Booster for Pfizer series)    Influenza Vaccine (1)    Mammogram         Breast Cancer Screening    Breast cancer is the second most common cancer in women after skin cancer, and the second leading cause of death from cancer after lung cancer. Mammograms can detect breast cancer early, which significantly increases the chances of curing the cancer.      A screening mammogram is an x-ray image of the breasts used for early breast cancer detection. It can help reduce the number of deaths from breast cancer among women. To get a clear image, the breast is placed between two plastic plates to make it flat. How often a mammogram is needed depends on your age and your breast cancer risk.    Although you are still overdue for this important screening, due to the COVID-19 pandemic, we recommend scheduling it in the near future.                       Cancer Prevention    Why should you choose to get screened for cancer? One simple reason is because you are important. You matter and deserve to have the best health so you can fulfill your great potential.       Colon Cancer screening - Most colon cancers can be prevented by screening. In most cases a polyp in the colon can grow and is not cancerous at  "first, but it can become cancerous years later. A polyp is like a seed that can grow into a weed if it is left in the soil. If the seed is detected and removed, no weed sprouts. A FIT test/Cologuard test and colonoscopy can detect precancerous polyps and lead to the prevention of cancer. A polyp is just like a seed that can be removed before the roots take hold. A colonoscopy can remove these polyps and eliminate the chance that these polyps turn into cancer.     Cervical Cancer screening- A PAP smear can detect precancerous cells that can become cervical cancer and can lead to procedures to remove them. It is very important to get a PAP smear as investing these few minutes can help prevent a lot of trouble down the road. If you want to do the most you can do to spend more time with your family and friends', cancer screenings can help with that goal.     Breast Cancer screening- Mammograms can detect breast cancer before it has spread and early detection allows the ability to remove the lesion prior to any spread. It is similar to a small rotten spot on fruit. If the spoiled part is removed quickly it can preserve the rest of the fruit, but if it is left alone it will corrupt the whole peach.     Smoking Cessation- "Smoking is like a chimney. The tar builds up and causes a back draft which leads to a cough. Smoking is like sitting in a car with a blocked exhaust system." Smoking can increase your risk for lung, mouth, throat, nose, esophagus, bladder, kidney, ureter, pancreas, stomach, liver, cervix, ovary, bowel, and leukemia [2]. If you have smoked in the past, you might meet the criteria for a CT lung cancer screening.     Lung Cancer Screening - "The U.S. Preventive Services Task Force (USPSTF) recommendsexternal icon yearly lung cancer screening with LDCT for people who--have a 20 pack-year or more smoking history, and smoke now or have quit within the past 15 years, and are between 50 and 80 years old. A " "pack-year is smoking an average of one pack of cigarettes per day for one year. For example, a person could have a 20 pack-year history by smoking one pack a day for 20 years or two packs a day for 10 years." [3]    Hypertension - Common high blood pressure meds may lower colorectal cancer risk-[employer]EMA, July 6, 2020 - Hypertension Journal Report Medications commonly prescribed to treat high blood pressure may also reduce patients' colorectal cancer risk, according to new research published today in Hypertension, an American Heart Association journal." [4].     Low HbA1c - "Higher HbA1c levels (within both the non?diabetic and diabetic ranges) were associated with the risk of colorectal cancer (Model 2; P linear?=?0.009), especially for colon cancer." [5].    A healthy diet, exercise, limitation of alcohol use, certain infections, avoidance of radiation/environmental toxins, and staying lean lowers your cancer risk [6].     I want to empower you to make informed choices for your health. I have a listed below the most common blood components that we check for when you get blood work. I discuss what each component measures along with common reasons for why they can be abnormal. If you are interested in understanding your blood work better, please read the lab explanation attached below.     Explanation of Lab Results    Please note: This information is included as a reference to help you better understand your lab results and is not be used for diagnosis.     Lipid Panel:  Cholesterol is a measure of cardiac risk and stroke risk. A lipid panel measures total cholesterol and provides readings which are broken down into 3 subsections: Triglycerides, HDL and LDL.    Total Cholesterol: Your total blood cholesterol is a measure of LDL cholesterol, HDL cholesterol, and other lipid components.  If your individual lipid level components are in the normal range and you have an elevated total cholesterol level we are generally " not to concerned since we primarily look at the LDL cholesterol which is considered the bad cholesterol which can lead to heart attacks and strokes.  Your calculated cardiac risk is the most important factor in deciding if you would benefit from a cholesterol-lowering medication that the total cholesterol level.    Triglycerides are most diet and weight sensitive. They are affected easily by lifestyle changes. Ideally, this reading should be less than 150, although if the remainder of the cholesterol panel is within normal limits, we will tolerate upwards of 250-300. For the most part, if triglycerides are the only part of your cholesterol panel reading as 'abnormal', it is best to lose weight and modify your diet, including less saturated fat and less simple sugars. We only start medication to lower this is the level is greater than 500 since this can increase ones risk for pancreatitis. This is not the cholesterol type that leads to heart attacks.     HDL is your 'good cholesterol.' Ideally, the reading should be over 40 and is particularly helpful when it is greater than 60. Research indicates that high HDL is extremely protective; and if your HDL level is greater than 60, we tolerate far worse LDL or triglyceride levels. For the most part, HDL is responsive to aerobic activity and ideal weight. We do not have medicines that increase the HDL reading very easily.    LDL is your 'bad cholesterol.' Our goals depend on how many cardiac risk factors you have.     High LDL: If you are diabetic or have had a heart attack, we like the LDL level to be less than 100. If you have 2 cardiac risk factors (such as diabetes, hypertension, family history, a low HDL and smoking), we like your LDL to be less than 130. If you have 0-2 cardiac risk factors, we like your LDL level to be less than 160, but we may not necessarily initiate medications to 190 unless you cannot lower it. The latest guidelines recommend to consider taking  a statin only if your ASCVD cardiac risk score is at least greater than 7.5% and a strong recommendation if your risk score is greater than 10%.     Low LDL: Normal or low LDL is considered good and having an LDL below the normal range is not of a concern.     Complete Blood Count (CBC):    White blood cells: These are infection fighting cells. Mild fluctuations may represent minor illness at the time of blood draw. Marked fluctuations can represent immune diseases. This can also be elevated from smoking.     High WBC: Elevation in wbc can commonly be caused by an infection, steroid use, or any cause of inflammation such as many rheumatologic diseases, surgery, or trauma.      Low WBC: Many different things can cause this such as infections, medications, rheumatologic disorders, malignancies, nutritional deficiencies, and a normal variant in some individuals. If this occurs it is common practice to rule out a few viruses such as HIV, Hep C, B12, folate along with a a peripheral blood smear to look for abnormalities. If you are otherwise healthy with no concerning symptoms and the workup is negative we usually monitor it with yearly blood work.  If there are other abnormal cell line abnormalities sometimes we refer to hematology to further evaluate.    Hemoglobin: A key indicator for anemia. Ranges depend on age. If you are significantly out of this range, we may need to talk with you.    High Hemoglobin: This can be elevated in some blood disorders, sleep apnea, chronic lung disease, kidney disease, testosterone use, dehydration, smoking, along with some other rare causes.     Low Hemoglobin: Many things can lead to this but the most common cause is an iron deficiency in females who lose blood during their periods, decreased absorption due to antacids, poor diet, sickle cell, anemia of chronic disease, malignancy, bleeding from the gut, along with some other less common causes. If you are a young female who has  periods it is usually assumed that this is from that blood loss unless other symptoms or abnormal blood work is present. If you are above 45 and have an iron deficiency it is always recommended to get a colonoscopy to ensure that there is no lesion causing blood loss and to exclude malignancy.     Hematocrit: Another way of looking at anemia, much like your hemoglobin. If you are significantly out of this range, we may need to talk with you.    MCV: This looks at the size of your red blood cells.     High MCV:  A large number may indicate a B-12 deficiency, folate deficiency, alcohol use, liver disease, medication-induced phenomenon, or a need for further workup.    Low MCV: A small number may imply iron anemia or genetic disorder such as alpha-thalassemia. We may check iron studies called to see if you are low.    MCH: Much like MCV above.    Platelets: These are clotting factors. We tolerate a broad range in this. If your numbers are less than 100, we may need to work it up.     High Platelet Count: If your numbers are elevated, this could represent ongoing inflammation within the body which can be caused by any infection, illness, iron deficiency, or other malignancy. We usually recheck it to monitor for improvement and if it does not resolve will work it up with more blood work.     Low Platelet Count: Many things can cause this such as infections, alcohol use, medications, liver disease, vitamin deficiencies, aspleenia, and some other rare blood disorders. If it persists many times we refer to hematology if a cause is not identified.     Iron:  This is not a reliable blood marker since this fluctuates throughout the day and if you are fasting many times your iron level in your blood is low but this does not necessarily mean you have a deficiency.  There are more reliable ways to measure your iron stores and these are discussed below.    Transferrin:  Many things can cause an abnormal result and this is not as  important as some other labs mentioned below.    TIBC:  This is the total iron-binding capacity and this measures the total amount of iron that can be bound by proteins in the blood.  If you have an elevated TIBC this is a good marker that you could be low on iron and this is useful blood marker.  A simple way to think of this is seats in a car. The TIBC is the number of open seats that iron can sit in. If you have a high TIBC (number of open seats) that means you have a low iron level.     Ferritin:  A low ferritin is almost always caused from an iron deficiency.  A normal ferritin level does not need necessarily exclude an iron deficiency since many inflammatory conditions such as kidney disease, diabetes, arthritis, and obesity can raise this level hiding an iron deficiency.  If you are healthy with no inflammatory conditions this is a very reliable test for detecting an iron deficiency since nothing else lowers your ferritin level except a low iron level. Keep in mind that you can have an iron deficiency if your ferritin level is normal but your TIBC is elevated.  If you have a low iron level the next step is always to identify why you have a low iron level.    CMP (Comprehensive Metabolic Panel):  Broadly, this is a test of organ function including the kidneys, liver, and electrolyte levels.    Glucose: This is primarily looking for diabetes. We like your blood glucose level to be less than 100 when fasting. Readings of 100-125 indicate what we call 'pre-diabetes' or 'glucose intolerance.' This does not necessarily indicate diabetes, but we may check another test called a hemoglobin A1C for confirmation. This level puts you at great risk for becoming a true diabetic and we would encourage the reduction of simple sugars and processed white flour as well as appropriate weight loss. If this number is greater than 125, it is likely you are diabetic; we will get an additional hemoglobin A1C test and will likely  schedule you for an appointment. If you notice that your blood glucose is above 125 and we have not scheduled a follow-up appointment, please call us. Patients who are known diabetics can have readings greater than 125.    Urea Nitrogen: This is a kidney function and maybe elevated because of mild dehydration or because of excessive muscle breakdown from aggressive exercise habits.    Creatinine: This also is a kidney function test. It may be mildly elevated if you have particularly large muscle bulk or taking a supplement like creatine. It is related to the GFR. It is a muscle product that we track to look at your kidney function. If this is elevated and new, we may need to talk with you. If you have had this mildly elevated in the past, it is likely that we will just track it to ensure that it does not worsen quickly. Some medications may gently worsen this, namely blood pressure pills called ace inhibitors. To some degree, we will permit levels of up to 1.6.    Sodium: This is essentially the concentration of salt within the body. This may be mildly low because of dehydration, overhydration, diuretics, .    Potassium: This is an electrolyte that can cause muscular cramping or cardiac difficulties. It is sometimes lowered by diuretic medications.    Chloride: For the most part, this is only relevant if the other electrolytes are abnormal.    CO2: This is a function of acid balance within the body. For the most part, mild abnormalities are not important and may represent a starvation or dehydration state when blood was drawn. Many medications can change this level as well such as diuretics, acetazolamide, calcium carbonate, laxatives, aspirin, and many others.    High CO2: Many things can cause this which includes dehydration, sleep apnea, and COPD.     Low CO2: Many things can lead to a low level and if you are generally healthy we are usually  not concerned. Diarrhea, renal disease, diabetes, and many medications  can cause this.     Anion Gap: Only relevant if your CO2 is abnormal.    Calcium: This is not related to dietary intake of calcium. It may fluctuate gently based on the amount of protein within your body. If it is above 10.9, we may need to do additional testing. Many times if low the albumin level is low and once the corrected calcium level is calculated the calcium is in a normal range.     Total protein: This looks at protein within the body. Markedly elevated levels can represent an immune response and may require further workup.    Albumin: This may be elevated because of particularly high level of fitness. If it is markedly suppressed, it may represent organ dysfunction, particularly in the liver or kidneys.    AST and ALT: These are enzymes in the liver and if elevated can indicate liver damage.     Elevated AST/ALT: Many things can caused elevated liver enzymes and the most common reason is viral infections, alcohol use, medications, supplements, autoimmune, along with some other rare causes. One of the most common reasons for a mild elevation in liver enzymes (less than 3 times the upper limit) is fatty liver disease. Many individuals who have extra fat in their diet store this in the liver and this can build up and cause a mild elevation. This is usually diagnosed with a liver ultrasound and exclusion of other causes. The only treatment for this is diet and exercise along with avoidance of liver toxic medications and alcohol. It is standard of care to rule our viral hepatitis and get imaging of the liver if elevated. This is monitored and if I feel concerned will refer to hepatology.     Alk Phos: Part of liver function or bones    High Alk Phos: elevated levels may indicate a liver injury or obstruction of bile flow. Elevated levels can also be seen in vitamin D deficiency, drug induced, or bone disorders.     Low Alk Phos: In most cases having a low Alkaline Phosphatase enzyme activity is not due to any  disease and is simply a normal variant.  Having an elevated Alk Phos is more concerning and associated with many diseases and thus I would not be overly concerned with a low level which is seen in many health individuals. The reasons for a low serum alkaline phosphatase activity were reviewed in a 1-yr retrospective study and in this study it was found that no cause was found in most cases.  Low activity values were recorded in several individuals in the absence of any obvious cause. This would suggest that the definition of the lower limit of the reference range for alkaline phosphatase is arbitrary, thus limiting the use of low serum activity as a marker of disease.  In some cases micronutrients like Zinc (Zn) and Magnesium (Mg) are causes of low ALP activity and you can take zinc or magnesium supplements. Unfortunately, the blood work to measure zinc and magnesium levels are unreliable and not very accurate since it does not test for the intracellular zinc or magnesium levels.     Jose BEASLEYD, Walter HICKS, Joe WEBB. Clinical significance of a low serum alkaline phosphatase. Net J Med. 1992 Feb;40(1-2):9-14. PMID: [id number].  Morteza GRESHAM. S, William B, Merna I, Behera S, Morteza S. Low Alkaline Phosphatase (ALP) In Adult Population an Indicator of Zinc (Zn) and Magnesium (Mg) Deficiency. Curr Res Nutr Food Sci 2017;5(3). doi : http://dx.[email or web address]    Total Bilirubin: This is also part of liver function.    High T Bili: If you have right upper quadrant pain an elevation in total bilirubin can be caused by a gallbladder stone that is blocking the biliary tract that leads to your gastrointestinal tract. If you have fever and right upper quadrant pain this can sometimes be elevated when your gallbladder is infected and most individuals have nausea with vomiting associated with it. If you have no symptoms and are otherwise healthy this can be caused by Gilbert syndrome which is a benign normal variant.  "There are other causes such as some anemias but there would be abnormal blood counts.     Low T Bili: Nothing to be concerned about.     Thyroid Stimulating Hormone (TSH): This reading is an indicator of your thyroid function. The thyroid regulates energy levels throughout the entire body, affecting almost every organ system. This is an inverse relationship so a high number actually presents a low thyroid. If this is abnormal, we will often check an additional lab called a Free T4 to evaluate this more carefully. Borderline elevations, those of 5-10, can be watched or worked up further. Please do not take the supplement Biotin for at least a week prior to getting your TSH checked since this can lead to false measurement levels.     Prostate Specific Antigen (PSA): (Men only.) This is a prostate cancer screening test, and is no longer a routine screening test. Levels are truly a function of age. Being less than 4 is typical for someone more in their 60s. If you are young, it should probably be less than 3. A higher PSA result does not necessarily mean that you have cancer, but may indicate a need for a discussion with your provider. Options include observation to look at rate of rise or prostate biopsy. Not only is the absolute value important, but how much it has changed from previous years. Please ensure that there is not a dramatic rise from previous years.    Please Note: This information is included as a reference to help you better understand your lab results and is not be used for diagnosis.    Frequently asked questions    When should I take my blood pressure medication?    The latest studies show that taking your blood pressure medication at night is the best time. A recent study showed that this prevents more heart attacks and strokes. See the answer below from [place].     "Q. I've taken blood pressure medicines every morning for many years, and they keep my pressure under control. Recently, my doctor " "recommended taking them at bedtime, instead. Does that make sense?    A. It actually does make sense -- based on recent research. For many years, there have been at least three theoretical reasons for taking blood pressure medicines before bedtime. First, a body system that strongly affects blood pressure, called the renin-angiotensin system, has its peak activity during sleep. Second, circadian rhythms cause differences in the body chemistry at night compared with daytime. Third, most heart attacks occur in the morning, before medicines taken in the morning have a chance to "kick in."" [6].     When should I take my cholesterol medication?    It used to be recommended to take your cholesterol medication at night since the original statins that lowered cholesterol did not last 24 hours and most cholesterol synthesis is done at night. The long acting statins such as atorvastatin and rosuvastatin last 24 hours so they can be taken any time during the day. Simvastatin, pravastatin, fluvastatin, and lovastatin are shorter acting and should be taken at bed time.     Can supplements affect my blood work?    Yes they can. A very important supplement to not take for at least a week prior to your blood work is biotin. "Biotin supplement use is common and can lead to the false measurement of thyroid hormone in commonly used assays." [8.]    What are conditions that should not be addressed during a virtual visit?    There are some conditions that should not be evaluated via a virtual visit since optimal care is impossible. Chest pain, shortness of breath, lung conditions, abdominal pain, and any neurological complaint such as headache, dizziness, numbness ect.         When will I get commentary of my blood work?    I review all blood work that you get and I will send out commentary on this via [employer] within 72 hours. In most cases you will get a message from me sooner, but many times not all of the blood work is completed " thus I usually wait until all results have returned. If there is a critical abnormality you should be contacted the same day you got blood work.     How frequently do I need to have visits to get controlled substances?    It is standard protocol to have a visit every 3 months if you are taking scheduled substances such as ADHD medications, psychiatric medications, and pain medications. This is to ensure your safety and monitor for any side effects.     When should I bring prior to a visit if I want to lose weight?    I recommend that you make a food diary for a week and fill out what you ate each day. You can bring this form in to your visit and I can look over it to suggest changes that you can make.     Which over the counter medications should I avoid if I have decreased kidney function?    NSAIDs which includes ibuprofen (Advil, Motrin, Nuprin), naproxen (Aleve), meloxicam (Mobic) and diclofenac(Zorvolex, Voltaren) and ketorolac (Toradol) can damage your kidneys if you take this long term.Tylenol does not affect your kidney and thus is safe as long as you don't have liver disease.     Is there an [facility]?     Yes! The [facility] is located on the first floor of the [facility]. The address is listed below. You can get curbside pickup if you call their number at [phone]. One of the many benefits of using the [facility] pharmacy is that the pharmacists can contact me directly if a cheaper alternative is available to save you money. They also see your note to know more about what the medication was prescribed for. I recommend this pharmacy since communication with me is quick in case any confusion arises on your medications.     Ariel rPabhu Lynn.  [address] [place] [id number]  Phone: [phone]    Hours:  Monday - Friday  8:00 a.m. - 5:30 a.m.    Why is it not in my best interest to call in order to get an antibiotic?    Medicine is a complex field and many times the correct diagnosis is critical  in order to provide the correct care. One of the most important goals of a healthcare provider is to ensure that no dangerous condition is masquerading as a mild illness. Specific questions are very important to obtain during an examination that provide a wealth of information to understand your illness. Health care providers are trained to investigate for signs that can be dangerous to your health. Messaging or calling the office in order to get an antibiotic can be very dangerous.     For example, many urinary tract infections can lead to an infection in the kidney that can result in a serious blood stream infection that can lead to hospitalization if not recognized. A cough can be caused by many different things and not necessarily an infection. It is not uncommon that one assumes a cough is from an infection when it is actually caused by a blood clot in the lungs. This can lead to death. Determining your risk can only be performed after a thorough history and examination. A few sentences through e-mail is not enough.     What are some common symptoms that should be evaluated by the emergency department and not by phone or e-mail?    This does not include every symptom, but common examples of symptoms that should prompt one to go to the emergency department are chest pain, chest pressure, shortness of breath, difficulty breathing, abdominal pain, weakness or numbness or an extremity, sudden weakness or drooping on one side of the body, speaking difficulty, unusual or bad headache (particularly if it started suddenly), head injury, confusion, seizure, passing out, lightheaded, pain in arm or jaw, suddenly not able to speak, see, walk, or move, dizziness, neck stiffness, suicidal thoughts, testicular pain, cuts and wounds, severe pain, along with many others. This is not an inclusive list.     Outside Records    It is common to have an colonoscopy, mammogram, PAP smear, or eye exam done outside the [facility] system.  Many times we do not get the records automatically sent to us. Please call your provider's office to notify them to fax us your records so that we can have the most up to date information. Your provider will review your outside results in order to provide you with the complete care that you deserve. We appreciate that you decided to choose us to be serving on your healthcare team and the more information we have about your health is essential.     If I have a psychiatric crisis what should I do?    If you ever feel that there is a risk of a harm to yourself we recommend to go to the emergency room. There is a National Suicide Prevention lifeline number 2-103-280-TALK which route you to the nearest crisis center. There is also a suicide hotline 5-557-JOAGKQW (1-213.710.6487).         Wishing you good health,     Milton Mahmood MD     References:  1-https://www.[employer]/speakers/eva_vertes  2-https://www.[employer].com.au/news/fgivx-icg-45-cancers-that-can-nl-zivmkt-as-smoking/  3-https://www.cdc.gov/cancer/lung/basic_info/screening.htm  4-https://newsroom.heart.org/news/common-hypertension-medications-may-reduce-colorectal-cancer-risk  5-Emilyo A, Jayne M, Sawada N, et al. High hemoglobin A1c levels within the non-diabetic range are associated with the risk of all cancers. Int J Cancer. 2016;138(7):2709-4931. doi:10.1002/ijc.11013  6-https://www.health.[employer].edu/newsletter_article/the-10-commandments-of-cancer-prevention  7-https://www.health.[employer].edu/diseases-and-conditions/should-i-take-blood-pressure-medications-at-night  8-Trudy HUTTON et al 2018 Prevalence of biotin supplement usage in outpatients and plasma biotin concentrations in patients presenting to the emergency department. Clin Biochem. Epub 2018 Jul 20. PMID: [id number].

## 2022-04-20 NOTE — PROGRESS NOTES
Monique Trujillo is a 71 y.o. female here for a diabetic eye screening with non-dilated fundus photos per Dr. Mahmood.    Patient cooperative?: yes  Small pupils?: yes  Last eye exam: 2020    For exam results, see Encounter Report.

## 2022-04-21 ENCOUNTER — CLINICAL SUPPORT (OUTPATIENT)
Dept: DERMATOLOGY | Facility: CLINIC | Age: 72
End: 2022-04-21
Payer: MEDICARE

## 2022-04-21 DIAGNOSIS — Z48.02 VISIT FOR SUTURE REMOVAL: Primary | ICD-10-CM

## 2022-04-21 PROCEDURE — 99024 PR POST-OP FOLLOW-UP VISIT: ICD-10-PCS | Mod: S$GLB,,, | Performed by: STUDENT IN AN ORGANIZED HEALTH CARE EDUCATION/TRAINING PROGRAM

## 2022-04-21 PROCEDURE — 99024 POSTOP FOLLOW-UP VISIT: CPT | Mod: S$GLB,,, | Performed by: STUDENT IN AN ORGANIZED HEALTH CARE EDUCATION/TRAINING PROGRAM

## 2022-04-22 ENCOUNTER — HOSPITAL ENCOUNTER (OUTPATIENT)
Dept: RADIOLOGY | Facility: HOSPITAL | Age: 72
Discharge: HOME | End: 2022-04-22
Attending: STUDENT IN AN ORGANIZED HEALTH CARE EDUCATION/TRAINING PROGRAM
Payer: MEDICARE

## 2022-04-22 ENCOUNTER — PATIENT MESSAGE (OUTPATIENT)
Dept: FAMILY MEDICINE | Facility: CLINIC | Age: 72
End: 2022-04-22
Payer: MEDICARE

## 2022-04-22 DIAGNOSIS — M54.9 DORSALGIA, UNSPECIFIED: ICD-10-CM

## 2022-04-22 DIAGNOSIS — M48.061 SPINAL STENOSIS OF LUMBAR REGION, UNSPECIFIED WHETHER NEUROGENIC CLAUDICATION PRESENT: Primary | ICD-10-CM

## 2022-04-22 DIAGNOSIS — M54.42 CHRONIC MIDLINE LOW BACK PAIN WITH BILATERAL SCIATICA: ICD-10-CM

## 2022-04-22 DIAGNOSIS — G89.29 CHRONIC MIDLINE LOW BACK PAIN WITH BILATERAL SCIATICA: ICD-10-CM

## 2022-04-22 DIAGNOSIS — M54.41 CHRONIC MIDLINE LOW BACK PAIN WITH BILATERAL SCIATICA: ICD-10-CM

## 2022-04-22 LAB
H PYLORI IGG SERPL QL IA: NEGATIVE
VIT B12 SERPL-MCNC: 813 NG/L (ref 180–914)

## 2022-04-22 PROCEDURE — 72148 MRI LUMBAR SPINE W/O DYE: CPT | Mod: TC,PO

## 2022-04-24 ENCOUNTER — PATIENT OUTREACH (OUTPATIENT)
Dept: ADMINISTRATIVE | Facility: OTHER | Age: 72
End: 2022-04-24
Payer: MEDICARE

## 2022-04-24 NOTE — PROGRESS NOTES
Chart was reviewed for overdue [facility] Encounters ([facility])  topics  Updates were requested from care everywhere  Health Maintenance has been updated  [facility] immunization registry triggered

## 2022-04-25 ENCOUNTER — PATIENT MESSAGE (OUTPATIENT)
Dept: FAMILY MEDICINE | Facility: CLINIC | Age: 72
End: 2022-04-25
Payer: MEDICARE

## 2022-04-25 ENCOUNTER — TELEPHONE (OUTPATIENT)
Dept: FAMILY MEDICINE | Facility: CLINIC | Age: 72
End: 2022-04-25
Payer: MEDICARE

## 2022-04-25 DIAGNOSIS — E11.65 TYPE 2 DIABETES MELLITUS WITH HYPERGLYCEMIA, WITHOUT LONG-TERM CURRENT USE OF INSULIN: Primary | ICD-10-CM

## 2022-04-25 RX ORDER — METFORMIN HYDROCHLORIDE 500 MG/1
500 TABLET, EXTENDED RELEASE ORAL
Qty: 90 TABLET | Refills: 1 | Status: SHIPPED | OUTPATIENT
Start: 2022-04-25 | End: 2022-05-23

## 2022-04-25 NOTE — TELEPHONE ENCOUNTER
----- Message from Mary Bradley sent at 4/25/2022  8:43 AM CDT -----  Type:  Patient Returning Call    Who Called:  pt  Who Left Message for Patient:  not sure  Does the patient know what this is regarding?:  yes  Best Call Back Number:  [phone] (home)     Additional Information:  regarding lab results.

## 2022-04-25 NOTE — PROGRESS NOTES
Please let the patient know that she has an elevated A1c above goal and thus I recommend for her to continue the medication that we started her diabetes and weight loss and clinic and also sent in metformin once a day to help better control her diabetes thanks

## 2022-04-28 DIAGNOSIS — M25.561 ACUTE PAIN OF BOTH KNEES: Primary | ICD-10-CM

## 2022-04-28 DIAGNOSIS — M25.562 ACUTE PAIN OF BOTH KNEES: Primary | ICD-10-CM

## 2022-04-29 ENCOUNTER — TELEPHONE (OUTPATIENT)
Dept: PAIN MEDICINE | Facility: CLINIC | Age: 72
End: 2022-04-29
Payer: MEDICARE

## 2022-04-29 ENCOUNTER — OFFICE VISIT (OUTPATIENT)
Dept: SPINE | Facility: CLINIC | Age: 72
End: 2022-04-29
Payer: MEDICAID

## 2022-04-29 VITALS — BODY MASS INDEX: 45.31 KG/M2 | HEIGHT: 61 IN | WEIGHT: 240 LBS

## 2022-04-29 DIAGNOSIS — G89.29 CHRONIC MIDLINE LOW BACK PAIN WITH BILATERAL SCIATICA: ICD-10-CM

## 2022-04-29 DIAGNOSIS — M51.36 LUMBAR DEGENERATIVE DISC DISEASE: Primary | ICD-10-CM

## 2022-04-29 DIAGNOSIS — M54.41 CHRONIC MIDLINE LOW BACK PAIN WITH BILATERAL SCIATICA: ICD-10-CM

## 2022-04-29 DIAGNOSIS — M48.062 SPINAL STENOSIS OF LUMBAR REGION WITH NEUROGENIC CLAUDICATION: Primary | ICD-10-CM

## 2022-04-29 DIAGNOSIS — M54.42 CHRONIC MIDLINE LOW BACK PAIN WITH BILATERAL SCIATICA: ICD-10-CM

## 2022-04-29 PROCEDURE — 3062F PR POS MACROALBUMINURIA RESULT DOCUMENTED/REVIEW: ICD-10-PCS | Mod: CPTII,S$GLB,, | Performed by: PHYSICAL MEDICINE & REHABILITATION

## 2022-04-29 PROCEDURE — 3044F HG A1C LEVEL LT 7.0%: CPT | Mod: CPTII,S$GLB,, | Performed by: PHYSICAL MEDICINE & REHABILITATION

## 2022-04-29 PROCEDURE — 3288F PR FALLS RISK ASSESSMENT DOCUMENTED: ICD-10-PCS | Mod: CPTII,S$GLB,, | Performed by: PHYSICAL MEDICINE & REHABILITATION

## 2022-04-29 PROCEDURE — 3288F FALL RISK ASSESSMENT DOCD: CPT | Mod: CPTII,S$GLB,, | Performed by: PHYSICAL MEDICINE & REHABILITATION

## 2022-04-29 PROCEDURE — 3066F NEPHROPATHY DOC TX: CPT | Mod: CPTII,S$GLB,, | Performed by: PHYSICAL MEDICINE & REHABILITATION

## 2022-04-29 PROCEDURE — 1160F PR REVIEW ALL MEDS BY PRESCRIBER/CLIN PHARMACIST DOCUMENTED: ICD-10-PCS | Mod: CPTII,S$GLB,, | Performed by: PHYSICAL MEDICINE & REHABILITATION

## 2022-04-29 PROCEDURE — 1101F PT FALLS ASSESS-DOCD LE1/YR: CPT | Mod: CPTII,S$GLB,, | Performed by: PHYSICAL MEDICINE & REHABILITATION

## 2022-04-29 PROCEDURE — 1125F AMNT PAIN NOTED PAIN PRSNT: CPT | Mod: CPTII,S$GLB,, | Performed by: PHYSICAL MEDICINE & REHABILITATION

## 2022-04-29 PROCEDURE — 1101F PR PT FALLS ASSESS DOC 0-1 FALLS W/OUT INJ PAST YR: ICD-10-PCS | Mod: CPTII,S$GLB,, | Performed by: PHYSICAL MEDICINE & REHABILITATION

## 2022-04-29 PROCEDURE — 1159F MED LIST DOCD IN RCRD: CPT | Mod: CPTII,S$GLB,, | Performed by: PHYSICAL MEDICINE & REHABILITATION

## 2022-04-29 PROCEDURE — 3044F PR MOST RECENT HEMOGLOBIN A1C LEVEL <7.0%: ICD-10-PCS | Mod: CPTII,S$GLB,, | Performed by: PHYSICAL MEDICINE & REHABILITATION

## 2022-04-29 PROCEDURE — 1159F PR MEDICATION LIST DOCUMENTED IN MEDICAL RECORD: ICD-10-PCS | Mod: CPTII,S$GLB,, | Performed by: PHYSICAL MEDICINE & REHABILITATION

## 2022-04-29 PROCEDURE — 99204 OFFICE O/P NEW MOD 45 MIN: CPT | Mod: S$GLB,,, | Performed by: PHYSICAL MEDICINE & REHABILITATION

## 2022-04-29 PROCEDURE — 1160F RVW MEDS BY RX/DR IN RCRD: CPT | Mod: CPTII,S$GLB,, | Performed by: PHYSICAL MEDICINE & REHABILITATION

## 2022-04-29 PROCEDURE — 3062F POS MACROALBUMINURIA REV: CPT | Mod: CPTII,S$GLB,, | Performed by: PHYSICAL MEDICINE & REHABILITATION

## 2022-04-29 PROCEDURE — 3008F BODY MASS INDEX DOCD: CPT | Mod: CPTII,S$GLB,, | Performed by: PHYSICAL MEDICINE & REHABILITATION

## 2022-04-29 PROCEDURE — 3008F PR BODY MASS INDEX (BMI) DOCUMENTED: ICD-10-PCS | Mod: CPTII,S$GLB,, | Performed by: PHYSICAL MEDICINE & REHABILITATION

## 2022-04-29 PROCEDURE — 1125F PR PAIN SEVERITY QUANTIFIED, PAIN PRESENT: ICD-10-PCS | Mod: CPTII,S$GLB,, | Performed by: PHYSICAL MEDICINE & REHABILITATION

## 2022-04-29 PROCEDURE — 3066F PR DOCUMENTATION OF TREATMENT FOR NEPHROPATHY: ICD-10-PCS | Mod: CPTII,S$GLB,, | Performed by: PHYSICAL MEDICINE & REHABILITATION

## 2022-04-29 PROCEDURE — 99204 PR OFFICE/OUTPT VISIT, NEW, LEVL IV, 45-59 MIN: ICD-10-PCS | Mod: S$GLB,,, | Performed by: PHYSICAL MEDICINE & REHABILITATION

## 2022-04-29 NOTE — PATIENT INSTRUCTIONS
Athletes Foot    Athletes foot (tinea pedis) is caused by a fungal infection in the skin. It affects the skin between the toes, causing cracks in the skin called fissures. It can also affect the bottom of the foot where it causes dry white scales and peeling of the skin. This infection is more likely to occur when the foot is in hot, sweaty socks and shoes for long periods of time. You may feel itching and burning between your toes. This infection is treated with skin creams or medicine taken by mouth.    Home care  The following are general care guidelines:  It is important to keep the feet dry. Use absorbent cotton socks and change them if they become sweaty. Or wear an open-toe shoe or sandal. Wash the feet at least once a day with soap and water.  Apply the antifungal cream as prescribed. Some antifungal creams are available without a prescription.  It may take a week before the rash starts to improve. It can take about 3 to 4 weeks to completely clear. Continue the medicine until the rash is all gone.  Use over-the-counter antifungal powders or sprays on your feet after exposure to high-risk environments, such as public showers, gyms, and locker rooms. This can help prevent future infections. Wearing appropriate shoes in these situations can help.    Prevention  The following tips may help prevent athletes foot:  Don't share shoes or socks with someone who has athlete's foot.  Don't walk barefoot in places where a fungal infection can spread quickly such as locker rooms, showers, and swimming pools.  Change socks regularly.  Alternate shoes to assist in drying.    Follow-up care  Follow up with your healthcare provider as recommended if the rash does not improve after 10 days of treatment, or if the rash continues to spread.    When to seek medical care  Get medical attention right away if any of the following occur:  Fever of 100.4°F (38°C) or higher, or as directed  Increasing redness or swelling of the  foot  Infection comes back soon after treatment  Pus draining from cracks in the skin    Date Last Reviewed: 8/1/2016  © 8644-4178 [employer]. [address] [place] [address], [place] [address]. All rights reserved. This information is not intended as a substitute for professional medical care. Always follow your healthcare professional's instructions.       Bunion    You have a bunion. This is a bony bump at the base of your big toe, along the inside edge of your foot. As the bump gets bigger, it can become red, swollen, and painful with shoe wear.  Bunions may occur if you wear shoes that are too tight and pinch your toes together. High heels may make this worse. In some cases a bunion is due to poor alignment of the foot and ankle. This puts extra weight on the instep of each foot.  Once a bunion forms, it changes the way weight is spread all across your foot. This causes the bunion to get worse over time. The big toe will bend more and more toward the other toes.  A minor bunion can be treated by:  Wearing properly fitting shoes  Using bunion pads  Wearing shoe inserts, called orthotics, to better align the foot and ankle  Physical therapy with ultrasound or whirlpool baths can ease pain, redness, and swelling. Severe cases may require surgery. If you dont treat what is causing the bunion, it may get larger and more painful.  Home care  Limit high heels. These shoes force your foot forward, crowding the toes together.  Switch to comfortable shoes with a wide toe area. Or have your existing shoes stretched by a shoe repair shop.  Avoid shoes that are tight, narrow, or pointed.  If you are flat-footed, using arch supports may help prevent further deformity. The best shoe inserts are the ones custom made by a foot specialist, called a podiatrist, or other healthcare provider.  Put a bunion pad over the bunion to ease pressure of your shoe against the bunion. You can buy these pads at most pharmacies without  a prescription  To reduce pain and swelling, apply an ice pack over the injured area for 15 to 20 minutes. Do this every 1 to 2 hours the first day. Keep using ice 3 to 4 times a day until the pain and swelling goes away.  To make an ice pack, put ice cubes in a sealed zip-lock plastic bag. Wrap the bag in a clean, thin towel or cloth. Never put ice or an ice pack directly on the skin.  You may use over-the-counter pain medicine to control pain, unless another medicine was prescribed. Talk with your provider before using these medicines if you have chronic liver or kidney disease, or ever had a stomach ulcer or GI (gastrointestinal) bleeding.  Follow-up care  Follow up with a podiatrist or foot doctor, or as advised.  If X-rays were taken, you will be notified of any new findings that may affect your care.  When to seek medical care  Contact your healthcare provider if any of the following occur:  Increasing pain or redness around the base of the big toe  Painful ingrown toenail, with redness and swelling or pus around the nail  Date Last Reviewed: 11/21/2015  © 7110-1293 [employer]. [address] [place] [address]. All rights reserved. This information is not intended as a substitute for professional medical care. Always follow your healthcare professional's instructions.     Your Diabetes Foot Care Program    Every day you depend on your feet to keep you moving. But when you have diabetes, your feet need special care. Even a small foot problem can become very serious. So dont take your feet for granted. By working with your diabetes healthcare team, you can learn how to protect your feet and keep them healthy.  Evaluating your feet  An evaluation helps your healthcare provider check the condition of your feet. The evaluation includes a review of your diabetes history and overall health. It may also include a foot exam, X-rays, or other tests. These can help show problems beneath the skin  that you cant see or feel.  Medical history  You will be asked about your overall health and any history of foot problems. Youll also discuss your diabetes history, such as whether your blood sugar level has changed over time. It also includes questions about sensations of pain, tingling, pins and needles, or numbness. Your healthcare provider will also want to know if you have high blood pressure and heart disease, or if you smoke. Be sure to mention any medicines (including over-the-counter), supplements, or herbal remedies you take.  Foot exam  A foot exam checks the condition of different parts of your foot. First, your skin and nails are examined for any signs of infection. Blood flow is checked by feeling for the pulses in each foot. You may also have tests to study the nerves in the foot. These include using a small filament (wire) to see how sensitive your feet are. In certain cases, you will be asked to walk a short distance to check for bone, joint, and muscle problems.  Diagnostic tests  If needed, your healthcare provider will suggest certain tests to learn more about your feet. These include:  Doppler tests to measure blood flow in the feet and lower leg.  X-rays, which can show bone or joint problems.  Other imaging tests, such as an MRI (magnetic resonance imaging), bone scan, and CT (computed tomography) scan. These can help show bone infections.  Other tests, such as vascular tests, which study the blood flow in your feet and legs. You may also have nerve studies to learn how sensitive your feet are.  Creating a foot care program  Based on the evaluation, your healthcare provider will create a foot care program for you. Your program may be as simple as starting a daily self-care routine and changing the types of shoes your wear. It may also involve treating minor foot problems, such as a corn or blister. In some cases, surgery will be needed to treat an infection or mechanical problems, such as  hammer toes.  Preventing problems  When you have diabetes, its easier to prevent problems than to treat them later on. So see your healthcare team for regular checkups and foot care. Your healthcare team can also help you learn more about caring for your feet at home. For example, you may be told to avoid walking barefoot. Or you may be told that special footwear is needed to protect your feet.  Have regular checkups  Foot problems can develop quickly. So be sure to follow your healthcare teams schedule for regular checkups. During office visits, take off your shoes and socks as soon as you get in the exam room. Ask your healthcare provider to examine your feet for problems. This will make it easier to find and treat small skin irritations before they get worse. Regular checkups can also help keep track of the blood flow and feeling in your feet. If you have neuropathy (lack of feeling in your feet), you will need to have checkups more often.  Learn about self-care  The more you know about diabetes and your feet, the easier it will be to prevent problems. Members of your healthcare team can teach you how to inspect your feet and teach you to look for warning signs. They can also give you other foot care tips. During office visits, be sure to ask any questions you have.  Date Last Reviewed: 7/1/2016 © 2000-2017 [employer]. [address] [place] [address], [place] [address]. All rights reserved. This information is not intended as a substitute for professional medical care. Always follow your healthcare professional's instructions.

## 2022-04-29 NOTE — H&P (VIEW-ONLY)
SUBJECTIVE:    Patient ID: Monique Trujillo is a 71 y.o. female.    Chief Complaint: Back Pain (Low back pain)    This is a 71-year-old woman who sees Dr. Mahmood for her primary care.  History of diabetes and hypertension otherwise denies any chronic major medical problems.  Long history of spine problems.  She has history of cervical fusion in 1999.  She has had 2 lumbar spine surgeries.  Most recent in January of 2020 with Dr. Brantley .  Prior to her most recent surgery she was having low back pain and bilateral leg pain/numbness.  She improved following the surgery in so much as the back pain and leg pain decreased but she was left with residual leg numbness which persists to this day.  Her current complaint is of low back pain at the lumbosacral junction with pain radiating into both legs which is worsened by walking and relieved with rest.  Her bowel and bladder are intact.  No fever chills sweats or unexpected weight loss.  Pain level is 6/10.  I reviewed an MRI of the lumbar spine from 04/22/2022 which is summarized below:    FINDINGS:  This report assumes that there are 5 non-rib bearing lumbar vertebral bodies with the L5 vertebral body articulating with the sacrum. Accurate numbering of the spine levels would require imaging of the thoracolumbar spine to count ribs.     The prevertebral soft tissues are normal. There is grade 1 anterolisthesis of L4 on L5 (3 mm), there is grade 1 anterolisthesis of L3 on L4 (2 mm), and slight retrolisthesis of T12 on L1 (2 mm), there is approximately 8 degrees of dextroscoliosis of lumbar spine. Individual vertebral height is maintained. Marrow signal is within normal limits. Conus is normal in caliber and signal. The conus terminates at T12-L1.     Postoperative: Bilateral transpedicular screw and angeles fixation is seen from L3-L5. There is an anterior metallic intervertebral discectomy fusion device at L3-L4. There is been a discectomy fusion at L4-L5 with incomplete  osseous fusion across the disc space. There appears to been a laminectomy at L4 to the spinous process resection L3 and L5. There is a 2.4 x 1.0 cm curvilinear fluid collection to the right of midline in the laminectomy bed favored to represent a postoperative seroma.     At T11-T12 there is moderate disc height loss with minor bulging of the posterior annulus. No facet arthropathy, ligamentum flavum hypertrophy or spinal canal/neural foraminal stenosis is present.     At T12-L1 there is moderate to severe disc height loss with a moderate-sized broad-based posterior disc bulge. There is no facet arthropathy ligamentum flavum hypertrophy. This results in mild left greater than right neural foraminal stenosis and minimal spinal canal stenosis.     At L1-2, the disc demonstrates relative normal disc height and signal intensity with only minor bulging of the posterior annulus. There is no facet arthropathy, ligamentum flavum hypertrophy or spinal canal/neural foraminal stenosis.     At L2-3, the disc demonstrates moderate disc height loss with a moderate-sized broad-based posterior disc bulge. There is severe bilateral facet arthropathy without ligamentum flavum hypertrophy. There is mild patchy adjacent bone marrow edema posteriorly, likely degenerative/inflammatory. These findings in combination result in severe spinal canal and bilateral neural foraminal stenosis (there is crowding of the nerve root centrally (axial image 14). Small left greater than right facet joint effusions are present.     At L3-4, there is a discectomy fusion device at this level with minor bulging of the posterior annulus. There is minimal neural foraminal and spinal canal stenosis.     At L4-5, there is a discectomy fusion device this level with severe disc height loss. There is grade 1 anterolisthesis with dorsal uncovering of the remaining disc and a small broad-based posterior disc bulge. There is severe right greater than left neural  foraminal stenosis with minimal spinal canal stenosis. There is effacement of the right greater the left lateral recess with possible impingement on the traversing L5 nerve root in the lateral recess.     At L5-S1, the disc shows relative normal disc height with normal posterior contour. There is mild bilateral facet arthropathy with ligamentum flavum hypertrophy. The spinal canal and neural foramina appear patent.     The SI joints and visualized pelvis are within normal limits.     IMPRESSION:  1. Postoperative fusion unchanged from L3-L5.  2. Slight dextroscoliosis.  3. Grade 1 anterolisthesis of L3 on L4 and L4 on L5 with slight retrolisthesis of T12 on L1.  4. Degenerative change throughout the lumbar spine as outlined in detail above most pronounced at L2-L3 with severe spinal canal and bilateral neural foraminal stenosis at this level.           Past Medical History:   Diagnosis Date    Allergy     Arthritis     Chronic lower back pain     Depression     Fever blister     GERD (gastroesophageal reflux disease)     Hemorrhoids, internal 11/05/2014    Hyperlipidemia     Hypertension     Joint pain     Morbid obesity with BMI of 40.0-44.9, adult 02/24/2015    Multiple gastric ulcers 12/14/2017    TIMMY (obstructive sleep apnea)     does not use CPAP    Pulmonary hypertension     Skin cancer     Type 2 diabetes mellitus with hyperglycemia, without long-term current use of insulin 4/20/2022     Social History     Socioeconomic History    Marital status:     Years of education: 12   Occupational History     Employer: [employer]   Tobacco Use    Smoking status: Never Smoker    Smokeless tobacco: Never Used   Substance and Sexual Activity    Alcohol use: No     Alcohol/week: 0.0 standard drinks    Drug use: No    Sexual activity: Never     Past Surgical History:   Procedure Laterality Date    ANTERIOR CERVICAL CORPECTOMY W/ FUSION  1988    CAUDAL EPIDURAL STEROID INJECTION N/A 1/10/2019  "   Procedure: Injection-steroid-epidural-caudal;  Surgeon: Lydia Velásquez Jr., MD;  Location: [facility];  Service: Pain Management;  Laterality: N/A;    COLONOSCOPY N/A 11/2/2016    Procedure: COLONOSCOPY;  Surgeon: Viji Dewitt MD;  Location: [facility];  Service: Endoscopy;  Laterality: N/A;    EPIDURAL STEROID INJECTION INTO LUMBAR SPINE N/A 11/8/2018    Procedure: Injection-steroid-epidural-lumbar-L2-3 (LEFT > RIGHT);  Surgeon: Lydia Velásquez Jr., MD;  Location: [facility]GT;  Service: Pain Management;  Laterality: N/A;    FUSION OF SPINE WITH INSTRUMENTATION Left 1/3/2020    Procedure: FUSION, SPINE, WITH INSTRUMENTATION Stage 1 Left L3-4 LOIF Rise L Stage 2 Open L3-4 L4-5 Laminectomy Removal of L4-5 Hardware, L3-5 Posterior Instrumentation and extraction of Fusion at L3-4;  Surgeon: James Brantley MD;  Location: [facility] OR;  Service: Neurosurgery;  Laterality: Left;  Procedure: Stage 1 Left L3-4 LOIF Rise L  Stage 2 Open L3-4 L4-5 Laminectomy Removal of L4-5 Hardware,     INJECTION OF ANESTHETIC AGENT AROUND GENITOFEMORAL NERVE Bilateral 8/1/2019    Procedure: BILATERAL SHOULDER ARTICULAR BRANCH BLOCK;  Surgeon: Lydia Velásquez Jr., MD;  [place] [facility] [place] [facility]  Service: Pain Management;  Laterality: Bilateral;    KNEE ARTHROSCOPY W/ MENISCAL REPAIR Right 2012    LUMBAR LAMINECTOMY  06/19/2015    SKIN CANCER EXCISION      forehead does not recall date    TOTAL HIP ARTHROPLASTY Right 04/28/2016         TUBAL LIGATION  1980     Family History   Problem Relation Age of Onset    Arthritis Mother     Cancer Mother         breast ca    Breast cancer Mother     Kidney disease Father     Alzheimer's disease Maternal Grandfather     Cancer Paternal Grandmother         colon ca    Colon cancer Paternal Grandmother      Vitals:    04/29/22 1508   Weight: 108.9 kg (240 lb)   Height: 5' 1" (1.549 m)       Review of Systems   Constitutional: Negative for chills, diaphoresis, fatigue, fever " and unexpected weight change.   HENT: Negative for trouble swallowing.    Eyes: Negative for visual disturbance.   Respiratory: Negative for shortness of breath.    Cardiovascular: Negative for chest pain.   Gastrointestinal: Negative for abdominal pain, constipation, nausea and vomiting.   Genitourinary: Negative for difficulty urinating.   Musculoskeletal: Negative for arthralgias, back pain, gait problem, joint swelling, myalgias, neck pain and neck stiffness.   Neurological: Negative for dizziness, speech difficulty, weakness, light-headedness, numbness and headaches.          Objective:      Physical Exam  Neurological:      Mental Status: She is alert and oriented to person, place, and time.      Comments: He is awake and in no acute distress  She is ambulatory with a rolling walker  Mild tenderness to palpation lumbar paraspinous musculature with no palpable masses  Reflexes- +1-+2 reflexes at the following:   C5-Biceps   C6-Brachioradialis   C7-Triceps   L3/4-Patellar   S1-Achilles  Keith sign negative bilaterally  Strength testing- 5/5 strength in the following muscle groups:  C5-Elbow flexion  C6-Wrist extension  C7-Elbow extension  C8-Finger flexion  T1-Finger abduction  L2-Hip flexion  L3-Knee extension  L4-Ankle dorsiflexion  L5-Great toe extension  S1-Ankle plantar flexion                 Assessment:       1. Spinal stenosis of lumbar region with neurogenic claudication    2. Chronic midline low back pain with bilateral sciatica           Plan:     she has a nonfocal neurological examination and no historical red flags.  I think she has symptomatic lumbar spinal stenosis.  I recommend she try an interlaminar injection at L2-3.  If she fails that she could consider neurosurgical consultation.  She can follow up with me after the procedure      Spinal stenosis of lumbar region with neurogenic claudication    Chronic midline low back pain with bilateral sciatica  -     Ambulatory referral/consult to  [facility]

## 2022-04-29 NOTE — PROGRESS NOTES
SUBJECTIVE:    Patient ID: Monique Trujillo is a 71 y.o. female.    Chief Complaint: Back Pain (Low back pain)    This is a 71-year-old woman who sees Dr. Mahmood for her primary care.  History of diabetes and hypertension otherwise denies any chronic major medical problems.  Long history of spine problems.  She has history of cervical fusion in 1999.  She has had 2 lumbar spine surgeries.  Most recent in January of 2020 with Dr. Brantley .  Prior to her most recent surgery she was having low back pain and bilateral leg pain/numbness.  She improved following the surgery in so much as the back pain and leg pain decreased but she was left with residual leg numbness which persists to this day.  Her current complaint is of low back pain at the lumbosacral junction with pain radiating into both legs which is worsened by walking and relieved with rest.  Her bowel and bladder are intact.  No fever chills sweats or unexpected weight loss.  Pain level is 6/10.  I reviewed an MRI of the lumbar spine from 04/22/2022 which is summarized below:    FINDINGS:  This report assumes that there are 5 non-rib bearing lumbar vertebral bodies with the L5 vertebral body articulating with the sacrum. Accurate numbering of the spine levels would require imaging of the thoracolumbar spine to count ribs.     The prevertebral soft tissues are normal. There is grade 1 anterolisthesis of L4 on L5 (3 mm), there is grade 1 anterolisthesis of L3 on L4 (2 mm), and slight retrolisthesis of T12 on L1 (2 mm), there is approximately 8 degrees of dextroscoliosis of lumbar spine. Individual vertebral height is maintained. Marrow signal is within normal limits. Conus is normal in caliber and signal. The conus terminates at T12-L1.     Postoperative: Bilateral transpedicular screw and angeles fixation is seen from L3-L5. There is an anterior metallic intervertebral discectomy fusion device at L3-L4. There is been a discectomy fusion at L4-L5 with incomplete  osseous fusion across the disc space. There appears to been a laminectomy at L4 to the spinous process resection L3 and L5. There is a 2.4 x 1.0 cm curvilinear fluid collection to the right of midline in the laminectomy bed favored to represent a postoperative seroma.     At T11-T12 there is moderate disc height loss with minor bulging of the posterior annulus. No facet arthropathy, ligamentum flavum hypertrophy or spinal canal/neural foraminal stenosis is present.     At T12-L1 there is moderate to severe disc height loss with a moderate-sized broad-based posterior disc bulge. There is no facet arthropathy ligamentum flavum hypertrophy. This results in mild left greater than right neural foraminal stenosis and minimal spinal canal stenosis.     At L1-2, the disc demonstrates relative normal disc height and signal intensity with only minor bulging of the posterior annulus. There is no facet arthropathy, ligamentum flavum hypertrophy or spinal canal/neural foraminal stenosis.     At L2-3, the disc demonstrates moderate disc height loss with a moderate-sized broad-based posterior disc bulge. There is severe bilateral facet arthropathy without ligamentum flavum hypertrophy. There is mild patchy adjacent bone marrow edema posteriorly, likely degenerative/inflammatory. These findings in combination result in severe spinal canal and bilateral neural foraminal stenosis (there is crowding of the nerve root centrally (axial image 14). Small left greater than right facet joint effusions are present.     At L3-4, there is a discectomy fusion device at this level with minor bulging of the posterior annulus. There is minimal neural foraminal and spinal canal stenosis.     At L4-5, there is a discectomy fusion device this level with severe disc height loss. There is grade 1 anterolisthesis with dorsal uncovering of the remaining disc and a small broad-based posterior disc bulge. There is severe right greater than left neural  foraminal stenosis with minimal spinal canal stenosis. There is effacement of the right greater the left lateral recess with possible impingement on the traversing L5 nerve root in the lateral recess.     At L5-S1, the disc shows relative normal disc height with normal posterior contour. There is mild bilateral facet arthropathy with ligamentum flavum hypertrophy. The spinal canal and neural foramina appear patent.     The SI joints and visualized pelvis are within normal limits.     IMPRESSION:  1. Postoperative fusion unchanged from L3-L5.  2. Slight dextroscoliosis.  3. Grade 1 anterolisthesis of L3 on L4 and L4 on L5 with slight retrolisthesis of T12 on L1.  4. Degenerative change throughout the lumbar spine as outlined in detail above most pronounced at L2-L3 with severe spinal canal and bilateral neural foraminal stenosis at this level.           Past Medical History:   Diagnosis Date    Allergy     Arthritis     Chronic lower back pain     Depression     Fever blister     GERD (gastroesophageal reflux disease)     Hemorrhoids, internal 11/05/2014    Hyperlipidemia     Hypertension     Joint pain     Morbid obesity with BMI of 40.0-44.9, adult 02/24/2015    Multiple gastric ulcers 12/14/2017    TIMMY (obstructive sleep apnea)     does not use CPAP    Pulmonary hypertension     Skin cancer     Type 2 diabetes mellitus with hyperglycemia, without long-term current use of insulin 4/20/2022     Social History     Socioeconomic History    Marital status:     Years of education: 12   Occupational History     Employer: [employer]   Tobacco Use    Smoking status: Never Smoker    Smokeless tobacco: Never Used   Substance and Sexual Activity    Alcohol use: No     Alcohol/week: 0.0 standard drinks    Drug use: No    Sexual activity: Never     Past Surgical History:   Procedure Laterality Date    ANTERIOR CERVICAL CORPECTOMY W/ FUSION  1988    CAUDAL EPIDURAL STEROID INJECTION N/A 1/10/2019  "   Procedure: Injection-steroid-epidural-caudal;  Surgeon: Lydia Velásquez Jr., MD;  Location: [facility];  Service: Pain Management;  Laterality: N/A;    COLONOSCOPY N/A 11/2/2016    Procedure: COLONOSCOPY;  Surgeon: Viji Dewitt MD;  Location: [facility];  Service: Endoscopy;  Laterality: N/A;    EPIDURAL STEROID INJECTION INTO LUMBAR SPINE N/A 11/8/2018    Procedure: Injection-steroid-epidural-lumbar-L2-3 (LEFT > RIGHT);  Surgeon: Lydia Velásquez Jr., MD;  Location: [facility]IN [facility]GT;  Service: Pain Management;  Laterality: N/A;    FUSION OF SPINE WITH INSTRUMENTATION Left 1/3/2020    Procedure: FUSION, SPINE, WITH INSTRUMENTATION Stage 1 Left L3-4 LOIF Rise L Stage 2 Open L3-4 L4-5 Laminectomy Removal of L4-5 Hardware, L3-5 Posterior Instrumentation and extraction of Fusion at L3-4;  Surgeon: James Brantley MD;  Location: [facility] OR;  Service: Neurosurgery;  Laterality: Left;  Procedure: Stage 1 Left L3-4 LOIF Rise L  Stage 2 Open L3-4 L4-5 Laminectomy Removal of L4-5 Hardware,     INJECTION OF ANESTHETIC AGENT AROUND GENITOFEMORAL NERVE Bilateral 8/1/2019    Procedure: BILATERAL SHOULDER ARTICULAR BRANCH BLOCK;  Surgeon: Lydia Velásquez Jr., MD;  [place] [facility] [place] [facility]  Service: Pain Management;  Laterality: Bilateral;    KNEE ARTHROSCOPY W/ MENISCAL REPAIR Right 2012    LUMBAR LAMINECTOMY  06/19/2015    SKIN CANCER EXCISION      forehead does not recall date    TOTAL HIP ARTHROPLASTY Right 04/28/2016         TUBAL LIGATION  1980     Family History   Problem Relation Age of Onset    Arthritis Mother     Cancer Mother         breast ca    Breast cancer Mother     Kidney disease Father     Alzheimer's disease Maternal Grandfather     Cancer Paternal Grandmother         colon ca    Colon cancer Paternal Grandmother      Vitals:    04/29/22 1508   Weight: 108.9 kg (240 lb)   Height: 5' 1" (1.549 m)       Review of Systems   Constitutional: Negative for chills, diaphoresis, fatigue, fever " and unexpected weight change.   HENT: Negative for trouble swallowing.    Eyes: Negative for visual disturbance.   Respiratory: Negative for shortness of breath.    Cardiovascular: Negative for chest pain.   Gastrointestinal: Negative for abdominal pain, constipation, nausea and vomiting.   Genitourinary: Negative for difficulty urinating.   Musculoskeletal: Negative for arthralgias, back pain, gait problem, joint swelling, myalgias, neck pain and neck stiffness.   Neurological: Negative for dizziness, speech difficulty, weakness, light-headedness, numbness and headaches.          Objective:      Physical Exam  Neurological:      Mental Status: She is alert and oriented to person, place, and time.      Comments: He is awake and in no acute distress  She is ambulatory with a rolling walker  Mild tenderness to palpation lumbar paraspinous musculature with no palpable masses  Reflexes- +1-+2 reflexes at the following:   C5-Biceps   C6-Brachioradialis   C7-Triceps   L3/4-Patellar   S1-Achilles  Keith sign negative bilaterally  Strength testing- 5/5 strength in the following muscle groups:  C5-Elbow flexion  C6-Wrist extension  C7-Elbow extension  C8-Finger flexion  T1-Finger abduction  L2-Hip flexion  L3-Knee extension  L4-Ankle dorsiflexion  L5-Great toe extension  S1-Ankle plantar flexion                 Assessment:       1. Spinal stenosis of lumbar region with neurogenic claudication    2. Chronic midline low back pain with bilateral sciatica           Plan:     she has a nonfocal neurological examination and no historical red flags.  I think she has symptomatic lumbar spinal stenosis.  I recommend she try an interlaminar injection at L2-3.  If she fails that she could consider neurosurgical consultation.  She can follow up with me after the procedure      Spinal stenosis of lumbar region with neurogenic claudication    Chronic midline low back pain with bilateral sciatica  -     Ambulatory referral/consult to  [facility]

## 2022-04-29 NOTE — TELEPHONE ENCOUNTER
----- Message from Immanuel Tobias MD sent at 4/29/2022  3:34 PM CDT -----  Please schedule for interlaminar injection at L2-3

## 2022-05-02 ENCOUNTER — OFFICE VISIT (OUTPATIENT)
Dept: PODIATRY | Facility: CLINIC | Age: 72
End: 2022-05-02
Payer: MEDICARE

## 2022-05-02 VITALS
HEART RATE: 88 BPM | OXYGEN SATURATION: 98 % | HEIGHT: 61 IN | WEIGHT: 240 LBS | BODY MASS INDEX: 45.31 KG/M2 | RESPIRATION RATE: 18 BRPM

## 2022-05-02 DIAGNOSIS — L85.1 ACQUIRED KERATODERMA: ICD-10-CM

## 2022-05-02 DIAGNOSIS — M21.70 ACQUIRED UNEQUAL LIMB LENGTH: ICD-10-CM

## 2022-05-02 DIAGNOSIS — E11.9 ENCOUNTER FOR DIABETIC FOOT EXAM: ICD-10-CM

## 2022-05-02 DIAGNOSIS — M20.11 HALLUX VALGUS OF RIGHT FOOT: ICD-10-CM

## 2022-05-02 DIAGNOSIS — M54.16 LUMBAR RADICULOPATHY: ICD-10-CM

## 2022-05-02 DIAGNOSIS — R60.0 BILATERAL LOWER EXTREMITY EDEMA: ICD-10-CM

## 2022-05-02 DIAGNOSIS — E11.65 TYPE 2 DIABETES MELLITUS WITH HYPERGLYCEMIA, WITHOUT LONG-TERM CURRENT USE OF INSULIN: Primary | ICD-10-CM

## 2022-05-02 DIAGNOSIS — G60.9 IDIOPATHIC PERIPHERAL NEUROPATHY: ICD-10-CM

## 2022-05-02 DIAGNOSIS — B35.3 TINEA PEDIS, UNSPECIFIED LATERALITY: ICD-10-CM

## 2022-05-02 PROCEDURE — 1159F PR MEDICATION LIST DOCUMENTED IN MEDICAL RECORD: ICD-10-PCS | Mod: CPTII,S$GLB,, | Performed by: PODIATRIST

## 2022-05-02 PROCEDURE — 3066F NEPHROPATHY DOC TX: CPT | Mod: CPTII,S$GLB,, | Performed by: PODIATRIST

## 2022-05-02 PROCEDURE — 1101F PR PT FALLS ASSESS DOC 0-1 FALLS W/OUT INJ PAST YR: ICD-10-PCS | Mod: CPTII,S$GLB,, | Performed by: PODIATRIST

## 2022-05-02 PROCEDURE — 3008F PR BODY MASS INDEX (BMI) DOCUMENTED: ICD-10-PCS | Mod: CPTII,S$GLB,, | Performed by: PODIATRIST

## 2022-05-02 PROCEDURE — 3288F FALL RISK ASSESSMENT DOCD: CPT | Mod: CPTII,S$GLB,, | Performed by: PODIATRIST

## 2022-05-02 PROCEDURE — 99214 PR OFFICE/OUTPT VISIT, EST, LEVL IV, 30-39 MIN: ICD-10-PCS | Mod: S$GLB,,, | Performed by: PODIATRIST

## 2022-05-02 PROCEDURE — 1160F RVW MEDS BY RX/DR IN RCRD: CPT | Mod: CPTII,S$GLB,, | Performed by: PODIATRIST

## 2022-05-02 PROCEDURE — 3044F PR MOST RECENT HEMOGLOBIN A1C LEVEL <7.0%: ICD-10-PCS | Mod: CPTII,S$GLB,, | Performed by: PODIATRIST

## 2022-05-02 PROCEDURE — 99214 OFFICE O/P EST MOD 30 MIN: CPT | Mod: S$GLB,,, | Performed by: PODIATRIST

## 2022-05-02 PROCEDURE — 3008F BODY MASS INDEX DOCD: CPT | Mod: CPTII,S$GLB,, | Performed by: PODIATRIST

## 2022-05-02 PROCEDURE — 3062F POS MACROALBUMINURIA REV: CPT | Mod: CPTII,S$GLB,, | Performed by: PODIATRIST

## 2022-05-02 PROCEDURE — 1101F PT FALLS ASSESS-DOCD LE1/YR: CPT | Mod: CPTII,S$GLB,, | Performed by: PODIATRIST

## 2022-05-02 PROCEDURE — 1160F PR REVIEW ALL MEDS BY PRESCRIBER/CLIN PHARMACIST DOCUMENTED: ICD-10-PCS | Mod: CPTII,S$GLB,, | Performed by: PODIATRIST

## 2022-05-02 PROCEDURE — 3044F HG A1C LEVEL LT 7.0%: CPT | Mod: CPTII,S$GLB,, | Performed by: PODIATRIST

## 2022-05-02 PROCEDURE — 3062F PR POS MACROALBUMINURIA RESULT DOCUMENTED/REVIEW: ICD-10-PCS | Mod: CPTII,S$GLB,, | Performed by: PODIATRIST

## 2022-05-02 PROCEDURE — 3288F PR FALLS RISK ASSESSMENT DOCUMENTED: ICD-10-PCS | Mod: CPTII,S$GLB,, | Performed by: PODIATRIST

## 2022-05-02 PROCEDURE — 1159F MED LIST DOCD IN RCRD: CPT | Mod: CPTII,S$GLB,, | Performed by: PODIATRIST

## 2022-05-02 PROCEDURE — 3066F PR DOCUMENTATION OF TREATMENT FOR NEPHROPATHY: ICD-10-PCS | Mod: CPTII,S$GLB,, | Performed by: PODIATRIST

## 2022-05-02 NOTE — PROGRESS NOTES
"  [address] [place] [address]  Phone: [phone]   [phone]    Patient's PCP:Milton Mahmood MD  Referring Provider: Dr. Milton Mahmood    Subjective:      Chief Complaint:: Tinea Pedis (Bilateral ) and Diabetic Foot Exam (Yearly exam)    LAURA Trujillo is a 71 y.o. female who presents today with a complaint of bilateral tinea pedis, toes crossing over lasting for years. Onset of symptoms right first toe leaning under second toe, skin flakes, neuropathy and reports no trauma.  Current symptoms include right first toe leaning under second toe, bone protrusion base of right first toe, skin flakes, and neuropathy.  Aggravating factors are shoe gear. Symptoms have progressed over time. Treatment to date have included gabapentin, ibuprofen, and running shoes. who presents to the clinic for a diabetic foot exam.   Pt has seen Milton Mahmood MD on 04/20/2022 who treats them for their diabetes.  Pt has been a diabetic for two to three years.  Taking metformin to treat diabetes.      Systemic Doctor: Milton Mahmood MD  Date Last Seen: 4/20/22  Blood Sugar: 133  Hemoglobin A1c: 6.5 4/20/2022    Vitals:    05/02/22 1119   Pulse: 88   Resp: 18   SpO2: 98%   Weight: 108.9 kg (240 lb)   Height: 5' 1" (1.549 m)      Shoe Size:     Past Surgical History:   Procedure Laterality Date    ANTERIOR CERVICAL CORPECTOMY W/ FUSION  1988    CAUDAL EPIDURAL STEROID INJECTION N/A 1/10/2019    Procedure: Injection-steroid-epidural-caudal;  Surgeon: Lydia Velásquez Jr., MD;  Location: [facility] PAIN T;  Service: Pain Management;  Laterality: N/A;    COLONOSCOPY N/A 11/2/2016    Procedure: COLONOSCOPY;  Surgeon: Viji Dewitt MD;  Location: [facility];  Service: Endoscopy;  Laterality: N/A;    EPIDURAL STEROID INJECTION INTO LUMBAR SPINE N/A 11/8/2018    Procedure: Injection-steroid-epidural-lumbar-L2-3 (LEFT > RIGHT);  Surgeon: Lydia Velásquez Jr., MD;  Location: [facility] MGT;  Service: Pain Management;  Laterality: N/A;    FUSION OF SPINE WITH INSTRUMENTATION Left 1/3/2020    Procedure: FUSION, SPINE, WITH INSTRUMENTATION Stage 1 Left L3-4 LOIF Rise L Stage 2 Open L3-4 L4-5 Laminectomy Removal of L4-5 Hardware, L3-5 Posterior Instrumentation and extraction of Fusion at L3-4;  Surgeon: James Brantley MD;  Location: [facility] OR;  Service: Neurosurgery;  Laterality: Left;  Procedure: Stage 1 Left L3-4 LOIF Rise L  Stage 2 Open L3-4 L4-5 Laminectomy Removal of L4-5 Hardware,     INJECTION OF ANESTHETIC AGENT AROUND GENITOFEMORAL NERVE Bilateral 8/1/2019    Procedure: BILATERAL SHOULDER ARTICULAR BRANCH BLOCK;  Surgeon: Lydia Velásquez Jr., MD;  Location: [facility] PAIN MGT;  Service: Pain Management;  Laterality: Bilateral;    KNEE ARTHROSCOPY W/ MENISCAL REPAIR Right 2012    LUMBAR LAMINECTOMY  06/19/2015    SKIN CANCER EXCISION      forehead does not recall date    TOTAL HIP ARTHROPLASTY Right 04/28/2016         TUBAL LIGATION  1980     Past Medical History:   Diagnosis Date    Allergy     Arthritis     Chronic lower back pain     Depression     Fever blister     GERD (gastroesophageal reflux disease)     Hemorrhoids, internal 11/05/2014    Hyperlipidemia     Hypertension     Joint pain     Morbid obesity with BMI of 40.0-44.9, adult 02/24/2015    Multiple gastric ulcers 12/14/2017    TIMMY (obstructive sleep apnea)     does not use CPAP    Pulmonary hypertension     Skin cancer     Type 2 diabetes mellitus with hyperglycemia, without long-term current use of insulin 4/20/2022     Family History   Problem Relation Age of Onset    Arthritis Mother     Cancer Mother         breast ca    Breast cancer Mother     Kidney disease Father     Alzheimer's disease Maternal Grandfather     Cancer Paternal Grandmother         colon ca    Colon cancer Paternal Grandmother         Social History:   Marital Status:   Alcohol History:  reports no history of alcohol  use.  Tobacco History:  reports that she has never smoked. She has never used smokeless tobacco.  Drug History:  reports no history of drug use.    Review of patient's allergies indicates:  No Known Allergies    Current Outpatient Medications   Medication Sig Dispense Refill    amLODIPine (NORVASC) 10 MG tablet Take 1 tablet (10 mg total) by mouth once daily. 90 tablet 1    ammonium lactate (LAC-HYDRIN) 12 % lotion USE ON FEET AS NEEDED      ascorbic acid, vitamin C, (VITAMIN C) 1000 MG tablet Take 1,000 mg by mouth once daily.      atenoloL (TENORMIN) 25 MG tablet Take 1 tablet (25 mg total) by mouth once daily. 90 tablet 1    azelaic acid (AZELEX) 15 % gel Apply to face twice daily. Alert MD if no improvement after 12 weeks of use. 50 g 3    calcium-vitamin D3 (OS-KATERIN 500 + D3) 500 mg-5 mcg (200 unit) per tablet Take 1 tablet by mouth 2 (two) times daily with meals.      clindamycin (CLEOCIN T) 1 % lotion Use hs on face 60 mL 3    co-enzyme Q-10 30 mg capsule Take 30 mg by mouth once daily.       cycloSPORINE (RESTASIS) 0.05 % ophthalmic emulsion Place 1 drop into both eyes 2 (two) times daily. 24 mL 11    diclofenac sodium (VOLTAREN ARTHRITIS PAIN) 1 % Gel Apply 2 g topically once daily.      doxycycline (VIBRA-TABS) 100 MG tablet Take 1 po BID with food and not within 1 hour prior to lying down 60 tablet 2    econazole nitrate 1 % cream Use bid for rash 85 g 2    famotidine (PEPCID) 40 MG tablet Take 1 tablet (40 mg total) by mouth once daily. 90 tablet 1    fenofibrate micronized (LOFIBRA) 134 MG Cap Take 1 capsule (134 mg total) by mouth once daily. 90 capsule 3    furosemide (LASIX) 40 MG tablet Take lasix 40 mg PO every other day PRN swelling 45 tablet 3    gabapentin (NEURONTIN) 600 MG tablet Take 1 tablet (600 mg total) by mouth 3 (three) times daily. 90 tablet 11    GLUCOSA KHAN 2KCL/CHONDROITIN KHAN (GLUCOSAMINE SULF-CHONDROITIN ORAL) Take 1 tablet by mouth once daily.      ivermectin  (SOOLANTRA) 1 % Crea Apply topically to face once daily 45 g 5    levocetirizine (XYZAL) 5 MG tablet Take 1 tablet (5 mg total) by mouth every evening. 90 tablet 1    lidocaine (XYLOCAINE) 5 % Oint ointment APPLY 2.5 GRAMS TO THE AFFECTED AREA 3-4 TIMES DAILY STARTING 2 WEEKS POST SURGERY.      metFORMIN (GLUCOPHAGE-XR) 500 MG ER 24hr tablet Take 1 tablet (500 mg total) by mouth before evening meal. 90 tablet 1    metronidazole 1% (METROGEL) 1 % Gel Apply topically once daily. 60 g 2    multivitamin capsule Take 1 capsule by mouth once daily.      mupirocin (BACTROBAN) 2 % ointment APPLY 1/2 GRAM TO EACH NOSTRIL TWICE A DAY FOR UP TO 5 DAYS PRIOR TO SURGERY OR AS DIRECTED BY YOUR PHYSICIAN.      OPW TEST CLAIM - DO NOT FILL Take by mouth. OPW test claim. Do not fill. (Patient not taking: Reported on 4/20/2022) 11 tablet 0    oxybutynin (DITROPAN XL) 15 MG TR24 TAKE 1 TABLET (15 MG TOTAL) BY MOUTH ONCE DAILY. 90 tablet 3    polymyxin B sulf-trimethoprim (POLYTRIM) 10,000 unit- 1 mg/mL Drop Place 1 drop into both eyes every 4 (four) hours. 10 mL 0    pravastatin (PRAVACHOL) 40 MG tablet Take 1 tablet (40 mg total) by mouth once daily. 90 tablet 1    SANARE ADV SCAR THERAPY BASE Gel APPLY 1/2 GRAM (1 PUMP) TO AFFECTED AREAS TWICE DAILY STARTING 1 MONTH POST SURGERY      semaglutide (RYBELSUS) 3 mg tablet Take 1 tablet (3 mg total) by mouth once daily. 30 tablet 0    [START ON 5/21/2022] semaglutide (RYBELSUS) 7 mg tablet Take 1 tablet (7 mg total) by mouth once daily. 30 tablet 2    sertraline (ZOLOFT) 100 MG tablet Take 1 tablet (100 mg total) by mouth once daily. 30 tablet 2    vitamin E 400 UNIT capsule Take 400 Units by mouth once daily.       Current Facility-Administered Medications   Medication Dose Route Frequency Provider Last Rate Last Admin    lidocaine (PF) 20 mg/mL (2 %) injection 2 mL  2 mL Intradermal Once Kalyan Han MD           Review of Systems   Constitutional: Negative for chills,  fatigue, fever and unexpected weight change.   HENT: Negative for hearing loss and trouble swallowing.    Eyes: Negative for photophobia and visual disturbance.   Respiratory: Negative for cough, shortness of breath and wheezing.    Cardiovascular: Positive for leg swelling. Negative for chest pain and palpitations.   Gastrointestinal: Negative for abdominal pain and nausea.   Genitourinary: Negative for dysuria and frequency.   Musculoskeletal: Positive for arthralgias, back pain and gait problem. Negative for joint swelling and myalgias.   Skin: Negative for rash and wound.   Neurological: Positive for numbness. Negative for tremors, seizures, weakness and headaches.   Hematological: Does not bruise/bleed easily.         Objective:        Physical Exam:   Foot Exam    General  Orientation: alert and oriented to person, place, and time   Affect: appropriate   Gait: unimpaired   Assistance: walker use       Right Foot/Ankle     Inspection and Palpation  Ecchymosis: none  Tenderness: none   Swelling: (Mild lower extremity edema)  Arch: normal  Hallux valgus: yes  Skin Exam: callus and dry skin; no drainage, no maceration, no ulcer and no erythema   Neurovascular  Dorsalis pedis: 2+  Posterior tibial: 2+  Capillary Refill: 2+  Varicose veins: not present  Saphenous nerve sensation: diminished  Tibial nerve sensation: diminished  Superficial peroneal nerve sensation: diminished  Deep peroneal nerve sensation: diminished  Sural nerve sensation: diminished    Edema  Type of edema: non-pitting    Muscle Strength  Ankle dorsiflexion: 5  Ankle plantar flexion: 5  Ankle inversion: 5  Ankle eversion: 5  Great toe extension: 5  Great toe flexion: 5    Range of Motion    Passive  Ankle dorsiflexion: 5      Tests  Anterior drawer: negative   Talar tilt: negative   PT Tinel's sign: negative    Paresthesia: positive  Comments  Skin healthy normal texture and turgor    Left Foot/Ankle      Inspection and Palpation  Ecchymosis:  none  Tenderness: none   Swelling: (Mild lower extremity edema)  Arch: normal  Skin Exam: callus and dry skin; no drainage, no maceration, no ulcer and no erythema   Neurovascular  Dorsalis pedis: 2+  Posterior tibial: 2+  Capillary refill: 2+  Varicose veins: not present  Saphenous nerve sensation: diminished  Tibial nerve sensation: diminished  Superficial peroneal nerve sensation: diminished  Deep peroneal nerve sensation: diminished  Sural nerve sensation: diminished    Edema  Type of edema: non-pitting    Muscle Strength  Ankle dorsiflexion: 5  Ankle plantar flexion: 5  Ankle inversion: 5  Ankle eversion: 5  Great toe extension: 5  Great toe flexion: 5    Range of Motion    Passive  Ankle dorsiflexion: 5      Tests  Anterior drawer: negative   Talar tilt: negative   PT Tinel's sign: negative  Paresthesia: positive  Comments  Skin healthy normal texture and turgor    Physical Exam  Cardiovascular:      Pulses:           Dorsalis pedis pulses are 2+ on the right side and 2+ on the left side.        Posterior tibial pulses are 2+ on the right side and 2+ on the left side.   Musculoskeletal:      Right foot: Bunion present.   Feet:      Right foot:      Skin integrity: Callus and dry skin present. No ulcer, skin breakdown or erythema.      Left foot:      Skin integrity: Callus and dry skin present. No ulcer, skin breakdown or erythema.               Right Ankle/Foot Exam     Comments:  Skin healthy normal texture and turgor    Left Ankle/Foot Exam     Comments:  Skin healthy normal texture and turgor      Muscle Strength   Right Lower Extremity   Ankle Dorsiflexion:  5   Plantar flexion:  5/5  Left Lower Extremity   Ankle Dorsiflexion:  5   Plantar flexion:  5/5     Reflexes     Left Side  Paresthesia: present    Right Side   Paresthesia: present    Vascular Exam     Right Pulses  Dorsalis Pedis:      2+  Posterior Tibial:      2+        Left Pulses  Dorsalis Pedis:      2+  Posterior Tibial:      2+              Imaging: none            Assessment:       1. Type 2 diabetes mellitus with hyperglycemia, without long-term current use of insulin    2. Lumbar radiculopathy    3. Bilateral lower extremity edema    4. Tinea pedis, unspecified laterality    5. Encounter for diabetic foot exam    6. Hallux valgus of right foot    7. Acquired keratoderma    8. Acquired unequal limb length    9. Idiopathic peripheral neuropathy      Plan:   Type 2 diabetes mellitus with hyperglycemia, without long-term current use of insulin  -      DIABETES FOOT EXAM  -     DIABETIC SHOES FOR HOME USE    Lumbar radiculopathy    Bilateral lower extremity edema    Tinea pedis, unspecified laterality  -     Ambulatory referral/consult to Podiatry    Encounter for diabetic foot exam  -     [facility] DIABETES FOOT EXAM    Hallux valgus of right foot  -     DIABETIC SHOES FOR HOME USE    Acquired keratoderma  -     DIABETIC SHOES FOR HOME USE    Acquired unequal limb length  -     DIABETIC SHOES FOR HOME USE    Idiopathic peripheral neuropathy  -     DIABETIC SHOES FOR HOME USE      No follow-ups on file.    Procedures        I discussed the patient that the neuropathy symptoms in her feet are likely stemming from her back and not from her diabetes.  I did explain that regardless of the cause she needs to continue to check her feet regularly.  To help with her neuropathy symptoms I am going to send in a prescription for professional Arts topical pain and inflammatory cream.  Recommend she continue with her daily vitamin supplements.  Also discussed the patient that I see no symptoms currently of tinea pedis in her feet.    Counseling:     I provided patient education verbally regarding:   Patient diagnosis, treatment options, as well as alternatives, risks, and benefits.     Counseled patient on the aspects of diabetes and how it pertains to the feet.  I explained the importance of proper diabetic foot care and how it is essential for the health of their  feet.    I discussed the importance of knowing their HGA1c and that the level needs to be as close to 6 as possible.  I discussed the increase complications of high blood sugar including stroke, blindness, heart attack, kidney failure and loss of limb secondary to neuropathy and PVD.    Patient  was made aware of inspecting their feet.  Patient was told to be aware of any breaks in the skin or redness.  With neuropathy, these areas are not recognized early due to the numbness.  I discussed the lab test and NCV EMG test and also the role of the neurologist in evaluating the patient.  I discussed Diabetes, lower back issues, metabolic disorders, systemic causes, chemotherapy, viatmain defiencey, heavy metal exposure, as some of the causes.  I also explained that as much as 40% of the time we can not find a cause.  I discussed different treatments available to control the symptoms but whcih may not cure the problem.      Shoe inspection. Patient instructed on proper foot hygeine. We discussed wearing proper shoe gear, daily foot inspections, never walking without protective shoe gear, never putting sharp instruments to feet, routine podiatric nail visits every 2-3 months.      I provided patient education regarding: Bunion deformity and causes. I discussed conservative treatment with shoe modification, pads, treating symptoms with OTC NSAIDs, pads between toes, inserts and pads over the bunion. I explained that conservative treatment is non-curative but may help with pain and slow down the progression of the deformity.       This note was created using Dragon voice recognition software that occasionally misinterpreted phrases or words.

## 2022-05-03 ENCOUNTER — HOSPITAL ENCOUNTER (OUTPATIENT)
Dept: RADIOLOGY | Facility: HOSPITAL | Age: 72
Discharge: HOME | End: 2022-05-03
Attending: ORTHOPAEDIC SURGERY
Payer: MEDICARE

## 2022-05-03 ENCOUNTER — TELEPHONE (OUTPATIENT)
Dept: FAMILY MEDICINE | Facility: CLINIC | Age: 72
End: 2022-05-03
Payer: MEDICARE

## 2022-05-03 ENCOUNTER — OFFICE VISIT (OUTPATIENT)
Dept: ORTHOPEDICS | Facility: CLINIC | Age: 72
End: 2022-05-03
Payer: MEDICARE

## 2022-05-03 VITALS — WEIGHT: 240 LBS | RESPIRATION RATE: 18 BRPM | HEIGHT: 61 IN | BODY MASS INDEX: 45.31 KG/M2

## 2022-05-03 DIAGNOSIS — M25.562 ACUTE PAIN OF BOTH KNEES: ICD-10-CM

## 2022-05-03 DIAGNOSIS — M19.012 OSTEOARTHRITIS OF BOTH SHOULDERS, UNSPECIFIED OSTEOARTHRITIS TYPE: Primary | ICD-10-CM

## 2022-05-03 DIAGNOSIS — M75.101 ROTATOR CUFF TEAR ARTHROPATHY OF BOTH SHOULDERS: Primary | ICD-10-CM

## 2022-05-03 DIAGNOSIS — M12.811 ROTATOR CUFF TEAR ARTHROPATHY OF BOTH SHOULDERS: ICD-10-CM

## 2022-05-03 DIAGNOSIS — M19.011 OSTEOARTHRITIS OF BOTH SHOULDERS, UNSPECIFIED OSTEOARTHRITIS TYPE: Primary | ICD-10-CM

## 2022-05-03 DIAGNOSIS — M12.812 ROTATOR CUFF TEAR ARTHROPATHY OF BOTH SHOULDERS: Primary | ICD-10-CM

## 2022-05-03 DIAGNOSIS — M25.561 ACUTE PAIN OF BOTH KNEES: ICD-10-CM

## 2022-05-03 DIAGNOSIS — M12.812 ROTATOR CUFF TEAR ARTHROPATHY OF BOTH SHOULDERS: ICD-10-CM

## 2022-05-03 DIAGNOSIS — Z01.818 PREOP TESTING: ICD-10-CM

## 2022-05-03 DIAGNOSIS — M75.102 ROTATOR CUFF TEAR ARTHROPATHY OF BOTH SHOULDERS: Primary | ICD-10-CM

## 2022-05-03 DIAGNOSIS — M12.811 RIGHT ROTATOR CUFF TEAR ARTHROPATHY: ICD-10-CM

## 2022-05-03 DIAGNOSIS — M75.101 ROTATOR CUFF TEAR ARTHROPATHY OF BOTH SHOULDERS: ICD-10-CM

## 2022-05-03 DIAGNOSIS — M19.012 OSTEOARTHRITIS OF BOTH SHOULDERS, UNSPECIFIED OSTEOARTHRITIS TYPE: ICD-10-CM

## 2022-05-03 DIAGNOSIS — M75.101 RIGHT ROTATOR CUFF TEAR ARTHROPATHY: ICD-10-CM

## 2022-05-03 DIAGNOSIS — M12.811 ROTATOR CUFF TEAR ARTHROPATHY OF BOTH SHOULDERS: Primary | ICD-10-CM

## 2022-05-03 DIAGNOSIS — M19.011 OSTEOARTHRITIS OF BOTH SHOULDERS, UNSPECIFIED OSTEOARTHRITIS TYPE: ICD-10-CM

## 2022-05-03 DIAGNOSIS — M75.102 ROTATOR CUFF TEAR ARTHROPATHY OF BOTH SHOULDERS: ICD-10-CM

## 2022-05-03 PROCEDURE — 3062F POS MACROALBUMINURIA REV: CPT | Mod: CPTII,S$GLB,, | Performed by: ORTHOPAEDIC SURGERY

## 2022-05-03 PROCEDURE — 3044F PR MOST RECENT HEMOGLOBIN A1C LEVEL <7.0%: ICD-10-PCS | Mod: CPTII,S$GLB,, | Performed by: ORTHOPAEDIC SURGERY

## 2022-05-03 PROCEDURE — 99215 OFFICE O/P EST HI 40 MIN: CPT | Mod: S$GLB,,, | Performed by: ORTHOPAEDIC SURGERY

## 2022-05-03 PROCEDURE — 3288F PR FALLS RISK ASSESSMENT DOCUMENTED: ICD-10-PCS | Mod: CPTII,S$GLB,, | Performed by: ORTHOPAEDIC SURGERY

## 2022-05-03 PROCEDURE — 3062F PR POS MACROALBUMINURIA RESULT DOCUMENTED/REVIEW: ICD-10-PCS | Mod: CPTII,S$GLB,, | Performed by: ORTHOPAEDIC SURGERY

## 2022-05-03 PROCEDURE — 1101F PR PT FALLS ASSESS DOC 0-1 FALLS W/OUT INJ PAST YR: ICD-10-PCS | Mod: CPTII,S$GLB,, | Performed by: ORTHOPAEDIC SURGERY

## 2022-05-03 PROCEDURE — 1159F PR MEDICATION LIST DOCUMENTED IN MEDICAL RECORD: ICD-10-PCS | Mod: CPTII,S$GLB,, | Performed by: ORTHOPAEDIC SURGERY

## 2022-05-03 PROCEDURE — 1159F MED LIST DOCD IN RCRD: CPT | Mod: CPTII,S$GLB,, | Performed by: ORTHOPAEDIC SURGERY

## 2022-05-03 PROCEDURE — 3066F PR DOCUMENTATION OF TREATMENT FOR NEPHROPATHY: ICD-10-PCS | Mod: CPTII,S$GLB,, | Performed by: ORTHOPAEDIC SURGERY

## 2022-05-03 PROCEDURE — 1160F RVW MEDS BY RX/DR IN RCRD: CPT | Mod: CPTII,S$GLB,, | Performed by: ORTHOPAEDIC SURGERY

## 2022-05-03 PROCEDURE — 73030 X-RAY EXAM OF SHOULDER: CPT | Mod: 26,50,, | Performed by: RADIOLOGY

## 2022-05-03 PROCEDURE — 1160F PR REVIEW ALL MEDS BY PRESCRIBER/CLIN PHARMACIST DOCUMENTED: ICD-10-PCS | Mod: CPTII,S$GLB,, | Performed by: ORTHOPAEDIC SURGERY

## 2022-05-03 PROCEDURE — 3288F FALL RISK ASSESSMENT DOCD: CPT | Mod: CPTII,S$GLB,, | Performed by: ORTHOPAEDIC SURGERY

## 2022-05-03 PROCEDURE — 1101F PT FALLS ASSESS-DOCD LE1/YR: CPT | Mod: CPTII,S$GLB,, | Performed by: ORTHOPAEDIC SURGERY

## 2022-05-03 PROCEDURE — 99999 PR PBB SHADOW E&M-EST. PATIENT-LVL IV: CPT | Mod: PBBFAC,,, | Performed by: ORTHOPAEDIC SURGERY

## 2022-05-03 PROCEDURE — 3044F HG A1C LEVEL LT 7.0%: CPT | Mod: CPTII,S$GLB,, | Performed by: ORTHOPAEDIC SURGERY

## 2022-05-03 PROCEDURE — 3066F NEPHROPATHY DOC TX: CPT | Mod: CPTII,S$GLB,, | Performed by: ORTHOPAEDIC SURGERY

## 2022-05-03 PROCEDURE — 99215 PR OFFICE/OUTPT VISIT, EST, LEVL V, 40-54 MIN: ICD-10-PCS | Mod: S$GLB,,, | Performed by: ORTHOPAEDIC SURGERY

## 2022-05-03 PROCEDURE — 3008F BODY MASS INDEX DOCD: CPT | Mod: CPTII,S$GLB,, | Performed by: ORTHOPAEDIC SURGERY

## 2022-05-03 PROCEDURE — 99999 PR PBB SHADOW E&M-EST. PATIENT-LVL IV: ICD-10-PCS | Mod: PBBFAC,,, | Performed by: ORTHOPAEDIC SURGERY

## 2022-05-03 PROCEDURE — 73030 X-RAY EXAM OF SHOULDER: CPT | Mod: TC,50,PN

## 2022-05-03 PROCEDURE — 3008F PR BODY MASS INDEX (BMI) DOCUMENTED: ICD-10-PCS | Mod: CPTII,S$GLB,, | Performed by: ORTHOPAEDIC SURGERY

## 2022-05-03 PROCEDURE — 73030 XR SHOULDER TRAUMA 3 VIEW BILATERAL: ICD-10-PCS | Mod: 26,50,, | Performed by: RADIOLOGY

## 2022-05-03 RX ORDER — MUPIROCIN 20 MG/G
OINTMENT TOPICAL
Status: CANCELLED | OUTPATIENT
Start: 2022-05-03

## 2022-05-03 RX ORDER — CEFAZOLIN SODIUM 1 G/3ML
2 INJECTION, POWDER, FOR SOLUTION INTRAMUSCULAR; INTRAVENOUS
Status: CANCELLED | OUTPATIENT
Start: 2022-05-03

## 2022-05-03 NOTE — TELEPHONE ENCOUNTER
----- Message from Yanira Rodriguez LPN sent at 5/3/2022  3:36 PM CDT -----  Regarding: preoperative clearance  Good afternoon,   The attached pt is to have a Right reverse total shoulder with Dr. Tan on 5/25/2022. In order to proceed with the procedure as planned we will need medical clearance. If you could please provide this it would be greatly appreciated.    Thanks,  Yanira

## 2022-05-03 NOTE — H&P (VIEW-ONLY)
CC:  71-year-old female presents for evaluation of bilateral shoulder pain.  The patient has had pain in both of her shoulders for several years.  She has previously seen another orthopedic surgeon who gave her intra-articular injections.  Those initially gave her some relief but have failed to give her relief for the last 12 months.  She has pain in both of her shoulders right worse than left that she rates as an 8/10.  She is having difficulty with overhead activities in grooming secondary to the pain.    Past Medical History:   Diagnosis Date    Allergy     Arthritis     Chronic lower back pain     Depression     Fever blister     GERD (gastroesophageal reflux disease)     Hemorrhoids, internal 11/05/2014    Hyperlipidemia     Hypertension     Joint pain     Morbid obesity with BMI of 40.0-44.9, adult 02/24/2015    Multiple gastric ulcers 12/14/2017    TIMMY (obstructive sleep apnea)     does not use CPAP    Pulmonary hypertension     Skin cancer     Type 2 diabetes mellitus with hyperglycemia, without long-term current use of insulin 4/20/2022       Past Surgical History:   Procedure Laterality Date    ANTERIOR CERVICAL CORPECTOMY W/ FUSION  1988    CAUDAL EPIDURAL STEROID INJECTION N/A 1/10/2019    Procedure: Injection-steroid-epidural-caudal;  Surgeon: Lydia Velásquez Jr., MD;  Location: [facility] PAIN T;  Service: Pain Management;  Laterality: N/A;    COLONOSCOPY N/A 11/2/2016    Procedure: COLONOSCOPY;  Surgeon: Viji Dewitt MD;  Location: [facility];  Service: Endoscopy;  Laterality: N/A;    EPIDURAL STEROID INJECTION INTO LUMBAR SPINE N/A 11/8/2018    Procedure: Injection-steroid-epidural-lumbar-L2-3 (LEFT > RIGHT);  Surgeon: Lydia Velásquez Jr., MD;  Location: [facility] PAIN [facility];  Service: Pain Management;  Laterality: N/A;    FUSION OF SPINE WITH INSTRUMENTATION Left 1/3/2020    Procedure: FUSION, SPINE, WITH INSTRUMENTATION Stage 1 Left L3-4 LOIF Rise L Stage 2 Open L3-4 L4-5  Laminectomy Removal of L4-5 Hardware, L3-5 Posterior Instrumentation and extraction of Fusion at L3-4;  Surgeon: James Brantley MD;  Location: [facility] OR;  Service: Neurosurgery;  Laterality: Left;  Procedure: Stage 1 Left L3-4 LOIF Rise L  Stage 2 Open L3-4 L4-5 Laminectomy Removal of L4-5 Hardware,     INJECTION OF ANESTHETIC AGENT AROUND GENITOFEMORAL NERVE Bilateral 8/1/2019    Procedure: BILATERAL SHOULDER ARTICULAR BRANCH BLOCK;  Surgeon: Lydia Velásquez Jr., MD;  Location: [facility] PAIN MGT;  Service: Pain Management;  Laterality: Bilateral;    KNEE ARTHROSCOPY W/ MENISCAL REPAIR Right 2012    LUMBAR LAMINECTOMY  06/19/2015    SKIN CANCER EXCISION      forehead does not recall date    TOTAL HIP ARTHROPLASTY Right 04/28/2016         TUBAL LIGATION  1980       Current Outpatient Medications on File Prior to Visit   Medication Sig Dispense Refill    amLODIPine (NORVASC) 10 MG tablet Take 1 tablet (10 mg total) by mouth once daily. 90 tablet 1    ammonium lactate (LAC-HYDRIN) 12 % lotion USE ON FEET AS NEEDED      ascorbic acid, vitamin C, (VITAMIN C) 1000 MG tablet Take 1,000 mg by mouth once daily.      atenoloL (TENORMIN) 25 MG tablet Take 1 tablet (25 mg total) by mouth once daily. 90 tablet 1    azelaic acid (AZELEX) 15 % gel Apply to face twice daily. Alert MD if no improvement after 12 weeks of use. 50 g 3    calcium-vitamin D3 (OS-KATERIN 500 + D3) 500 mg-5 mcg (200 unit) per tablet Take 1 tablet by mouth 2 (two) times daily with meals.      clindamycin (CLEOCIN T) 1 % lotion Use hs on face 60 mL 3    co-enzyme Q-10 30 mg capsule Take 30 mg by mouth once daily.       cycloSPORINE (RESTASIS) 0.05 % ophthalmic emulsion Place 1 drop into both eyes 2 (two) times daily. 24 mL 11    diclofenac sodium (VOLTAREN ARTHRITIS PAIN) 1 % Gel Apply 2 g topically once daily.      doxycycline (VIBRA-TABS) 100 MG tablet Take 1 po BID with food and not within 1 hour prior to lying down 60 tablet 2    econazole  nitrate 1 % cream Use bid for rash 85 g 2    famotidine (PEPCID) 40 MG tablet Take 1 tablet (40 mg total) by mouth once daily. 90 tablet 1    fenofibrate micronized (LOFIBRA) 134 MG Cap Take 1 capsule (134 mg total) by mouth once daily. 90 capsule 3    furosemide (LASIX) 40 MG tablet Take lasix 40 mg PO every other day PRN swelling 45 tablet 3    gabapentin (NEURONTIN) 600 MG tablet Take 1 tablet (600 mg total) by mouth 3 (three) times daily. 90 tablet 11    GLUCOSA KHAN 2KCL/CHONDROITIN KHAN (GLUCOSAMINE SULF-CHONDROITIN ORAL) Take 1 tablet by mouth once daily.      ivermectin (SOOLANTRA) 1 % Crea Apply topically to face once daily 45 g 5    levocetirizine (XYZAL) 5 MG tablet Take 1 tablet (5 mg total) by mouth every evening. 90 tablet 1    lidocaine (XYLOCAINE) 5 % Oint ointment APPLY 2.5 GRAMS TO THE AFFECTED AREA 3-4 TIMES DAILY STARTING 2 WEEKS POST SURGERY.      metFORMIN (GLUCOPHAGE-XR) 500 MG ER 24hr tablet Take 1 tablet (500 mg total) by mouth before evening meal. 90 tablet 1    metronidazole 1% (METROGEL) 1 % Gel Apply topically once daily. 60 g 2    multivitamin capsule Take 1 capsule by mouth once daily.      mupirocin (BACTROBAN) 2 % ointment APPLY 1/2 GRAM TO EACH NOSTRIL TWICE A DAY FOR UP TO 5 DAYS PRIOR TO SURGERY OR AS DIRECTED BY YOUR PHYSICIAN.      OPW TEST CLAIM - DO NOT FILL Take by mouth. OPW test claim. Do not fill. (Patient not taking: Reported on 4/20/2022) 11 tablet 0    oxybutynin (DITROPAN XL) 15 MG TR24 TAKE 1 TABLET (15 MG TOTAL) BY MOUTH ONCE DAILY. 90 tablet 3    polymyxin B sulf-trimethoprim (POLYTRIM) 10,000 unit- 1 mg/mL Drop Place 1 drop into both eyes every 4 (four) hours. 10 mL 0    pravastatin (PRAVACHOL) 40 MG tablet Take 1 tablet (40 mg total) by mouth once daily. 90 tablet 1    SANARE ADV SCAR THERAPY BASE Gel APPLY 1/2 GRAM (1 PUMP) TO AFFECTED AREAS TWICE DAILY STARTING 1 MONTH POST SURGERY      semaglutide (RYBELSUS) 3 mg tablet Take 1 tablet (3 mg total)  by mouth once daily. 30 tablet 0    [START ON 5/21/2022] semaglutide (RYBELSUS) 7 mg tablet Take 1 tablet (7 mg total) by mouth once daily. 30 tablet 2    sertraline (ZOLOFT) 100 MG tablet Take 1 tablet (100 mg total) by mouth once daily. 30 tablet 2    vitamin E 400 UNIT capsule Take 400 Units by mouth once daily.       Current Facility-Administered Medications on File Prior to Visit   Medication Dose Route Frequency Provider Last Rate Last Admin    lidocaine (PF) 20 mg/mL (2 %) injection 2 mL  2 mL Intradermal Once Kalyan Han MD           ROS:    Constitution: Denies chills, fever, and sweats.  HENT: Denies headaches or blurry vision.  Cardiovascular: Denies chest pain or irregular heart beat.  Respiratory: Denies cough or shortness of breath.  Gastrointestinal: Denies abdominal pain, nausea, or vomiting.  Genitourinary:  Denies urinary incontinence, bladder and kidney issues  Musculoskeletal:  Denies muscle cramps.  Positive for bilateral shoulder pain right worse than left  Neurological: Denies dizziness or focal weakness.  Psychiatric/Behavioral: Normal mental status.  Hematologic/Lymphatic: Denies bleeding problem or easy bruising/bleeding.  Skin: Denies rash or suspicious lesions.    Physical examination     Gen - No acute distress, well nourished, well groomed   Eyes - Extraoccular motions intact, pupils equally round and reactive to light and accommodation   ENT - normocephalic, atruamtic, oropharynx clear   Neck - Supple, no abnormal masses   Cardiovascular - regular rate and rhythm   Pulmonary - clear to auscultation bilaterally, no wheezes, ronchi, or rales   Abdomen - soft, non-tender, non-distended, positive bowel sounds   Psych - The patient is alert and oriented x3 with normal mood and affect    Right Upper Extremity Examination     Skin is intact throughout   Motor is intact distally radial, median, ulnar, AIN, PIN   +2 radial and ulnar pulses   Sensation to light touch is intact distally  radial, median, and ulnar     Examination of the Right shoulder:   ROM:   For - 150   Abd - 150   Ext - 50   Int - T12     Tenderness to palpation:   Subacromial space - positive  Biceps Tendon - positive  Anterior Glenohumeral Joint - positive  AC joint - negative  Glenohumeral instability - negative  Empty Can test - positive  Speeds test - positive  Ambrosio/Neers sign - positive  Cross-arm adduction test - positive    Left Upper Extremity Examination     Skin is intact throughout   Motor is intact distally radial, median, ulnar, AIN, PIN   +2 radial and ulnar pulses   Sensation to light touch is intact distally radial, median, and ulnar     Examination of the Left shoulder:   ROM:   For - 150   Abd - 150   Ext - 50   Int - T12     Tenderness to palpation:   Subacromial space - positive  Biceps Tendon - positive  Anterior Glenohumeral Joint - positive  AC joint - negative  Glenohumeral instability - negative  Empty Can test - positive  Speeds test - positive  Ambrosio/Neers sign - positive  Cross-arm adduction test - positive    X-ray images were examined and personally interpreted by me.  Three views of bilateral shoulders dated 05/03/2022 show severe osteoarthritis of both shoulders with complete loss of joint space, periarticular osteophytes including goat beard deformity off the inferior edge of both humeri indicative of cuff tear arthropathy.    Dx:  Severe cuff tear arthropathy of bilateral shoulders, right more painful than left    Plan:  We discussed potential morbidity to the upper extremities with both operative and continue non operative treatment.  Risks and benefits of reverse total shoulder arthroplasty were explained the patient.  She verbalized understanding and does wish to proceed.  She would like to do the right side 1st as it is more painful.  We will schedule that for the next available date that is convenient for her.

## 2022-05-03 NOTE — PROGRESS NOTES
CC:  71-year-old female presents for evaluation of bilateral shoulder pain.  The patient has had pain in both of her shoulders for several years.  She has previously seen another orthopedic surgeon who gave her intra-articular injections.  Those initially gave her some relief but have failed to give her relief for the last 12 months.  She has pain in both of her shoulders right worse than left that she rates as an 8/10.  She is having difficulty with overhead activities in grooming secondary to the pain.    Past Medical History:   Diagnosis Date    Allergy     Arthritis     Chronic lower back pain     Depression     Fever blister     GERD (gastroesophageal reflux disease)     Hemorrhoids, internal 11/05/2014    Hyperlipidemia     Hypertension     Joint pain     Morbid obesity with BMI of 40.0-44.9, adult 02/24/2015    Multiple gastric ulcers 12/14/2017    TIMMY (obstructive sleep apnea)     does not use CPAP    Pulmonary hypertension     Skin cancer     Type 2 diabetes mellitus with hyperglycemia, without long-term current use of insulin 4/20/2022       Past Surgical History:   Procedure Laterality Date    ANTERIOR CERVICAL CORPECTOMY W/ FUSION  1988    CAUDAL EPIDURAL STEROID INJECTION N/A 1/10/2019    Procedure: Injection-steroid-epidural-caudal;  Surgeon: Lydia Velásquez Jr., MD;  Location: [facility] PAIN T;  Service: Pain Management;  Laterality: N/A;    COLONOSCOPY N/A 11/2/2016    Procedure: COLONOSCOPY;  Surgeon: Viji Dewitt MD;  Location: [facility];  Service: Endoscopy;  Laterality: N/A;    EPIDURAL STEROID INJECTION INTO LUMBAR SPINE N/A 11/8/2018    Procedure: Injection-steroid-epidural-lumbar-L2-3 (LEFT > RIGHT);  Surgeon: Lydia Velásquez Jr., MD;  Location: [facility] PAIN [facility];  Service: Pain Management;  Laterality: N/A;    FUSION OF SPINE WITH INSTRUMENTATION Left 1/3/2020    Procedure: FUSION, SPINE, WITH INSTRUMENTATION Stage 1 Left L3-4 LOIF Rise L Stage 2 Open L3-4 L4-5  Laminectomy Removal of L4-5 Hardware, L3-5 Posterior Instrumentation and extraction of Fusion at L3-4;  Surgeon: James Brantley MD;  Location: [facility] OR;  Service: Neurosurgery;  Laterality: Left;  Procedure: Stage 1 Left L3-4 LOIF Rise L  Stage 2 Open L3-4 L4-5 Laminectomy Removal of L4-5 Hardware,     INJECTION OF ANESTHETIC AGENT AROUND GENITOFEMORAL NERVE Bilateral 8/1/2019    Procedure: BILATERAL SHOULDER ARTICULAR BRANCH BLOCK;  Surgeon: Lydia Velásquez Jr., MD;  Location: [facility] PAIN MGT;  Service: Pain Management;  Laterality: Bilateral;    KNEE ARTHROSCOPY W/ MENISCAL REPAIR Right 2012    LUMBAR LAMINECTOMY  06/19/2015    SKIN CANCER EXCISION      forehead does not recall date    TOTAL HIP ARTHROPLASTY Right 04/28/2016         TUBAL LIGATION  1980       Current Outpatient Medications on File Prior to Visit   Medication Sig Dispense Refill    amLODIPine (NORVASC) 10 MG tablet Take 1 tablet (10 mg total) by mouth once daily. 90 tablet 1    ammonium lactate (LAC-HYDRIN) 12 % lotion USE ON FEET AS NEEDED      ascorbic acid, vitamin C, (VITAMIN C) 1000 MG tablet Take 1,000 mg by mouth once daily.      atenoloL (TENORMIN) 25 MG tablet Take 1 tablet (25 mg total) by mouth once daily. 90 tablet 1    azelaic acid (AZELEX) 15 % gel Apply to face twice daily. Alert MD if no improvement after 12 weeks of use. 50 g 3    calcium-vitamin D3 (OS-KATERIN 500 + D3) 500 mg-5 mcg (200 unit) per tablet Take 1 tablet by mouth 2 (two) times daily with meals.      clindamycin (CLEOCIN T) 1 % lotion Use hs on face 60 mL 3    co-enzyme Q-10 30 mg capsule Take 30 mg by mouth once daily.       cycloSPORINE (RESTASIS) 0.05 % ophthalmic emulsion Place 1 drop into both eyes 2 (two) times daily. 24 mL 11    diclofenac sodium (VOLTAREN ARTHRITIS PAIN) 1 % Gel Apply 2 g topically once daily.      doxycycline (VIBRA-TABS) 100 MG tablet Take 1 po BID with food and not within 1 hour prior to lying down 60 tablet 2    econazole  nitrate 1 % cream Use bid for rash 85 g 2    famotidine (PEPCID) 40 MG tablet Take 1 tablet (40 mg total) by mouth once daily. 90 tablet 1    fenofibrate micronized (LOFIBRA) 134 MG Cap Take 1 capsule (134 mg total) by mouth once daily. 90 capsule 3    furosemide (LASIX) 40 MG tablet Take lasix 40 mg PO every other day PRN swelling 45 tablet 3    gabapentin (NEURONTIN) 600 MG tablet Take 1 tablet (600 mg total) by mouth 3 (three) times daily. 90 tablet 11    GLUCOSA KHAN 2KCL/CHONDROITIN KHAN (GLUCOSAMINE SULF-CHONDROITIN ORAL) Take 1 tablet by mouth once daily.      ivermectin (SOOLANTRA) 1 % Crea Apply topically to face once daily 45 g 5    levocetirizine (XYZAL) 5 MG tablet Take 1 tablet (5 mg total) by mouth every evening. 90 tablet 1    lidocaine (XYLOCAINE) 5 % Oint ointment APPLY 2.5 GRAMS TO THE AFFECTED AREA 3-4 TIMES DAILY STARTING 2 WEEKS POST SURGERY.      metFORMIN (GLUCOPHAGE-XR) 500 MG ER 24hr tablet Take 1 tablet (500 mg total) by mouth before evening meal. 90 tablet 1    metronidazole 1% (METROGEL) 1 % Gel Apply topically once daily. 60 g 2    multivitamin capsule Take 1 capsule by mouth once daily.      mupirocin (BACTROBAN) 2 % ointment APPLY 1/2 GRAM TO EACH NOSTRIL TWICE A DAY FOR UP TO 5 DAYS PRIOR TO SURGERY OR AS DIRECTED BY YOUR PHYSICIAN.      OPW TEST CLAIM - DO NOT FILL Take by mouth. OPW test claim. Do not fill. (Patient not taking: Reported on 4/20/2022) 11 tablet 0    oxybutynin (DITROPAN XL) 15 MG TR24 TAKE 1 TABLET (15 MG TOTAL) BY MOUTH ONCE DAILY. 90 tablet 3    polymyxin B sulf-trimethoprim (POLYTRIM) 10,000 unit- 1 mg/mL Drop Place 1 drop into both eyes every 4 (four) hours. 10 mL 0    pravastatin (PRAVACHOL) 40 MG tablet Take 1 tablet (40 mg total) by mouth once daily. 90 tablet 1    SANARE ADV SCAR THERAPY BASE Gel APPLY 1/2 GRAM (1 PUMP) TO AFFECTED AREAS TWICE DAILY STARTING 1 MONTH POST SURGERY      semaglutide (RYBELSUS) 3 mg tablet Take 1 tablet (3 mg total)  by mouth once daily. 30 tablet 0    [START ON 5/21/2022] semaglutide (RYBELSUS) 7 mg tablet Take 1 tablet (7 mg total) by mouth once daily. 30 tablet 2    sertraline (ZOLOFT) 100 MG tablet Take 1 tablet (100 mg total) by mouth once daily. 30 tablet 2    vitamin E 400 UNIT capsule Take 400 Units by mouth once daily.       Current Facility-Administered Medications on File Prior to Visit   Medication Dose Route Frequency Provider Last Rate Last Admin    lidocaine (PF) 20 mg/mL (2 %) injection 2 mL  2 mL Intradermal Once Kalyan Han MD           ROS:    Constitution: Denies chills, fever, and sweats.  HENT: Denies headaches or blurry vision.  Cardiovascular: Denies chest pain or irregular heart beat.  Respiratory: Denies cough or shortness of breath.  Gastrointestinal: Denies abdominal pain, nausea, or vomiting.  Genitourinary:  Denies urinary incontinence, bladder and kidney issues  Musculoskeletal:  Denies muscle cramps.  Positive for bilateral shoulder pain right worse than left  Neurological: Denies dizziness or focal weakness.  Psychiatric/Behavioral: Normal mental status.  Hematologic/Lymphatic: Denies bleeding problem or easy bruising/bleeding.  Skin: Denies rash or suspicious lesions.    Physical examination     Gen - No acute distress, well nourished, well groomed   Eyes - Extraoccular motions intact, pupils equally round and reactive to light and accommodation   ENT - normocephalic, atruamtic, oropharynx clear   Neck - Supple, no abnormal masses   Cardiovascular - regular rate and rhythm   Pulmonary - clear to auscultation bilaterally, no wheezes, ronchi, or rales   Abdomen - soft, non-tender, non-distended, positive bowel sounds   Psych - The patient is alert and oriented x3 with normal mood and affect    Right Upper Extremity Examination     Skin is intact throughout   Motor is intact distally radial, median, ulnar, AIN, PIN   +2 radial and ulnar pulses   Sensation to light touch is intact distally  radial, median, and ulnar     Examination of the Right shoulder:   ROM:   For - 150   Abd - 150   Ext - 50   Int - T12     Tenderness to palpation:   Subacromial space - positive  Biceps Tendon - positive  Anterior Glenohumeral Joint - positive  AC joint - negative  Glenohumeral instability - negative  Empty Can test - positive  Speeds test - positive  Ambrosio/Neers sign - positive  Cross-arm adduction test - positive    Left Upper Extremity Examination     Skin is intact throughout   Motor is intact distally radial, median, ulnar, AIN, PIN   +2 radial and ulnar pulses   Sensation to light touch is intact distally radial, median, and ulnar     Examination of the Left shoulder:   ROM:   For - 150   Abd - 150   Ext - 50   Int - T12     Tenderness to palpation:   Subacromial space - positive  Biceps Tendon - positive  Anterior Glenohumeral Joint - positive  AC joint - negative  Glenohumeral instability - negative  Empty Can test - positive  Speeds test - positive  Ambrosio/Neers sign - positive  Cross-arm adduction test - positive    X-ray images were examined and personally interpreted by me.  Three views of bilateral shoulders dated 05/03/2022 show severe osteoarthritis of both shoulders with complete loss of joint space, periarticular osteophytes including goat beard deformity off the inferior edge of both humeri indicative of cuff tear arthropathy.    Dx:  Severe cuff tear arthropathy of bilateral shoulders, right more painful than left    Plan:  We discussed potential morbidity to the upper extremities with both operative and continue non operative treatment.  Risks and benefits of reverse total shoulder arthroplasty were explained the patient.  She verbalized understanding and does wish to proceed.  She would like to do the right side 1st as it is more painful.  We will schedule that for the next available date that is convenient for her.

## 2022-05-05 ENCOUNTER — OFFICE VISIT (OUTPATIENT)
Dept: FAMILY MEDICINE | Facility: CLINIC | Age: 72
End: 2022-05-05
Payer: MEDICARE

## 2022-05-05 ENCOUNTER — HOSPITAL ENCOUNTER (OUTPATIENT)
Dept: RADIOLOGY | Facility: CLINIC | Age: 72
Discharge: HOME | End: 2022-05-05
Attending: NURSE PRACTITIONER
Payer: MEDICARE

## 2022-05-05 VITALS
SYSTOLIC BLOOD PRESSURE: 122 MMHG | HEIGHT: 61 IN | OXYGEN SATURATION: 96 % | DIASTOLIC BLOOD PRESSURE: 68 MMHG | WEIGHT: 239 LBS | TEMPERATURE: 99 F | HEART RATE: 78 BPM | BODY MASS INDEX: 45.12 KG/M2

## 2022-05-05 DIAGNOSIS — I70.0 ATHEROSCLEROSIS OF AORTA: ICD-10-CM

## 2022-05-05 DIAGNOSIS — E11.59 HYPERTENSION ASSOCIATED WITH DIABETES: ICD-10-CM

## 2022-05-05 DIAGNOSIS — Z01.818 PRE-OP EXAM: ICD-10-CM

## 2022-05-05 DIAGNOSIS — F39 MOOD DISORDER: ICD-10-CM

## 2022-05-05 DIAGNOSIS — E11.65 TYPE 2 DIABETES MELLITUS WITH HYPERGLYCEMIA, WITHOUT LONG-TERM CURRENT USE OF INSULIN: ICD-10-CM

## 2022-05-05 DIAGNOSIS — M25.511 CHRONIC RIGHT SHOULDER PAIN: ICD-10-CM

## 2022-05-05 DIAGNOSIS — M19.012 OSTEOARTHRITIS OF BOTH SHOULDERS, UNSPECIFIED OSTEOARTHRITIS TYPE: ICD-10-CM

## 2022-05-05 DIAGNOSIS — E11.69 HYPERLIPIDEMIA ASSOCIATED WITH TYPE 2 DIABETES MELLITUS: ICD-10-CM

## 2022-05-05 DIAGNOSIS — E78.5 HYPERLIPIDEMIA ASSOCIATED WITH TYPE 2 DIABETES MELLITUS: ICD-10-CM

## 2022-05-05 DIAGNOSIS — R51.9 CHRONIC NONINTRACTABLE HEADACHE, UNSPECIFIED HEADACHE TYPE: ICD-10-CM

## 2022-05-05 DIAGNOSIS — G47.30 SLEEP APNEA, UNSPECIFIED TYPE: ICD-10-CM

## 2022-05-05 DIAGNOSIS — K21.9 GASTROESOPHAGEAL REFLUX DISEASE, UNSPECIFIED WHETHER ESOPHAGITIS PRESENT: ICD-10-CM

## 2022-05-05 DIAGNOSIS — G89.29 CHRONIC RIGHT SHOULDER PAIN: ICD-10-CM

## 2022-05-05 DIAGNOSIS — G89.29 CHRONIC NONINTRACTABLE HEADACHE, UNSPECIFIED HEADACHE TYPE: ICD-10-CM

## 2022-05-05 DIAGNOSIS — N18.31 CHRONIC KIDNEY DISEASE, STAGE 3A: ICD-10-CM

## 2022-05-05 DIAGNOSIS — I15.2 HYPERTENSION ASSOCIATED WITH DIABETES: ICD-10-CM

## 2022-05-05 DIAGNOSIS — M19.011 OSTEOARTHRITIS OF BOTH SHOULDERS, UNSPECIFIED OSTEOARTHRITIS TYPE: ICD-10-CM

## 2022-05-05 DIAGNOSIS — Z01.818 PRE-OP EXAM: Primary | ICD-10-CM

## 2022-05-05 PROCEDURE — 93005 EKG 12-LEAD: ICD-10-PCS | Mod: S$GLB,,, | Performed by: NURSE PRACTITIONER

## 2022-05-05 PROCEDURE — 71046 X-RAY EXAM CHEST 2 VIEWS: CPT | Mod: TC,FY,PO

## 2022-05-05 PROCEDURE — 1160F PR REVIEW ALL MEDS BY PRESCRIBER/CLIN PHARMACIST DOCUMENTED: ICD-10-PCS | Mod: CPTII,S$GLB,, | Performed by: NURSE PRACTITIONER

## 2022-05-05 PROCEDURE — 3066F PR DOCUMENTATION OF TREATMENT FOR NEPHROPATHY: ICD-10-PCS | Mod: CPTII,S$GLB,, | Performed by: NURSE PRACTITIONER

## 2022-05-05 PROCEDURE — 1160F RVW MEDS BY RX/DR IN RCRD: CPT | Mod: CPTII,S$GLB,, | Performed by: NURSE PRACTITIONER

## 2022-05-05 PROCEDURE — 3062F POS MACROALBUMINURIA REV: CPT | Mod: CPTII,S$GLB,, | Performed by: NURSE PRACTITIONER

## 2022-05-05 PROCEDURE — 93010 ELECTROCARDIOGRAM REPORT: CPT | Mod: S$GLB,,, | Performed by: INTERNAL MEDICINE

## 2022-05-05 PROCEDURE — 3044F HG A1C LEVEL LT 7.0%: CPT | Mod: CPTII,S$GLB,, | Performed by: NURSE PRACTITIONER

## 2022-05-05 PROCEDURE — 99214 PR OFFICE/OUTPT VISIT, EST, LEVL IV, 30-39 MIN: ICD-10-PCS | Mod: S$GLB,,, | Performed by: NURSE PRACTITIONER

## 2022-05-05 PROCEDURE — 3062F PR POS MACROALBUMINURIA RESULT DOCUMENTED/REVIEW: ICD-10-PCS | Mod: CPTII,S$GLB,, | Performed by: NURSE PRACTITIONER

## 2022-05-05 PROCEDURE — 3044F PR MOST RECENT HEMOGLOBIN A1C LEVEL <7.0%: ICD-10-PCS | Mod: CPTII,S$GLB,, | Performed by: NURSE PRACTITIONER

## 2022-05-05 PROCEDURE — 71046 X-RAY EXAM CHEST 2 VIEWS: CPT | Mod: 26,,, | Performed by: RADIOLOGY

## 2022-05-05 PROCEDURE — 3008F BODY MASS INDEX DOCD: CPT | Mod: CPTII,S$GLB,, | Performed by: NURSE PRACTITIONER

## 2022-05-05 PROCEDURE — 3074F SYST BP LT 130 MM HG: CPT | Mod: CPTII,S$GLB,, | Performed by: NURSE PRACTITIONER

## 2022-05-05 PROCEDURE — 99999 PR PBB SHADOW E&M-EST. PATIENT-LVL V: CPT | Mod: PBBFAC,,, | Performed by: NURSE PRACTITIONER

## 2022-05-05 PROCEDURE — 3074F PR MOST RECENT SYSTOLIC BLOOD PRESSURE < 130 MM HG: ICD-10-PCS | Mod: CPTII,S$GLB,, | Performed by: NURSE PRACTITIONER

## 2022-05-05 PROCEDURE — 99214 OFFICE O/P EST MOD 30 MIN: CPT | Mod: S$GLB,,, | Performed by: NURSE PRACTITIONER

## 2022-05-05 PROCEDURE — 1101F PT FALLS ASSESS-DOCD LE1/YR: CPT | Mod: CPTII,S$GLB,, | Performed by: NURSE PRACTITIONER

## 2022-05-05 PROCEDURE — 71046 XR CHEST PA AND LATERAL: ICD-10-PCS | Mod: 26,,, | Performed by: RADIOLOGY

## 2022-05-05 PROCEDURE — 93010 EKG 12-LEAD: ICD-10-PCS | Mod: S$GLB,,, | Performed by: INTERNAL MEDICINE

## 2022-05-05 PROCEDURE — 1159F PR MEDICATION LIST DOCUMENTED IN MEDICAL RECORD: ICD-10-PCS | Mod: CPTII,S$GLB,, | Performed by: NURSE PRACTITIONER

## 2022-05-05 PROCEDURE — 3078F DIAST BP <80 MM HG: CPT | Mod: CPTII,S$GLB,, | Performed by: NURSE PRACTITIONER

## 2022-05-05 PROCEDURE — 3066F NEPHROPATHY DOC TX: CPT | Mod: CPTII,S$GLB,, | Performed by: NURSE PRACTITIONER

## 2022-05-05 PROCEDURE — 3078F PR MOST RECENT DIASTOLIC BLOOD PRESSURE < 80 MM HG: ICD-10-PCS | Mod: CPTII,S$GLB,, | Performed by: NURSE PRACTITIONER

## 2022-05-05 PROCEDURE — 93005 ELECTROCARDIOGRAM TRACING: CPT | Mod: S$GLB,,, | Performed by: NURSE PRACTITIONER

## 2022-05-05 PROCEDURE — 99999 PR PBB SHADOW E&M-EST. PATIENT-LVL V: ICD-10-PCS | Mod: PBBFAC,,, | Performed by: NURSE PRACTITIONER

## 2022-05-05 PROCEDURE — 1126F PR PAIN SEVERITY QUANTIFIED, NO PAIN PRESENT: ICD-10-PCS | Mod: CPTII,S$GLB,, | Performed by: NURSE PRACTITIONER

## 2022-05-05 PROCEDURE — 1101F PR PT FALLS ASSESS DOC 0-1 FALLS W/OUT INJ PAST YR: ICD-10-PCS | Mod: CPTII,S$GLB,, | Performed by: NURSE PRACTITIONER

## 2022-05-05 PROCEDURE — 1126F AMNT PAIN NOTED NONE PRSNT: CPT | Mod: CPTII,S$GLB,, | Performed by: NURSE PRACTITIONER

## 2022-05-05 PROCEDURE — 3288F FALL RISK ASSESSMENT DOCD: CPT | Mod: CPTII,S$GLB,, | Performed by: NURSE PRACTITIONER

## 2022-05-05 PROCEDURE — 3288F PR FALLS RISK ASSESSMENT DOCUMENTED: ICD-10-PCS | Mod: CPTII,S$GLB,, | Performed by: NURSE PRACTITIONER

## 2022-05-05 PROCEDURE — 1159F MED LIST DOCD IN RCRD: CPT | Mod: CPTII,S$GLB,, | Performed by: NURSE PRACTITIONER

## 2022-05-05 PROCEDURE — 3008F PR BODY MASS INDEX (BMI) DOCUMENTED: ICD-10-PCS | Mod: CPTII,S$GLB,, | Performed by: NURSE PRACTITIONER

## 2022-05-05 RX ORDER — BUTALBITAL, ACETAMINOPHEN AND CAFFEINE 50; 325; 40 MG/1; MG/1; MG/1
1 TABLET ORAL EVERY 6 HOURS PRN
Qty: 60 TABLET | Refills: 2 | Status: SHIPPED | OUTPATIENT
Start: 2022-05-05 | End: 2022-08-16

## 2022-05-05 NOTE — PATIENT INSTRUCTIONS
Rik Amaya,     If you are due for any health screening(s) below please notify me so we can arrange them to be ordered and scheduled to maintain your health. Most healthy patients complete it. Don't lose out on improving your health.     Health Maintenance   Topic Date Due    DEXA Scan  05/08/2021    Mammogram  02/19/2022    Hemoglobin A1c  10/20/2022    Lipid Panel  04/20/2023    Eye Exam  04/20/2023    Foot Exam  05/02/2023    High Dose Statin  05/05/2023    TETANUS VACCINE  12/20/2026    Hepatitis C Screening  Completed       Breast Cancer Screening    Breast cancer is the second most common cancer in women after skin cancer, and the second leading cause of death from cancer after lung cancer. Mammograms can detect breast cancer early, which significantly increases the chances of curing the cancer.      A screening mammogram is an x-ray image of the breasts used for early breast cancer detection. It can help reduce the number of deaths from breast cancer among women. To get a clear image, the breast is placed between two plastic plates to make it flat. How often a mammogram is needed depends on your age and your breast cancer risk.    Although you are still overdue for this important screening, due to the COVID-19 pandemic, we recommend scheduling it in the near future.             - EKG normal sinus rhythm  - check labs   - check chest x-ray x-ray  - will clear for surgery next week

## 2022-05-05 NOTE — PROGRESS NOTES
Subjective:       Patient ID: Monique Trujillo is a 71 y.o. female.    Chief Complaint: Pre-op Exam    71-year-old female presents to the clinic today for preop exam.  She is scheduled to have a right total shoulder replacement on 05/25/2022 per Dr. Herb Piña.  She has diabetes and her rash hemoglobin A1c from 04/20/2022 was 6.5.  She has never started her metformin yet.  She is unsure exactly what dose of diabetes medication she is taking. I recommended her bring all of her medications to her preop appointment with her.  She denies any cardiac chest pain, heart palpitations, shortness of breath, or swelling to lower extremities.  She denies any headaches, dizziness, or blurred vision.  She has had previous right hip replacement, right knee replacement, 2 back surgeries, and 1 neck surgery in the past.  She has done fine with all those surgeries.She takes herbal medications. She has not been on prolonged courses of steroids. She denies any history of problems or history of  family problems with anesthesia. She reports no history of blood clots or excessive bleeding. She reports poor exercise tolerance.  She has chronic ringing in both ears and is going to see audiologist soon.  She has swelling to both of her lower extremities at times but today they look fine according to the patient.      Past Medical History:   Diagnosis Date    Allergy     Arthritis     Chronic lower back pain     Depression     Fever blister     GERD (gastroesophageal reflux disease)     Hemorrhoids, internal 11/05/2014    Hyperlipidemia     Hypertension     Joint pain     Morbid obesity with BMI of 40.0-44.9, adult 02/24/2015    Multiple gastric ulcers 12/14/2017    TIMMY (obstructive sleep apnea)     does not use CPAP    Pulmonary hypertension     Skin cancer     Type 2 diabetes mellitus with hyperglycemia, without long-term current use of insulin 4/20/2022     Past Surgical History:   Procedure Laterality Date     ANTERIOR CERVICAL CORPECTOMY W/ FUSION  1988    CAUDAL EPIDURAL STEROID INJECTION N/A 1/10/2019    Procedure: Injection-steroid-epidural-caudal;  Surgeon: Lydia Velásquez Jr., MD;  [place] [facility] [place]  Service: Pain Management;  Laterality: N/A;    COLONOSCOPY N/A 11/2/2016    Procedure: COLONOSCOPY;  Surgeon: Viji Dewitt MD;  [facility]: [facility];  Service: Endoscopy;  Laterality: N/A;    EPIDURAL STEROID INJECTION INTO LUMBAR SPINE N/A 11/8/2018    Procedure: Injection-steroid-epidural-lumbar-L2-3 (LEFT > RIGHT);  Surgeon: Lydia Velásquez Jr., MD;  Location: [facility];  Service: Pain Management;  Laterality: N/A;    FUSION OF SPINE WITH INSTRUMENTATION Left 1/3/2020    Procedure: FUSION, SPINE, WITH INSTRUMENTATION Stage 1 Left L3-4 LOIF Rise L Stage 2 Open L3-4 L4-5 Laminectomy Removal of L4-5 Hardware, L3-5 Posterior Instrumentation and extraction of Fusion at L3-4;  Surgeon: James Brantley MD;  Location: [facility] OR;  Service: Neurosurgery;  Laterality: Left;  Procedure: Stage 1 Left L3-4 LOIF Rise L  Stage 2 Open L3-4 L4-5 Laminectomy Removal of L4-5 Hardware,     INJECTION OF ANESTHETIC AGENT AROUND GENITOFEMORAL NERVE Bilateral 8/1/2019    Procedure: BILATERAL SHOULDER ARTICULAR BRANCH BLOCK;  Surgeon: Lydia Velásquez Jr., MD;  Location: [facility];  Service: Pain Management;  Laterality: Bilateral;    KNEE ARTHROSCOPY W/ MENISCAL REPAIR Right 2012    LUMBAR LAMINECTOMY  06/19/2015    SKIN CANCER EXCISION      forehead does not recall date    TOTAL HIP ARTHROPLASTY Right 04/28/2016         TUBAL LIGATION  1980      reports that she has never smoked. She has never used smokeless tobacco. She reports that she does not drink alcohol and does not use drugs.  Review of Systems   Constitutional: Negative for chills and fever.   HENT: Positive for tinnitus. Negative for congestion, ear discharge, ear pain, postnasal drip, rhinorrhea, sinus pressure, sinus pain, sneezing and sore  throat.    Respiratory: Negative for cough, shortness of breath and wheezing.    Cardiovascular: Positive for leg swelling. Negative for chest pain and palpitations.   Gastrointestinal: Negative for abdominal pain, diarrhea, nausea and vomiting.   Genitourinary: Negative for difficulty urinating, dysuria, flank pain and frequency.   Musculoskeletal: Negative for gait problem.   Neurological: Negative for dizziness, light-headedness and headaches.       Objective:      Physical Exam  Vitals and nursing note reviewed.   Constitutional:       General: She is not in acute distress.     Appearance: Normal appearance. She is well-developed. She is obese. She is not ill-appearing, toxic-appearing or diaphoretic.   HENT:      Head: Normocephalic and atraumatic.      Right Ear: Tympanic membrane, ear canal and external ear normal. There is no impacted cerumen.      Left Ear: Tympanic membrane, ear canal and external ear normal. There is no impacted cerumen.      Nose: Nose normal. No congestion or rhinorrhea.      Mouth/Throat:      Mouth: Mucous membranes are moist.      Pharynx: Oropharynx is clear. No oropharyngeal exudate or posterior oropharyngeal erythema.   Eyes:      General: No scleral icterus.        Right eye: No discharge.         Left eye: No discharge.      Extraocular Movements: Extraocular movements intact.      Conjunctiva/sclera: Conjunctivae normal.      Pupils: Pupils are equal, round, and reactive to light.   Neck:      Thyroid: No thyromegaly.      Vascular: No JVD.      Trachea: No tracheal deviation.   Cardiovascular:      Rate and Rhythm: Normal rate and regular rhythm.      Heart sounds: Normal heart sounds. No murmur heard.    No friction rub. No gallop.   Pulmonary:      Effort: Pulmonary effort is normal. No respiratory distress.      Breath sounds: Normal breath sounds. No stridor. No wheezing, rhonchi or rales.   Abdominal:      General: Bowel sounds are normal. There is no distension.       Palpations: There is no mass.      Tenderness: There is no abdominal tenderness. There is no guarding or rebound.   Genitourinary:     Vagina: Normal. No vaginal discharge.      Rectum: Guaiac result negative.   Musculoskeletal:         General: No tenderness. Normal range of motion.      Cervical back: Normal range of motion and neck supple.   Lymphadenopathy:      Cervical: No cervical adenopathy.   Skin:     General: Skin is warm and dry.      Coloration: Skin is not pale.      Findings: No erythema or rash.   Neurological:      Mental Status: She is alert and oriented to person, place, and time.      Cranial Nerves: No cranial nerve deficit.      Motor: No abnormal muscle tone.      Coordination: Coordination normal.      Deep Tendon Reflexes: Reflexes normal.   Psychiatric:         Mood and Affect: Mood normal.         Behavior: Behavior normal.         Thought Content: Thought content normal.         Judgment: Judgment normal.         Assessment:       1. Pre-op exam    2. Chronic right shoulder pain    3. Type 2 diabetes mellitus with hyperglycemia, without long-term current use of insulin    4. Osteoarthritis of both shoulders, unspecified osteoarthritis type    5. Sleep apnea, unspecified type    6. Hypertension associated with diabetes    7. Hyperlipidemia associated with type 2 diabetes mellitus    8. Gastroesophageal reflux disease, unspecified whether esophagitis present    9. Chronic kidney disease, stage 3a    10. Atherosclerosis of aorta    11. Mood disorder        Plan:         Pre-op exam  -     EKG 12-lead  -     CBC Auto Differential; Future; Expected date: 05/05/2022  -     Comprehensive Metabolic Panel; Future; Expected date: 05/05/2022  -     X-Ray Chest PA And Lateral; Future; Expected date: 05/05/2022  - EKG normal sinus rhythm     Chronic right shoulder pain  - scheduled to have total right shoulder replacement on 5/25/2022    Type 2 diabetes mellitus with hyperglycemia, without long-term  current use of insulin  -     Cancel: Hemoglobin A1C; Future; Expected date: 05/05/2022  - The current medical regimen is effective;  continue present plan and medications.    Osteoarthritis of both shoulders, unspecified osteoarthritis type  - scheduled for surgery for right total shoulder replacement on 5/25/2022    Sleep apnea, unspecified type  - does not use her CPAP machine     Hypertension associated with diabetes  - The current medical regimen is effective;  continue present plan and medications.    Hyperlipidemia associated with type 2 diabetes mellitus  - The current medical regimen is effective;  continue present plan and medications.    Gastroesophageal reflux disease, unspecified whether esophagitis present  - The current medical regimen is effective;  continue present plan and medications.    Chronic kidney disease, stage 3a  - avoid all anti-inflammatories and stay well hydrated    Atherosclerosis of aorta  - Stable / Asymptomatic is on blood pressure and cholesterol lowering medications    Mood disorder  - The current medical regimen is effective;  continue present plan and medications.    - 4/20/22 HgA1C 6.5  - CMP glucose 168 otherwise WNL  - CBC WNL  - chest x-ray There is no acute pulmonary abnormality.  - EKG normal sinus rhythm     - This is a low cardiac risk patient that is medically maximized for a low risk cardiac surgery. She is cleared for surgery.

## 2022-05-11 ENCOUNTER — TELEPHONE (OUTPATIENT)
Dept: PAIN MEDICINE | Facility: CLINIC | Age: 72
End: 2022-05-11
Payer: MEDICARE

## 2022-05-11 NOTE — TELEPHONE ENCOUNTER
----- Message from Irasema Rich sent at 5/11/2022 12:30 PM CDT -----  Contact: ashley  Type: Needs Medical Advice  Who Called:  Ashley with [employer]  Best Call Back Number: [phone]  Additional Information: needs level and sides for the ELINA

## 2022-05-11 NOTE — TELEPHONE ENCOUNTER
Spoke with Ashley and  She said she found information she needed to disregard message we should have an approval.

## 2022-05-12 ENCOUNTER — HOSPITAL ENCOUNTER (OUTPATIENT)
Dept: RADIOLOGY | Facility: CLINIC | Age: 72
Discharge: HOME | End: 2022-05-12
Attending: STUDENT IN AN ORGANIZED HEALTH CARE EDUCATION/TRAINING PROGRAM
Payer: MEDICARE

## 2022-05-12 DIAGNOSIS — Z12.31 ENCOUNTER FOR SCREENING MAMMOGRAM FOR MALIGNANT NEOPLASM OF BREAST: ICD-10-CM

## 2022-05-12 PROCEDURE — 77063 BREAST TOMOSYNTHESIS BI: CPT | Mod: TC,PO

## 2022-05-12 PROCEDURE — 77067 SCR MAMMO BI INCL CAD: CPT | Mod: 26,,, | Performed by: RADIOLOGY

## 2022-05-12 PROCEDURE — 77067 MAMMO DIGITAL SCREENING BILAT WITH TOMO: ICD-10-PCS | Mod: 26,,, | Performed by: RADIOLOGY

## 2022-05-12 PROCEDURE — 77063 BREAST TOMOSYNTHESIS BI: CPT | Mod: 26,,, | Performed by: RADIOLOGY

## 2022-05-12 PROCEDURE — 77063 MAMMO DIGITAL SCREENING BILAT WITH TOMO: ICD-10-PCS | Mod: 26,,, | Performed by: RADIOLOGY

## 2022-05-13 ENCOUNTER — CLINICAL SUPPORT (OUTPATIENT)
Dept: DIABETES | Facility: CLINIC | Age: 72
End: 2022-05-13
Payer: MEDICARE

## 2022-05-13 DIAGNOSIS — E11.65 TYPE 2 DIABETES MELLITUS WITH HYPERGLYCEMIA, WITHOUT LONG-TERM CURRENT USE OF INSULIN: ICD-10-CM

## 2022-05-13 PROCEDURE — G0108 DIAB MANAGE TRN  PER INDIV: HCPCS | Mod: S$GLB,,, | Performed by: NUTRITIONIST

## 2022-05-13 PROCEDURE — 99212 OFFICE O/P EST SF 10 MIN: CPT | Mod: PBBFAC,PO | Performed by: NUTRITIONIST

## 2022-05-13 PROCEDURE — 99999 PR PBB SHADOW E&M-EST. PATIENT-LVL II: CPT | Mod: PBBFAC,,, | Performed by: NUTRITIONIST

## 2022-05-13 PROCEDURE — G0108 PR DIAB MANAGE TRN  PER INDIV: ICD-10-PCS | Mod: S$GLB,,, | Performed by: NUTRITIONIST

## 2022-05-13 PROCEDURE — 99999 PR PBB SHADOW E&M-EST. PATIENT-LVL II: ICD-10-PCS | Mod: PBBFAC,,, | Performed by: NUTRITIONIST

## 2022-05-13 NOTE — PROGRESS NOTES
Diabetes Care Specialist Progress Note  Author: Felicia Geronimo RD  Date: 5/13/2022    Program Intake  Reason for Diabetes Program Visit:: Initial Diabetes Assessment  Current diabetes risk level:: low  In the last 12 months, have you:: none  Permission to speak with others about care:: no    Lab Results   Component Value Date    HGBA1C 6.5 (H) 04/20/2022       Clinical    Patient Health Rating  Compared to other people your age, how would you rate your health?: Good    Problem Review  Reviewed Problem List with Patient: yes  Active comorbidities affecting diabetes self-care.: yes  Comorbidities: Cardiovascular Disease, Hypertension  Reviewed health maintenance: yes    Clinical Assessment  Current Diabetes Treatment: Oral Medication (500 mg Metformin XR --NOT TAKING; Rybelsus 3 mg)  Have you ever experienced hypoglycemia (low blood sugar)?: no  Have you ever experienced hyperglycemia (high blood sugar)?: no    Medication Information  How do you obtain your medications?: Family picks up  How many days a week do you miss your medications?: Never  Do you use a pill box or medication chart to help you manage your medications?: Pill box  Do you sometimes have difficulty refilling your medications?: No  Medication adherence impacting ability to self-manage diabetes?: Yes (Not taking Metformin)    Labs  Do you have regular lab work to monitor your medications?: Yes  Type of Regular Lab Work: A1c, Cholesterol, CBC, Other  Where do you get your labs drawn?: [facility]  Lab Compliance Barriers: No    Nutritional Status  Diet: Regular  Meal Plan 24 Hour Recall: Breakfast, Lunch, Dinner, Snack  Meal Plan 24 Hour Recall - Breakfast:  (coffee w/honey and flavored creamer.  Cheerios (2 cups++), whole banana, milk)  Meal Plan 24 Hour Recall - Lunch:  (leftovers--roast, rice, carrots and green beans; water)  Meal Plan 24 Hour Recall - Dinner:  (ground beef, red sauce, ravioli; water)  Meal Plan 24 Hour Recall - Snack:  (mid day:  orange  or granola bar or PNB crackers.  HS/late night: cookies)  Change in appetite?: No  Dentation:: Needs Dentures  Recent Changes in Weight: No Recent Weight Change  Current nutritional status an area of need that is impacting patient's ability to self-manage diabetes?: No    Additional Social History    Support  Does anyone support you with your diabetes care?: yes  Who supports you?: son/daughter, self (Lives with daughter who provides transportation and makes meals)  Who takes you to your medical appointments?: son/daughter  Does the current support meet the patient's needs?: Yes  Is Support an area impacting ability to self-manage diabetes?: No    Access to Mass Media & Technology  Does the patient have access to any of the following devices or technologies?: Smart phone  Media or technology needs impacting ability to self-manage diabetes?: No    Cognitive/Behavioral Health  Alert and Oriented: Yes  Difficulty Thinking: No  Requires Prompting: No  Requires assistance for routine expression?: No  Cognitive or behavioral barriers impacting ability to self-manage diabetes?: No    Culture/Yarsanism  Culture or Sabianism beliefs that may impact ability to access healthcare: No    Communication  Language preference: English  Hearing Problems: No  Vision Problems: No  Communication needs impacting ability to self-manage diabetes?: No    Health Literacy  Preferred Learning Method: Face to Face  How often do you need to have someone help you read instructions, pamphlets, or written material from your doctor or pharmacy?: Never  Health literacy needs impacting ability to self-manage diabetes?: No      Diabetes Self-Management Skills Assessment    Diabetes Disease Process/Treatment Options  Patient/caregiver able to state what happens when someone has diabetes.: yes  Patient/caregiver knows what type of diabetes they have.: yes  Diabetes Type : Type II  Patient/caregiver able to identify at least three signs and symptoms of  "diabetes.: yes  Identified signs and symptoms:: blurred vision, fatigue, frequent infections, frequent urination, increased thirst  Patient able to identify at least three risk factors for diabetes.: yes  Identified risk factors:: age over 40, being overweight, family history, reduced activity  Diabetes Disease Process/Treatment Options: Skills Assessment Completed: Yes  Assessment indicates:: Adequate understanding  Area of need?: No    Nutrition/Healthy Eating  Challenges to healthy eating:: portion control  Method of carbohydrate measurement:: no method  Patient can identify foods that impact blood sugar.: no (see comments)  Nutrition/Healthy Eating Skills Assessment Completed:: Yes  Assessment indicates:: Instruction Needed, Knowledge deficit  Area of need?: Yes    Physical Activity/Exercise  Patient's daily activity level:: sedentary  Patient formally exercises outside of work.: no  Reasons for not exercising:: physically unable to exercise currently (usues walker)  Physical Activity/Exercise Skills Assessment Completed: : Yes  Assessment indicates:: Adequate understanding  Area of need?: No    Medications  Patient is able to describe current diabetes management routine.: yes  Diabetes management routine:: oral medications (500 mg Metformin XR --NOT TAKING; Rybelsus 3 mg)  Patient is able to identify current diabetes medications, dosages, and appropriate timing of medications.: yes  Patient understands the purpose of the medications taken for diabetes.: yes  Patient reports problems or concerns with current medication regimen.: yes  Medication regimen problems/concerns:: concerned about side effects (took Metformin ONCE and felt it made her "totally out of it" (sleepy).)  Medication Skills Assessment Completed:: Yes  Assessment indicates:: Knowledge deficit  Area of need?: Yes    Home Blood Glucose Monitoring  Patient states that blood sugar is checked at home daily.: no  Reasons for not monitoring:: new " diabetes diagnosis  Home Blood Glucose Monitoring Skills Assessment Completed: : Yes  Assessment indicates:: Adequate understanding  Area of need?: No    Acute Complications  Acute Complications Skills Assessment Completed: : No  Deffered due to:: Time  Area of need?: No    Chronic Complications  Patient can identify major chronic complications of diabetes.: yes  Stated chronic complications:: heart disease/heart attack, kidney disease, neuropathy/nerve damage, retinopathy, stroke  Patient can identify ways to prevent or delay diabetes complications.: yes  Stated ways to prevent complications:: healthy eating and regular activity, controlling blood sugar  Patient is aware that having diabetes increases risk of heart disease?: Yes  Patient is aware that heart disease is the leading cause of death and disability in people with diabetes?: Yes  Patient able to state risk factors for heart disease?: Yes  Patient stated risk factors for heart disease:: High blood pressure, High cholesterol, Diet, Limited activity, Medication non-adherance, Having diabetes  Patient is taking statin?: Yes  Chronic Complications Skills Assessment Completed: : Yes  Assessment indicates:: Adequate understanding  Area of need?: No    Psychosocial/Coping  Patient can identify ways of coping with chronic disease.: yes  Patient-stated ways of coping with chronic disease:: support from loved ones  Psychosocial/Coping Skills Assessment Completed: : Yes  Assessment indicates:: Adequate understanding  Area of need?: No      Diabetes Self Support Plan    Assessment Summary and Plan    Based on today's diabetes care assessment, the following areas of need were identified:      Social 5/13/2022   Support No   Access to Mass Media/Tech No   Cognitive/Behavioral Health No   Culture/Adventism No   Communication No   Health Literacy No        Clinical 5/13/2022   Medication Adherence Yes   Lab Compliance No   Nutritional Status No        Diabetes  "Self-Management Skills 5/13/2022   Diabetes Disease Process/Treatment Options No   Nutrition/Healthy Eating Yes--see Care Plan   Physical Activity/Exercise No   Medication Yes--see Care Plan   Home Blood Glucose Monitoring No   Acute Complications No   Chronic Complications No   Psychosocial/Coping No          Today's interventions were provided through individual discussion, instruction, and written materials were provided.      Patient verbalized understanding of instruction and written materials.  Pt was able to return back demonstration of instructions today. Patient understood key points, needs reinforcement and further instruction.     Diabetes Self-Management Care Plan:    Today's Diabetes Self-Management Care Plan was developed with Monique's input. Monique has agreed to work toward the following goal(s) to improve his/her overall diabetes control.      Care Plan: Diabetes Management   Updates made since 4/13/2022 12:00 AM      Problem: Healthy Eating       Goal: Eat 3 meals daily with 30g/2 servings of Carbohydrate per meal.    Start Date: 5/13/2022   Expected End Date: 8/12/2022   Priority: High   Barriers: No Barriers Identified   Note:    Focused on what portions are and how to determine how much carbs are needed at each meal, using "fistful", list, and label reading.  Pt has been over consuming carbs at b'fast (possibly 7 servings). Discussed different options that are within guidelines and healthier.  Emphasized importance of combining carbs w/PRO and gave suggestions.  Provided h.o. on PRO bars, yogurt.       Task: Reviewed the sources and role of Carbohydrate, Protein, and Fat and how each nutrient impacts blood sugar. Completed 5/13/2022      Task: Provided visual examples using dry measuring cups, food models, and other familiar objects such as computer mouse, deck or cards, tennis ball etc. to help with visualization of portions. Completed 5/13/2022      Task: Recommended replacing beverages " "containing high sugar content with noncaloric/sugar free options and/or water. Completed 5/13/2022      Task: Review the importance of balancing carbohydrates with each meal using portion control techniques to count servings of carbohydrate and label reading to identify serving size and amount of total carbs per serving. Completed 5/13/2022      Task: Provided Sample plate method and reviewed the use of the plate to estimate amounts of carbohydrate per meal. Completed 5/13/2022      Problem: Medications       Goal: Patient Agrees to take Diabetes Medication(s) as prescribed.    Start Date: 5/13/2022   Expected End Date: 8/12/2022   Priority: Level 1   Barriers: No Barriers Identified   Note:    Pt states she took 1 dose of Metformin and felt "totally out of it" the next day, so stopped taking.  Reviewed correct way to take Metformin and side effects. Now that pt understands, she states she may try again to take.  Also reviewed correct procedure for taking Rybelsus.  Pt has been taking on empty stomach with less than 4 oz water and not eating for about 30 mins.     Task: Reviewed with patient all current diabetes medications and provided basic review of the purpose, dosage, frequency, side effects, and storage of both oral and injectable diabetes medications. Completed 5/13/2022      Task: Instructed patient on how to self-administer Medications prescribed Completed 5/13/2022      Task: Discussed guidelines for preventing, detecting and treating hypoglycemia and hyperglycemia and reviewed the importance of meal and medication timing with diabetes mediations for prevention of hypoglycemia and maximum drug benefit.           Follow Up Plan     Follow up if symptoms worsen or fail to improve.    Today's care plan and follow up schedule was discussed with patient.  Monique verbalized understanding of the care plan, goals, and agrees to follow up plan.        The patient was encouraged to communicate with his/her health " care provider/physician and care team regarding his/her condition(s) and treatment.  I provided the patient with my contact information today and encouraged to contact me via phone or [facility] Patient Portal as needed.     Length of Visit   Total Time: 60 Minutes

## 2022-05-17 ENCOUNTER — HOSPITAL ENCOUNTER (OUTPATIENT)
Dept: PREADMISSION TESTING | Facility: HOSPITAL | Age: 72
Discharge: HOME | End: 2022-05-17
Attending: ORTHOPAEDIC SURGERY
Payer: MEDICARE

## 2022-05-17 LAB
ABO + RH BLD: NORMAL
BLD GP AB SCN CELLS X3 SERPL QL: NORMAL

## 2022-05-17 PROCEDURE — 99900103 DSU ONLY-NO CHARGE-INITIAL HR (STAT)

## 2022-05-17 PROCEDURE — 86850 RBC ANTIBODY SCREEN: CPT | Performed by: ORTHOPAEDIC SURGERY

## 2022-05-17 PROCEDURE — 36415 COLL VENOUS BLD VENIPUNCTURE: CPT | Performed by: ORTHOPAEDIC SURGERY

## 2022-05-17 PROCEDURE — 99900104 DSU ONLY-NO CHARGE-EA ADD'L HR (STAT)

## 2022-05-17 PROCEDURE — 87081 CULTURE SCREEN ONLY: CPT | Performed by: ORTHOPAEDIC SURGERY

## 2022-05-17 RX ORDER — CARBOXYMETHYLCELLULOSE SODIUM 10 MG/ML
1 GEL OPHTHALMIC
COMMUNITY

## 2022-05-17 NOTE — DISCHARGE INSTRUCTIONS
To confirm, Your doctor has instructed you that surgery is scheduled for: 5/25/22 with DR. Tan    Please report to [facility], [facility] the morning of surgery. You must check-in and receive a wristband before going to your procedure.    Pre-Op will call the afternoon prior to surgery between 1:00 and 6:00 PM with the final arrival time.  Phone number: [phone]    PLEASE NOTE:  The surgery schedule has many variables which may affect the time of your surgery case.  Family members should be available if your surgery time changes.  Plan to be here the day of your procedure between 4-6 hours.    MEDICATIONS:  TAKE ONLY THESE MEDICATIONS WITH A SMALL SIP OF WATER THE MORNING OF YOUR PROCEDURE:  FAMOTIDINE, EYE DROPS, GABAPENTIN    NO SEMAGLUTIDE OR LASIX MORNING OF SURGERY      DO NOT TAKE THESE MEDICATIONS 5-7 DAYS PRIOR to your procedure or per your surgeon's request:   ASPIRIN, ALEVE, ADVIL, IBUPROFEN, FISH OIL VITAMIN E, HERBALS, GLUCOSAMINE  (May take Tylenol)    ONLY if you are prescribed any types of blood thinners such as:  Aspirin, Coumadin, Plavix, Pradaxa, Xarelto, Aggrenox, Effient, Eliquis, Savasya, Brilinta, or any other, ask your surgeon whether you should stop taking them and how long before surgery you should stop.  You may also need to verify with the prescribing physician if it is ok to stop your medication.      INSTRUCTIONS IMPORTANT!!  Do not eat or drink anything between midnight and the time of your procedure- this includes gum, mints, and candy.  Do not smoke or drink alcoholic beverages 24 hours prior to your procedure.  Shower the night before AND the morning of your procedure with a Chlorhexidine wash such as Hibiclens or Dial antibacterial soap from the neck down.  Do not get it on your face or in your eyes.  You may use your own shampoo and face wash. This helps your skin to be as bacteria free as possible.    If you wear contact lenses, dentures, hearing  aids or glasses, bring a container to put them in during surgery and give to a family member for safe keeping.  Please leave all jewelry, piercing's and valuables at home.   DO NOT remove hair from the surgery site.  Do not shave the incision site unless you are given specific instructions to do so.    ONLY if you have been diagnosed with sleep apnea please bring your C-PAP machine.  ONLY if you wear home oxygen please bring your portable oxygen tank the day of your procedure.  ONLY if you have a history of OPEN HEART SURGERY you will need a clearance from your Cardiologist per Anesthesia.      ONLY for patients requiring bowel prep, written instructions will be given by your doctor's office.  ONLY if you have a neuro stimulator, please bring the controller with you the morning of surgery  ONLY if a type and screen test is needed before surgery, please return:  If your doctor has scheduled you for an overnight stay, bring a small overnight bag with any personal items you need.  Make arrangements in advance for transportation home by a responsible adult.  It is not safe to drive a vehicle during the 24 hours after anesthesia.       [facility] will resume routine visitation for COVID-19 negative patients, including inpatients, outpatients, and  procedural areas. Visitors are still required to practice social distancing in waiting rooms, cafeterias, and common  areas. Visitors and patients should maintain six feet distance between those not in their group. Visitors will be  asked to leave if they exhibit symptoms of respiratory infection, have tested positive for COVID -19 in the last  ten days, have a pending COVID-19 test for symptoms, are unable or refuse to wear a mask OR do not comply  with current policy.       All [facility] facilities and properties are tobacco free.  Smoking is NOT allowed.   If you have any questions about these instructions, call Pre-Op Admit  Nursing at [phone] or the Pre-Op Day Surgery Unit  at [phone].

## 2022-05-18 ENCOUNTER — HOSPITAL ENCOUNTER (OUTPATIENT)
Facility: AMBULARY SURGERY CENTER | Age: 72
Discharge: HOME | End: 2022-05-18
Attending: ANESTHESIOLOGY | Admitting: ANESTHESIOLOGY
Payer: MEDICARE

## 2022-05-18 DIAGNOSIS — M54.16 LUMBAR RADICULITIS: ICD-10-CM

## 2022-05-18 LAB — POCT GLUCOSE: 134 MG/DL (ref 70–110)

## 2022-05-18 PROCEDURE — 62323 NJX INTERLAMINAR LMBR/SAC: CPT | Mod: ,,, | Performed by: ANESTHESIOLOGY

## 2022-05-18 PROCEDURE — 62323 PR INJ LUMBAR/SACRAL, W/IMAGING GUIDANCE: ICD-10-PCS | Mod: ,,, | Performed by: ANESTHESIOLOGY

## 2022-05-18 PROCEDURE — 62323 NJX INTERLAMINAR LMBR/SAC: CPT | Performed by: ANESTHESIOLOGY

## 2022-05-18 RX ORDER — LIDOCAINE HYDROCHLORIDE 10 MG/ML
INJECTION, SOLUTION EPIDURAL; INFILTRATION; INTRACAUDAL; PERINEURAL
Status: DISCONTINUED | OUTPATIENT
Start: 2022-05-18 | End: 2022-05-18 | Stop reason: HOSPADM

## 2022-05-18 RX ORDER — ALPRAZOLAM 1 MG/1
1 TABLET, ORALLY DISINTEGRATING ORAL
Status: COMPLETED | OUTPATIENT
Start: 2022-05-18 | End: 2022-05-18

## 2022-05-18 RX ORDER — SODIUM CHLORIDE, SODIUM LACTATE, POTASSIUM CHLORIDE, CALCIUM CHLORIDE 600; 310; 30; 20 MG/100ML; MG/100ML; MG/100ML; MG/100ML
INJECTION, SOLUTION INTRAVENOUS ONCE AS NEEDED
Status: DISCONTINUED | OUTPATIENT
Start: 2022-05-18 | End: 2022-05-18 | Stop reason: HOSPADM

## 2022-05-18 RX ORDER — SODIUM CHLORIDE 0.9 % (FLUSH) 0.9 %
SYRINGE (ML) INJECTION
Status: DISCONTINUED | OUTPATIENT
Start: 2022-05-18 | End: 2022-05-18 | Stop reason: HOSPADM

## 2022-05-18 RX ORDER — DEXAMETHASONE SODIUM PHOSPHATE 100 MG/10ML
INJECTION INTRAMUSCULAR; INTRAVENOUS
Status: DISCONTINUED | OUTPATIENT
Start: 2022-05-18 | End: 2022-05-18 | Stop reason: HOSPADM

## 2022-05-18 RX ADMIN — ALPRAZOLAM 1 MG: 1 TABLET, ORALLY DISINTEGRATING ORAL at 12:05

## 2022-05-18 NOTE — PLAN OF CARE
Patient awake alert and says she is ready to go home; patient's daughter Tessa says she is ready to take the patient home and the daughter is driving. Patient's vital signs and injection site stable. Patient denies pain, nausea weakness or dizziness. All patient belongings have been returned to patient and she is wearing her facemask.

## 2022-05-18 NOTE — DISCHARGE SUMMARY
[facility]  Discharge Note  Short Stay    Procedure(s) (LRB):  Injection, Steroid, Epidural (N/A)    OUTCOME: Patient tolerated treatment/procedure well without complication and is now ready for discharge.    DISPOSITION: Home or Self Care    FINAL DIAGNOSIS:  <principal problem not specified>    FOLLOWUP: In clinic    DISCHARGE INSTRUCTIONS:    Discharge Procedure Orders   Notify your health care provider if you experience any of the following:  temperature >100.4     Notify your health care provider if you experience any of the following:  severe uncontrolled pain     Notify your health care provider if you experience any of the following:  redness, tenderness, or signs of infection (pain, swelling, redness, odor or green/yellow discharge around incision site)     Activity as tolerated        TIME SPENT ON DISCHARGE: 30 minutes

## 2022-05-18 NOTE — OP NOTE
PROCEDURE DATE: 5/18/2022    Procedure:   Interlaminar epidural steroid injection at L2-3 under fluoroscopic guidance.    Diagnosis: lUMBAR radiculitis  pOSTOP DIAGNOSIS: sAME    Physician: Fermín Luo M.D.    Medications injected:10 mg dexamethasone with 4 ml of preservative free NaCl    Local anesthetic injected:    Lidocaine 1% 2 ml total    Sedation Medications: None    Estimated blood loss:  None    Complications:  None    Technique:  Time-out taken to identify patient and procedure prior to starting the procedure.  With the patient laying in a prone position, the area was prepped and draped in the usual sterile fashion using ChloraPrep and a fenestrated drape.  After determining the target level with an AP fluoroscopic view, local anesthetic was given using a 25-gauge 1.5 inch needle by raising a wheal and then infiltrating toward the interlaminar entry space.  A 3.5inch 20-gauge Touhy needle was introduced under AP fluoroscopic guidance to the interlaminar space of L2-3. Once the trajectory was established, the needle was visualized in the lateral view and advanced using loss of resistance technique. Once in the desired position, 1ml contrast was injected to confirm placement and there was no vascular uptake nor intrathecal spread.  The medication was then injected slowly. The patient tolerated the procedure well.      The patient was monitored after the procedure.   They were given post-procedure and discharge instructions to follow at home.  The patient was discharged in a stable condition.

## 2022-05-18 NOTE — DISCHARGE INSTRUCTIONS
Before leaving, please make sure you have all your personal belongings such as glasses, purses, wallets, keys, cell phones, jewelry, jackets etc      Pain injection instructions:     This procedure may take a couple weeks to relieve pain  You may get some pain relief from the local anesthetic initally.   Steroids can have side effects of flushed face or nervous feeling.    No driving for 24 hrs.   Activity as tolerated- gradually increase activities.  Dont lift over 10 lbs for 24 hrs   No heat at injection sites for 2 full days. No heating pads, hot tubs, saunas, or swimming in any body of water or pool for 2 full days.  Use ice pack for mild swelling and for comfort , apply for 20 minutes, remove for 20 minute intervals. No direct contact of ice itself  to skin.  May shower today if not drowsy  Do not allow shower water to beat on injections site(s) for 2 full days. No tub baths for two full days.      Resume Aspirin, Plavix, or Coumadin the day after the procedure unless otherwise instructed.   If diabetic,monitor your glucose carefully as steroids can increase your glucose level    Seek immediate medical help for:   Severe increase in your usual pain or appearance of new pain.  Prolonged (more than 8 hours) or increasing weakness or numbness in the legs or arms.   .    Fever above 100.4 degrees F ,Drainage,redness,active bleeding, or increased swelling at the injection site.  Headache, shortness of breath, chest pain, or breathing problems.    After Surgery:  Always be aware that any surgery can cause these symptoms:    Pain- Medication can be prescribed for pain to decrease your pain but may not completely take your pain away. Over the Counter pain medicine my be enough and you can always use Ice and rest to help ease pain.    Bleeding- a little bleeding after a surgery is usually within normal.  If there is a lot of blood you need to notify your MD.  Emergency treatments of bleeding are cold application,  elevation of the bleeding site and compression.    Infection- Infection after surgery is NOT a normal occurrence.  Signs of infection are fever, swelling, hot to touch the incision.  If this occurs notify your MD immediately.    Nausea- this can be common after a surgery especially if you have had anesthesia medicine or are taking pain medicine.  Steroids have a side effect of nausea sometimes. Staying on clear liquids, bland foods, gingerale, or over the counter anti nausea medicines can help.  If you vomit more than once, notify your MD.  Anti Nausea medicines can be prescribed.

## 2022-05-19 LAB — MRSA SPEC QL CULT: NORMAL

## 2022-05-20 VITALS
HEART RATE: 68 BPM | BODY MASS INDEX: 45.15 KG/M2 | DIASTOLIC BLOOD PRESSURE: 77 MMHG | SYSTOLIC BLOOD PRESSURE: 149 MMHG | RESPIRATION RATE: 18 BRPM | OXYGEN SATURATION: 95 % | TEMPERATURE: 98 F | WEIGHT: 239 LBS

## 2022-05-23 ENCOUNTER — OFFICE VISIT (OUTPATIENT)
Dept: FAMILY MEDICINE | Facility: CLINIC | Age: 72
End: 2022-05-23
Payer: MEDICARE

## 2022-05-23 VITALS
BODY MASS INDEX: 44.53 KG/M2 | WEIGHT: 235.88 LBS | OXYGEN SATURATION: 96 % | SYSTOLIC BLOOD PRESSURE: 136 MMHG | RESPIRATION RATE: 16 BRPM | DIASTOLIC BLOOD PRESSURE: 86 MMHG | HEART RATE: 81 BPM | TEMPERATURE: 99 F | HEIGHT: 61 IN

## 2022-05-23 DIAGNOSIS — K21.9 GASTROESOPHAGEAL REFLUX DISEASE, UNSPECIFIED WHETHER ESOPHAGITIS PRESENT: ICD-10-CM

## 2022-05-23 DIAGNOSIS — I70.0 ATHEROSCLEROSIS OF AORTA: ICD-10-CM

## 2022-05-23 DIAGNOSIS — E11.65 TYPE 2 DIABETES MELLITUS WITH HYPERGLYCEMIA, WITHOUT LONG-TERM CURRENT USE OF INSULIN: Primary | ICD-10-CM

## 2022-05-23 DIAGNOSIS — R53.81 PHYSICAL DECONDITIONING: ICD-10-CM

## 2022-05-23 DIAGNOSIS — E78.5 HYPERLIPIDEMIA ASSOCIATED WITH TYPE 2 DIABETES MELLITUS: ICD-10-CM

## 2022-05-23 DIAGNOSIS — M46.1 SACROILIITIS, NOT ELSEWHERE CLASSIFIED: ICD-10-CM

## 2022-05-23 DIAGNOSIS — E11.59 HYPERTENSION ASSOCIATED WITH DIABETES: ICD-10-CM

## 2022-05-23 DIAGNOSIS — M54.16 SPINAL STENOSIS OF LUMBAR REGION WITH RADICULOPATHY: ICD-10-CM

## 2022-05-23 DIAGNOSIS — E11.69 HYPERLIPIDEMIA ASSOCIATED WITH TYPE 2 DIABETES MELLITUS: ICD-10-CM

## 2022-05-23 DIAGNOSIS — I27.20 PULMONARY HYPERTENSION: ICD-10-CM

## 2022-05-23 DIAGNOSIS — D69.2 OTHER NONTHROMBOCYTOPENIC PURPURA: ICD-10-CM

## 2022-05-23 DIAGNOSIS — M19.012 OSTEOARTHRITIS OF BOTH SHOULDERS, UNSPECIFIED OSTEOARTHRITIS TYPE: ICD-10-CM

## 2022-05-23 DIAGNOSIS — F33.40 RECURRENT MAJOR DEPRESSIVE DISORDER, IN REMISSION: ICD-10-CM

## 2022-05-23 DIAGNOSIS — M48.061 SPINAL STENOSIS OF LUMBAR REGION WITH RADICULOPATHY: ICD-10-CM

## 2022-05-23 DIAGNOSIS — R26.9 GAIT ABNORMALITY: ICD-10-CM

## 2022-05-23 DIAGNOSIS — I15.2 HYPERTENSION ASSOCIATED WITH DIABETES: ICD-10-CM

## 2022-05-23 DIAGNOSIS — E66.01 MORBID OBESITY: ICD-10-CM

## 2022-05-23 DIAGNOSIS — M19.011 OSTEOARTHRITIS OF BOTH SHOULDERS, UNSPECIFIED OSTEOARTHRITIS TYPE: ICD-10-CM

## 2022-05-23 PROCEDURE — 3062F POS MACROALBUMINURIA REV: CPT | Mod: CPTII,S$GLB,, | Performed by: STUDENT IN AN ORGANIZED HEALTH CARE EDUCATION/TRAINING PROGRAM

## 2022-05-23 PROCEDURE — 3288F PR FALLS RISK ASSESSMENT DOCUMENTED: ICD-10-PCS | Mod: CPTII,S$GLB,, | Performed by: STUDENT IN AN ORGANIZED HEALTH CARE EDUCATION/TRAINING PROGRAM

## 2022-05-23 PROCEDURE — 3075F PR MOST RECENT SYSTOLIC BLOOD PRESS GE 130-139MM HG: ICD-10-PCS | Mod: CPTII,S$GLB,, | Performed by: STUDENT IN AN ORGANIZED HEALTH CARE EDUCATION/TRAINING PROGRAM

## 2022-05-23 PROCEDURE — 3066F PR DOCUMENTATION OF TREATMENT FOR NEPHROPATHY: ICD-10-PCS | Mod: CPTII,S$GLB,, | Performed by: STUDENT IN AN ORGANIZED HEALTH CARE EDUCATION/TRAINING PROGRAM

## 2022-05-23 PROCEDURE — 99999 PR PBB SHADOW E&M-EST. PATIENT-LVL V: CPT | Mod: PBBFAC,,, | Performed by: STUDENT IN AN ORGANIZED HEALTH CARE EDUCATION/TRAINING PROGRAM

## 2022-05-23 PROCEDURE — 3066F NEPHROPATHY DOC TX: CPT | Mod: CPTII,S$GLB,, | Performed by: STUDENT IN AN ORGANIZED HEALTH CARE EDUCATION/TRAINING PROGRAM

## 2022-05-23 PROCEDURE — 3008F BODY MASS INDEX DOCD: CPT | Mod: CPTII,S$GLB,, | Performed by: STUDENT IN AN ORGANIZED HEALTH CARE EDUCATION/TRAINING PROGRAM

## 2022-05-23 PROCEDURE — 99499 UNLISTED E&M SERVICE: CPT | Mod: S$GLB,,, | Performed by: STUDENT IN AN ORGANIZED HEALTH CARE EDUCATION/TRAINING PROGRAM

## 2022-05-23 PROCEDURE — 1159F MED LIST DOCD IN RCRD: CPT | Mod: CPTII,S$GLB,, | Performed by: STUDENT IN AN ORGANIZED HEALTH CARE EDUCATION/TRAINING PROGRAM

## 2022-05-23 PROCEDURE — 3075F SYST BP GE 130 - 139MM HG: CPT | Mod: CPTII,S$GLB,, | Performed by: STUDENT IN AN ORGANIZED HEALTH CARE EDUCATION/TRAINING PROGRAM

## 2022-05-23 PROCEDURE — 3079F DIAST BP 80-89 MM HG: CPT | Mod: CPTII,S$GLB,, | Performed by: STUDENT IN AN ORGANIZED HEALTH CARE EDUCATION/TRAINING PROGRAM

## 2022-05-23 PROCEDURE — 3079F PR MOST RECENT DIASTOLIC BLOOD PRESSURE 80-89 MM HG: ICD-10-PCS | Mod: CPTII,S$GLB,, | Performed by: STUDENT IN AN ORGANIZED HEALTH CARE EDUCATION/TRAINING PROGRAM

## 2022-05-23 PROCEDURE — 1101F PR PT FALLS ASSESS DOC 0-1 FALLS W/OUT INJ PAST YR: ICD-10-PCS | Mod: CPTII,S$GLB,, | Performed by: STUDENT IN AN ORGANIZED HEALTH CARE EDUCATION/TRAINING PROGRAM

## 2022-05-23 PROCEDURE — 3044F PR MOST RECENT HEMOGLOBIN A1C LEVEL <7.0%: ICD-10-PCS | Mod: CPTII,S$GLB,, | Performed by: STUDENT IN AN ORGANIZED HEALTH CARE EDUCATION/TRAINING PROGRAM

## 2022-05-23 PROCEDURE — 3044F HG A1C LEVEL LT 7.0%: CPT | Mod: CPTII,S$GLB,, | Performed by: STUDENT IN AN ORGANIZED HEALTH CARE EDUCATION/TRAINING PROGRAM

## 2022-05-23 PROCEDURE — 3288F FALL RISK ASSESSMENT DOCD: CPT | Mod: CPTII,S$GLB,, | Performed by: STUDENT IN AN ORGANIZED HEALTH CARE EDUCATION/TRAINING PROGRAM

## 2022-05-23 PROCEDURE — 99999 PR PBB SHADOW E&M-EST. PATIENT-LVL V: ICD-10-PCS | Mod: PBBFAC,,, | Performed by: STUDENT IN AN ORGANIZED HEALTH CARE EDUCATION/TRAINING PROGRAM

## 2022-05-23 PROCEDURE — 1126F AMNT PAIN NOTED NONE PRSNT: CPT | Mod: CPTII,S$GLB,, | Performed by: STUDENT IN AN ORGANIZED HEALTH CARE EDUCATION/TRAINING PROGRAM

## 2022-05-23 PROCEDURE — 1101F PT FALLS ASSESS-DOCD LE1/YR: CPT | Mod: CPTII,S$GLB,, | Performed by: STUDENT IN AN ORGANIZED HEALTH CARE EDUCATION/TRAINING PROGRAM

## 2022-05-23 PROCEDURE — 3008F PR BODY MASS INDEX (BMI) DOCUMENTED: ICD-10-PCS | Mod: CPTII,S$GLB,, | Performed by: STUDENT IN AN ORGANIZED HEALTH CARE EDUCATION/TRAINING PROGRAM

## 2022-05-23 PROCEDURE — 99215 PR OFFICE/OUTPT VISIT, EST, LEVL V, 40-54 MIN: ICD-10-PCS | Mod: S$GLB,,, | Performed by: STUDENT IN AN ORGANIZED HEALTH CARE EDUCATION/TRAINING PROGRAM

## 2022-05-23 PROCEDURE — 3062F PR POS MACROALBUMINURIA RESULT DOCUMENTED/REVIEW: ICD-10-PCS | Mod: CPTII,S$GLB,, | Performed by: STUDENT IN AN ORGANIZED HEALTH CARE EDUCATION/TRAINING PROGRAM

## 2022-05-23 PROCEDURE — 99499 RISK ADDL DX/OHS AUDIT: ICD-10-PCS | Mod: S$GLB,,, | Performed by: STUDENT IN AN ORGANIZED HEALTH CARE EDUCATION/TRAINING PROGRAM

## 2022-05-23 PROCEDURE — 99215 OFFICE O/P EST HI 40 MIN: CPT | Mod: S$GLB,,, | Performed by: STUDENT IN AN ORGANIZED HEALTH CARE EDUCATION/TRAINING PROGRAM

## 2022-05-23 PROCEDURE — 1159F PR MEDICATION LIST DOCUMENTED IN MEDICAL RECORD: ICD-10-PCS | Mod: CPTII,S$GLB,, | Performed by: STUDENT IN AN ORGANIZED HEALTH CARE EDUCATION/TRAINING PROGRAM

## 2022-05-23 PROCEDURE — 1126F PR PAIN SEVERITY QUANTIFIED, NO PAIN PRESENT: ICD-10-PCS | Mod: CPTII,S$GLB,, | Performed by: STUDENT IN AN ORGANIZED HEALTH CARE EDUCATION/TRAINING PROGRAM

## 2022-05-23 RX ORDER — DOXYCYCLINE 100 MG/1
100 CAPSULE ORAL DAILY
Status: ON HOLD | COMMUNITY
End: 2022-10-06 | Stop reason: ALTCHOICE

## 2022-05-23 RX ORDER — PANTOPRAZOLE SODIUM 40 MG/1
40 TABLET, DELAYED RELEASE ORAL DAILY
COMMUNITY
End: 2023-04-24 | Stop reason: SDUPTHER

## 2022-05-23 NOTE — PROGRESS NOTES
[facility] Note    Subjective:    The HPI and pertinent ROS is included in the Diagnostic Impression Remarks section at the end of the note. Please see below for further details. Chief complaint is at end of note.     Data reviewed    274}  Previous medical records reviewed and summarized in plan section at end of note.      The following portions of the patient's history were reviewed and updated as appropriate: allergies, current medications, past family history, past medical history, past social history, past surgical history and problem list.    She  has a past medical history of Allergy, Arthritis, Chronic lower back pain, Depression, Fever blister, GERD (gastroesophageal reflux disease), Hemorrhoids, internal (11/05/2014), Hyperlipidemia, Hypertension, Joint pain, Morbid obesity with BMI of 40.0-44.9, adult (02/24/2015), Multiple gastric ulcers (12/14/2017), TIMMY (obstructive sleep apnea), Pulmonary hypertension, Skin cancer, and Type 2 diabetes mellitus with hyperglycemia, without long-term current use of insulin (4/20/2022).  She  has a past surgical history that includes Knee arthroscopy w/ meniscal repair (Right, 2012); Tubal ligation (1980); Colonoscopy (N/A, 11/2/2016); Anterior cervical corpectomy w/ fusion (1988); Lumbar laminectomy (06/19/2015); Total hip arthroplasty (Right, 04/28/2016); Skin cancer excision; Epidural steroid injection into lumbar spine (N/A, 11/8/2018); Caudal epidural steroid injection (N/A, 1/10/2019); Injection of anesthetic agent around genitofemoral nerve (Bilateral, 8/1/2019); Fusion of spine with instrumentation (Left, 1/3/2020); Joint replacement; Back surgery; and Epidural steroid injection (N/A, 5/18/2022).    She  reports that she has never smoked. She has never used smokeless tobacco. She reports that she does not drink alcohol and does not use drugs.  She family history includes Alzheimer's disease in her maternal grandfather; Arthritis in her mother;  "Breast cancer in her mother; Cancer in her mother and paternal grandmother; Colon cancer in her paternal grandmother; Kidney disease in her father.    Review of patient's allergies indicates:   Allergen Reactions    Metformin Other (See Comments)     Diarrhea        Tobacco Use: Low Risk     Smoking Tobacco Use: Never Smoker    Smokeless Tobacco Use: Never Used     Physical Examination  Wt Readings from Last 3 Encounters:   05/23/22 107 kg (235 lb 14.3 oz)   05/12/22 108.4 kg (238 lb 15.7 oz)   05/05/22 108.4 kg (238 lb 15.7 oz)                Estimated body mass index is 44.57 kg/m² as calculated from the following:    Height as of this encounter: 5' 1" (1.549 m).    Weight as of this encounter: 107 kg (235 lb 14.3 oz).     General appearance: alert, cooperative, no distress  Neck: no thyromegaly, no neck stiffness  Lungs: clear to auscultation, no wheezes, rales or rhonchi, symmetric air entry  Heart: normal rate, regular rhythm, normal S1, S2, no murmurs, rubs, clicks or gallops  Abdomen: soft, nontender, nondistended, no rigidity, rebound, or guarding.   Back: no point tenderness over spine  Extremities: peripheral pulses normal, no unilateral leg swelling or calf tenderness   Neurological:alert, oriented, normal speech, no new focal findings or movement disorder noted from baseline    Neuro:               MS: alert, awake, oriented x3. Speech and thought process intact.  CN: PERRLA. Pupils constricted normally to light. Pupils constricted when looking at a near object, dilated when looking at a distant object, converged when my finger moved towards the nose.  Visual fields intact. EOMI. Sensation to light touch in all trigeminal divisions intact. Able to closed eyes tightly and smile without asymmetry. Hearing intact. No uvula deviation. 5/5 shoulder shrugs. Tongue able to protrude bilaterally without difficulty.   Motor: 5/5 all ext strength.  Sensation: decreased sensation bilateral feet   Coordination: " "Intact finger-to-nose,        BP Readings from Last 3 Encounters:   05/23/22 136/86   05/18/22 (!) 149/77   05/05/22 122/68     /86 (BP Location: Right arm, Patient Position: Sitting, BP Method: Large (Manual))   Pulse 81   Temp 98.6 °F (37 °C) (Oral)   Resp 16   Ht 5' 1" (1.549 m)   Wt 107 kg (235 lb 14.3 oz)   LMP  (LMP Unknown)   SpO2 96%   BMI 44.57 kg/m²       274}  Laboratory: I have reviewed old labs below:       274}    Lab Results   Component Value Date    WBC 5.59 05/05/2022    HGB 12.9 05/05/2022    HCT 41.7 05/05/2022    MCV 97 05/05/2022     05/05/2022     05/05/2022    K 4.0 05/05/2022     05/05/2022    CALCIUM 9.5 05/05/2022    PHOS 3.3 05/02/2018    CO2 26 05/05/2022     (H) 05/05/2022    BUN 20 05/05/2022    CREATININE 0.9 05/05/2022    ANIONGAP 10 05/05/2022    ESTGFRAFRICA >60.0 05/05/2022    EGFRNONAA >60.0 05/05/2022    PROT 7.3 05/05/2022    ALBUMIN 3.9 05/05/2022    BILITOT 0.4 05/05/2022    ALKPHOS 54 (L) 05/05/2022    ALT 15 05/05/2022    AST 21 05/05/2022    INR 0.9 12/12/2019    CHOL 158 04/20/2022    TRIG 117 04/20/2022    HDL 55 04/20/2022    LDLCALC 79.6 04/20/2022    TSH 2.249 04/20/2022    HGBA1C 6.5 (H) 04/20/2022     Lab reviewed by me: Particular labs of significance that I will monitor, workup, or treat to improve are mentioned below in diagnostic impression remarks.    Imaging/EKG: I have reviewed the pertinent results and my findings are noted in remarks.     274}    CC:   Chief Complaint   Patient presents with    Follow-up    Hypertension           274}    Assessment/Plan  Monique Trujillo is a 71 y.o. female who presents to clinic with:  1. Type 2 diabetes mellitus with hyperglycemia, without long-term current use of insulin    2. Hypertension associated with diabetes    3. Atherosclerosis of aorta    4. Other nonthrombocytopenic purpura    5. Pulmonary hypertension    6. Sacroiliitis, not elsewhere classified    7. Gastroesophageal " "reflux disease, unspecified whether esophagitis present    8. Hyperlipidemia associated with type 2 diabetes mellitus    9. Osteoarthritis of both shoulders, unspecified osteoarthritis type    10. Recurrent major depressive disorder, in remission    11. Spinal stenosis of lumbar region with radiculopathy    12. Morbid obesity    13. Gait abnormality    14. Physical deconditioning          274}  Diagnostic Impression Remarks + HPI     Documentation entered by me for this encounter may have been done in part using speech-recognition technology. Although I have made an effort to ensure accuracy, "sound like" errors may exist and should be interpreted in context.      Diabetes-needs improvement could not tolerate metformin thus stopped this due to side effects was taking some a glue tied at 3 mg daily longer than needed this will increase to 7 mg daily recommend healthy diet   Hypertension stable continue current meds monitor blood pressure   Physical deconditioning-patient reports chronic back pain and joint pain and use a walker.  No new unilateral weakness or numbness but does have diabetic neuropathy with decreased sensation as well recommend physical therapy stay active   Gait abnormality-patient reports she has had abnormal gait this is chronic for the last year likely related to physical deconditioning She denies unilateral weakness, slurred speech, facial droop, and new onset numbness. No fever, neck stiffness, or neck pain. She does not endorse seizures, confusion, morning vomiting, or weight loss. No jaw claudication, vision changes, temporal tenderness, or eye pain. She denies sudden onset headache, maximum intensity of pain at onset, and head trauma.  Nonfocal exam will put a referral for neurology she is going to see ENT as well also has some ringing in the ears.  Gabapentin can have any side effects including dizziness and thus will cut down in amount   Osteoarthritis-stable recommend Voltaren cream " Tylenol monitor   None thrombocytopenic purpura-stable monitor blood counts   Overactive bladder-stable on oxybutynin consider trying lower dose    · Depression-stable continue current meds no SI/plan  · Sacroiliitis-stable on gabapentin had a recent injection avoid or NSAIDs due to kidney injury also can try physical therapy will decrease gabapentin dose to see how much is benefiting her since this can cause dizziness and other side effects  · Pulmonary hypertension-stable continue current meds no shortness of breath    Atherosclerosis of aorta- stable continue statin and rec healthy diet monitor follow lipid levels     At this point in time the patient is considered a low risk as determined by her history, physical, and the absence of red flags. I have a low suspicion of cord compression since she does not have saddle anesthesia, new bowel or bladder incontinence, abnormal reflexes, weakness, or numbness in her arms or legs. Infection is low on my differential since she denies fever, chills, night sweats, IV drug use, dysuria, flank pain, and recent surgery. I did not appreciate bony tenderness, CVA tenderness, or an infectious source. My suspicion for cancer is low since she did not endorse fever, night sweats, or unintentional weight loss. Aortic dissection is low on the differential since she denies a ripping or tearing sensation chest pain, chest pain that radiates to the back, and sudden severe abdominal pain. On my exam there was no pulse deficit or pulsatile abdominal mass. Based on the history and examination, I feel that the likelihood of a high risk condition requiring emergent imaging is so low that no further testing in this regard is warranted at this point in time.     We discussed at length the warning symptoms in order for the patient to return to clinic and/or present to the ED if unable to reach the clinic within a timely manner including but not limited to: increased pain, change in gait,  change in sensation around the rectum or perineum, bowel or bladder issues/incontinent, urinary retention, and fever. Via teach back mechanism, the patient voiced understanding of the aforementioned recommendations/instructions.      Altogether 55 minutes were spent with Monique, over half of which was spent in counseling on diagnosis, prognosis, and treatment options.I also counseled her on common and the most usual side effect of prescribed medications. Risk and benefits of the medication were also discussed. I reviewed previous notes to better coordinate patient's care. She test results and lifestyle changes were also discussed. All questions were answered, and patient voiced understanding.     I have analyzed and reviewed an extensive amount of data along with records prior to the patient visit. This patient has a high number of complex of problems that I addressed today that will require further monitoring and workup.   This is the extent of the patient's concerns at this present time. She did not feel chest pain upon exertion, dyspnea, nausea, vomiting, diaphoresis, and syncope. No pleuritic chest pain, unilateral leg swelling, calf tenderness, or calf pain.     Monique will return to clinic in a few months for further workup and reassessment or sooner as needed. She was instructed to call the clinic or go to the emergency department if her symptoms do not improve, worsens, or if new symptoms develop. As we discussed that symptoms could worsen over the next 24 hours she was advised that if any increased swelling, pain, or numbness arise to go immediately to the ED. Patient knows to call any time if an emergency arises. Shared decision making occurred and she verbalized understanding in agreement with this plan.     I discussed imaging findings, diagnosis, possibilities, treatment options, medications, risks, and benefits. She had many questions regarding the options and long-term effects. All questions were  answered. She expressed understanding after counseling regarding the diagnosis and recommendations. She was capable and demonstrated competence with understanding of these options. Shared decision making was performed resulting in her choosing the current treatment plan. Patient handout was given with instructions and recommendations. Advised the patient that if they become pregnant to alert us immediately to assess for medication changes.     I also discussed the importance of close follow up to discuss labs, change or modify her medications if needed, monitor side effects, and further evaluation of medical problems.     Additional workup planned: see labs ordered below.    See below for labs and meds ordered with associated diagnosis      1. Type 2 diabetes mellitus with hyperglycemia, without long-term current use of insulin  - semaglutide (RYBELSUS) 7 mg tablet; Take 1 tablet (7 mg total) by mouth once daily.  Dispense: 90 tablet; Refill: 1    2. Hypertension associated with diabetes  - Hypertension Digital Medicine (HDMP) Enrollment Order  - Hypertension Digital Medicine ([employer]): Assign Onboarding Questionnaires    3. Atherosclerosis of aorta    4. Other nonthrombocytopenic purpura    5. Pulmonary hypertension    6. Sacroiliitis, not elsewhere classified    7. Gastroesophageal reflux disease, unspecified whether esophagitis present    8. Hyperlipidemia associated with type 2 diabetes mellitus    9. Osteoarthritis of both shoulders, unspecified osteoarthritis type    10. Recurrent major depressive disorder, in remission    11. Spinal stenosis of lumbar region with radiculopathy    12. Morbid obesity    13. Gait abnormality  - Ambulatory referral/consult to Neurology; Future    14. Physical deconditioning    Medication List with Changes/Refills   Current Medications    AMLODIPINE (NORVASC) 10 MG TABLET    Take 1 tablet (10 mg total) by mouth once daily.    AMMONIUM LACTATE (LAC-HYDRIN) 12 % LOTION    USE ON FEET  AS NEEDED    ASCORBIC ACID, VITAMIN C, (VITAMIN C) 1000 MG TABLET    Take 1,000 mg by mouth once daily.    ATENOLOL (TENORMIN) 25 MG TABLET    Take 1 tablet (25 mg total) by mouth once daily.    AZELAIC ACID (AZELEX) 15 % GEL    Apply to face twice daily. Alert MD if no improvement after 12 weeks of use.    BUTALBITAL-ACETAMINOPHEN-CAFFEINE -40 MG (FIORICET, ESGIC) -40 MG PER TABLET    Take 1 tablet by mouth every 6 (six) hours as needed for Headaches. Do not take for more than 2 consecutive days    CALCIUM-VITAMIN D3 (OS-KATERIN 500 + D3) 500 MG-5 MCG (200 UNIT) PER TABLET    Take 1 tablet by mouth 2 (two) times daily with meals.    CARBOXYMETHYLCELLULOSE 1 % OPHTHALMIC SOLUTION    1 drop.    CINNAMON BARK, BULK, MISC    1,000 mg by Misc.(Non-Drug; Combo Route) route.    CLINDAMYCIN (CLEOCIN T) 1 % LOTION    Use hs on face    CO-ENZYME Q-10 30 MG CAPSULE    Take 30 mg by mouth once daily.     CYCLOSPORINE (RESTASIS) 0.05 % OPHTHALMIC EMULSION    Place 1 drop into both eyes 2 (two) times daily.    DICLOFENAC SODIUM (VOLTAREN) 1 % GEL    Apply 2 g topically once daily.    DOXYCYCLINE (VIBRAMYCIN) 100 MG CAP    Take 100 mg by mouth once daily.    ECONAZOLE NITRATE 1 % CREAM    Use bid for rash    FAMOTIDINE (PEPCID) 40 MG TABLET    Take 1 tablet (40 mg total) by mouth once daily.    FENOFIBRATE MICRONIZED (LOFIBRA) 134 MG CAP    Take 1 capsule (134 mg total) by mouth once daily.    FUROSEMIDE (LASIX) 40 MG TABLET    Take lasix 40 mg PO every other day PRN swelling    GABAPENTIN (NEURONTIN) 600 MG TABLET    Take 1 tablet (600 mg total) by mouth 3 (three) times daily.    GLUCOSA KHAN 2KCL/CHONDROITIN KHAN (GLUCOSAMINE SULF-CHONDROITIN ORAL)    Take 1 tablet by mouth once daily.    IVERMECTIN (SOOLANTRA) 1 % CREA    Apply topically to face once daily    LEVOCETIRIZINE (XYZAL) 5 MG TABLET    Take 1 tablet (5 mg total) by mouth every evening.    LIDOCAINE (XYLOCAINE) 5 % OINT OINTMENT    APPLY 2.5 GRAMS TO THE AFFECTED  AREA 3-4 TIMES DAILY STARTING 2 WEEKS POST SURGERY.    METRONIDAZOLE 1% (METROGEL) 1 % GEL    Apply topically once daily.    MULTIVITAMIN CAPSULE    Take 1 capsule by mouth once daily.    MUPIROCIN (BACTROBAN) 2 % OINTMENT    APPLY 1/2 GRAM TO EACH NOSTRIL TWICE A DAY FOR UP TO 5 DAYS PRIOR TO SURGERY OR AS DIRECTED BY YOUR PHYSICIAN.    OPW TEST CLAIM - DO NOT FILL    Take by mouth. OPW test claim. Do not fill.    OXYBUTYNIN (DITROPAN XL) 15 MG TR24    TAKE 1 TABLET (15 MG TOTAL) BY MOUTH ONCE DAILY.    PANTOPRAZOLE (PROTONIX) 40 MG TABLET    Take 40 mg by mouth once daily.    POLYMYXIN B SULF-TRIMETHOPRIM (POLYTRIM) 10,000 UNIT- 1 MG/ML DROP    Place 1 drop into both eyes every 4 (four) hours.    PRAVASTATIN (PRAVACHOL) 40 MG TABLET    Take 1 tablet (40 mg total) by mouth once daily.    SANARE ADV SCAR THERAPY BASE GEL    APPLY 1/2 GRAM (1 PUMP) TO AFFECTED AREAS TWICE DAILY STARTING 1 MONTH POST SURGERY    SERTRALINE (ZOLOFT) 100 MG TABLET    Take 1 tablet (100 mg total) by mouth once daily.    VITAMIN E 400 UNIT CAPSULE    Take 400 Units by mouth once daily.   Changed and/or Refilled Medications    Modified Medication Previous Medication    SEMAGLUTIDE (RYBELSUS) 7 MG TABLET semaglutide (RYBELSUS) 7 mg tablet       Take 1 tablet (7 mg total) by mouth once daily.    Take 1 tablet (7 mg total) by mouth once daily.   Discontinued Medications    METFORMIN (GLUCOPHAGE-XR) 500 MG ER 24HR TABLET    Take 1 tablet (500 mg total) by mouth before evening meal.     Modified Medications    Modified Medication Previous Medication    SEMAGLUTIDE (RYBELSUS) 7 MG TABLET semaglutide (RYBELSUS) 7 mg tablet       Take 1 tablet (7 mg total) by mouth once daily.    Take 1 tablet (7 mg total) by mouth once daily.       Milton Mahmood MD      274}  05/23/2022     This note was completed with dictation software and grammatical errors may exist.    If you are due for any health screening(s) below please notify me so we can arrange them  to be ordered and scheduled to maintain your health.     Health Maintenance Due   Topic Date Due    Shingles Vaccine (2 of 3) 04/22/2015    DEXA Scan  05/08/2021    COVID-19 Vaccine (3 - Booster for Pfizer series) 08/05/2021

## 2022-05-23 NOTE — PATIENT INSTRUCTIONS
Rik Amaya,     If you are due for any health screening(s) below please notify me so we can arrange them to be ordered and scheduled to maintain your health. Most healthy patients complete it. Don't lose out on improving your health.     Health Maintenance   Topic Date Due    DEXA Scan  05/08/2021    Hemoglobin A1c  10/20/2022    Lipid Panel  04/20/2023    Eye Exam  04/20/2023    Foot Exam  05/02/2023    Mammogram  05/12/2023    High Dose Statin  05/23/2023    TETANUS VACCINE  12/20/2026    Hepatitis C Screening  Completed

## 2022-05-24 ENCOUNTER — ANESTHESIA EVENT (OUTPATIENT)
Dept: SURGERY | Facility: HOSPITAL | Age: 72
End: 2022-05-24
Payer: MEDICARE

## 2022-05-24 RX ORDER — HYDROCODONE BITARTRATE AND ACETAMINOPHEN 7.5; 325 MG/1; MG/1
1 TABLET ORAL EVERY 6 HOURS PRN
Qty: 28 TABLET | Refills: 0 | Status: SHIPPED | OUTPATIENT
Start: 2022-05-24 | End: 2022-06-09 | Stop reason: SDUPTHER

## 2022-05-24 RX ORDER — ASPIRIN 81 MG/1
81 TABLET ORAL 2 TIMES DAILY
Qty: 60 TABLET | Refills: 0 | Status: SHIPPED | OUTPATIENT
Start: 2022-05-24 | End: 2022-12-02

## 2022-05-25 ENCOUNTER — HOSPITAL ENCOUNTER (OUTPATIENT)
Facility: HOSPITAL | Age: 72
Discharge: HOME | End: 2022-05-25
Attending: ORTHOPAEDIC SURGERY | Admitting: ORTHOPAEDIC SURGERY
Payer: MEDICARE

## 2022-05-25 ENCOUNTER — ANESTHESIA (OUTPATIENT)
Dept: SURGERY | Facility: HOSPITAL | Age: 72
End: 2022-05-25
Payer: MEDICARE

## 2022-05-25 VITALS
RESPIRATION RATE: 19 BRPM | BODY MASS INDEX: 44.37 KG/M2 | TEMPERATURE: 98 F | SYSTOLIC BLOOD PRESSURE: 145 MMHG | DIASTOLIC BLOOD PRESSURE: 70 MMHG | HEART RATE: 80 BPM | HEIGHT: 61 IN | WEIGHT: 235 LBS | OXYGEN SATURATION: 95 %

## 2022-05-25 DIAGNOSIS — M75.102 ROTATOR CUFF TEAR ARTHROPATHY OF BOTH SHOULDERS: ICD-10-CM

## 2022-05-25 DIAGNOSIS — M75.101 ROTATOR CUFF TEAR ARTHROPATHY OF BOTH SHOULDERS: ICD-10-CM

## 2022-05-25 DIAGNOSIS — M12.811 RIGHT ROTATOR CUFF TEAR ARTHROPATHY: Primary | ICD-10-CM

## 2022-05-25 DIAGNOSIS — M75.101 RIGHT ROTATOR CUFF TEAR ARTHROPATHY: Primary | ICD-10-CM

## 2022-05-25 DIAGNOSIS — M12.811 ROTATOR CUFF TEAR ARTHROPATHY OF BOTH SHOULDERS: ICD-10-CM

## 2022-05-25 DIAGNOSIS — Z01.818 PREOP TESTING: ICD-10-CM

## 2022-05-25 DIAGNOSIS — M12.812 ROTATOR CUFF TEAR ARTHROPATHY OF BOTH SHOULDERS: ICD-10-CM

## 2022-05-25 PROCEDURE — 36000711: Performed by: ORTHOPAEDIC SURGERY

## 2022-05-25 PROCEDURE — 27200750 HC INSULATED NEEDLE/ STIMUPLEX: Performed by: ANESTHESIOLOGY

## 2022-05-25 PROCEDURE — 01638 ANES OPN/ARTHR TOT SHO RPLCM: CPT | Performed by: ORTHOPAEDIC SURGERY

## 2022-05-25 PROCEDURE — D9220A PRA ANESTHESIA: Mod: ANES,,, | Performed by: ANESTHESIOLOGY

## 2022-05-25 PROCEDURE — 64415 NJX AA&/STRD BRCH PLXS IMG: CPT | Mod: 59,RT,, | Performed by: ANESTHESIOLOGY

## 2022-05-25 PROCEDURE — D9220A PRA ANESTHESIA: ICD-10-PCS | Mod: ANES,,, | Performed by: ANESTHESIOLOGY

## 2022-05-25 PROCEDURE — 71000015 HC POSTOP RECOV 1ST HR: Performed by: ORTHOPAEDIC SURGERY

## 2022-05-25 PROCEDURE — 63600175 PHARM REV CODE 636 W HCPCS: Performed by: ORTHOPAEDIC SURGERY

## 2022-05-25 PROCEDURE — 71000033 HC RECOVERY, INTIAL HOUR: Performed by: ORTHOPAEDIC SURGERY

## 2022-05-25 PROCEDURE — 25000003 PHARM REV CODE 250: Performed by: ORTHOPAEDIC SURGERY

## 2022-05-25 PROCEDURE — 71000016 HC POSTOP RECOV ADDL HR: Performed by: ORTHOPAEDIC SURGERY

## 2022-05-25 PROCEDURE — 36000710: Performed by: ORTHOPAEDIC SURGERY

## 2022-05-25 PROCEDURE — 63600175 PHARM REV CODE 636 W HCPCS: Performed by: ANESTHESIOLOGY

## 2022-05-25 PROCEDURE — 76942 ECHO GUIDE FOR BIOPSY: CPT | Mod: 26,,, | Performed by: ANESTHESIOLOGY

## 2022-05-25 PROCEDURE — 99900103 DSU ONLY-NO CHARGE-INITIAL HR (STAT): Performed by: ORTHOPAEDIC SURGERY

## 2022-05-25 PROCEDURE — C9290 INJ, BUPIVACAINE LIPOSOME: HCPCS | Performed by: ANESTHESIOLOGY

## 2022-05-25 PROCEDURE — 99900104 DSU ONLY-NO CHARGE-EA ADD'L HR (STAT): Performed by: ORTHOPAEDIC SURGERY

## 2022-05-25 PROCEDURE — 76942 PR U/S GUIDANCE FOR NEEDLE GUIDANCE: ICD-10-PCS | Mod: 26,,, | Performed by: ANESTHESIOLOGY

## 2022-05-25 PROCEDURE — 37000009 HC ANESTHESIA EA ADD 15 MINS: Performed by: ORTHOPAEDIC SURGERY

## 2022-05-25 PROCEDURE — D9220A PRA ANESTHESIA: ICD-10-PCS | Mod: CRNA,,, | Performed by: REGISTERED NURSE

## 2022-05-25 PROCEDURE — 63600175 PHARM REV CODE 636 W HCPCS: Performed by: REGISTERED NURSE

## 2022-05-25 PROCEDURE — 37000008 HC ANESTHESIA 1ST 15 MINUTES: Performed by: ORTHOPAEDIC SURGERY

## 2022-05-25 PROCEDURE — D9220A PRA ANESTHESIA: Mod: CRNA,,, | Performed by: REGISTERED NURSE

## 2022-05-25 PROCEDURE — 23472 PR RECONSTR TOTAL SHOULDER IMPLANT: ICD-10-PCS | Mod: RT,,, | Performed by: ORTHOPAEDIC SURGERY

## 2022-05-25 PROCEDURE — C1713 ANCHOR/SCREW BN/BN,TIS/BN: HCPCS | Performed by: ORTHOPAEDIC SURGERY

## 2022-05-25 PROCEDURE — 63600175 PHARM REV CODE 636 W HCPCS

## 2022-05-25 PROCEDURE — C1776 JOINT DEVICE (IMPLANTABLE): HCPCS | Performed by: ORTHOPAEDIC SURGERY

## 2022-05-25 PROCEDURE — 71000039 HC RECOVERY, EACH ADD'L HOUR: Performed by: ORTHOPAEDIC SURGERY

## 2022-05-25 PROCEDURE — 23472 RECONSTRUCT SHOULDER JOINT: CPT | Mod: RT,,, | Performed by: ORTHOPAEDIC SURGERY

## 2022-05-25 PROCEDURE — 25000003 PHARM REV CODE 250: Performed by: ANESTHESIOLOGY

## 2022-05-25 PROCEDURE — 64415 PR NERVE BLOCK INJ, ANES/STEROID, BRACHIAL PLEXUS, INCL IMAG GUIDANCE: ICD-10-PCS | Mod: 59,RT,, | Performed by: ANESTHESIOLOGY

## 2022-05-25 PROCEDURE — 64415 NJX AA&/STRD BRCH PLXS IMG: CPT | Performed by: ANESTHESIOLOGY

## 2022-05-25 PROCEDURE — 25000003 PHARM REV CODE 250: Performed by: REGISTERED NURSE

## 2022-05-25 PROCEDURE — 27201423 OPTIME MED/SURG SUP & DEVICES STERILE SUPPLY: Performed by: ORTHOPAEDIC SURGERY

## 2022-05-25 DEVICE — STEM ALTIVATE REV SZ10 108MM: Type: IMPLANTABLE DEVICE | Site: SHOULDER | Status: FUNCTIONAL

## 2022-05-25 DEVICE — BASEPLATE GLENOID REV RSP P2: Type: IMPLANTABLE DEVICE | Site: SHOULDER | Status: FUNCTIONAL

## 2022-05-25 DEVICE — HEAD GLENOID RSP 4MM OFFSET: Type: IMPLANTABLE DEVICE | Site: SHOULDER | Status: FUNCTIONAL

## 2022-05-25 DEVICE — INSERT SOCKET E+ SM +4 SZ32: Type: IMPLANTABLE DEVICE | Site: SHOULDER | Status: FUNCTIONAL

## 2022-05-25 DEVICE — SCREW BONE RSP 6.5X18 GLENOID: Type: IMPLANTABLE DEVICE | Site: SHOULDER | Status: FUNCTIONAL

## 2022-05-25 RX ORDER — CEFAZOLIN SODIUM 2 G/50ML
2 SOLUTION INTRAVENOUS
Status: COMPLETED | OUTPATIENT
Start: 2022-05-25 | End: 2022-05-25

## 2022-05-25 RX ORDER — FENTANYL CITRATE 50 UG/ML
INJECTION, SOLUTION INTRAMUSCULAR; INTRAVENOUS
Status: DISCONTINUED | OUTPATIENT
Start: 2022-05-25 | End: 2022-05-25

## 2022-05-25 RX ORDER — MIDAZOLAM HYDROCHLORIDE 1 MG/ML
INJECTION INTRAMUSCULAR; INTRAVENOUS
Status: DISCONTINUED | OUTPATIENT
Start: 2022-05-25 | End: 2022-05-25

## 2022-05-25 RX ORDER — ONDANSETRON HYDROCHLORIDE 2 MG/ML
INJECTION, SOLUTION INTRAMUSCULAR; INTRAVENOUS
Status: DISCONTINUED | OUTPATIENT
Start: 2022-05-25 | End: 2022-05-25

## 2022-05-25 RX ORDER — EPHEDRINE SULFATE 50 MG/ML
INJECTION, SOLUTION INTRAVENOUS
Status: DISCONTINUED | OUTPATIENT
Start: 2022-05-25 | End: 2022-05-25

## 2022-05-25 RX ORDER — PHENYLEPHRINE HYDROCHLORIDE 10 MG/ML
INJECTION INTRAVENOUS
Status: DISCONTINUED | OUTPATIENT
Start: 2022-05-25 | End: 2022-05-25

## 2022-05-25 RX ORDER — KETAMINE HYDROCHLORIDE 100 MG/ML
INJECTION, SOLUTION INTRAMUSCULAR; INTRAVENOUS
Status: DISCONTINUED | OUTPATIENT
Start: 2022-05-25 | End: 2022-05-25

## 2022-05-25 RX ORDER — OXYCODONE HYDROCHLORIDE 5 MG/1
5 TABLET ORAL ONCE AS NEEDED
Status: COMPLETED | OUTPATIENT
Start: 2022-05-25 | End: 2022-05-25

## 2022-05-25 RX ORDER — SUCCINYLCHOLINE CHLORIDE 20 MG/ML
INJECTION INTRAMUSCULAR; INTRAVENOUS
Status: DISCONTINUED | OUTPATIENT
Start: 2022-05-25 | End: 2022-05-25

## 2022-05-25 RX ORDER — BUPIVACAINE HYDROCHLORIDE 5 MG/ML
INJECTION, SOLUTION EPIDURAL; INTRACAUDAL
Status: COMPLETED | OUTPATIENT
Start: 2022-05-25 | End: 2022-05-25

## 2022-05-25 RX ORDER — MUPIROCIN 20 MG/G
OINTMENT TOPICAL
Status: DISCONTINUED | OUTPATIENT
Start: 2022-05-25 | End: 2022-05-25 | Stop reason: HOSPADM

## 2022-05-25 RX ORDER — PROPOFOL 10 MG/ML
VIAL (ML) INTRAVENOUS
Status: DISCONTINUED | OUTPATIENT
Start: 2022-05-25 | End: 2022-05-25

## 2022-05-25 RX ORDER — ONDANSETRON 2 MG/ML
4 INJECTION INTRAMUSCULAR; INTRAVENOUS ONCE AS NEEDED
Status: DISCONTINUED | OUTPATIENT
Start: 2022-05-25 | End: 2022-05-25 | Stop reason: HOSPADM

## 2022-05-25 RX ORDER — DEXAMETHASONE SODIUM PHOSPHATE 4 MG/ML
INJECTION, SOLUTION INTRA-ARTICULAR; INTRALESIONAL; INTRAMUSCULAR; INTRAVENOUS; SOFT TISSUE
Status: DISCONTINUED | OUTPATIENT
Start: 2022-05-25 | End: 2022-05-25

## 2022-05-25 RX ORDER — LIDOCAINE HYDROCHLORIDE 20 MG/ML
INJECTION INTRAVENOUS
Status: DISCONTINUED | OUTPATIENT
Start: 2022-05-25 | End: 2022-05-25

## 2022-05-25 RX ORDER — ROCURONIUM BROMIDE 10 MG/ML
INJECTION, SOLUTION INTRAVENOUS
Status: DISCONTINUED | OUTPATIENT
Start: 2022-05-25 | End: 2022-05-25

## 2022-05-25 RX ORDER — LIDOCAINE HYDROCHLORIDE 10 MG/ML
1 INJECTION, SOLUTION EPIDURAL; INFILTRATION; INTRACAUDAL; PERINEURAL ONCE
Status: COMPLETED | OUTPATIENT
Start: 2022-05-25 | End: 2022-05-25

## 2022-05-25 RX ORDER — FENTANYL CITRATE 50 UG/ML
25 INJECTION, SOLUTION INTRAMUSCULAR; INTRAVENOUS EVERY 5 MIN PRN
Status: COMPLETED | OUTPATIENT
Start: 2022-05-25 | End: 2022-05-25

## 2022-05-25 RX ORDER — NEOSTIGMINE METHYLSULFATE 1 MG/ML
INJECTION, SOLUTION INTRAVENOUS
Status: DISCONTINUED | OUTPATIENT
Start: 2022-05-25 | End: 2022-05-25

## 2022-05-25 RX ADMIN — MUPIROCIN: 20 OINTMENT TOPICAL at 10:05

## 2022-05-25 RX ADMIN — FENTANYL CITRATE 25 MCG: 50 INJECTION INTRAMUSCULAR; INTRAVENOUS at 02:05

## 2022-05-25 RX ADMIN — EPHEDRINE SULFATE 5 MG: 50 INJECTION, SOLUTION INTRAMUSCULAR; INTRAVENOUS; SUBCUTANEOUS at 12:05

## 2022-05-25 RX ADMIN — OXYCODONE 5 MG: 5 TABLET ORAL at 01:05

## 2022-05-25 RX ADMIN — LIDOCAINE HYDROCHLORIDE 10 MG: 10 INJECTION, SOLUTION EPIDURAL; INFILTRATION; INTRACAUDAL; PERINEURAL at 09:05

## 2022-05-25 RX ADMIN — PHENYLEPHRINE HYDROCHLORIDE 100 MCG: 10 INJECTION INTRAVENOUS at 12:05

## 2022-05-25 RX ADMIN — EPHEDRINE SULFATE 5 MG: 50 INJECTION, SOLUTION INTRAMUSCULAR; INTRAVENOUS; SUBCUTANEOUS at 11:05

## 2022-05-25 RX ADMIN — NEOSTIGMINE METHYLSULFATE 5 MG: 1 INJECTION INTRAVENOUS at 01:05

## 2022-05-25 RX ADMIN — BUPIVACAINE 10 ML: 13.3 INJECTION, SUSPENSION, LIPOSOMAL INFILTRATION at 10:05

## 2022-05-25 RX ADMIN — KETAMINE HYDROCHLORIDE 15 MG: 100 INJECTION, SOLUTION, CONCENTRATE INTRAMUSCULAR; INTRAVENOUS at 11:05

## 2022-05-25 RX ADMIN — PHENYLEPHRINE HYDROCHLORIDE 16 MCG/MIN: 10 INJECTION INTRAVENOUS at 11:05

## 2022-05-25 RX ADMIN — ROCURONIUM BROMIDE 5 MG: 10 INJECTION, SOLUTION INTRAVENOUS at 11:05

## 2022-05-25 RX ADMIN — ROCURONIUM BROMIDE 45 MG: 10 INJECTION, SOLUTION INTRAVENOUS at 11:05

## 2022-05-25 RX ADMIN — FENTANYL CITRATE 50 MCG: 50 INJECTION, SOLUTION INTRAMUSCULAR; INTRAVENOUS at 11:05

## 2022-05-25 RX ADMIN — EPHEDRINE SULFATE 10 MG: 50 INJECTION, SOLUTION INTRAMUSCULAR; INTRAVENOUS; SUBCUTANEOUS at 12:05

## 2022-05-25 RX ADMIN — TRANEXAMIC ACID 1000 MG: 100 INJECTION, SOLUTION INTRAVENOUS at 11:05

## 2022-05-25 RX ADMIN — PROPOFOL 120 MG: 10 INJECTION, EMULSION INTRAVENOUS at 11:05

## 2022-05-25 RX ADMIN — DEXAMETHASONE SODIUM PHOSPHATE 8 MG: 4 INJECTION, SOLUTION INTRA-ARTICULAR; INTRALESIONAL; INTRAMUSCULAR; INTRAVENOUS; SOFT TISSUE at 11:05

## 2022-05-25 RX ADMIN — PHENYLEPHRINE HYDROCHLORIDE 100 MCG: 10 INJECTION INTRAVENOUS at 11:05

## 2022-05-25 RX ADMIN — SODIUM CHLORIDE, SODIUM GLUCONATE, SODIUM ACETATE, POTASSIUM CHLORIDE, MAGNESIUM CHLORIDE, SODIUM PHOSPHATE, DIBASIC, AND POTASSIUM PHOSPHATE: .53; .5; .37; .037; .03; .012; .00082 INJECTION, SOLUTION INTRAVENOUS at 10:05

## 2022-05-25 RX ADMIN — ONDANSETRON 4 MG: 2 INJECTION, SOLUTION INTRAMUSCULAR; INTRAVENOUS at 11:05

## 2022-05-25 RX ADMIN — BUPIVACAINE HYDROCHLORIDE 5 ML: 5 INJECTION, SOLUTION EPIDURAL; INTRACAUDAL; PERINEURAL at 10:05

## 2022-05-25 RX ADMIN — SUCCINYLCHOLINE CHLORIDE 160 MG: 20 INJECTION, SOLUTION INTRAMUSCULAR; INTRAVENOUS; PARENTERAL at 11:05

## 2022-05-25 RX ADMIN — MIDAZOLAM HYDROCHLORIDE 1 MG: 1 INJECTION, SOLUTION INTRAMUSCULAR; INTRAVENOUS at 10:05

## 2022-05-25 RX ADMIN — FENTANYL CITRATE 50 MCG: 50 INJECTION, SOLUTION INTRAMUSCULAR; INTRAVENOUS at 10:05

## 2022-05-25 RX ADMIN — FENTANYL CITRATE 25 MCG: 50 INJECTION INTRAMUSCULAR; INTRAVENOUS at 01:05

## 2022-05-25 RX ADMIN — EPHEDRINE SULFATE 10 MG: 50 INJECTION, SOLUTION INTRAMUSCULAR; INTRAVENOUS; SUBCUTANEOUS at 11:05

## 2022-05-25 RX ADMIN — SODIUM CHLORIDE, SODIUM GLUCONATE, SODIUM ACETATE, POTASSIUM CHLORIDE, MAGNESIUM CHLORIDE, SODIUM PHOSPHATE, DIBASIC, AND POTASSIUM PHOSPHATE: .53; .5; .37; .037; .03; .012; .00082 INJECTION, SOLUTION INTRAVENOUS at 12:05

## 2022-05-25 RX ADMIN — GLYCOPYRROLATE 0.4 MG: 0.2 INJECTION, SOLUTION INTRAMUSCULAR; INTRAVITREAL at 01:05

## 2022-05-25 RX ADMIN — MIDAZOLAM HYDROCHLORIDE 1 MG: 1 INJECTION, SOLUTION INTRAMUSCULAR; INTRAVENOUS at 11:05

## 2022-05-25 RX ADMIN — KETAMINE HYDROCHLORIDE 15 MG: 100 INJECTION, SOLUTION, CONCENTRATE INTRAMUSCULAR; INTRAVENOUS at 12:05

## 2022-05-25 RX ADMIN — GLYCOPYRROLATE 0.2 MG: 0.2 INJECTION, SOLUTION INTRAMUSCULAR; INTRAVITREAL at 11:05

## 2022-05-25 RX ADMIN — CEFAZOLIN SODIUM 2 G: 2 SOLUTION INTRAVENOUS at 11:05

## 2022-05-25 RX ADMIN — LIDOCAINE HYDROCHLORIDE 50 MG: 20 INJECTION, SOLUTION INTRAVENOUS at 11:05

## 2022-05-25 NOTE — ANESTHESIA PROCEDURE NOTES
Peripheral Block    Patient location during procedure: pre-op   Block not for primary anesthetic.  Reason for block: at surgeon's request and post-op pain management   Post-op Pain Location: shoulder right   Start time: 5/25/2022 10:36 AM  Timeout: 5/25/2022 10:35 AM   End time: 5/25/2022 10:40 AM    Staffing  Authorizing Provider: Simone Leos MD  Performing Provider: Simone Leos MD    Preanesthetic Checklist  Completed: patient identified, IV checked, site marked, risks and benefits discussed, surgical consent, monitors and equipment checked, pre-op evaluation and timeout performed  Peripheral Block  Patient position: sitting  Prep: ChloraPrep  Patient monitoring: heart rate, cardiac monitor, continuous pulse ox, continuous capnometry and frequent blood pressure checks  Block type: interscalene  Laterality: right  Injection technique: single shot  Needle  Needle type: Stimuplex   Needle gauge: 22 G  Needle length: 2 in  Needle localization: anatomical landmarks and ultrasound guidance   -ultrasound image captured on disc.  Assessment  Injection assessment: negative aspiration, negative parasthesia and local visualized surrounding nerve  Paresthesia pain: immediately resolved  Heart rate change: no  Slow fractionated injection: yes  Pain Tolerance: comfortable throughout block and no complaints  Medications:    Medications: bupivacaine (pf) (MARCAINE) injection 0.5% - Perineural   5 mL - 5/25/2022 10:39:00 AM    Additional Notes  VSS.  [facility] RN monitoring vitals throughout procedure.  Patient tolerated procedure well.

## 2022-05-25 NOTE — DISCHARGE INSTRUCTIONS
General Information:    1.  Do not drink alcoholic beverages including beer for 24 hours or as long as you are on pain medication..  2.  Do not drive a motor vehicle, operate machinery or power tools, or signs legal papers for 24 hours or as long as you are on pain medication.   3.  You may experience light-headedness, dizziness, and sleepiness following surgery. Please do not stay alone. A responsible adult should be with you for this 24 hour period.  4.  Go home and rest.    5. Progress slowly to a normal diet unless instructed.  Otherwise, begin with liquids such as soft drinks, then soup and crackers working up to solid foods. Drink plenty of nonalcoholic fluids.  6.  Certain anesthetics and pain medications produce nausea and vomiting in certain       individuals. If nausea becomes a problem at home, call you doctor.    7. A nurse will be calling you sometime after surgery. Do not be alarmed. This is our way of finding out how you are doing.    8. Several times every hour while you are awake, take 2-3 deep breaths and cough. If you had stomach surgery hold a pillow or rolled towel firmly against your stomach before you cough. This will help with any pain the cough might cause.  9. Several times every hour while you are awake, pump and flex your feet 5-6 times and do foot circles. This will help prevent blood clots.    10.Call your doctor for severe pain, bleeding, fever, or signs or symptoms of infection (pain, swelling, redness, foul odor, drainage).   Post op instructions for prevention of DVT  What is deep vein thrombosis?  Deep vein thrombosis (DVT) is the medical term for blood clots in the deep veins of the leg.  These blood clots can be dangerous.  A DVT can block a blood vessel and keep blood from getting where it needs to go.  Another problem is that the clot can travel to other parts of the body such as the lungs.  A clot that travels to the lungs is called a pulmonary embolus (PE) and can cause  serious problems with breathing which can lead to death.  Am I at risk for DVT/PE?  If you are not very active, you are at risk of DVT.  Anyone confined to bed, sitting for long periods of time, recovering from surgery, etc. increases the risk of DVT.  Other risk factors are cancer diagnosis, certain medications, estrogen replacement in any form,older age, obesity, pregnancy, smoking, history of clotting disorders, and dehydration.  How will I know if I have a DVT?  Swelling in the lower leg  Pain  Warmth, redness, hardness or bulging of the vein  If you have any of these symptoms, call your doctors office right away.  Some people will not have any symptoms until the clot moves to the lungs.  What are the symptoms of a PE?  Panting, shortness of breath, or trouble breathing  Sharp, knife-like chest pain when you breathe  Coughing or coughing up blood  Rapid heartbeat  If you have any of these symptoms or get worse quickly, call 911 for emergency treatment.  How can I prevent a DVT?  Avoid long periods of inactivity and dont cross your legs--get up and walk around every hour or so.  Stay active--walking after surgery is highly encouraged.  This means you should get out of the house and walk in the neighborhood.  Going up and down stairs will not impair healing (unless advised against such activity by your doctor).    Drink plenty of noncaffeinated, nonalcoholic fluids each day to prevent dehydration.  Wear special support stockings, if they have been advised by your doctor.  If you travel, stop at least once an hour and walk around.  Avoid smoking (assistance with stopping is available through your healthcare provider)  Always notify your doctor if you are not able to follow the post operative instructions that are given to you at the time of discharge.  It may be necessary to prescribe one of the medications available to prevent DVT. Using an Incentive Spirometer    An incentive spirometer is a device that helps  you do deep breathing exercises. These exercises expand your lungs, aid in circulation, and help prevent pneumonia. Deep breathing exercises also help you breathe better and improve the function of your lungs by:  Keeping your lungs clear  Strengthening your breathing muscles  Helping prevent respiratory complications or problems  The incentive spirometer gives you a way to take an active part in recover. A nurse or therapist will teach you breathing exercises. To do these exercises, you will breathe in through your mouth and not your nose. The incentive spirometer only works correctly if you breathe in through your mouth.  Steps to clear lungs  Step 1. Exhale normally. Then, inhale normally.  Relax and breathe out.  Step 2. Place your lips tightly around the mouthpiece.  Make sure the device is upright and not tilted.  Step 3. Inhale as much air as you can through the mouthpiece (don't breath through your nose).  Inhale slowly and deeply.  Hold your breath long enough to keep the balls or disk raised for at least 3 to 5 seconds, or as instructed by your healthcare provider.  Some spirometers have an indicator to let you know that you are breathing in too fast. If the indicator goes off, breathe in more slowly.  Step 4. Repeat the exercise regularly.  Do this exercise every hour while you're awake, or as instructed by your healthcare provider.  If you were taught deep breathing and coughing exercises, do them regularly as instructed by your healthcare provider.      Discharge Instructions: After Your Surgery/Procedure  Youve just had surgery. During surgery you were given medicine called anesthesia to keep you relaxed and free of pain. After surgery you may have some pain or nausea. This is common. Here are some tips for feeling better and getting well after surgery.     Stay on schedule with your medication.   Going home  Your doctor or nurse will show you how to take care of yourself when you go home. He or she  "will also answer your questions. Have an adult family member or friend drive you home.      For your safety we recommend these precaution for the first 24 hours after your procedure:  Do not drive or use heavy equipment.  Do not make important decisions or sign legal papers.  Do not drink alcohol.  Have someone stay with you, if needed. He or she can watch for problems and help keep you safe.  Your concentration, balance, coordination, and judgement may be impaired for many hours after anesthesia.  Use caution when ambulating or standing up.     You may feel weak and "washed out" after anesthesia and surgery.      Subtle residual effects of general anesthesia or sedation with regional / local anesthesia can last more than 24 hours.  Rest for the remainder of the day or longer if your Doctor/Surgeon has advised you to do so.  Although you may feel normal within the first 24 hours, your reflexes and mental ability may be impaired without you realizing it.  You may feel dizzy, lightheaded or sleepy for 24 hours or longer.      Be sure to go to all follow-up visits with your doctor. And rest after your surgery for as long as your doctor tells you to.  Coping with pain  If you have pain after surgery, pain medicine will help you feel better. Take it as told, before pain becomes severe. Also, ask your doctor or pharmacist about other ways to control pain. This might be with heat, ice, or relaxation. And follow any other instructions your surgeon or nurse gives you.  Tips for taking pain medicine  To get the best relief possible, remember these points:  Pain medicines can upset your stomach. Taking them with a little food may help.  Most pain relievers taken by mouth need at least 20 to 30 minutes to start to work.  Taking medicine on a schedule can help you remember to take it. Try to time your medicine so that you can take it before starting an activity. This might be before you get dressed, go for a walk, or sit down " for dinner.  Constipation is a common side effect of pain medicines. Call your doctor before taking any medicines such as laxatives or stool softeners to help ease constipation. Also ask if you should skip any foods. Drinking lots of fluids and eating foods such as fruits and vegetables that are high in fiber can also help. Remember, do not take laxatives unless your surgeon has prescribed them.  Drinking alcohol and taking pain medicine can cause dizziness and slow your breathing. It can even be deadly. Do not drink alcohol while taking pain medicine.  Pain medicine can make you react more slowly to things. Do not drive or run machinery while taking pain medicine.  Your health care provider may tell you to take acetaminophen to help ease your pain. Ask him or her how much you are supposed to take each day. Acetaminophen or other pain relievers may interact with your prescription medicines or other over-the-counter (OTC) drugs. Some prescription medicines have acetaminophen and other ingredients. Using both prescription and OTC acetaminophen for pain can cause you to overdose. Read the labels on your OTC medicines with care. This will help you to clearly know the list of ingredients, how much to take, and any warnings. It may also help you not take too much acetaminophen. If you have questions or do not understand the information, ask your pharmacist or health care provider to explain it to you before you take the OTC medicine.  Managing nausea  Some people have an upset stomach after surgery. This is often because of anesthesia, pain, or pain medicine, or the stress of surgery. These tips will help you handle nausea and eat healthy foods as you get better. If you were on a special food plan before surgery, ask your doctor if you should follow it while you get better. These tips may help:  Do not push yourself to eat. Your body will tell you when to eat and how much.  Start off with clear liquids and soup. They are  easier to digest.  Next try semi-solid foods, such as mashed potatoes, applesauce, and gelatin, as you feel ready.  Slowly move to solid foods. Dont eat fatty, rich, or spicy foods at first.  Do not force yourself to have 3 large meals a day. Instead eat smaller amounts more often.  Take pain medicines with a small amount of solid food, such as crackers or toast, to avoid nausea.     Call your surgeon if  You still have pain an hour after taking medicine. The medicine may not be strong enough.  You feel too sleepy, dizzy, or groggy. The medicine may be too strong.  You have side effects like nausea, vomiting, or skin changes, such as rash, itching, or hives.       If you have obstructive sleep apnea  You were given anesthesia medicine during surgery to keep you comfortable and free of pain. After surgery, you may have more apnea spells because of this medicine and other medicines you were given. The spells may last longer than usual.   At home:  Keep using the continuous positive airway pressure (CPAP) device when you sleep. Unless your health care provider tells you not to, use it when you sleep, day or night. CPAP is a common device used to treat obstructive sleep apnea.  Talk with your provider before taking any pain medicine, muscle relaxants, or sedatives. Your provider will tell you about the possible dangers of taking these medicines.  © 6249-2348 [employer], Natanael [employer]. [address] [place] [id number]. All rights reserved. This information is not intended as a substitute for professional medical care. Always follow your healthcare professional's instructions. Using Opioids for Pain Management     Your doctor has given instructions for you to take an opioid.  This is a drug for bad pain.  It helps control pain without causing bleeding and kidney problems.  Common opioid names are morphine, hydromorphone, oxycodone, and methadone. These drugs are called narcotics.    There are several safety  concerns you need to know.     It is against the law to give or sell this drug to another person.  You must keep this medicine safely locked.    You may have side effects from taking this medication.  These include nausea, itching, sweating, sleepiness, a change in your ability to breathe, and depression.  Do not take alcohol or sleeping pills opioids.    Long-term opoid use may no longer giver you relief from pain.  It can cause you stomach pain, mental anxiety, and headaches.  Long-term opoid use can potentially lead to unlawful street drug abuse and reduce your ability to stay employed.    Your body may become opioid tolerant if you need to take more to get relief.    You must stop taking opioids if you begin having more pain as a result of the medicine.    Opioid withdrawal occurs when you have to stop taking the drug.  It can cause you to have nausea, vomiting, diarrhea, stomach pain, anxiety, and dilated pupils in your eyes. This condition means you are opioid dependent.    Addiction is a drug induced brain disease. It means there are changes in how your brain is working.  Children, teens, and young adults under 25 years old are more likely to get addicted to opioids.      Addiction can happen with repeated opioid use.  It does not happen with short-term use of two weeks or less.       For more information, please speak with your doctor or pharmacist.         We hope your stay was comfortable as you heal now, mend and rest.    For we have enjoyed taking care of you by giving your our best.    And as you get better, by regaining your health and strength;   We count it as a privilege to have served you and hope your time at [facility] was well spent.      Thank  You!!!

## 2022-05-25 NOTE — ANESTHESIA POSTPROCEDURE EVALUATION
Anesthesia Post Evaluation    Patient: Monique Trujillo    Procedure(s) Performed: Procedure(s) (LRB):  ARTHROPLASTY, SHOULDER, TOTAL, REVERSE (Right)    Final Anesthesia Type: general      Patient location during evaluation: PACU  Patient participation: Yes- Able to Participate  Level of consciousness: awake and alert  Post-procedure vital signs: reviewed and stable  Pain management: adequate  Airway patency: patent    PONV status at discharge: No PONV  Anesthetic complications: no      Cardiovascular status: blood pressure returned to baseline  Respiratory status: unassisted  Hydration status: euvolemic  Follow-up not needed.          Vitals Value Taken Time   /70 05/25/22 1548   Temp 36.9 °C (98.4 °F) 05/25/22 1325   Pulse 80 05/25/22 1548   Resp 20 05/25/22 1519   SpO2 95 % 05/25/22 1548   Vitals shown include unvalidated device data.      Event Time   Out of Recovery 15:25:00         Pain/Lindsey Score: Pain Rating Prior to Med Admin: 7 (5/25/2022  2:20 PM)  Pain Rating Post Med Admin: 3 (5/25/2022  3:10 PM)  Lindsey Score: 10 (5/25/2022  3:10 PM)

## 2022-05-25 NOTE — ANESTHESIA PREPROCEDURE EVALUATION
05/25/2022  Monique Trujillo is a 71 y.o., female.      Pre-op Assessment    I have reviewed the Patient Summary Reports.     I have reviewed the Nursing Notes. I have reviewed the NPO Status.   I have reviewed the Medications.     Review of Systems  Anesthesia Hx:  Denies Family Hx of Anesthesia complications.   Denies Personal Hx of Anesthesia complications.   Cardiovascular:   Hypertension ECG has been reviewed. H/o mild pulmonary HTN   Pulmonary:   Sleep Apnea, CPAP    Hepatic/GI:   PUD, GERD    Musculoskeletal:   Arthritis   Spine Disorders: lumbar    Neurological:   Peripheral Neuropathy    Endocrine:   Diabetes, type 2  Morbid Obesity / BMI > 40  Psych:   Psychiatric History          Physical Exam  General: Cooperative, Alert and Oriented    Airway:  Mallampati: III / II  Mouth Opening: Normal  TM Distance: 4 - 6 cm  Tongue: Normal  Neck ROM: Extension Decreased  Neck: Girth Increased    Dental:Chipped and carious teeth remaining  Chest/Lungs:  Normal Respiratory Rate    Heart:  Rate: Normal  Rhythm: Regular Rhythm        Anesthesia Plan  Type of Anesthesia, risks & benefits discussed:    Anesthesia Type: Gen ETT  Intra-op Monitoring Plan: Standard ASA Monitors  Post Op Pain Control Plan: multimodal analgesia and peripheral nerve block  Induction:  IV  Airway Plan: Video, Post-Induction  Informed Consent: Informed consent signed with the Patient and all parties understand the risks and agree with anesthesia plan.  All questions answered.   ASA Score: 3    Ready For Surgery From Anesthesia Perspective.     .

## 2022-05-25 NOTE — DISCHARGE SUMMARY
[facility]  Department of Orthopedic Surgery  Discharge Note  Short Stay    Admit Date: 5/25/2022    Discharge Date and Time: No discharge date for patient encounter.     Attending Physician: Herb Tan II, MD     Discharge Provider: Herb Tan II    Diagnoses:  Active Hospital Problems    Diagnosis  POA    *Right rotator cuff tear arthropathy [M75.101, M12.811]  Yes      Resolved Hospital Problems   No resolved problems to display.       Discharged Condition: good    Hospital Course: Patient was admitted for an outpatient procedure and tolerated the procedure well with no complications.    Final Diagnoses: Same as principal problem.    Disposition: Home or Self Care    Follow up/Patient Instructions:    Medications:  Reconciled Home Medications:      Medication List      CHANGE how you take these medications    amLODIPine 10 MG tablet  Commonly known as: NORVASC  Take 1 tablet (10 mg total) by mouth once daily.  What changed: when to take this     atenoloL 25 MG tablet  Commonly known as: TENORMIN  Take 1 tablet (25 mg total) by mouth once daily.  What changed: when to take this     fenofibrate micronized 134 MG Cap  Commonly known as: LOFIBRA  Take 1 capsule (134 mg total) by mouth once daily.  What changed: when to take this     oxybutynin 15 MG Tr24  Commonly known as: DITROPAN XL  TAKE 1 TABLET (15 MG TOTAL) BY MOUTH ONCE DAILY.  What changed: when to take this     pravastatin 40 MG tablet  Commonly known as: PRAVACHOL  Take 1 tablet (40 mg total) by mouth once daily.  What changed: when to take this     sertraline 100 MG tablet  Commonly known as: ZOLOFT  Take 1 tablet (100 mg total) by mouth once daily.  What changed: when to take this        CONTINUE taking these medications    ammonium lactate 12 % lotion  Commonly known as: LAC-HYDRIN  USE ON FEET AS NEEDED     ascorbic acid (vitamin C) 1000 MG tablet  Commonly known as: VITAMIN C  Take 1,000 mg by mouth once daily.     aspirin 81 MG  EC tablet  Commonly known as: ECOTRIN  Take 1 tablet (81 mg total) by mouth 2 (two) times a day.     butalbital-acetaminophen-caffeine -40 mg -40 mg per tablet  Commonly known as: FIORICET, ESGIC  Take 1 tablet by mouth every 6 (six) hours as needed for Headaches. Do not take for more than 2 consecutive days     calcium-vitamin D3 500 mg-5 mcg (200 unit) per tablet  Commonly known as: OS-KATERIN 500 + D3  Take 1 tablet by mouth 2 (two) times daily with meals.     carboxymethylcellulose 1 % ophthalmic solution  1 drop.     CINNAMON BARK (BULK) MISC  1,000 mg by Misc.(Non-Drug; Combo Route) route.     clindamycin 1 % lotion  Commonly known as: CLEOCIN T  Use hs on face     co-enzyme Q-10 30 mg capsule  Take 30 mg by mouth once daily.     cycloSPORINE 0.05 % ophthalmic emulsion  Commonly known as: RESTASIS  Place 1 drop into both eyes 2 (two) times daily.     diclofenac sodium 1 % Gel  Commonly known as: VOLTAREN  Apply 2 g topically once daily.     doxycycline 100 MG Cap  Commonly known as: VIBRAMYCIN  Take 100 mg by mouth once daily.     econazole nitrate 1 % cream  Use bid for rash     famotidine 40 MG tablet  Commonly known as: PEPCID  Take 1 tablet (40 mg total) by mouth once daily.     furosemide 40 MG tablet  Commonly known as: LASIX  Take lasix 40 mg PO every other day PRN swelling     gabapentin 600 MG tablet  Commonly known as: NEURONTIN  Take 1 tablet (600 mg total) by mouth 3 (three) times daily.     HYDROcodone-acetaminophen 7.5-325 mg per tablet  Commonly known as: NORCO  Take 1 tablet by mouth every 6 (six) hours as needed for Pain.     levocetirizine 5 MG tablet  Commonly known as: XYZAL  Take 1 tablet (5 mg total) by mouth every evening.     multivitamin capsule  Take 1 capsule by mouth once daily.     pantoprazole 40 MG tablet  Commonly known as: PROTONIX  Take 40 mg by mouth once daily.     semaglutide 7 mg tablet  Commonly known as: RYBELSUS  Take 1 tablet (7 mg total) by mouth once daily.      vitamin E 400 UNIT capsule  Take 400 Units by mouth once daily.          Discharge Procedure Orders   Diet Adult Regular     Ice to affected area     Notify your health care provider if you experience any of the following:  increased confusion or weakness     Notify your health care provider if you experience any of the following:  persistent dizziness, light-headedness, or visual disturbances     Notify your health care provider if you experience any of the following:  worsening rash     Notify your health care provider if you experience any of the following:  severe persistent headache     Notify your health care provider if you experience any of the following:  difficulty breathing or increased cough     Notify your health care provider if you experience any of the following:  redness, tenderness, or signs of infection (pain, swelling, redness, odor or green/yellow discharge around incision site)     Notify your health care provider if you experience any of the following:  severe uncontrolled pain     Notify your health care provider if you experience any of the following:  persistent nausea and vomiting or diarrhea     Notify your health care provider if you experience any of the following:  temperature >100.4     Leave dressing on - Keep it clean, dry, and intact until clinic visit     Weight bearing restrictions (specify):   Order Comments: JOY PATEL, but encourage finger movement      Follow-up Information     Herb Tan II, MD Follow up in 2 week(s).    Specialty: Orthopedic Surgery  Contact information:  [address] [place] [address] DR Nadine MARTEL [id number] [phone]                         Discharge Procedure Orders (must include Diet, Follow-up, Activity):   Discharge Procedure Orders (must include Diet, Follow-up, Activity)   Diet Adult Regular     Ice to affected area     Notify your health care provider if you experience any of the following:  increased confusion or weakness     Notify your health care  provider if you experience any of the following:  persistent dizziness, light-headedness, or visual disturbances     Notify your health care provider if you experience any of the following:  worsening rash     Notify your health care provider if you experience any of the following:  severe persistent headache     Notify your health care provider if you experience any of the following:  difficulty breathing or increased cough     Notify your health care provider if you experience any of the following:  redness, tenderness, or signs of infection (pain, swelling, redness, odor or green/yellow discharge around incision site)     Notify your health care provider if you experience any of the following:  severe uncontrolled pain     Notify your health care provider if you experience any of the following:  persistent nausea and vomiting or diarrhea     Notify your health care provider if you experience any of the following:  temperature >100.4     Leave dressing on - Keep it clean, dry, and intact until clinic visit     Weight bearing restrictions (specify):   Order Comments: NWB RUE, but encourage finger movement

## 2022-05-25 NOTE — PLAN OF CARE
Patient stable, awake and alert. Met transfer criteria per Anesthesiologist. Pain controlled, denies nausea. VS'S. Dressing clean, dry and intact. IS and polar ice machine transferred with patient. Patient transported to post op.  Bedside report given to Sonia

## 2022-05-25 NOTE — TRANSFER OF CARE
"Anesthesia Transfer of Care Note    Patient: Monique Trujillo    Procedure(s) Performed: Procedure(s) (LRB):  ARTHROPLASTY, SHOULDER, TOTAL, REVERSE (Right)    Patient location: PACU    Anesthesia Type: general    Transport from OR: Transported from OR on 6-10 L/min O2 by face mask with adequate spontaneous ventilation    Post pain: adequate analgesia    Post assessment: tolerated procedure well and no apparent anesthetic complications    Post vital signs: stable    Level of consciousness: awake, alert and oriented    Nausea/Vomiting: no nausea/vomiting    Complications: none    Transfer of care protocol was followed      Last vitals:   Visit Vitals  BP (!) 159/74   Pulse 73   Temp 36.3 °C (97.4 °F) (Skin)   Resp 16   Ht 5' 1" (1.549 m)   Wt 106.6 kg (235 lb)   LMP  (LMP Unknown)   SpO2 (!) 94%   Breastfeeding No   BMI 44.40 kg/m²     "

## 2022-05-25 NOTE — OP NOTE
[facility]  Orthopedic Surgery Department  Operative Note    SUMMARY     Date of Procedure: 5/25/2022     Procedure: Procedure(s) (LRB):  ARTHROPLASTY, SHOULDER, TOTAL, REVERSE (Right)     Surgeon(s) and Role:     * Herb Tan II, MD - Primary    Assisting Surgeon: None    First Assist:  HENOK Hogan    Pre-Operative Diagnosis: Rotator cuff tear arthropathy of both shoulders [M75.101, M12.811, M12.812, M75.102]  Preop testing [Z01.818]    Post-Operative Diagnosis: Post-Op Diagnosis Codes:     * Rotator cuff tear arthropathy of both shoulders [M75.101, M12.811, M12.812, M75.102]     * Preop testing [Z01.818]    Anesthesia: General    Technical Procedures Used:  Reverse right total shoulder arthroplasty    Description of the Findings of the Procedure:  Dictated    Significant Surgical Tasks Conducted by the Assistant(s), if Applicable:  Positioning and prepping the patient, retraction during exposure and implant insertion, wound closure and bandage application.    Complications: No    Estimated Blood Loss (EBL): * No values recorded between 5/25/2022 12:01 PM and 5/25/2022  1:09 PM *           Implants:   Implant Name Type Inv. Item Serial No.  Lot No. LRB No. Used Action   BASEPLATE GLENOID REV RSP P2 - [id number] Screw BASEPLATE GLENOID REV RSP P2  [id number] Right 1 Implanted   SCREW BONE RSP 6.5X18 GLENOID - [id number] Screw SCREW BONE RSP 6.5X18 GLENOID  DJO [id number] Right 1 Implanted   SCREW BONE RSP 6.5X18 GLENOID - [id number] Screw SCREW BONE RSP 6.5X18 GLENOID  DJO [id number] Right 1 Implanted   SCREW BONE RSP 6.5X18 GLENOID - [id number] Screw SCREW BONE RSP 6.5X18 GLENOID  DJO [id number] Right 1 Implanted   HEAD GLENOID RSP 4MM OFFSET - [id number]  HEAD GLENOID RSP 4MM OFFSET  DJO [id number] Right 1 Implanted   STEM ALTIVATE REV SZ10 108MM - [id number]  STEM ALTIVATE REV SZ10 108MM  DJO [id number] Right 1 Implanted   INSERT SOCKET E+ SM +4 SZ32 - [id number]  INSERT  ANNIA ORTIZ+ SM +4 SZ32  DJO [id number] Right 1 Implanted       Specimens:   Specimen (24h ago, onward)            None                  Condition: Good    Disposition: PACU - hemodynamically stable.    Attestation: I was present for the entire procedure.    Procedure In Detail:  The patient brought to the operating room and placed on the table in the supine position.  The patient underwent anesthesia with the anesthesia service.  She was then sat up in the beach chair position and the right upper extremity was prepped and draped in the normal sterile fashion.  A deltopectoral approach to the shoulder was created.  Dissection was taken through skin and subcutaneous tissue down to the deltopectoral interval.  The cephalic vein was identified and retracted medially and protected throughout the remainder of the case.  Fibers of the deltoid were lifted up and off of the anterior aspect of the proximal humerus.  This led excellent exposure of the proximal humerus.  The capsule and remnant of the cuff were taken as 1 cuff of tissue and tagged with Ethibond suture for later repair.  This led excellent exposure of the shoulder joint.  Shoulder joint was dislocated and the humeral head was brought into the field.  Oscillating saw was used to make a cut and the humeral head was removed.  Retractors were placed anteriorly and posteriorly to the glenoid and the humerus was allowed to translate posteriorly.  There were noted to be large osteophytes rimming the glenoid and the taken down with a rongeur.  Once we had debrided the osteophytes and had but would be a arthritic normal glenoid we then proceeded with placing the guide on the inferior rim of the glenoid and drilling.  We then measured the depth and measured out at 30 mm.  We then tapped and used the tap as opposed to ream the glenoid.  After reaming the glenoid a DJO glenoid base plate 30 mm screw length was then inserted into the glenoid and fully seated.  We then used a  guide to place 3 locking screws at the 9, 12, and 3 position.  318 mm locking screws were placed.  We then tamped the glenosphere into place onto the base plate and seated it with a locking screw.  Attention was next turned to the humerus.  We reamed up to a size 10 and then broached up to a size 10.  Using the size 10 broach as a trial we trialed different polyethylene thicknesses.  In the end a +4 neutral 32 mm poly noted to restore the shoulder length and tension and the shoulder was stable throughout a plane of motion when testing with those components in place.  The shoulder was then dislocated again and the trial humeral component was removed.  The final component was assembled and inserted into the proximal humerus.  It was fully seated.  A +4 polyethylene was then impacted onto the humeral stem and the shoulder was reduced.  With the final components in place tension and offset the shoulder was restored the shoulder was stable throughout range of motion.  Shoulder was then placed in a neutral position and closure was begun.  The remnant of the cuff and capsule was repaired with Ethibond suture.  We then closed in layered fashion using 0 PDS strata fix, 2 0 PDS strata fix, and 3-0 Monocryl.  A Dermabond Prineo device was applied to the skin.  Once that had dried a sterile intraoperative dressing was placed along with a polar Care device for postoperative pain control.  The patient was also placed in a shoulder immobilizer.  She was then awakened from anesthesia and taken recovery where she was noted to be stable postoperatively.  Needle and lap counts were correct at the end the case.

## 2022-05-25 NOTE — PLAN OF CARE
Discharged home with daughter post instructions on home care. All valuables were returned, IV removed and handouts provided. assisted to dress, and urinate. Criteria met for discharge, wheelchair provided.

## 2022-05-27 DIAGNOSIS — F32.1 CURRENT MODERATE EPISODE OF MAJOR DEPRESSIVE DISORDER WITHOUT PRIOR EPISODE: ICD-10-CM

## 2022-05-27 RX ORDER — SERTRALINE HYDROCHLORIDE 100 MG/1
100 TABLET, FILM COATED ORAL NIGHTLY
Qty: 90 TABLET | Refills: 3 | Status: SHIPPED | OUTPATIENT
Start: 2022-05-27 | End: 2023-05-08

## 2022-05-27 NOTE — TELEPHONE ENCOUNTER
No new care gaps identified.  [facility] [place] Embedded Care Gaps. Reference number: [id number]. 5/27/2022   11:36:26 AM MAIRAT

## 2022-05-27 NOTE — TELEPHONE ENCOUNTER
----- Message from Yolanda Fleming sent at 5/27/2022 11:30 AM CDT -----  Contact: [facility]  Type:  RX Refill Request    Who Called:  Pharmacy     Refill or New Rx: refill    RX Name and Strength: sertraline (ZOLOFT) 100 MG tablet      How is the patient currently taking it? (ex. 1XDay):     Is this a 30 day or 90 day RX: 30    Preferred Pharmacy with phone number:       [facility] [place] [address]  [address] [facility] [address]  [place] [address]  Phone: [phone] Fax: [phone]      Local or Mail Order: Local    Ordering Provider: Dr Mahmood     Would the patient rather a call back or a response via [facility]?      Best Call Back Number:     Additional Information:

## 2022-05-30 DIAGNOSIS — H93.19 TINNITUS, UNSPECIFIED LATERALITY: Primary | ICD-10-CM

## 2022-06-06 DIAGNOSIS — M75.101 RIGHT ROTATOR CUFF TEAR ARTHROPATHY: Primary | ICD-10-CM

## 2022-06-06 DIAGNOSIS — M12.811 RIGHT ROTATOR CUFF TEAR ARTHROPATHY: Primary | ICD-10-CM

## 2022-06-09 ENCOUNTER — OFFICE VISIT (OUTPATIENT)
Dept: ORTHOPEDICS | Facility: CLINIC | Age: 72
End: 2022-06-09
Payer: MEDICARE

## 2022-06-09 ENCOUNTER — HOSPITAL ENCOUNTER (OUTPATIENT)
Dept: RADIOLOGY | Facility: HOSPITAL | Age: 72
Discharge: HOME | End: 2022-06-09
Attending: ORTHOPAEDIC SURGERY
Payer: MEDICARE

## 2022-06-09 VITALS — BODY MASS INDEX: 44.37 KG/M2 | HEIGHT: 61 IN | WEIGHT: 235 LBS

## 2022-06-09 DIAGNOSIS — M75.101 RIGHT ROTATOR CUFF TEAR ARTHROPATHY: Primary | ICD-10-CM

## 2022-06-09 DIAGNOSIS — M75.101 RIGHT ROTATOR CUFF TEAR ARTHROPATHY: ICD-10-CM

## 2022-06-09 DIAGNOSIS — M12.811 RIGHT ROTATOR CUFF TEAR ARTHROPATHY: ICD-10-CM

## 2022-06-09 DIAGNOSIS — M12.811 RIGHT ROTATOR CUFF TEAR ARTHROPATHY: Primary | ICD-10-CM

## 2022-06-09 DIAGNOSIS — Z96.611 S/P REVERSE TOTAL SHOULDER ARTHROPLASTY, RIGHT: Primary | ICD-10-CM

## 2022-06-09 PROCEDURE — 99999 PR PBB SHADOW E&M-EST. PATIENT-LVL IV: ICD-10-PCS | Mod: PBBFAC,,, | Performed by: ORTHOPAEDIC SURGERY

## 2022-06-09 PROCEDURE — 1160F PR REVIEW ALL MEDS BY PRESCRIBER/CLIN PHARMACIST DOCUMENTED: ICD-10-PCS | Mod: CPTII,S$GLB,, | Performed by: ORTHOPAEDIC SURGERY

## 2022-06-09 PROCEDURE — 1159F PR MEDICATION LIST DOCUMENTED IN MEDICAL RECORD: ICD-10-PCS | Mod: CPTII,S$GLB,, | Performed by: ORTHOPAEDIC SURGERY

## 2022-06-09 PROCEDURE — 73030 X-RAY EXAM OF SHOULDER: CPT | Mod: TC,PN,RT

## 2022-06-09 PROCEDURE — 3062F PR POS MACROALBUMINURIA RESULT DOCUMENTED/REVIEW: ICD-10-PCS | Mod: CPTII,S$GLB,, | Performed by: ORTHOPAEDIC SURGERY

## 2022-06-09 PROCEDURE — 1126F PR PAIN SEVERITY QUANTIFIED, NO PAIN PRESENT: ICD-10-PCS | Mod: CPTII,S$GLB,, | Performed by: ORTHOPAEDIC SURGERY

## 2022-06-09 PROCEDURE — 3066F NEPHROPATHY DOC TX: CPT | Mod: CPTII,S$GLB,, | Performed by: ORTHOPAEDIC SURGERY

## 2022-06-09 PROCEDURE — 3288F FALL RISK ASSESSMENT DOCD: CPT | Mod: CPTII,S$GLB,, | Performed by: ORTHOPAEDIC SURGERY

## 2022-06-09 PROCEDURE — 1160F RVW MEDS BY RX/DR IN RCRD: CPT | Mod: CPTII,S$GLB,, | Performed by: ORTHOPAEDIC SURGERY

## 2022-06-09 PROCEDURE — 3288F PR FALLS RISK ASSESSMENT DOCUMENTED: ICD-10-PCS | Mod: CPTII,S$GLB,, | Performed by: ORTHOPAEDIC SURGERY

## 2022-06-09 PROCEDURE — 73030 X-RAY EXAM OF SHOULDER: CPT | Mod: 26,RT,, | Performed by: RADIOLOGY

## 2022-06-09 PROCEDURE — 3044F PR MOST RECENT HEMOGLOBIN A1C LEVEL <7.0%: ICD-10-PCS | Mod: CPTII,S$GLB,, | Performed by: ORTHOPAEDIC SURGERY

## 2022-06-09 PROCEDURE — 99999 PR PBB SHADOW E&M-EST. PATIENT-LVL IV: CPT | Mod: PBBFAC,,, | Performed by: ORTHOPAEDIC SURGERY

## 2022-06-09 PROCEDURE — 3066F PR DOCUMENTATION OF TREATMENT FOR NEPHROPATHY: ICD-10-PCS | Mod: CPTII,S$GLB,, | Performed by: ORTHOPAEDIC SURGERY

## 2022-06-09 PROCEDURE — 73030 XR SHOULDER COMPLETE 2 OR MORE VIEWS RIGHT: ICD-10-PCS | Mod: 26,RT,, | Performed by: RADIOLOGY

## 2022-06-09 PROCEDURE — 99024 PR POST-OP FOLLOW-UP VISIT: ICD-10-PCS | Mod: S$GLB,,, | Performed by: ORTHOPAEDIC SURGERY

## 2022-06-09 PROCEDURE — 1101F PR PT FALLS ASSESS DOC 0-1 FALLS W/OUT INJ PAST YR: ICD-10-PCS | Mod: CPTII,S$GLB,, | Performed by: ORTHOPAEDIC SURGERY

## 2022-06-09 PROCEDURE — 3044F HG A1C LEVEL LT 7.0%: CPT | Mod: CPTII,S$GLB,, | Performed by: ORTHOPAEDIC SURGERY

## 2022-06-09 PROCEDURE — 1101F PT FALLS ASSESS-DOCD LE1/YR: CPT | Mod: CPTII,S$GLB,, | Performed by: ORTHOPAEDIC SURGERY

## 2022-06-09 PROCEDURE — 99024 POSTOP FOLLOW-UP VISIT: CPT | Mod: S$GLB,,, | Performed by: ORTHOPAEDIC SURGERY

## 2022-06-09 PROCEDURE — 1126F AMNT PAIN NOTED NONE PRSNT: CPT | Mod: CPTII,S$GLB,, | Performed by: ORTHOPAEDIC SURGERY

## 2022-06-09 PROCEDURE — 3062F POS MACROALBUMINURIA REV: CPT | Mod: CPTII,S$GLB,, | Performed by: ORTHOPAEDIC SURGERY

## 2022-06-09 PROCEDURE — 3008F BODY MASS INDEX DOCD: CPT | Mod: CPTII,S$GLB,, | Performed by: ORTHOPAEDIC SURGERY

## 2022-06-09 PROCEDURE — 3008F PR BODY MASS INDEX (BMI) DOCUMENTED: ICD-10-PCS | Mod: CPTII,S$GLB,, | Performed by: ORTHOPAEDIC SURGERY

## 2022-06-09 PROCEDURE — 1159F MED LIST DOCD IN RCRD: CPT | Mod: CPTII,S$GLB,, | Performed by: ORTHOPAEDIC SURGERY

## 2022-06-09 RX ORDER — HYDROCODONE BITARTRATE AND ACETAMINOPHEN 7.5; 325 MG/1; MG/1
1 TABLET ORAL EVERY 6 HOURS PRN
Qty: 28 TABLET | Refills: 0 | Status: SHIPPED | OUTPATIENT
Start: 2022-06-09 | End: 2022-06-16

## 2022-06-09 NOTE — PROGRESS NOTES
CC:  71-year-old female follows up status post reverse right total shoulder arthroplasty.  Date of surgery was 05/25/2022.  She is 2 weeks out.  Overall she is doing well and has no complaints today.  She states she is currently pain free.    RUE:  Incision is healing well with no signs of infection  Grossly intact motor sensory function distally  Plus 2 distal pulses    X-ray images were examined and personally interpreted by me.  Two views the right shoulder dated 06/09/2022 show a reverse right total shoulder arthroplasty that is well fixed and in good alignment.    Dx:  Status post reverse right total shoulder arthroplasty    Plan:  We discussed wound care.  She can shower with soap and water and pat the incision dry.  We made a PT referral.  I refilled her pain medicine and she can follow up in 4 weeks with an x-ray.

## 2022-06-14 ENCOUNTER — CLINICAL SUPPORT (OUTPATIENT)
Dept: REHABILITATION | Facility: HOSPITAL | Age: 72
End: 2022-06-14
Attending: ORTHOPAEDIC SURGERY
Payer: MEDICARE

## 2022-06-14 DIAGNOSIS — M62.81 MUSCLE WEAKNESS OF RIGHT UPPER EXTREMITY: ICD-10-CM

## 2022-06-14 DIAGNOSIS — Z96.611 S/P REVERSE TOTAL SHOULDER ARTHROPLASTY, RIGHT: ICD-10-CM

## 2022-06-14 DIAGNOSIS — M25.511 ACUTE PAIN OF RIGHT SHOULDER: ICD-10-CM

## 2022-06-14 DIAGNOSIS — M25.611 IMPAIRED RANGE OF MOTION OF RIGHT SHOULDER: ICD-10-CM

## 2022-06-14 PROCEDURE — 97140 MANUAL THERAPY 1/> REGIONS: CPT | Mod: PN

## 2022-06-14 PROCEDURE — 97161 PT EVAL LOW COMPLEX 20 MIN: CPT | Mod: PN

## 2022-06-14 NOTE — PLAN OF CARE
[facility]  Physical Therapy Initial Evaluation    Name: Monique Trujillo  Clinic Number: [id number]    Therapy Diagnosis:   Encounter Diagnoses   Name Primary?    S/P reverse total shoulder arthroplasty, right     Acute pain of right shoulder     Muscle weakness of right upper extremity     Impaired range of motion of right shoulder       Physician: Herb Tan II, MD    Physician Orders: PT Eval and Treat  Medical Diagnosis from Referral: Z96.611 (ICD-10-CM) - S/P reverse total shoulder arthroplasty, right  Evaluation Date: 6/14/2022  Authorization Period Expiration: 12/31/22  Plan of Care Expiration: 9/14/2022    Progress Update: 7/20/2022    Visit # / Visits authorized: 1 / 20    FOTO: Visit #1 - 1/3     PRECAUTIONS: Standard Precautions     MD Follow-up: 8/2022    Time In: 1050  Time Out: 1130  Total Appointment Time (timed & untimed codes): 40 minutes    SUBJECTIVE     Date of onset: May 25th    History of current condition - Monique is a 71 y.o. female whom reports Reverse Total Shoulder about 3 weeks ago. Years of right shoulder pain prior to surgery. Uses walker because of decreased sensation in her feet, difficulty with balance, 2 lumbar surgeries. Says her pain is well controlled.   Monique's current exercise regiment includes: none.  Seeking Physical Therapy for motion an d strength after suregery.    ZULEYMA: Surgery  Falls: none  Physician Instructions (per patient): none  Other concerns: none    Imaging: X-RAY FINDINGS:  Right shoulder reverse arthroplasty with the hardware appearing in place and intact.  Resolution of the previously seen soft tissue air.  No acute fracture or dislocation    Prior Therapy: Yes  Social History: Pt lives with their daughter   Living Environment: House   ADLs unable to complete: Some difficulty with dressing, needs helping showering   Gym/Home Equipment: none  Occupation: Pt is retired  Prior Level of Function: Independent with all  ADLs  Current Level of Function: 50% of PLOF    Pain:  Current 0 /10, worst 7 /10, best 0 /10   Location: right shoulder,   Description: Aching, Tight and Deep  Aggravating Factors: motion  Easing Factors: rest, medication    Dominant Extremity: Right    Pts goals: Pt reported goals are motion and strength after surgery    _______________________________________________________  Medical History:   Past Medical History:   Diagnosis Date    Allergy     Arthritis     Chronic lower back pain     Depression     Fever blister     GERD (gastroesophageal reflux disease)     Hemorrhoids, internal 11/05/2014    Hyperlipidemia     Hypertension     Joint pain     Morbid obesity with BMI of 40.0-44.9, adult 02/24/2015    Multiple gastric ulcers 12/14/2017    TIMMY (obstructive sleep apnea)     does not use CPAP    Pulmonary hypertension     Skin cancer     Type 2 diabetes mellitus with hyperglycemia, without long-term current use of insulin 4/20/2022       Surgical History:    has a past surgical history that includes Knee arthroscopy w/ meniscal repair (Right, 2012); Tubal ligation (1980); Colonoscopy (N/A, 11/2/2016); Anterior cervical corpectomy w/ fusion (1988); Lumbar laminectomy (06/19/2015); Total hip arthroplasty (Right, 04/28/2016); Skin cancer excision; Epidural steroid injection into lumbar spine (N/A, 11/8/2018); Caudal epidural steroid injection (N/A, 1/10/2019); Injection of anesthetic agent around genitofemoral nerve (Bilateral, 8/1/2019); Fusion of spine with instrumentation (Left, 1/3/2020); Joint replacement; Back surgery; Epidural steroid injection (N/A, 5/18/2022); and Reverse total shoulder arthroplasty (Right, 5/25/2022).    Medications:   has a current medication list which includes the following prescription(s): amlodipine, ammonium lactate, ascorbic acid (vitamin c), aspirin, atenolol, butalbital-acetaminophen-caffeine -40 mg, calcium-vitamin d3, carboxymethylcellulose, cinnamon  bark, clindamycin, co-enzyme q-10, cyclosporine, diclofenac sodium, doxycycline, econazole nitrate, famotidine, fenofibrate micronized, furosemide, gabapentin, hydrocodone-acetaminophen, levocetirizine, multivitamin, oxybutynin, pantoprazole, pravastatin, semaglutide, sertraline, and vitamin e.    Allergies:   Review of patient's allergies indicates:   Allergen Reactions    Metformin Other (See Comments)     Diarrhea         OBJECTIVE     RANGE OF MOTION:    Shoulder AROM/PROM Right   Left   Goal   Forward Flexion (180) 40  180     ER at 90 degrees (90) NT  90     ER at 0 degrees (45)  10  45     Functional ER (C7) NT  C7     IR  20         STRENGTH:    U/E MMT Right   Goal   Shoulder Flexion 2/5 5/5 B   Shoulder Abduction 2/5 5/5 B   Shoulder IR 3-/5 5/5 B   Shoulder ER 2/5 5/5 B   Elbow Flexion  3+/5 5/5 B   Elbow Extension 3+/5 5/5 B     MUSCLE LENGTH:     Muscle Tested  Right   Left    Goal   Upper Trapezius  decreased  Normal B    Levator Scapulae  decreased  Normal B   Sternocleidomastoid normal  Normal B   Scalenes  decreased  Normal B    Pectoralis Minor  decreased  Normal B   Pectoralis Major decreased  Normal B     JOINT MOBILITY:     Joint Motion Tested Right   Left    Goal   GHJ Posterior Glide Hypomobile  Normal B   GHJ Inferior Glide Hypomobile  Normal B   GHJ Lateral Glide Hypomobile  Normal B   GHJ Anterior Glide Hypomobile  Normal B     SPECIAL TESTS:   Deferred is Post Op Reverse TSA    Sensation:  Sensation is impaired to light touch    Palpation: Increased tone and tenderness noted with palpation to: Right Upper trap, Right subscap and Right Infraspinatus    Posture:  Pt presents with postural abnormalities which include: forward head, rounded shoulders and ankle/foot pronation    Gait: N/A    Movement Analysis: N/A    Balance: N/A      FUNCTION:     CMS Impairment/Limitation/Restriction for FOTO DASH Survey    Therapist reviewed FOTO scores for Monique Trujillo on 6/14/2022.   FOTO documents  entered into Layer - see Media section.    Limitation Score: 61%         TREATMENT     Total Treatment time separate from Evaluation: (10) minutes    Monique received therapeutic exercises to develop strength, endurance, ROM, flexibility, and posture for (10) minutes including: x = exercises performed     TherEx 6/14/2022    Joint Mobs/Osscilations      PROM Flexion     STM Upper trap             Plan for Next Visit: Shrugs, Scap retractions, Table slides, Dowel exercises, Pulleys       PATIENT EDUCATION AND HOME EXERCISES       Education/Self-Care provided: (included in treatment) minutes    Patient educated on the impairments noted above and the effects of physical therapy intervention to improve overall condition and QOL.    Patient was educated on all the above exercise prior/during/after for proper posture, positioning, and execution for safe performance with home exercise program.   Exercise Prescription:   o 6/14/2022: EVAL:     Written Home Exercises Provided: yes. Prefers: Printed Copies  Exercises were reviewed and Monique was able to demonstrate them prior to the end of the session.  Monique demonstrated good understanding of the education provided.     See EMR under Patient Instructions for exercises provided during therapy sessions.    ASSESSMENT     Monique is a 71 y.o. female referred to outpatient Physical Therapy with a medical diagnosis of reverse total shoulder.  Monique presents with clinical signs and symptoms that support this diagnosis withdecreased shoulder girdle ROM, decreased scapular and shoulder strength, Glenohumeral joint hypomobility, and impaired functional mobility.    The above impairments will be addressed through manual therapy techniques, therapeutic exercises, functional training, and modalities as necessary. Patient was treated and educated on exercises for home program, progression of therapy, and benefits of therapy to achieve full functional mobility.     Pt  "prognosis is Good.   Pt will benefit from skilled outpatient Physical Therapy to address the deficits stated above and in the chart below, provide pt/family education, and to maximize pt's level of independence.     Plan of care discussed with patient: Yes  Pt's spiritual, cultural and educational needs considered and patient is agreeable to the plan of care and goals as stated below:     Anticipated Barriers for therapy: co-morbidities, sedentary lifestyle and chronicity of condition    Medical Necessity is demonstrated by the following  History  Co-morbidities and personal factors that may impact the plan of care Co-morbidities:   depression, diabetes, high BMI, prior hip surgery and prior lumbar surgery  Past Medical History:   Diagnosis Date    Allergy     Arthritis     Chronic lower back pain     Depression     Fever blister     GERD (gastroesophageal reflux disease)     Hemorrhoids, internal 11/05/2014    Hyperlipidemia     Hypertension     Joint pain     Morbid obesity with BMI of 40.0-44.9, adult 02/24/2015    Multiple gastric ulcers 12/14/2017    TIMMY (obstructive sleep apnea)     does not use CPAP    Pulmonary hypertension     Skin cancer     Type 2 diabetes mellitus with hyperglycemia, without long-term current use of insulin 4/20/2022       Personal Factors:   no deficits     low   Examination  Body Structures and Functions, activity limitations and participation restrictions that may impact the plan of care Body Regions:   neck  upper extremities  trunk    Body Systems:    gross symmetry  ROM  strength  gross coordinated movement  balance  transfers  transitions  motor control  motor learning    Participation Restrictions:   See above in "Current Level of Function"     Activity limitations:   Learning and applying knowledge  no deficits    General Tasks and Commands  no deficits    Communication  no deficits    Mobility  lifting and carrying objects  fine hand use (grasping/picking " up)  walking  driving (bike, car, motorcycle)    Self care  washing oneself (bathing, drying, washing hands)    Domestic Life  shopping  cooking  doing house work (cleaning house, washing dishes, laundry)    Interactions/Relationships  no deficits    Life Areas  no deficits    Community and Social Life  community life  recreation and leisure         low   Clinical Presentation stable and uncomplicated low   Decision Making/ Complexity Score: low       GOALS:  SHORT TERM GOALS: 4 weeks, () Progress   1. Recent signs and systems trend is improving in order to progress towards LTG's.    2. Patient will be independent with HEP in order to further progress and return to maximal function.    3. Pain rating at Worst: 4/10 in order to progress towards increased independence with activity.    4. Patient will be able to correct postural deviations in sitting and standing, to decrease pain and promote postural awareness for injury prevention.       LONG TERM GOALS: 10 weeks, () Progress   1. Patient will return to normal ADL, recreational, and work related activities with less pain and limitation.     2. Patient will improve AROM to stated goals in order to return to maximal functional potential.     3. Patient will improve Strength to stated goals of appropriate musculature in order to improve functional independence.     4. Pain Rating at Best: 1/10 to improve Quality of Life.     5. Patient will meet predicted functional outcome (FOTO) score: 25% to increase self-worth & perceived functional ability.    6. Patient will have met/partially met personal goal of: being able to use right Upper Extremity to bath herself        PLAN   Plan of care Certification: 6/14/2022 to 9/14/2022    Outpatient Physical Therapy 2 times weekly for 10 weeks to include any combination of the following interventions: virtual visits, dry needling, modalities, electrical stimulation (IFC, Pre-Mod, Attended with Functional Dry Needling), Manual  Therapy, Moist Heat/ Ice, Neuromuscular Re-ed, Patient Education, Self Care, Therapeutic Exercise, Functional Training and Therapeutic Activites     Thank you for this referral.    Didier Can, PT      I CERTIFY THE NEED FOR THESE SERVICES FURNISHED UNDER THIS PLAN OF TREATMENT AND WHILE UNDER MY CARE   Physician's comments:     Physician's Signature: ___________________________________________________

## 2022-06-15 ENCOUNTER — OFFICE VISIT (OUTPATIENT)
Dept: UROLOGY | Facility: CLINIC | Age: 72
End: 2022-06-15
Payer: MEDICARE

## 2022-06-15 VITALS
WEIGHT: 235.25 LBS | DIASTOLIC BLOOD PRESSURE: 81 MMHG | HEIGHT: 61 IN | HEART RATE: 90 BPM | SYSTOLIC BLOOD PRESSURE: 128 MMHG | BODY MASS INDEX: 44.42 KG/M2

## 2022-06-15 DIAGNOSIS — N39.46 MIXED STRESS AND URGE URINARY INCONTINENCE: ICD-10-CM

## 2022-06-15 PROCEDURE — 1159F MED LIST DOCD IN RCRD: CPT | Mod: CPTII,S$GLB,, | Performed by: NURSE PRACTITIONER

## 2022-06-15 PROCEDURE — 3066F NEPHROPATHY DOC TX: CPT | Mod: CPTII,S$GLB,, | Performed by: NURSE PRACTITIONER

## 2022-06-15 PROCEDURE — 99999 PR PBB SHADOW E&M-EST. PATIENT-LVL V: CPT | Mod: PBBFAC,,, | Performed by: NURSE PRACTITIONER

## 2022-06-15 PROCEDURE — 3062F PR POS MACROALBUMINURIA RESULT DOCUMENTED/REVIEW: ICD-10-PCS | Mod: CPTII,S$GLB,, | Performed by: NURSE PRACTITIONER

## 2022-06-15 PROCEDURE — 1160F RVW MEDS BY RX/DR IN RCRD: CPT | Mod: CPTII,S$GLB,, | Performed by: NURSE PRACTITIONER

## 2022-06-15 PROCEDURE — 1159F PR MEDICATION LIST DOCUMENTED IN MEDICAL RECORD: ICD-10-PCS | Mod: CPTII,S$GLB,, | Performed by: NURSE PRACTITIONER

## 2022-06-15 PROCEDURE — 99215 OFFICE O/P EST HI 40 MIN: CPT | Mod: PBBFAC,PN | Performed by: NURSE PRACTITIONER

## 2022-06-15 PROCEDURE — 3008F BODY MASS INDEX DOCD: CPT | Mod: CPTII,S$GLB,, | Performed by: NURSE PRACTITIONER

## 2022-06-15 PROCEDURE — 3044F HG A1C LEVEL LT 7.0%: CPT | Mod: CPTII,S$GLB,, | Performed by: NURSE PRACTITIONER

## 2022-06-15 PROCEDURE — 3074F SYST BP LT 130 MM HG: CPT | Mod: CPTII,S$GLB,, | Performed by: NURSE PRACTITIONER

## 2022-06-15 PROCEDURE — 3079F PR MOST RECENT DIASTOLIC BLOOD PRESSURE 80-89 MM HG: ICD-10-PCS | Mod: CPTII,S$GLB,, | Performed by: NURSE PRACTITIONER

## 2022-06-15 PROCEDURE — 3062F POS MACROALBUMINURIA REV: CPT | Mod: CPTII,S$GLB,, | Performed by: NURSE PRACTITIONER

## 2022-06-15 PROCEDURE — 3074F PR MOST RECENT SYSTOLIC BLOOD PRESSURE < 130 MM HG: ICD-10-PCS | Mod: CPTII,S$GLB,, | Performed by: NURSE PRACTITIONER

## 2022-06-15 PROCEDURE — 3008F PR BODY MASS INDEX (BMI) DOCUMENTED: ICD-10-PCS | Mod: CPTII,S$GLB,, | Performed by: NURSE PRACTITIONER

## 2022-06-15 PROCEDURE — 1160F PR REVIEW ALL MEDS BY PRESCRIBER/CLIN PHARMACIST DOCUMENTED: ICD-10-PCS | Mod: CPTII,S$GLB,, | Performed by: NURSE PRACTITIONER

## 2022-06-15 PROCEDURE — 3066F PR DOCUMENTATION OF TREATMENT FOR NEPHROPATHY: ICD-10-PCS | Mod: CPTII,S$GLB,, | Performed by: NURSE PRACTITIONER

## 2022-06-15 PROCEDURE — 99204 PR OFFICE/OUTPT VISIT, NEW, LEVL IV, 45-59 MIN: ICD-10-PCS | Mod: S$GLB,,, | Performed by: NURSE PRACTITIONER

## 2022-06-15 PROCEDURE — 99204 OFFICE O/P NEW MOD 45 MIN: CPT | Mod: S$GLB,,, | Performed by: NURSE PRACTITIONER

## 2022-06-15 PROCEDURE — 3079F DIAST BP 80-89 MM HG: CPT | Mod: CPTII,S$GLB,, | Performed by: NURSE PRACTITIONER

## 2022-06-15 PROCEDURE — 3044F PR MOST RECENT HEMOGLOBIN A1C LEVEL <7.0%: ICD-10-PCS | Mod: CPTII,S$GLB,, | Performed by: NURSE PRACTITIONER

## 2022-06-15 PROCEDURE — 99999 PR PBB SHADOW E&M-EST. PATIENT-LVL V: ICD-10-PCS | Mod: PBBFAC,,, | Performed by: NURSE PRACTITIONER

## 2022-06-15 RX ORDER — SOLIFENACIN SUCCINATE 5 MG/1
5 TABLET, FILM COATED ORAL DAILY
Qty: 30 TABLET | Refills: 11 | Status: SHIPPED | OUTPATIENT
Start: 2022-06-15 | End: 2023-06-05 | Stop reason: SDUPTHER

## 2022-06-15 RX ORDER — LIDOCAINE AND PRILOCAINE 25; 25 MG/G; MG/G
CREAM TOPICAL
COMMUNITY
Start: 2022-05-04

## 2022-06-15 RX ORDER — DICLOFENAC SODIUM 16.05 MG/ML
SOLUTION TOPICAL
COMMUNITY
Start: 2022-05-04

## 2022-06-15 NOTE — PROGRESS NOTES
[facility] Note  Staff: ALEXANDREA Guerrero    PCP: MD Timoteo    Chief Complaint: Urge incontinence of urine    Subjective:        HPI: Monique Trujillo is a 71 y.o. female presents with urinary incontinence X10 years but has worsened within the last year.  Sometimes she has urinary urgency every hour and will get up to go to bathroom and automatically leaks on herself.  She does have occasional leakage with laughing, coughing and sneezing.    Nocturia:  1-2x nightly   Dysuria?: None  Gross Hematuria?: None  History of Kidney Stones?: None  Constipation issues?: Yes after her recent shoulder surgery.  Now having regular bowel movements.    Family Hx of  Cancers?:  None    Pt recently had Right shoulder surgery by Dr. Tan on 5/25/22    REVIEW OF SYSTEMS:  A comprehensive 10 system review was performed and is negative except as noted above in HPI    PMHx:  Past Medical History:   Diagnosis Date    Allergy     Arthritis     Chronic lower back pain     Depression     Fever blister     GERD (gastroesophageal reflux disease)     Hemorrhoids, internal 11/05/2014    Hyperlipidemia     Hypertension     Joint pain     Morbid obesity with BMI of 40.0-44.9, adult 02/24/2015    Multiple gastric ulcers 12/14/2017    TIMMY (obstructive sleep apnea)     does not use CPAP    Pulmonary hypertension     Skin cancer     Type 2 diabetes mellitus with hyperglycemia, without long-term current use of insulin 4/20/2022     PSHx:  Past Surgical History:   Procedure Laterality Date    ANTERIOR CERVICAL CORPECTOMY W/ FUSION  1988    BACK SURGERY      CAUDAL EPIDURAL STEROID INJECTION N/A 1/10/2019    Procedure: Injection-steroid-epidural-caudal;  Surgeon: Lydia Velásquez Jr., MD;  Location: [facility];  Service: Pain Management;  Laterality: N/A;    COLONOSCOPY N/A 11/2/2016    Procedure: COLONOSCOPY;  Surgeon: Viji Dewitt MD;  Location: [facility];  Service: Endoscopy;   Laterality: N/A;    EPIDURAL STEROID INJECTION N/A 5/18/2022    Procedure: Injection, Steroid, Epidural;  Surgeon: Fermín Luo MD;  Location: [facility] OR;  Service: Pain Management;  Laterality: N/A;  L2-l3     EPIDURAL STEROID INJECTION INTO LUMBAR SPINE N/A 11/8/2018    Procedure: Injection-steroid-epidural-lumbar-L2-3 (LEFT > RIGHT);  Surgeon: Lydia Velásquez Jr., MD;  Location: [facility];  Service: Pain Management;  Laterality: N/A;    FUSION OF SPINE WITH INSTRUMENTATION Left 1/3/2020    Procedure: FUSION, SPINE, WITH INSTRUMENTATION Stage 1 Left L3-4 LOIF Rise L Stage 2 Open L3-4 L4-5 Laminectomy Removal of L4-5 Hardware, L3-5 Posterior Instrumentation and extraction of Fusion at L3-4;  Surgeon: James Brantley MD;  Location: [facility] OR;  Service: Neurosurgery;  Laterality: Left;  Procedure: Stage 1 Left L3-4 LOIF Rise L  Stage 2 Open L3-4 L4-5 Laminectomy Removal of L4-5 Hardware,     INJECTION OF ANESTHETIC AGENT AROUND GENITOFEMORAL NERVE Bilateral 8/1/2019    Procedure: BILATERAL SHOULDER ARTICULAR BRANCH BLOCK;  Surgeon: Lydia Velásquez Jr., MD;  Location: [facility];  Service: Pain Management;  Laterality: Bilateral;    JOINT REPLACEMENT      KNEE ARTHROSCOPY W/ MENISCAL REPAIR Right 2012    LUMBAR LAMINECTOMY  06/19/2015    REVERSE TOTAL SHOULDER ARTHROPLASTY Right 5/25/2022    Procedure: ARTHROPLASTY, SHOULDER, TOTAL, REVERSE;  Surgeon: Herb Tan II, MD;  Location: [facility] OR;  Service: Orthopedics;  Laterality: Right;    SKIN CANCER EXCISION      forehead does not recall date    TOTAL HIP ARTHROPLASTY Right 04/28/2016         TUBAL LIGATION  1980     Allergies:  Metformin    Medications: reviewed   Objective:     Vitals:    06/15/22 1056   BP: 128/81   Pulse: 90     Physical Exam  Vitals reviewed.   Constitutional:       Appearance: She is well-developed.   HENT:      Head: Normocephalic and atraumatic.   Eyes:      Conjunctiva/sclera: Conjunctivae normal.      Pupils: Pupils are  equal, round, and reactive to light.   Cardiovascular:      Rate and Rhythm: Normal rate and regular rhythm.      Heart sounds: Normal heart sounds.   Pulmonary:      Effort: Pulmonary effort is normal.      Breath sounds: Normal breath sounds.   Abdominal:      General: Bowel sounds are normal.      Palpations: Abdomen is soft.   Musculoskeletal:         General: Normal range of motion.      Cervical back: Normal range of motion and neck supple.   Skin:     General: Skin is warm and dry.   Neurological:      Mental Status: She is alert and oriented to person, place, and time.      Deep Tendon Reflexes: Reflexes are normal and symmetric.   Psychiatric:         Behavior: Behavior normal.         Thought Content: Thought content normal.         Judgment: Judgment normal.       Assessment:       1. Mixed stress and urge urinary incontinence          Plan:   Mixed incontinence of urine:    1. Solifenacin 5 mg one tablet daily prescribed to this pt during ov today.  The med prescribed to pt today as trial to see if med improves pt's current LUTS.  Benefits, risks and side affects were thoroughly explained to pt today in office with all questions answered.    2. Discussed conservative measures to control urgency and frequency including avoiding bladder irritants, timed voiding, not postponing voiding, and bowel regimen (as distended bowel has extrinsic compressive effect on bladder. Discussed bladder irritants include coffe (even decaf), tea, alcohol, soda, spicy foods, acidic juices (orange, tomato), vinegar, and artificial sweeteners.    3.    Bladder retraining and how to was thoroughly explained to pt during ov today.    F/u:  Pt was instructed to RTC in 2 weeks on Nurse Visit for UA (needs hat in toilet) and PVR at that time.  F/UP with me in one month.  Pt verbalized understanding.    [facility]: Active    Alida Womack, ALEXANDREA

## 2022-06-21 ENCOUNTER — CLINICAL SUPPORT (OUTPATIENT)
Dept: REHABILITATION | Facility: HOSPITAL | Age: 72
End: 2022-06-21
Attending: ORTHOPAEDIC SURGERY
Payer: MEDICARE

## 2022-06-21 ENCOUNTER — OFFICE VISIT (OUTPATIENT)
Dept: PAIN MEDICINE | Facility: CLINIC | Age: 72
End: 2022-06-21
Payer: MEDICARE

## 2022-06-21 VITALS
HEIGHT: 61 IN | SYSTOLIC BLOOD PRESSURE: 132 MMHG | DIASTOLIC BLOOD PRESSURE: 75 MMHG | BODY MASS INDEX: 44.37 KG/M2 | HEART RATE: 84 BPM | WEIGHT: 235 LBS

## 2022-06-21 DIAGNOSIS — M51.36 LUMBAR DEGENERATIVE DISC DISEASE: Primary | ICD-10-CM

## 2022-06-21 DIAGNOSIS — M48.062 SPINAL STENOSIS OF LUMBAR REGION WITH NEUROGENIC CLAUDICATION: ICD-10-CM

## 2022-06-21 DIAGNOSIS — M25.611 IMPAIRED RANGE OF MOTION OF RIGHT SHOULDER: ICD-10-CM

## 2022-06-21 DIAGNOSIS — M25.511 ACUTE PAIN OF RIGHT SHOULDER: Primary | ICD-10-CM

## 2022-06-21 DIAGNOSIS — M62.81 MUSCLE WEAKNESS OF RIGHT UPPER EXTREMITY: ICD-10-CM

## 2022-06-21 PROCEDURE — 3075F PR MOST RECENT SYSTOLIC BLOOD PRESS GE 130-139MM HG: ICD-10-PCS | Mod: CPTII,S$GLB,, | Performed by: PHYSICIAN ASSISTANT

## 2022-06-21 PROCEDURE — 3044F HG A1C LEVEL LT 7.0%: CPT | Mod: CPTII,S$GLB,, | Performed by: PHYSICIAN ASSISTANT

## 2022-06-21 PROCEDURE — 99213 OFFICE O/P EST LOW 20 MIN: CPT | Mod: S$GLB,,, | Performed by: PHYSICIAN ASSISTANT

## 2022-06-21 PROCEDURE — 3066F PR DOCUMENTATION OF TREATMENT FOR NEPHROPATHY: ICD-10-PCS | Mod: CPTII,S$GLB,, | Performed by: PHYSICIAN ASSISTANT

## 2022-06-21 PROCEDURE — 3008F BODY MASS INDEX DOCD: CPT | Mod: CPTII,S$GLB,, | Performed by: PHYSICIAN ASSISTANT

## 2022-06-21 PROCEDURE — 1101F PR PT FALLS ASSESS DOC 0-1 FALLS W/OUT INJ PAST YR: ICD-10-PCS | Mod: CPTII,S$GLB,, | Performed by: PHYSICIAN ASSISTANT

## 2022-06-21 PROCEDURE — 97110 THERAPEUTIC EXERCISES: CPT | Mod: PN,CQ

## 2022-06-21 PROCEDURE — 1126F AMNT PAIN NOTED NONE PRSNT: CPT | Mod: CPTII,S$GLB,, | Performed by: PHYSICIAN ASSISTANT

## 2022-06-21 PROCEDURE — 1101F PT FALLS ASSESS-DOCD LE1/YR: CPT | Mod: CPTII,S$GLB,, | Performed by: PHYSICIAN ASSISTANT

## 2022-06-21 PROCEDURE — 99213 PR OFFICE/OUTPT VISIT, EST, LEVL III, 20-29 MIN: ICD-10-PCS | Mod: S$GLB,,, | Performed by: PHYSICIAN ASSISTANT

## 2022-06-21 PROCEDURE — 99999 PR PBB SHADOW E&M-EST. PATIENT-LVL II: CPT | Mod: PBBFAC,,, | Performed by: PHYSICIAN ASSISTANT

## 2022-06-21 PROCEDURE — 1159F MED LIST DOCD IN RCRD: CPT | Mod: CPTII,S$GLB,, | Performed by: PHYSICIAN ASSISTANT

## 2022-06-21 PROCEDURE — 3288F PR FALLS RISK ASSESSMENT DOCUMENTED: ICD-10-PCS | Mod: CPTII,S$GLB,, | Performed by: PHYSICIAN ASSISTANT

## 2022-06-21 PROCEDURE — 3062F POS MACROALBUMINURIA REV: CPT | Mod: CPTII,S$GLB,, | Performed by: PHYSICIAN ASSISTANT

## 2022-06-21 PROCEDURE — 3078F PR MOST RECENT DIASTOLIC BLOOD PRESSURE < 80 MM HG: ICD-10-PCS | Mod: CPTII,S$GLB,, | Performed by: PHYSICIAN ASSISTANT

## 2022-06-21 PROCEDURE — 3075F SYST BP GE 130 - 139MM HG: CPT | Mod: CPTII,S$GLB,, | Performed by: PHYSICIAN ASSISTANT

## 2022-06-21 PROCEDURE — 3288F FALL RISK ASSESSMENT DOCD: CPT | Mod: CPTII,S$GLB,, | Performed by: PHYSICIAN ASSISTANT

## 2022-06-21 PROCEDURE — 99999 PR PBB SHADOW E&M-EST. PATIENT-LVL II: ICD-10-PCS | Mod: PBBFAC,,, | Performed by: PHYSICIAN ASSISTANT

## 2022-06-21 PROCEDURE — 3078F DIAST BP <80 MM HG: CPT | Mod: CPTII,S$GLB,, | Performed by: PHYSICIAN ASSISTANT

## 2022-06-21 PROCEDURE — 3008F PR BODY MASS INDEX (BMI) DOCUMENTED: ICD-10-PCS | Mod: CPTII,S$GLB,, | Performed by: PHYSICIAN ASSISTANT

## 2022-06-21 PROCEDURE — 3062F PR POS MACROALBUMINURIA RESULT DOCUMENTED/REVIEW: ICD-10-PCS | Mod: CPTII,S$GLB,, | Performed by: PHYSICIAN ASSISTANT

## 2022-06-21 PROCEDURE — 3044F PR MOST RECENT HEMOGLOBIN A1C LEVEL <7.0%: ICD-10-PCS | Mod: CPTII,S$GLB,, | Performed by: PHYSICIAN ASSISTANT

## 2022-06-21 PROCEDURE — 1159F PR MEDICATION LIST DOCUMENTED IN MEDICAL RECORD: ICD-10-PCS | Mod: CPTII,S$GLB,, | Performed by: PHYSICIAN ASSISTANT

## 2022-06-21 PROCEDURE — 1126F PR PAIN SEVERITY QUANTIFIED, NO PAIN PRESENT: ICD-10-PCS | Mod: CPTII,S$GLB,, | Performed by: PHYSICIAN ASSISTANT

## 2022-06-21 PROCEDURE — 3066F NEPHROPATHY DOC TX: CPT | Mod: CPTII,S$GLB,, | Performed by: PHYSICIAN ASSISTANT

## 2022-06-21 NOTE — PROGRESS NOTES
Physical Therapy Daily Treatment Note     Name: Monique Trujillo  Clinic Number: [id number]    Therapy Diagnosis:   Encounter Diagnoses   Name Primary?    Acute pain of right shoulder Yes    Muscle weakness of right upper extremity     Impaired range of motion of right shoulder      Physician: Herb Tan II, MD    Visit Date: 6/21/2022    Physician: Herb Tan II, MD    Physician Orders: PT Eval and Treat  Medical Diagnosis from Referral: Z96.611 (ICD-10-CM) - S/P reverse total shoulder arthroplasty, right  Evaluation Date: 6/14/2022  Authorization Period Expiration: 12/31/22  Plan of Care Expiration: 9/14/2022                Progress Update: 7/20/2022               Visit # / Visits authorized: 1 / 11  + eval   ([id number])                FOTO: Visit #1 - 1/3          Time In: 1132  Time Out: 1214  Total Billable Time: 15 minutes    Precautions: Standard    Subjective     Pt reports: she is doing well, L shoulder hurts more than her R, she is anxious to get rid of her sling and get back to PLOF  She was compliant with home exercise program.  Response to previous treatment: first visit after eval  Functional change: none today    Pain: 0/10  Location: right shoulder      Objective4     DOS 5/25/22  Monique received therapeutic exercises to develop strength, ROM and posture for 34 minutes including:      Shoulder shrugs x 20  Shoulder squeezes x 20    Adduction isometric x 20  IR isometric x 20  Elbow flex/ext x 20  Wrist flexion/extension x 20    Overhead Pulleys flexion x 20     Table wipes  NOT PERFORMED time     Supine dowel pressup with dowel 2 x 10    Supine flexion to 90 deg with dowel 2 x 10    Manual therapy: x 8 minutes  Myofacial Release with therapy bar to R UT  PROM R shoulder: flexion    Home Exercises Provided and Patient Education Provided     Education provided:   - cont HEP  - posture    Written Home Exercises Provided: Patient instructed to cont prior HEP.  Exercises were reviewed  and Monique was able to demonstrate them prior to the end of the session.  Monique demonstrated good  understanding of the education provided.     See EMR under Patient Instructions for exercises provided prior visit.    Assessment     Sky was able to complete all therex w/o pain provocation.  She has rounded shoulder posture which she was able to correct with VC, but was not able to carry over more than a few seconds.  Continued UE strengthening per MD protocol, posture education to improve ADL's, functional activies.    Monique Is progressing well towards her goals.   Pt prognosis is Good.     Pt will continue to benefit from skilled outpatient physical therapy to address the deficits listed in the problem list box on initial evaluation, provide pt/family education and to maximize pt's level of independence in the home and community environment.     Pt's spiritual, cultural and educational needs considered and pt agreeable to plan of care and goals.    Anticipated barriers to physical therapy: none    GOALS:  SHORT TERM GOALS: 4 weeks, () Progress  6/21/2022   1. Recent signs and systems trend is improving in order to progress towards LTG's. ongoing    2. Patient will be independent with HEP in order to further progress and return to maximal function.  ongoing   3. Pain rating at Worst: 4/10 in order to progress towards increased independence with activity. ongoing    4. Patient will be able to correct postural deviations in sitting and standing, to decrease pain and promote postural awareness for injury prevention.  ongoing       LONG TERM GOALS: 10 weeks, () Progress  6/21/2022   1. Patient will return to normal ADL, recreational, and work related activities with less pain and limitation.  ongoing    2. Patient will improve AROM to stated goals in order to return to maximal functional potential.  ongoing    3. Patient will improve Strength to stated goals of appropriate musculature in order to improve  functional independence.   ongoing   4. Pain Rating at Best: 1/10 to improve Quality of Life.   ongoing   5. Patient will meet predicted functional outcome (FOTO) score: 25% to increase self-worth & perceived functional ability.  ongoing   6. Patient will have met/partially met personal goal of: being able to use right Upper Extremity to bath herself ongoing         Plan     Cont per POC, posture education, ROM, and strength    Belle Beck, PTA

## 2022-06-21 NOTE — PROGRESS NOTES
SUBJECTIVE:    Patient ID: Monique Trujillo is a 71 y.o. female.    Chief Complaint: Low-back Pain (5/18/22 EPI L2,L3)    Monique Trujillo is a 71 y.o. female with a history of diabetes and hypertension who presents today for f/u s/p Lumbar ELINA at L2-3. Reports 60% improvement in symptoms. She is very happy with results. She is s/p right shoulder arthroplasty. She started PT today. She has history of cervical fusion in 1999.  She has had 2 lumbar spine surgeries.  Most recent in January of 2020 with Dr. Brantley.   Prior to her most recent surgery she was having low back pain and bilateral leg pain/numbness.  Back pain and leg pain decreased but she was left with residual leg numbness which persists to this day. Denies b/b changes. Denies LE weakness. Pain today Is rated 0/10  Patient is new to our clinic. Procedure was directly referred by Dr. Tobias.    Past Medical History:   Diagnosis Date    Allergy     Arthritis     Chronic lower back pain     Depression     Fever blister     GERD (gastroesophageal reflux disease)     Hemorrhoids, internal 11/05/2014    Hyperlipidemia     Hypertension     Joint pain     Morbid obesity with BMI of 40.0-44.9, adult 02/24/2015    Multiple gastric ulcers 12/14/2017    TIMMY (obstructive sleep apnea)     does not use CPAP    Pulmonary hypertension     Skin cancer     Type 2 diabetes mellitus with hyperglycemia, without long-term current use of insulin 4/20/2022     Social History     Socioeconomic History    Marital status:     Years of education: 12   Occupational History     Employer: [employer]   Tobacco Use    Smoking status: Never Smoker    Smokeless tobacco: Never Used   Substance and Sexual Activity    Alcohol use: No     Alcohol/week: 0.0 standard drinks    Drug use: No    Sexual activity: Never     Past Surgical History:   Procedure Laterality Date    ANTERIOR CERVICAL CORPECTOMY W/ FUSION  1988    BACK SURGERY      CAUDAL EPIDURAL  STEROID INJECTION N/A 1/10/2019    Procedure: Injection-steroid-epidural-caudal;  Surgeon: Lydia Velásquez Jr., MD;  Location: [facility];  Service: Pain Management;  Laterality: N/A;    COLONOSCOPY N/A 11/2/2016    Procedure: COLONOSCOPY;  Surgeon: Viji Dewitt MD;  Location: [facility];  Service: Endoscopy;  Laterality: N/A;    EPIDURAL STEROID INJECTION N/A 5/18/2022    Procedure: Injection, Steroid, Epidural;  Surgeon: Fermín Luo MD;  Location: [facility] OR;  Service: Pain Management;  Laterality: N/A;  L2-l3     EPIDURAL STEROID INJECTION INTO LUMBAR SPINE N/A 11/8/2018    Procedure: Injection-steroid-epidural-lumbar-L2-3 (LEFT > RIGHT);  Surgeon: Lydia Velásquez Jr., MD;  Location: [facility];  Service: Pain Management;  Laterality: N/A;    FUSION OF SPINE WITH INSTRUMENTATION Left 1/3/2020    Procedure: FUSION, SPINE, WITH INSTRUMENTATION Stage 1 Left L3-4 LOIF Rise L Stage 2 Open L3-4 L4-5 Laminectomy Removal of L4-5 Hardware, L3-5 Posterior Instrumentation and extraction of Fusion at L3-4;  Surgeon: James Brantley MD;  Location: [facility];  Service: Neurosurgery;  Laterality: Left;  Procedure: Stage 1 Left L3-4 LOIF Rise L  Stage 2 Open L3-4 L4-5 Laminectomy Removal of L4-5 Hardware,     INJECTION OF ANESTHETIC AGENT AROUND GENITOFEMORAL NERVE Bilateral 8/1/2019    Procedure: BILATERAL SHOULDER ARTICULAR BRANCH BLOCK;  Surgeon: Lydia Velásquez Jr., MD;  Location: [facility];  Service: Pain Management;  Laterality: Bilateral;    JOINT REPLACEMENT      KNEE ARTHROSCOPY W/ MENISCAL REPAIR Right 2012    LUMBAR LAMINECTOMY  06/19/2015    REVERSE TOTAL SHOULDER ARTHROPLASTY Right 5/25/2022    Procedure: ARTHROPLASTY, SHOULDER, TOTAL, REVERSE;  Surgeon: Herb Tan II, MD;  Location: [facility];  Service: Orthopedics;  Laterality: Right;    SKIN CANCER EXCISION      forehead does not recall date    TOTAL HIP ARTHROPLASTY Right 04/28/2016         TUBAL LIGATION  1980     Family History  "  Problem Relation Age of Onset    Arthritis Mother     Cancer Mother         breast ca    Breast cancer Mother     Kidney disease Father     Alzheimer's disease Maternal Grandfather     Cancer Paternal Grandmother         colon ca    Colon cancer Paternal Grandmother      Vitals:    06/21/22 1301   BP: 132/75   Pulse: 84   Weight: 106.6 kg (235 lb)   Height: 5' 1" (1.549 m)       Review of Systems   Constitutional: Negative for chills, diaphoresis, fatigue, fever and unexpected weight change.   HENT: Negative for trouble swallowing.    Eyes: Negative for visual disturbance.   Respiratory: Negative for shortness of breath.    Cardiovascular: Negative for chest pain.   Gastrointestinal: Negative for abdominal pain, constipation, nausea and vomiting.   Genitourinary: Negative for difficulty urinating.   Musculoskeletal: Negative for arthralgias, back pain, gait problem, joint swelling, myalgias, neck pain and neck stiffness.   Neurological: Negative for dizziness, speech difficulty, weakness, light-headedness, numbness and headaches.          Objective:      PE:  Vitals:    06/21/22 1301   BP: 132/75   Pulse: 84   Weight: 106.6 kg (235 lb)   Height: 5' 1" (1.549 m)   PainSc: 0-No pain   PainLoc: Back       Physical Exam   Constitutional: She is oriented to person, place, and time. She appears well-developed and well-nourished.   HENT:   Head: Normocephalic and atraumatic.   Eyes: Conjunctivae normal and EOM are normal. Pupils are equal, round, and reactive to light.   Cardiovascular: Normal rate and regular rhythm.    Pulmonary/Chest: Effort normal and breath sounds normal.   Abdominal: Soft.   Neurological: She is alert and oriented to person, place, and time.   Skin: Skin is warm and dry. No rash noted. No erythema.   Psychiatric: She has a normal mood and affect. Her behavior is normal. Judgment and thought content normal.       EXTREMITIES:    Gen: No cyanosis, edema, varicosities, or tenderness to " palpation BLE   Skin: Warm, pink, dry, no rashes, no lesions BLE   Strength: 5/5 motor strength BLE   ROM: hips, knees and ankles without pain or instability.     SPINE:    L-spine ROM: Full ROM to flexion, extension, bilateral roation, and bilateral lateral flexion   mild pain left facet loading.   C-Spine ROM: Full ROM to flexion, extension, bilateral roation, and bilateral lateral flexion   Straight Leg Raise: Negative bilaterally sitting   SI Joint: No tenderness to palpation bilaterally  Trochanter Bursa:     NEUROLOGICAL:    Gen: No clonus or spasticity.   Gait: Normal without antalgic lean   DTR's: 2+ in bilateral patellar, and ankle   BABINSKI: Absent bilaterally  Sensory: Intact to light touch and proprioception BLE            FINDINGS:  This report assumes that there are 5 non-rib bearing lumbar vertebral bodies with the L5 vertebral body articulating with the sacrum. Accurate numbering of the spine levels would require imaging of the thoracolumbar spine to count ribs.     The prevertebral soft tissues are normal. There is grade 1 anterolisthesis of L4 on L5 (3 mm), there is grade 1 anterolisthesis of L3 on L4 (2 mm), and slight retrolisthesis of T12 on L1 (2 mm), there is approximately 8 degrees of dextroscoliosis of lumbar spine. Individual vertebral height is maintained. Marrow signal is within normal limits. Conus is normal in caliber and signal. The conus terminates at T12-L1.     Postoperative: Bilateral transpedicular screw and angeles fixation is seen from L3-L5. There is an anterior metallic intervertebral discectomy fusion device at L3-L4. There is been a discectomy fusion at L4-L5 with incomplete osseous fusion across the disc space. There appears to been a laminectomy at L4 to the spinous process resection L3 and L5. There is a 2.4 x 1.0 cm curvilinear fluid collection to the right of midline in the laminectomy bed favored to represent a postoperative seroma.     At T11-T12 there is moderate disc  height loss with minor bulging of the posterior annulus. No facet arthropathy, ligamentum flavum hypertrophy or spinal canal/neural foraminal stenosis is present.     At T12-L1 there is moderate to severe disc height loss with a moderate-sized broad-based posterior disc bulge. There is no facet arthropathy ligamentum flavum hypertrophy. This results in mild left greater than right neural foraminal stenosis and minimal spinal canal stenosis.     At L1-2, the disc demonstrates relative normal disc height and signal intensity with only minor bulging of the posterior annulus. There is no facet arthropathy, ligamentum flavum hypertrophy or spinal canal/neural foraminal stenosis.     At L2-3, the disc demonstrates moderate disc height loss with a moderate-sized broad-based posterior disc bulge. There is severe bilateral facet arthropathy without ligamentum flavum hypertrophy. There is mild patchy adjacent bone marrow edema posteriorly, likely degenerative/inflammatory. These findings in combination result in severe spinal canal and bilateral neural foraminal stenosis (there is crowding of the nerve root centrally (axial image 14). Small left greater than right facet joint effusions are present.     At L3-4, there is a discectomy fusion device at this level with minor bulging of the posterior annulus. There is minimal neural foraminal and spinal canal stenosis.     At L4-5, there is a discectomy fusion device this level with severe disc height loss. There is grade 1 anterolisthesis with dorsal uncovering of the remaining disc and a small broad-based posterior disc bulge. There is severe right greater than left neural foraminal stenosis with minimal spinal canal stenosis. There is effacement of the right greater the left lateral recess with possible impingement on the traversing L5 nerve root in the lateral recess.     At L5-S1, the disc shows relative normal disc height with normal posterior contour. There is mild  bilateral facet arthropathy with ligamentum flavum hypertrophy. The spinal canal and neural foramina appear patent.     The SI joints and visualized pelvis are within normal limits.     IMPRESSION:  1. Postoperative fusion unchanged from L3-L5.  2. Slight dextroscoliosis.  3. Grade 1 anterolisthesis of L3 on L4 and L4 on L5 with slight retrolisthesis of T12 on L1.  4. Degenerative change throughout the lumbar spine as outlined in detail above most pronounced at L2-L3 with severe spinal canal and bilateral neural foraminal stenosis at this level.       Assessment:     Monique Trujillo is a  71 y.o. female with   1. Lumbar degenerative disc disease    2. Spinal stenosis of lumbar region with neurogenic claudication           Plan:   1.  I have stressed the importance of physical activity and a home exercise plan to help with pain and improve overall health.  2. Monitor progress from ELINA and consider repeat if symptoms worsen  3. Continue PT for shoulder  4. F/u prn

## 2022-06-21 NOTE — PROGRESS NOTES
SUBJECTIVE:    Patient ID: Monique Trujillo is a 71 y.o. female.    Chief Complaint: Low-back Pain (5/18/22 EPI L2,L3)    This is a 71-year-old woman who sees Dr. Mahmood for her primary care.  History of diabetes and hypertension otherwise denies any chronic major medical problems.  Long history of spine problems.  She has history of cervical fusion in 1999.  She has had 2 lumbar spine surgeries.  Most recent in January of 2020 with Dr. Brantley .  Prior to her most recent surgery she was having low back pain and bilateral leg pain/numbness.  She improved following the surgery in so much as the back pain and leg pain decreased but she was left with residual leg numbness which persists to this day.  Her current complaint is of low back pain at the lumbosacral junction with pain radiating into both legs which is worsened by walking and relieved with rest.  Her bowel and bladder are intact.  No fever chills sweats or unexpected weight loss.  Pain level is 6/10.  I reviewed an MRI of the lumbar spine from 04/22/2022 which is summarized below:    FINDINGS:  This report assumes that there are 5 non-rib bearing lumbar vertebral bodies with the L5 vertebral body articulating with the sacrum. Accurate numbering of the spine levels would require imaging of the thoracolumbar spine to count ribs.     The prevertebral soft tissues are normal. There is grade 1 anterolisthesis of L4 on L5 (3 mm), there is grade 1 anterolisthesis of L3 on L4 (2 mm), and slight retrolisthesis of T12 on L1 (2 mm), there is approximately 8 degrees of dextroscoliosis of lumbar spine. Individual vertebral height is maintained. Marrow signal is within normal limits. Conus is normal in caliber and signal. The conus terminates at T12-L1.     Postoperative: Bilateral transpedicular screw and angeles fixation is seen from L3-L5. There is an anterior metallic intervertebral discectomy fusion device at L3-L4. There is been a discectomy fusion at L4-L5 with  incomplete osseous fusion across the disc space. There appears to been a laminectomy at L4 to the spinous process resection L3 and L5. There is a 2.4 x 1.0 cm curvilinear fluid collection to the right of midline in the laminectomy bed favored to represent a postoperative seroma.     At T11-T12 there is moderate disc height loss with minor bulging of the posterior annulus. No facet arthropathy, ligamentum flavum hypertrophy or spinal canal/neural foraminal stenosis is present.     At T12-L1 there is moderate to severe disc height loss with a moderate-sized broad-based posterior disc bulge. There is no facet arthropathy ligamentum flavum hypertrophy. This results in mild left greater than right neural foraminal stenosis and minimal spinal canal stenosis.     At L1-2, the disc demonstrates relative normal disc height and signal intensity with only minor bulging of the posterior annulus. There is no facet arthropathy, ligamentum flavum hypertrophy or spinal canal/neural foraminal stenosis.     At L2-3, the disc demonstrates moderate disc height loss with a moderate-sized broad-based posterior disc bulge. There is severe bilateral facet arthropathy without ligamentum flavum hypertrophy. There is mild patchy adjacent bone marrow edema posteriorly, likely degenerative/inflammatory. These findings in combination result in severe spinal canal and bilateral neural foraminal stenosis (there is crowding of the nerve root centrally (axial image 14). Small left greater than right facet joint effusions are present.     At L3-4, there is a discectomy fusion device at this level with minor bulging of the posterior annulus. There is minimal neural foraminal and spinal canal stenosis.     At L4-5, there is a discectomy fusion device this level with severe disc height loss. There is grade 1 anterolisthesis with dorsal uncovering of the remaining disc and a small broad-based posterior disc bulge. There is severe right greater than left  neural foraminal stenosis with minimal spinal canal stenosis. There is effacement of the right greater the left lateral recess with possible impingement on the traversing L5 nerve root in the lateral recess.     At L5-S1, the disc shows relative normal disc height with normal posterior contour. There is mild bilateral facet arthropathy with ligamentum flavum hypertrophy. The spinal canal and neural foramina appear patent.     The SI joints and visualized pelvis are within normal limits.     IMPRESSION:  1. Postoperative fusion unchanged from L3-L5.  2. Slight dextroscoliosis.  3. Grade 1 anterolisthesis of L3 on L4 and L4 on L5 with slight retrolisthesis of T12 on L1.  4. Degenerative change throughout the lumbar spine as outlined in detail above most pronounced at L2-L3 with severe spinal canal and bilateral neural foraminal stenosis at this level.         Low-back Pain  Pertinent negatives include no abdominal pain, chest pain, fever, headaches, numbness or weakness.         Past Medical History:   Diagnosis Date    Allergy     Arthritis     Chronic lower back pain     Depression     Fever blister     GERD (gastroesophageal reflux disease)     Hemorrhoids, internal 11/05/2014    Hyperlipidemia     Hypertension     Joint pain     Morbid obesity with BMI of 40.0-44.9, adult 02/24/2015    Multiple gastric ulcers 12/14/2017    TIMMY (obstructive sleep apnea)     does not use CPAP    Pulmonary hypertension     Skin cancer     Type 2 diabetes mellitus with hyperglycemia, without long-term current use of insulin 4/20/2022     Social History     Socioeconomic History    Marital status:     Years of education: 12   Occupational History     Employer: [employer]   Tobacco Use    Smoking status: Never Smoker    Smokeless tobacco: Never Used   Substance and Sexual Activity    Alcohol use: No     Alcohol/week: 0.0 standard drinks    Drug use: No    Sexual activity: Never     Past Surgical History:    Procedure Laterality Date    ANTERIOR CERVICAL CORPECTOMY W/ FUSION  1988    BACK SURGERY      CAUDAL EPIDURAL STEROID INJECTION N/A 1/10/2019    Procedure: Injection-steroid-epidural-caudal;  Surgeon: Lydia Velásquez Jr., MD;  Location: [facility];  Service: Pain Management;  Laterality: N/A;    COLONOSCOPY N/A 11/2/2016    Procedure: COLONOSCOPY;  Surgeon: Viji Dewitt MD;  Location[facility];  Service: Endoscopy;  Laterality: N/A;    EPIDURAL STEROID INJECTION N/A 5/18/2022    Procedure: Injection, Steroid, Epidural;  Surgeon: Fermín Luo MD;  Location: [facility];  Service: Pain Management;  Laterality: N/A;  L2-l3     EPIDURAL STEROID INJECTION INTO LUMBAR SPINE N/A 11/8/2018    Procedure: Injection-steroid-epidural-lumbar-L2-3 (LEFT > RIGHT);  Surgeon: Lydia Velásquez Jr., MD;  Location: [facility];  Service: Pain Management;  Laterality: N/A;    FUSION OF SPINE WITH INSTRUMENTATION Left 1/3/2020    Procedure: FUSION, SPINE, WITH INSTRUMENTATION Stage 1 Left L3-4 LOIF Rise L Stage 2 Open L3-4 L4-5 Laminectomy Removal of L4-5 Hardware, L3-5 Posterior Instrumentation and extraction of Fusion at L3-4;  Surgeon: James Brantley MD;  Location: [facility];  Service: Neurosurgery;  Laterality: Left;  Procedure: Stage 1 Left L3-4 LOIF Rise L  Stage 2 Open L3-4 L4-5 Laminectomy Removal of L4-5 Hardware,     INJECTION OF ANESTHETIC AGENT AROUND GENITOFEMORAL NERVE Bilateral 8/1/2019    Procedure: BILATERAL SHOULDER ARTICULAR BRANCH BLOCK;  Surgeon: Lydia Velásquez Jr., MD;  Location: [facility];  Service: Pain Management;  Laterality: Bilateral;    JOINT REPLACEMENT      KNEE ARTHROSCOPY W/ MENISCAL REPAIR Right 2012    LUMBAR LAMINECTOMY  06/19/2015    REVERSE TOTAL SHOULDER ARTHROPLASTY Right 5/25/2022    Procedure: ARTHROPLASTY, SHOULDER, TOTAL, REVERSE;  Surgeon: Herb Tan II, MD;  [facility];  Service: Orthopedics;  Laterality: Right;    SKIN CANCER EXCISION       "forehead does not recall date    TOTAL HIP ARTHROPLASTY Right 04/28/2016         TUBAL LIGATION  1980     Family History   Problem Relation Age of Onset    Arthritis Mother     Cancer Mother         breast ca    Breast cancer Mother     Kidney disease Father     Alzheimer's disease Maternal Grandfather     Cancer Paternal Grandmother         colon ca    Colon cancer Paternal Grandmother      Vitals:    06/21/22 1301   BP: 132/75   Pulse: 84   Weight: 106.6 kg (235 lb)   Height: 5' 1" (1.549 m)       Review of Systems   Constitutional: Negative for chills, diaphoresis, fatigue, fever and unexpected weight change.   HENT: Negative for trouble swallowing.    Eyes: Negative for visual disturbance.   Respiratory: Negative for shortness of breath.    Cardiovascular: Negative for chest pain.   Gastrointestinal: Negative for abdominal pain, constipation, nausea and vomiting.   Genitourinary: Negative for difficulty urinating.   Musculoskeletal: Negative for arthralgias, back pain, gait problem, joint swelling, myalgias, neck pain and neck stiffness.   Neurological: Negative for dizziness, speech difficulty, weakness, light-headedness, numbness and headaches.          Objective:      Physical Exam  Neurological:      Mental Status: She is alert and oriented to person, place, and time.      Comments: He is awake and in no acute distress  She is ambulatory with a rolling walker  Mild tenderness to palpation lumbar paraspinous musculature with no palpable masses  Reflexes- +1-+2 reflexes at the following:   C5-Biceps   C6-Brachioradialis   C7-Triceps   L3/4-Patellar   S1-Achilles  Keith sign negative bilaterally  Strength testing- 5/5 strength in the following muscle groups:  C5-Elbow flexion  C6-Wrist extension  C7-Elbow extension  C8-Finger flexion  T1-Finger abduction  L2-Hip flexion  L3-Knee extension  L4-Ankle dorsiflexion  L5-Great toe extension  S1-Ankle plantar flexion                 Assessment:       No " diagnosis found.       Plan:     she has a nonfocal neurological examination and no historical red flags.  I think she has symptomatic lumbar spinal stenosis.  I recommend she try an interlaminar injection at L2-3.  If she fails that she could consider neurosurgical consultation.  She can follow up with me after the procedure      There are no diagnoses linked to this encounter.

## 2022-06-23 ENCOUNTER — DOCUMENTATION ONLY (OUTPATIENT)
Dept: REHABILITATION | Facility: HOSPITAL | Age: 72
End: 2022-06-23

## 2022-06-23 ENCOUNTER — CLINICAL SUPPORT (OUTPATIENT)
Dept: REHABILITATION | Facility: HOSPITAL | Age: 72
End: 2022-06-23
Attending: ORTHOPAEDIC SURGERY
Payer: MEDICARE

## 2022-06-23 DIAGNOSIS — M62.81 MUSCLE WEAKNESS OF RIGHT UPPER EXTREMITY: ICD-10-CM

## 2022-06-23 DIAGNOSIS — M25.511 ACUTE PAIN OF RIGHT SHOULDER: Primary | ICD-10-CM

## 2022-06-23 DIAGNOSIS — M25.611 IMPAIRED RANGE OF MOTION OF RIGHT SHOULDER: ICD-10-CM

## 2022-06-23 PROCEDURE — 97140 MANUAL THERAPY 1/> REGIONS: CPT | Mod: PN

## 2022-06-23 PROCEDURE — 97110 THERAPEUTIC EXERCISES: CPT | Mod: PN

## 2022-06-23 NOTE — PROGRESS NOTES
Physical Therapy Daily Treatment Note     Name: Monique Trujillo  Clinic Number: [id number]    Therapy Diagnosis:   Encounter Diagnoses   Name Primary?    Acute pain of right shoulder Yes    Muscle weakness of right upper extremity     Impaired range of motion of right shoulder      Physician: Herb Tan II, MD    Visit Date: 6/23/2022    Physician: Herb Tan II, MD    Physician Orders: PT Eval and Treat  Medical Diagnosis from Referral: Z96.611 (ICD-10-CM) - S/P reverse total shoulder arthroplasty, right  Evaluation Date: 6/14/2022  Authorization Period Expiration: 12/31/22  Plan of Care Expiration: 9/14/2022                Progress Update: 7/20/2022               Visit # / Visits authorized: 1 / 11  + eval   ([email or web address])                FOTO: Visit #1 - 1/3          Time In: 1140  Time Out: 1220  Total Billable Time: 40 minutes    Precautions: Standard    Subjective     Pt reports: she is doing well,she has been doing her home stretches, right shoulder pain is controlled  She was compliant with home exercise program.  Response to previous treatment: first visit after eval  Functional change: none today    Pain: 0/10  Location: right shoulder      Objective4     DOS 5/25/22  Monique received therapeutic exercises to develop strength, ROM and posture for 30 minutes including:  UBE 2/2    Shoulder shrugs x 20  Shoulder squeezes x 20    Adduction isometric x 20  IR isometric x 20  Elbow flex/ext x 20  Wrist flexion/extension x 20    Overhead Pulleys flexion x 20     Slides up stair rail x 20    Supine dowel pressup with dowel 2 x 10    Supine flexion to 90 deg with dowel 2 x 10    Manual therapy: x 10 minutes  Myofacial Release with therapy bar to R UT  PROM R shoulder: flexion    Home Exercises Provided and Patient Education Provided     Education provided:   - cont HEP  - posture    Written Home Exercises Provided: Patient instructed to cont prior HEP.  Exercises were reviewed and Monique was able  to demonstrate them prior to the end of the session.  Monique demonstrated good  understanding of the education provided.     See EMR under Patient Instructions for exercises provided prior visit.    Assessment     Kiloemagabriel was able to complete all therex w/o pain provocation.  She has rounded shoulder posture which she was able to correct with VC, but was not able to carry over more than a few seconds.  Continued UE strengthening per MD protocol, posture education to improve ADL's, functional activies.    Monique Is progressing well towards her goals.   Pt prognosis is Good.     Pt will continue to benefit from skilled outpatient physical therapy to address the deficits listed in the problem list box on initial evaluation, provide pt/family education and to maximize pt's level of independence in the home and community environment.     Pt's spiritual, cultural and educational needs considered and pt agreeable to plan of care and goals.    Anticipated barriers to physical therapy: none    GOALS:  SHORT TERM GOALS: 4 weeks, () Progress  6/23/2022   1. Recent signs and systems trend is improving in order to progress towards LTG's. ongoing    2. Patient will be independent with HEP in order to further progress and return to maximal function.  ongoing   3. Pain rating at Worst: 4/10 in order to progress towards increased independence with activity. ongoing    4. Patient will be able to correct postural deviations in sitting and standing, to decrease pain and promote postural awareness for injury prevention.  ongoing       LONG TERM GOALS: 10 weeks, () Progress  6/23/2022   1. Patient will return to normal ADL, recreational, and work related activities with less pain and limitation.  ongoing    2. Patient will improve AROM to stated goals in order to return to maximal functional potential.  ongoing    3. Patient will improve Strength to stated goals of appropriate musculature in order to improve functional independence.    ongoing   4. Pain Rating at Best: 1/10 to improve Quality of Life.   ongoing   5. Patient will meet predicted functional outcome (FOTO) score: 25% to increase self-worth & perceived functional ability.  ongoing   6. Patient will have met/partially met personal goal of: being able to use right Upper Extremity to bath herself ongoing         Plan     Cont per POC, posture education, ROM, and strength    Didier Can, PT

## 2022-06-23 NOTE — PROGRESS NOTES
30 day visit PT-PTA face-face discussion with BRADLY Can re: patient status, POC, and plan for progression done 6/23/22.  Belle Beck, PTA

## 2022-06-27 ENCOUNTER — CLINICAL SUPPORT (OUTPATIENT)
Dept: REHABILITATION | Facility: HOSPITAL | Age: 72
End: 2022-06-27
Attending: ORTHOPAEDIC SURGERY
Payer: MEDICARE

## 2022-06-27 DIAGNOSIS — M25.611 IMPAIRED RANGE OF MOTION OF RIGHT SHOULDER: ICD-10-CM

## 2022-06-27 DIAGNOSIS — M25.511 ACUTE PAIN OF RIGHT SHOULDER: Primary | ICD-10-CM

## 2022-06-27 DIAGNOSIS — M62.81 MUSCLE WEAKNESS OF RIGHT UPPER EXTREMITY: ICD-10-CM

## 2022-06-27 PROCEDURE — 97140 MANUAL THERAPY 1/> REGIONS: CPT | Mod: PN

## 2022-06-27 PROCEDURE — 97110 THERAPEUTIC EXERCISES: CPT | Mod: PN

## 2022-06-27 NOTE — PROGRESS NOTES
Physical Therapy Daily Treatment Note     Name: Monique Trujillo  Clinic Number: [id number]    Therapy Diagnosis:   Encounter Diagnoses   Name Primary?    Acute pain of right shoulder Yes    Muscle weakness of right upper extremity     Impaired range of motion of right shoulder      Physician: Herb Tan II, MD    Visit Date: 6/27/2022    Physician: Herb Tan II, MD    Physician Orders: PT Eval and Treat  Medical Diagnosis from Referral: Z96.611 (ICD-10-CM) - S/P reverse total shoulder arthroplasty, right  Evaluation Date: 6/14/2022  Authorization Period Expiration: 12/31/22  Plan of Care Expiration: 9/14/2022                Progress Update: 7/20/2022               Visit # / Visits authorized: 1 / 11  + eval   ([email or web address])                FOTO: Visit #1 - 1/3          Time In: 830  Time Out: 915  Total Billable Time: 45 minutes    Precautions: Standard    Subjective     Pt reports: she is doing well,she has been doing her home stretches, right shoulder pain is controlled  She was compliant with home exercise program.  Response to previous treatment: first visit after eval  Functional change: none today    Pain: 0/10  Location: right shoulder      Objective4     DOS 5/25/22  Monique received therapeutic exercises to develop strength, ROM and posture for 30 minutes including:  UBE 2/2    Shoulder shrugs x 20  Shoulder squeezes x 20    Shoulder isometrics  x 20 each direction    Landmine press with 5# bar x 15    Elbow flex/ext x 20 1#  Wrist flexion/extension x 20    Overhead Pulleys flexion x 2 min    Slides up stair rail x 20    Supine dowel pressup with dowel 2 x 10    Supine flexion to 90 deg with dowel 2 x 10    Manual therapy: x 15 minutes  Myofacial Release with therapy bar to R UT  PROM R shoulder: flexion    Home Exercises Provided and Patient Education Provided     Education provided:   - cont HEP  - posture    Written Home Exercises Provided: Patient instructed to cont prior  HEP.  Exercises were reviewed and Monique was able to demonstrate them prior to the end of the session.  Monique demonstrated good  understanding of the education provided.     See EMR under Patient Instructions for exercises provided prior visit.    Assessment     Sky was able to complete all therex w/o pain provocation.  She has rounded shoulder posture which she was able to correct with VC, but was not able to carry over more than a few seconds. Weakness in her wrist, making elbow flexion exercise difficult, gave home wrist extensions to help improve.  Continued UE strengthening per MD protocol, posture education to improve ADL's, functional activies.    Monique Is progressing well towards her goals.   Pt prognosis is Good.     Pt will continue to benefit from skilled outpatient physical therapy to address the deficits listed in the problem list box on initial evaluation, provide pt/family education and to maximize pt's level of independence in the home and community environment.     Pt's spiritual, cultural and educational needs considered and pt agreeable to plan of care and goals.    Anticipated barriers to physical therapy: none    GOALS:  SHORT TERM GOALS: 4 weeks, () Progress  6/27/2022   1. Recent signs and systems trend is improving in order to progress towards LTG's. Ongoing  6/27/2022      2. Patient will be independent with HEP in order to further progress and return to maximal function.  ongoing  6/27/2022     3. Pain rating at Worst: 4/10 in order to progress towards increased independence with activity. Ongoing  6/27/2022      4. Patient will be able to correct postural deviations in sitting and standing, to decrease pain and promote postural awareness for injury prevention.  Ongoing  6/27/2022         LONG TERM GOALS: 10 weeks, () Progress  6/27/2022   1. Patient will return to normal ADL, recreational, and work related activities with less pain and limitation.  Ongoing  6/27/2022       2. Patient will improve AROM to stated goals in order to return to maximal functional potential.  Ongoing  6/27/2022      3. Patient will improve Strength to stated goals of appropriate musculature in order to improve functional independence.   ongoing  6/27/2022     4. Pain Rating at Best: 1/10 to improve Quality of Life.   ongoing  6/27/2022     5. Patient will meet predicted functional outcome (FOTO) score: 25% to increase self-worth & perceived functional ability.  ongoing  6/27/2022     6. Patient will have met/partially met personal goal of: being able to use right Upper Extremity to bath herself Ongoing  6/27/2022           Plan     Cont per POC, posture education, ROM, and strength    Didier Can, PT

## 2022-06-29 DIAGNOSIS — Z96.611 S/P REVERSE TOTAL SHOULDER ARTHROPLASTY, RIGHT: Primary | ICD-10-CM

## 2022-06-30 ENCOUNTER — CLINICAL SUPPORT (OUTPATIENT)
Dept: REHABILITATION | Facility: HOSPITAL | Age: 72
End: 2022-06-30
Attending: ORTHOPAEDIC SURGERY
Payer: MEDICARE

## 2022-06-30 ENCOUNTER — CLINICAL SUPPORT (OUTPATIENT)
Dept: UROLOGY | Facility: CLINIC | Age: 72
End: 2022-06-30
Payer: MEDICARE

## 2022-06-30 DIAGNOSIS — N39.46 MIXED STRESS AND URGE URINARY INCONTINENCE: Primary | ICD-10-CM

## 2022-06-30 DIAGNOSIS — Z96.611 S/P REVERSE TOTAL SHOULDER ARTHROPLASTY, RIGHT: Primary | ICD-10-CM

## 2022-06-30 LAB — POC RESIDUAL URINE VOLUME: 45 ML (ref 0–100)

## 2022-06-30 PROCEDURE — 51798 US URINE CAPACITY MEASURE: CPT | Mod: S$GLB,,, | Performed by: NURSE PRACTITIONER

## 2022-06-30 PROCEDURE — 99499 NO LOS: ICD-10-PCS | Mod: S$GLB,,, | Performed by: NURSE PRACTITIONER

## 2022-06-30 PROCEDURE — 87086 URINE CULTURE/COLONY COUNT: CPT | Performed by: NURSE PRACTITIONER

## 2022-06-30 PROCEDURE — 99499 UNLISTED E&M SERVICE: CPT | Mod: S$GLB,,, | Performed by: NURSE PRACTITIONER

## 2022-06-30 PROCEDURE — 97110 THERAPEUTIC EXERCISES: CPT | Mod: PN

## 2022-06-30 PROCEDURE — 51798 POCT BLADDER SCAN: ICD-10-PCS | Mod: S$GLB,,, | Performed by: NURSE PRACTITIONER

## 2022-06-30 PROCEDURE — 97140 MANUAL THERAPY 1/> REGIONS: CPT | Mod: PN

## 2022-06-30 NOTE — PROGRESS NOTES
Physical Therapy Daily Treatment Note     Name: Monique Trujillo  Clinic Number: [id number]    Therapy Diagnosis:   No diagnosis found.  Physician: Herb Tan II, MD    Visit Date: 6/30/2022    Physician: Herb Tan II, MD    Physician Orders: PT Eval and Treat  Medical Diagnosis from Referral: Z96.611 (ICD-10-CM) - S/P reverse total shoulder arthroplasty, right  Evaluation Date: 6/14/2022  Authorization Period Expiration: 12/31/22  Plan of Care Expiration: 9/14/2022                Progress Update: 7/20/2022               Visit # / Visits authorized: 5 / 11  + eval   ([id number])                FOTO: Visit #1 - 1/3          Time In: 1000  Time Out: 1045  Total Billable Time: 45 minutes    Precautions: Standard    Subjective     Pt reports: she is doing ok, shoulder feeling better  She was compliant with home exercise program.  Response to previous treatment: Positive  Functional change: less right shoulder pain    Pain: 2/10  Location: right shoulder      Objective4     DOS 5/25/22  Monique received therapeutic exercises to develop strength, ROM and posture for 30 minutes including:  UBE 2/2    Shoulder shrugs x 20  Shoulder squeezes x 20    Shoulder isometrics  x 10 each direction Manual Resistance    Landmine press with 5# bar x 15    Elbow flex/ext x 20 1#  Wrist flexion/extension x 20    Overhead Pulleys flexion x 2 min    Slides up stair rail x 20  Finger walk to #18 x 3    Supine dowel pressup with dowel 2 x 10    Supine flexion to 90 deg with dowel 2 x 10    Manual therapy: x 15 minutes  Myofacial Release with therapy bar to R UT  PROM R shoulder: flexion    Home Exercises Provided and Patient Education Provided     Education provided:   - cont HEP  - posture    Written Home Exercises Provided: Patient instructed to cont prior HEP.  Exercises were reviewed and Monique was able to demonstrate them prior to the end of the session.  Monique demonstrated good  understanding of the education  provided.     See EMR under Patient Instructions for exercises provided prior visit.    Assessment     Sky was able to complete all therex w/o pain provocation.  She has rounded shoulder posture which she was able to correct with VC, but was not able to carry over more than a few seconds. Weakness in her wrist, making elbow flexion exercise difficult, gave home wrist extensions to help improve.  Continued UE strengthening per MD protocol, posture education to improve ADL's, functional activies.    Monique Is progressing well towards her goals.   Pt prognosis is Good.     Pt will continue to benefit from skilled outpatient physical therapy to address the deficits listed in the problem list box on initial evaluation, provide pt/family education and to maximize pt's level of independence in the home and community environment.     Pt's spiritual, cultural and educational needs considered and pt agreeable to plan of care and goals.    Anticipated barriers to physical therapy: none    GOALS:  SHORT TERM GOALS: 4 weeks, () Progress  6/30/2022   1. Recent signs and systems trend is improving in order to progress towards LTG's. Ongoing  6/30/2022      2. Patient will be independent with HEP in order to further progress and return to maximal function. MET   3. Pain rating at Worst: 4/10 in order to progress towards increased independence with activity. Ongoing  6/30/2022      4. Patient will be able to correct postural deviations in sitting and standing, to decrease pain and promote postural awareness for injury prevention.  Ongoing  6/30/2022         LONG TERM GOALS: 10 weeks, () Progress  6/30/2022   1. Patient will return to normal ADL, recreational, and work related activities with less pain and limitation.  Ongoing  6/30/2022      2. Patient will improve AROM to stated goals in order to return to maximal functional potential.  Ongoing  6/30/2022      3. Patient will improve Strength to stated goals of appropriate  musculature in order to improve functional independence.   ongoing  6/30/2022     4. Pain Rating at Best: 1/10 to improve Quality of Life.   ongoing  6/30/2022     5. Patient will meet predicted functional outcome (FOTO) score: 25% to increase self-worth & perceived functional ability.  ongoing  6/30/2022     6. Patient will have met/partially met personal goal of: being able to use right Upper Extremity to bath herself Ongoing  6/30/2022           Plan     Cont per POC, posture education, ROM, and strength    Didier Can, PT

## 2022-06-30 NOTE — PROGRESS NOTES
Pt arrived in clinic for repeat urine culture/ pvr  Pt voided 100ml harriett, clear urine  Bladder scanned, PVR- 48  Specimen labeled and sent to lab for testing  Pt left clinic in satisfactory condition

## 2022-07-01 LAB — BACTERIA UR CULT: NORMAL

## 2022-07-06 ENCOUNTER — DOCUMENTATION ONLY (OUTPATIENT)
Dept: REHABILITATION | Facility: HOSPITAL | Age: 72
End: 2022-07-06
Payer: MEDICARE

## 2022-07-06 ENCOUNTER — CLINICAL SUPPORT (OUTPATIENT)
Dept: REHABILITATION | Facility: HOSPITAL | Age: 72
End: 2022-07-06
Attending: ORTHOPAEDIC SURGERY
Payer: MEDICARE

## 2022-07-06 DIAGNOSIS — M62.81 MUSCLE WEAKNESS OF RIGHT UPPER EXTREMITY: ICD-10-CM

## 2022-07-06 DIAGNOSIS — M25.511 ACUTE PAIN OF RIGHT SHOULDER: Primary | ICD-10-CM

## 2022-07-06 DIAGNOSIS — M25.611 IMPAIRED RANGE OF MOTION OF RIGHT SHOULDER: ICD-10-CM

## 2022-07-06 PROCEDURE — 97110 THERAPEUTIC EXERCISES: CPT | Mod: PN,CQ

## 2022-07-06 PROCEDURE — 97140 MANUAL THERAPY 1/> REGIONS: CPT | Mod: PN,CQ

## 2022-07-06 NOTE — PROGRESS NOTES
30 day visit PT-PTA face-face discussion with BRADLY Can re: patient status, POC, and plan for progression done 7/6/22.  Belle Beck, PTA

## 2022-07-06 NOTE — PROGRESS NOTES
Physical Therapy Daily Treatment Note     Name: Monique Trujillo  Clinic Number: [id number]    Therapy Diagnosis:   Encounter Diagnoses   Name Primary?    Acute pain of right shoulder Yes    Muscle weakness of right upper extremity     Impaired range of motion of right shoulder      Physician: Herb Tan II, MD    Visit Date: 7/6/2022    Physician: Herb Tan II, MD    Physician Orders: PT Eval and Treat  Medical Diagnosis from Referral: Z96.611 (ICD-10-CM) - S/P reverse total shoulder arthroplasty, right  Evaluation Date: 6/14/2022  Authorization Period Expiration: 12/31/22  Plan of Care Expiration: 9/14/2022                Progress Update: 7/20/2022               Visit # / Visits authorized: 5 / 11  + eval   ([id number])                FOTO: Visit #1 - 1/3          Time In: 1109  Time Out: 1154  Total Billable Time: 45 minutes    Precautions: Standard    Subjective     Pt reports: doing well  She was compliant with home exercise program.  Response to previous treatment: Positive  Functional change: less right shoulder pain    Pain: 0/10  Location: right shoulder      Objective4     DOS 5/25/22    Monique received therapeutic exercises to develop strength, ROM and posture for 46 minutes including:    UBE 2/2    Shoulder shrugs x 20  Shoulder squeezes x 20    Shoulder isometrics  x 10 each direction Manual Resistance    Landmine press with 5# bar x 15    Elbow flex/ext x 20 1#  Wrist flexion/extension x 20    Overhead Pulleys flexion x 2 min    Slides up stair rail x 20  Finger walk to #18 x 3    Supine dowel pressup with dowel 2 x 10    Supine flexion to 90 deg with dowel 2 x 10    Manual therapy: x 8 minutes  Myofacial Release with therapy bar to R UT  PROM R shoulder: flexion    Home Exercises Provided and Patient Education Provided     Education provided:   - cont HEP  - posture    Written Home Exercises Provided: Patient instructed to cont prior HEP.  Exercises were reviewed and Monique was  able to demonstrate them prior to the end of the session.  Monique demonstrated good  understanding of the education provided.     See EMR under Patient Instructions for exercises provided prior visit.    Assessment     Resemary is making good progress with strength.  She tends to abduct her elbow with active shoulder motions.  TC given with elbow flexion exercise to maintain neutral position.  Continued therapy per MD protocol to improve strength, functional activities.    Monique Is progressing well towards her goals.   Pt prognosis is Good.     Pt will continue to benefit from skilled outpatient physical therapy to address the deficits listed in the problem list box on initial evaluation, provide pt/family education and to maximize pt's level of independence in the home and community environment.     Pt's spiritual, cultural and educational needs considered and pt agreeable to plan of care and goals.    Anticipated barriers to physical therapy: none    GOALS:  SHORT TERM GOALS: 4 weeks, () Progress  7/6/2022   1. Recent signs and systems trend is improving in order to progress towards LTG's. Ongoing  7/6/2022      2. Patient will be independent with HEP in order to further progress and return to maximal function. MET   3. Pain rating at Worst: 4/10 in order to progress towards increased independence with activity. Ongoing  7/6/2022      4. Patient will be able to correct postural deviations in sitting and standing, to decrease pain and promote postural awareness for injury prevention.  Ongoing  7/6/2022         LONG TERM GOALS: 10 weeks, () Progress  7/6/2022   1. Patient will return to normal ADL, recreational, and work related activities with less pain and limitation.  Ongoing  7/6/2022      2. Patient will improve AROM to stated goals in order to return to maximal functional potential.  Ongoing  7/6/2022      3. Patient will improve Strength to stated goals of appropriate musculature in order to improve  functional independence.   ongoing  7/6/2022     4. Pain Rating at Best: 1/10 to improve Quality of Life.   ongoing  7/6/2022     5. Patient will meet predicted functional outcome (FOTO) score: 25% to increase self-worth & perceived functional ability.  ongoing  7/6/2022     6. Patient will have met/partially met personal goal of: being able to use right Upper Extremity to bath herself Ongoing  7/6/2022           Plan     Cont per POC, posture education, ROM, and strength    Belle Beck, PTA

## 2022-07-07 ENCOUNTER — OFFICE VISIT (OUTPATIENT)
Dept: ORTHOPEDICS | Facility: CLINIC | Age: 72
End: 2022-07-07
Payer: MEDICARE

## 2022-07-07 ENCOUNTER — HOSPITAL ENCOUNTER (OUTPATIENT)
Dept: RADIOLOGY | Facility: HOSPITAL | Age: 72
Discharge: HOME | End: 2022-07-07
Attending: ORTHOPAEDIC SURGERY
Payer: MEDICARE

## 2022-07-07 VITALS — HEIGHT: 61 IN | BODY MASS INDEX: 44.37 KG/M2 | WEIGHT: 235 LBS

## 2022-07-07 DIAGNOSIS — Z96.611 S/P REVERSE TOTAL SHOULDER ARTHROPLASTY, RIGHT: Primary | ICD-10-CM

## 2022-07-07 DIAGNOSIS — Z96.611 S/P REVERSE TOTAL SHOULDER ARTHROPLASTY, RIGHT: ICD-10-CM

## 2022-07-07 PROCEDURE — 1159F PR MEDICATION LIST DOCUMENTED IN MEDICAL RECORD: ICD-10-PCS | Mod: CPTII,S$GLB,, | Performed by: ORTHOPAEDIC SURGERY

## 2022-07-07 PROCEDURE — 99024 POSTOP FOLLOW-UP VISIT: CPT | Mod: S$GLB,,, | Performed by: ORTHOPAEDIC SURGERY

## 2022-07-07 PROCEDURE — 73030 X-RAY EXAM OF SHOULDER: CPT | Mod: TC,FY,RT

## 2022-07-07 PROCEDURE — 3062F POS MACROALBUMINURIA REV: CPT | Mod: CPTII,S$GLB,, | Performed by: ORTHOPAEDIC SURGERY

## 2022-07-07 PROCEDURE — 99999 PR PBB SHADOW E&M-EST. PATIENT-LVL II: ICD-10-PCS | Mod: PBBFAC,,, | Performed by: ORTHOPAEDIC SURGERY

## 2022-07-07 PROCEDURE — 1159F MED LIST DOCD IN RCRD: CPT | Mod: CPTII,S$GLB,, | Performed by: ORTHOPAEDIC SURGERY

## 2022-07-07 PROCEDURE — 73030 X-RAY EXAM OF SHOULDER: CPT | Mod: 26,RT,, | Performed by: RADIOLOGY

## 2022-07-07 PROCEDURE — 1160F PR REVIEW ALL MEDS BY PRESCRIBER/CLIN PHARMACIST DOCUMENTED: ICD-10-PCS | Mod: CPTII,S$GLB,, | Performed by: ORTHOPAEDIC SURGERY

## 2022-07-07 PROCEDURE — 3008F PR BODY MASS INDEX (BMI) DOCUMENTED: ICD-10-PCS | Mod: CPTII,S$GLB,, | Performed by: ORTHOPAEDIC SURGERY

## 2022-07-07 PROCEDURE — 1126F AMNT PAIN NOTED NONE PRSNT: CPT | Mod: CPTII,S$GLB,, | Performed by: ORTHOPAEDIC SURGERY

## 2022-07-07 PROCEDURE — 73030 XR SHOULDER COMPLETE 2 OR MORE VIEWS RIGHT: ICD-10-PCS | Mod: 26,RT,, | Performed by: RADIOLOGY

## 2022-07-07 PROCEDURE — 3008F BODY MASS INDEX DOCD: CPT | Mod: CPTII,S$GLB,, | Performed by: ORTHOPAEDIC SURGERY

## 2022-07-07 PROCEDURE — 1101F PR PT FALLS ASSESS DOC 0-1 FALLS W/OUT INJ PAST YR: ICD-10-PCS | Mod: CPTII,S$GLB,, | Performed by: ORTHOPAEDIC SURGERY

## 2022-07-07 PROCEDURE — 3044F PR MOST RECENT HEMOGLOBIN A1C LEVEL <7.0%: ICD-10-PCS | Mod: CPTII,S$GLB,, | Performed by: ORTHOPAEDIC SURGERY

## 2022-07-07 PROCEDURE — 3288F PR FALLS RISK ASSESSMENT DOCUMENTED: ICD-10-PCS | Mod: CPTII,S$GLB,, | Performed by: ORTHOPAEDIC SURGERY

## 2022-07-07 PROCEDURE — 3044F HG A1C LEVEL LT 7.0%: CPT | Mod: CPTII,S$GLB,, | Performed by: ORTHOPAEDIC SURGERY

## 2022-07-07 PROCEDURE — 1101F PT FALLS ASSESS-DOCD LE1/YR: CPT | Mod: CPTII,S$GLB,, | Performed by: ORTHOPAEDIC SURGERY

## 2022-07-07 PROCEDURE — 3062F PR POS MACROALBUMINURIA RESULT DOCUMENTED/REVIEW: ICD-10-PCS | Mod: CPTII,S$GLB,, | Performed by: ORTHOPAEDIC SURGERY

## 2022-07-07 PROCEDURE — 3066F PR DOCUMENTATION OF TREATMENT FOR NEPHROPATHY: ICD-10-PCS | Mod: CPTII,S$GLB,, | Performed by: ORTHOPAEDIC SURGERY

## 2022-07-07 PROCEDURE — 3066F NEPHROPATHY DOC TX: CPT | Mod: CPTII,S$GLB,, | Performed by: ORTHOPAEDIC SURGERY

## 2022-07-07 PROCEDURE — 3288F FALL RISK ASSESSMENT DOCD: CPT | Mod: CPTII,S$GLB,, | Performed by: ORTHOPAEDIC SURGERY

## 2022-07-07 PROCEDURE — 1126F PR PAIN SEVERITY QUANTIFIED, NO PAIN PRESENT: ICD-10-PCS | Mod: CPTII,S$GLB,, | Performed by: ORTHOPAEDIC SURGERY

## 2022-07-07 PROCEDURE — 99024 PR POST-OP FOLLOW-UP VISIT: ICD-10-PCS | Mod: S$GLB,,, | Performed by: ORTHOPAEDIC SURGERY

## 2022-07-07 PROCEDURE — 99999 PR PBB SHADOW E&M-EST. PATIENT-LVL II: CPT | Mod: PBBFAC,,, | Performed by: ORTHOPAEDIC SURGERY

## 2022-07-07 PROCEDURE — 1160F RVW MEDS BY RX/DR IN RCRD: CPT | Mod: CPTII,S$GLB,, | Performed by: ORTHOPAEDIC SURGERY

## 2022-07-07 NOTE — PROGRESS NOTES
CC:  71-year-old female follows up status post reverse right total shoulder arthroplasty.  Date of surgery is 05/25/2022.  She is 6 weeks out at this point.  Overall she is doing well.  Participating in physical therapy twice a week and currently states that she is pain-free.    Right Upper Extremity Examination     Skin is intact throughout, incision is healing well with no sign of infection  Motor is intact distally radial, median, ulnar, AIN, PIN   +2 radial and ulnar pulses   Sensation to light touch is intact distally radial, median, and ulnar     Examination of the Right shoulder:   ROM:   For - 90   Abd - 90   Ext - 30   Int - back pocket    Tenderness to palpation:   Subacromial space - negative  Biceps Tendon - negative  Anterior Glenohumeral Joint - negative  AC joint - negative  Glenohumeral instability - negative    X-ray images were examined and personally interpreted by me.  Three views the right shoulder dated 07/07/2022 show a reverse total shoulder arthroplasty in good alignment with no evidence of loosening.    Dx:  Status post reverse total shoulder arthroplasty, stable    Plan:  Continue to progress physical therapy and activity as tolerated.  Follow-up in 6 weeks with an x-ray.

## 2022-07-11 ENCOUNTER — CLINICAL SUPPORT (OUTPATIENT)
Dept: REHABILITATION | Facility: HOSPITAL | Age: 72
End: 2022-07-11
Attending: ORTHOPAEDIC SURGERY
Payer: MEDICARE

## 2022-07-11 DIAGNOSIS — M25.511 ACUTE PAIN OF RIGHT SHOULDER: Primary | ICD-10-CM

## 2022-07-11 DIAGNOSIS — M25.611 IMPAIRED RANGE OF MOTION OF RIGHT SHOULDER: ICD-10-CM

## 2022-07-11 DIAGNOSIS — M62.81 MUSCLE WEAKNESS OF RIGHT UPPER EXTREMITY: ICD-10-CM

## 2022-07-11 PROCEDURE — 97110 THERAPEUTIC EXERCISES: CPT | Mod: PN

## 2022-07-11 PROCEDURE — 97140 MANUAL THERAPY 1/> REGIONS: CPT | Mod: PN

## 2022-07-11 NOTE — PROGRESS NOTES
Physical Therapy Daily Treatment Note     Name: Monique Trujillo  Clinic Number: [id number]    Therapy Diagnosis:   Encounter Diagnoses   Name Primary?    Acute pain of right shoulder Yes    Muscle weakness of right upper extremity     Impaired range of motion of right shoulder      Physician: Herb Tan II, MD    Visit Date: 7/11/2022    Physician: Herb Tan II, MD    Physician Orders: PT Eval and Treat  Medical Diagnosis from Referral: Z96.611 (ICD-10-CM) - S/P reverse total shoulder arthroplasty, right  Evaluation Date: 6/14/2022  Authorization Period Expiration: 12/31/22  Plan of Care Expiration: 9/14/2022                Progress Update: 7/20/2022               Visit # / Visits authorized: 6 / 11  + eval   ([id number])                FOTO: Visit #1 - 1/3          Time In: 1100  Time Out: 1150  Total Billable Time: 50 minutes    Precautions: Standard    Subjective     Pt reports: is having a bad day some shoulder pain  She was compliant with home exercise program.  Response to previous treatment: Positive  Functional change: less right shoulder pain    Pain: 3/10  Location: right shoulder      Objective4     DOS 5/25/22    Monique received therapeutic exercises to develop strength, ROM and posture for 40 minutes including:    UBE 2/2    Shoulder shrugs x 20  Shoulder squeezes x 20  Shoulder isometrics  x 10 each direction Manual Resistance    Landmine press with 5# bar x 15    Elbow flex/ext x 20 1#  Wrist flexion/extension x 20    Overhead Pulleys flexion x 2 min    Slides up stair rail x 20  Finger walk to #18 x 3  CC 3# Rows x 15  CC 3# Extension x 15    Supine dowel pressup with dowel 2 x 10    Supine flexion to 90 deg with dowel 2 x 10    Manual therapy: x 10 minutes  Myofacial Release with therapy bar to R UT  PROM R shoulder: flexion    Home Exercises Provided and Patient Education Provided     Education provided:   - cont HEP  - posture    Written Home Exercises Provided: Patient  instructed to cont prior HEP.  Exercises were reviewed and Monique was able to demonstrate them prior to the end of the session.  Monique demonstrated good  understanding of the education provided.     See EMR under Patient Instructions for exercises provided prior visit.    Assessment     Kiloemagabriel is making progress with her strength and motion, overall she is still weak and lacks shoulder flexion.   She tends to abduct her elbow with active shoulder motions, and shrug her shoulder.  TC given with elbow flexion exercise to maintain neutral position.  Continued therapy per MD protocol to improve strength, functional activities.    Monique Is progressing well towards her goals.   Pt prognosis is Good.     Pt will continue to benefit from skilled outpatient physical therapy to address the deficits listed in the problem list box on initial evaluation, provide pt/family education and to maximize pt's level of independence in the home and community environment.     Pt's spiritual, cultural and educational needs considered and pt agreeable to plan of care and goals.    Anticipated barriers to physical therapy: none    GOALS:  SHORT TERM GOALS: 4 weeks, () Progress  7/11/2022   1. Recent signs and systems trend is improving in order to progress towards LTG's. Ongoing  7/11/2022      2. Patient will be independent with HEP in order to further progress and return to maximal function. MET   3. Pain rating at Worst: 4/10 in order to progress towards increased independence with activity. Ongoing  7/11/2022      4. Patient will be able to correct postural deviations in sitting and standing, to decrease pain and promote postural awareness for injury prevention.  Ongoing  7/11/2022         LONG TERM GOALS: 10 weeks, () Progress  7/11/2022   1. Patient will return to normal ADL, recreational, and work related activities with less pain and limitation.  Ongoing  7/11/2022      2. Patient will improve AROM to stated goals in order  to return to maximal functional potential.  Ongoing  7/11/2022      3. Patient will improve Strength to stated goals of appropriate musculature in order to improve functional independence.   ongoing  7/11/2022     4. Pain Rating at Best: 1/10 to improve Quality of Life.   ongoing  7/11/2022     5. Patient will meet predicted functional outcome (FOTO) score: 25% to increase self-worth & perceived functional ability.  ongoing  7/11/2022     6. Patient will have met/partially met personal goal of: being able to use right Upper Extremity to bath herself Ongoing  7/11/2022           Plan     Cont per POC, posture education, ROM, and strength    Didier Can, PT

## 2022-07-13 ENCOUNTER — CLINICAL SUPPORT (OUTPATIENT)
Dept: REHABILITATION | Facility: HOSPITAL | Age: 72
End: 2022-07-13
Attending: ORTHOPAEDIC SURGERY
Payer: MEDICARE

## 2022-07-13 DIAGNOSIS — M25.611 IMPAIRED RANGE OF MOTION OF RIGHT SHOULDER: ICD-10-CM

## 2022-07-13 DIAGNOSIS — M25.511 ACUTE PAIN OF RIGHT SHOULDER: Primary | ICD-10-CM

## 2022-07-13 DIAGNOSIS — M62.81 MUSCLE WEAKNESS OF RIGHT UPPER EXTREMITY: ICD-10-CM

## 2022-07-13 PROCEDURE — 97110 THERAPEUTIC EXERCISES: CPT | Mod: PN,CQ

## 2022-07-13 NOTE — PROGRESS NOTES
Physical Therapy Daily Treatment Note     Name: Monique Trujillo  Clinic Number: [id number]    Therapy Diagnosis:   Encounter Diagnoses   Name Primary?    Acute pain of right shoulder Yes    Muscle weakness of right upper extremity     Impaired range of motion of right shoulder      Physician: Herb Tan II, MD    Visit Date: 7/13/2022    Physician: Herb Tan II, MD    Physician Orders: PT Eval and Treat  Medical Diagnosis from Referral: Z96.611 (ICD-10-CM) - S/P reverse total shoulder arthroplasty, right  Evaluation Date: 6/14/2022  Authorization Period Expiration: 12/31/22  Plan of Care Expiration: 9/14/2022                Progress Update: 7/20/2022               Visit # / Visits authorized: 7 / 11  + eval   ([id number])                PTA visit # 1/5      FOTO: Visit #1 - 1/3          Time In: 1121 (pt arrived late for appt.  Pt thought appt time was at 1115)  Time Out: 1200  Total Billable Time:  39  minutes    Precautions: Standard    Subjective     Pt reports: is having a bad day some shoulder pain  She was compliant with home exercise program.  Response to previous treatment: Positive  Functional change: less right shoulder pain    Pain: 2/10  Location: right shoulder      Objective4     DOS 5/25/22    Monique received therapeutic exercises to develop strength, ROM and posture for 40 minutes including:    UBE 3'/3'    Shoulder shrugs x 20 reps   Shoulder squeezes x 20 reps   Shoulder isometrics  x 10 each direction Manual Resistance    Landmine press with 5# bar x 20 reps     Elbow flex/ext x 20 reps 1# (towel under elbow to prevent Abduction )  Wrist flexion/extension x 20 reps 1#    Overhead Pulleys flexion x 3 minutes    Slides up stair rail x 20  Finger walk to #21 x 3 reps   cable column  3# Rows x 15  cable column  3# Extension x 15    Supine dowel pressup with dowel 2 x 10    Supine flexion to 90 deg with dowel 2 x 10    Manual therapy: x 10 minutes  Myofacial Release with therapy  bar to R UT  PROM R shoulder: flexion    Home Exercises Provided and Patient Education Provided     Education provided:   - cont HEP  - posture    Written Home Exercises Provided: Patient instructed to cont prior HEP.  Exercises were reviewed and Monique was able to demonstrate them prior to the end of the session.  Monique demonstrated good  understanding of the education provided.     See EMR under Patient Instructions for exercises provided prior visit.    Assessment     Resemary is making progress with her strength and motion, overall she is still weak and lacks shoulder flexion.   She tends to abduct her elbow with active shoulder motions, and shrug her shoulder.  TC given with elbow flexion exercise to maintain neutral position.  Continued therapy per MD protocol to improve strength, functional activities.    Monique Is progressing well towards her goals.   Pt prognosis is Good.     Pt will continue to benefit from skilled outpatient physical therapy to address the deficits listed in the problem list box on initial evaluation, provide pt/family education and to maximize pt's level of independence in the home and community environment.     Pt's spiritual, cultural and educational needs considered and pt agreeable to plan of care and goals.    Anticipated barriers to physical therapy: none    GOALS:  SHORT TERM GOALS: 4 weeks, () Progress  7/13/2022   1. Recent signs and systems trend is improving in order to progress towards LTG's. Ongoing  7/13/2022      2. Patient will be independent with HEP in order to further progress and return to maximal function. MET   3. Pain rating at Worst: 4/10 in order to progress towards increased independence with activity. Ongoing  7/13/2022      4. Patient will be able to correct postural deviations in sitting and standing, to decrease pain and promote postural awareness for injury prevention.  Ongoing  7/13/2022         LONG TERM GOALS: 10 weeks, () Progress  7/13/2022    1. Patient will return to normal ADL, recreational, and work related activities with less pain and limitation.  Ongoing  7/13/2022      2. Patient will improve AROM to stated goals in order to return to maximal functional potential.  Ongoing  7/13/2022      3. Patient will improve Strength to stated goals of appropriate musculature in order to improve functional independence.   ongoing  7/13/2022     4. Pain Rating at Best: 1/10 to improve Quality of Life.   ongoing  7/13/2022     5. Patient will meet predicted functional outcome (FOTO) score: 25% to increase self-worth & perceived functional ability.  ongoing  7/13/2022     6. Patient will have met/partially met personal goal of: being able to use right Upper Extremity to bath herself Ongoing  7/13/2022           Plan     Cont per POC, posture education, ROM, and strength    Roxy Norton, PTA

## 2022-07-18 ENCOUNTER — CLINICAL SUPPORT (OUTPATIENT)
Dept: REHABILITATION | Facility: HOSPITAL | Age: 72
End: 2022-07-18
Attending: ORTHOPAEDIC SURGERY
Payer: MEDICARE

## 2022-07-18 DIAGNOSIS — M25.511 ACUTE PAIN OF RIGHT SHOULDER: Primary | ICD-10-CM

## 2022-07-18 DIAGNOSIS — M62.81 MUSCLE WEAKNESS OF RIGHT UPPER EXTREMITY: ICD-10-CM

## 2022-07-18 DIAGNOSIS — M25.611 IMPAIRED RANGE OF MOTION OF RIGHT SHOULDER: ICD-10-CM

## 2022-07-18 PROCEDURE — 97110 THERAPEUTIC EXERCISES: CPT | Mod: PN

## 2022-07-18 PROCEDURE — 97140 MANUAL THERAPY 1/> REGIONS: CPT | Mod: PN

## 2022-07-18 NOTE — PROGRESS NOTES
Physical Therapy Daily Treatment Note     Name: Monique Trujillo  Clinic Number: [id number]    Therapy Diagnosis:   Encounter Diagnoses   Name Primary?    Acute pain of right shoulder Yes    Muscle weakness of right upper extremity     Impaired range of motion of right shoulder      Physician: Herb Tan II, MD    Visit Date: 7/18/2022    Physician: Herb Tan II, MD    Physician Orders: PT Eval and Treat  Medical Diagnosis from Referral: Z96.611 (ICD-10-CM) - S/P reverse total shoulder arthroplasty, right  Evaluation Date: 6/14/2022  Authorization Period Expiration: 12/31/22  Plan of Care Expiration: 9/14/2022                Progress Update: 7/20/2022               Visit # / Visits authorized: 8 / 11  + eval   ([id number])                PTA visit # 0/5      FOTO: Visit #1 - 1/3          Time In: 1100  Time Out: 1200  Total Billable Time:  60  minutes    Precautions: Standard    Subjective     Pt reports: she is doing ok today, shoulder still feels weak  She was compliant with home exercise program.  Response to previous treatment: Positive  Functional change: less right shoulder pain    Pain: 2/10  Location: right shoulder      Objective4     DOS 5/25/22    Monique received therapeutic exercises to develop strength, ROM and posture for 45 minutes including:    UBE 3'/3'    Shoulder shrugs x 20 reps   Shoulder squeezes x 20 reps   Shoulder isometrics  x 10 each direction Manual Resistance    Landmine press with 5# bar x 20 reps     Elbow flex/ext x 20 reps 1# (towel under elbow to prevent Abduction )  Wrist flexion/extension x 20 reps 1#    Overhead Pulleys flexion x 3 minutes    Slides up stair rail x 20  Finger walk to #21 x 3 reps   cable column  3# Rows x 15  cable column  3# Extension x 15    Supine dowel pressup with dowel 2 x 10    Supine flexion to 90 deg with dowel 2 x 10    Manual therapy: x 15 minutes  Myofacial Release with therapy bar to R UT  PROM R shoulder: flexion, ER, IR and  ABD    Home Exercises Provided and Patient Education Provided     Education provided:   - cont HEP  - posture    Written Home Exercises Provided: Patient instructed to cont prior HEP.  Exercises were reviewed and Monique was able to demonstrate them prior to the end of the session.  Monique demonstrated good  understanding of the education provided.     See EMR under Patient Instructions for exercises provided prior visit.    Assessment     Resemary is making progress with her strength and motion, overall she is still weak and lacks shoulder flexion.   She tends to abduct her elbow with active shoulder motions, and shrug her shoulder.  TC given with elbow flexion exercise to maintain neutral position.  Continued therapy per MD protocol to improve strength, functional activities.    Monique Is progressing well towards her goals.   Pt prognosis is Good.     Pt will continue to benefit from skilled outpatient physical therapy to address the deficits listed in the problem list box on initial evaluation, provide pt/family education and to maximize pt's level of independence in the home and community environment.     Pt's spiritual, cultural and educational needs considered and pt agreeable to plan of care and goals.    Anticipated barriers to physical therapy: none    GOALS:  SHORT TERM GOALS: 4 weeks, () Progress  7/18/2022   1. Recent signs and systems trend is improving in order to progress towards LTG's. Ongoing  7/18/2022      2. Patient will be independent with HEP in order to further progress and return to maximal function. MET   3. Pain rating at Worst: 4/10 in order to progress towards increased independence with activity. Ongoing  7/18/2022      4. Patient will be able to correct postural deviations in sitting and standing, to decrease pain and promote postural awareness for injury prevention.  Ongoing  7/18/2022         LONG TERM GOALS: 10 weeks, () Progress  7/18/2022   1. Patient will return to normal  ADL, recreational, and work related activities with less pain and limitation.  Ongoing  7/18/2022      2. Patient will improve AROM to stated goals in order to return to maximal functional potential.  Ongoing  7/18/2022      3. Patient will improve Strength to stated goals of appropriate musculature in order to improve functional independence.   ongoing  7/18/2022     4. Pain Rating at Best: 1/10 to improve Quality of Life.   ongoing  7/18/2022     5. Patient will meet predicted functional outcome (FOTO) score: 25% to increase self-worth & perceived functional ability.  ongoing  7/18/2022     6. Patient will have met/partially met personal goal of: being able to use right Upper Extremity to bath herself Ongoing  7/18/2022           Plan     Cont per POC, posture education, ROM, and strength    Didier Can, PT

## 2022-07-19 ENCOUNTER — OFFICE VISIT (OUTPATIENT)
Dept: PULMONOLOGY | Facility: CLINIC | Age: 72
End: 2022-07-19
Payer: MEDICARE

## 2022-07-19 ENCOUNTER — TELEPHONE (OUTPATIENT)
Dept: GASTROENTEROLOGY | Facility: CLINIC | Age: 72
End: 2022-07-19
Payer: MEDICARE

## 2022-07-19 VITALS
HEIGHT: 61 IN | BODY MASS INDEX: 43.85 KG/M2 | OXYGEN SATURATION: 97 % | WEIGHT: 232.25 LBS | HEART RATE: 74 BPM | DIASTOLIC BLOOD PRESSURE: 79 MMHG | SYSTOLIC BLOOD PRESSURE: 130 MMHG

## 2022-07-19 DIAGNOSIS — E66.01 MORBID OBESITY: Primary | ICD-10-CM

## 2022-07-19 DIAGNOSIS — G47.30 SLEEP APNEA, UNSPECIFIED TYPE: ICD-10-CM

## 2022-07-19 DIAGNOSIS — E78.5 HYPERLIPIDEMIA: ICD-10-CM

## 2022-07-19 DIAGNOSIS — K21.9 GASTROESOPHAGEAL REFLUX DISEASE, UNSPECIFIED WHETHER ESOPHAGITIS PRESENT: ICD-10-CM

## 2022-07-19 DIAGNOSIS — G47.33 OSA (OBSTRUCTIVE SLEEP APNEA): ICD-10-CM

## 2022-07-19 PROCEDURE — 99499 RISK ADDL DX/OHS AUDIT: ICD-10-PCS | Mod: S$GLB,,, | Performed by: INTERNAL MEDICINE

## 2022-07-19 PROCEDURE — 3062F POS MACROALBUMINURIA REV: CPT | Mod: CPTII,S$GLB,, | Performed by: INTERNAL MEDICINE

## 2022-07-19 PROCEDURE — 1101F PR PT FALLS ASSESS DOC 0-1 FALLS W/OUT INJ PAST YR: ICD-10-PCS | Mod: CPTII,S$GLB,, | Performed by: INTERNAL MEDICINE

## 2022-07-19 PROCEDURE — 3075F PR MOST RECENT SYSTOLIC BLOOD PRESS GE 130-139MM HG: ICD-10-PCS | Mod: CPTII,S$GLB,, | Performed by: INTERNAL MEDICINE

## 2022-07-19 PROCEDURE — 1126F PR PAIN SEVERITY QUANTIFIED, NO PAIN PRESENT: ICD-10-PCS | Mod: CPTII,S$GLB,, | Performed by: INTERNAL MEDICINE

## 2022-07-19 PROCEDURE — 3044F PR MOST RECENT HEMOGLOBIN A1C LEVEL <7.0%: ICD-10-PCS | Mod: CPTII,S$GLB,, | Performed by: INTERNAL MEDICINE

## 2022-07-19 PROCEDURE — 3288F FALL RISK ASSESSMENT DOCD: CPT | Mod: CPTII,S$GLB,, | Performed by: INTERNAL MEDICINE

## 2022-07-19 PROCEDURE — 3066F NEPHROPATHY DOC TX: CPT | Mod: CPTII,S$GLB,, | Performed by: INTERNAL MEDICINE

## 2022-07-19 PROCEDURE — 3078F PR MOST RECENT DIASTOLIC BLOOD PRESSURE < 80 MM HG: ICD-10-PCS | Mod: CPTII,S$GLB,, | Performed by: INTERNAL MEDICINE

## 2022-07-19 PROCEDURE — 1126F AMNT PAIN NOTED NONE PRSNT: CPT | Mod: CPTII,S$GLB,, | Performed by: INTERNAL MEDICINE

## 2022-07-19 PROCEDURE — 3075F SYST BP GE 130 - 139MM HG: CPT | Mod: CPTII,S$GLB,, | Performed by: INTERNAL MEDICINE

## 2022-07-19 PROCEDURE — 99204 PR OFFICE/OUTPT VISIT, NEW, LEVL IV, 45-59 MIN: ICD-10-PCS | Mod: S$GLB,,, | Performed by: INTERNAL MEDICINE

## 2022-07-19 PROCEDURE — 3008F BODY MASS INDEX DOCD: CPT | Mod: CPTII,S$GLB,, | Performed by: INTERNAL MEDICINE

## 2022-07-19 PROCEDURE — 3044F HG A1C LEVEL LT 7.0%: CPT | Mod: CPTII,S$GLB,, | Performed by: INTERNAL MEDICINE

## 2022-07-19 PROCEDURE — 3288F PR FALLS RISK ASSESSMENT DOCUMENTED: ICD-10-PCS | Mod: CPTII,S$GLB,, | Performed by: INTERNAL MEDICINE

## 2022-07-19 PROCEDURE — 99999 PR PBB SHADOW E&M-EST. PATIENT-LVL III: ICD-10-PCS | Mod: PBBFAC,,, | Performed by: INTERNAL MEDICINE

## 2022-07-19 PROCEDURE — 99499 UNLISTED E&M SERVICE: CPT | Mod: S$GLB,,, | Performed by: INTERNAL MEDICINE

## 2022-07-19 PROCEDURE — 3008F PR BODY MASS INDEX (BMI) DOCUMENTED: ICD-10-PCS | Mod: CPTII,S$GLB,, | Performed by: INTERNAL MEDICINE

## 2022-07-19 PROCEDURE — 3062F PR POS MACROALBUMINURIA RESULT DOCUMENTED/REVIEW: ICD-10-PCS | Mod: CPTII,S$GLB,, | Performed by: INTERNAL MEDICINE

## 2022-07-19 PROCEDURE — 99999 PR PBB SHADOW E&M-EST. PATIENT-LVL III: CPT | Mod: PBBFAC,,, | Performed by: INTERNAL MEDICINE

## 2022-07-19 PROCEDURE — 1101F PT FALLS ASSESS-DOCD LE1/YR: CPT | Mod: CPTII,S$GLB,, | Performed by: INTERNAL MEDICINE

## 2022-07-19 PROCEDURE — 3078F DIAST BP <80 MM HG: CPT | Mod: CPTII,S$GLB,, | Performed by: INTERNAL MEDICINE

## 2022-07-19 PROCEDURE — 1159F PR MEDICATION LIST DOCUMENTED IN MEDICAL RECORD: ICD-10-PCS | Mod: CPTII,S$GLB,, | Performed by: INTERNAL MEDICINE

## 2022-07-19 PROCEDURE — 3066F PR DOCUMENTATION OF TREATMENT FOR NEPHROPATHY: ICD-10-PCS | Mod: CPTII,S$GLB,, | Performed by: INTERNAL MEDICINE

## 2022-07-19 PROCEDURE — 99204 OFFICE O/P NEW MOD 45 MIN: CPT | Mod: S$GLB,,, | Performed by: INTERNAL MEDICINE

## 2022-07-19 PROCEDURE — 1159F MED LIST DOCD IN RCRD: CPT | Mod: CPTII,S$GLB,, | Performed by: INTERNAL MEDICINE

## 2022-07-19 NOTE — TELEPHONE ENCOUNTER
----- Message from Mat Clemens sent at 7/19/2022  4:25 PM CDT -----  Type:  RX Refill Request    Who Called:  Pharmacy     Refill or New Rx:Refill     RX Name and Strength:pravastatin (PRAVACHOL) 40 MG tablet    How is the patient currently taking it? Sig - Route: Take 1 tablet (40 mg total) by mouth once daily. - Oral    Is this a 30 day or 90 day RX:    Preferred Pharmacy with phone number:[facility] - [address] - [place]    Local or Mail Order:Local     Ordering Provider:    Would the patient rather a call back or a response via [facility]? Call back     Best Call Back Number:[phone] (mobile)     Additional Information:

## 2022-07-19 NOTE — TELEPHONE ENCOUNTER
No new care gaps identified.  [facility] Embedded Care Gaps. Reference number: [id number]. 7/19/2022   4:38:00 PM CDT

## 2022-07-19 NOTE — PROGRESS NOTES
7/19/2022    Monique Trujillo  New Patient Consult    Chief Complaint   Patient presents with    Sleep Apnea       F/u, currently no new concerns       HPI: Pt is a 70 yo female with depression, HTN, HLD, morbid obesity, DM2, TIMMY, GERD   Chart reviewed- [place] clinic [place]  Pt continues using cpap w/ nasal mask, needs to establish new physician for TIMMY after moving to the [facility]. She has been on cpap 2 yrs and it helps her sleep better. She has a new machine after previous one was recalled.  She goes to bed between 12-2am, wakes 8-10am. Sleeps through night w/ no sleep aids.  She had shoulder replacement recently, off cpap 2 wks due to difficulty sleeping in bed- was in recliner.  Denies lung disease  Notices tightness in chest constantly. Has bad reflux- on pantoprazole.  Denies smoking   Denies pulm exposure    The chief complaint problem is new to me    PFSH:  Past Medical History:   Diagnosis Date    Allergy     Arthritis     Chronic lower back pain     Depression     Fever blister     GERD (gastroesophageal reflux disease)     Hemorrhoids, internal 11/05/2014    Hyperlipidemia     Hypertension     Joint pain     Morbid obesity with BMI of 40.0-44.9, adult 02/24/2015    Multiple gastric ulcers 12/14/2017    TIMMY (obstructive sleep apnea)     does not use CPAP    Pulmonary hypertension     Skin cancer     Type 2 diabetes mellitus with hyperglycemia, without long-term current use of insulin 4/20/2022         Past Surgical History:   Procedure Laterality Date    ANTERIOR CERVICAL CORPECTOMY W/ FUSION  1988    BACK SURGERY      CAUDAL EPIDURAL STEROID INJECTION N/A 1/10/2019    Procedure: Injection-steroid-epidural-caudal;  Surgeon: Lydia Velásquez Jr., MD;  Location: [facility]GT;  Service: Pain Management;  Laterality: N/A;    COLONOSCOPY N/A 11/2/2016    Procedure: COLONOSCOPY;  Surgeon: Viji Dewitt MD;  Location: [facility];  Service: Endoscopy;   Laterality: N/A;    EPIDURAL STEROID INJECTION N/A 5/18/2022    Procedure: Injection, Steroid, Epidural;  Surgeon: Fermín Luo MD;  [facility];  Service: Pain Management;  Laterality: N/A;  L2-l3     EPIDURAL STEROID INJECTION INTO LUMBAR SPINE N/A 11/8/2018    Procedure: Injection-steroid-epidural-lumbar-L2-3 (LEFT > RIGHT);  Surgeon: Lydia Velásquez Jr., MD;  Location: [facility];  Service: Pain Management;  Laterality: N/A;    FUSION OF SPINE WITH INSTRUMENTATION Left 1/3/2020    Procedure: FUSION, SPINE, WITH INSTRUMENTATION Stage 1 Left L3-4 LOIF Rise L Stage 2 Open L3-4 L4-5 Laminectomy Removal of L4-5 Hardware, L3-5 Posterior Instrumentation and extraction of Fusion at L3-4;  Surgeon: James Brantley MD;  Location: [facility] OR;  Service: Neurosurgery;  Laterality: Left;  Procedure: Stage 1 Left L3-4 LOIF Rise L  Stage 2 Open L3-4 L4-5 Laminectomy Removal of L4-5 Hardware,     INJECTION OF ANESTHETIC AGENT AROUND GENITOFEMORAL NERVE Bilateral 8/1/2019    Procedure: BILATERAL SHOULDER ARTICULAR BRANCH BLOCK;  Surgeon: Lydia Velásquez Jr., MD;  Location: [facility]IN [facility];  Service: Pain Management;  Laterality: Bilateral;    JOINT REPLACEMENT      KNEE ARTHROSCOPY W/ MENISCAL REPAIR Right 2012    LUMBAR LAMINECTOMY  06/19/2015    REVERSE TOTAL SHOULDER ARTHROPLASTY Right 5/25/2022    Procedure: ARTHROPLASTY, SHOULDER, TOTAL, REVERSE;  Surgeon: Herb Tan II, MD;  Location: [facility] OR;  Service: Orthopedics;  Laterality: Right;    SKIN CANCER EXCISION      forehead does not recall date    TOTAL HIP ARTHROPLASTY Right 04/28/2016         TUBAL LIGATION  1980     Social History     Tobacco Use    Smoking status: Never Smoker    Smokeless tobacco: Never Used   Substance Use Topics    Alcohol use: No     Alcohol/week: 0.0 standard drinks    Drug use: No     Family History   Problem Relation Age of Onset    Arthritis Mother     Cancer Mother         breast ca    Breast cancer Mother      "Kidney disease Father     Alzheimer's disease Maternal Grandfather     Cancer Paternal Grandmother         colon ca    Colon cancer Paternal Grandmother      Review of patient's allergies indicates:   Allergen Reactions    Metformin Other (See Comments)     Diarrhea        Performance Status:The patient's activity level is mobility with asit devices.  Uses rollator walker after cord compression back surg. Cannot feel feet, limits activity.    Review of Systems:  a review of eleven systems covering constitutional, Eye, HEENT, Psych, Respiratory, Cardiac, GI, , Musculoskeletal, Endocrine, Dermatologic was negative except for pertinent findings as listed ABOVE and below:  Headaches once/wk   Wt loss 2-3 pounds  Numb feet  Longstanding GERD w/ poor control  Scleral hemorrhage due to blowing nose    Exam:Comprehensive exam done. /79 (BP Location: Left arm, Patient Position: Sitting, BP Method: Large (Automatic)) Comment (BP Method): long  Pulse 74   Ht 5' 1" (1.549 m)   Wt 105.3 kg (232 lb 4.1 oz)   LMP  (LMP Unknown)   SpO2 97% Comment: room air at rest  BMI 43.88 kg/m²   Exam included Vitals as listed, and patient's appearance and affect and alertness and mood, oral exam for yeast and hygiene and pharynx lesions and Mallapatti (M) score, neck with inspection for jvd and masses and thyroid abnormalities and lymph nodes (supraclavicular and infraclavicular nodes and axillary also examined and noted if abn), chest exam included symmetry and effort and fremitus and percussion and auscultation, cardiac exam included rhythm and gallops and murmur and rubs and jvd and edema, abdominal exam for mass and hepatosplenomegaly and tenderness and hernias and bowel sounds, Musculoskeletal exam with muscle tone and posture and mobility/gait and  strength, and skin for rashes and cyanosis and pallor and turgor, extremity for clubbing.  Findings were normal except for pertinent findings listed " below:  M3  Oropharynx clear  Scleral hemorrhage on right  HR regular  Breath sounds clear bilaterally  abd obese  R shoulder scar  No edema    Radiographs (ct chest and cxr) reviewed: view by direct vision   CXR 2016- L costophrenic angle blunting    Labs reviewed    CBC, CMP 5/2022 wnl     PFT was not done    PSG 9/2020-    The overall AHI was 17.5 with an oxygen elijah of 77.0%. The supine AHI was 21.9 and the REM AHI was 57.4    Plan:  Clinical impression is apparently straight forward and impression with management as below. TIMMY establishing care    Monique was seen today for sleep apnea.    Diagnoses and all orders for this visit:    Morbid obesity    Sleep apnea, unspecified type  -     Ambulatory referral/consult to Pulmonology    TIMMY (obstructive sleep apnea)    Gastroesophageal reflux disease, unspecified whether esophagitis present  -     Ambulatory referral/consult to Gastroenterology; Future        Follow up in about 1 year (around 7/19/2023), or with NP.    Discussed with patient above for education the following:      Patient Instructions   Will get compliance report from your CPAP machine    Weight loss recommended      Addendum- compliance report reviewed. Numbers do not reflect her actual use of cpap machine. She was off cpap recently due to shoulder injury (explained above). She is using and benefiting from cpap machine.

## 2022-07-20 ENCOUNTER — CLINICAL SUPPORT (OUTPATIENT)
Dept: REHABILITATION | Facility: HOSPITAL | Age: 72
End: 2022-07-20
Attending: ORTHOPAEDIC SURGERY
Payer: MEDICARE

## 2022-07-20 ENCOUNTER — TELEPHONE (OUTPATIENT)
Dept: GASTROENTEROLOGY | Facility: CLINIC | Age: 72
End: 2022-07-20
Payer: MEDICARE

## 2022-07-20 DIAGNOSIS — M62.81 MUSCLE WEAKNESS OF RIGHT UPPER EXTREMITY: ICD-10-CM

## 2022-07-20 DIAGNOSIS — M25.611 IMPAIRED RANGE OF MOTION OF RIGHT SHOULDER: ICD-10-CM

## 2022-07-20 DIAGNOSIS — M25.511 ACUTE PAIN OF RIGHT SHOULDER: Primary | ICD-10-CM

## 2022-07-20 PROCEDURE — 97110 THERAPEUTIC EXERCISES: CPT | Mod: PN

## 2022-07-20 PROCEDURE — 97140 MANUAL THERAPY 1/> REGIONS: CPT | Mod: PN

## 2022-07-20 RX ORDER — PRAVASTATIN SODIUM 40 MG/1
40 TABLET ORAL NIGHTLY
Qty: 90 TABLET | Refills: 3 | Status: SHIPPED | OUTPATIENT
Start: 2022-07-20 | End: 2023-03-15

## 2022-07-20 NOTE — TELEPHONE ENCOUNTER
GI referral scheduled with patient: 8/3/2022 1400 Tasneem Marin NP [facility].

## 2022-07-20 NOTE — PROGRESS NOTES
Physical Therapy Daily Treatment Note     Name: Monique Trujillo  Clinic Number: [id number]    Therapy Diagnosis:   Encounter Diagnoses   Name Primary?    Acute pain of right shoulder Yes    Muscle weakness of right upper extremity     Impaired range of motion of right shoulder      Physician: Herb Tan II, MD    Visit Date: 7/20/2022    Physician: Herb Tan II, MD    Physician Orders: PT Eval and Treat  Medical Diagnosis from Referral: Z96.611 (ICD-10-CM) - S/P reverse total shoulder arthroplasty, right  Evaluation Date: 6/14/2022  Authorization Period Expiration: 12/31/22  Plan of Care Expiration: 9/14/2022                Progress Update: 7/20/2022               Visit # / Visits authorized: 9 / 11  + eval   ([id number])                PTA visit # 0/5      FOTO: Visit #1 - 2/3          Time In: 1030  Time Out: 1130  Total Billable Time:  60  minutes    Precautions: Standard    Subjective     Pt reports: shoulder pain in controlled, still weak and lacks motion  She was compliant with home exercise program.  Response to previous treatment: Positive  Functional change: less right shoulder pain    Pain: 2/10  Location: right shoulder      Objective4     DOS 5/25/22    Monique received therapeutic exercises to develop strength, ROM and posture for 45 minutes including:    UBE 3'/3'    Shoulder shrugs x 20 reps   Shoulder squeezes x 20 reps   Shoulder isometrics  x 10 each direction Manual Resistance    Landmine press with 5# bar x 20 reps     Elbow flex/ext x 20 reps 1# (towel under elbow to prevent Abduction )  Wrist flexion/extension x 20 reps 1#    Overhead Pulleys flexion x 4 minutes    Slides up stair rail x 20  Finger walk to #21 x 3 reps   cable column  3# Rows x 15  cable column  3# Extension x 15    Supine dowel pressup with dowel 2 x 10    Supine flexion to 90 deg with dowel 2 x 10    Manual therapy: x 15 minutes  Myofacial Release with therapy bar to R UT  PROM R shoulder: flexion, ER, IR  and ABD    Home Exercises Provided and Patient Education Provided     Education provided:   - cont HEP  - posture    Written Home Exercises Provided: Patient instructed to cont prior HEP.  Exercises were reviewed and Monique was able to demonstrate them prior to the end of the session.  Monique demonstrated good  understanding of the education provided.     See EMR under Patient Instructions for exercises provided prior visit.    Assessment     Resemary is making progress with her strength and motion, overall she is still weak and lacks shoulder flexion.   She tends to abduct her elbow with active shoulder motions, and shrug her shoulder.  TC given with elbow flexion exercise to maintain neutral position.  Continued therapy per MD protocol to improve strength, functional activities.    Monique Is progressing well towards her goals.   Pt prognosis is Good.     Pt will continue to benefit from skilled outpatient physical therapy to address the deficits listed in the problem list box on initial evaluation, provide pt/family education and to maximize pt's level of independence in the home and community environment.     Pt's spiritual, cultural and educational needs considered and pt agreeable to plan of care and goals.    Anticipated barriers to physical therapy: none    GOALS:  SHORT TERM GOALS: 4 weeks, () Progress  7/20/2022   1. Recent signs and systems trend is improving in order to progress towards LTG's. Ongoing  7/20/2022      2. Patient will be independent with HEP in order to further progress and return to maximal function. MET   3. Pain rating at Worst: 4/10 in order to progress towards increased independence with activity. Ongoing  7/20/2022      4. Patient will be able to correct postural deviations in sitting and standing, to decrease pain and promote postural awareness for injury prevention.  Ongoing  7/20/2022         LONG TERM GOALS: 10 weeks, () Progress  7/20/2022   1. Patient will return to normal  ADL, recreational, and work related activities with less pain and limitation.  Ongoing  7/20/2022      2. Patient will improve AROM to stated goals in order to return to maximal functional potential.  Ongoing  7/20/2022      3. Patient will improve Strength to stated goals of appropriate musculature in order to improve functional independence.   ongoing  7/20/2022     4. Pain Rating at Best: 1/10 to improve Quality of Life.   ongoing  7/20/2022     5. Patient will meet predicted functional outcome (FOTO) score: 25% to increase self-worth & perceived functional ability.  ongoing  7/20/2022     6. Patient will have met/partially met personal goal of: being able to use right Upper Extremity to bath herself Ongoing  7/20/2022           Plan     Cont per POC, posture education, ROM, and strength    Didier Can, PT

## 2022-07-22 NOTE — PLAN OF CARE
Problem: Patient Care Overview  Goal: Plan of Care Review  Outcome: Ongoing (interventions implemented as appropriate)  Patient resting in bed, AAOx4. IVF infusing as ordered. Medications administered as ordered. Patient complained of some pain overnight, PRN medication administered. Asleep for majority of shift. Encouraged to call with needs or concerns. Will continue to monitor.        Taltz Counseling: I discussed with the patient the risks of ixekizumab including but not limited to immunosuppression, serious infections, worsening of inflammatory bowel disease and drug reactions.  The patient understands that monitoring is required including a PPD at baseline and must alert us or the primary physician if symptoms of infection or other concerning signs are noted.

## 2022-07-25 ENCOUNTER — CLINICAL SUPPORT (OUTPATIENT)
Dept: REHABILITATION | Facility: HOSPITAL | Age: 72
End: 2022-07-25
Attending: ORTHOPAEDIC SURGERY
Payer: MEDICARE

## 2022-07-25 DIAGNOSIS — M25.511 ACUTE PAIN OF RIGHT SHOULDER: Primary | ICD-10-CM

## 2022-07-25 DIAGNOSIS — M25.611 IMPAIRED RANGE OF MOTION OF RIGHT SHOULDER: ICD-10-CM

## 2022-07-25 DIAGNOSIS — M62.81 MUSCLE WEAKNESS OF RIGHT UPPER EXTREMITY: ICD-10-CM

## 2022-07-25 PROCEDURE — 97110 THERAPEUTIC EXERCISES: CPT | Mod: PN

## 2022-07-25 PROCEDURE — 97140 MANUAL THERAPY 1/> REGIONS: CPT | Mod: PN

## 2022-07-25 NOTE — PROGRESS NOTES
Physical Therapy Daily Treatment Note     Name: Monique Trujillo  Clinic Number: [id number]    Therapy Diagnosis:   Encounter Diagnoses   Name Primary?    Acute pain of right shoulder Yes    Muscle weakness of right upper extremity     Impaired range of motion of right shoulder      Physician: Herb Tan II, MD    Visit Date: 7/25/2022    Physician: Herb Tan II, MD    Physician Orders: PT Eval and Treat  Medical Diagnosis from Referral: Z96.611 (ICD-10-CM) - S/P reverse total shoulder arthroplasty, right  Evaluation Date: 6/14/2022  Authorization Period Expiration: 12/31/22  Plan of Care Expiration: 9/14/2022                Progress Update: 7/20/2022               Visit # / Visits authorized: 10 / 22  + eval   ([id number])                PTA visit # 0/5      FOTO: Visit #1 - 2/3          Time In: 1100  Time Out: 1155  Total Billable Time:  55  minutes    Precautions: Standard    Subjective     Pt reports: shoulder is a little sore today, slept on it  She was compliant with home exercise program.  Response to previous treatment: Positive  Functional change: less right shoulder pain    Pain: 2/10  Location: right shoulder      Objective4     DOS 5/25/22    Monique received therapeutic exercises to develop strength, ROM and posture for 45 minutes including:    UBE 3'/3'    Shoulder shrugs x 20 reps   Shoulder squeezes x 20 reps   Shoulder isometrics  x 10 each direction Manual Resistance    Landmine press with 10# bar x 20 reps     Elbow flex/ext x 20 reps 1# (towel under elbow to prevent Abduction )  Wrist flexion/extension x 20 reps 1#    Overhead Pulleys flexion x 4 minutes    Slides up stair rail x 20  Finger walk to #21 x 3 reps   cable column  3# Rows x 15  cable column  3# Extension x 15    Supine dowel pressup with dowel 2 x 10    Supine flexion to 90 deg with dowel 2 x 10    Manual therapy: x 15 minutes  Myofacial Release with therapy bar to R UT  PROM R shoulder: flexion, ER, IR and  ABD    Home Exercises Provided and Patient Education Provided     Education provided:   - cont HEP  - posture    Written Home Exercises Provided: Patient instructed to cont prior HEP.  Exercises were reviewed and Monique was able to demonstrate them prior to the end of the session.  Monique demonstrated good  understanding of the education provided.     See EMR under Patient Instructions for exercises provided prior visit.    Assessment     Resemary is making progress with her strength and motion, her external rotation strength is still poor and she lacks shoulder flexion.   She tends to abduct her elbow with active shoulder motions, and shrug her shoulder.  TC given with elbow flexion exercise to maintain neutral position. She needs to continue with therapy. Continued therapy per MD protocol to improve strength, functional activities.    Monique Is progressing well towards her goals.   Pt prognosis is Good.     Pt will continue to benefit from skilled outpatient physical therapy to address the deficits listed in the problem list box on initial evaluation, provide pt/family education and to maximize pt's level of independence in the home and community environment.     Pt's spiritual, cultural and educational needs considered and pt agreeable to plan of care and goals.    Anticipated barriers to physical therapy: none    GOALS:  SHORT TERM GOALS: 4 weeks, () Progress  7/25/2022   1. Recent signs and systems trend is improving in order to progress towards LTG's. Ongoing  7/25/2022      2. Patient will be independent with HEP in order to further progress and return to maximal function. MET   3. Pain rating at Worst: 4/10 in order to progress towards increased independence with activity. Ongoing  7/25/2022      4. Patient will be able to correct postural deviations in sitting and standing, to decrease pain and promote postural awareness for injury prevention.  Ongoing  7/25/2022         LONG TERM GOALS: 10 weeks, ()  Progress  7/25/2022   1. Patient will return to normal ADL, recreational, and work related activities with less pain and limitation.  Ongoing  7/25/2022      2. Patient will improve AROM to stated goals in order to return to maximal functional potential.  Ongoing  7/25/2022      3. Patient will improve Strength to stated goals of appropriate musculature in order to improve functional independence.   ongoing  7/25/2022     4. Pain Rating at Best: 1/10 to improve Quality of Life.   ongoing  7/25/2022     5. Patient will meet predicted functional outcome (FOTO) score: 25% to increase self-worth & perceived functional ability.  ongoing  7/25/2022     6. Patient will have met/partially met personal goal of: being able to use right Upper Extremity to bath herself Ongoing  7/25/2022           Plan     Cont per POC, posture education, ROM, and strength    Didier Can, PT

## 2022-07-27 ENCOUNTER — CLINICAL SUPPORT (OUTPATIENT)
Dept: REHABILITATION | Facility: HOSPITAL | Age: 72
End: 2022-07-27
Attending: ORTHOPAEDIC SURGERY
Payer: MEDICARE

## 2022-07-27 DIAGNOSIS — M25.611 IMPAIRED RANGE OF MOTION OF RIGHT SHOULDER: ICD-10-CM

## 2022-07-27 DIAGNOSIS — M25.511 ACUTE PAIN OF RIGHT SHOULDER: Primary | ICD-10-CM

## 2022-07-27 DIAGNOSIS — M62.81 MUSCLE WEAKNESS OF RIGHT UPPER EXTREMITY: ICD-10-CM

## 2022-07-27 PROCEDURE — 97110 THERAPEUTIC EXERCISES: CPT | Mod: PN

## 2022-07-27 PROCEDURE — 97140 MANUAL THERAPY 1/> REGIONS: CPT | Mod: PN

## 2022-07-27 NOTE — PROGRESS NOTES
Physical Therapy Daily Treatment Note     Name: Monique Trujillo  Clinic Number: [id number]    Therapy Diagnosis:   Encounter Diagnoses   Name Primary?    Acute pain of right shoulder Yes    Muscle weakness of right upper extremity     Impaired range of motion of right shoulder      Physician: Herb Tan II, MD    Visit Date: 7/27/2022    Physician: Herb Tan II, MD    Physician Orders: PT Eval and Treat  Medical Diagnosis from Referral: Z96.611 (ICD-10-CM) - S/P reverse total shoulder arthroplasty, right  Evaluation Date: 6/14/2022  Authorization Period Expiration: 12/31/22  Plan of Care Expiration: 9/14/2022                Progress Update: 7/20/2022               Visit # / Visits authorized: 11 / 22  + eval   ([id number])                PTA visit # 0/5      FOTO: Visit #1 - 2/3          Time In: 1030  Time Out: 1125  Total Billable Time:  55  minutes    Precautions: Standard    Subjective     Pt reports: shoulder is not hurting but is still weak  She was compliant with home exercise program.  Response to previous treatment: Positive  Functional change: less right shoulder pain    Pain: 2/10  Location: right shoulder      Objective4     DOS 5/25/22    Monique received therapeutic exercises to develop strength, ROM and posture for 45 minutes including:    UBE 3'/3'    Shoulder shrugs x 20 reps   Shoulder squeezes x 20 reps   Shoulder isometrics  x 10 each direction Manual Resistance    Landmine press with 10# bar x 20 reps     Elbow flex/ext x 20 reps 1# (towel under elbow to prevent Abduction )  Wrist flexion/extension x 20 reps 1#    Overhead Pulleys flexion x 4 minutes    Slides up stair rail x 20  Finger walk to #21 x 3 reps   Seated punches with Red TheraBand   cable column  3# Rows x 20 or Red TheraBand   cable column  3# Extension x 20 or Red TheraBand   Wall Push Ups x 15    Supine dowel pressup with dowel 2 x 10    Supine dowel SA press 2 x 10    Manual therapy: x 15 minutes  Myofacial  Release with therapy bar to R UT  PROM R shoulder: flexion, ER, IR and ABD    Home Exercises Provided and Patient Education Provided     Education provided:   - cont HEP  - posture    Written Home Exercises Provided: Patient instructed to cont prior HEP.  Exercises were reviewed and Monique was able to demonstrate them prior to the end of the session.  Monique demonstrated good  understanding of the education provided.     See EMR under Patient Instructions for exercises provided prior visit.    Assessment     Resemary is making slow progress with her strength and motion, her external rotation strength is still poor and she lacks shoulder flexion.   She tends to abduct her elbow with active shoulder motions, and shrug her shoulder.  TC given with elbow flexion exercise to maintain neutral position. She needs to continue with therapy. Continued therapy per MD protocol to improve strength, functional activities.    Monique Is progressing well towards her goals.   Pt prognosis is Good.     Pt will continue to benefit from skilled outpatient physical therapy to address the deficits listed in the problem list box on initial evaluation, provide pt/family education and to maximize pt's level of independence in the home and community environment.     Pt's spiritual, cultural and educational needs considered and pt agreeable to plan of care and goals.    Anticipated barriers to physical therapy: none    GOALS:  SHORT TERM GOALS: 4 weeks, () Progress  7/27/2022   1. Recent signs and systems trend is improving in order to progress towards LTG's. Ongoing  7/27/2022      2. Patient will be independent with HEP in order to further progress and return to maximal function. MET   3. Pain rating at Worst: 4/10 in order to progress towards increased independence with activity. Ongoing  7/27/2022      4. Patient will be able to correct postural deviations in sitting and standing, to decrease pain and promote postural awareness for  injury prevention.  Ongoing  7/27/2022         LONG TERM GOALS: 10 weeks, () Progress  7/27/2022   1. Patient will return to normal ADL, recreational, and work related activities with less pain and limitation.  Ongoing  7/27/2022      2. Patient will improve AROM to stated goals in order to return to maximal functional potential.  Ongoing  7/27/2022      3. Patient will improve Strength to stated goals of appropriate musculature in order to improve functional independence.   ongoing  7/27/2022     4. Pain Rating at Best: 1/10 to improve Quality of Life.   ongoing  7/27/2022     5. Patient will meet predicted functional outcome (FOTO) score: 25% to increase self-worth & perceived functional ability.  ongoing  7/27/2022     6. Patient will have met/partially met personal goal of: being able to use right Upper Extremity to bath herself Ongoing  7/27/2022           Plan     Cont per POC, posture education, ROM, and strength    Didier Can, PT

## 2022-08-03 ENCOUNTER — CLINICAL SUPPORT (OUTPATIENT)
Dept: REHABILITATION | Facility: HOSPITAL | Age: 72
End: 2022-08-03
Attending: ORTHOPAEDIC SURGERY
Payer: MEDICARE

## 2022-08-03 ENCOUNTER — TELEPHONE (OUTPATIENT)
Dept: GASTROENTEROLOGY | Facility: CLINIC | Age: 72
End: 2022-08-03
Payer: MEDICARE

## 2022-08-03 DIAGNOSIS — M25.611 IMPAIRED RANGE OF MOTION OF RIGHT SHOULDER: ICD-10-CM

## 2022-08-03 DIAGNOSIS — M25.511 ACUTE PAIN OF RIGHT SHOULDER: Primary | ICD-10-CM

## 2022-08-03 DIAGNOSIS — M62.81 MUSCLE WEAKNESS OF RIGHT UPPER EXTREMITY: ICD-10-CM

## 2022-08-03 PROCEDURE — 97140 MANUAL THERAPY 1/> REGIONS: CPT | Mod: PN,CQ

## 2022-08-03 PROCEDURE — 97110 THERAPEUTIC EXERCISES: CPT | Mod: PN,CQ

## 2022-08-03 NOTE — PROGRESS NOTES
"  Physical Therapy Daily Treatment Note     Name: Monique Trujillo  Clinic Number: [id number]    Therapy Diagnosis:   Encounter Diagnoses   Name Primary?    Acute pain of right shoulder Yes    Muscle weakness of right upper extremity     Impaired range of motion of right shoulder      Physician: Herb Tan II, MD    Visit Date: 8/3/2022    Physician: Herb Tan II, MD    Physician Orders: PT Eval and Treat  Medical Diagnosis from Referral: Z96.611 (ICD-10-CM) - S/P reverse total shoulder arthroplasty, right  Evaluation Date: 6/14/2022  Authorization Period Expiration: 12/31/22  Plan of Care Expiration: 9/14/2022                Progress Update: 7/20/2022               Visit # / Visits authorized: 12/22  + eval   ([id number])                PTA visit # 1/5    Time In: 1052  Time Out: 1200  Total Billable Time: 46 minutes  Non billable time:  22 min    Precautions: Standard    Subjective     Pt reports: her shoulder "is doing ok.  I still have a few issues"  Pt still has difficulty combing her hair but she can now lift her arm to eat.  Pt states she is having pain in her other shoulder  She was compliant with home exercise program.  Response to previous treatment: Positive  Functional change: can lift her shoulder to eat    Pain: 3/10  Location: right shoulder      Objective4     DOS 5/25/22    Monique received therapeutic exercises to develop strength, ROM and posture for 60 minutes including:    Pt seen with one other patient    Upper Body Ergometer  3'/3'  Overhead Pulleys flexion x 4 minutes    Shoulder shrugs x 20 reps   Shoulder squeezes x 20 reps   Shoulder rolls x 20 reps   Shoulder isometrics  x 20 each direction with pillow    Landmine press with 10# bar x 20 reps     Elbow flex/ext x 20 reps 1# (towel under elbow to prevent Abduction )  Wrist flexion/extension x 20 reps 1#  Pronation/supination 1# x 20 reps     Slides up stair rail x 20  Finger ladder x 3 reps level 21   Seated punches x 20 " reps with Red TheraBand   cable column  3# Rows x 20 reps   cable column  3# Extension x 20 reps    Wall Push Ups x 15 reps     Supine exercises:  Flexion with dowel x 20 reps   pressup with dowel 2 x 10    Serratus anterior press x 20  Abduction with dowel  x 20 reps   external rotation with dowel x 20 reps     Manual therapy: x 8 minutes  Passive Range of Motion  Right  shoulder: flexion, external rotation , internal rotation  and Abduction     Home Exercises Provided and Patient Education Provided     Education provided:   - cont Home exercise program   - posture    Written Home Exercises Provided: Patient instructed to cont prior HEP.  Exercises were reviewed and Monique was able to demonstrate them prior to the end of the session.  Monique demonstrated good  understanding of the education provided.     See EMR under Patient Instructions for exercises provided prior visit.    Assessment     Sky is making slow progress with her strength and motion, her external rotation strength is still poor and she lacks shoulder flexion.   She tends to abduct her elbow with active shoulder motions, and shrug her shoulder.  TC given with elbow flexion exercise to maintain neutral position. She needs to continue with therapy. Continued therapy per MD protocol to improve strength, functional activities.          Monique Is progressing well towards her goals.   Pt prognosis is Good.     Pt will continue to benefit from skilled outpatient physical therapy to address the deficits listed in the problem list box on initial evaluation, provide pt/family education and to maximize pt's level of independence in the home and community environment.     Pt's spiritual, cultural and educational needs considered and pt agreeable to plan of care and goals.    Anticipated barriers to physical therapy: none    GOALS:  SHORT TERM GOALS: 4 weeks, () Progress  8/3/2022   1. Recent signs and systems trend is improving in order to progress  towards LTG's. Ongoing  8/3/2022      2. Patient will be independent with HEP in order to further progress and return to maximal function. MET   3. Pain rating at Worst: 4/10 in order to progress towards increased independence with activity. Ongoing  8/3/2022      4. Patient will be able to correct postural deviations in sitting and standing, to decrease pain and promote postural awareness for injury prevention.  Ongoing  8/3/2022         LONG TERM GOALS: 10 weeks, () Progress  8/3/2022   1. Patient will return to normal ADL, recreational, and work related activities with less pain and limitation.  Ongoing  8/3/2022      2. Patient will improve AROM to stated goals in order to return to maximal functional potential.  Ongoing  8/3/2022      3. Patient will improve Strength to stated goals of appropriate musculature in order to improve functional independence.   ongoing  8/3/2022     4. Pain Rating at Best: 1/10 to improve Quality of Life.   ongoing  8/3/2022     5. Patient will meet predicted functional outcome (FOTO) score: 25% to increase self-worth & perceived functional ability.  ongoing  8/3/2022     6. Patient will have met/partially met personal goal of: being able to use right Upper Extremity to bath herself Ongoing  8/3/2022           Plan   Plan of care Certification: 6/14/2022 to 9/14/2022     Outpatient Physical Therapy 2 times weekly for 10 weeks to include any combination of the following interventions: virtual visits, dry needling, modalities, electrical stimulation (IFC, Pre-Mod, Attended with Functional Dry Needling), Manual Therapy, Moist Heat/ Ice, Neuromuscular Re-ed, Patient Education, Self Care, Therapeutic Exercise, Functional Training and Therapeutic Activites      Cont per POC, posture education, ROM, and strength    Roxy Norton, PTA

## 2022-08-03 NOTE — TELEPHONE ENCOUNTER
Patient missed appointment 8/3 Tasneem Marin Np , called to see if patient would like to reschedule. No answer.left message

## 2022-08-08 ENCOUNTER — CLINICAL SUPPORT (OUTPATIENT)
Dept: REHABILITATION | Facility: HOSPITAL | Age: 72
End: 2022-08-08
Attending: ORTHOPAEDIC SURGERY
Payer: MEDICARE

## 2022-08-08 DIAGNOSIS — M25.611 IMPAIRED RANGE OF MOTION OF RIGHT SHOULDER: ICD-10-CM

## 2022-08-08 DIAGNOSIS — M62.81 MUSCLE WEAKNESS OF RIGHT UPPER EXTREMITY: ICD-10-CM

## 2022-08-08 DIAGNOSIS — M25.511 ACUTE PAIN OF RIGHT SHOULDER: Primary | ICD-10-CM

## 2022-08-08 PROCEDURE — 97110 THERAPEUTIC EXERCISES: CPT | Mod: PN

## 2022-08-08 PROCEDURE — 97140 MANUAL THERAPY 1/> REGIONS: CPT | Mod: PN

## 2022-08-08 NOTE — PROGRESS NOTES
Physical Therapy Daily Treatment Note     Name: Monique Trujillo  Clinic Number: [id number]    Therapy Diagnosis:   Encounter Diagnoses   Name Primary?    Acute pain of right shoulder Yes    Muscle weakness of right upper extremity     Impaired range of motion of right shoulder      Physician: Herb Tan II, MD    Visit Date: 8/8/2022    Physician: Herb Tan II, MD    Physician Orders: PT Eval and Treat  Medical Diagnosis from Referral: Z96.611 (ICD-10-CM) - S/P reverse total shoulder arthroplasty, right  Evaluation Date: 6/14/2022  Authorization Period Expiration: 12/31/22  Plan of Care Expiration: 9/14/2022                Progress Update: 7/20/2022               Visit # / Visits authorized: 13 / 22  + eval   ([id number])                PTA visit # 0/5      FOTO: Visit #1 - 2/3          Time In: 1100  Time Out: 1200  Total Billable Time:  60  minutes    Precautions: Standard    Subjective     Pt reports: shoulder has some discomfort today  She was compliant with home exercise program.  Response to previous treatment: Positive  Functional change: less right shoulder pain    Pain: 2/10  Location: right shoulder      Objective4     DOS 5/25/22    Monique received therapeutic exercises to develop strength, ROM and posture for 45 minutes including:    UBE 3'/3'    Shoulder shrugs x 20 reps   Shoulder squeezes x 20 reps   Shoulder isometrics  x 10 each direction Manual Resistance    Landmine press with 10# bar x 20 reps     Elbow flex/ext x 20 reps 1# (towel under elbow to prevent Abduction )  Wrist flexion/extension x 20 reps 1#    Overhead Pulleys flexion x 4 minutes    Slides up stair rail x 20  Finger walk to #21 x 3 reps   Seated punches with Red TheraBand   cable column  3# Rows x 20 or Red TheraBand   cable column  3# Extension x 20 or Red TheraBand   Wall Push Ups x 15    Supine dowel pressup with dowel 2 x 10    Supine dowel SA press 2 x 10    Manual therapy: x 15 minutes  Myofacial Release  with therapy bar to R UT  PROM R shoulder: flexion, ER, IR and ABD    Home Exercises Provided and Patient Education Provided     Education provided:   - cont HEP  - posture    Written Home Exercises Provided: Patient instructed to cont prior HEP.  Exercises were reviewed and Monique was able to demonstrate them prior to the end of the session.  Monique demonstrated good  understanding of the education provided.     See EMR under Patient Instructions for exercises provided prior visit.    Assessment     Resemary is making slow progress with her strength and motion, her external rotation strength is still poor and she lacks shoulder flexion.   She tends to abduct her elbow with active shoulder motions, and shrug her shoulder.  TC given with elbow flexion exercise to maintain neutral position. She needs to continue with therapy. Continued therapy per MD protocol to improve strength, functional activities.    Monique Is progressing well towards her goals.   Pt prognosis is Good.     Pt will continue to benefit from skilled outpatient physical therapy to address the deficits listed in the problem list box on initial evaluation, provide pt/family education and to maximize pt's level of independence in the home and community environment.     Pt's spiritual, cultural and educational needs considered and pt agreeable to plan of care and goals.    Anticipated barriers to physical therapy: none    GOALS:  SHORT TERM GOALS: 4 weeks, () Progress  8/8/2022   1. Recent signs and systems trend is improving in order to progress towards LTG's. Ongoing  8/8/2022      2. Patient will be independent with HEP in order to further progress and return to maximal function. MET   3. Pain rating at Worst: 4/10 in order to progress towards increased independence with activity. Ongoing  8/8/2022      4. Patient will be able to correct postural deviations in sitting and standing, to decrease pain and promote postural awareness for injury  prevention.  Ongoing  8/8/2022         LONG TERM GOALS: 10 weeks, () Progress  8/8/2022   1. Patient will return to normal ADL, recreational, and work related activities with less pain and limitation.  Ongoing  8/8/2022      2. Patient will improve AROM to stated goals in order to return to maximal functional potential.  Ongoing  8/8/2022      3. Patient will improve Strength to stated goals of appropriate musculature in order to improve functional independence.   ongoing  8/8/2022     4. Pain Rating at Best: 1/10 to improve Quality of Life.   ongoing  8/8/2022     5. Patient will meet predicted functional outcome (FOTO) score: 25% to increase self-worth & perceived functional ability.  ongoing  8/8/2022     6. Patient will have met/partially met personal goal of: being able to use right Upper Extremity to bath herself Ongoing  8/8/2022           Plan     Cont per POC, posture education, ROM, and strength    Didier Can, PT

## 2022-08-09 DIAGNOSIS — Z96.611 S/P REVERSE TOTAL SHOULDER ARTHROPLASTY, RIGHT: Primary | ICD-10-CM

## 2022-08-10 ENCOUNTER — OFFICE VISIT (OUTPATIENT)
Dept: NEUROLOGY | Facility: CLINIC | Age: 72
End: 2022-08-10
Payer: MEDICARE

## 2022-08-10 VITALS
DIASTOLIC BLOOD PRESSURE: 82 MMHG | BODY MASS INDEX: 43.99 KG/M2 | RESPIRATION RATE: 18 BRPM | HEART RATE: 76 BPM | SYSTOLIC BLOOD PRESSURE: 136 MMHG | HEIGHT: 61 IN | WEIGHT: 233 LBS

## 2022-08-10 DIAGNOSIS — R42 DIZZINESS AND GIDDINESS: ICD-10-CM

## 2022-08-10 DIAGNOSIS — R26.9 GAIT ABNORMALITY: Primary | ICD-10-CM

## 2022-08-10 DIAGNOSIS — R42 SPINNING SENSATION: ICD-10-CM

## 2022-08-10 DIAGNOSIS — H93.11 TINNITUS OF RIGHT EAR: ICD-10-CM

## 2022-08-10 PROCEDURE — 1160F PR REVIEW ALL MEDS BY PRESCRIBER/CLIN PHARMACIST DOCUMENTED: ICD-10-PCS | Mod: CPTII,S$GLB,, | Performed by: NURSE PRACTITIONER

## 2022-08-10 PROCEDURE — 3079F DIAST BP 80-89 MM HG: CPT | Mod: CPTII,S$GLB,, | Performed by: NURSE PRACTITIONER

## 2022-08-10 PROCEDURE — 1159F MED LIST DOCD IN RCRD: CPT | Mod: CPTII,S$GLB,, | Performed by: NURSE PRACTITIONER

## 2022-08-10 PROCEDURE — 3008F PR BODY MASS INDEX (BMI) DOCUMENTED: ICD-10-PCS | Mod: CPTII,S$GLB,, | Performed by: NURSE PRACTITIONER

## 2022-08-10 PROCEDURE — 99203 OFFICE O/P NEW LOW 30 MIN: CPT | Mod: S$GLB,,, | Performed by: NURSE PRACTITIONER

## 2022-08-10 PROCEDURE — 3079F PR MOST RECENT DIASTOLIC BLOOD PRESSURE 80-89 MM HG: ICD-10-PCS | Mod: CPTII,S$GLB,, | Performed by: NURSE PRACTITIONER

## 2022-08-10 PROCEDURE — 3008F BODY MASS INDEX DOCD: CPT | Mod: CPTII,S$GLB,, | Performed by: NURSE PRACTITIONER

## 2022-08-10 PROCEDURE — 1125F AMNT PAIN NOTED PAIN PRSNT: CPT | Mod: CPTII,S$GLB,, | Performed by: NURSE PRACTITIONER

## 2022-08-10 PROCEDURE — 1159F PR MEDICATION LIST DOCUMENTED IN MEDICAL RECORD: ICD-10-PCS | Mod: CPTII,S$GLB,, | Performed by: NURSE PRACTITIONER

## 2022-08-10 PROCEDURE — 3062F POS MACROALBUMINURIA REV: CPT | Mod: CPTII,S$GLB,, | Performed by: NURSE PRACTITIONER

## 2022-08-10 PROCEDURE — 1101F PR PT FALLS ASSESS DOC 0-1 FALLS W/OUT INJ PAST YR: ICD-10-PCS | Mod: CPTII,S$GLB,, | Performed by: NURSE PRACTITIONER

## 2022-08-10 PROCEDURE — 3288F PR FALLS RISK ASSESSMENT DOCUMENTED: ICD-10-PCS | Mod: CPTII,S$GLB,, | Performed by: NURSE PRACTITIONER

## 2022-08-10 PROCEDURE — 99203 PR OFFICE/OUTPT VISIT, NEW, LEVL III, 30-44 MIN: ICD-10-PCS | Mod: S$GLB,,, | Performed by: NURSE PRACTITIONER

## 2022-08-10 PROCEDURE — 3066F PR DOCUMENTATION OF TREATMENT FOR NEPHROPATHY: ICD-10-PCS | Mod: CPTII,S$GLB,, | Performed by: NURSE PRACTITIONER

## 2022-08-10 PROCEDURE — 3044F PR MOST RECENT HEMOGLOBIN A1C LEVEL <7.0%: ICD-10-PCS | Mod: CPTII,S$GLB,, | Performed by: NURSE PRACTITIONER

## 2022-08-10 PROCEDURE — 1125F PR PAIN SEVERITY QUANTIFIED, PAIN PRESENT: ICD-10-PCS | Mod: CPTII,S$GLB,, | Performed by: NURSE PRACTITIONER

## 2022-08-10 PROCEDURE — 3075F PR MOST RECENT SYSTOLIC BLOOD PRESS GE 130-139MM HG: ICD-10-PCS | Mod: CPTII,S$GLB,, | Performed by: NURSE PRACTITIONER

## 2022-08-10 PROCEDURE — 3075F SYST BP GE 130 - 139MM HG: CPT | Mod: CPTII,S$GLB,, | Performed by: NURSE PRACTITIONER

## 2022-08-10 PROCEDURE — 3066F NEPHROPATHY DOC TX: CPT | Mod: CPTII,S$GLB,, | Performed by: NURSE PRACTITIONER

## 2022-08-10 PROCEDURE — 99999 PR PBB SHADOW E&M-EST. PATIENT-LVL IV: ICD-10-PCS | Mod: PBBFAC,,, | Performed by: NURSE PRACTITIONER

## 2022-08-10 PROCEDURE — 1160F RVW MEDS BY RX/DR IN RCRD: CPT | Mod: CPTII,S$GLB,, | Performed by: NURSE PRACTITIONER

## 2022-08-10 PROCEDURE — 3044F HG A1C LEVEL LT 7.0%: CPT | Mod: CPTII,S$GLB,, | Performed by: NURSE PRACTITIONER

## 2022-08-10 PROCEDURE — 3062F PR POS MACROALBUMINURIA RESULT DOCUMENTED/REVIEW: ICD-10-PCS | Mod: CPTII,S$GLB,, | Performed by: NURSE PRACTITIONER

## 2022-08-10 PROCEDURE — 99999 PR PBB SHADOW E&M-EST. PATIENT-LVL IV: CPT | Mod: PBBFAC,,, | Performed by: NURSE PRACTITIONER

## 2022-08-10 PROCEDURE — 1101F PT FALLS ASSESS-DOCD LE1/YR: CPT | Mod: CPTII,S$GLB,, | Performed by: NURSE PRACTITIONER

## 2022-08-10 PROCEDURE — 3288F FALL RISK ASSESSMENT DOCD: CPT | Mod: CPTII,S$GLB,, | Performed by: NURSE PRACTITIONER

## 2022-08-10 RX ORDER — METFORMIN HYDROCHLORIDE 500 MG/1
TABLET, EXTENDED RELEASE ORAL
Status: ON HOLD | COMMUNITY
Start: 2022-07-13 | End: 2022-10-06 | Stop reason: ALTCHOICE

## 2022-08-10 NOTE — PROGRESS NOTES
NEUROLOGY  Outpatient CONSULT    [facility]  [address] [facility], [place] [address]  [phone] (office) / [phone] (fax)    Patient Name:  Monique Trujillo  :  1950  MR #:  [id number]  Acct #:  [id number]    Date of Neurology Consult: 2022  Name of Provider: ALEXANDREA Orozco    Other Physicians:  Milton Mahmood MD (Primary Care Physician); Milton Mahmood MD (Referring)      Chief Complaint: Gait Problem      History of Present Illness (HPI):  Monique Trujillo is a right handed 71 y.o. female with a PMHX HTN, HLD, GERD, depression, Chronic back pain (x2 surgeries), TIMMY (compliant with CPAP), DM  Patient is here today for gait abnormality.   She reports a history of inner issues and tinnitus. She has trouble walking straight and requires a walker at all times,stating she will lean. She does not follow ENT.   In regard to her gait abnormality, this became worse after her back surgery in . She did not complete outpatient therapy after the surgery. Gait became worse in that she states she cannot feel her feet at all. She states at times she does experience tingling or stabbing in her toes. She uses a cream and takes Gabapentin 600 mg BID even though its prescribed TID. She is unsure how well it is working as she was on this drug prior to back surgery. The pains are worse at night, during the day it is controlled.   Her last fall was about 1 year ago. She is currenlty in outpt PT for shoulder ROM.     She also endorses spinning sensation that has been going on for a few years. It is assocaited wirh ringing of the ear. It does not occur daily but rather ~ 3 months. She feels as though her eyes are rolling around. She denies LOC, nausea, vomiting,or migraines.                     Past Medical, Surgical, Family & Social History:   Past Medical History:   Diagnosis Date    Allergy     Arthritis     Chronic lower back pain     Depression     Fever blister      GERD (gastroesophageal reflux disease)     Hemorrhoids, internal 11/05/2014    Hyperlipidemia     Hypertension     Joint pain     Morbid obesity with BMI of 40.0-44.9, adult 02/24/2015    Multiple gastric ulcers 12/14/2017    TIMMY (obstructive sleep apnea)     does not use CPAP    Pulmonary hypertension     Skin cancer     Type 2 diabetes mellitus with hyperglycemia, without long-term current use of insulin 4/20/2022     Past Surgical History:   Procedure Laterality Date    ANTERIOR CERVICAL CORPECTOMY W/ FUSION  1988    BACK SURGERY      CAUDAL EPIDURAL STEROID INJECTION N/A 1/10/2019    Procedure: Injection-steroid-epidural-caudal;  Surgeon: Lydia Velásquez Jr., MD;  Location: [facility];  Service: Pain Management;  Laterality: N/A;    COLONOSCOPY N/A 11/2/2016    Procedure: COLONOSCOPY;  Surgeon: Viji Dewitt MD;  Location: [facility];  Service: Endoscopy;  Laterality: N/A;    EPIDURAL STEROID INJECTION N/A 5/18/2022    Procedure: Injection, Steroid, Epidural;  Surgeon: Fermín Luo MD;  Location: [facility] OR;  Service: Pain Management;  Laterality: N/A;  L2-l3     EPIDURAL STEROID INJECTION INTO LUMBAR SPINE N/A 11/8/2018    Procedure: Injection-steroid-epidural-lumbar-L2-3 (LEFT > RIGHT);  Surgeon: Lydia Velásquez Jr., MD;  Location: [facility];  Service: Pain Management;  Laterality: N/A;    FUSION OF SPINE WITH INSTRUMENTATION Left 1/3/2020    Procedure: FUSION, SPINE, WITH INSTRUMENTATION Stage 1 Left L3-4 LOIF Rise L Stage 2 Open L3-4 L4-5 Laminectomy Removal of L4-5 Hardware, L3-5 Posterior Instrumentation and extraction of Fusion at L3-4;  Surgeon: James Brantley MD;  Location: [facility] OR;  Service: Neurosurgery;  Laterality: Left;  Procedure: Stage 1 Left L3-4 LOIF Rise L  Stage 2 Open L3-4 L4-5 Laminectomy Removal of L4-5 Hardware,     INJECTION OF ANESTHETIC AGENT AROUND GENITOFEMORAL NERVE Bilateral 8/1/2019    Procedure: BILATERAL SHOULDER ARTICULAR BRANCH BLOCK;   Surgeon: Lydia Velásquez Jr., MD;  Location: [facility];  Service: Pain Management;  Laterality: Bilateral;    JOINT REPLACEMENT      KNEE ARTHROSCOPY W/ MENISCAL REPAIR Right 2012    LUMBAR LAMINECTOMY  06/19/2015    REVERSE TOTAL SHOULDER ARTHROPLASTY Right 5/25/2022    Procedure: ARTHROPLASTY, SHOULDER, TOTAL, REVERSE;  Surgeon: Herb Tan II, MD;  [facility];  Service: Orthopedics;  Laterality: Right;    SKIN CANCER EXCISION      forehead does not recall date    TOTAL HIP ARTHROPLASTY Right 04/28/2016         TUBAL LIGATION  1980     Family History   Problem Relation Age of Onset    Arthritis Mother     Cancer Mother         breast ca    Breast cancer Mother     Kidney disease Father     Alzheimer's disease Maternal Grandfather     Cancer Paternal Grandmother         colon ca    Colon cancer Paternal Grandmother      Alcohol use:  reports no history of alcohol use.   (Of note, 0.6 oz = 1 beer or 6 oz = 10 beers).  Tobacco use:  reports that she has never smoked. She has never used smokeless tobacco.  Street drug use:  reports no history of drug use.  Allergies: Metformin.    Home Medications:     Current Outpatient Medications:     amLODIPine (NORVASC) 10 MG tablet, Take 1 tablet (10 mg total) by mouth once daily. (Patient taking differently: Take 10 mg by mouth every evening.), Disp: 90 tablet, Rfl: 1    ammonium lactate (LAC-HYDRIN) 12 % lotion, USE ON FEET AS NEEDED, Disp: , Rfl:     ascorbic acid, vitamin C, (VITAMIN C) 1000 MG tablet, Take 1,000 mg by mouth once daily., Disp: , Rfl:     atenoloL (TENORMIN) 25 MG tablet, Take 1 tablet (25 mg total) by mouth once daily. (Patient taking differently: Take 25 mg by mouth every evening.), Disp: 90 tablet, Rfl: 1    calcium-vitamin D3 (OS-KATERIN 500 + D3) 500 mg-5 mcg (200 unit) per tablet, Take 1 tablet by mouth 2 (two) times daily with meals., Disp: , Rfl:     carboxymethylcellulose 1 % ophthalmic solution, 1 drop., Disp: ,  Rfl:     CINNAMON BARK, BULK, MISC, 1,000 mg by Misc.(Non-Drug; Combo Route) route., Disp: , Rfl:     clindamycin (CLEOCIN T) 1 % lotion, Use hs on face, Disp: 60 mL, Rfl: 3    co-enzyme Q-10 30 mg capsule, Take 30 mg by mouth once daily. , Disp: , Rfl:     doxycycline (VIBRAMYCIN) 100 MG Cap, Take 100 mg by mouth once daily., Disp: , Rfl:     econazole nitrate 1 % cream, Use bid for rash, Disp: 85 g, Rfl: 2    fenofibrate micronized (LOFIBRA) 134 MG Cap, Take 1 capsule (134 mg total) by mouth once daily. (Patient taking differently: Take 134 mg by mouth every evening.), Disp: 90 capsule, Rfl: 3    furosemide (LASIX) 40 MG tablet, Take lasix 40 mg PO every other day PRN swelling, Disp: 45 tablet, Rfl: 3    gabapentin (NEURONTIN) 600 MG tablet, Take 1 tablet (600 mg total) by mouth 3 (three) times daily., Disp: 90 tablet, Rfl: 11    multivitamin capsule, Take 1 capsule by mouth once daily., Disp: , Rfl:     pantoprazole (PROTONIX) 40 MG tablet, Take 40 mg by mouth once daily., Disp: , Rfl:     semaglutide (RYBELSUS) 7 mg tablet, Take 1 tablet (7 mg total) by mouth once daily., Disp: 90 tablet, Rfl: 1    sertraline (ZOLOFT) 100 MG tablet, Take 1 tablet (100 mg total) by mouth every evening., Disp: 90 tablet, Rfl: 3    vitamin E 400 UNIT capsule, Take 400 Units by mouth once daily., Disp: , Rfl:     aspirin (ECOTRIN) 81 MG EC tablet, Take 1 tablet (81 mg total) by mouth 2 (two) times a day. (Patient not taking: Reported on 8/10/2022), Disp: 60 tablet, Rfl: 0    cycloSPORINE (RESTASIS) 0.05 % ophthalmic emulsion, Place 1 drop into both eyes 2 (two) times daily. (Patient not taking: Reported on 8/10/2022), Disp: 24 mL, Rfl: 11    diclofenac sodium (VOLTAREN) 1 % Gel, Apply 2 g topically once daily., Disp: , Rfl:     diclofenac sodium 1.5 % Drop, SMARTSI Drop(s) Topical 1 to 4 Times Daily, Disp: , Rfl:     famotidine (PEPCID) 40 MG tablet, Take 1 tablet (40 mg total) by mouth once daily., Disp: 90  "tablet, Rfl: 1    levocetirizine (XYZAL) 5 MG tablet, Take 1 tablet (5 mg total) by mouth every evening., Disp: 90 tablet, Rfl: 1    LIDOcaine-prilocaine (EMLA) cream, SMARTSI Gram(s) Topical 3-4 Times Daily, Disp: , Rfl:     metFORMIN (GLUCOPHAGE-XR) 500 MG ER 24hr tablet, SMARTSI Tablet(s) By Mouth Every Evening, Disp: , Rfl:     pravastatin (PRAVACHOL) 40 MG tablet, Take 1 tablet (40 mg total) by mouth every evening., Disp: 90 tablet, Rfl: 3    solifenacin (VESICARE) 5 MG tablet, Take 1 tablet (5 mg total) by mouth once daily., Disp: 30 tablet, Rfl: 11    Physical Examination:  /82 (BP Location: Left arm, Patient Position: Sitting, BP Method: Large (Automatic))   Pulse 76   Resp 18   Ht 5' 1" (1.549 m)   Wt 105.7 kg (233 lb 0.4 oz)   LMP  (LMP Unknown)   BMI 44.03 kg/m²     GENERAL:  General appearance: Well, non-toxic appearing.  No apparent distress.  Neck: supple.  .    MENTAL STATUS:  Alertness, attention span & concentration: normal.  Language: normal.  Orientation to self, place & time:  normal.  Memory, recent & remote: normal.  Fund of knowledge: normal.      SPEECH:  Clear and fluent.  Follows complex commands.      CRANIAL NERVES:  Cranial Nerves II-XII were examined.  II - Visual fields: normal.  III, IV, VI: PERRL, EOMI, No ptosis, No nystagmus.  V - Facial sensation: normal.  VII - Face symmetry & mobility: normal.  VIII - Hearing: normal  IX, X - Palate: mobile & midline.  XI - Shoulder shrug: normal.  XII - Tongue protrusion: normal.        GROSS MOTOR:  Gait & station: uses walker; mild limp on initiation which resolved (reports RLE longer after having hip sx)  Tone: normal.  Abnormal movements: none.  Finger-nose: normal.  Rapid alternating movements: normal.  Pronator drift: normal      MUSCLE STRENGTH:     Fascics Atrophy RIGHT    LEFT Atrophy Fascics     5 Neck Ext. 5       5 Neck Flex 5       5 Deltoids 5       5 Sh.Ext.Rot. 5       5 Sh.Int.Rot. 5       5 Biceps 5      "  5 Triceps 5       5 Forearm.Pr. 5                5 Iliopsoas flex    5       5 Hip Abduct 5       5 Hip Adduct 5       5 Quads 5       5 Hams 5       5 Dorsiflex 5       5 Plantar Flex 5       REFLEXES:    RIGHT Reflex   LEFT   2 Biceps 2   2 Brachiorad. 2        2 Patellar 2     SENSORY:  Light touch: Normal throughout.   Sharp touch: decreased to right lateral and interior leg, right bottom of foot  Vibration: Normal throughout.  Temperature: Normal throughout.  Joint Position: Normal throughout.  Proprioception: Intact      Diagnostic Data Reviewed:     Component      Latest Ref Rng & Units 4/20/2022   Cholesterol      120 - 199 mg/dL 158   Triglycerides      30 - 150 mg/dL 117   HDL      40 - 75 mg/dL 55   LDL Cholesterol External      63.0 - 159.0 mg/dL 79.6   HDL/Cholesterol Ratio      20.0 - 50.0 % 34.8   Total Cholesterol/HDL Ratio      2.0 - 5.0 2.9   Non-HDL Cholesterol      mg/dL 103   Hemoglobin A1C External      4.0 - 5.6 % 6.5 (H)   Estimated Avg Glucose      68 - 131 mg/dL 140 (H)   TSH      0.400 - 4.000 uIU/mL 2.249   Vitamin B-12      180 - 914 ng/L 813               Assessment and Plan:  Monique Trujillo is a 71 y.o. female.      Problem List Items Addressed This Visit        ENT    Tinnitus of right ear    Current Assessment & Plan     ENT eval           Spinning sensation    Current Assessment & Plan     ENT eval               Other    Gait abnormality - Primary    Current Assessment & Plan     Patient is a 72 y/o female that presents for gait abnormality.   She endorses leaning when walking and spinning sensation with tinnitus. She has never german evaluated by ENT for this.   She has ongong back issues and has had a Lumbar laminectomy in 2020 and diagnosed with Radiculopathy.   Suspect that her gait disturbance is mulitfactorial due to neuropathy, tinnitus, spinning, h/o lumbar disease  Obtain MRI brain   Referral to ENT  Increase Gabapentin to 600, 300, 600 for ongoing neuropathy but also may  worsen symptoms    - pt previously taking 600 mg BID only  Consider balance therapy                       Other Visit Diagnoses     Dizziness and giddiness                Important to note, also  has a past medical history of Allergy, Arthritis, Chronic lower back pain, Depression, Fever blister, GERD (gastroesophageal reflux disease), Hemorrhoids, internal (11/05/2014), Hyperlipidemia, Hypertension, Joint pain, Morbid obesity with BMI of 40.0-44.9, adult (02/24/2015), Multiple gastric ulcers (12/14/2017), TIMMY (obstructive sleep apnea), Pulmonary hypertension, Skin cancer, and Type 2 diabetes mellitus with hyperglycemia, without long-term current use of insulin (4/20/2022).            The patient will return to clinic in 3 months.        All questions were answered and patient is comfortable with the plan.       Thank you very much for the opportunity to assist in this patient's care.    If you have any questions or concerns, please do not hesitate to contact me at any time.    Sincerely,     ALEXANDREA Orozco  [facility] [place]         MARAL spent a total of 39 minutes on the day of the visit.This includes face to face time and non-face to face time preparing to see the patient (eg, review of tests), Obtaining and/or reviewing separately obtained history, Documenting clinical information in the electronic or other health record, Independently interpreting resultsand communicating results to the patient/family/caregiver, or Care coordination.

## 2022-08-11 ENCOUNTER — CLINICAL SUPPORT (OUTPATIENT)
Dept: REHABILITATION | Facility: HOSPITAL | Age: 72
End: 2022-08-11
Attending: ORTHOPAEDIC SURGERY
Payer: MEDICARE

## 2022-08-11 ENCOUNTER — DOCUMENTATION ONLY (OUTPATIENT)
Dept: REHABILITATION | Facility: HOSPITAL | Age: 72
End: 2022-08-11

## 2022-08-11 DIAGNOSIS — M25.611 IMPAIRED RANGE OF MOTION OF RIGHT SHOULDER: ICD-10-CM

## 2022-08-11 DIAGNOSIS — M25.511 ACUTE PAIN OF RIGHT SHOULDER: Primary | ICD-10-CM

## 2022-08-11 DIAGNOSIS — M62.81 MUSCLE WEAKNESS OF RIGHT UPPER EXTREMITY: ICD-10-CM

## 2022-08-11 PROCEDURE — 97110 THERAPEUTIC EXERCISES: CPT | Mod: PN,CQ

## 2022-08-11 PROCEDURE — 97140 MANUAL THERAPY 1/> REGIONS: CPT | Mod: PN,CQ

## 2022-08-11 NOTE — PROGRESS NOTES
30 day visit PT-PTA face-face discussion with BRADLY Can re: patient status, POC, and plan for progression done 8/11/22.  Belle Beck, PTA

## 2022-08-11 NOTE — PROGRESS NOTES
Physical Therapy Daily Treatment Note     Name: Monique Trujillo  Clinic Number: [id number]    Therapy Diagnosis:   Encounter Diagnoses   Name Primary?    Acute pain of right shoulder Yes    Muscle weakness of right upper extremity     Impaired range of motion of right shoulder      Physician: Herb Tan II, MD    Visit Date: 8/11/2022    Physician: Herb Tan II, MD    Physician Orders: PT Eval and Treat  Medical Diagnosis from Referral: Z96.611 (ICD-10-CM) - S/P reverse total shoulder arthroplasty, right  Evaluation Date: 6/14/2022  Authorization Period Expiration: 12/31/22  Plan of Care Expiration: 9/14/2022                Progress Update: 7/20/2022               Visit # / Visits authorized: 14 / 22  + eval   ([id number])                    FOTO: Visit #1 - 2/3       Time In: 1100  Time Out: 1155  Total Billable Time:  55  minutes    Precautions: Standard    Subjective     Pt reports: getting better, cannot brush hair, difficulty feeding self, can start to reach across to opposite shoulder  She was compliant with home exercise program.  Response to previous treatment: good  Functional change: improved functional motions    Pain: 0/10  Location: right shoulder      Objective4     DOS 5/25/22    Monique received therapeutic exercises to develop strength, ROM and posture for 40 minutes including:    UBE 3'/3'    Shoulder shrugs x 20 reps   NOT PERFORMED time   Shoulder squeezes x 20 reps    NOT PERFORMED time   Shoulder isometrics  x 10 each direction Manual Resistance - External Rotation today    Landmine press with 10# bar x 20 reps     Elbow flex/ext x 20 reps 1# (towel under elbow to prevent Abduction )  Wrist flexion/extension x 20 reps 1#     NOT PERFORMED time     Overhead Pulleys flexion x 4 minutes  NOT PERFORMED time     Slides up stair rail x 20  Finger walk to #21 x 5 reps   Seated punches with Red TheraBand x 20  cable column  3# Rows x 20 or Green TheraBand   cable column  3#  Extension x 20 or Red TheraBand   Internal Rotation with strap behind back x 10  Wall Push Ups x 15    Supine dowel pressup with dowel 2 x 10  NOT PERFORMED time   Supine dowel SA press 2 x 10  NOT PERFORMED time     Manual therapy: x 15 minutes  AAROM External Rotation  Myofacial Release with therapy bar to R UT  NOT PERFORMED   PROM R shoulder: flexion, ER, Internal Rotation, tricep  Grade II superior/inferior clavicular mobs    Home Exercises Provided and Patient Education Provided     Education provided:   - cont HEP  - posture    Written Home Exercises Provided: Patient instructed to cont prior HEP.  Exercises were reviewed and Monique was able to demonstrate them prior to the end of the session.  Monique demonstrated good  understanding of the education provided.     See EMR under Patient Instructions for exercises provided prior visit.    Assessment     Resemary is making slow progress with R shoulder RANGE OF MOTION and strength.  She abducts her elbow and perform shoulder hike to gain R UE RANGE OF MOTION.  TC at elbow given with most exercises to maintain neutral arm position.   Continued strengthening, proprioceptive training to improve ADL's, functional activities.   Monique Is progressing well towards her goals.   Pt prognosis is Good.     Pt will continue to benefit from skilled outpatient physical therapy to address the deficits listed in the problem list box on initial evaluation, provide pt/family education and to maximize pt's level of independence in the home and community environment.     Pt's spiritual, cultural and educational needs considered and pt agreeable to plan of care and goals.    Anticipated barriers to physical therapy: none    GOALS:  SHORT TERM GOALS: 4 weeks, () Progress  8/11/2022   1. Recent signs and systems trend is improving in order to progress towards LTG's. Ongoing  8/11/2022      2. Patient will be independent with HEP in order to further progress and return to maximal  function. MET   3. Pain rating at Worst: 4/10 in order to progress towards increased independence with activity. Ongoing  8/11/2022      4. Patient will be able to correct postural deviations in sitting and standing, to decrease pain and promote postural awareness for injury prevention.  Ongoing  8/11/2022         LONG TERM GOALS: 10 weeks, () Progress  8/11/2022   1. Patient will return to normal ADL, recreational, and work related activities with less pain and limitation.  Ongoing  8/11/2022      2. Patient will improve AROM to stated goals in order to return to maximal functional potential.  Ongoing  8/11/2022      3. Patient will improve Strength to stated goals of appropriate musculature in order to improve functional independence.   ongoing  8/11/2022     4. Pain Rating at Best: 1/10 to improve Quality of Life.   ongoing  8/11/2022     5. Patient will meet predicted functional outcome (FOTO) score: 25% to increase self-worth & perceived functional ability.  ongoing  8/11/2022     6. Patient will have met/partially met personal goal of: being able to use right Upper Extremity to bath herself Ongoing  8/11/2022           Plan     Cont per POC, posture education, ROM, and strength    Belle Beck, PTA

## 2022-08-17 ENCOUNTER — CLINICAL SUPPORT (OUTPATIENT)
Dept: REHABILITATION | Facility: HOSPITAL | Age: 72
End: 2022-08-17
Attending: ORTHOPAEDIC SURGERY
Payer: MEDICARE

## 2022-08-17 DIAGNOSIS — M25.611 IMPAIRED RANGE OF MOTION OF RIGHT SHOULDER: ICD-10-CM

## 2022-08-17 DIAGNOSIS — M25.511 ACUTE PAIN OF RIGHT SHOULDER: Primary | ICD-10-CM

## 2022-08-17 DIAGNOSIS — M62.81 MUSCLE WEAKNESS OF RIGHT UPPER EXTREMITY: ICD-10-CM

## 2022-08-17 PROCEDURE — 97110 THERAPEUTIC EXERCISES: CPT | Mod: PN,CQ

## 2022-08-17 NOTE — PROGRESS NOTES
Physical Therapy Daily Treatment Note     Name: Monique Trujillo  Clinic Number: [id number]    Therapy Diagnosis:   Encounter Diagnoses   Name Primary?    Acute pain of right shoulder Yes    Muscle weakness of right upper extremity     Impaired range of motion of right shoulder      Physician: Herb Tan II, MD    Visit Date: 8/17/2022    Physician: Herb Tan II, MD    Physician Orders: PT Eval and Treat  Medical Diagnosis from Referral: Z96.611 (ICD-10-CM) - S/P reverse total shoulder arthroplasty, right  Evaluation Date: 6/14/2022  Authorization Period Expiration: 12/31/22  Plan of Care Expiration: 9/14/2022                Progress Update: 7/20/2022               Visit # / Visits authorized: 15 / 22  + eval   (Plan of Care 2 x 10 wks)                    Time In: 1100  Time Out: 1155  Total Billable Time:  55  minutes    Precautions: Standard    Subjective     Pt reports: she is not having any pain today.  Pt stated the Soft Tissue Mobs last treatment really helped loosen up her shoulder.  She was compliant with home exercise program.  Response to previous treatment: good  Functional change: no new changes    Pain: 0/10  Location: right shoulder      Objective4     DOS 5/25/22    Monique received therapeutic exercises to develop strength, ROM and posture for  minutes including:    Upper Body Ergometer  3'/3'  Overhead Pulleys flexion x 4 minutes    Seated exercises:  Shoulder rolls x 20 reps   Shoulder shrugs x 20 reps     Shoulder squeezes x 20 reps     Shoulder isometrics x 10 each direction Manual Resistance  Elbow flex/ext x 20 reps 1# (towel under elbow to prevent Abduction)  Wrist flexion/extension x 20 reps 1#      Pronation/ supination x 20 reps 1#   Seated punches with Red TheraBand x 20  Seated Internal Rotation with strap behind back x 10 reps with 10 seconds hold   Seated yellow theraband external rotation x 10 reps (cuing to not lift Right elbow from side)      Landmine press with  10# bar x 20 reps     Standing exercises:  Slides up stair rail x 20 reps   Finger walk to #21 x 5 reps   Wall Push Ups x 15  cable column  3# Rows x 20   cable column  3# Extension x 20       Supine dowel pressup with dowel 2 x 10  NOT PERFORMED time   Supine dowel SA press 2 x 10  NOT PERFORMED time       Manual therapy: x 15 minutes  AAROM External Rotation  Myofacial Release with therapy bar to R UT  NOT PERFORMED   PROM R shoulder: flexion, ER, Internal Rotation, tricep  Grade II superior/inferior clavicular mobs    Home Exercises Provided and Patient Education Provided     Education provided:   - cont Home exercise program   - posture    Written Home Exercises Provided: Patient instructed to cont prior HEP.  Exercises were reviewed and Monique was able to demonstrate them prior to the end of the session.  Monique demonstrated good  understanding of the education provided.     See EMR under Patient Instructions for exercises provided prior visit.    Assessment   Monique provided good effort and participation toward therapeutic interventions today with focus on Right upper extremity range of motion and strength.  Resemary is slowly progressing with R shoulder range of motion/strength.  She continues to abduct her elbow when performing her exercises despite having a towel under her elbow.  Verbal cuing to keep elbow close to her body.       Monique Is progressing well towards her goals.   Pt prognosis is Good.     Pt will continue to benefit from skilled outpatient physical therapy to address the deficits listed in the problem list box on initial evaluation, provide pt/family education and to maximize pt's level of independence in the home and community environment.     Pt's spiritual, cultural and educational needs considered and pt agreeable to plan of care and goals.    Anticipated barriers to physical therapy: none    GOALS:  SHORT TERM GOALS: 4 weeks, () Progress  8/17/2022   1. Recent signs and systems  trend is improving in order to progress towards LTG's. Ongoing  8/17/2022      2. Patient will be independent with HEP in order to further progress and return to maximal function. MET   3. Pain rating at Worst: 4/10 in order to progress towards increased independence with activity. Ongoing  8/17/2022      4. Patient will be able to correct postural deviations in sitting and standing, to decrease pain and promote postural awareness for injury prevention.  Ongoing  8/17/2022         LONG TERM GOALS: 10 weeks, () Progress  8/17/2022   1. Patient will return to normal ADL, recreational, and work related activities with less pain and limitation.  Ongoing  8/17/2022      2. Patient will improve AROM to stated goals in order to return to maximal functional potential.  Ongoing  8/17/2022      3. Patient will improve Strength to stated goals of appropriate musculature in order to improve functional independence.   ongoing  8/17/2022     4. Pain Rating at Best: 1/10 to improve Quality of Life.   ongoing  8/17/2022     5. Patient will meet predicted functional outcome (FOTO) score: 25% to increase self-worth & perceived functional ability.  ongoing  8/17/2022     6. Patient will have met/partially met personal goal of: being able to use right Upper Extremity to bath herself Ongoing  8/17/2022           Plan     Plan of care Certification: 6/14/2022 to 9/14/2022     Outpatient Physical Therapy 2 times weekly for 10 weeks to include any combination of the following interventions: virtual visits, dry needling, modalities, electrical stimulation (IFC, Pre-Mod, Attended with Functional Dry Needling), Manual Therapy, Moist Heat/ Ice, Neuromuscular Re-ed, Patient Education, Self Care, Therapeutic Exercise, Functional Training and Therapeutic Activites        Cont per POC, posture education, ROM, and strength    Roxy Norton, PTA

## 2022-08-19 PROBLEM — R42 SPINNING SENSATION: Status: ACTIVE | Noted: 2022-08-19

## 2022-08-19 NOTE — ASSESSMENT & PLAN NOTE
Patient is a 72 y/o female that presents for gait abnormality.   She endorses leaning when walking and spinning sensation with tinnitus. She has never german evaluated by ENT for this.   She has ongong back issues and has had a Lumbar laminectomy in 2020 and diagnosed with Radiculopathy.   Suspect that her gait disturbance is mulitfactorial due to neuropathy, tinnitus, spinning, h/o lumbar disease  Obtain MRI brain   Referral to ENT  Increase Gabapentin to 600, 300, 600 for ongoing neuropathy but also may worsen symptoms    - pt previously taking 600 mg BID only  Consider balance therapy

## 2022-08-22 ENCOUNTER — CLINICAL SUPPORT (OUTPATIENT)
Dept: REHABILITATION | Facility: HOSPITAL | Age: 72
End: 2022-08-22
Attending: ORTHOPAEDIC SURGERY
Payer: MEDICARE

## 2022-08-22 DIAGNOSIS — M62.81 MUSCLE WEAKNESS OF RIGHT UPPER EXTREMITY: ICD-10-CM

## 2022-08-22 DIAGNOSIS — M25.611 IMPAIRED RANGE OF MOTION OF RIGHT SHOULDER: ICD-10-CM

## 2022-08-22 DIAGNOSIS — M25.511 ACUTE PAIN OF RIGHT SHOULDER: Primary | ICD-10-CM

## 2022-08-22 PROCEDURE — 97110 THERAPEUTIC EXERCISES: CPT | Mod: PN,CQ

## 2022-08-22 PROCEDURE — 97140 MANUAL THERAPY 1/> REGIONS: CPT | Mod: PN,CQ

## 2022-08-22 NOTE — PROGRESS NOTES
Physical Therapy Daily Treatment Note     Name: Monique Trujillo  Clinic Number: [id number]    Therapy Diagnosis:   Encounter Diagnoses   Name Primary?    Acute pain of right shoulder Yes    Muscle weakness of right upper extremity     Impaired range of motion of right shoulder      Physician: Herb Tan II, MD    Visit Date: 8/22/2022    Physician: Herb Tan II, MD    Physician Orders: PT Eval and Treat  Medical Diagnosis from Referral: Z96.611 (ICD-10-CM) - S/P reverse total shoulder arthroplasty, right  Evaluation Date: 6/14/2022  Authorization Period Expiration: 12/31/22  Plan of Care Expiration: 9/14/2022                Progress Update: 7/20/2022               Visit # / Visits authorized: 16 / 22  + eval   (Plan of Care 2 x 10 wks)                    Time In: 1200p  Time Out: 1255p  Total Billable Time:  30  minutes    Precautions: Standard    Subjective     Pt reports: no complaints  She was compliant with home exercise program.  Response to previous treatment: good  Functional change: no new changes    Pain: 0/10  Location: right shoulder      Objective4     DOS 5/25/22    Monique received therapeutic exercises to develop strength, ROM and posture for 45  minutes including:      Shoulder shrugs x 20 reps   NOT PERFORMED time   Shoulder squeezes x 20 reps    NOT PERFORMED time   Shoulder isometrics  x 10 each direction Manual Resistance - External Rotation today     Landmine press with 10# bar x 20 reps      Elbow flex/ext x 20 reps 1# (towel under elbow to prevent Abduction )  Wrist flexion/extension x 20 reps 1#     NOT PERFORMED time   Seated punches with Red TheraBand x 20    Overhead Pulleys flexion x 4 minutes  NOT PERFORMED time      Slides up stair rail x 20  Finger walk to #21 x 5 reps       cable column  3# Rows x 20 or Green TheraBand   cable column  3# Extension x 20 or Red TheraBand   Internal Rotation with strap behind back x 10  Wall Push Ups x 15     Supine dowel pressup with  dowel 2 x 10  NOT PERFORMED time   Supine dowel SA press 2 x 10  NOT PERFORMED time      Manual therapy: x 10 minutes  AAROM External Rotation  Myofacial Release with therapy bar to R UT  NOT PERFORMED   PROM R shoulder: flexion, ER, Internal Rotation, tricep  Grade II superior/inferior clavicular mobs      Home Exercises Provided and Patient Education Provided     Education provided:   - cont Home exercise program   - posture    Written Home Exercises Provided: Patient instructed to cont prior HEP.  Exercises were reviewed and Monique was able to demonstrate them prior to the end of the session.  Monique demonstrated good  understanding of the education provided.     See EMR under Patient Instructions for exercises provided prior visit.    Assessment     'Monique demonstrates a minimal decrease in shoulder abduction with R UE activity.  She is able to maintain neutral elbow position more often before reverting to abduction.  Improved ease with finger ladder at end range today.  Continued strengthening to improve functional activities, quality of life.       Monique Is progressing well towards her goals.   Pt prognosis is Good.     Pt will continue to benefit from skilled outpatient physical therapy to address the deficits listed in the problem list box on initial evaluation, provide pt/family education and to maximize pt's level of independence in the home and community environment.     Pt's spiritual, cultural and educational needs considered and pt agreeable to plan of care and goals.    Anticipated barriers to physical therapy: none    GOALS:  SHORT TERM GOALS: 4 weeks, () Progress  8/22/2022   1. Recent signs and systems trend is improving in order to progress towards LTG's. Ongoing  8/22/2022      2. Patient will be independent with HEP in order to further progress and return to maximal function. MET   3. Pain rating at Worst: 4/10 in order to progress towards increased independence with activity.  Ongoing  8/22/2022      4. Patient will be able to correct postural deviations in sitting and standing, to decrease pain and promote postural awareness for injury prevention.  Ongoing  8/22/2022         LONG TERM GOALS: 10 weeks, () Progress  8/22/2022   1. Patient will return to normal ADL, recreational, and work related activities with less pain and limitation.  Ongoing  8/22/2022      2. Patient will improve AROM to stated goals in order to return to maximal functional potential.  Ongoing  8/22/2022      3. Patient will improve Strength to stated goals of appropriate musculature in order to improve functional independence.   ongoing  8/22/2022     4. Pain Rating at Best: 1/10 to improve Quality of Life.   ongoing  8/22/2022     5. Patient will meet predicted functional outcome (FOTO) score: 25% to increase self-worth & perceived functional ability.  ongoing  8/22/2022     6. Patient will have met/partially met personal goal of: being able to use right Upper Extremity to bath herself Ongoing  8/22/2022           Plan     Plan of care Certification: 6/14/2022 to 9/14/2022     Outpatient Physical Therapy 2 times weekly for 10 weeks to include any combination of the following interventions: virtual visits, dry needling, modalities, electrical stimulation (IFC, Pre-Mod, Attended with Functional Dry Needling), Manual Therapy, Moist Heat/ Ice, Neuromuscular Re-ed, Patient Education, Self Care, Therapeutic Exercise, Functional Training and Therapeutic Activites        Cont per POC, posture education, ROM, and strength    Belle Beck, PTA

## 2022-08-23 DIAGNOSIS — R51.9 CHRONIC NONINTRACTABLE HEADACHE, UNSPECIFIED HEADACHE TYPE: ICD-10-CM

## 2022-08-23 DIAGNOSIS — G89.29 CHRONIC NONINTRACTABLE HEADACHE, UNSPECIFIED HEADACHE TYPE: ICD-10-CM

## 2022-08-23 RX ORDER — BUTALBITAL, ACETAMINOPHEN AND CAFFEINE 50; 325; 40 MG/1; MG/1; MG/1
1 TABLET ORAL EVERY 6 HOURS PRN
Qty: 60 TABLET | Refills: 2 | OUTPATIENT
Start: 2022-08-23 | End: 2022-09-22

## 2022-08-24 ENCOUNTER — TELEPHONE (OUTPATIENT)
Dept: OTOLARYNGOLOGY | Facility: CLINIC | Age: 72
End: 2022-08-24
Payer: MEDICARE

## 2022-08-24 ENCOUNTER — TELEPHONE (OUTPATIENT)
Dept: OPHTHALMOLOGY | Facility: CLINIC | Age: 72
End: 2022-08-24
Payer: MEDICARE

## 2022-08-24 NOTE — TELEPHONE ENCOUNTER
----- Message from Yamilex Light sent at 8/24/2022 11:42 AM CDT -----  Contact: Pt  Type:  Patient Returning Call    Who Called:  Pt  Who Left Message for Patient:  Yancy  Does the patient know what this is regarding?:  Yes  Best Call Back Number:  [phone]  Additional Information:  Please call back.  Thanks.

## 2022-08-24 NOTE — TELEPHONE ENCOUNTER
Attempted to contact pt to schedule appt per referral. No answer; left voicemail msg for call back.

## 2022-08-24 NOTE — TELEPHONE ENCOUNTER
----- Message from Ani Gibbons sent at 8/24/2022 11:02 AM CDT -----  Regarding: pt called  Name of Who is Calling: MEE CHEEMA [[id number]]      What is the request in detail: pt had a blood vessel that popped in her right eye and it got all red and now her vision id blurry and needs to be seen. Please advise       Can the clinic reply by [facility]: No      [facility]: [phone]

## 2022-08-24 NOTE — TELEPHONE ENCOUNTER
----- Message from Ani Gibbons sent at 8/24/2022 11:00 AM CDT -----  Regarding: pt called  Name of Who is Calling: MEE CHEEMA [[id number]]      What is the request in detail: pt has an active referral that needs to be scheduled. Please advise       Can the clinic reply by [facility]: No      What Number to Call Back if not in [facility]: [phone] (home)

## 2022-08-24 NOTE — TELEPHONE ENCOUNTER
Contacted and spoke w/pt after receiving the following call back msg. Pt addressed that she will need to r/s her ENT appt due to it being the same day as her ortho appt. Pt was also wanting to cancel her audiogram since her insurance was giving her issues w/transportation. Let her know that we can cancel her audiogram and have her do a hearing test, externally, once she is seen. Pt agreed; ENT clyde was rescheduled to 09/02 @ 1015.

## 2022-08-24 NOTE — TELEPHONE ENCOUNTER
----- Message from Yamilex Light sent at 8/24/2022 11:40 AM CDT -----  Contact: Pt  Type:  Patient Returning Call    Who Called:  Pt  Who Left Message for Patient:  Marsha  Does the patient know what this is regarding?:  Yes  Best Call Back Number:  [phone]  Additional Information:  Please call back.  Thanks.

## 2022-08-24 NOTE — TELEPHONE ENCOUNTER
Contacted and spoke w/pt. Appt was scheduled for audiogram and ENT. Pt confirmed new appt dates/times.

## 2022-08-24 NOTE — TELEPHONE ENCOUNTER
----- Message from Adan Hanson sent at 8/24/2022  4:10 PM CDT -----  Type: Needs Medical Advice  Who Called:  patient  Best Call Back Number: [phone]   Additional Information: pt needs to reschedule her appt on Tuesday because its right before her ortho appt.

## 2022-08-29 ENCOUNTER — OFFICE VISIT (OUTPATIENT)
Dept: GASTROENTEROLOGY | Facility: CLINIC | Age: 72
End: 2022-08-29
Payer: MEDICARE

## 2022-08-29 VITALS
DIASTOLIC BLOOD PRESSURE: 83 MMHG | HEART RATE: 77 BPM | HEIGHT: 61 IN | WEIGHT: 233 LBS | BODY MASS INDEX: 43.99 KG/M2 | SYSTOLIC BLOOD PRESSURE: 143 MMHG

## 2022-08-29 DIAGNOSIS — K21.9 GASTROESOPHAGEAL REFLUX DISEASE, UNSPECIFIED WHETHER ESOPHAGITIS PRESENT: Primary | ICD-10-CM

## 2022-08-29 DIAGNOSIS — Z80.0 FAMILY HISTORY OF COLON CANCER: ICD-10-CM

## 2022-08-29 DIAGNOSIS — R14.2 BELCHING: ICD-10-CM

## 2022-08-29 DIAGNOSIS — R13.10 DYSPHAGIA, UNSPECIFIED TYPE: ICD-10-CM

## 2022-08-29 DIAGNOSIS — K27.9 PUD (PEPTIC ULCER DISEASE): ICD-10-CM

## 2022-08-29 DIAGNOSIS — K44.9 HIATAL HERNIA: ICD-10-CM

## 2022-08-29 PROCEDURE — 3044F PR MOST RECENT HEMOGLOBIN A1C LEVEL <7.0%: ICD-10-PCS | Mod: CPTII,S$GLB,,

## 2022-08-29 PROCEDURE — 3079F PR MOST RECENT DIASTOLIC BLOOD PRESSURE 80-89 MM HG: ICD-10-PCS | Mod: CPTII,S$GLB,,

## 2022-08-29 PROCEDURE — 99999 PR PBB SHADOW E&M-EST. PATIENT-LVL III: ICD-10-PCS | Mod: PBBFAC,,,

## 2022-08-29 PROCEDURE — 1101F PR PT FALLS ASSESS DOC 0-1 FALLS W/OUT INJ PAST YR: ICD-10-PCS | Mod: CPTII,S$GLB,,

## 2022-08-29 PROCEDURE — 3288F FALL RISK ASSESSMENT DOCD: CPT | Mod: CPTII,S$GLB,,

## 2022-08-29 PROCEDURE — 1159F MED LIST DOCD IN RCRD: CPT | Mod: CPTII,S$GLB,,

## 2022-08-29 PROCEDURE — 3062F PR POS MACROALBUMINURIA RESULT DOCUMENTED/REVIEW: ICD-10-PCS | Mod: CPTII,S$GLB,,

## 2022-08-29 PROCEDURE — 3079F DIAST BP 80-89 MM HG: CPT | Mod: CPTII,S$GLB,,

## 2022-08-29 PROCEDURE — 99499 UNLISTED E&M SERVICE: CPT | Mod: S$GLB,,,

## 2022-08-29 PROCEDURE — 1160F PR REVIEW ALL MEDS BY PRESCRIBER/CLIN PHARMACIST DOCUMENTED: ICD-10-PCS | Mod: CPTII,S$GLB,,

## 2022-08-29 PROCEDURE — 1160F RVW MEDS BY RX/DR IN RCRD: CPT | Mod: CPTII,S$GLB,,

## 2022-08-29 PROCEDURE — 3077F SYST BP >= 140 MM HG: CPT | Mod: CPTII,S$GLB,,

## 2022-08-29 PROCEDURE — 1126F PR PAIN SEVERITY QUANTIFIED, NO PAIN PRESENT: ICD-10-PCS | Mod: CPTII,S$GLB,,

## 2022-08-29 PROCEDURE — 3044F HG A1C LEVEL LT 7.0%: CPT | Mod: CPTII,S$GLB,,

## 2022-08-29 PROCEDURE — 3077F PR MOST RECENT SYSTOLIC BLOOD PRESSURE >= 140 MM HG: ICD-10-PCS | Mod: CPTII,S$GLB,,

## 2022-08-29 PROCEDURE — 3062F POS MACROALBUMINURIA REV: CPT | Mod: CPTII,S$GLB,,

## 2022-08-29 PROCEDURE — 1159F PR MEDICATION LIST DOCUMENTED IN MEDICAL RECORD: ICD-10-PCS | Mod: CPTII,S$GLB,,

## 2022-08-29 PROCEDURE — 3066F PR DOCUMENTATION OF TREATMENT FOR NEPHROPATHY: ICD-10-PCS | Mod: CPTII,S$GLB,,

## 2022-08-29 PROCEDURE — 1101F PT FALLS ASSESS-DOCD LE1/YR: CPT | Mod: CPTII,S$GLB,,

## 2022-08-29 PROCEDURE — 1126F AMNT PAIN NOTED NONE PRSNT: CPT | Mod: CPTII,S$GLB,,

## 2022-08-29 PROCEDURE — 99499 RISK ADDL DX/OHS AUDIT: ICD-10-PCS | Mod: S$GLB,,,

## 2022-08-29 PROCEDURE — 3008F BODY MASS INDEX DOCD: CPT | Mod: CPTII,S$GLB,,

## 2022-08-29 PROCEDURE — 99999 PR PBB SHADOW E&M-EST. PATIENT-LVL III: CPT | Mod: PBBFAC,,,

## 2022-08-29 PROCEDURE — 99203 PR OFFICE/OUTPT VISIT, NEW, LEVL III, 30-44 MIN: ICD-10-PCS | Mod: S$GLB,,,

## 2022-08-29 PROCEDURE — 99203 OFFICE O/P NEW LOW 30 MIN: CPT | Mod: S$GLB,,,

## 2022-08-29 PROCEDURE — 3066F NEPHROPATHY DOC TX: CPT | Mod: CPTII,S$GLB,,

## 2022-08-29 PROCEDURE — 3288F PR FALLS RISK ASSESSMENT DOCUMENTED: ICD-10-PCS | Mod: CPTII,S$GLB,,

## 2022-08-29 PROCEDURE — 3008F PR BODY MASS INDEX (BMI) DOCUMENTED: ICD-10-PCS | Mod: CPTII,S$GLB,,

## 2022-08-29 NOTE — PROGRESS NOTES
Subjective:       Patient ID: Monique Trujillo is a 71 y.o. female Body mass index is 44.03 kg/m².    Chief Complaint: Gastroesophageal Reflux    This patient is new to me.  Referring Provider: Dr. Nhung Sandoval for GERD.     Gastroesophageal Reflux  She complains of belching and dysphagia (sensation that food gets stuck in esophagus after eating; intermittently). She reports no abdominal pain, no chest pain, no choking, no coughing, no early satiety, no globus sensation, no heartburn, no hoarse voice, no nausea, no sore throat or no water brash. This is a chronic problem. The current episode started more than 1 year ago. The problem occurs occasionally. The problem has been gradually improving. The symptoms are aggravated by certain foods and stress (red sauce). Pertinent negatives include no anemia, fatigue, melena, muscle weakness or weight loss. Risk factors include hiatal hernia, obesity, caffeine use, NSAIDs and lack of exercise (1 cup of coffee daily; takes ibuprofen 800 mg PRN). She has tried a PPI and an antacid (protonix 40 mg once daily & TUMs PRN) for the symptoms. The treatment provided moderate relief. Past procedures include an EGD (12/14/17 - History of antral ulcers Normal EGD today, ulcers have healed. Abdominal pain has improved). Past procedures do not include an abdominal ultrasound, esophageal manometry, esophageal pH monitoring, H. pylori antibody titer or a UGI. Hx of gastric ulcers. Past invasive treatments do not include gastroplasty, gastroplication or reflux surgery.   Review of Systems   Constitutional:  Negative for activity change, appetite change, chills, diaphoresis, fatigue, fever, unexpected weight change and weight loss.   HENT:  Negative for hoarse voice and sore throat.    Respiratory:  Negative for cough and choking.    Cardiovascular:  Negative for chest pain.   Gastrointestinal:  Positive for dysphagia (sensation that food gets stuck in esophagus after eating;  intermittently). Negative for abdominal distention, abdominal pain, anal bleeding, blood in stool, constipation, diarrhea, heartburn, melena, nausea, rectal pain and vomiting.   Musculoskeletal:  Negative for muscle weakness.       No LMP recorded (lmp unknown). Patient is postmenopausal.  Past Medical History:   Diagnosis Date    Allergy     Arthritis     Chronic lower back pain     Depression     Fever blister     GERD (gastroesophageal reflux disease)     Hemorrhoids, internal 11/05/2014    Hyperlipidemia     Hypertension     Joint pain     Morbid obesity with BMI of 40.0-44.9, adult 02/24/2015    Multiple gastric ulcers 12/14/2017    TIMMY (obstructive sleep apnea)     does not use CPAP    Pulmonary hypertension     Skin cancer     Type 2 diabetes mellitus with hyperglycemia, without long-term current use of insulin 4/20/2022     Past Surgical History:   Procedure Laterality Date    ANTERIOR CERVICAL CORPECTOMY W/ FUSION  1988    BACK SURGERY      CAUDAL EPIDURAL STEROID INJECTION N/A 01/10/2019    Procedure: Injection-steroid-epidural-caudal;  Surgeon: Lydia Velásquez Jr., MD;  Location: [facility] PAIN [facility];  Service: Pain Management;  Laterality: N/A;    COLONOSCOPY N/A 11/02/2016    Procedure: COLONOSCOPY;  Surgeon: Viji Dewitt MD;  Location: [facility];  Service: Endoscopy;  Laterality: N/A;    EPIDURAL STEROID INJECTION N/A 05/18/2022    Procedure: Injection, Steroid, Epidural;  Surgeon: Fermín Luo MD;  Location: [facility] OR;  Service: Pain Management;  Laterality: N/A;  L2-l3     EPIDURAL STEROID INJECTION INTO LUMBAR SPINE N/A 11/08/2018    Procedure: Injection-steroid-epidural-lumbar-L2-3 (LEFT > RIGHT);  Surgeon: Lydia Velásquez Jr., MD;  Location: [facility] PAIN [facility];  Service: Pain Management;  Laterality: N/A;    FUSION OF SPINE WITH INSTRUMENTATION Left 01/03/2020    Procedure: FUSION, SPINE, WITH INSTRUMENTATION Stage 1 Left L3-4 LOIF Rise L Stage 2 Open L3-4 L4-5 Laminectomy Removal of L4-5 Hardware,  L3-5 Posterior Instrumentation and extraction of Fusion at L3-4;  Surgeon: James Brantley MD;  Location: [facility] OR;  Service: Neurosurgery;  Laterality: Left;  Procedure: Stage 1 Left L3-4 LOIF Rise L  Stage 2 Open L3-4 L4-5 Laminectomy Removal of L4-5 Hardware,     INJECTION OF ANESTHETIC AGENT AROUND GENITOFEMORAL NERVE Bilateral 08/01/2019    Procedure: BILATERAL SHOULDER ARTICULAR BRANCH BLOCK;  Surgeon: Lydia Velásquez Jr., MD;  Location: [facility] PAIN MGT;  Service: Pain Management;  Laterality: Bilateral;    JOINT REPLACEMENT      KNEE ARTHROSCOPY W/ MENISCAL REPAIR Right 2012    LUMBAR LAMINECTOMY  06/19/2015    REVERSE TOTAL SHOULDER ARTHROPLASTY Right 05/25/2022    Procedure: ARTHROPLASTY, SHOULDER, TOTAL, REVERSE;  Surgeon: Herb Tan II, MD;  Location: [facility] OR;  Service: Orthopedics;  Laterality: Right;    SKIN CANCER EXCISION      forehead does not recall date    TOTAL HIP ARTHROPLASTY Right 04/28/2016         TUBAL LIGATION  1980     Family History   Problem Relation Age of Onset    Arthritis Mother     Cancer Mother         breast ca    Breast cancer Mother     Kidney disease Father     Alzheimer's disease Maternal Grandfather     Cancer Paternal Grandmother         colon ca    Colon cancer Paternal Grandmother      Social History     Tobacco Use    Smoking status: Never    Smokeless tobacco: Never   Substance Use Topics    Alcohol use: No     Alcohol/week: 0.0 standard drinks    Drug use: No     Wt Readings from Last 10 Encounters:   08/29/22 105.7 kg (233 lb 0.4 oz)   08/10/22 105.7 kg (233 lb 0.4 oz)   07/19/22 105.3 kg (232 lb 4.1 oz)   07/07/22 106.6 kg (235 lb)   06/21/22 106.6 kg (235 lb)   06/15/22 106.7 kg (235 lb 3.7 oz)   06/09/22 106.6 kg (235 lb 0.2 oz)   05/25/22 106.6 kg (235 lb)   05/23/22 107 kg (235 lb 14.3 oz)   05/12/22 108.4 kg (238 lb 15.7 oz)     Lab Results   Component Value Date    WBC 5.59 05/05/2022    HGB 12.9 05/05/2022    HCT 41.7 05/05/2022    MCV 97 05/05/2022      05/05/2022     CMP  Sodium   Date Value Ref Range Status   05/05/2022 141 136 - 145 mmol/L Final     Potassium   Date Value Ref Range Status   05/05/2022 4.0 3.5 - 5.1 mmol/L Final     Chloride   Date Value Ref Range Status   05/05/2022 105 95 - 110 mmol/L Final     CO2   Date Value Ref Range Status   05/05/2022 26 23 - 29 mmol/L Final     Glucose   Date Value Ref Range Status   05/05/2022 168 (H) 70 - 110 mg/dL Final     BUN   Date Value Ref Range Status   05/05/2022 20 8 - 23 mg/dL Final     Creatinine   Date Value Ref Range Status   05/05/2022 0.9 0.5 - 1.4 mg/dL Final     Calcium   Date Value Ref Range Status   05/05/2022 9.5 8.7 - 10.5 mg/dL Final     Total Protein   Date Value Ref Range Status   05/05/2022 7.3 6.0 - 8.4 g/dL Final     Albumin   Date Value Ref Range Status   05/05/2022 3.9 3.5 - 5.2 g/dL Final     Total Bilirubin   Date Value Ref Range Status   05/05/2022 0.4 0.1 - 1.0 mg/dL Final     Comment:     For infants and newborns, interpretation of results should be based  on gestational age, weight and in agreement with clinical  observations.    Premature Infant recommended reference ranges:  Up to 24 hours.............<8.0 mg/dL  Up to 48 hours............<12.0 mg/dL  3-5 days..................<15.0 mg/dL  6-29 days.................<15.0 mg/dL       Alkaline Phosphatase   Date Value Ref Range Status   05/05/2022 54 (L) 55 - 135 U/L Final     AST   Date Value Ref Range Status   05/05/2022 21 10 - 40 U/L Final     ALT   Date Value Ref Range Status   05/05/2022 15 10 - 44 U/L Final     Anion Gap   Date Value Ref Range Status   05/05/2022 10 8 - 16 mmol/L Final     eGFR if    Date Value Ref Range Status   05/05/2022 >60.0 >60 mL/min/1.73 m^2 Final     eGFR if non    Date Value Ref Range Status   05/05/2022 >60.0 >60 mL/min/1.73 m^2 Final     Comment:     Calculation used to obtain the estimated glomerular filtration  rate (eGFR) is the CKD-EPI equation.        Lab  Results   Component Value Date    TSH 2.249 04/20/2022       Reviewed prior medical records including endoscopy history (see surgical history).    Objective:      Physical Exam  Vitals reviewed.   Constitutional:       General: She is not in acute distress.     Appearance: Normal appearance. She is obese. She is not ill-appearing.   HENT:      Mouth/Throat:      Comments: Unable to assess due to COVID concerns.  Eyes:      Extraocular Movements: Extraocular movements intact.      Pupils: Pupils are equal, round, and reactive to light.   Cardiovascular:      Rate and Rhythm: Normal rate and regular rhythm.   Pulmonary:      Effort: Pulmonary effort is normal. No respiratory distress.      Breath sounds: Normal breath sounds.   Abdominal:      General: Bowel sounds are normal. There is no distension.      Palpations: There is no mass.      Tenderness: There is no abdominal tenderness. There is no guarding or rebound.      Hernia: No hernia is present.   Skin:     General: Skin is warm and dry.      Coloration: Skin is not jaundiced or pale.   Neurological:      Mental Status: She is alert and oriented to person, place, and time.   Psychiatric:         Attention and Perception: Attention normal.         Mood and Affect: Mood normal.         Speech: Speech normal.         Behavior: Behavior normal.       Assessment:       1. Gastroesophageal reflux disease, unspecified whether esophagitis present    2. Belching    3. Dysphagia, unspecified type    4. Hiatal hernia    5. PUD (peptic ulcer disease)    6. Family history of colon cancer    7. BMI 40.0-44.9, adult        Plan:       Gastroesophageal reflux disease, unspecified whether esophagitis present  - schedule EGD, discussed procedure with patient, including risks and benefits, patient verbalized understanding  -discussed about the different types of medications used to treat reflux and how to use them, antacids can be used PRN for breakthrough heartburn symptoms by  reducing stomach acid that is already produced, H2 blockers work by limiting the amount acid production, & PPI's work to block acid production and are taken daily, patient verbalized understanding.  -Educated patient on lifestyle modifications to help control/reduce reflux/abdominal pain including: avoid large meals, avoid eating within 2-3 hours of bedtime (avoid late night eating & lying down soon after eating), elevate head of bed if nocturnal symptoms are present, smoking cessation (if current smoker), & weight loss (if overweight).   -Educated to avoid known foods which trigger reflux symptoms & to minimize/avoid high-fat foods, chocolate, caffeine, citrus, alcohol, & tomato products.  -Advised to avoid/limit use of NSAID's, since they can cause GI upset, bleeding, and/or ulcers. If needed, take with food.   -Continue protonix 40 mg once daily 30 minutes before breakfast & TUMs PRN for breakthrough heartburn/reflux    Belching  - schedule EGD, discussed procedure with patient, including risks and benefits, patient verbalized understanding    Dysphagia, unspecified type  - schedule EGD, discussed procedure with patient and possible esophageal dilation may be performed during procedure if indicated, patient verbalized understanding  - educated patient to eat smaller more frequent meals and to eat slowly and advised to eat a soft diet.  - possible UGI/esophagram/esophageal manometry if symptoms persist    Hiatal hernia  -usually managed by controlling reflux symptoms, surgery is an option, but usually performed if reflux is uncontrolled by medication management and lifestyle/dietary modifications; if symptoms persist despite medication management and lifestyle/dietary modifications, we can refer to general surgery to consult about surgical options, patient verbalized understanding  - schedule EGD, discussed procedure with patient, including risks and benefits, patient verbalized understanding  -Continue protonix  40 mg once daily 30 minutes before breakfast & TUMs PRN for breakthrough heartburn/reflux    PUD (peptic ulcer disease)  - schedule EGD, discussed procedure with patient, including risks and benefits, patient verbalized understanding  -Continue protonix 40 mg once daily 30 minutes before breakfast & TUMs PRN for breakthrough heartburn/reflux    Family history of colon cancer  Patient agreed to sign medical records release consent for us to obtain records from [facility] in [place] where last colonoscopy was performed.     BMI 40.0-44.9, adult  -Continue diet and exercise as tolerated    Follow up in about 4 weeks (around 9/26/2022), or if symptoms worsen or fail to improve.      If no improvement in symptoms or symptoms worsen, call/follow-up at clinic or go to ER.        30 minutes of total time spent on the encounter, which includes face to face time and non-face to face time preparing to see the patient (eg, review of tests), Obtaining and/or reviewing separately obtained history, Documenting clinical information in the electronic or other health record, Independently interpreting results (not separately reported) and communicating results to the patient/family/caregiver, or Care coordination (not separately reported).

## 2022-08-31 ENCOUNTER — CLINICAL SUPPORT (OUTPATIENT)
Dept: REHABILITATION | Facility: HOSPITAL | Age: 72
End: 2022-08-31
Attending: ORTHOPAEDIC SURGERY
Payer: MEDICARE

## 2022-08-31 DIAGNOSIS — M25.511 ACUTE PAIN OF RIGHT SHOULDER: Primary | ICD-10-CM

## 2022-08-31 DIAGNOSIS — M25.611 IMPAIRED RANGE OF MOTION OF RIGHT SHOULDER: ICD-10-CM

## 2022-08-31 DIAGNOSIS — M62.81 MUSCLE WEAKNESS OF RIGHT UPPER EXTREMITY: ICD-10-CM

## 2022-08-31 PROCEDURE — 97140 MANUAL THERAPY 1/> REGIONS: CPT | Mod: PN

## 2022-08-31 PROCEDURE — 97110 THERAPEUTIC EXERCISES: CPT | Mod: PN

## 2022-08-31 NOTE — PROGRESS NOTES
Physical Therapy Daily Treatment Note     Name: Monique Trujillo  Clinic Number: [id number]    Therapy Diagnosis:   Encounter Diagnoses   Name Primary?    Acute pain of right shoulder Yes    Muscle weakness of right upper extremity     Impaired range of motion of right shoulder      Physician: Herb Tan II, MD    Visit Date: 8/31/2022    Physician: Herb Tan II, MD    Physician Orders: PT Eval and Treat  Medical Diagnosis from Referral: Z96.611 (ICD-10-CM) - S/P reverse total shoulder arthroplasty, right  Evaluation Date: 6/14/2022  Authorization Period Expiration: 12/31/22  Plan of Care Expiration: 9/14/2022                Progress Update: 7/20/2022               Visit # / Visits authorized: 17 / 22  + eval   (Plan of Care 2 x 10 wks)                    Time In: 1230p  Time Out: 125p  Total Billable Time:  55  minutes    Precautions: Standard    Subjective     Pt reports: no complaints  She was compliant with home exercise program.  Response to previous treatment: good  Functional change: no new changes    Pain: 0/10  Location: right shoulder      Objective4     DOS 5/25/22    Monique received therapeutic exercises to develop strength, ROM and posture for 45  minutes including:      Shoulder shrugs x 20 reps     Shoulder squeezes x 20 reps      Shoulder isometrics  x 10 each direction Manual Resistance - External Rotation today   Landmine press with 10# bar x 20 reps    Elbow flex/ext x 20 reps 1# (towel under elbow to prevent Abduction )  Wrist flexion/extension x 20 reps 1#     NOT PERFORMED time   Seated punches with Red TheraBand x 20    Overhead Pulleys flexion x 4 minutes       Slides up stair rail x 20  Finger walk to #21 x 5 reps     cable column  3# Rows x 20 or Green TheraBand   cable column  3# Extension x 20 or Red TheraBand   Internal Rotation with strap behind back x 10  Wall Push Ups x 15     Supine dowel pressup with dowel 2 x 10  NOT PERFORMED time   Supine dowel SA press 2 x 10   NOT PERFORMED time      Manual therapy: x 10 minutes  AAROM External Rotation  Myofacial Release with therapy bar to R UT  NOT PERFORMED   PROM R shoulder: flexion, ER, Internal Rotation, tricep  Grade II superior/inferior clavicular mobs      Home Exercises Provided and Patient Education Provided     Education provided:   - cont Home exercise program   - posture    Written Home Exercises Provided: Patient instructed to cont prior HEP.  Exercises were reviewed and Monique was able to demonstrate them prior to the end of the session.  Monique demonstrated good  understanding of the education provided.     See EMR under Patient Instructions for exercises provided prior visit.    Assessment     'Monique demonstrates a minimal decrease in shoulder abduction with R UE activity.  She is able to maintain neutral elbow position more often before reverting to abduction.  Improved ease with finger ladder at end range today.  Continued strengthening to improve functional activities, quality of life.       Monique Is progressing well towards her goals.   Pt prognosis is Good.     Pt will continue to benefit from skilled outpatient physical therapy to address the deficits listed in the problem list box on initial evaluation, provide pt/family education and to maximize pt's level of independence in the home and community environment.     Pt's spiritual, cultural and educational needs considered and pt agreeable to plan of care and goals.    Anticipated barriers to physical therapy: none    GOALS:  SHORT TERM GOALS: 4 weeks, () Progress  8/31/2022   Recent signs and systems trend is improving in order to progress towards LTG's. Ongoing  8/31/2022      Patient will be independent with HEP in order to further progress and return to maximal function. MET   Pain rating at Worst: 4/10 in order to progress towards increased independence with activity. Ongoing  8/31/2022      Patient will be able to correct postural deviations in  sitting and standing, to decrease pain and promote postural awareness for injury prevention.  Ongoing  8/31/2022         LONG TERM GOALS: 10 weeks, () Progress  8/31/2022   Patient will return to normal ADL, recreational, and work related activities with less pain and limitation.  Ongoing  8/31/2022      Patient will improve AROM to stated goals in order to return to maximal functional potential.  Ongoing  8/31/2022      Patient will improve Strength to stated goals of appropriate musculature in order to improve functional independence.   ongoing  8/31/2022     Pain Rating at Best: 1/10 to improve Quality of Life.   ongoing  8/31/2022     Patient will meet predicted functional outcome (FOTO) score: 25% to increase self-worth & perceived functional ability.  ongoing  8/31/2022     Patient will have met/partially met personal goal of: being able to use right Upper Extremity to bath herself Ongoing  8/31/2022           Plan     Plan of care Certification: 6/14/2022 to 9/14/2022     Outpatient Physical Therapy 2 times weekly for 10 weeks to include any combination of the following interventions: virtual visits, dry needling, modalities, electrical stimulation (IFC, Pre-Mod, Attended with Functional Dry Needling), Manual Therapy, Moist Heat/ Ice, Neuromuscular Re-ed, Patient Education, Self Care, Therapeutic Exercise, Functional Training and Therapeutic Activites        Cont per POC, posture education, ROM, and strength    Didier Can, PT

## 2022-09-02 ENCOUNTER — OFFICE VISIT (OUTPATIENT)
Dept: OTOLARYNGOLOGY | Facility: CLINIC | Age: 72
End: 2022-09-02
Payer: MEDICARE

## 2022-09-02 VITALS — WEIGHT: 229.25 LBS | HEIGHT: 61 IN | BODY MASS INDEX: 43.28 KG/M2 | TEMPERATURE: 98 F

## 2022-09-02 DIAGNOSIS — M26.629 TMJ SYNDROME: Primary | ICD-10-CM

## 2022-09-02 DIAGNOSIS — H93.11 TINNITUS OF RIGHT EAR: ICD-10-CM

## 2022-09-02 DIAGNOSIS — Z86.69 HISTORY OF HEARING LOSS: ICD-10-CM

## 2022-09-02 DIAGNOSIS — R42 VERTIGO: ICD-10-CM

## 2022-09-02 DIAGNOSIS — K02.9 DENTAL CARIES: ICD-10-CM

## 2022-09-02 DIAGNOSIS — R42 DYSEQUILIBRIUM: ICD-10-CM

## 2022-09-02 DIAGNOSIS — R42 SPINNING SENSATION: ICD-10-CM

## 2022-09-02 PROCEDURE — 99999 PR PBB SHADOW E&M-EST. PATIENT-LVL IV: CPT | Mod: PBBFAC,,, | Performed by: PHYSICIAN ASSISTANT

## 2022-09-02 PROCEDURE — 99203 PR OFFICE/OUTPT VISIT, NEW, LEVL III, 30-44 MIN: ICD-10-PCS | Mod: S$GLB,,, | Performed by: PHYSICIAN ASSISTANT

## 2022-09-02 PROCEDURE — 3008F PR BODY MASS INDEX (BMI) DOCUMENTED: ICD-10-PCS | Mod: CPTII,S$GLB,, | Performed by: PHYSICIAN ASSISTANT

## 2022-09-02 PROCEDURE — 3044F HG A1C LEVEL LT 7.0%: CPT | Mod: CPTII,S$GLB,, | Performed by: PHYSICIAN ASSISTANT

## 2022-09-02 PROCEDURE — 1125F PR PAIN SEVERITY QUANTIFIED, PAIN PRESENT: ICD-10-PCS | Mod: CPTII,S$GLB,, | Performed by: PHYSICIAN ASSISTANT

## 2022-09-02 PROCEDURE — 3288F FALL RISK ASSESSMENT DOCD: CPT | Mod: CPTII,S$GLB,, | Performed by: PHYSICIAN ASSISTANT

## 2022-09-02 PROCEDURE — 99999 PR PBB SHADOW E&M-EST. PATIENT-LVL IV: ICD-10-PCS | Mod: PBBFAC,,, | Performed by: PHYSICIAN ASSISTANT

## 2022-09-02 PROCEDURE — 3044F PR MOST RECENT HEMOGLOBIN A1C LEVEL <7.0%: ICD-10-PCS | Mod: CPTII,S$GLB,, | Performed by: PHYSICIAN ASSISTANT

## 2022-09-02 PROCEDURE — 1159F PR MEDICATION LIST DOCUMENTED IN MEDICAL RECORD: ICD-10-PCS | Mod: CPTII,S$GLB,, | Performed by: PHYSICIAN ASSISTANT

## 2022-09-02 PROCEDURE — 3066F NEPHROPATHY DOC TX: CPT | Mod: CPTII,S$GLB,, | Performed by: PHYSICIAN ASSISTANT

## 2022-09-02 PROCEDURE — 3288F PR FALLS RISK ASSESSMENT DOCUMENTED: ICD-10-PCS | Mod: CPTII,S$GLB,, | Performed by: PHYSICIAN ASSISTANT

## 2022-09-02 PROCEDURE — 1101F PT FALLS ASSESS-DOCD LE1/YR: CPT | Mod: CPTII,S$GLB,, | Performed by: PHYSICIAN ASSISTANT

## 2022-09-02 PROCEDURE — 1125F AMNT PAIN NOTED PAIN PRSNT: CPT | Mod: CPTII,S$GLB,, | Performed by: PHYSICIAN ASSISTANT

## 2022-09-02 PROCEDURE — 1159F MED LIST DOCD IN RCRD: CPT | Mod: CPTII,S$GLB,, | Performed by: PHYSICIAN ASSISTANT

## 2022-09-02 PROCEDURE — 1101F PR PT FALLS ASSESS DOC 0-1 FALLS W/OUT INJ PAST YR: ICD-10-PCS | Mod: CPTII,S$GLB,, | Performed by: PHYSICIAN ASSISTANT

## 2022-09-02 PROCEDURE — 1160F RVW MEDS BY RX/DR IN RCRD: CPT | Mod: CPTII,S$GLB,, | Performed by: PHYSICIAN ASSISTANT

## 2022-09-02 PROCEDURE — 3062F PR POS MACROALBUMINURIA RESULT DOCUMENTED/REVIEW: ICD-10-PCS | Mod: CPTII,S$GLB,, | Performed by: PHYSICIAN ASSISTANT

## 2022-09-02 PROCEDURE — 1160F PR REVIEW ALL MEDS BY PRESCRIBER/CLIN PHARMACIST DOCUMENTED: ICD-10-PCS | Mod: CPTII,S$GLB,, | Performed by: PHYSICIAN ASSISTANT

## 2022-09-02 PROCEDURE — 3008F BODY MASS INDEX DOCD: CPT | Mod: CPTII,S$GLB,, | Performed by: PHYSICIAN ASSISTANT

## 2022-09-02 PROCEDURE — 3062F POS MACROALBUMINURIA REV: CPT | Mod: CPTII,S$GLB,, | Performed by: PHYSICIAN ASSISTANT

## 2022-09-02 PROCEDURE — 3066F PR DOCUMENTATION OF TREATMENT FOR NEPHROPATHY: ICD-10-PCS | Mod: CPTII,S$GLB,, | Performed by: PHYSICIAN ASSISTANT

## 2022-09-02 PROCEDURE — 99203 OFFICE O/P NEW LOW 30 MIN: CPT | Mod: S$GLB,,, | Performed by: PHYSICIAN ASSISTANT

## 2022-09-02 NOTE — PATIENT INSTRUCTIONS
TMJ Syndrome  This is a condition with chronic or recurrent pain in the joint of the jaw (in front of the ear). The pain may cause limited motion of the jaw, a locking or catching sensation, clicking, popping or grinding sounds from the joint with movement. It may also lead to headache, earache or neck pain. It is sometimes caused by inflammation in the joint, injury or wear-and-tear of the cartilage in the joint, involuntary grinding of the teeth or poorly fitting dentures. Emotional stress and tension are often a factor. Most cases resolve completely within a few months with proper treatment.    Home Care:  1) Rest the jaw by avoiding crunchy or hard foods to chew. Do not eat hard or sticky candies. Soft foods and liquids are easier on the jaw. Protect your jaw while yawning.    2) Hot packs (small towel soaked in hot water) applied to the jaw may give relief by reducing muscle spasm. You may use a heating pad or a towel soaked in hot water. Some people get relief with cold packs, so try both and see which one works best for you.    3) You may use ibuprofen (Motrin, Advil) to control pain, unless another medicine was prescribed. [ NOTE : If you have chronic liver or kidney disease or ever had a stomach ulcer or GI bleeding, talk with your doctor before using these medicines.]  - Typical dosing in an adult for anti-inflammatory     4) If you suspect emotional stress is related to your condition:    a) Try to identify the sources of stress in your life. It may not be obvious! These may include:  -- Daily hassles of life that pile up (traffic jams, missed appointments, car troubles)  -- Major life changes, both good (new baby, job promotion) and bad (loss of job, loss of loved one)  -- Overload: feeling that you have too many responsibilities and can't take care of everything at once  -- Helplessness: feeling like your problems are more than you can solve    b) When possible, do something about the source of your  stress: avoid hassles, limit the amount of change that is happening in your life at one time and take a break when you feel overloaded.    c) Unfortunately, many stressful situations cannot be avoided. Therefore, it is necessary to learn HOW TO MANAGE STRESS better. There are many proven methods that work and will reduce your anxiety. These include simple things like exercise, good nutrition and adequate rest. Also, there are certain techniques that are helpful: relaxation and breathing exercises, visualization, biofeedback, meditation or simply taking some time-out to clear your mind. For more information about this, consult your doctor or go to a local bookstore and review the many books and tapes available on this subject.    Follow up with dentist for personalized .

## 2022-09-02 NOTE — PROGRESS NOTES
[facility]    Subjective:      Patient: Monique Trujillo Patient PCP: Milton Mahmood MD         :  1950     Sex:  female      MRN:  [id number]          Date of Visit: 2022      Chief Complaint: Tinnitus of right ear and dizziness    Patient ID: Monique Trujillo is a 71 y.o. female who was referred to me by Andra Snell NP in consultation for  tinnitus of right ear and dizziness . Pt uses walker to ambulate. Pt has h/o vertigo, bilateral tinnitus, bilateral SNHL and lumbar radiculopathy and spinal stenosis with last lumbar spinal fusion 2019. Pt seen by neurology for gait issues 08/10/2022 and had complaints of being able to walk straight. Pt stated at that appt that she had been having increased issues with gait since her prior back surgery and that she cannot feel her feet at all and has numbness and stabbing in her toes. Pt currently takes gabapentin 600mg BID for lower extremity paraesthesia. Pt endorsed intermittent tinnitus in right ear with associated spinning sensation. Pt previously seen by ENT MD Dr. Evelyn Whyte 2019 for dizziness and dysequilibrium. VNG was ordered for further assessment, but was not completed. Pt has planned MRI Brain WO scheduled for today to further assess right sided tinnitus with dysequilibrium with dizziness. Pt's last audiogram 2019 showed bilateral SNHL with Tymps Type A AS and Type As AD and speech reception thresholds were obtained at 25 dB HL and 20 dB HL, in the right and left ears. Pt had word recognitions scores of 96% were obtained in both ears using a presentation level of 65 dB HL in the right ear and 60 dB HL in the left ear. Pt states today in office that she has had increase in right ear tinnitus that can switch between medium hum and high-pitched non-pulsatile tinnitus. Pt states that she her dizziness comes and goes. Pt states that when she stands up or had fast head movements that lasts for around a minute and goes away  when she settles her head for around the past 4-5 years. Pt states she had migraines as a child, but no longer suffers from migraines.  Pt states that she has had right ear pain for a few years that worsens when she opens her jaw. Pt denies fever/chills, further hearing loss or aural pressure. Pt states that her right leg was a little longer since right hip surgery and had lift placed in right shoe and no longer has issues veering when she walks.     Past Medical History  She has a past medical history of Allergy, Arthritis, Chronic lower back pain, Depression, Fever blister, GERD (gastroesophageal reflux disease), Hemorrhoids, internal, Hyperlipidemia, Hypertension, Joint pain, Morbid obesity with BMI of 40.0-44.9, adult, Multiple gastric ulcers, TIMMY (obstructive sleep apnea), Pulmonary hypertension, Skin cancer, and Type 2 diabetes mellitus with hyperglycemia, without long-term current use of insulin.    Family History  Her family history includes Alzheimer's disease in her maternal grandfather; Arthritis in her mother; Breast cancer in her mother; Cancer in her mother and paternal grandmother; Colon cancer in her paternal grandmother; Kidney disease in her father.    Past Surgical History:   Procedure Laterality Date    ANTERIOR CERVICAL CORPECTOMY W/ FUSION  1988    BACK SURGERY      CAUDAL EPIDURAL STEROID INJECTION N/A 01/10/2019    Procedure: Injection-steroid-epidural-caudal;  Surgeon: Lydia Velásquez Jr., MD;  Location: [facility];  Service: Pain Management;  Laterality: N/A;    COLONOSCOPY N/A 11/02/2016    Procedure: COLONOSCOPY;  Surgeon: Viji Dewitt MD;  Location: [facility];  Service: Endoscopy;  Laterality: N/A;    EPIDURAL STEROID INJECTION N/A 05/18/2022    Procedure: Injection, Steroid, Epidural;  Surgeon: Fermín Lou MD;  Location: [facility] OR;  Service: Pain Management;  Laterality: N/A;  L2-l3     EPIDURAL STEROID INJECTION INTO LUMBAR SPINE N/A 11/08/2018    Procedure:  Injection-steroid-epidural-lumbar-L2-3 (LEFT > RIGHT);  Surgeon: Lydia Velásquez Jr., MD;  Location: [facility];  Service: Pain Management;  Laterality: N/A;    FUSION OF SPINE WITH INSTRUMENTATION Left 01/03/2020    Procedure: FUSION, SPINE, WITH INSTRUMENTATION Stage 1 Left L3-4 LOIF Rise L Stage 2 Open L3-4 L4-5 Laminectomy Removal of L4-5 Hardware, L3-5 Posterior Instrumentation and extraction of Fusion at L3-4;  Surgeon: James Brantley MD;  Location: [facility] OR;  Service: Neurosurgery;  Laterality: Left;  Procedure: Stage 1 Left L3-4 LOIF Rise L  Stage 2 Open L3-4 L4-5 Laminectomy Removal of L4-5 Hardware,     INJECTION OF ANESTHETIC AGENT AROUND GENITOFEMORAL NERVE Bilateral 08/01/2019    Procedure: BILATERAL SHOULDER ARTICULAR BRANCH BLOCK;  Surgeon: Lydia Velásquez Jr., MD;  Location: [facility];  Service: Pain Management;  Laterality: Bilateral;    JOINT REPLACEMENT      KNEE ARTHROSCOPY W/ MENISCAL REPAIR Right 2012    LUMBAR LAMINECTOMY  06/19/2015    REVERSE TOTAL SHOULDER ARTHROPLASTY Right 05/25/2022    Procedure: ARTHROPLASTY, SHOULDER, TOTAL, REVERSE;  Surgeon: Herb Tan II, MD;  Location: [facility] OR;  Service: Orthopedics;  Laterality: Right;    SKIN CANCER EXCISION      forehead does not recall date    TOTAL HIP ARTHROPLASTY Right 04/28/2016         TUBAL LIGATION  1980     Social History     Tobacco Use    Smoking status: Never    Smokeless tobacco: Never   Substance and Sexual Activity    Alcohol use: No     Alcohol/week: 0.0 standard drinks    Drug use: No    Sexual activity: Never     Medications  She has a current medication list which includes the following prescription(s): amlodipine, ammonium lactate, ascorbic acid (vitamin c), aspirin, atenolol, calcium-vitamin d3, carboxymethylcellulose, cinnamon bark, clindamycin, co-enzyme q-10, cyclosporine, diclofenac sodium, diclofenac sodium, doxycycline, econazole nitrate, famotidine, fenofibrate micronized, furosemide, gabapentin,  levocetirizine, lidocaine-prilocaine, metformin, multivitamin, pantoprazole, pravastatin, sertraline, solifenacin, and vitamin e.    Review of patient's allergies indicates:   Allergen Reactions    Metformin Other (See Comments)     Diarrhea      All medications, allergies, and past history have been reviewed.    Objective:      Vitals:  Vitals - 1 value per visit 8/29/2022 9/2/2022 9/2/2022   SYSTOLIC 143 - -   DIASTOLIC 83 - -   Pulse 77 - -   Temp - - 98.3   Resp - - -   SPO2 - - -   Weight (lb) 233.03 - 229.28   Weight (kg) 105.7 - 104   Height 61 - 61   BMI (Calculated) 44.1 - 43.3   VISIT REPORT - - -   Pain Score  - 1 -   Some recent data might be hidden       Body surface area is 2.12 meters squared.    Physical Exam  Constitutional:       General: She is not in acute distress.     Appearance: Normal appearance. She is not ill-appearing.   HENT:      Head: Normocephalic and atraumatic.      Right Ear: Tympanic membrane, ear canal and external ear normal.      Left Ear: Tympanic membrane, ear canal and external ear normal.      Nose: Nose normal.      Mouth/Throat:      Lips: Pink. No lesions.      Mouth: Mucous membranes are moist. No oral lesions.      Dentition: Abnormal dentition. Dental caries present.      Tongue: No lesions.      Palate: No lesions.      Pharynx: Oropharynx is clear. Uvula midline. No pharyngeal swelling, oropharyngeal exudate, posterior oropharyngeal erythema or uvula swelling.      Comments: Bilateral pain to palpation to TMJs  Eyes:      General:         Right eye: No discharge.         Left eye: No discharge.      Extraocular Movements: Extraocular movements intact.      Conjunctiva/sclera: Conjunctivae normal.   Pulmonary:      Effort: Pulmonary effort is normal.   Neurological:      General: No focal deficit present.      Mental Status: She is alert and oriented to person, place, and time. Mental status is at baseline.   Psychiatric:         Mood and Affect: Mood normal.          Behavior: Behavior normal.         Thought Content: Thought content normal.         Judgment: Judgment normal.   Gordo-Hallpike: Negative bilaterally.     Labs:  WBC   Date Value Ref Range Status   05/05/2022 5.59 3.90 - 12.70 K/uL Final     Platelets   Date Value Ref Range Status   05/05/2022 283 150 - 450 K/uL Final     Creatinine   Date Value Ref Range Status   05/05/2022 0.9 0.5 - 1.4 mg/dL Final     TSH   Date Value Ref Range Status   04/20/2022 2.249 0.400 - 4.000 uIU/mL Final     Glucose   Date Value Ref Range Status   05/05/2022 168 (H) 70 - 110 mg/dL Final     Hemoglobin A1C   Date Value Ref Range Status   04/20/2022 6.5 (H) 4.0 - 5.6 % Final     Comment:     ADA Screening Guidelines:  5.7-6.4%  Consistent with prediabetes  >or=6.5%  Consistent with diabetes    High levels of fetal hemoglobin interfere with the HbA1C  assay. Heterozygous hemoglobin variants (HbS, HgC, etc)do  not significantly interfere with this assay.   However, presence of multiple variants may affect accuracy.         Audiogram Summary:      All lab results and imaging results have been reviewed.    Assessment:        ICD-10-CM ICD-9-CM   1. TMJ syndrome  M26.629 524.69   2. Tinnitus of right ear  H93.11 388.30   3. Spinning sensation  R42 780.4   4. Vertigo  R42 780.4   5. History of hearing loss  Z86.69 V12.49   6. Dental caries  K02.9 521.00   7. Dysequilibrium  R42 780.4            Plan:      TMJ syndrome  -Pt grinds her teeth at night and has pain to palpation of TMJs. Pt advised about TMJD causing referred ear pain as pt has complaints of right ear pain. Normal ear examination today in office. Pt is to follow TMJ syndrome guidelines as provided in AVS. Pt is to follow up with dentist for personalized  due to grinding her teeth during the daytime and at night.     Tinnitus of right ear with positional vertigo and dysequilibrium and h/o bilateral SNHL  -     Negative gordo-hallpike. Pt has positional vertigo with increase in  right sided non-pulsatile tinnitus. Will have MRI Brain WO changed to MRI Brain W/WO to further assess pt's vertigo with increased unilateral right sided tinnitus.   -     Pt is having physical therapy for her right shoulder. Orders placed for vestibular physical therapy for pt-pt will schedule when she has completed physical therapy for her shoulder.   - Updated comprehensive audiogram to be performed due to pt having increase in right sided non-pulsatile tinnitus with vertigo in setting of prior bilateral SNHL.  - Pt is to undergo VNG to further assess her vertigo.    Dental caries  -Pt has multiple dental caries and plans on following up with dentist for evaluation.     Follow up in 8 to for follow up of dizziness. Follow up sooner if having worsening of symptoms or ENT issues/concerns.

## 2022-09-06 ENCOUNTER — CLINICAL SUPPORT (OUTPATIENT)
Dept: REHABILITATION | Facility: HOSPITAL | Age: 72
End: 2022-09-06
Attending: ORTHOPAEDIC SURGERY
Payer: MEDICARE

## 2022-09-06 DIAGNOSIS — M25.511 ACUTE PAIN OF RIGHT SHOULDER: Primary | ICD-10-CM

## 2022-09-06 DIAGNOSIS — M25.611 IMPAIRED RANGE OF MOTION OF RIGHT SHOULDER: ICD-10-CM

## 2022-09-06 DIAGNOSIS — M62.81 MUSCLE WEAKNESS OF RIGHT UPPER EXTREMITY: ICD-10-CM

## 2022-09-06 PROCEDURE — 97110 THERAPEUTIC EXERCISES: CPT | Mod: PN,CQ

## 2022-09-06 NOTE — PROGRESS NOTES
Physical Therapy Daily Treatment Note     Name: Monique Trujillo  Clinic Number: [id number]    Therapy Diagnosis:   Encounter Diagnoses   Name Primary?    Acute pain of right shoulder Yes    Muscle weakness of right upper extremity     Impaired range of motion of right shoulder      Physician: Herb Tan II, MD    Visit Date: 9/6/2022    Physician: Herb Tan II, MD    Physician Orders: PT Eval and Treat  Medical Diagnosis from Referral: Z96.611 (ICD-10-CM) - S/P reverse total shoulder arthroplasty, right  Evaluation Date: 6/14/2022  Authorization Period Expiration: 12/31/22  Plan of Care Expiration: 9/14/2022                Progress Update: 7/20/2022               Visit # / Visits authorized: 18 / 23  + eval   (Plan of Care 2 x 10 wks)                    Time In: 1000  Time Out: 1058  Total Billable Time: 30 minutes    Precautions: Standard    Subjective     Pt reports: no complaints  She was compliant with home exercise program.  Response to previous treatment: good  Functional change: no new changes    Pain: 0/10  Location: right shoulder      Objective4     DOS 5/25/22    Monique received therapeutic exercises to develop strength, ROM and posture for 54  minutes including:      Shoulder shrugs x 30 reps     Shoulder squeezes x 30 reps      Shoulder isometrics  x 10 each direction Manual Resistance - External Rotation today  Landmine press with 10# bar x 20 reps  NOT PERFORMED time   Elbow flex/ext x 20 reps 1# (towel under elbow to prevent Abduction )  Wrist flexion/extension x 20 reps 1#       Seated punches with Red TheraBand x 20    Overhead Pulleys flexion x 4 minutes       Slides up stair rail x 20  Finger walk to #21 x 5 reps  NOT PERFORMED time     Rows x 20 or Green TheraBand   Extension x 20 Green TheraBand   Internal Rotation with strap behind back x 10  Wall Push Ups x 20     Supine pressup with 3# dowel x 25  Supine SA press 3# dowel x 25  +Sidelying External Rotation x 20     Manual  therapy: x 4 minutes  AAROM External Rotation  Myofacial Release with therapy bar to R UT  NOT PERFORMED   PROM R shoulder: flexion, ER, Internal Rotation, tricep  Grade II superior/inferior clavicular mobs      Home Exercises Provided and Patient Education Provided     Education provided:   - cont Home exercise program   - posture    Written Home Exercises Provided: Patient instructed to cont prior HEP.  Exercises were reviewed and Monique was able to demonstrate them prior to the end of the session.  Monique demonstrated good  understanding of the education provided.     See EMR under Patient Instructions for exercises provided prior visit.    Assessment     'Monique continues to abduct R elbow to compensate for active shoulder flexion, although her R UE strength is improving.  Good tolerance for sidelying External Rotation.  Continued posture education, anterior deltoid and bicep strengthening to decrease compensation.    Monique Is progressing well towards her goals.   Pt prognosis is Good.     Pt will continue to benefit from skilled outpatient physical therapy to address the deficits listed in the problem list box on initial evaluation, provide pt/family education and to maximize pt's level of independence in the home and community environment.     Pt's spiritual, cultural and educational needs considered and pt agreeable to plan of care and goals.    Anticipated barriers to physical therapy: none    GOALS:  SHORT TERM GOALS: 4 weeks, () Progress  9/6/2022   Recent signs and systems trend is improving in order to progress towards LTG's. Ongoing  9/6/2022      Patient will be independent with HEP in order to further progress and return to maximal function. MET   Pain rating at Worst: 4/10 in order to progress towards increased independence with activity. Ongoing  9/6/2022      Patient will be able to correct postural deviations in sitting and standing, to decrease pain and promote postural awareness for  injury prevention.  Ongoing  9/6/2022         LONG TERM GOALS: 10 weeks, () Progress  9/6/2022   Patient will return to normal ADL, recreational, and work related activities with less pain and limitation.  Ongoing  9/6/2022      Patient will improve AROM to stated goals in order to return to maximal functional potential.  Ongoing  9/6/2022      Patient will improve Strength to stated goals of appropriate musculature in order to improve functional independence.   ongoing  9/6/2022     Pain Rating at Best: 1/10 to improve Quality of Life.   ongoing  9/6/2022     Patient will meet predicted functional outcome (FOTO) score: 25% to increase self-worth & perceived functional ability.  ongoing  9/6/2022     Patient will have met/partially met personal goal of: being able to use right Upper Extremity to bath herself Ongoing  9/6/2022           Plan     Plan of care Certification: 6/14/2022 to 9/14/2022     Outpatient Physical Therapy 2 times weekly for 10 weeks to include any combination of the following interventions: virtual visits, dry needling, modalities, electrical stimulation (IFC, Pre-Mod, Attended with Functional Dry Needling), Manual Therapy, Moist Heat/ Ice, Neuromuscular Re-ed, Patient Education, Self Care, Therapeutic Exercise, Functional Training and Therapeutic Activites        Cont per POC, posture education, ROM, and strength    Belle Beck, PTA

## 2022-09-08 ENCOUNTER — TELEPHONE (OUTPATIENT)
Dept: OTOLARYNGOLOGY | Facility: CLINIC | Age: 72
End: 2022-09-08
Payer: MEDICARE

## 2022-09-08 NOTE — TELEPHONE ENCOUNTER
----- Message from Carmine Jay PA-C sent at 9/2/2022 11:41 AM CDT -----  Please have pt set up with VNG to further assess dizziness. I told her I would have it completed. I just placed order. Thank you

## 2022-09-08 NOTE — TELEPHONE ENCOUNTER
Contacted and spoke w/pt. Addressed that provider would like for her to have a VNG done to further assess dizziness - offered pt [facility] or [facility] audiology locations. Pt decided to proceed w/[facility]. Let her know that I will send staff a msg to get in contact w/her to schedule VNG. Pt confirmed and acknowledged; advised that if she does not hear back within a week from audiology staff, to give me a call. Pt acknowledged.

## 2022-09-12 ENCOUNTER — HOSPITAL ENCOUNTER (OUTPATIENT)
Dept: RADIOLOGY | Facility: HOSPITAL | Age: 72
Discharge: HOME | End: 2022-09-12
Attending: ORTHOPAEDIC SURGERY
Payer: MEDICARE

## 2022-09-12 ENCOUNTER — OFFICE VISIT (OUTPATIENT)
Dept: ORTHOPEDICS | Facility: CLINIC | Age: 72
End: 2022-09-12
Payer: MEDICARE

## 2022-09-12 VITALS — BODY MASS INDEX: 43.28 KG/M2 | WEIGHT: 229.25 LBS | HEIGHT: 61 IN

## 2022-09-12 DIAGNOSIS — Z96.611 S/P REVERSE TOTAL SHOULDER ARTHROPLASTY, RIGHT: Primary | ICD-10-CM

## 2022-09-12 DIAGNOSIS — Z96.611 S/P REVERSE TOTAL SHOULDER ARTHROPLASTY, RIGHT: ICD-10-CM

## 2022-09-12 PROCEDURE — 73030 X-RAY EXAM OF SHOULDER: CPT | Mod: 26,RT,, | Performed by: RADIOLOGY

## 2022-09-12 PROCEDURE — 1126F AMNT PAIN NOTED NONE PRSNT: CPT | Mod: CPTII,S$GLB,, | Performed by: ORTHOPAEDIC SURGERY

## 2022-09-12 PROCEDURE — 99999 PR PBB SHADOW E&M-EST. PATIENT-LVL II: CPT | Mod: PBBFAC,,, | Performed by: ORTHOPAEDIC SURGERY

## 2022-09-12 PROCEDURE — 99999 PR PBB SHADOW E&M-EST. PATIENT-LVL II: ICD-10-PCS | Mod: PBBFAC,,, | Performed by: ORTHOPAEDIC SURGERY

## 2022-09-12 PROCEDURE — 3288F FALL RISK ASSESSMENT DOCD: CPT | Mod: CPTII,S$GLB,, | Performed by: ORTHOPAEDIC SURGERY

## 2022-09-12 PROCEDURE — 3008F PR BODY MASS INDEX (BMI) DOCUMENTED: ICD-10-PCS | Mod: CPTII,S$GLB,, | Performed by: ORTHOPAEDIC SURGERY

## 2022-09-12 PROCEDURE — 3066F PR DOCUMENTATION OF TREATMENT FOR NEPHROPATHY: ICD-10-PCS | Mod: CPTII,S$GLB,, | Performed by: ORTHOPAEDIC SURGERY

## 2022-09-12 PROCEDURE — 1101F PR PT FALLS ASSESS DOC 0-1 FALLS W/OUT INJ PAST YR: ICD-10-PCS | Mod: CPTII,S$GLB,, | Performed by: ORTHOPAEDIC SURGERY

## 2022-09-12 PROCEDURE — 3066F NEPHROPATHY DOC TX: CPT | Mod: CPTII,S$GLB,, | Performed by: ORTHOPAEDIC SURGERY

## 2022-09-12 PROCEDURE — 1101F PT FALLS ASSESS-DOCD LE1/YR: CPT | Mod: CPTII,S$GLB,, | Performed by: ORTHOPAEDIC SURGERY

## 2022-09-12 PROCEDURE — 3062F POS MACROALBUMINURIA REV: CPT | Mod: CPTII,S$GLB,, | Performed by: ORTHOPAEDIC SURGERY

## 2022-09-12 PROCEDURE — 73030 X-RAY EXAM OF SHOULDER: CPT | Mod: TC,PN,RT

## 2022-09-12 PROCEDURE — 3008F BODY MASS INDEX DOCD: CPT | Mod: CPTII,S$GLB,, | Performed by: ORTHOPAEDIC SURGERY

## 2022-09-12 PROCEDURE — 73030 XR SHOULDER TRAUMA 3 VIEW RIGHT: ICD-10-PCS | Mod: 26,RT,, | Performed by: RADIOLOGY

## 2022-09-12 PROCEDURE — 3062F PR POS MACROALBUMINURIA RESULT DOCUMENTED/REVIEW: ICD-10-PCS | Mod: CPTII,S$GLB,, | Performed by: ORTHOPAEDIC SURGERY

## 2022-09-12 PROCEDURE — 1159F MED LIST DOCD IN RCRD: CPT | Mod: CPTII,S$GLB,, | Performed by: ORTHOPAEDIC SURGERY

## 2022-09-12 PROCEDURE — 1126F PR PAIN SEVERITY QUANTIFIED, NO PAIN PRESENT: ICD-10-PCS | Mod: CPTII,S$GLB,, | Performed by: ORTHOPAEDIC SURGERY

## 2022-09-12 PROCEDURE — 3288F PR FALLS RISK ASSESSMENT DOCUMENTED: ICD-10-PCS | Mod: CPTII,S$GLB,, | Performed by: ORTHOPAEDIC SURGERY

## 2022-09-12 PROCEDURE — 1160F RVW MEDS BY RX/DR IN RCRD: CPT | Mod: CPTII,S$GLB,, | Performed by: ORTHOPAEDIC SURGERY

## 2022-09-12 PROCEDURE — 1159F PR MEDICATION LIST DOCUMENTED IN MEDICAL RECORD: ICD-10-PCS | Mod: CPTII,S$GLB,, | Performed by: ORTHOPAEDIC SURGERY

## 2022-09-12 PROCEDURE — 1160F PR REVIEW ALL MEDS BY PRESCRIBER/CLIN PHARMACIST DOCUMENTED: ICD-10-PCS | Mod: CPTII,S$GLB,, | Performed by: ORTHOPAEDIC SURGERY

## 2022-09-12 PROCEDURE — 99212 PR OFFICE/OUTPT VISIT, EST, LEVL II, 10-19 MIN: ICD-10-PCS | Mod: S$GLB,,, | Performed by: ORTHOPAEDIC SURGERY

## 2022-09-12 PROCEDURE — 99212 OFFICE O/P EST SF 10 MIN: CPT | Mod: S$GLB,,, | Performed by: ORTHOPAEDIC SURGERY

## 2022-09-12 PROCEDURE — 3044F PR MOST RECENT HEMOGLOBIN A1C LEVEL <7.0%: ICD-10-PCS | Mod: CPTII,S$GLB,, | Performed by: ORTHOPAEDIC SURGERY

## 2022-09-12 PROCEDURE — 3044F HG A1C LEVEL LT 7.0%: CPT | Mod: CPTII,S$GLB,, | Performed by: ORTHOPAEDIC SURGERY

## 2022-09-12 NOTE — PROGRESS NOTES
CC:  71-year-old female follows up status post reverse right total shoulder arthroplasty.  Date of surgery is 05/25/2022.  She is just over 3 months out.  She has no complaints regarding her shoulder.  Her main complaint today is that she is having difficulty supinating her right hand which we discussed is more of a forearm wrist and elbow issue.    Right Upper Extremity Examination     Skin is intact throughout   Motor is intact distally radial, median, ulnar, AIN, PIN   +2 radial and ulnar pulses   Sensation to light touch is intact distally radial, median, and ulnar     Examination of the Right shoulder:   ROM:   For - 110   Abd - 100   Ext - 20   Int - back pocket    Tenderness to palpation:   Subacromial space - negative  Biceps Tendon - negative  Anterior Glenohumeral Joint - negative  AC joint - negative  Glenohumeral instability - negative    X-ray images were examined and personally interpreted by me.  Three views the right shoulder dated 09/12/2022 show a reverse right total shoulder arthroplasty is well fixed and in good alignment.    Dx:  Status post reverse right total shoulder arthroplasty, stable    Plan:  Continue physical therapy with transition to a home program.  Advance activity as tolerated.  Follow-up in 3 months with an x-ray.

## 2022-09-13 ENCOUNTER — DOCUMENTATION ONLY (OUTPATIENT)
Dept: REHABILITATION | Facility: HOSPITAL | Age: 72
End: 2022-09-13
Payer: MEDICARE

## 2022-09-13 ENCOUNTER — CLINICAL SUPPORT (OUTPATIENT)
Dept: REHABILITATION | Facility: HOSPITAL | Age: 72
End: 2022-09-13
Attending: ORTHOPAEDIC SURGERY
Payer: MEDICARE

## 2022-09-13 DIAGNOSIS — M62.81 MUSCLE WEAKNESS OF RIGHT UPPER EXTREMITY: ICD-10-CM

## 2022-09-13 DIAGNOSIS — M25.611 IMPAIRED RANGE OF MOTION OF RIGHT SHOULDER: ICD-10-CM

## 2022-09-13 DIAGNOSIS — M25.511 ACUTE PAIN OF RIGHT SHOULDER: Primary | ICD-10-CM

## 2022-09-13 PROCEDURE — 97110 THERAPEUTIC EXERCISES: CPT | Mod: KX,PN

## 2022-09-13 PROCEDURE — 97140 MANUAL THERAPY 1/> REGIONS: CPT | Mod: KX,PN

## 2022-09-13 NOTE — PROGRESS NOTES
Physical Therapy Daily Treatment Note     Name: Monique Trujillo  Clinic Number: [id number]    Therapy Diagnosis:   Encounter Diagnoses   Name Primary?    Acute pain of right shoulder Yes    Muscle weakness of right upper extremity     Impaired range of motion of right shoulder      Physician: Herb Tan II, MD    Visit Date: 9/13/2022    Physician: Herb Tan II, MD    Physician Orders: PT Eval and Treat  Medical Diagnosis from Referral: Z96.611 (ICD-10-CM) - S/P reverse total shoulder arthroplasty, right  Evaluation Date: 6/14/2022  Authorization Period Expiration: 12/31/22  Plan of Care Expiration: 9/14/2022                Progress Update: 7/20/2022               Visit # / Visits authorized: 18 / 23  + eval   (Plan of Care 2 x 10 wks)                    Time In: 310  Time Out: 403  Total Billable Time: 53 minutes    Precautions: Standard    Subjective     Pt reports: no complaints, saw MD said finish out therapy.   She was compliant with home exercise program.  Response to previous treatment: good  Functional change: no new changes    Pain: 0/10  Location: right shoulder      Objective4     DOS 5/25/22    Monique received therapeutic exercises to develop strength, ROM and posture for 45  minutes including:      Shoulder shrugs 2# x 30 reps     Shoulder squeezes x 30 reps      Shoulder isometrics  x 10 each direction Manual Resistance - External Rotation today  Landmine press with 10# bar x 20 reps    Elbow flex/ext x 20 reps 1# (towel under elbow to prevent Abduction )  Wrist flexion/extension x 20 reps 1#       Seated punches with Red TheraBand x 20    Overhead Pulleys flexion x 4 minutes       Slides up stair rail x 20  Finger walk to #21 x 5 reps  NOT PERFORMED time     Rows x 20 or Green TheraBand   Extension x 20 Green TheraBand   Internal Rotation with strap behind back x 10  Wall Push Ups x 20     Supine pressup with 3# dowel x 25  Supine SA press 3# dowel x 25  Side lying External Rotation  x 20     Manual therapy: x 8 minutes  AAROM External Rotation  Myofacial Release with therapy bar to R UT   PROM R shoulder: flexion, ER, Internal Rotation, tricep  Grade II superior/inferior clavicular mobs      Home Exercises Provided and Patient Education Provided     Education provided:   - cont Home exercise program   - posture    Written Home Exercises Provided: Patient instructed to cont prior HEP.  Exercises were reviewed and Monique was able to demonstrate them prior to the end of the session.  Monique demonstrated good  understanding of the education provided.     See EMR under Patient Instructions for exercises provided prior visit.    Assessment     'Monqiue continues to abduct R elbow to compensate for active shoulder flexion, although her R UE strength is improving.  Good tolerance for sidelying External Rotation.  Continued posture education, anterior deltoid and bicep strengthening to decrease compensation. Can transition to Home Exercise Program soon    Monique Is progressing well towards her goals.   Pt prognosis is Good.     Pt will continue to benefit from skilled outpatient physical therapy to address the deficits listed in the problem list box on initial evaluation, provide pt/family education and to maximize pt's level of independence in the home and community environment.     Pt's spiritual, cultural and educational needs considered and pt agreeable to plan of care and goals.    Anticipated barriers to physical therapy: none    GOALS:  SHORT TERM GOALS: 4 weeks, () Progress  9/13/2022   Recent signs and systems trend is improving in order to progress towards LTG's. Ongoing  9/13/2022      Patient will be independent with HEP in order to further progress and return to maximal function. MET   Pain rating at Worst: 4/10 in order to progress towards increased independence with activity. Ongoing  9/13/2022      Patient will be able to correct postural deviations in sitting and standing, to  decrease pain and promote postural awareness for injury prevention.  Ongoing  9/13/2022         LONG TERM GOALS: 10 weeks, () Progress  9/13/2022   Patient will return to normal ADL, recreational, and work related activities with less pain and limitation.  Ongoing  9/13/2022      Patient will improve AROM to stated goals in order to return to maximal functional potential.  Ongoing  9/13/2022      Patient will improve Strength to stated goals of appropriate musculature in order to improve functional independence.   ongoing  9/13/2022     Pain Rating at Best: 1/10 to improve Quality of Life.   ongoing  9/13/2022     Patient will meet predicted functional outcome (FOTO) score: 25% to increase self-worth & perceived functional ability.  ongoing  9/13/2022     Patient will have met/partially met personal goal of: being able to use right Upper Extremity to bath herself Ongoing  9/13/2022           Plan     Plan of care Certification: 6/14/2022 to 9/14/2022     Outpatient Physical Therapy 2 times weekly for 10 weeks to include any combination of the following interventions: virtual visits, dry needling, modalities, electrical stimulation (IFC, Pre-Mod, Attended with Functional Dry Needling), Manual Therapy, Moist Heat/ Ice, Neuromuscular Re-ed, Patient Education, Self Care, Therapeutic Exercise, Functional Training and Therapeutic Activites        Cont per POC, posture education, ROM, and strength    Didier Can, PT

## 2022-09-13 NOTE — PROGRESS NOTES
30 day visit PT-PTA face-face discussion with BRADLY Can re: patient status, POC, and plan for progression done 9/13/22.  Belle Beck, PTA

## 2022-09-15 ENCOUNTER — CLINICAL SUPPORT (OUTPATIENT)
Dept: REHABILITATION | Facility: HOSPITAL | Age: 72
End: 2022-09-15
Attending: ORTHOPAEDIC SURGERY
Payer: MEDICARE

## 2022-09-15 DIAGNOSIS — M25.611 IMPAIRED RANGE OF MOTION OF RIGHT SHOULDER: ICD-10-CM

## 2022-09-15 DIAGNOSIS — M25.511 ACUTE PAIN OF RIGHT SHOULDER: Primary | ICD-10-CM

## 2022-09-15 DIAGNOSIS — M62.81 MUSCLE WEAKNESS OF RIGHT UPPER EXTREMITY: ICD-10-CM

## 2022-09-15 PROCEDURE — 97110 THERAPEUTIC EXERCISES: CPT | Mod: PN

## 2022-09-15 PROCEDURE — 97140 MANUAL THERAPY 1/> REGIONS: CPT | Mod: PN

## 2022-09-15 NOTE — PROGRESS NOTES
Physical Therapy Daily Treatment Note     Name: Monique Trujillo  Clinic Number: [id number]    Therapy Diagnosis:   Encounter Diagnoses   Name Primary?    Acute pain of right shoulder Yes    Muscle weakness of right upper extremity     Impaired range of motion of right shoulder      Physician: Herb Tan II, MD    Visit Date: 9/15/2022    Physician: Herb Tan II, MD    Physician Orders: PT Eval and Treat  Medical Diagnosis from Referral: Z96.611 (ICD-10-CM) - S/P reverse total shoulder arthroplasty, right  Evaluation Date: 6/14/2022  Authorization Period Expiration: 12/31/22  Plan of Care Expiration: 9/14/2022                Progress Update: 7/20/2022               Visit # / Visits authorized: 20 / 23  + eval   (Plan of Care 2 x 10 wks)                    Time In: 1200  Time Out: 1255  Total Billable Time: 55 minutes    Precautions: Standard    Subjective     Pt reports: No new issues some soreness  She was compliant with home exercise program.  Response to previous treatment: good  Functional change: no new changes    Pain: 0/10  Location: right shoulder      Objective4     DOS 5/25/22    Monique received therapeutic exercises to develop strength, ROM and posture for 45  minutes including:      Shoulder shrugs 2# x 30 reps     Shoulder squeezes x 30 reps      Shoulder isometrics  x 10 each direction Manual Resistance - External Rotation today  Landmine press with 10# bar x 20 reps    Elbow flex/ext x 20 reps 1# (towel under elbow to prevent Abduction )  Wrist flexion/extension x 20 reps 1#       Seated punches with Red TheraBand x 20    Overhead Pulleys flexion x 4 minutes       Slides up stair rail x 20  Finger walk to #21 x 5 reps  NOT PERFORMED time     Rows x 20 or Green TheraBand   Extension x 20 Green TheraBand   Internal Rotation with strap behind back x 10  Wall Push Ups x 20     Supine pressup with 4# dowel x 25  Supine SA press 4# dowel x 25  Side lying External Rotation x 20     Manual  therapy: x 10 minutes  AAROM External Rotation  Myofacial Release with therapy bar to R UT   PROM R shoulder: flexion, ER, Internal Rotation, tricep  Grade II superior/inferior clavicular mobs      Home Exercises Provided and Patient Education Provided     Education provided:   - cont Home exercise program   - posture    Written Home Exercises Provided: Patient instructed to cont prior HEP.  Exercises were reviewed and Monique was able to demonstrate them prior to the end of the session.  Monique demonstrated good  understanding of the education provided.     See EMR under Patient Instructions for exercises provided prior visit.    Assessment     'Monique continues to abduct R elbow to compensate for active shoulder flexion, although her R UE strength is improving.  Good tolerance for sidelying External Rotation.  Continued posture education, anterior deltoid and bicep strengthening to decrease compensation. Can transition to Home Exercise Program soon    Monique Is progressing well towards her goals.   Pt prognosis is Good.     Pt will continue to benefit from skilled outpatient physical therapy to address the deficits listed in the problem list box on initial evaluation, provide pt/family education and to maximize pt's level of independence in the home and community environment.     Pt's spiritual, cultural and educational needs considered and pt agreeable to plan of care and goals.    Anticipated barriers to physical therapy: none    GOALS:  SHORT TERM GOALS: 4 weeks, () Progress  9/15/2022   Recent signs and systems trend is improving in order to progress towards LTG's. Ongoing  9/15/2022      Patient will be independent with HEP in order to further progress and return to maximal function. MET   Pain rating at Worst: 4/10 in order to progress towards increased independence with activity. Ongoing  9/15/2022      Patient will be able to correct postural deviations in sitting and standing, to decrease pain and  promote postural awareness for injury prevention.  Ongoing  9/15/2022         LONG TERM GOALS: 10 weeks, () Progress  9/15/2022   Patient will return to normal ADL, recreational, and work related activities with less pain and limitation.  Ongoing  9/15/2022      Patient will improve AROM to stated goals in order to return to maximal functional potential.  Ongoing  9/15/2022      Patient will improve Strength to stated goals of appropriate musculature in order to improve functional independence.   ongoing  9/15/2022     Pain Rating at Best: 1/10 to improve Quality of Life.   ongoing  9/15/2022     Patient will meet predicted functional outcome (FOTO) score: 25% to increase self-worth & perceived functional ability.  ongoing  9/15/2022     Patient will have met/partially met personal goal of: being able to use right Upper Extremity to bath herself Ongoing  9/15/2022           Plan     Plan of care Certification: 6/14/2022 to 9/14/2022     Outpatient Physical Therapy 2 times weekly for 10 weeks to include any combination of the following interventions: virtual visits, dry needling, modalities, electrical stimulation (IFC, Pre-Mod, Attended with Functional Dry Needling), Manual Therapy, Moist Heat/ Ice, Neuromuscular Re-ed, Patient Education, Self Care, Therapeutic Exercise, Functional Training and Therapeutic Activites        Cont per POC, posture education, ROM, and strength    Didier Can, PT

## 2022-09-22 ENCOUNTER — TELEPHONE (OUTPATIENT)
Dept: GASTROENTEROLOGY | Facility: CLINIC | Age: 72
End: 2022-09-22
Payer: MEDICARE

## 2022-09-22 NOTE — TELEPHONE ENCOUNTER
----- Message from Felisa Oliver sent at 9/22/2022 10:57 AM CDT -----  Contact: pt  Type: Needs Medical Advice  Who Called:  pt   Best Call Back Number: [phone]    Additional Information: calling the office to ask if medical transportation is okay following the procedure on Oct 06.. please call and adv-

## 2022-09-23 ENCOUNTER — HOSPITAL ENCOUNTER (OUTPATIENT)
Dept: RADIOLOGY | Facility: HOSPITAL | Age: 72
Discharge: HOME | End: 2022-09-23
Attending: PHYSICIAN ASSISTANT
Payer: MEDICARE

## 2022-09-23 DIAGNOSIS — R42 VERTIGO: ICD-10-CM

## 2022-09-23 LAB
CREAT SERPL-MCNC: 1.1 MG/DL (ref 0.5–1.4)
SAMPLE: NORMAL

## 2022-09-23 PROCEDURE — 70553 MRI BRAIN W WO CONTRAST: ICD-10-PCS | Mod: 26,,, | Performed by: RADIOLOGY

## 2022-09-23 PROCEDURE — 70553 MRI BRAIN STEM W/O & W/DYE: CPT | Mod: 26,,, | Performed by: RADIOLOGY

## 2022-09-23 PROCEDURE — 25500020 PHARM REV CODE 255: Performed by: PHYSICIAN ASSISTANT

## 2022-09-23 PROCEDURE — A9585 GADOBUTROL INJECTION: HCPCS | Performed by: PHYSICIAN ASSISTANT

## 2022-09-23 PROCEDURE — 70553 MRI BRAIN STEM W/O & W/DYE: CPT | Mod: TC

## 2022-09-23 RX ORDER — GADOBUTROL 604.72 MG/ML
10 INJECTION INTRAVENOUS
Status: COMPLETED | OUTPATIENT
Start: 2022-09-23 | End: 2022-09-23

## 2022-09-23 RX ORDER — GADOBUTROL 604.72 MG/ML
10 INJECTION INTRAVENOUS
Status: DISCONTINUED | OUTPATIENT
Start: 2022-09-23 | End: 2022-09-23

## 2022-09-23 RX ADMIN — GADOBUTROL 10 ML: 604.72 INJECTION INTRAVENOUS at 04:09

## 2022-09-26 ENCOUNTER — TELEPHONE (OUTPATIENT)
Dept: OTOLARYNGOLOGY | Facility: CLINIC | Age: 72
End: 2022-09-26
Payer: MEDICARE

## 2022-09-26 NOTE — TELEPHONE ENCOUNTER
----- Message from Carmine Jay PA-C sent at 9/26/2022  4:05 PM CDT -----  Please tell pt that she has age related changes on MRI. She has microvascular changes, which are age related narrowing of the vessels. There is no evidence of stroke or brain mass or lesion. I suggest 81mg aspirin daily due to age related changes seen on MRI scan. Proceed with VNG to further assess dizziness.

## 2022-09-27 DIAGNOSIS — J30.9 ALLERGIC RHINITIS, UNSPECIFIED SEASONALITY, UNSPECIFIED TRIGGER: ICD-10-CM

## 2022-09-27 DIAGNOSIS — I10 ESSENTIAL HYPERTENSION: ICD-10-CM

## 2022-09-27 DIAGNOSIS — H10.13 ALLERGIC CONJUNCTIVITIS OF BOTH EYES: ICD-10-CM

## 2022-09-27 RX ORDER — ATENOLOL 25 MG/1
25 TABLET ORAL NIGHTLY
Qty: 90 TABLET | Refills: 3 | Status: SHIPPED | OUTPATIENT
Start: 2022-09-27 | End: 2023-07-22

## 2022-09-27 RX ORDER — LEVOCETIRIZINE DIHYDROCHLORIDE 5 MG/1
5 TABLET, FILM COATED ORAL NIGHTLY
Qty: 90 TABLET | Refills: 3 | Status: SHIPPED | OUTPATIENT
Start: 2022-09-27 | End: 2023-07-22

## 2022-09-27 RX ORDER — AMLODIPINE BESYLATE 10 MG/1
10 TABLET ORAL NIGHTLY
Qty: 90 TABLET | Refills: 3 | Status: SHIPPED | OUTPATIENT
Start: 2022-09-27 | End: 2023-04-26

## 2022-09-27 NOTE — TELEPHONE ENCOUNTER
No new care gaps identified.  [facility] Embedded Care Gaps. Reference number: [id number]. 9/27/2022   11:50:54 AM MAIRAT

## 2022-10-06 ENCOUNTER — ANESTHESIA EVENT (OUTPATIENT)
Dept: ENDOSCOPY | Facility: HOSPITAL | Age: 72
End: 2022-10-06
Payer: MEDICARE

## 2022-10-06 ENCOUNTER — ANESTHESIA (OUTPATIENT)
Dept: ENDOSCOPY | Facility: HOSPITAL | Age: 72
End: 2022-10-06
Payer: MEDICARE

## 2022-10-06 ENCOUNTER — HOSPITAL ENCOUNTER (OUTPATIENT)
Facility: HOSPITAL | Age: 72
Discharge: HOME | End: 2022-10-06
Attending: INTERNAL MEDICINE | Admitting: INTERNAL MEDICINE
Payer: MEDICARE

## 2022-10-06 DIAGNOSIS — K22.2 SCHATZKI'S RING: Primary | ICD-10-CM

## 2022-10-06 DIAGNOSIS — K27.9 PUD (PEPTIC ULCER DISEASE): ICD-10-CM

## 2022-10-06 DIAGNOSIS — K29.70 GASTRITIS, PRESENCE OF BLEEDING UNSPECIFIED, UNSPECIFIED CHRONICITY, UNSPECIFIED GASTRITIS TYPE: ICD-10-CM

## 2022-10-06 DIAGNOSIS — K44.9 HIATAL HERNIA: ICD-10-CM

## 2022-10-06 PROCEDURE — 25000003 PHARM REV CODE 250: Performed by: NURSE ANESTHETIST, CERTIFIED REGISTERED

## 2022-10-06 PROCEDURE — D9220A PRA ANESTHESIA: Mod: ANES,,, | Performed by: ANESTHESIOLOGY

## 2022-10-06 PROCEDURE — 27201012 HC FORCEPS, HOT/COLD, DISP: Performed by: INTERNAL MEDICINE

## 2022-10-06 PROCEDURE — D9220A PRA ANESTHESIA: Mod: CRNA,,, | Performed by: NURSE ANESTHETIST, CERTIFIED REGISTERED

## 2022-10-06 PROCEDURE — 88342 IMHCHEM/IMCYTCHM 1ST ANTB: CPT | Mod: 26,,, | Performed by: STUDENT IN AN ORGANIZED HEALTH CARE EDUCATION/TRAINING PROGRAM

## 2022-10-06 PROCEDURE — 88305 TISSUE EXAM BY PATHOLOGIST: ICD-10-PCS | Mod: 26,,, | Performed by: STUDENT IN AN ORGANIZED HEALTH CARE EDUCATION/TRAINING PROGRAM

## 2022-10-06 PROCEDURE — 43239 PR EGD, FLEX, W/BIOPSY, SGL/MULTI: ICD-10-PCS | Mod: 59,,, | Performed by: INTERNAL MEDICINE

## 2022-10-06 PROCEDURE — 43239 EGD BIOPSY SINGLE/MULTIPLE: CPT | Mod: 59,,, | Performed by: INTERNAL MEDICINE

## 2022-10-06 PROCEDURE — 43239 EGD BIOPSY SINGLE/MULTIPLE: CPT | Mod: 59 | Performed by: INTERNAL MEDICINE

## 2022-10-06 PROCEDURE — 43248 EGD GUIDE WIRE INSERTION: CPT | Mod: ,,, | Performed by: INTERNAL MEDICINE

## 2022-10-06 PROCEDURE — 88305 TISSUE EXAM BY PATHOLOGIST: CPT | Mod: 26,,, | Performed by: STUDENT IN AN ORGANIZED HEALTH CARE EDUCATION/TRAINING PROGRAM

## 2022-10-06 PROCEDURE — 63600175 PHARM REV CODE 636 W HCPCS: Performed by: NURSE ANESTHETIST, CERTIFIED REGISTERED

## 2022-10-06 PROCEDURE — 88342 IMHCHEM/IMCYTCHM 1ST ANTB: CPT | Performed by: STUDENT IN AN ORGANIZED HEALTH CARE EDUCATION/TRAINING PROGRAM

## 2022-10-06 PROCEDURE — 43248 EGD GUIDE WIRE INSERTION: CPT | Performed by: INTERNAL MEDICINE

## 2022-10-06 PROCEDURE — D9220A PRA ANESTHESIA: ICD-10-PCS | Mod: CRNA,,, | Performed by: NURSE ANESTHETIST, CERTIFIED REGISTERED

## 2022-10-06 PROCEDURE — 37000008 HC ANESTHESIA 1ST 15 MINUTES: Performed by: INTERNAL MEDICINE

## 2022-10-06 PROCEDURE — C1769 GUIDE WIRE: HCPCS | Performed by: INTERNAL MEDICINE

## 2022-10-06 PROCEDURE — 43248 PR EGD, FLEX, W/DILATION OVER GUIDEWIRE: ICD-10-PCS | Mod: ,,, | Performed by: INTERNAL MEDICINE

## 2022-10-06 PROCEDURE — D9220A PRA ANESTHESIA: ICD-10-PCS | Mod: ANES,,, | Performed by: ANESTHESIOLOGY

## 2022-10-06 PROCEDURE — 25000003 PHARM REV CODE 250: Performed by: INTERNAL MEDICINE

## 2022-10-06 PROCEDURE — 88342 CHG IMMUNOCYTOCHEMISTRY: ICD-10-PCS | Mod: 26,,, | Performed by: STUDENT IN AN ORGANIZED HEALTH CARE EDUCATION/TRAINING PROGRAM

## 2022-10-06 PROCEDURE — 88305 TISSUE EXAM BY PATHOLOGIST: CPT | Performed by: STUDENT IN AN ORGANIZED HEALTH CARE EDUCATION/TRAINING PROGRAM

## 2022-10-06 RX ORDER — PROPOFOL 10 MG/ML
VIAL (ML) INTRAVENOUS
Status: DISCONTINUED | OUTPATIENT
Start: 2022-10-06 | End: 2022-10-06

## 2022-10-06 RX ORDER — ORAL SEMAGLUTIDE 7 MG/1
7 TABLET ORAL DAILY
COMMUNITY
End: 2023-04-25

## 2022-10-06 RX ORDER — LIDOCAINE HCL/PF 100 MG/5ML
SYRINGE (ML) INTRAVENOUS
Status: DISCONTINUED | OUTPATIENT
Start: 2022-10-06 | End: 2022-10-06

## 2022-10-06 RX ORDER — SODIUM CHLORIDE 9 MG/ML
INJECTION, SOLUTION INTRAVENOUS CONTINUOUS
Status: DISCONTINUED | OUTPATIENT
Start: 2022-10-06 | End: 2022-10-06 | Stop reason: HOSPADM

## 2022-10-06 RX ADMIN — PROPOFOL 30 MG: 10 INJECTION, EMULSION INTRAVENOUS at 10:10

## 2022-10-06 RX ADMIN — SODIUM CHLORIDE: 0.9 INJECTION, SOLUTION INTRAVENOUS at 10:10

## 2022-10-06 RX ADMIN — LIDOCAINE HYDROCHLORIDE 100 MG: 20 INJECTION INTRAVENOUS at 10:10

## 2022-10-06 RX ADMIN — PROPOFOL 100 MG: 10 INJECTION, EMULSION INTRAVENOUS at 10:10

## 2022-10-06 NOTE — ANESTHESIA PREPROCEDURE EVALUATION
10/06/2022  Monique Trujillo is a 71 y.o., female.      Pre-op Assessment    I have reviewed the Patient Summary Reports.     I have reviewed the Nursing Notes. I have reviewed the NPO Status.   I have reviewed the Medications.     Review of Systems  Anesthesia Hx:  No problems with previous Anesthesia Denies Hx of Anesthetic complications    Social:  Non-Smoker    Cardiovascular:   Hypertension Denies MI.  Denies CAD.    Denies CABG/stent.   Denies Angina.    Pulmonary:   Denies COPD.  Denies Asthma.  Denies Recent URI. Sleep Apnea    Renal/:   Denies Chronic Renal Disease.     Hepatic/GI:   PUD, GERD Denies Liver Disease.    Musculoskeletal:   Arthritis     Neurological:   Denies TIA. Denies CVA. Neuromuscular Disease,  Denies Seizures.    Endocrine:   Diabetes Denies Hypothyroidism.    Psych:   Psychiatric History          Physical Exam  General: Well nourished, Cooperative, Alert and Oriented  Morbid obesity with BMI of 40.0-44.9, adult  Airway:  Mallampati: II / II  Mouth Opening: Normal  TM Distance: 4 - 6 cm  Tongue: Normal    Dental:  Intact    Chest/Lungs:  Clear to auscultation, Normal Respiratory Rate    Heart:  Rate: Normal  Rhythm: Regular Rhythm  Sounds: Normal        Anesthesia Plan  Type of Anesthesia, risks & benefits discussed:    Anesthesia Type: Gen Natural Airway  Intra-op Monitoring Plan: Standard ASA Monitors  Induction:  IV  Informed Consent: Informed consent signed with the Patient and all parties understand the risks and agree with anesthesia plan.  All questions answered.   ASA Score: 4    Ready For Surgery From Anesthesia Perspective.     .

## 2022-10-06 NOTE — TRANSFER OF CARE
"Anesthesia Transfer of Care Note    Patient: Monique Trujillo    Procedure(s) Performed: Procedure(s) (LRB):  EGD (ESOPHAGOGASTRODUODENOSCOPY) (N/A)    Patient location: GI    Anesthesia Type: general    Transport from OR: Transported from OR on room air with adequate spontaneous ventilation    Post pain: adequate analgesia    Post assessment: no apparent anesthetic complications and tolerated procedure well    Post vital signs: stable    Level of consciousness: awake, alert and oriented    Nausea/Vomiting: no nausea/vomiting    Complications: none    Transfer of care protocol was followed      Last vitals:   Visit Vitals  BP (!) 174/87 (BP Location: Left arm, Patient Position: Lying)   Pulse 76   Temp 37 °C (98.6 °F) (Skin)   Resp 18   Ht 5' 1" (1.549 m)   Wt 104.3 kg (230 lb)   LMP  (LMP Unknown)   SpO2 96%   Breastfeeding No   BMI 43.46 kg/m²     "

## 2022-10-06 NOTE — H&P
CC: Dysphagia, epigastric pain    71 year old female with above. States that symptoms are stable, no alleviating/exacerbating factors.  No bleeding or weight loss.     ROS:  No headache, no fever/chills, no chest pain/SOB, no nausea/vomiting/diarrhea/constipation/GI bleeding/abdominal pain, no dysuria/hematuria.    VSSAF   Exam:   Alert and oriented x 3; no apparent distress   PERRLA, sclera anicteric  CV: Regular rate/rhythm, normal PMI   Lungs: Clear bilaterally with no wheeze/rales   Abdomen: Soft, NT/ND, normal bowel sounds   Ext: No cyanosis, clubbing     Impression:   As above    Plan:   Proceed with endoscopy. Further recs to follow.

## 2022-10-06 NOTE — PROVATION PATIENT INSTRUCTIONS
Discharge Summary/Instructions after an Endoscopic Procedure  Patient Name: Monique Trujillo  Patient MRN: [id number]  Patient YOB: 1950 Thursday, October 6, 2022  Migel Alanis MD  Dear patient,  As a result of recent federal legislation (The Federal Cures Act), you may   receive lab or pathology results from your procedure in your [facility]   account before your physician is able to contact you. Your physician or   their representative will relay the results to you with their   recommendations at their soonest availability.  Thank you,  RESTRICTIONS:  During your procedure today, you received medications for sedation.  These   medications may affect your judgment, balance and coordination.  Therefore,   for 24 hours, you have the following restrictions:   - DO NOT drive a car, operate machinery, make legal/financial decisions,   sign important papers or drink alcohol.    ACTIVITY:  Today: no heavy lifting, straining or running due to procedural   sedation/anesthesia.  The following day: return to full activity including work.  DIET:  Eat and drink normally unless instructed otherwise.     TREATMENT FOR COMMON SIDE EFFECTS:  - Mild abdominal pain, nausea, belching, bloating or excessive gas:  rest,   eat lightly and use a heating pad.  - Sore Throat: treat with throat lozenges and/or gargle with warm salt   water.  - Because air was used during the procedure, expelling large amounts of air   from your rectum or belching is normal.  - If a bowel prep was taken, you may not have a bowel movement for 1-3 days.    This is normal.  SYMPTOMS TO WATCH FOR AND REPORT TO YOUR PHYSICIAN:  1. Abdominal pain or bloating, other than gas cramps.  2. Chest pain.  3. Back pain.  4. Signs of infection such as: chills or fever occurring within 24 hours   after the procedure.  5. Rectal bleeding, which would show as bright red, maroon, or black stools.   (A tablespoon of blood from the rectum is not serious, especially  if   hemorrhoids are present.)  6. Vomiting.  7. Weakness or dizziness.  GO DIRECTLY TO THE NEAREST EMERGENCY ROOM IF YOU HAVE ANY OF THE FOLLOWING:      Difficulty breathing              Chills and/or fever over 101 F   Persistent vomiting and/or vomiting blood   Severe abdominal pain   Severe chest pain   Black, tarry stools   Bleeding- more than one tablespoon   Any other symptom or condition that you feel may need urgent attention  Your doctor recommends these additional instructions:  If any biopsies were taken, your doctors clinic will contact you in 1 to 2   weeks with any results.  - Patient has a contact number available for emergencies.  The signs and   symptoms of potential delayed complications were discussed with the   patient.  Return to normal activities tomorrow.  Written discharge   instructions were provided to the patient.   - Resume previous diet.   - Continue present medications.   - No aspirin, ibuprofen, naproxen, or other non-steroidal anti-inflammatory   drugs.   - Await pathology results.   - Discharge patient to home (ambulatory).   - Follow an antireflux regimen.   - Return to nurse practitioner after studies are complete.  For questions, problems or results please call your physician - Migel Alanis MD at Work:  [phone].  [facility], EMERGENCY ROOM PHONE NUMBER: [phone]  IF A COMPLICATION OR EMERGENCY SITUATION ARISES AND YOU ARE UNABLE TO REACH   YOUR PHYSICIAN - GO DIRECTLY TO THE EMERGENCY ROOM.  Migel Alanis MD  10/6/2022 10:58:19 AM  This report has been verified and signed electronically.  Dear patient,  As a result of recent federal legislation (The Federal Cures Act), you may   receive lab or pathology results from your procedure in your [facility]   account before your physician is able to contact you. Your physician or   their representative will relay the results to you with their   recommendations at their soonest availability.  Thank  you,  [employer]ATION

## 2022-10-06 NOTE — ANESTHESIA POSTPROCEDURE EVALUATION
Anesthesia Post Evaluation    Patient: Monique Trujillo    Procedure(s) Performed: Procedure(s) (LRB):  EGD (ESOPHAGOGASTRODUODENOSCOPY) (N/A)    Final Anesthesia Type: general      Patient location during evaluation: PACU  Patient participation: Yes- Able to Participate  Level of consciousness: awake and alert and oriented  Post-procedure vital signs: reviewed and stable  Pain management: adequate  Airway patency: patent    PONV status at discharge: No PONV  Anesthetic complications: no      Cardiovascular status: blood pressure returned to baseline  Respiratory status: unassisted, spontaneous ventilation and room air  Hydration status: euvolemic  Follow-up not needed.          Vitals Value Taken Time   /71 10/06/22 1115   Temp 36.6 °C (97.9 °F) 10/06/22 1100   Pulse 73 10/06/22 1115   Resp 16 10/06/22 1115   SpO2 95 % 10/06/22 1115         Event Time   Out of Recovery 11:27:48         Pain/Lindsey Score: Lindsey Score: 10 (10/6/2022 11:15 AM)

## 2022-10-07 VITALS
HEIGHT: 61 IN | OXYGEN SATURATION: 95 % | BODY MASS INDEX: 43.43 KG/M2 | DIASTOLIC BLOOD PRESSURE: 71 MMHG | WEIGHT: 230 LBS | SYSTOLIC BLOOD PRESSURE: 142 MMHG | RESPIRATION RATE: 16 BRPM | TEMPERATURE: 98 F | HEART RATE: 73 BPM

## 2022-10-10 ENCOUNTER — CLINICAL SUPPORT (OUTPATIENT)
Dept: REHABILITATION | Facility: HOSPITAL | Age: 72
End: 2022-10-10
Attending: ORTHOPAEDIC SURGERY
Payer: MEDICARE

## 2022-10-10 DIAGNOSIS — R42 DIZZINESS: ICD-10-CM

## 2022-10-10 DIAGNOSIS — R26.89 IMBALANCE: ICD-10-CM

## 2022-10-10 DIAGNOSIS — R42 VERTIGO: ICD-10-CM

## 2022-10-10 PROCEDURE — 97112 NEUROMUSCULAR REEDUCATION: CPT | Mod: PN

## 2022-10-10 PROCEDURE — 97161 PT EVAL LOW COMPLEX 20 MIN: CPT | Mod: PN

## 2022-10-10 NOTE — PLAN OF CARE
[facility]  Physical Therapy Neurological Rehabilitation Initial Evaluation    Name: Monique Trujillo  Clinic Number: [id number]    Therapy Diagnosis:   Encounter Diagnoses   Name Primary?    Vertigo     Dizziness     Imbalance      Physician: Carmine Jay,*    Physician Orders: PT Eval and Treat   Medical Diagnosis from Referral: vertigo  Evaluation Date: 10/10/2022  Authorization Period Expiration: 11/15/2022  Plan of Care Expiration: 11/19/22  Visit # / Visits authorized: 1/ 11    Time In: 1415  Time Out: 1455  Total Billable Time: 40 minutes    Precautions:  history of anterior cervical fusion       Subjective     Date of onset: 09/02/22  History of Current Symptoms, Monique reports: several year history of bilateral tinnitus and hearing loss (right > left); patient endorses concurrent symptoms of dysequilibrium and dizziness; Monique states her dizziness last seconds when exacerbated; conditions that tend to instigate her symptoms include walking in the dark and up/down head movement (down > up); patient's condition is further complicated by chronic low back pain, neuropathy in bilateral feet and history of anterior cervical fusion.     History of migraines: as a child     Medical History:   Past Medical History:   Diagnosis Date    Allergy     Arthritis     Chronic lower back pain     Depression     Fever blister     GERD (gastroesophageal reflux disease)     Hemorrhoids, internal 11/05/2014    Hyperlipidemia     Hypertension     Joint pain     Morbid obesity with BMI of 40.0-44.9, adult 02/24/2015    Multiple gastric ulcers 12/14/2017    TIMMY (obstructive sleep apnea)     does not use CPAP    Pulmonary hypertension     Skin cancer     Type 2 diabetes mellitus with hyperglycemia, without long-term current use of insulin 4/20/2022     Surgical History:   Monique Trujillo  has a past surgical history that includes Knee arthroscopy w/ meniscal repair (Right, 2012); Tubal  ligation (1980); Colonoscopy (N/A, 11/02/2016); Anterior cervical corpectomy w/ fusion (1988); Lumbar laminectomy (06/19/2015); Total hip arthroplasty (Right, 04/28/2016); Skin cancer excision; Epidural steroid injection into lumbar spine (N/A, 11/08/2018); Caudal epidural steroid injection (N/A, 01/10/2019); Injection of anesthetic agent around genitofemoral nerve (Bilateral, 08/01/2019); Fusion of spine with instrumentation (Left, 01/03/2020); Joint replacement; Back surgery; Epidural steroid injection (N/A, 05/18/2022); Reverse total shoulder arthroplasty (Right, 05/25/2022); and Esophagogastroduodenoscopy (N/A, 10/6/2022).    Medications:   Monique has a current medication list which includes the following prescription(s): amlodipine, ammonium lactate, ascorbic acid (vitamin c), aspirin, atenolol, calcium-vitamin d3, carboxymethylcellulose, cinnamon bark, clindamycin, co-enzyme q-10, cyclosporine, diclofenac sodium, diclofenac sodium, econazole nitrate, famotidine, fenofibrate micronized, furosemide, gabapentin, levocetirizine, lidocaine-prilocaine, multivitamin, pantoprazole, pravastatin, rybelsus, sertraline, solifenacin, and vitamin e.    Allergies:   Review of patient's allergies indicates:   Allergen Reactions    Metformin Other (See Comments)     Diarrhea       Imaging (MRI of brain on 09/23/22): Moderate periventricular white matter signal alteration suggesting microvascular changes. Otherwise unremarkable MRI of the brain with without gadolinium for the patient's age.    Prior Therapy: for unrelated dysfunctions  Social History: lives with her family in 1-story home (no steps)  Falls: several near falls reported   DME: Rollator    Occupation: retired  Prior Level of Function: supervision needed  Current Level of Function: moderate difficulty with activities of daily living     Pain:  Current 3/10, worst 6/10, best 0/10   Location: bilateral back   Description: Aching and Dull  Aggravating Factors: lack  of activity  Easing Factors:  changing positions    Pts goals: decrease incidence of dizziness; improve stability during functional ambulation      Objective     - Follows commands: 100% of time   - Speech: no deficits     Functional Mobility & ADLs   Sit to stand: stand by assist     Mental status: alert, oriented to person, place, and time, normal mood, behavior, speech, dress, motor activity, and thought processes  Appearance: Casually dressed  Behavior:  calm and cooperative  Attention Span and Concentration:  Normal    Posture Alignment in sitting:   Head: forward head     Sensation: Light Touch: Impaired: neuropathy in bilateral feet          Proprioception: Intact, Kinesthesia Intact         Visual/Auditory: denies changes   Tracking/Smooth Pursuits:Impaired: mild elevation of symptoms  Saccades: Impaired: mild elevation of symptoms  VOR: Impaired: mild elevation of symptoms side to side; moderate elevation of symptoms up and down  Dynamic Visual Acuity Test: difference of one line    Coordination:   - fine motor: within functional limits   - UE coordination: within functional limits    - LE coordination:  within functional limits    ROM:   CERVICAL SPINE  Flexion 40 degrees (80-90 deg)  Extension 50 degrees (70-80 deg)  L side bend 40 degrees, R side bend 30 degrees (20-45 degrees)  L rotation 50 degrees, R rotation 50 degrees (70-90 degrees)  Are concurrent symptoms present with any of these movements = no     Modified VAS (Vertebral Artery Screen), in sitting (rotation, then extension):  R: NT   L: NT     RANGE OF MOTION--LOWER EXTREMITIES  (R) LE Hip: normal   Knee: normal   Ankle: normal    (L) LE: Hip: normal   Knee: normal   Ankle: normal    Strength: manual muscle test grades below     Lower Extremity Strength  Right LE  Left LE    Hip flexion:  3+/5 Hip flexion: 3+/5   Knee extension: 4-/5 Knee extension: 4-/5   Ankle dorsiflexion:  4-/5 Ankle dorsiflexion: 4-/5       Gait Assessment:(if  indicated)  - AD used: rollator  - Assistance: stand by assist   - Distance: 120 feet     GAIT DEVIATIONS:  Monique displays the following deviations with ambulation: mild path deviation    Impairments contributing to deviations: dizziness, imbalance    Endurance Deficit: mild fatigue       POSITIONAL CANAL TESTING  Looking for nystagmus (slow phase followed by quick phase to the affected side for BPPV)    [place] Hallpike (posterior / CL anterior)   Right : n/a   Left: n/a  Horizontal Canals   Right: n/a   Left: n/a  Treatment Performed: n/a        CMS Impairment/Limitation/Restriction for FOTO Vestibular Survey    Therapist reviewed FOTO scores for Monique Trujillo on 10/10/2022.   FOTO documents entered into [employer] - see Media section.    Limitation Score: 38.9%    Category: Mobility         Other: Dizziness Handicap Inventory = 62/100 (severe handicap): [place] = 18/28 (high fall risk)      TREATMENT     Treatment Time In: 1445  Treatment Time Out: 1455  Total Treatment time separate from Evaluation: 10 minutes    Monique participated in neuromuscular re-education activities to assess: Balance and Vestibular Function for 10 minutes. The following activities were included:    X 5 each seated smooth pursuits = side to side*, up and down*  X 5 each seated saccades = side to side*, up and down*  X 5 each seated VOR1 = side to side*, up and down**  X 10 static standing holds      * minimal increase in symptoms    * moderate increase in symptoms      Home Exercises and Patient Education Provided    Education provided:   - proper therapeutic exercise technique  - treatment plan    Written Home Exercises Provided:  to be provided at future appointment .      Assessment     Monique is a 71 y.o. female referred to outpatient Physical Therapy with a medical diagnosis of vertigo. 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     Pt prognosis is Fair.   Pt will benefit from skilled outpatient Physical Therapy to address the deficits stated above and in the chart below, provide pt/family education, and to maximize pt's level of independence.     Plan of care discussed with patient: Yes  Pt's spiritual, cultural and educational needs considered and patient is agreeable to the plan of care and goals as stated below:     Anticipated Barriers for therapy: severity of symptoms    Medical Necessity is demonstrated by the following  History  Co-morbidities and personal factors that may impact the plan of care Co-morbidities:   depression, diabetes, history of cancer, and HTN    Personal Factors:   no deficits     moderate   Examination  Body Structures and Functions, activity limitations and participation restrictions that may impact the plan of care Body Regions:   head  back  lower extremities    Body Systems:    strength  balance  gait  transfers  vestibular    Participation Restrictions:   none    Activity limitations:   Learning and applying knowledge  no deficits    General Tasks and Commands  no deficits    Communication  no deficits    Mobility  lifting and carrying objects  walking  driving (bike, car, motorcycle)    Self care  no deficits    Domestic Life  shopping  cooking  doing house work (cleaning house, washing dishes, laundry)    Interactions/Relationships  no deficits    Life Areas  no deficits    Community and Social Life  no deficits         high   Clinical Presentation stable and uncomplicated low   Decision Making/ Complexity Score: low     Goals:    Short Term Goals (3 Weeks):   Patient to tolerate use of 2# weights during lower extremity therapeutic exercise to improve overall strength.  Patient to perform VOR exercises with minimal increase in symptoms.  Patient to tolerate x 45 seconds full Romberg  stance, eyes open to improve upright stability.  Patient to perform x 10 repetitions sit to stand so that she may rise with single upper extremity support.    Long Term Goals (5 Weeks):   Patient to demonstrate competence with home exercise program to maintain therapeutic gains.  Patient to improve bilateral hip MMT 1/2 grade to demo strength gains from therapeutic intervention.  Patient to ambulate 200 feet with rollator and stand by assist with improved dea/symmetry.  Patient to report that bending over sometimes increases her problem.      Plan     Plan of care Certification: 10/10/2022 to 11/19/22.    Outpatient Physical Therapy evaluation, plus 2 times weekly for 5 weeks to include the following interventions (starting week of 10/17/22): Gait Training, Manual Therapy, Moist Heat/ Ice, Neuromuscular Re-ed, Patient Education, Self Care, Therapeutic Activities, Therapeutic Exercise, and HEP .     Gabe Pelletier, PT

## 2022-10-11 LAB
FINAL PATHOLOGIC DIAGNOSIS: NORMAL
GROSS: NORMAL
Lab: NORMAL

## 2022-10-21 ENCOUNTER — CLINICAL SUPPORT (OUTPATIENT)
Dept: REHABILITATION | Facility: HOSPITAL | Age: 72
End: 2022-10-21
Payer: MEDICARE

## 2022-10-21 DIAGNOSIS — R42 VERTIGO: Primary | ICD-10-CM

## 2022-10-21 PROCEDURE — 97110 THERAPEUTIC EXERCISES: CPT | Mod: PN

## 2022-10-21 PROCEDURE — 97112 NEUROMUSCULAR REEDUCATION: CPT | Mod: PN

## 2022-10-21 NOTE — PROGRESS NOTES
[facility]   Physical Therapy Treatment Note     Name: Monique Trujillo  Clinic Number: [id number]    Therapy Diagnosis:   Encounter Diagnosis   Name Primary?    Vertigo Yes     Physician: Carmine Jay,Cathy    Visit Date: 10/21/2022    Physician Orders: PT Eval and Treat   Medical Diagnosis from Referral: vertigo  Evaluation Date: 10/10/2022  Authorization Period Expiration: 12/31/2022  Plan of Care Expiration: 11/19/22  Visit # / Visits authorized: 1/ 99     Time In: 1407  Time Out: 1452  Total Billable Time: 45 minutes     Precautions:  history of anterior cervical fusion       SUBJECTIVE     Pt reports: taking a fall yesterday onto her knees - leaned over to pick something up and became lightheaded; now reports right knee pain.    She  does not have a  home exercise program.  Response to previous treatment: new onset right knee pain  Functional change: none    Pain: 3/10  Location: right knee        OBJECTIVE     Objective Measures updated at progress report unless specified.     Treatment     Monique received the treatments listed below:      therapeutic exercises to develop strength, endurance, and flexibility for 15 minutes including:    X 10 minutes SciFit (level 2) to promote flexibility prior to strength/balance/vestibular training  X 15 each seated bilateral lower extremity therapeutic exercise (1.5#) = marching, long arc quad, hip abduction (red theraband), ball squeeze      neuromuscular re-education activities to improve: Balance and Vestibular Hypofunction for 30 minutes. The following activities were included:    X 5 sit to stand while hold target  X 5 lean over touch stool while hold target  X 30 seconds each seated saccades = side to side, up and down  X 30 seconds each seated VOR1 = side to side, up and down  X 30 seconds full Romberg stance = eyes open   X 15 each balance therapeutic exercise = mini squats and heel raises/ toe raises    X 15 feet each balance  gait = side stepping and backwards gait    X 1 minute stand on AirEx   X 15 alternating toe taps on 3-inch stool      Patient Education and Home Exercises     Home Exercises Provided and Patient Education Provided     Education provided:   - proper therapeutic exercise technique    Written Home Exercises Provided:  to be provided at future appointment .       ASSESSMENT     Unable to maintain full Romberg stance with eyes closed; no elevation of symptoms during oculomotor exercises; double upper extremity support needed during balance therapeutic exercise and balance gait; loss of balance x 1 while standing on AirEx.    Monique Is progressing fairly well towards her goals.   Pt prognosis is Fair.     Pt will continue to benefit from skilled outpatient physical therapy to address the deficits listed in the problem list box on initial evaluation, provide pt/family education and to maximize pt's level of independence in the home and community environment.     Pt's spiritual, cultural and educational needs considered and pt agreeable to plan of care and goals.     Anticipated barriers to physical therapy: severity of symptoms    Goals:     Short Term Goals (3 Weeks):   Patient to tolerate use of 2# weights during lower extremity therapeutic exercise to improve overall strength. (NOT MET)  Patient to perform VOR exercises with minimal increase in symptoms. (MET)  Patient to tolerate x 45 seconds full Romberg stance, eyes open to improve upright stability. (NOT MET)  Patient to perform x 10 repetitions sit to stand so that she may rise with single upper extremity support. (NOT MET)     Long Term Goals (5 Weeks):   Patient to demonstrate competence with home exercise program to maintain therapeutic gains. (NOT MET)  Patient to improve bilateral hip MMT 1/2 grade to demo strength gains from therapeutic intervention. (NOT MET)  Patient to ambulate 200 feet with rollator and stand by assist with improved dea/symmetry.  (NOT MET)  Patient to report that bending over sometimes increases her problem. (NOT MET)      PLAN     Continue to advance strength/balance/vestibular training to patient's tolerance.      Gabe Pelletier, PT

## 2022-10-24 ENCOUNTER — CLINICAL SUPPORT (OUTPATIENT)
Dept: AUDIOLOGY | Facility: CLINIC | Age: 72
End: 2022-10-24
Payer: MEDICARE

## 2022-10-24 DIAGNOSIS — Z86.69 HISTORY OF HEARING LOSS: ICD-10-CM

## 2022-10-24 DIAGNOSIS — H93.11 TINNITUS OF RIGHT EAR: ICD-10-CM

## 2022-10-24 DIAGNOSIS — R42 DISEQUILIBRIUM: Primary | ICD-10-CM

## 2022-10-24 DIAGNOSIS — R42 VERTIGO: ICD-10-CM

## 2022-10-24 DIAGNOSIS — H90.3 SENSORINEURAL HEARING LOSS (SNHL) OF BOTH EARS: Primary | ICD-10-CM

## 2022-10-24 DIAGNOSIS — R42 DIZZINESS: ICD-10-CM

## 2022-10-24 PROCEDURE — 99999 PR PBB SHADOW E&M-EST. PATIENT-LVL I: CPT | Mod: PBBFAC,,,

## 2022-10-24 PROCEDURE — 92567 TYMPANOMETRY: CPT | Mod: S$GLB,,, | Performed by: AUDIOLOGIST

## 2022-10-24 PROCEDURE — 92557 COMPREHENSIVE HEARING TEST: CPT | Mod: S$GLB,,, | Performed by: AUDIOLOGIST

## 2022-10-24 PROCEDURE — 99999 PR PBB SHADOW E&M-EST. PATIENT-LVL I: ICD-10-PCS | Mod: PBBFAC,,,

## 2022-10-24 PROCEDURE — 92567 PR TYMPA2METRY: ICD-10-PCS | Mod: S$GLB,,, | Performed by: AUDIOLOGIST

## 2022-10-24 PROCEDURE — 92540 BASIC VESTIBULAR EVALUATION: CPT | Mod: S$GLB,,, | Performed by: AUDIOLOGIST

## 2022-10-24 PROCEDURE — 92557 PR COMPREHENSIVE HEARING TEST: ICD-10-PCS | Mod: S$GLB,,, | Performed by: AUDIOLOGIST

## 2022-10-24 PROCEDURE — 92537 PR CALORIC VSTBLR TEST W/REC BITHERMAL: ICD-10-PCS | Mod: S$GLB,,, | Performed by: AUDIOLOGIST

## 2022-10-24 PROCEDURE — 92537 CALORIC VSTBLR TEST W/REC: CPT | Mod: S$GLB,,, | Performed by: AUDIOLOGIST

## 2022-10-24 PROCEDURE — 92540 PR VESTIBULAR EVAL NYSTAG FOVL&PERPH STIM OSCIL TRACKING: ICD-10-PCS | Mod: S$GLB,,, | Performed by: AUDIOLOGIST

## 2022-10-24 NOTE — PROGRESS NOTES
Referring provider: Carmine Jay PA-C     Monique Trujillo was seen 10/24/22 for Videonystagmography (VNG) testing.      Pt reportedly abstained from vestibular suppressants for 24 hours prior to her VNG this morning except for her regular daily doses of Xyzal, Zoloft and Gabapentin that she has taken daily for more than a year.      VAT was negative bilaterally.     VNG Results (abnormal results italicized):   Oculomotor function tests (sinusoidal tracking, saccade and OPKs) were normal and symmetric.  Spontaneous nystagmus was absent.  Gaze nystagmus was absent.  [place]-Hallpike Left was negative for BPPV.  [place]Hallpike Right was negative for BPPV.  Static Positional nystagmus was absent.  Bi-thermal caloric irrigations revealed a 15% caloric weakness in the left ear, which is within normal limits, and 2% directional preponderance to the left, which is within normal limits.  Fixation suppression following caloric irrigations were normal.  RC: 36 d/s    RW: 44 d/s    d/s    LW: 35 d/s     Impressions:  Normal VNG.  Normal oculomotor function.  No significant caloric asymmetry or weakness.  Negative for BPPV bilaterally.      Recommendations:  1. ENT review of results  2. Continue with VRT with PT    Tracings will be scanned into the patient's chart.

## 2022-10-24 NOTE — Clinical Note
Impressions:  Normal VNG.  Normal oculomotor function.  No significant caloric asymmetry or weakness.  Negative for BPPV bilaterally.    Recommendations: 1. ENT review of results 2. Continue with VRT with PT

## 2022-10-26 DIAGNOSIS — K21.9 GASTROESOPHAGEAL REFLUX DISEASE, UNSPECIFIED WHETHER ESOPHAGITIS PRESENT: ICD-10-CM

## 2022-10-26 RX ORDER — FAMOTIDINE 40 MG/1
40 TABLET, FILM COATED ORAL DAILY
Qty: 90 TABLET | Refills: 3 | Status: SHIPPED | OUTPATIENT
Start: 2022-10-26 | End: 2023-08-24

## 2022-10-26 NOTE — TELEPHONE ENCOUNTER
No new care gaps identified.  [facility] Embedded Care Gaps. Reference number: [id number]. 10/26/2022   2:22:25 PM CDT

## 2022-10-26 NOTE — PROGRESS NOTES
[facility]    Subjective:      Patient: Monique Trujillo Patient PCP: Milton Mahmood MD         :  1950     Sex:  female      MRN:  [id number]          Date of Visit: 10/28/2022      Chief Complaint: Tinnitus of right ear and dizziness    Interval History 10/28/2022: Audiogram 10/24/2022 shows slight to moderate SNHL in right ear and slight to moderately severe SNHL in the left ear. VNG unremarkable for further workup of dizziness. 15% left vestibular weakness with 2% directional preponderance to the left are low and insignificant findings on VNG. MRI Brain w/wo 2022 shows age related ischemic changes, but is otherwise unremarkable. Pt has started VPT. Pt continues to have dizziness when bending over and sitting up or at random for a few seconds. Pt sates that this occurs around once or twice a week. Pt states that she continue to have right sided high-pitched non-pulsatile tinnitus. Pt states she continues to have trouble understanding people when she has background noise around her.     Patient ID [id number]: Monique Trujillo is a 71 y.o. female who was referred to me by Andra Snell NP in consultation for  tinnitus of right ear and dizziness . Pt uses walker to ambulate. Pt has h/o vertigo, bilateral tinnitus, bilateral SNHL and lumbar radiculopathy and spinal stenosis with last lumbar spinal fusion 2019. Pt seen by neurology for gait issues 08/10/2022 and had complaints of being able to walk straight. Pt stated at that appt that she had been having increased issues with gait since her prior back surgery and that she cannot feel her feet at all and has numbness and stabbing in her toes. Pt currently takes gabapentin 600mg BID for lower extremity paraesthesia. Pt endorsed intermittent tinnitus in right ear with associated spinning sensation. Pt previously seen by ENT MD Dr. Evelyn Whyte 2019 for dizziness and dysequilibrium. VNG was ordered for further assessment, but was  not completed. Pt has planned MRI Brain WO scheduled for today to further assess right sided tinnitus with dysequilibrium with dizziness. Pt's last audiogram 07/19/2019 showed bilateral SNHL with Tymps Type A AS and Type As AD and speech reception thresholds were obtained at 25 dB HL and 20 dB HL, in the right and left ears. Pt had word recognitions scores of 96% were obtained in both ears using a presentation level of 65 dB HL in the right ear and 60 dB HL in the left ear. Pt states today in office that she has had increase in right ear tinnitus that can switch between medium hum and high-pitched non-pulsatile tinnitus. Pt states that she her dizziness comes and goes. Pt states that when she stands up or does fast head movements she has dizziness that lasts for around a minute and goes away when she settles her head for around the past 4-5 years. Pt states she had migraines as a child, but no longer suffers from migraines.  Pt states that she has had right ear pain for a few years that worsens when she opens her jaw. Pt denies fever/chills, further hearing loss or aural pressure. Pt states that her right leg was a little longer since right hip surgery and had lift placed in right shoe and no longer has issues veering when she walks.     Past Medical History  She has a past medical history of Allergy, Arthritis, Chronic lower back pain, Depression, Fever blister, GERD (gastroesophageal reflux disease), Hemorrhoids, internal, Hyperlipidemia, Hypertension, Joint pain, Morbid obesity with BMI of 40.0-44.9, adult, Multiple gastric ulcers, TIMMY (obstructive sleep apnea), Pulmonary hypertension, Skin cancer, and Type 2 diabetes mellitus with hyperglycemia, without long-term current use of insulin.    Family History  Her family history includes Alzheimer's disease in her maternal grandfather; Arthritis in her mother; Breast cancer in her mother; Cancer in her mother and paternal grandmother; Colon cancer in her paternal  grandmother; Kidney disease in her father.    Past Surgical History:   Procedure Laterality Date    ANTERIOR CERVICAL CORPECTOMY W/ FUSION  1988    BACK SURGERY      CAUDAL EPIDURAL STEROID INJECTION N/A 01/10/2019    Procedure: Injection-steroid-epidural-caudal;  Surgeon: Lydia Velásquez Jr., MD;  Location: [facility];  Service: Pain Management;  Laterality: N/A;    COLONOSCOPY N/A 11/02/2016    Procedure: COLONOSCOPY;  Surgeon: Viji Dewitt MD;  Location: [facility];  Service: Endoscopy;  Laterality: N/A;    EPIDURAL STEROID INJECTION N/A 05/18/2022    Procedure: Injection, Steroid, Epidural;  Surgeon: Fermín Luo MD;  Location: [facility] OR;  Service: Pain Management;  Laterality: N/A;  L2-l3     EPIDURAL STEROID INJECTION INTO LUMBAR SPINE N/A 11/08/2018    Procedure: Injection-steroid-epidural-lumbar-L2-3 (LEFT > RIGHT);  Surgeon: Lydia Velásquez Jr., MD;  Location: [facility];  Service: Pain Management;  Laterality: N/A;    ESOPHAGOGASTRODUODENOSCOPY N/A 10/6/2022    Procedure: EGD (ESOPHAGOGASTRODUODENOSCOPY);  Surgeon: Migel Koroma MD;  Location: [facility];  Service: Endoscopy;  Laterality: N/A;    FUSION OF SPINE WITH INSTRUMENTATION Left 01/03/2020    Procedure: FUSION, SPINE, WITH INSTRUMENTATION Stage 1 Left L3-4 LOIF Rise L Stage 2 Open L3-4 L4-5 Laminectomy Removal of L4-5 Hardware, L3-5 Posterior Instrumentation and extraction of Fusion at L3-4;  Surgeon: James Brantley MD;  Location: [facility] OR;  Service: Neurosurgery;  Laterality: Left;  Procedure: Stage 1 Left L3-4 LOIF Rise L  Stage 2 Open L3-4 L4-5 Laminectomy Removal of L4-5 Hardware,     INJECTION OF ANESTHETIC AGENT AROUND GENITOFEMORAL NERVE Bilateral 08/01/2019    Procedure: BILATERAL SHOULDER ARTICULAR BRANCH BLOCK;  Surgeon: Lydia Velásquez Jr., MD;  Location: [facility];  Service: Pain Management;  Laterality: Bilateral;    JOINT REPLACEMENT      KNEE ARTHROSCOPY W/ MENISCAL REPAIR Right 2012    LUMBAR LAMINECTOMY   06/19/2015    REVERSE TOTAL SHOULDER ARTHROPLASTY Right 05/25/2022    Procedure: ARTHROPLASTY, SHOULDER, TOTAL, REVERSE;  Surgeon: Herb Tan II, MD;  Location: [facility];  Service: Orthopedics;  Laterality: Right;    SKIN CANCER EXCISION      forehead does not recall date    TOTAL HIP ARTHROPLASTY Right 04/28/2016         TUBAL LIGATION  1980     Social History     Tobacco Use    Smoking status: Never    Smokeless tobacco: Never   Substance and Sexual Activity    Alcohol use: No     Alcohol/week: 0.0 standard drinks    Drug use: No    Sexual activity: Never     Medications  She has a current medication list which includes the following prescription(s): amlodipine, ammonium lactate, ascorbic acid (vitamin c), aspirin, atenolol, calcium-vitamin d3, carboxymethylcellulose, cinnamon bark, clindamycin, co-enzyme q-10, cyclosporine, diclofenac sodium, diclofenac sodium, econazole nitrate, famotidine, fenofibrate micronized, furosemide, gabapentin, levocetirizine, lidocaine-prilocaine, multivitamin, pantoprazole, pravastatin, rybelsus, sertraline, solifenacin, and vitamin e.    Review of patient's allergies indicates:   Allergen Reactions    Metformin Other (See Comments)     Diarrhea      All medications, allergies, and past history have been reviewed.    Objective:      Vitals:  Vitals - 1 value per visit 10/28/2022 10/28/2022 10/28/2022   SYSTOLIC 142 134 143   DIASTOLIC 79 81 89   Pulse 73 70 83   Temp - - -   Resp - - -   SPO2 - - -   Weight (lb) - - -   Weight (kg) - - -   Height - - -   BMI (Calculated) - - -   VISIT REPORT - - -   Pain Score  6 - -   Some recent data might be hidden       Body surface area is 2.12 meters squared.    Vitals:    10/28/22 1045 10/28/22 1053 10/28/22 1055 10/28/22 1057   BP:  (!) 142/79 134/81 (!) 143/89   BP Location:  Right arm Right arm Right arm   Patient Position:  Lying Sitting Standing   BP Method:  Large (Automatic) Large (Automatic) Large (Automatic)   Pulse:  73 70 83  "  Resp: 18      Weight: 104.5 kg (230 lb 6.1 oz)      Height: 5' 1" (1.549 m)            Physical Exam  Constitutional:       General: She is not in acute distress.     Appearance: Normal appearance. She is not ill-appearing.   HENT:      Head: Normocephalic and atraumatic.      Right Ear: Tympanic membrane, ear canal and external ear normal.      Left Ear: Tympanic membrane, ear canal and external ear normal.      Nose: Nose normal.      Mouth/Throat:      Lips: Pink. No lesions.      Mouth: Mucous membranes are moist. No oral lesions.      Tongue: No lesions.      Palate: No lesions.      Pharynx: Oropharynx is clear. Uvula midline. No pharyngeal swelling, oropharyngeal exudate, posterior oropharyngeal erythema or uvula swelling.   Eyes:      General:         Right eye: No discharge.         Left eye: No discharge.      Extraocular Movements: Extraocular movements intact.      Conjunctiva/sclera: Conjunctivae normal.   Pulmonary:      Effort: Pulmonary effort is normal.   Neurological:      General: No focal deficit present.      Mental Status: She is alert and oriented to person, place, and time. Mental status is at baseline.   Psychiatric:         Mood and Affect: Mood normal.         Behavior: Behavior normal.         Thought Content: Thought content normal.         Judgment: Judgment normal.       Labs:  WBC   Date Value Ref Range Status   05/05/2022 5.59 3.90 - 12.70 K/uL Final     Platelets   Date Value Ref Range Status   05/05/2022 283 150 - 450 K/uL Final     Creatinine   Date Value Ref Range Status   09/23/2022 1.1 0.5 - 1.4 mg/dL Final     TSH   Date Value Ref Range Status   04/20/2022 2.249 0.400 - 4.000 uIU/mL Final     Glucose   Date Value Ref Range Status   05/05/2022 168 (H) 70 - 110 mg/dL Final     Hemoglobin A1C   Date Value Ref Range Status   04/20/2022 6.5 (H) 4.0 - 5.6 % Final     Comment:     ADA Screening Guidelines:  5.7-6.4%  Consistent with prediabetes  >or=6.5%  Consistent with " diabetes    High levels of fetal hemoglobin interfere with the HbA1C  assay. Heterozygous hemoglobin variants (HbS, HgC, etc)do  not significantly interfere with this assay.   However, presence of multiple variants may affect accuracy.       Imaging:   MRI IAC w/wo 09/23/2022:  FINDINGS:  Patchy areas of periventricular white matter T2 and FLAIR hyperintensity are evident.  The largest of these measure 14 mm and the findings likely relate to moderate microvascular disease.  There is no evidence of cortical infarct, mass effect or midline shift.  No abnormal extra-axial collections are seen.  There is no evidence of abnormal enhancement.  There is no evidence of restricted diffusion.  The visualized mastoid air cells and paranasal sinuses are unremarkable.     Impression:     Moderate periventricular white matter signal alteration suggesting microvascular changes.     Otherwise unremarkable MRI of the brain with without gadolinium for the patient's age.    VNG:      Audiogram Summary:          EKG 05/05/2022:  Narrative  Performed by: ALDO  Test Reason : Z01.818,     Vent. Rate : 073 BPM     Atrial Rate : 073 BPM      P-R Int : 176 ms          QRS Dur : 096 ms       QT Int : 396 ms       P-R-T Axes : 040 056 046 degrees      QTc Int : 436 ms     Normal sinus rhythm   Normal ECG   When compared with ECG of 12-DEC-2019 11:47,   No significant change was found   Confirmed by Missael Muro MD ([id number]) on 5/6/2022 12:11:45 PM      All lab results and imaging results have been reviewed.    Assessment:        ICD-10-CM ICD-9-CM   1. Sensorineural hearing loss (SNHL) of both ears  H90.3 389.18   2. Tinnitus of right ear  H93.11 388.30   3. Dizziness  R42 780.4            Plan:     Orthostatics show mild increase in blood pressure without signs of orthostatic hypotension. MRI IAC shows age related ischemic changes. Pt advised to monitor her BP. Reviewed audiogram and VNG with pt. No central or peripheral findings significant for  pt's complaints of dizziness. Pt is medically cleared for hearing aids. Pt advised to undergo annual audiograms to monitor hearing loss and to use noise protection if she knows she is going to be in loud environments. Follow up with ENT as needed for ENT issues/concerns.

## 2022-10-27 ENCOUNTER — CLINICAL SUPPORT (OUTPATIENT)
Dept: REHABILITATION | Facility: HOSPITAL | Age: 72
End: 2022-10-27
Payer: MEDICARE

## 2022-10-27 DIAGNOSIS — R42 VERTIGO: Primary | ICD-10-CM

## 2022-10-27 PROCEDURE — 97112 NEUROMUSCULAR REEDUCATION: CPT | Mod: PN

## 2022-10-27 PROCEDURE — 97110 THERAPEUTIC EXERCISES: CPT | Mod: PN

## 2022-10-27 NOTE — PROGRESS NOTES
[facility]   Physical Therapy Treatment Note     Name: Monique Trujillo  Clinic Number: [id number]    Therapy Diagnosis:   Encounter Diagnosis   Name Primary?    Vertigo Yes     Physician: Carmine Jay,Cathy    Visit Date: 10/27/2022    Physician Orders: PT Eval and Treat   Medical Diagnosis from Referral: vertigo  Evaluation Date: 10/10/2022  Authorization Period Expiration: 12/31/2022  Plan of Care Expiration: 11/19/22  Visit # / Visits authorized: 2/ 99     Time In: 1115  Time Out: 1200  Total Billable Time: 45 minutes     Precautions:  history of anterior cervical fusion       SUBJECTIVE     Pt reports: elevated pain levels in her left knee today.    She  does not have a  home exercise program.  Response to previous treatment: new onset right knee pain  Functional change: none    Pain: 6/10  Location: left knee        OBJECTIVE     Objective Measures updated at progress report unless specified.     Treatment     Monique received the treatments listed below:      therapeutic exercises to develop strength, endurance, and flexibility for 15 minutes including:    X 10 minutes SciFit (level 2) to promote flexibility prior to strength/balance/vestibular training  X 20 each seated bilateral lower extremity therapeutic exercise (1.5#) = marching, long arc quad, hip abduction (red theraband), ball squeeze      neuromuscular re-education activities to improve: Balance and Vestibular Hypofunction for 30 minutes. The following activities were included:    X 7 sit to stand while hold target  X 7 lean over touch stool while hold target  X 45 seconds each seated saccades = side to side, up and down  X 45 seconds each seated VOR1 = side to side, up and down  X 45 seconds full Romberg stance = eyes open  X 30 seconds normal stance = eyes closed   X 15 each balance therapeutic exercise = mini squats and heel raises/ toe raises    X 25 feet each balance gait = side stepping and backwards gait        Patient Education and Home Exercises     Home Exercises Provided and Patient Education Provided     Education provided:   - proper therapeutic exercise technique    Written Home Exercises Provided:  to be provided at future appointment .       ASSESSMENT     Patient was able to tolerate increased endurance during oculomotor exercises; increased repetitions performed during habituation exercises and seated lower extremity therapeutic exercise; able to maintain balance in normal stance with eyes closed; no elevation of symptoms during oculomotor exercises; double upper extremity support needed during balance therapeutic exercise and balance gait; increased distance performed during balance gait.    Monique Is progressing fairly well towards her goals.   Pt prognosis is Fair.     Pt will continue to benefit from skilled outpatient physical therapy to address the deficits listed in the problem list box on initial evaluation, provide pt/family education and to maximize pt's level of independence in the home and community environment.     Pt's spiritual, cultural and educational needs considered and pt agreeable to plan of care and goals.     Anticipated barriers to physical therapy: severity of symptoms    Goals:     Short Term Goals (3 Weeks):   Patient to tolerate use of 2# weights during lower extremity therapeutic exercise to improve overall strength. (NOT MET)  Patient to perform VOR exercises with minimal increase in symptoms. (MET)  Patient to tolerate x 45 seconds full Romberg stance, eyes open to improve upright stability. (PART MET)  Patient to perform x 10 repetitions sit to stand so that she may rise with single upper extremity support. (NOT MET)     Long Term Goals (5 Weeks):   Patient to demonstrate competence with home exercise program to maintain therapeutic gains. (NOT MET)  Patient to improve bilateral hip MMT 1/2 grade to demo strength gains from therapeutic intervention. (NOT MET)  Patient to  ambulate 200 feet with rollator and stand by assist with improved dea/symmetry. (NOT MET)  Patient to report that bending over sometimes increases her problem. (NOT MET)      PLAN     Continue to advance strength/balance/vestibular training to patient's tolerance.      Gabe Pelletier, PT

## 2022-10-28 ENCOUNTER — OFFICE VISIT (OUTPATIENT)
Dept: OTOLARYNGOLOGY | Facility: CLINIC | Age: 72
End: 2022-10-28
Payer: MEDICARE

## 2022-10-28 VITALS
SYSTOLIC BLOOD PRESSURE: 143 MMHG | HEIGHT: 61 IN | HEART RATE: 83 BPM | BODY MASS INDEX: 43.5 KG/M2 | RESPIRATION RATE: 18 BRPM | DIASTOLIC BLOOD PRESSURE: 89 MMHG | WEIGHT: 230.38 LBS

## 2022-10-28 DIAGNOSIS — H93.11 TINNITUS OF RIGHT EAR: ICD-10-CM

## 2022-10-28 DIAGNOSIS — H90.3 SENSORINEURAL HEARING LOSS (SNHL) OF BOTH EARS: Primary | ICD-10-CM

## 2022-10-28 DIAGNOSIS — H10.13 ALLERGIC CONJUNCTIVITIS OF BOTH EYES: ICD-10-CM

## 2022-10-28 DIAGNOSIS — R42 DIZZINESS: ICD-10-CM

## 2022-10-28 DIAGNOSIS — J30.9 ALLERGIC RHINITIS, UNSPECIFIED SEASONALITY, UNSPECIFIED TRIGGER: ICD-10-CM

## 2022-10-28 PROCEDURE — 1160F RVW MEDS BY RX/DR IN RCRD: CPT | Mod: CPTII,S$GLB,, | Performed by: PHYSICIAN ASSISTANT

## 2022-10-28 PROCEDURE — 3062F PR POS MACROALBUMINURIA RESULT DOCUMENTED/REVIEW: ICD-10-PCS | Mod: CPTII,S$GLB,, | Performed by: PHYSICIAN ASSISTANT

## 2022-10-28 PROCEDURE — 3288F FALL RISK ASSESSMENT DOCD: CPT | Mod: CPTII,S$GLB,, | Performed by: PHYSICIAN ASSISTANT

## 2022-10-28 PROCEDURE — 1159F PR MEDICATION LIST DOCUMENTED IN MEDICAL RECORD: ICD-10-PCS | Mod: CPTII,S$GLB,, | Performed by: PHYSICIAN ASSISTANT

## 2022-10-28 PROCEDURE — 3079F PR MOST RECENT DIASTOLIC BLOOD PRESSURE 80-89 MM HG: ICD-10-PCS | Mod: CPTII,S$GLB,, | Performed by: PHYSICIAN ASSISTANT

## 2022-10-28 PROCEDURE — 99999 PR PBB SHADOW E&M-EST. PATIENT-LVL V: ICD-10-PCS | Mod: PBBFAC,,, | Performed by: PHYSICIAN ASSISTANT

## 2022-10-28 PROCEDURE — 99999 PR PBB SHADOW E&M-EST. PATIENT-LVL V: CPT | Mod: PBBFAC,,, | Performed by: PHYSICIAN ASSISTANT

## 2022-10-28 PROCEDURE — 99214 OFFICE O/P EST MOD 30 MIN: CPT | Mod: S$GLB,,, | Performed by: PHYSICIAN ASSISTANT

## 2022-10-28 PROCEDURE — 3062F POS MACROALBUMINURIA REV: CPT | Mod: CPTII,S$GLB,, | Performed by: PHYSICIAN ASSISTANT

## 2022-10-28 PROCEDURE — 3288F PR FALLS RISK ASSESSMENT DOCUMENTED: ICD-10-PCS | Mod: CPTII,S$GLB,, | Performed by: PHYSICIAN ASSISTANT

## 2022-10-28 PROCEDURE — 3079F DIAST BP 80-89 MM HG: CPT | Mod: CPTII,S$GLB,, | Performed by: PHYSICIAN ASSISTANT

## 2022-10-28 PROCEDURE — 3044F HG A1C LEVEL LT 7.0%: CPT | Mod: CPTII,S$GLB,, | Performed by: PHYSICIAN ASSISTANT

## 2022-10-28 PROCEDURE — 1159F MED LIST DOCD IN RCRD: CPT | Mod: CPTII,S$GLB,, | Performed by: PHYSICIAN ASSISTANT

## 2022-10-28 PROCEDURE — 3044F PR MOST RECENT HEMOGLOBIN A1C LEVEL <7.0%: ICD-10-PCS | Mod: CPTII,S$GLB,, | Performed by: PHYSICIAN ASSISTANT

## 2022-10-28 PROCEDURE — 1160F PR REVIEW ALL MEDS BY PRESCRIBER/CLIN PHARMACIST DOCUMENTED: ICD-10-PCS | Mod: CPTII,S$GLB,, | Performed by: PHYSICIAN ASSISTANT

## 2022-10-28 PROCEDURE — 3066F NEPHROPATHY DOC TX: CPT | Mod: CPTII,S$GLB,, | Performed by: PHYSICIAN ASSISTANT

## 2022-10-28 PROCEDURE — 1101F PR PT FALLS ASSESS DOC 0-1 FALLS W/OUT INJ PAST YR: ICD-10-PCS | Mod: CPTII,S$GLB,, | Performed by: PHYSICIAN ASSISTANT

## 2022-10-28 PROCEDURE — 3066F PR DOCUMENTATION OF TREATMENT FOR NEPHROPATHY: ICD-10-PCS | Mod: CPTII,S$GLB,, | Performed by: PHYSICIAN ASSISTANT

## 2022-10-28 PROCEDURE — 1101F PT FALLS ASSESS-DOCD LE1/YR: CPT | Mod: CPTII,S$GLB,, | Performed by: PHYSICIAN ASSISTANT

## 2022-10-28 PROCEDURE — 3077F PR MOST RECENT SYSTOLIC BLOOD PRESSURE >= 140 MM HG: ICD-10-PCS | Mod: CPTII,S$GLB,, | Performed by: PHYSICIAN ASSISTANT

## 2022-10-28 PROCEDURE — 99214 PR OFFICE/OUTPT VISIT, EST, LEVL IV, 30-39 MIN: ICD-10-PCS | Mod: S$GLB,,, | Performed by: PHYSICIAN ASSISTANT

## 2022-10-28 PROCEDURE — 1125F PR PAIN SEVERITY QUANTIFIED, PAIN PRESENT: ICD-10-PCS | Mod: CPTII,S$GLB,, | Performed by: PHYSICIAN ASSISTANT

## 2022-10-28 PROCEDURE — 1125F AMNT PAIN NOTED PAIN PRSNT: CPT | Mod: CPTII,S$GLB,, | Performed by: PHYSICIAN ASSISTANT

## 2022-10-28 PROCEDURE — 3077F SYST BP >= 140 MM HG: CPT | Mod: CPTII,S$GLB,, | Performed by: PHYSICIAN ASSISTANT

## 2022-10-28 NOTE — TELEPHONE ENCOUNTER
No new care gaps identified.  [facility] Embedded Care Gaps. Reference number: [id number]. 10/28/2022   2:33:29 PM CDT

## 2022-10-31 ENCOUNTER — CLINICAL SUPPORT (OUTPATIENT)
Dept: REHABILITATION | Facility: HOSPITAL | Age: 72
End: 2022-10-31
Payer: MEDICARE

## 2022-10-31 DIAGNOSIS — R42 VERTIGO: Primary | ICD-10-CM

## 2022-10-31 PROCEDURE — 97110 THERAPEUTIC EXERCISES: CPT | Mod: PN

## 2022-10-31 PROCEDURE — 97112 NEUROMUSCULAR REEDUCATION: CPT | Mod: PN

## 2022-10-31 RX ORDER — LEVOCETIRIZINE DIHYDROCHLORIDE 5 MG/1
5 TABLET, FILM COATED ORAL NIGHTLY
Qty: 90 TABLET | Refills: 3 | OUTPATIENT
Start: 2022-10-31 | End: 2023-10-31

## 2022-10-31 NOTE — PROGRESS NOTES
[facility] [place] [facility]   Physical Therapy Treatment Note     Name: Monique Trujillo  Clinic Number: [id number]    Therapy Diagnosis:   Encounter Diagnosis   Name Primary?    Vertigo Yes     Physician: Carmine Jay,Cathy    Visit Date: 10/31/2022    Physician Orders: PT Eval and Treat   Medical Diagnosis from Referral: vertigo  Evaluation Date: 10/10/2022  Authorization Period Expiration: 12/31/2022  Plan of Care Expiration: 11/19/22  Visit # / Visits authorized: 3/ 99     Time In: 1202  Time Out: 1245  Total Billable Time: 43 minutes     Precautions:  history of anterior cervical fusion       SUBJECTIVE     Pt reports: single episode of short-duration dizziness over the weekend.    She  does not have a  home exercise program.  Response to previous treatment: no changes  Functional change: none    Pain: 0/10  Location: left knee        OBJECTIVE     Objective Measures updated at progress report unless specified.     Treatment     Monique received the treatments listed below:      therapeutic exercises to develop strength, endurance, and flexibility for 10 minutes including:    X 10 minutes SciFit (level 2) to promote flexibility prior to strength/balance/vestibular training      neuromuscular re-education activities to improve: Balance and Vestibular Hypofunction for 33 minutes. The following activities were included:    X 8 sit to stand while hold target  X 8 lean over touch stool while hold target  X 30 seconds each standing saccades = side to side, up and down  X 30 seconds each standing VOR1 = side to side, up and down  X 8 each standing bilateral move rings in rainbow pattern while hold target  X 30 seconds left foot forward step stance = eyes closed   X 15 each balance therapeutic exercise = mini squats and heel raises/ toe raises    X 25 feet each balance gait = side stepping and backwards gait    2 x 30 seconds stand on AirEx      Patient Education and Home Exercises     Home Exercises  Provided and Patient Education Provided     Education provided:   - proper therapeutic exercise technique    Written Home Exercises Provided:  to be provided at future appointment .       ASSESSMENT     Patient was able to tolerate oculomotor exercises in standing; increased repetitions performed during habituation exercises; loss of balance x 2 during left foot forward step stance with eyes closed; loss of balance x 1 while standing on AirEx; no elevation of symptoms during oculomotor exercises; single upper extremity support needed during balance therapeutic exercise and balance gait.    Monique Is progressing fairly well towards her goals.   Pt prognosis is Fair.     Pt will continue to benefit from skilled outpatient physical therapy to address the deficits listed in the problem list box on initial evaluation, provide pt/family education and to maximize pt's level of independence in the home and community environment.     Pt's spiritual, cultural and educational needs considered and pt agreeable to plan of care and goals.     Anticipated barriers to physical therapy: severity of symptoms    Goals:     Short Term Goals (3 Weeks):   Patient to tolerate use of 2# weights during lower extremity therapeutic exercise to improve overall strength. (NOT MET)  Patient to perform VOR exercises with minimal increase in symptoms. (MET)  Patient to tolerate x 45 seconds full Romberg stance, eyes open to improve upright stability. (PART MET)  Patient to perform x 10 repetitions sit to stand so that she may rise with single upper extremity support. (NOT MET)     Long Term Goals (5 Weeks):   Patient to demonstrate competence with home exercise program to maintain therapeutic gains. (NOT MET)  Patient to improve bilateral hip MMT 1/2 grade to demo strength gains from therapeutic intervention. (NOT MET)  Patient to ambulate 200 feet with rollator and stand by assist with improved dea/symmetry. (NOT MET)  Patient to report  that bending over sometimes increases her problem. (NOT MET)      PLAN     Continue to advance strength/balance/vestibular training to patient's tolerance.      Gabe Pelletier, PT

## 2022-11-01 ENCOUNTER — PES CALL (OUTPATIENT)
Dept: ADMINISTRATIVE | Facility: CLINIC | Age: 72
End: 2022-11-01
Payer: MEDICARE

## 2022-11-04 ENCOUNTER — CLINICAL SUPPORT (OUTPATIENT)
Dept: REHABILITATION | Facility: HOSPITAL | Age: 72
End: 2022-11-04
Payer: MEDICARE

## 2022-11-04 DIAGNOSIS — R42 VERTIGO: Primary | ICD-10-CM

## 2022-11-04 PROCEDURE — 97112 NEUROMUSCULAR REEDUCATION: CPT | Mod: PN

## 2022-11-04 PROCEDURE — 97110 THERAPEUTIC EXERCISES: CPT | Mod: PN

## 2022-11-04 NOTE — PROGRESS NOTES
[facility]   Physical Therapy Treatment Note     Name: Monique Trujillo  Clinic Number: [id number]    Therapy Diagnosis:   Encounter Diagnosis   Name Primary?    Vertigo Yes       Physician: Carmine Jay,*    Visit Date: 11/4/2022    Physician Orders: PT Eval and Treat   Medical Diagnosis from Referral: vertigo  Evaluation Date: 10/10/2022  Authorization Period Expiration: 12/31/2022  Plan of Care Expiration: 11/19/22  Visit # / Visits authorized: 4/ 99     Time In: 1247  Time Out: 1328  Total Billable Time: 41 minutes     Precautions:  history of anterior cervical fusion       SUBJECTIVE     Pt reports: dizziness has been ok. No episodes of dizziness in the past week. She states that her primary concern with her balance is that she cannot feel her feet.    Response to previous treatment: no changes  Functional change: none    Pain: 0/10  Location: left knee        OBJECTIVE     Objective Measures updated at progress report unless specified.     Treatment     Monique received the treatments listed below:      therapeutic exercises to develop strength, endurance, and flexibility for 10 minutes including:    X 10 minutes SciFit (level 2.5) to promote flexibility prior to strength/balance/vestibular training      neuromuscular re-education activities to improve: Balance and Vestibular Hypofunction for 31 minutes. The following activities were included:    X 10 sit to stand while tapping overhead target  X 6 lean over touch stool with tap to overhead target  X 10 lean over touch stool with tap to overhead target  X 30 seconds each standing saccades = side to side    In parallel bars:  1 x 45 seconds holds in [place] on flat surface with eyes open.Stand-by Assistance  x 30 seconds holds in [place] on flat surface with eyes closed.Stand-by Assistance with light touching to bars with upper extremities   x 30 seconds holds in [place] on compliant surface with eyes open.Stand-by  Assistance   x 30 seconds holds in [place] on compliant surface with eyes closed.Contact Guard Assistance   x 30 seconds holds in semi-tandem on flat surface with eyes open.Stand-by Assistance = 1 x each leg  x 30 seconds each standing saccades = side to side, vertical, while reading out letters on alphabet cards   2 X 30 seconds each standing VOR1 = side to side, while reading out letters on alphabet cards   1 x on airex    1 x on airex in rhomberg      Patient Education and Home Exercises     Home Exercises Provided and Patient Education Provided     Education provided:   - proper therapeutic exercise technique    Written Home Exercises Provided:  to be provided at future appointment .       ASSESSMENT     Patient tolerated treatment session well. She rated Her level of perceived exertion level on the RASTA as a 15/20 indicating that intensity was vigorous after completing 10 minutes on the SciFit. Patient has made improvements in her balance ability as demonstrated by standing 45 seconds in rhomberg stance with only stand-by assistance. She still requires Contact Guard Assistance in order to balance on compliant surface. Patient continues to require skilled intervention to address balance deficits. Continue to progress per patient tolerance.      Monique Is progressing fairly well towards her goals.   Pt prognosis is Fair.     Pt will continue to benefit from skilled outpatient physical therapy to address the deficits listed in the problem list box on initial evaluation, provide pt/family education and to maximize pt's level of independence in the home and community environment.     Pt's spiritual, cultural and educational needs considered and pt agreeable to plan of care and goals.     Anticipated barriers to physical therapy: severity of symptoms    Goals:     Short Term Goals (3 Weeks):   Patient to tolerate use of 2# weights during lower extremity therapeutic exercise to improve overall strength. (NOT  MET)  Patient to perform VOR exercises with minimal increase in symptoms. (MET)  Patient to tolerate x 45 seconds full Romberg stance, eyes open to improve upright stability. (MET-11/4/2022)  Patient to perform x 10 repetitions sit to stand so that she may rise with single upper extremity support. (NOT MET)     Long Term Goals (5 Weeks):   Patient to demonstrate competence with home exercise program to maintain therapeutic gains. (NOT MET)  Patient to improve bilateral hip MMT 1/2 grade to demo strength gains from therapeutic intervention. (NOT MET)  Patient to ambulate 200 feet with rollator and stand by assist with improved dea/symmetry. (NOT MET)  Patient to report that bending over sometimes increases her problem. (NOT MET)      PLAN     Continue to advance strength/balance/vestibular training to patient's tolerance.      Alida Jain, SPT    11/4/2022

## 2022-11-07 ENCOUNTER — CLINICAL SUPPORT (OUTPATIENT)
Dept: REHABILITATION | Facility: HOSPITAL | Age: 72
End: 2022-11-07
Payer: MEDICARE

## 2022-11-07 DIAGNOSIS — R42 VERTIGO: Primary | ICD-10-CM

## 2022-11-07 PROCEDURE — 97112 NEUROMUSCULAR REEDUCATION: CPT | Mod: PN

## 2022-11-07 PROCEDURE — 97110 THERAPEUTIC EXERCISES: CPT | Mod: PN

## 2022-11-07 NOTE — PROGRESS NOTES
[facility]   Physical Therapy Progress Note     Name: Monique Trujillo  Clinic Number: [id number]    Therapy Diagnosis:   Encounter Diagnosis   Name Primary?    Vertigo Yes     Physician: Carmine Jay,*    Visit Date: 11/7/2022    Physician Orders: PT Eval and Treat   Medical Diagnosis from Referral: vertigo  Evaluation Date: 10/10/2022  Authorization Period Expiration: 12/31/2022  Plan of Care Expiration: 11/19/22  Visit # / Visits authorized: 5/ 99     Time In: 1100  Time Out: 1145  Total Billable Time: 45 minutes     Precautions:  history of anterior cervical fusion       SUBJECTIVE     Pt reports: single episode of short-duration dizziness recently while looking up.    She  does not have a  home exercise program.  Response to previous treatment: no changes  Functional change: none    Pain: 5/10  Location: left knee        OBJECTIVE      Lower Extremity Strength  Right LE   Left LE     Hip flexion:  3+/5 Hip flexion: 3+/5   Knee extension: 4-/5 Knee extension: 4-/5   Ankle dorsiflexion:  4-/5 Ankle dorsiflexion: 4-/5         Gait Assessment:(if indicated)  - AD used: rollator  - Assistance: stand by assist   - Distance: 120 feet       Treatment     Monique received the treatments listed below:      therapeutic exercises to develop strength, endurance, and flexibility for 10 minutes including:    X 10 minutes SciFit (level 2) to promote flexibility prior to strength/balance/vestibular training      neuromuscular re-education activities to improve: Balance and Vestibular Hypofunction for 35 minutes. The following activities were included:    Joint Position Error Test = 4-7 cm error left side, 7-11 cm error right side  X 10 sit to stand while hold target  X 10 lean over touch stool while hold target  X 45 seconds each standing saccades = side to side, up and down  X 45 seconds each standing VOR1 = side to side, up and down  X 45 seconds 50% Romberg stance = eyes open  2 X 15  seconds full Romberg stance = eyes closed   X 15 each balance therapeutic exercise = mini squats and heel raises/ toe raises    X 25 feet each balance gait = side stepping and backwards gait      Patient Education and Home Exercises     Home Exercises Provided and Patient Education Provided     Education provided:   - proper therapeutic exercise technique    Written Home Exercises Provided:  to be provided at future appointment .       ASSESSMENT     Patient was able to perform increased repetitions performed during habituation exercises; frequent loss of balance and eye opening during full Romberg stance, eyes closed; single episode of symptom elevation during standing VOR1, up and down; single upper extremity support needed during balance therapeutic exercise and balance gait; possible right sided cervical spine proprioceptive dysfunction (based on Joint Position Error Test); hands placed on bilateral thighs during sit to stand; increased stance time performed during oculomotor exercises.    Monique Is progressing fairly well towards her goals.   Pt prognosis is Fair.     Pt will continue to benefit from skilled outpatient physical therapy to address the deficits listed in the problem list box on initial evaluation, provide pt/family education and to maximize pt's level of independence in the home and community environment.     Pt's spiritual, cultural and educational needs considered and pt agreeable to plan of care and goals.     Anticipated barriers to physical therapy: severity of symptoms    Goals:     Short Term Goals (3 Weeks):   Patient to tolerate use of 2# weights during lower extremity therapeutic exercise to improve overall strength. (NOT MET)  Patient to perform VOR exercises with minimal increase in symptoms. (MET)  Patient to tolerate x 45 seconds full Romberg stance, eyes open to improve upright stability. (PART MET)  Patient to perform x 10 repetitions sit to stand so that she may rise with  single upper extremity support. (MET)     Long Term Goals (5 Weeks):   Patient to demonstrate competence with home exercise program to maintain therapeutic gains. (NOT MET)  Patient to improve bilateral hip MMT 1/2 grade to demo strength gains from therapeutic intervention. (NOT MET)  Patient to ambulate 200 feet with rollator and stand by assist with improved dea/symmetry. (NOT MET)  Patient to report that bending over sometimes increases her problem. (NOT MET)      PLAN     Continue to advance strength/balance/vestibular training to patient's tolerance.      Gabe Pelletier, PT

## 2022-11-14 ENCOUNTER — CLINICAL SUPPORT (OUTPATIENT)
Dept: REHABILITATION | Facility: HOSPITAL | Age: 72
End: 2022-11-14
Payer: MEDICARE

## 2022-11-14 DIAGNOSIS — R42 VERTIGO: Primary | ICD-10-CM

## 2022-11-14 PROCEDURE — 97110 THERAPEUTIC EXERCISES: CPT | Mod: PN

## 2022-11-14 PROCEDURE — 97112 NEUROMUSCULAR REEDUCATION: CPT | Mod: PN

## 2022-11-14 NOTE — PROGRESS NOTES
[facility]   Physical Therapy Treatment Note     Name: Monique Trujillo  Clinic Number: [id number]    Therapy Diagnosis:   Encounter Diagnosis   Name Primary?    Vertigo Yes     Physician: Carmine Jay,*    Visit Date: 11/14/2022    Physician Orders: PT Eval and Treat   Medical Diagnosis from Referral: vertigo  Evaluation Date: 10/10/2022  Authorization Period Expiration: 12/31/2022  Plan of Care Expiration: 11/19/22  Visit # / Visits authorized: 6/ 99     Time In: 1120  Time Out: 1202  Total Billable Time: 42 minutes     Precautions:  history of anterior cervical fusion       SUBJECTIVE     Pt reports: that she had to cancel her last appointment due to increased lightheadedness; also reports elevated pain levels in her left shoulder.    She  does not have a  home exercise program.  Response to previous treatment: increased pain left shoulder  Functional change: none    Pain: 4/10; 8/10  Location: left knee; left shoulder      OBJECTIVE      n/a    Treatment     Monique received the treatments listed below:      therapeutic exercises to develop strength, endurance, and flexibility for 10 minutes including:    X 10 minutes SciFit (level 3) to promote flexibility prior to strength/balance/vestibular training      neuromuscular re-education activities to improve: Balance and Vestibular Hypofunction for 32 minutes. The following activities were included:    Joint Position Error Test = 4-7 cm error left side, 7-11 cm error right side  X 10 sit to stand while hold target  X 10 lean over touch stool while hold target  X 30 seconds each standing saccades on AirEx = side to side, up and down  X 30 seconds each standing VOR1 on AirEx = side to side, up and down  X 30 seconds full Romberg stance on AirEx = eyes open  3 X 15 seconds full Romberg stance = eyes closed   X 15 each balance therapeutic exercise = mini squats and heel raises/ toe raises    X 25 feet each balance gait = side  stepping and backwards gait      Patient Education and Home Exercises     Home Exercises Provided and Patient Education Provided     Education provided:   - proper therapeutic exercise technique    Written Home Exercises Provided:  to be provided at future appointment .       ASSESSMENT     Patient was able to perform increased repetitions performed during habituation exercises; frequent loss of balance and eye opening during full Romberg stance, eyes closed; single episode of difficulty focusing on task at hand while performing up/down VOR1 on AirEx - verbal cues needed to keep her head moving; able to perform adaptation exercises while standing on AirEx; increased repetitions performed during full Romberg stance, eyes closed; able to perform full Romberg stance, eyes open on AirEx without loss of balance.    Monique Is progressing fairly well towards her goals.   Pt prognosis is Fair.     Pt will continue to benefit from skilled outpatient physical therapy to address the deficits listed in the problem list box on initial evaluation, provide pt/family education and to maximize pt's level of independence in the home and community environment.     Pt's spiritual, cultural and educational needs considered and pt agreeable to plan of care and goals.     Anticipated barriers to physical therapy: severity of symptoms    Goals:     Short Term Goals (3 Weeks):   Patient to tolerate use of 2# weights during lower extremity therapeutic exercise to improve overall strength. (NOT MET)  Patient to perform VOR exercises with minimal increase in symptoms. (MET)  Patient to tolerate x 45 seconds full Romberg stance, eyes open to improve upright stability. (PART MET)  Patient to perform x 10 repetitions sit to stand so that she may rise with single upper extremity support. (MET)     Long Term Goals (5 Weeks):   Patient to demonstrate competence with home exercise program to maintain therapeutic gains. (NOT MET)  Patient to improve  bilateral hip MMT 1/2 grade to demo strength gains from therapeutic intervention. (NOT MET)  Patient to ambulate 200 feet with rollator and stand by assist with improved dea/symmetry. (NOT MET)  Patient to report that bending over sometimes increases her problem. (NOT MET)      PLAN     Continue to advance strength/balance/vestibular training to patient's tolerance.      Gabe Pelletier, PT

## 2022-11-17 ENCOUNTER — CLINICAL SUPPORT (OUTPATIENT)
Dept: REHABILITATION | Facility: HOSPITAL | Age: 72
End: 2022-11-17
Payer: MEDICARE

## 2022-11-17 DIAGNOSIS — R42 VERTIGO: Primary | ICD-10-CM

## 2022-11-17 PROCEDURE — 97110 THERAPEUTIC EXERCISES: CPT | Mod: PN

## 2022-11-17 PROCEDURE — 97112 NEUROMUSCULAR REEDUCATION: CPT | Mod: PN

## 2022-11-17 NOTE — PROGRESS NOTES
[facility]   Physical Therapy Discharge Note    Name: Monique Trujillo  Clinic Number: [id number]    Therapy Diagnosis:   Encounter Diagnosis   Name Primary?    Vertigo Yes     Physician: Carmine Jay,*    Visit Date: 11/17/2022    Physician Orders: PT Eval and Treat   Medical Diagnosis from Referral: vertigo  Evaluation Date: 10/10/2022    Date of Last visit: 11/17/22  Total Visits Received: 8     Time In: 1117  Time Out: 1200  Total Billable Time: 43 minutes     Precautions:  history of anterior cervical fusion       SUBJECTIVE     Pt reports: general fatigue today; also continues to report elevated pain levels in her left shoulder.    She  does not have a  home exercise program.  Response to previous treatment: increased pain left shoulder  Functional change: none    Pain: 1/10; 6-7/10  Location: left knee and low back; left shoulder      OBJECTIVE      n/a    Treatment     Monique received the treatments listed below:      therapeutic exercises to develop strength, endurance, and flexibility for 10 minutes including:    X 10 minutes SciFit (level 2.5) to promote flexibility prior to strength/balance/vestibular training      neuromuscular re-education activities to improve: Balance and Vestibular Hypofunction for 30 minutes. The following activities were included:    X 10 sit to stand while hold target  X 10 lean over touch stool while hold target  X 45 seconds each standing saccades = side to side, up and down  X 45 seconds each standing VOR1 = side to side, up and down*  Count backwards from 77 to 55 while  full Romberg stance = eyes open   Provided patient with oculomotor home exercise program - instructed to perform twice a day       Patient Education and Home Exercises     Home Exercises Provided and Patient Education Provided     Education provided:   - proper therapeutic exercise technique    Written Home Exercises Provided:  to be provided at future  appointment .       ASSESSMENT     Patient reported minimal elevation of symptoms during standing VOR1, up and down; double upper extremity support needed during sit to stand; independent with home exercise program.    Monique has progressed fairly well towards her goals.     Pt's spiritual, cultural and educational needs considered and pt agreeable to plan of care and goals.     Goals:     Short Term Goals (3 Weeks):   Patient to tolerate use of 2# weights during lower extremity therapeutic exercise to improve overall strength. (NOT MET)  Patient to perform VOR exercises with minimal increase in symptoms. (MET)  Patient to tolerate x 45 seconds full Romberg stance, eyes open to improve upright stability. (PART MET)  Patient to perform x 10 repetitions sit to stand so that she may rise with single upper extremity support. (MET)     Long Term Goals (5 Weeks):   Patient to demonstrate competence with home exercise program to maintain therapeutic gains. (NOT MET)  Patient to improve bilateral hip MMT 1/2 grade to demo strength gains from therapeutic intervention. (NOT MET)  Patient to ambulate 200 feet with rollator and stand by assist with improved dea/symmetry. (NOT MET)  Patient to report that bending over sometimes increases her problem. (MET)    Discharge reason: Patient has reached the maximum rehab potential for the present time.    Discharge FOTO Score: 43/67      PLAN     This patient is discharged from Physical Therapy.      Gabe Pelletier, PT

## 2022-11-29 ENCOUNTER — TELEPHONE (OUTPATIENT)
Dept: FAMILY MEDICINE | Facility: CLINIC | Age: 72
End: 2022-11-29
Payer: MEDICARE

## 2022-11-30 ENCOUNTER — TELEPHONE (OUTPATIENT)
Dept: FAMILY MEDICINE | Facility: CLINIC | Age: 72
End: 2022-11-30
Payer: MEDICARE

## 2022-12-02 ENCOUNTER — LAB VISIT (OUTPATIENT)
Dept: LAB | Facility: HOSPITAL | Age: 72
End: 2022-12-02
Attending: STUDENT IN AN ORGANIZED HEALTH CARE EDUCATION/TRAINING PROGRAM
Payer: MEDICARE

## 2022-12-02 ENCOUNTER — OFFICE VISIT (OUTPATIENT)
Dept: FAMILY MEDICINE | Facility: CLINIC | Age: 72
End: 2022-12-02
Payer: MEDICARE

## 2022-12-02 VITALS
HEIGHT: 61 IN | RESPIRATION RATE: 16 BRPM | BODY MASS INDEX: 42.46 KG/M2 | DIASTOLIC BLOOD PRESSURE: 72 MMHG | SYSTOLIC BLOOD PRESSURE: 124 MMHG | WEIGHT: 224.88 LBS | TEMPERATURE: 99 F

## 2022-12-02 DIAGNOSIS — Z00.00 HEALTHCARE MAINTENANCE: Primary | ICD-10-CM

## 2022-12-02 DIAGNOSIS — F33.40 RECURRENT MAJOR DEPRESSIVE DISORDER, IN REMISSION: ICD-10-CM

## 2022-12-02 DIAGNOSIS — E11.69 HYPERLIPIDEMIA ASSOCIATED WITH TYPE 2 DIABETES MELLITUS: ICD-10-CM

## 2022-12-02 DIAGNOSIS — E11.59 HYPERTENSION ASSOCIATED WITH DIABETES: ICD-10-CM

## 2022-12-02 DIAGNOSIS — I15.2 HYPERTENSION ASSOCIATED WITH DIABETES: ICD-10-CM

## 2022-12-02 DIAGNOSIS — E11.65 TYPE 2 DIABETES MELLITUS WITH HYPERGLYCEMIA, WITHOUT LONG-TERM CURRENT USE OF INSULIN: ICD-10-CM

## 2022-12-02 DIAGNOSIS — E66.01 MORBID OBESITY: ICD-10-CM

## 2022-12-02 DIAGNOSIS — E78.5 HYPERLIPIDEMIA ASSOCIATED WITH TYPE 2 DIABETES MELLITUS: ICD-10-CM

## 2022-12-02 LAB
ALBUMIN SERPL BCP-MCNC: 4.1 G/DL (ref 3.5–5.2)
ALP SERPL-CCNC: 51 U/L (ref 55–135)
ALT SERPL W/O P-5'-P-CCNC: 15 U/L (ref 10–44)
ANION GAP SERPL CALC-SCNC: 12 MMOL/L (ref 8–16)
AST SERPL-CCNC: 23 U/L (ref 10–40)
BILIRUB SERPL-MCNC: 0.5 MG/DL (ref 0.1–1)
BUN SERPL-MCNC: 16 MG/DL (ref 8–23)
CALCIUM SERPL-MCNC: 10 MG/DL (ref 8.7–10.5)
CHLORIDE SERPL-SCNC: 102 MMOL/L (ref 95–110)
CO2 SERPL-SCNC: 25 MMOL/L (ref 23–29)
CREAT SERPL-MCNC: 1.1 MG/DL (ref 0.5–1.4)
EST. GFR  (NO RACE VARIABLE): 53.1 ML/MIN/1.73 M^2
ESTIMATED AVG GLUCOSE: 105 MG/DL (ref 68–131)
GLUCOSE SERPL-MCNC: 91 MG/DL (ref 70–110)
HBA1C MFR BLD: 5.3 % (ref 4–5.6)
POTASSIUM SERPL-SCNC: 4.1 MMOL/L (ref 3.5–5.1)
PROT SERPL-MCNC: 7.6 G/DL (ref 6–8.4)
SODIUM SERPL-SCNC: 139 MMOL/L (ref 136–145)

## 2022-12-02 PROCEDURE — 99999 PR PBB SHADOW E&M-EST. PATIENT-LVL IV: ICD-10-PCS | Mod: PBBFAC,,, | Performed by: STUDENT IN AN ORGANIZED HEALTH CARE EDUCATION/TRAINING PROGRAM

## 2022-12-02 PROCEDURE — 3074F SYST BP LT 130 MM HG: CPT | Mod: CPTII,S$GLB,, | Performed by: STUDENT IN AN ORGANIZED HEALTH CARE EDUCATION/TRAINING PROGRAM

## 2022-12-02 PROCEDURE — 3074F PR MOST RECENT SYSTOLIC BLOOD PRESSURE < 130 MM HG: ICD-10-PCS | Mod: CPTII,S$GLB,, | Performed by: STUDENT IN AN ORGANIZED HEALTH CARE EDUCATION/TRAINING PROGRAM

## 2022-12-02 PROCEDURE — 3044F PR MOST RECENT HEMOGLOBIN A1C LEVEL <7.0%: ICD-10-PCS | Mod: CPTII,S$GLB,, | Performed by: STUDENT IN AN ORGANIZED HEALTH CARE EDUCATION/TRAINING PROGRAM

## 2022-12-02 PROCEDURE — 1126F AMNT PAIN NOTED NONE PRSNT: CPT | Mod: CPTII,S$GLB,, | Performed by: STUDENT IN AN ORGANIZED HEALTH CARE EDUCATION/TRAINING PROGRAM

## 2022-12-02 PROCEDURE — 99214 OFFICE O/P EST MOD 30 MIN: CPT | Mod: 25,S$GLB,, | Performed by: STUDENT IN AN ORGANIZED HEALTH CARE EDUCATION/TRAINING PROGRAM

## 2022-12-02 PROCEDURE — 3062F POS MACROALBUMINURIA REV: CPT | Mod: CPTII,S$GLB,, | Performed by: STUDENT IN AN ORGANIZED HEALTH CARE EDUCATION/TRAINING PROGRAM

## 2022-12-02 PROCEDURE — 83036 HEMOGLOBIN GLYCOSYLATED A1C: CPT | Performed by: STUDENT IN AN ORGANIZED HEALTH CARE EDUCATION/TRAINING PROGRAM

## 2022-12-02 PROCEDURE — 3078F DIAST BP <80 MM HG: CPT | Mod: CPTII,S$GLB,, | Performed by: STUDENT IN AN ORGANIZED HEALTH CARE EDUCATION/TRAINING PROGRAM

## 2022-12-02 PROCEDURE — 3044F HG A1C LEVEL LT 7.0%: CPT | Mod: CPTII,S$GLB,, | Performed by: STUDENT IN AN ORGANIZED HEALTH CARE EDUCATION/TRAINING PROGRAM

## 2022-12-02 PROCEDURE — 1101F PT FALLS ASSESS-DOCD LE1/YR: CPT | Mod: CPTII,S$GLB,, | Performed by: STUDENT IN AN ORGANIZED HEALTH CARE EDUCATION/TRAINING PROGRAM

## 2022-12-02 PROCEDURE — 3008F BODY MASS INDEX DOCD: CPT | Mod: CPTII,S$GLB,, | Performed by: STUDENT IN AN ORGANIZED HEALTH CARE EDUCATION/TRAINING PROGRAM

## 2022-12-02 PROCEDURE — 1101F PR PT FALLS ASSESS DOC 0-1 FALLS W/OUT INJ PAST YR: ICD-10-PCS | Mod: CPTII,S$GLB,, | Performed by: STUDENT IN AN ORGANIZED HEALTH CARE EDUCATION/TRAINING PROGRAM

## 2022-12-02 PROCEDURE — 3288F PR FALLS RISK ASSESSMENT DOCUMENTED: ICD-10-PCS | Mod: CPTII,S$GLB,, | Performed by: STUDENT IN AN ORGANIZED HEALTH CARE EDUCATION/TRAINING PROGRAM

## 2022-12-02 PROCEDURE — 1159F PR MEDICATION LIST DOCUMENTED IN MEDICAL RECORD: ICD-10-PCS | Mod: CPTII,S$GLB,, | Performed by: STUDENT IN AN ORGANIZED HEALTH CARE EDUCATION/TRAINING PROGRAM

## 2022-12-02 PROCEDURE — 3288F FALL RISK ASSESSMENT DOCD: CPT | Mod: CPTII,S$GLB,, | Performed by: STUDENT IN AN ORGANIZED HEALTH CARE EDUCATION/TRAINING PROGRAM

## 2022-12-02 PROCEDURE — 3066F NEPHROPATHY DOC TX: CPT | Mod: CPTII,S$GLB,, | Performed by: STUDENT IN AN ORGANIZED HEALTH CARE EDUCATION/TRAINING PROGRAM

## 2022-12-02 PROCEDURE — 3066F PR DOCUMENTATION OF TREATMENT FOR NEPHROPATHY: ICD-10-PCS | Mod: CPTII,S$GLB,, | Performed by: STUDENT IN AN ORGANIZED HEALTH CARE EDUCATION/TRAINING PROGRAM

## 2022-12-02 PROCEDURE — 1126F PR PAIN SEVERITY QUANTIFIED, NO PAIN PRESENT: ICD-10-PCS | Mod: CPTII,S$GLB,, | Performed by: STUDENT IN AN ORGANIZED HEALTH CARE EDUCATION/TRAINING PROGRAM

## 2022-12-02 PROCEDURE — 3008F PR BODY MASS INDEX (BMI) DOCUMENTED: ICD-10-PCS | Mod: CPTII,S$GLB,, | Performed by: STUDENT IN AN ORGANIZED HEALTH CARE EDUCATION/TRAINING PROGRAM

## 2022-12-02 PROCEDURE — G0008 FLU VACCINE - QUADRIVALENT - ADJUVANTED: ICD-10-PCS | Mod: S$GLB,,, | Performed by: STUDENT IN AN ORGANIZED HEALTH CARE EDUCATION/TRAINING PROGRAM

## 2022-12-02 PROCEDURE — 80053 COMPREHEN METABOLIC PANEL: CPT | Performed by: STUDENT IN AN ORGANIZED HEALTH CARE EDUCATION/TRAINING PROGRAM

## 2022-12-02 PROCEDURE — 99214 PR OFFICE/OUTPT VISIT, EST, LEVL IV, 30-39 MIN: ICD-10-PCS | Mod: 25,S$GLB,, | Performed by: STUDENT IN AN ORGANIZED HEALTH CARE EDUCATION/TRAINING PROGRAM

## 2022-12-02 PROCEDURE — 90694 VACC AIIV4 NO PRSRV 0.5ML IM: CPT | Mod: S$GLB,,, | Performed by: STUDENT IN AN ORGANIZED HEALTH CARE EDUCATION/TRAINING PROGRAM

## 2022-12-02 PROCEDURE — G0008 ADMIN INFLUENZA VIRUS VAC: HCPCS | Mod: S$GLB,,, | Performed by: STUDENT IN AN ORGANIZED HEALTH CARE EDUCATION/TRAINING PROGRAM

## 2022-12-02 PROCEDURE — 3078F PR MOST RECENT DIASTOLIC BLOOD PRESSURE < 80 MM HG: ICD-10-PCS | Mod: CPTII,S$GLB,, | Performed by: STUDENT IN AN ORGANIZED HEALTH CARE EDUCATION/TRAINING PROGRAM

## 2022-12-02 PROCEDURE — 99999 PR PBB SHADOW E&M-EST. PATIENT-LVL IV: CPT | Mod: PBBFAC,,, | Performed by: STUDENT IN AN ORGANIZED HEALTH CARE EDUCATION/TRAINING PROGRAM

## 2022-12-02 PROCEDURE — 36415 COLL VENOUS BLD VENIPUNCTURE: CPT | Mod: PO | Performed by: STUDENT IN AN ORGANIZED HEALTH CARE EDUCATION/TRAINING PROGRAM

## 2022-12-02 PROCEDURE — 3062F PR POS MACROALBUMINURIA RESULT DOCUMENTED/REVIEW: ICD-10-PCS | Mod: CPTII,S$GLB,, | Performed by: STUDENT IN AN ORGANIZED HEALTH CARE EDUCATION/TRAINING PROGRAM

## 2022-12-02 PROCEDURE — 90694 FLU VACCINE - QUADRIVALENT - ADJUVANTED: ICD-10-PCS | Mod: S$GLB,,, | Performed by: STUDENT IN AN ORGANIZED HEALTH CARE EDUCATION/TRAINING PROGRAM

## 2022-12-02 PROCEDURE — 1159F MED LIST DOCD IN RCRD: CPT | Mod: CPTII,S$GLB,, | Performed by: STUDENT IN AN ORGANIZED HEALTH CARE EDUCATION/TRAINING PROGRAM

## 2022-12-02 RX ORDER — LANCETS
1 EACH MISCELLANEOUS 2 TIMES DAILY WITH MEALS
Qty: 100 EACH | Refills: 11 | Status: SHIPPED | OUTPATIENT
Start: 2022-12-02

## 2022-12-02 RX ORDER — LANCING DEVICE
1 EACH MISCELLANEOUS 2 TIMES DAILY WITH MEALS
Qty: 1 EACH | Refills: 0 | Status: SHIPPED | OUTPATIENT
Start: 2022-12-02

## 2022-12-02 RX ORDER — PEN NEEDLE, DIABETIC 30 GX3/16"
1 NEEDLE, DISPOSABLE MISCELLANEOUS 2 TIMES DAILY WITH MEALS
Qty: 100 EACH | Refills: 11 | Status: SHIPPED | OUTPATIENT
Start: 2022-12-02

## 2022-12-02 RX ORDER — INSULIN PUMP SYRINGE, 3 ML
EACH MISCELLANEOUS
Qty: 1 EACH | Refills: 0 | Status: SHIPPED | OUTPATIENT
Start: 2022-12-02

## 2022-12-02 NOTE — PROGRESS NOTES
[facility] Note    Subjective:    The HPI and pertinent ROS is included in the Diagnostic Impression Remarks section at the end of the note. Please see below for further details. Chief complaint is at end of note.     Monique is a pleasant intelligent patient who is here for evaluation.     Modified Medications    No medications on file       Data reviewed 274}  Previous medical records reviewed and summarized in plan section at end of note.      If you are due for any health screening(s) below please notify me so we can arrange them to be ordered and scheduled. Most healthy patients at your age complete them, but you are free to accept or refuse. If you can't do it, I'll definitely understand. If you can, I'd certainly appreciate it!     All of your core healthy metrics are met.      The following portions of the patient's history were reviewed and updated as appropriate: allergies, current medications, past family history, past medical history, past social history, past surgical history and problem list.    She  has a past medical history of Allergy, Arthritis, Chronic lower back pain, Depression, Fever blister, GERD (gastroesophageal reflux disease), Hemorrhoids, internal (11/05/2014), Hyperlipidemia, Hypertension, Joint pain, Morbid obesity with BMI of 40.0-44.9, adult (02/24/2015), Multiple gastric ulcers (12/14/2017), TIMMY (obstructive sleep apnea), Pulmonary hypertension, Skin cancer, and Type 2 diabetes mellitus with hyperglycemia, without long-term current use of insulin (4/20/2022).  She  has a past surgical history that includes Knee arthroscopy w/ meniscal repair (Right, 2012); Tubal ligation (1980); Colonoscopy (N/A, 11/02/2016); Anterior cervical corpectomy w/ fusion (1988); Lumbar laminectomy (06/19/2015); Total hip arthroplasty (Right, 04/28/2016); Skin cancer excision; Epidural steroid injection into lumbar spine (N/A, 11/08/2018); Caudal epidural steroid injection (N/A,  01/10/2019); Injection of anesthetic agent around genitofemoral nerve (Bilateral, 08/01/2019); Fusion of spine with instrumentation (Left, 01/03/2020); Joint replacement; Back surgery; Epidural steroid injection (N/A, 05/18/2022); Reverse total shoulder arthroplasty (Right, 05/25/2022); and Esophagogastroduodenoscopy (N/A, 10/6/2022).    She  reports that she has never smoked. She has never been exposed to tobacco smoke. She has never used smokeless tobacco. She reports that she does not drink alcohol and does not use drugs.  She family history includes Alzheimer's disease in her maternal grandfather; Arthritis in her mother; Breast cancer in her mother; Cancer in her mother and paternal grandmother; Colon cancer in her paternal grandmother; Kidney disease in her father.    Review of patient's allergies indicates:   Allergen Reactions    Metformin Other (See Comments)     Diarrhea        Tobacco Use: Low Risk     Smoking Tobacco Use: Never    Smokeless Tobacco Use: Never    Passive Exposure: Never     Physical Examination  General appearance: alert, cooperative, no distress  Neck: no thyromegaly, no neck stiffness  Lungs: clear to auscultation, no wheezes, rales or rhonchi, symmetric air entry  Heart: normal rate, regular rhythm, normal S1, S2, no murmurs, rubs, clicks or gallops  Abdomen: soft, nontender, nondistended, no rigidity, rebound, or guarding.   Back: no point tenderness over spine  Extremities: peripheral pulses normal, no unilateral leg swelling or calf tenderness   Neurological:alert, oriented, normal speech, no new focal findings or movement disorder noted from baseline    BP Readings from Last 3 Encounters:   12/02/22 124/72   10/28/22 (!) 143/89   10/06/22 (!) 142/71     Wt Readings from Last 3 Encounters:   12/02/22 102 kg (224 lb 13.9 oz)   10/28/22 104.5 kg (230 lb 6.1 oz)   10/06/22 104.3 kg (230 lb)     /72 (BP Location: Right arm, Patient Position: Sitting, BP Method: Large (Manual))    "Temp 98.8 °F (37.1 °C) (Oral)   Resp 16   Ht 5' 1" (1.549 m)   Wt 102 kg (224 lb 13.9 oz)   LMP  (LMP Unknown)   BMI 42.49 kg/m²    274}  Laboratory: I have reviewed old labs below:    274}    Lab Results   Component Value Date    WBC 5.59 05/05/2022    HGB 12.9 05/05/2022    HCT 41.7 05/05/2022    MCV 97 05/05/2022     05/05/2022     05/05/2022    K 4.0 05/05/2022     05/05/2022    CALCIUM 9.5 05/05/2022    PHOS 3.3 05/02/2018    CO2 26 05/05/2022     (H) 05/05/2022    BUN 20 05/05/2022    CREATININE 1.1 09/23/2022    ANIONGAP 10 05/05/2022    PROT 7.3 05/05/2022    ALBUMIN 3.9 05/05/2022    BILITOT 0.4 05/05/2022    ALKPHOS 54 (L) 05/05/2022    ALT 15 05/05/2022    AST 21 05/05/2022    INR 0.9 12/12/2019    CHOL 158 04/20/2022    TRIG 117 04/20/2022    HDL 55 04/20/2022    LDLCALC 79.6 04/20/2022    TSH 2.249 04/20/2022    HGBA1C 6.5 (H) 04/20/2022     Lab reviewed by me: Particular labs of significance that I will monitor, workup, or treat to improve are mentioned below in diagnostic impression remarks.    Imaging/EKG: I have reviewed the pertinent results and my findings are noted in remarks.  274}    CC:   Chief Complaint   Patient presents with    Diabetes    Follow-up    Hypertension        274}    Assessment/Plan  Monique Trujillo is a 72 y.o. female who presents to clinic with:  1. Healthcare maintenance    2. Type 2 diabetes mellitus with hyperglycemia, without long-term current use of insulin    3. Hyperlipidemia associated with type 2 diabetes mellitus    4. Recurrent major depressive disorder, in remission    5. Morbid obesity       274}  Diagnostic Impression Remarks + HPI     Documentation entered by me for this encounter may have been done in part using speech-recognition technology. Although I have made an effort to ensure accuracy, "sound like" errors may exist and should be interpreted in context.     Diabetes stable recheck A1c is on G LP 1 agonist did not " tolerate metformin also get a glucose meter as well     hyperlipidemia well controlled continue statin monitor    depression-well controlled continue current meds no SI or plan    Morbid obesity-needs improvement did lose some weight with G LP 1 agonist continue monitor recommend healthy diet activity    Hypertension well controlled continue current meds    Health maintenance recommend flu shot  This is the extent of this pleasant patient's concerns at this present time. She did not feel chest pain upon exertion, dyspnea, nausea, vomiting, diaphoresis, or syncope. No pleuritic chest pain, unilateral leg swelling, calf tenderness, or calf pain. Negative for unintentional weight loss night sweats and fevers. Monique will return to clinic in a few months for further workup and reassessment or sooner as needed. She was instructed to call the clinic or go to the emergency department or urgent care immediately if her symptoms do not improve, worsens, or if any new symptoms develop. As we discussed that symptoms could worsen over the next 24 hours she was advised that if any increased swelling, pain, or numbness arise to go immediately to the ED. Patient knows to call any time if an emergency arises. Shared decision making occurred and she verbalized understanding in agreement with this plan. I discussed imaging findings, diagnosis, possibilities, treatment options, medications, risks, and benefits. She had many questions regarding the options and long-term effects. All questions were answered. She expressed understanding after counseling regarding the diagnosis and recommendations. She was capable and demonstrated competence with understanding of these options. Shared decision making was performed resulting in her choosing the current treatment plan. Patient handout was given with instructions and recommendations. Advised the patient that if they become pregnant to alert us immediately to assess for medication changes. I  "also discussed the importance of close follow up to discuss labs, change or modify her medications if needed, monitor side effects, and further evaluation of medical problems.     Additional workup planned: see labs ordered below.    See below for labs and meds ordered with associated diagnosis      1. Healthcare maintenance    2. Type 2 diabetes mellitus with hyperglycemia, without long-term current use of insulin  - Hemoglobin A1C; Future  - COMPREHENSIVE METABOLIC PANEL; Future  - blood-glucose meter kit; Use as instructed  Dispense: 1 each; Refill: 0  - blood sugar diagnostic Strp; 1 strip by Misc.(Non-Drug; Combo Route) route 2 (two) times daily with meals.  Dispense: 100 strip; Refill: 11  - lancing device Misc; 1 Device by Misc.(Non-Drug; Combo Route) route 2 (two) times daily with meals.  Dispense: 1 each; Refill: 0  - lancets Misc; 1 lancet by Misc.(Non-Drug; Combo Route) route 2 (two) times daily with meals.  Dispense: 100 each; Refill: 11  - pen needle, diabetic 31 gauge x 5/16" Ndle; 1 each by Misc.(Non-Drug; Combo Route) route 2 (two) times daily with meals.  Dispense: 100 each; Refill: 11    3. Hyperlipidemia associated with type 2 diabetes mellitus    4. Recurrent major depressive disorder, in remission    5. Morbid obesity    Milton Mahmood MD   274}    If you are due for any health screening(s) below please notify me so we can arrange them to be ordered and scheduled. Most healthy patients at your age complete them, but you are free to accept or refuse.     If you can't do it, I'll definitely understand. If you can, I'd certainly appreciate it!   All of your core healthy metrics are met.            "

## 2022-12-15 DIAGNOSIS — Z96.611 S/P REVERSE TOTAL SHOULDER ARTHROPLASTY, RIGHT: Primary | ICD-10-CM

## 2023-01-30 ENCOUNTER — OFFICE VISIT (OUTPATIENT)
Dept: ORTHOPEDICS | Facility: CLINIC | Age: 73
End: 2023-01-30
Payer: MEDICARE

## 2023-01-30 ENCOUNTER — HOSPITAL ENCOUNTER (OUTPATIENT)
Dept: RADIOLOGY | Facility: HOSPITAL | Age: 73
Discharge: HOME | End: 2023-01-30
Attending: ORTHOPAEDIC SURGERY
Payer: MEDICARE

## 2023-01-30 VITALS — WEIGHT: 224.88 LBS | HEIGHT: 61 IN | BODY MASS INDEX: 42.46 KG/M2 | RESPIRATION RATE: 18 BRPM

## 2023-01-30 DIAGNOSIS — Z96.611 S/P REVERSE TOTAL SHOULDER ARTHROPLASTY, RIGHT: ICD-10-CM

## 2023-01-30 DIAGNOSIS — M25.562 ACUTE PAIN OF LEFT KNEE: ICD-10-CM

## 2023-01-30 DIAGNOSIS — Z96.611 S/P REVERSE TOTAL SHOULDER ARTHROPLASTY, RIGHT: Primary | ICD-10-CM

## 2023-01-30 PROCEDURE — 1126F AMNT PAIN NOTED NONE PRSNT: CPT | Mod: CPTII,S$GLB,, | Performed by: ORTHOPAEDIC SURGERY

## 2023-01-30 PROCEDURE — 73030 X-RAY EXAM OF SHOULDER: CPT | Mod: TC,PN,RT

## 2023-01-30 PROCEDURE — 99999 PR PBB SHADOW E&M-EST. PATIENT-LVL IV: CPT | Mod: PBBFAC,,, | Performed by: ORTHOPAEDIC SURGERY

## 2023-01-30 PROCEDURE — 1126F PR PAIN SEVERITY QUANTIFIED, NO PAIN PRESENT: ICD-10-PCS | Mod: CPTII,S$GLB,, | Performed by: ORTHOPAEDIC SURGERY

## 2023-01-30 PROCEDURE — 99214 OFFICE O/P EST MOD 30 MIN: CPT | Mod: 25,S$GLB,, | Performed by: ORTHOPAEDIC SURGERY

## 2023-01-30 PROCEDURE — 3288F PR FALLS RISK ASSESSMENT DOCUMENTED: ICD-10-PCS | Mod: CPTII,S$GLB,, | Performed by: ORTHOPAEDIC SURGERY

## 2023-01-30 PROCEDURE — 20610 DRAIN/INJ JOINT/BURSA W/O US: CPT | Mod: LT,S$GLB,, | Performed by: ORTHOPAEDIC SURGERY

## 2023-01-30 PROCEDURE — 99214 PR OFFICE/OUTPT VISIT, EST, LEVL IV, 30-39 MIN: ICD-10-PCS | Mod: 25,S$GLB,, | Performed by: ORTHOPAEDIC SURGERY

## 2023-01-30 PROCEDURE — 1160F RVW MEDS BY RX/DR IN RCRD: CPT | Mod: CPTII,S$GLB,, | Performed by: ORTHOPAEDIC SURGERY

## 2023-01-30 PROCEDURE — 1101F PR PT FALLS ASSESS DOC 0-1 FALLS W/OUT INJ PAST YR: ICD-10-PCS | Mod: CPTII,S$GLB,, | Performed by: ORTHOPAEDIC SURGERY

## 2023-01-30 PROCEDURE — 1159F PR MEDICATION LIST DOCUMENTED IN MEDICAL RECORD: ICD-10-PCS | Mod: CPTII,S$GLB,, | Performed by: ORTHOPAEDIC SURGERY

## 2023-01-30 PROCEDURE — 1160F PR REVIEW ALL MEDS BY PRESCRIBER/CLIN PHARMACIST DOCUMENTED: ICD-10-PCS | Mod: CPTII,S$GLB,, | Performed by: ORTHOPAEDIC SURGERY

## 2023-01-30 PROCEDURE — 20610 LARGE JOINT ASPIRATION/INJECTION: L KNEE: ICD-10-PCS | Mod: LT,S$GLB,, | Performed by: ORTHOPAEDIC SURGERY

## 2023-01-30 PROCEDURE — 1101F PT FALLS ASSESS-DOCD LE1/YR: CPT | Mod: CPTII,S$GLB,, | Performed by: ORTHOPAEDIC SURGERY

## 2023-01-30 PROCEDURE — 99999 PR PBB SHADOW E&M-EST. PATIENT-LVL IV: ICD-10-PCS | Mod: PBBFAC,,, | Performed by: ORTHOPAEDIC SURGERY

## 2023-01-30 PROCEDURE — 3008F PR BODY MASS INDEX (BMI) DOCUMENTED: ICD-10-PCS | Mod: CPTII,S$GLB,, | Performed by: ORTHOPAEDIC SURGERY

## 2023-01-30 PROCEDURE — 3288F FALL RISK ASSESSMENT DOCD: CPT | Mod: CPTII,S$GLB,, | Performed by: ORTHOPAEDIC SURGERY

## 2023-01-30 PROCEDURE — 73030 X-RAY EXAM OF SHOULDER: CPT | Mod: 26,RT,, | Performed by: RADIOLOGY

## 2023-01-30 PROCEDURE — 1159F MED LIST DOCD IN RCRD: CPT | Mod: CPTII,S$GLB,, | Performed by: ORTHOPAEDIC SURGERY

## 2023-01-30 PROCEDURE — 73030 XR SHOULDER COMPLETE 2 OR MORE VIEWS RIGHT: ICD-10-PCS | Mod: 26,RT,, | Performed by: RADIOLOGY

## 2023-01-30 PROCEDURE — 3008F BODY MASS INDEX DOCD: CPT | Mod: CPTII,S$GLB,, | Performed by: ORTHOPAEDIC SURGERY

## 2023-01-30 RX ORDER — METHYLPREDNISOLONE ACETATE 80 MG/ML
80 INJECTION, SUSPENSION INTRA-ARTICULAR; INTRALESIONAL; INTRAMUSCULAR; SOFT TISSUE
Status: DISCONTINUED | OUTPATIENT
Start: 2023-01-30 | End: 2023-01-30 | Stop reason: HOSPADM

## 2023-01-30 RX ADMIN — METHYLPREDNISOLONE ACETATE 80 MG: 80 INJECTION, SUSPENSION INTRA-ARTICULAR; INTRALESIONAL; INTRAMUSCULAR; SOFT TISSUE at 11:01

## 2023-01-30 NOTE — PROCEDURES
Large Joint Aspiration/Injection: L knee    Date/Time: 1/30/2023 11:15 AM  Performed by: Herb Tan II, MD  Authorized by: Herb Tan II, MD     Consent Done?:  Yes (Verbal)  Indications:  Pain  Timeout: prior to procedure the correct patient, procedure, and site was verified    Prep: patient was prepped and draped in usual sterile fashion      Local anesthesia used?: Yes    Local anesthetic:  Topical anesthetic    Details:  Needle Size:  22 G  Approach:  Anterolateral  Location:  Knee  Site:  L knee  Medications:  80 mg methylPREDNISolone acetate 80 mg/mL  Patient tolerance:  Patient tolerated the procedure well with no immediate complications   Launch [employer] and print the 'Prescriptions from this Visit' Report

## 2023-01-30 NOTE — PROGRESS NOTES
CC:  72-year-old female follows up today with 2 complaints.  She had a reverse right total shoulder arthroplasty on 05/25/2022.  She is right at 8 months out from that.  She is not having any problems with the right shoulder at this point.    Her main complaint today is of left knee pain.  She is had chronic pain in the left knee that is progressively gotten worse over the last several weeks.  She rates the left knee pain as a 6/10 it is interfering with basic daily activities and keeping her up at night.    Past Medical History:   Diagnosis Date    Allergy     Arthritis     Chronic lower back pain     Depression     Fever blister     GERD (gastroesophageal reflux disease)     Hemorrhoids, internal 11/05/2014    Hyperlipidemia     Hypertension     Joint pain     Morbid obesity with BMI of 40.0-44.9, adult 02/24/2015    Multiple gastric ulcers 12/14/2017    TIMMY (obstructive sleep apnea)     does not use CPAP    Pulmonary hypertension     Skin cancer     Type 2 diabetes mellitus with hyperglycemia, without long-term current use of insulin 4/20/2022     Past Surgical History:   Procedure Laterality Date    ANTERIOR CERVICAL CORPECTOMY W/ FUSION  1988    BACK SURGERY      CAUDAL EPIDURAL STEROID INJECTION N/A 01/10/2019    Procedure: Injection-steroid-epidural-caudal;  Surgeon: Lydia Velásquez Jr., MD;  Location: [facility];  Service: Pain Management;  Laterality: N/A;    COLONOSCOPY N/A 11/02/2016    Procedure: COLONOSCOPY;  Surgeon: Viji Dewitt MD;  Location: [facility];  Service: Endoscopy;  Laterality: N/A;    EPIDURAL STEROID INJECTION N/A 05/18/2022    Procedure: Injection, Steroid, Epidural;  Surgeon: Fermín Luo MD;  Location: [facility] OR;  Service: Pain Management;  Laterality: N/A;  L2-l3     EPIDURAL STEROID INJECTION INTO LUMBAR SPINE N/A 11/08/2018    Procedure: Injection-steroid-epidural-lumbar-L2-3 (LEFT > RIGHT);  Surgeon: Lydia Velásquez Jr., MD;  Location: [facility];  Service: Pain  Management;  Laterality: N/A;    ESOPHAGOGASTRODUODENOSCOPY N/A 10/6/2022    Procedure: EGD (ESOPHAGOGASTRODUODENOSCOPY);  Surgeon: Migel Koroma MD;  Location: [facility] ENDO;  Service: Endoscopy;  Laterality: N/A;    FUSION OF SPINE WITH INSTRUMENTATION Left 01/03/2020    Procedure: FUSION, SPINE, WITH INSTRUMENTATION Stage 1 Left L3-4 LOIF Rise L Stage 2 Open L3-4 L4-5 Laminectomy Removal of L4-5 Hardware, L3-5 Posterior Instrumentation and extraction of Fusion at L3-4;  Surgeon: James Brantley MD;  Location: [facility] OR;  Service: Neurosurgery;  Laterality: Left;  Procedure: Stage 1 Left L3-4 LOIF Rise L  Stage 2 Open L3-4 L4-5 Laminectomy Removal of L4-5 Hardware,     INJECTION OF ANESTHETIC AGENT AROUND GENITOFEMORAL NERVE Bilateral 08/01/2019    Procedure: BILATERAL SHOULDER ARTICULAR BRANCH BLOCK;  Surgeon: Lydia Velásquez Jr., MD;  Location: [facility] PAIN MGT;  Service: Pain Management;  Laterality: Bilateral;    JOINT REPLACEMENT      KNEE ARTHROSCOPY W/ MENISCAL REPAIR Right 2012    LUMBAR LAMINECTOMY  06/19/2015    REVERSE TOTAL SHOULDER ARTHROPLASTY Right 05/25/2022    Procedure: ARTHROPLASTY, SHOULDER, TOTAL, REVERSE;  Surgeon: Herb Tan II, MD;  Location: [facility] OR;  Service: Orthopedics;  Laterality: Right;    SKIN CANCER EXCISION      forehead does not recall date    TOTAL HIP ARTHROPLASTY Right 04/28/2016         TUBAL LIGATION  1980         Current Outpatient Medications on File Prior to Visit   Medication Sig Dispense Refill    amLODIPine (NORVASC) 10 MG tablet Take 1 tablet (10 mg total) by mouth every evening. 90 tablet 3    ammonium lactate (LAC-HYDRIN) 12 % lotion USE ON FEET AS NEEDED      ascorbic acid, vitamin C, (VITAMIN C) 1000 MG tablet Take 1,000 mg by mouth once daily.      atenoloL (TENORMIN) 25 MG tablet Take 1 tablet (25 mg total) by mouth every evening. 90 tablet 3    blood sugar diagnostic Strp 1 strip by Misc.(Non-Drug; Combo Route) route 2 (two) times daily with meals. 100  "strip 11    blood-glucose meter kit Use as instructed 1 each 0    calcium-vitamin D3 (OS-KATERIN 500 + D3) 500 mg-5 mcg (200 unit) per tablet Take 1 tablet by mouth 2 (two) times daily with meals.      carboxymethylcellulose 1 % ophthalmic solution 1 drop.      CINNAMON BARK, BULK, MISC 1,000 mg by Misc.(Non-Drug; Combo Route) route.      clindamycin (CLEOCIN T) 1 % lotion Use hs on face 60 mL 3    co-enzyme Q-10 30 mg capsule Take 30 mg by mouth once daily.       cycloSPORINE (RESTASIS) 0.05 % ophthalmic emulsion Place 1 drop into both eyes 2 (two) times daily. (Patient not taking: Reported on 8/10/2022) 24 mL 11    diclofenac sodium (VOLTAREN) 1 % Gel Apply 2 g topically once daily.      diclofenac sodium 1.5 % Drop SMARTSI Drop(s) Topical 1 to 4 Times Daily      econazole nitrate 1 % cream Use bid for rash 85 g 2    famotidine (PEPCID) 40 MG tablet Take 1 tablet (40 mg total) by mouth once daily. 90 tablet 3    fenofibrate micronized (LOFIBRA) 134 MG Cap TAKE 1 CAPSULE (134 MG TOTAL) BY MOUTH ONCE DAILY. 90 capsule 0    furosemide (LASIX) 40 MG tablet Take lasix 40 mg PO every other day PRN swelling (Patient not taking: Reported on 2022) 45 tablet 3    gabapentin (NEURONTIN) 600 MG tablet Take 1 tablet (600 mg total) by mouth 3 (three) times daily. 90 tablet 11    lancets Misc 1 lancet by Misc.(Non-Drug; Combo Route) route 2 (two) times daily with meals. 100 each 11    lancing device Misc 1 Device by Misc.(Non-Drug; Combo Route) route 2 (two) times daily with meals. 1 each 0    levocetirizine (XYZAL) 5 MG tablet Take 1 tablet (5 mg total) by mouth every evening. 90 tablet 3    LIDOcaine-prilocaine (EMLA) cream SMARTSI Gram(s) Topical 3-4 Times Daily      multivitamin capsule Take 1 capsule by mouth once daily.      pantoprazole (PROTONIX) 40 MG tablet Take 40 mg by mouth once daily.      pen needle, diabetic 31 gauge x 5/16" Ndle 1 each by Misc.(Non-Drug; Combo Route) route 2 (two) times daily with meals. " 100 each 11    pravastatin (PRAVACHOL) 40 MG tablet Take 1 tablet (40 mg total) by mouth every evening. 90 tablet 3    semaglutide (RYBELSUS) 7 mg tablet Take 7 mg by mouth once daily.      sertraline (ZOLOFT) 100 MG tablet Take 1 tablet (100 mg total) by mouth every evening. 90 tablet 3    solifenacin (VESICARE) 5 MG tablet Take 1 tablet (5 mg total) by mouth once daily. 30 tablet 11    vitamin E 400 UNIT capsule Take 400 Units by mouth once daily.       No current facility-administered medications on file prior to visit.     ROS:    Constitution: Denies chills, fever, and sweats.  HENT: Denies headaches or blurry vision.  Cardiovascular: Denies chest pain or irregular heart beat.  Respiratory: Denies cough or shortness of breath.  Gastrointestinal: Denies abdominal pain, nausea, or vomiting.  Genitourinary:  Denies urinary incontinence, bladder and kidney issues  Musculoskeletal:  Denies muscle cramps.  Neurological: Denies dizziness or focal weakness.  Psychiatric/Behavioral: Normal mental status.  Hematologic/Lymphatic: Denies bleeding problem or easy bruising/bleeding.  Skin: Denies rash or suspicious lesions.    Physical examination     Gen - No acute distress, well nourished, well groomed   Eyes - Extraoccular motions intact, pupils equally round and reactive to light and accommodation   ENT - normocephalic, atruamtic, oropharynx clear   Neck - Supple, no abnormal masses   Cardiovascular - regular rate and rhythm   Pulmonary - clear to auscultation bilaterally, no wheezes, ronchi, or rales   Abdomen - soft, non-tender, non-distended, positive bowel sounds   Psych - The patient is alert and oriented x3 with normal mood and affect    Right Upper Extremity Examination     Skin is intact throughout   Motor is intact distally radial, median, ulnar, AIN, PIN   +2 radial and ulnar pulses   Sensation to light touch is intact distally radial, median, and ulnar     Examination of the Right shoulder:   ROM:   For - 150    Abd - 150   Ext - 50   Int - T12     Tenderness to palpation:   Subacromial space - negative  Biceps Tendon - negative  Anterior Glenohumeral Joint - negative  AC joint - negative  Glenohumeral instability - negative  Empty Can test - negative  Speeds test - negative  Ambrosio/Neers sign - negative  Cross-arm adduction test - negative    Examination of the Left Lower Extremity:     Skin intact throughout.  Motor function is intact distally EHL/FHL/TA/argelia   +2 dorsalis pedis and posterior tibial pulses   Sensation to light touch intact distally dorsal, plantar, and first web space     Examination of the Left knee:    ROM 0 - 150   Effusion positive  Tenderness to palpation at the joint line positive  Pain during range of motion positive  Crepitation during range of motion positive     positive increased pain noted with flexion past 90   positive antalgic gait noted   negative Lachman's Test   negative Anterior Drawer Test   negative Posterior Drawer Test   negative McMurrays Test   negative Disco Test   negative Varus/Valgus instability    X-ray images were examined and personally interpreted by me.  Three views the right shoulder dated 01/30/2023 show a reverse right total shoulder arthroplasty that is well fixed and in good alignment.    Dx: Status post reverse right total shoulder arthroplasty, stable.  New onset left knee pain.    Plan:  I did offer to inject the patient's left knee and she agreed.  I injected the left knee with a mixture of 2, 2, 1.  She tolerated that well.  Activity as tolerated regarding the right shoulder.  Follow-up in 6 months with an x-ray of the right shoulder.

## 2023-03-23 ENCOUNTER — TELEPHONE (OUTPATIENT)
Dept: DERMATOLOGY | Facility: CLINIC | Age: 73
End: 2023-03-23
Payer: MEDICARE

## 2023-03-23 NOTE — TELEPHONE ENCOUNTER
Writer contacted pt to notify that Gerry Hunter will not be in the clinic today and that we would contact her next week to reschedule her appt. She voiced understanding.

## 2023-04-18 NOTE — ANESTHESIA PREPROCEDURE EVALUATION
12/27/2019  Monique Trujillo is a 69 y.o., female.  Patient Active Problem List   Diagnosis    Osteopenia of multiple sites    Morbid obesity with BMI of 40.0-44.9, adult    Mixed hyperlipidemia    Mood disorder    Mixed stress and urge urinary incontinence    Osteoarthritis of spine with radiculopathy, lumbar region    Essential hypertension    Primary osteoarthritis of right hip    SI (sacroiliac) joint dysfunction    Spondylolisthesis, lumbar region    Lumbar degenerative disc disease    History of colon polyps    Status post right hip replacement    Lumbar radiculopathy    Dysphagia    Bilateral primary osteoarthritis of knee    Osteoarthritis of left shoulder    Multiple gastric ulcers    TIMMY (obstructive sleep apnea)    Primary osteoarthritis of both knees    Decreased ROM of right knee    Decreased mobility and endurance    Right knee pain    Decreased strength    Chronic bilateral low back pain with bilateral sciatica    Spinal stenosis of lumbar region with neurogenic claudication    Chronic pain    Shoulder arthritis    Chronic pain of both shoulders    Status post lumbar spinal fusion       Anesthesia Evaluation      I have reviewed the Medications.     Review of Systems  Anesthesia Hx:  No problems with previous Anesthesia Denies Family Hx of Anesthesia complications.    Social:  No Alcohol Use, Non-Smoker    Cardiovascular:   Hypertension    Pulmonary:   Sleep Apnea    Hepatic/GI:   PUD, GERD    Musculoskeletal:   Arthritis     Neurological:   Neuromuscular Disease,    Psych:   Psychiatric History          Physical Exam  General:  Well nourished    Airway/Jaw/Neck:  Airway Findings: Mouth Opening: Normal Tongue: Normal  General Airway Assessment: Adult  Mallampati: II      Dental:  Dental Findings: In tact   Chest/Lungs:  Chest/Lungs Findings: Clear to  Per Dr. Diego Saleem re: remote transmission:    Ana Traore MD  Some are true PACs/PVCs, however, some of the plots look concerning enough that I would start patient on [address]. Xarelto 20 mg daily with meals please. If patient would like to be seen to discuss it further I would arrange for FU with NP to talk about [address]. Thanks. Updated patient via ""t. 1223- Phoned patient since no Mychart response noted. ID verified using two patient identifiers   Advised of recommendations and sent prescription to preferred pharmacy.  Advised to send [employer] message or call with any concerns auscultation, Normal Respiratory Rate     Heart/Vascular:  Heart Findings: Rate: Normal  Rhythm: Regular Rhythm        Mental Status:  Mental Status Findings:  Cooperative, Alert and Oriented         Anesthesia Plan  Type of Anesthesia, risks & benefits discussed:  Anesthesia Type:  general  Patient's Preference: general  Intra-op Monitoring Plan:   Intra-op Monitoring Plan Comments:   Post Op Pain Control Plan:   Post Op Pain Control Plan Comments:   Induction:   IV  Beta Blocker:  Patient is not currently on a Beta-Blocker (No further documentation required).       Informed Consent: Patient understands risks and agrees with Anesthesia plan.  Questions answered. Anesthesia consent signed with patient.  ASA Score: 3     Day of Surgery Review of History & Physical:            Ready For Surgery From Anesthesia Perspective.

## 2023-04-25 RX ORDER — PANTOPRAZOLE SODIUM 40 MG/1
40 TABLET, DELAYED RELEASE ORAL DAILY
Qty: 90 TABLET | Refills: 3 | Status: SHIPPED | OUTPATIENT
Start: 2023-04-25 | End: 2024-02-09

## 2023-04-25 RX ORDER — ORAL SEMAGLUTIDE 7 MG/1
TABLET ORAL
Qty: 90 TABLET | Refills: 2 | Status: SHIPPED | OUTPATIENT
Start: 2023-04-25 | End: 2023-12-29

## 2023-04-25 NOTE — TELEPHONE ENCOUNTER
No new care gaps identified.  [facility] Embedded Care Gaps. Reference number: [id number]. 4/25/2023   1:10:23 PM CDT

## 2023-04-25 NOTE — TELEPHONE ENCOUNTER
Refill Routing Note   Medication(s) are not appropriate for processing by [facility] for the following reason(s):      No active prescription written by PCP    [facility] action(s):  Defer            Appointments  past 12m or future 3m with PCP    Date Provider   Last Visit   12/2/2022 Milton Mahmood MD   Next Visit   Visit date not found Milton Mahmood MD   ED visits in past 90 days: 0        Note composed:1:30 PM 04/25/2023

## 2023-04-26 ENCOUNTER — OFFICE VISIT (OUTPATIENT)
Dept: DERMATOLOGY | Facility: CLINIC | Age: 73
End: 2023-04-26
Payer: MEDICARE

## 2023-04-26 VITALS — HEIGHT: 61 IN | BODY MASS INDEX: 42.29 KG/M2 | WEIGHT: 224 LBS

## 2023-04-26 DIAGNOSIS — D48.5 NEOPLASM OF UNCERTAIN BEHAVIOR OF SKIN: ICD-10-CM

## 2023-04-26 DIAGNOSIS — L82.1 SEBORRHEIC KERATOSES: ICD-10-CM

## 2023-04-26 DIAGNOSIS — D18.01 CHERRY ANGIOMA: ICD-10-CM

## 2023-04-26 DIAGNOSIS — C44.219 BASAL CELL CARCINOMA (BCC) OF HELIX OF LEFT EAR: Primary | ICD-10-CM

## 2023-04-26 DIAGNOSIS — L30.4 INTERTRIGO: ICD-10-CM

## 2023-04-26 DIAGNOSIS — E11.9 TYPE 2 DIABETES MELLITUS WITHOUT COMPLICATION: ICD-10-CM

## 2023-04-26 DIAGNOSIS — D22.9 BENIGN NEVUS: ICD-10-CM

## 2023-04-26 DIAGNOSIS — L91.8 SKIN TAG: ICD-10-CM

## 2023-04-26 PROCEDURE — 99213 PR OFFICE/OUTPT VISIT, EST, LEVL III, 20-29 MIN: ICD-10-PCS | Mod: 25,S$GLB,, | Performed by: STUDENT IN AN ORGANIZED HEALTH CARE EDUCATION/TRAINING PROGRAM

## 2023-04-26 PROCEDURE — 99213 OFFICE O/P EST LOW 20 MIN: CPT | Mod: 25,S$GLB,, | Performed by: STUDENT IN AN ORGANIZED HEALTH CARE EDUCATION/TRAINING PROGRAM

## 2023-04-26 PROCEDURE — 3288F FALL RISK ASSESSMENT DOCD: CPT | Mod: CPTII,S$GLB,, | Performed by: STUDENT IN AN ORGANIZED HEALTH CARE EDUCATION/TRAINING PROGRAM

## 2023-04-26 PROCEDURE — 1101F PR PT FALLS ASSESS DOC 0-1 FALLS W/OUT INJ PAST YR: ICD-10-PCS | Mod: CPTII,S$GLB,, | Performed by: STUDENT IN AN ORGANIZED HEALTH CARE EDUCATION/TRAINING PROGRAM

## 2023-04-26 PROCEDURE — 1159F MED LIST DOCD IN RCRD: CPT | Mod: CPTII,S$GLB,, | Performed by: STUDENT IN AN ORGANIZED HEALTH CARE EDUCATION/TRAINING PROGRAM

## 2023-04-26 PROCEDURE — 1160F RVW MEDS BY RX/DR IN RCRD: CPT | Mod: CPTII,S$GLB,, | Performed by: STUDENT IN AN ORGANIZED HEALTH CARE EDUCATION/TRAINING PROGRAM

## 2023-04-26 PROCEDURE — 99999 PR PBB SHADOW E&M-EST. PATIENT-LVL V: ICD-10-PCS | Mod: PBBFAC,,, | Performed by: STUDENT IN AN ORGANIZED HEALTH CARE EDUCATION/TRAINING PROGRAM

## 2023-04-26 PROCEDURE — 1160F PR REVIEW ALL MEDS BY PRESCRIBER/CLIN PHARMACIST DOCUMENTED: ICD-10-PCS | Mod: CPTII,S$GLB,, | Performed by: STUDENT IN AN ORGANIZED HEALTH CARE EDUCATION/TRAINING PROGRAM

## 2023-04-26 PROCEDURE — 1159F PR MEDICATION LIST DOCUMENTED IN MEDICAL RECORD: ICD-10-PCS | Mod: CPTII,S$GLB,, | Performed by: STUDENT IN AN ORGANIZED HEALTH CARE EDUCATION/TRAINING PROGRAM

## 2023-04-26 PROCEDURE — 88305 TISSUE EXAM BY PATHOLOGIST: CPT | Mod: 26,,, | Performed by: DERMATOLOGY

## 2023-04-26 PROCEDURE — 11102 TANGNTL BX SKIN SINGLE LES: CPT | Mod: S$GLB,,, | Performed by: STUDENT IN AN ORGANIZED HEALTH CARE EDUCATION/TRAINING PROGRAM

## 2023-04-26 PROCEDURE — 99999 PR PBB SHADOW E&M-EST. PATIENT-LVL V: CPT | Mod: PBBFAC,,, | Performed by: STUDENT IN AN ORGANIZED HEALTH CARE EDUCATION/TRAINING PROGRAM

## 2023-04-26 PROCEDURE — 1101F PT FALLS ASSESS-DOCD LE1/YR: CPT | Mod: CPTII,S$GLB,, | Performed by: STUDENT IN AN ORGANIZED HEALTH CARE EDUCATION/TRAINING PROGRAM

## 2023-04-26 PROCEDURE — 3008F BODY MASS INDEX DOCD: CPT | Mod: CPTII,S$GLB,, | Performed by: STUDENT IN AN ORGANIZED HEALTH CARE EDUCATION/TRAINING PROGRAM

## 2023-04-26 PROCEDURE — 88305 TISSUE EXAM BY PATHOLOGIST: ICD-10-PCS | Mod: 26,,, | Performed by: DERMATOLOGY

## 2023-04-26 PROCEDURE — 3288F PR FALLS RISK ASSESSMENT DOCUMENTED: ICD-10-PCS | Mod: CPTII,S$GLB,, | Performed by: STUDENT IN AN ORGANIZED HEALTH CARE EDUCATION/TRAINING PROGRAM

## 2023-04-26 PROCEDURE — 88305 TISSUE EXAM BY PATHOLOGIST: CPT | Performed by: DERMATOLOGY

## 2023-04-26 PROCEDURE — 11102 PR TANGENTIAL BIOPSY, SKIN, SINGLE LESION: ICD-10-PCS | Mod: S$GLB,,, | Performed by: STUDENT IN AN ORGANIZED HEALTH CARE EDUCATION/TRAINING PROGRAM

## 2023-04-26 PROCEDURE — 3008F PR BODY MASS INDEX (BMI) DOCUMENTED: ICD-10-PCS | Mod: CPTII,S$GLB,, | Performed by: STUDENT IN AN ORGANIZED HEALTH CARE EDUCATION/TRAINING PROGRAM

## 2023-04-26 RX ORDER — TRIAMCINOLONE ACETONIDE 0.25 MG/G
CREAM TOPICAL 2 TIMES DAILY
Qty: 80 G | Refills: 0 | Status: SHIPPED | OUTPATIENT
Start: 2023-04-26 | End: 2023-07-03 | Stop reason: SDUPTHER

## 2023-04-26 RX ORDER — KETOCONAZOLE 20 MG/G
CREAM TOPICAL 2 TIMES DAILY
Qty: 60 G | Refills: 3 | Status: SHIPPED | OUTPATIENT
Start: 2023-04-26 | End: 2023-10-24 | Stop reason: SDUPTHER

## 2023-04-26 NOTE — PROGRESS NOTES
"  Subjective:      Patient ID:  Monique Trujillo is a 72 y.o. female who presents for   Chief Complaint   Patient presents with    Skin Check     UBSC     LOV 3/23/22    Patient here today for UBSC  C/O spot to right side x "few months" no symptoms.  C/O folds "cant see down there" would like to know if "something's there".  C/O spot to scalp x" a while" no symptoms, "just aggravating".    Derm HX:  uncertain of type, forehead          Review of Systems   Constitutional:  Negative for fever and chills.   Respiratory:  Negative for shortness of breath.    Skin:  Negative for daily sunscreen use, activity-related sunscreen use and wears hat.   Hematologic/Lymphatic: Does not bruise/bleed easily.     Objective:   Physical Exam   Constitutional: She appears well-developed and well-nourished. No distress.   Neurological: She is alert and oriented to person, place, and time. She is not disoriented.   Psychiatric: She has a normal mood and affect.   Skin:   Areas Examined (abnormalities noted in diagram):   Scalp / Hair Palpated and Inspected  Head / Face Inspection Performed  Neck Inspection Performed  Chest / Axilla Inspection Performed  Abdomen Inspection Performed  Back Inspection Performed  RUE Inspected  LUE Inspection Performed  Nails and Digits Inspection Performed                   Diagram Legend     Erythematous scaling macule/papule c/w actinic keratosis       Vascular papule c/w angioma      Pigmented verrucoid papule/plaque c/w seborrheic keratosis      Yellow umbilicated papule c/w sebaceous hyperplasia      Irregularly shaped tan macule c/w lentigo     1-2 mm smooth white papules consistent with Milia      Movable subcutaneous cyst with punctum c/w epidermal inclusion cyst      Subcutaneous movable cyst c/w pilar cyst      Firm pink to brown papule c/w dermatofibroma      Pedunculated fleshy papule(s) c/w skin tag(s)      Evenly pigmented macule c/w junctional nevus     Mildly variegated pigmented, " slightly irregular-bordered macule c/w mildly atypical nevus      Flesh colored to evenly pigmented papule c/w intradermal nevus       Pink pearly papule/plaque c/w basal cell carcinoma      Erythematous hyperkeratotic cursted plaque c/w SCC      Surgical scar with no sign of skin cancer recurrence      Open and closed comedones      Inflammatory papules and pustules      Verrucoid papule consistent consistent with wart     Erythematous eczematous patches and plaques     Dystrophic onycholytic nail with subungual debris c/w onychomycosis     Umbilicated papule    Erythematous-base heme-crusted tan verrucoid plaque consistent with inflamed seborrheic keratosis     Erythematous Silvery Scaling Plaque c/w Psoriasis     See annotation        Assessment / Plan:      Pathology Orders:       Normal Orders This Visit    Specimen to Pathology, Dermatology     Questions:    Procedure Type: Dermatology and skin neoplasms    Number of Specimens: 1    ------------------------: -------------------------    Spec 1 Procedure: Biopsy    Spec 1 Clinical Impression: r/o bcc    Spec 1 Source: left helix    Release to patient:           Seborrheic keratoses  These are benign inherited growths without a malignant potential. Reassurance given to patient. No treatment is necessary.     Cherry angioma  This is a benign vascular lesion. Reassurance given. No treatment required.     Skin tag  - discussed that lesions are benign, non-cancerous. Removal is cosmetic.     Benign nevus  Careful dermoscopy evaluation of nevi performed.  Monitor for new mole or moles that are becoming bigger, darker, irritated, or developing irregular borders.   Upper body skin examination performed today including at least 9points as noted in physical examination. Suspicious lesions noted.  Patient instructed in importance in daily broad spectrum sun protection of at least spf 30. Mineral sunscreen ingredients preferred (Zinc +/- Titanium) and can be found  OTC.  Patient encouraged to wear hat for all outdoor exposure.   Also discussed sun avoidance and use of protective clothing.      Intertrigo  -     triamcinolone acetonide 0.025% (KENALOG) 0.025 % cream; Apply topically 2 (two) times daily.  Dispense: 80 g; Refill: 0  -     ketoconazole (NIZORAL) 2 % cream; Apply topically 2 (two) times daily.  Dispense: 60 g; Refill: 3  - ketoconazole cream mixed with triamcinolone cream twice daily for 7- 10 days when flared, when less irritated use zeasorb AF powder daily in the morning or carpe moisturize absorbing powder    Neoplasm of uncertain behavior of skin  -     Specimen to Pathology, Dermatology  Shave biopsy procedure note:    Shave biopsy performed after verbal consent including risk of infection, scar, recurrence, need for additional treatment of site. Area prepped with alcohol, anesthetized with approximately 1.0cc of 1% lidocaine with epinephrine. Lesional tissue shaved with razor blade. Hemostasis achieved with application of aluminum chloride followed by hyfrecation. No complications. Dressing applied. Wound care explained.             No follow-ups on file.

## 2023-04-26 NOTE — PATIENT INSTRUCTIONS
- ketoconazole cream mixed with triamcinolone cream twice daily for 7- 10 days when flared, when less irritated use zeasorb AF powder daily in the morning or carpe moisturize absorbing powder     Shave Biopsy Wound Care    Your doctor has performed a shave biopsy today.  A band aid and vaseline ointment has been placed over the site.  This should remain in place for 24 hours.  It is recommended that you keep the area dry for the first 24 hours.  After 24 hours, you may remove the band aid and wash the area with warm soap and water and apply Vaseline jelly.  Many patients prefer to use Neosporin or Bacitracin ointment.  This is acceptable; however, know that you can develop an allergy to this medication even if you have used it safely for years.  It is important to keep the area moist.  Letting it dry out and get air slows healing time, and will worsen the scar.  Band aid is optional after first 24 hours.      If you notice increasing redness, tenderness, pain, or yellow drainage at the biopsy site, please notify your doctor.  These are signs of an infection.    If your biopsy site is bleeding, apply firm pressure for 15 minutes straight.  Repeat for another 15 minutes, if it is still bleeding.   If the surgical site continues to bleed, then please contact your doctor.      [facility] [place] [address]/ [phone] FAX/ [email or web address]

## 2023-05-01 ENCOUNTER — PATIENT MESSAGE (OUTPATIENT)
Dept: ADMINISTRATIVE | Facility: HOSPITAL | Age: 73
End: 2023-05-01
Payer: MEDICARE

## 2023-05-03 LAB
FINAL PATHOLOGIC DIAGNOSIS: NORMAL
GROSS: NORMAL
Lab: NORMAL
MICROSCOPIC EXAM: NORMAL

## 2023-05-08 DIAGNOSIS — F32.1 CURRENT MODERATE EPISODE OF MAJOR DEPRESSIVE DISORDER WITHOUT PRIOR EPISODE: ICD-10-CM

## 2023-05-08 RX ORDER — SERTRALINE HYDROCHLORIDE 100 MG/1
100 TABLET, FILM COATED ORAL NIGHTLY
Qty: 90 TABLET | Refills: 1 | Status: SHIPPED | OUTPATIENT
Start: 2023-05-08 | End: 2023-10-15

## 2023-06-05 NOTE — TELEPHONE ENCOUNTER
Care Due:                  Date            Visit Type   Department     Provider  --------------------------------------------------------------------------------                                EP -                              PRIMARY      [facility]  Last Visit: 12-      CARE ([facility])   NARINDER Mahmood                              EP -                              PRIMARY      [facility]  Next Visit: 12-      CARE ([facility])   MEDICINE       Milton Mahmood                                                            Last  Test          Frequency    Reason                     Performed    Due Date  --------------------------------------------------------------------------------    CBC.........  12 months..  fenofibrate..............  05- 04-    Lipid Panel.  12 months..  fenofibrate, pravastatin.  04-   04-    [facility] Embedded Care Due Messages. Reference number: [id number].   6/05/2023 3:49:57 PM CDT

## 2023-06-06 RX ORDER — SOLIFENACIN SUCCINATE 5 MG/1
5 TABLET, FILM COATED ORAL DAILY
Qty: 90 TABLET | Refills: 3 | Status: SHIPPED | OUTPATIENT
Start: 2023-06-06 | End: 2024-05-31

## 2023-06-21 DIAGNOSIS — E11.9 TYPE 2 DIABETES MELLITUS WITHOUT COMPLICATION: ICD-10-CM

## 2023-06-26 ENCOUNTER — PATIENT MESSAGE (OUTPATIENT)
Dept: ADMINISTRATIVE | Facility: HOSPITAL | Age: 73
End: 2023-06-26
Payer: MEDICARE

## 2023-06-28 DIAGNOSIS — E11.9 TYPE 2 DIABETES MELLITUS WITHOUT COMPLICATION: ICD-10-CM

## 2023-07-03 ENCOUNTER — PATIENT MESSAGE (OUTPATIENT)
Dept: ADMINISTRATIVE | Facility: HOSPITAL | Age: 73
End: 2023-07-03
Payer: MEDICARE

## 2023-07-03 DIAGNOSIS — L30.4 INTERTRIGO: ICD-10-CM

## 2023-07-03 RX ORDER — TRIAMCINOLONE ACETONIDE 0.25 MG/G
CREAM TOPICAL 2 TIMES DAILY
Qty: 80 G | Refills: 0 | Status: SHIPPED | OUTPATIENT
Start: 2023-07-03 | End: 2023-10-24 | Stop reason: SDUPTHER

## 2023-07-20 DIAGNOSIS — Z96.611 S/P REVERSE TOTAL SHOULDER ARTHROPLASTY, RIGHT: Primary | ICD-10-CM

## 2023-07-22 DIAGNOSIS — J30.9 ALLERGIC RHINITIS, UNSPECIFIED SEASONALITY, UNSPECIFIED TRIGGER: ICD-10-CM

## 2023-07-22 DIAGNOSIS — H10.13 ALLERGIC CONJUNCTIVITIS OF BOTH EYES: ICD-10-CM

## 2023-07-22 DIAGNOSIS — I10 ESSENTIAL HYPERTENSION: ICD-10-CM

## 2023-07-22 RX ORDER — LEVOCETIRIZINE DIHYDROCHLORIDE 5 MG/1
5 TABLET, FILM COATED ORAL NIGHTLY
Qty: 90 TABLET | Refills: 0 | Status: SHIPPED | OUTPATIENT
Start: 2023-07-22 | End: 2023-08-24

## 2023-07-22 RX ORDER — ATENOLOL 25 MG/1
25 TABLET ORAL NIGHTLY
Qty: 90 TABLET | Refills: 0 | Status: SHIPPED | OUTPATIENT
Start: 2023-07-22 | End: 2023-08-24

## 2023-07-23 NOTE — TELEPHONE ENCOUNTER
Refill Routing Note   Medication(s) are not appropriate for processing by [facility] for the following reason(s):      Required labs outdated    [facility] action(s):  Defer  Approve Care Due:  None identified          Appointments  past 12m or future 3m with PCP    Date Provider   Last Visit   12/2/2022 Milton Mahmood MD   Next Visit   12/5/2023 Milton Mahmood MD   ED visits in past 90 days: 0        Note composed:10:56 PM 07/22/2023

## 2023-07-24 ENCOUNTER — OFFICE VISIT (OUTPATIENT)
Dept: ORTHOPEDICS | Facility: CLINIC | Age: 73
End: 2023-07-24
Payer: MEDICARE

## 2023-07-24 ENCOUNTER — HOSPITAL ENCOUNTER (OUTPATIENT)
Dept: RADIOLOGY | Facility: HOSPITAL | Age: 73
Discharge: HOME | End: 2023-07-24
Attending: ORTHOPAEDIC SURGERY
Payer: MEDICARE

## 2023-07-24 VITALS — HEIGHT: 61 IN | WEIGHT: 224 LBS | BODY MASS INDEX: 42.29 KG/M2

## 2023-07-24 DIAGNOSIS — Z96.611 S/P REVERSE TOTAL SHOULDER ARTHROPLASTY, RIGHT: ICD-10-CM

## 2023-07-24 DIAGNOSIS — Z96.611 S/P REVERSE TOTAL SHOULDER ARTHROPLASTY, RIGHT: Primary | ICD-10-CM

## 2023-07-24 PROCEDURE — 3288F PR FALLS RISK ASSESSMENT DOCUMENTED: ICD-10-PCS | Mod: CPTII,S$GLB,, | Performed by: ORTHOPAEDIC SURGERY

## 2023-07-24 PROCEDURE — 1126F PR PAIN SEVERITY QUANTIFIED, NO PAIN PRESENT: ICD-10-PCS | Mod: CPTII,S$GLB,, | Performed by: ORTHOPAEDIC SURGERY

## 2023-07-24 PROCEDURE — 1159F PR MEDICATION LIST DOCUMENTED IN MEDICAL RECORD: ICD-10-PCS | Mod: CPTII,S$GLB,, | Performed by: ORTHOPAEDIC SURGERY

## 2023-07-24 PROCEDURE — 99214 PR OFFICE/OUTPT VISIT, EST, LEVL IV, 30-39 MIN: ICD-10-PCS | Mod: S$GLB,,, | Performed by: ORTHOPAEDIC SURGERY

## 2023-07-24 PROCEDURE — 73030 XR SHOULDER COMPLETE 2 OR MORE VIEWS RIGHT: ICD-10-PCS | Mod: 26,RT,, | Performed by: RADIOLOGY

## 2023-07-24 PROCEDURE — 1126F AMNT PAIN NOTED NONE PRSNT: CPT | Mod: CPTII,S$GLB,, | Performed by: ORTHOPAEDIC SURGERY

## 2023-07-24 PROCEDURE — 1101F PT FALLS ASSESS-DOCD LE1/YR: CPT | Mod: CPTII,S$GLB,, | Performed by: ORTHOPAEDIC SURGERY

## 2023-07-24 PROCEDURE — 73030 X-RAY EXAM OF SHOULDER: CPT | Mod: 26,RT,, | Performed by: RADIOLOGY

## 2023-07-24 PROCEDURE — 99999 PR PBB SHADOW E&M-EST. PATIENT-LVL IV: CPT | Mod: PBBFAC,,, | Performed by: ORTHOPAEDIC SURGERY

## 2023-07-24 PROCEDURE — 99999 PR PBB SHADOW E&M-EST. PATIENT-LVL IV: ICD-10-PCS | Mod: PBBFAC,,, | Performed by: ORTHOPAEDIC SURGERY

## 2023-07-24 PROCEDURE — 1160F RVW MEDS BY RX/DR IN RCRD: CPT | Mod: CPTII,S$GLB,, | Performed by: ORTHOPAEDIC SURGERY

## 2023-07-24 PROCEDURE — 3288F FALL RISK ASSESSMENT DOCD: CPT | Mod: CPTII,S$GLB,, | Performed by: ORTHOPAEDIC SURGERY

## 2023-07-24 PROCEDURE — 99214 OFFICE O/P EST MOD 30 MIN: CPT | Mod: S$GLB,,, | Performed by: ORTHOPAEDIC SURGERY

## 2023-07-24 PROCEDURE — 1160F PR REVIEW ALL MEDS BY PRESCRIBER/CLIN PHARMACIST DOCUMENTED: ICD-10-PCS | Mod: CPTII,S$GLB,, | Performed by: ORTHOPAEDIC SURGERY

## 2023-07-24 PROCEDURE — 73030 X-RAY EXAM OF SHOULDER: CPT | Mod: TC,PO,RT

## 2023-07-24 PROCEDURE — 1159F MED LIST DOCD IN RCRD: CPT | Mod: CPTII,S$GLB,, | Performed by: ORTHOPAEDIC SURGERY

## 2023-07-24 PROCEDURE — 3008F BODY MASS INDEX DOCD: CPT | Mod: CPTII,S$GLB,, | Performed by: ORTHOPAEDIC SURGERY

## 2023-07-24 PROCEDURE — 1101F PR PT FALLS ASSESS DOC 0-1 FALLS W/OUT INJ PAST YR: ICD-10-PCS | Mod: CPTII,S$GLB,, | Performed by: ORTHOPAEDIC SURGERY

## 2023-07-24 PROCEDURE — 3008F PR BODY MASS INDEX (BMI) DOCUMENTED: ICD-10-PCS | Mod: CPTII,S$GLB,, | Performed by: ORTHOPAEDIC SURGERY

## 2023-07-24 RX ORDER — FENOFIBRATE 134 MG/1
134 CAPSULE ORAL DAILY
Qty: 90 CAPSULE | Refills: 0 | Status: SHIPPED | OUTPATIENT
Start: 2023-07-24 | End: 2023-08-24

## 2023-07-24 NOTE — PROGRESS NOTES
CC:  72-year-old female follows up status post reverse right total shoulder arthroplasty.  Date of surgery was 05/25/2022.  She is just over a year out.  Today she has no complaints.  No pain.    Past Medical History:   Diagnosis Date    Allergy     Arthritis     Chronic lower back pain     Depression     Fever blister     GERD (gastroesophageal reflux disease)     Hemorrhoids, internal 11/05/2014    Hyperlipidemia     Hypertension     Joint pain     Morbid obesity with BMI of 40.0-44.9, adult 02/24/2015    Multiple gastric ulcers 12/14/2017    TIMMY (obstructive sleep apnea)     does not use CPAP    Pulmonary hypertension     Skin cancer     Type 2 diabetes mellitus with hyperglycemia, without long-term current use of insulin 4/20/2022       Past Surgical History:   Procedure Laterality Date    ANTERIOR CERVICAL CORPECTOMY W/ FUSION  1988    BACK SURGERY      CAUDAL EPIDURAL STEROID INJECTION N/A 01/10/2019    Procedure: Injection-steroid-epidural-caudal;  Surgeon: Lydia Velásquez Jr., MD;  Location: [facility];  Service: Pain Management;  Laterality: N/A;    COLONOSCOPY N/A 11/02/2016    Procedure: COLONOSCOPY;  Surgeon: Viji Dewitt MD;  Location: [facility];  Service: Endoscopy;  Laterality: N/A;    EPIDURAL STEROID INJECTION N/A 05/18/2022    Procedure: Injection, Steroid, Epidural;  Surgeon: Fermín Luo MD;  Location: [facility];  Service: Pain Management;  Laterality: N/A;  L2-l3     EPIDURAL STEROID INJECTION INTO LUMBAR SPINE N/A 11/08/2018    Procedure: Injection-steroid-epidural-lumbar-L2-3 (LEFT > RIGHT);  Surgeon: Lydia Velásquez Jr., MD;  Location: [facility] PA[facility];  Service: Pain Management;  Laterality: N/A;    ESOPHAGOGASTRODUODENOSCOPY N/A 10/6/2022    Procedure: EGD (ESOPHAGOGASTRODUODENOSCOPY);  Surgeon: Migel Koroma MD;  Location: [facility];  Service: Endoscopy;  Laterality: N/A;    FUSION OF SPINE WITH INSTRUMENTATION Left 01/03/2020    Procedure: FUSION, SPINE, WITH INSTRUMENTATION  Stage 1 Left L3-4 LOIF Rise L Stage 2 Open L3-4 L4-5 Laminectomy Removal of L4-5 Hardware, L3-5 Posterior Instrumentation and extraction of Fusion at L3-4;  Surgeon: James Brantley MD;  Location: [facility] OR;  Service: Neurosurgery;  Laterality: Left;  Procedure: Stage 1 Left L3-4 LOIF Rise L  Stage 2 Open L3-4 L4-5 Laminectomy Removal of L4-5 Hardware,     INJECTION OF ANESTHETIC AGENT AROUND GENITOFEMORAL NERVE Bilateral 08/01/2019    Procedure: BILATERAL SHOULDER ARTICULAR BRANCH BLOCK;  Surgeon: Lydia Velásquez Jr., MD;  Location: [facility] PAIN [facility];  Service: Pain Management;  Laterality: Bilateral;    JOINT REPLACEMENT      KNEE ARTHROSCOPY W/ MENISCAL REPAIR Right 2012    LUMBAR LAMINECTOMY  06/19/2015    REVERSE TOTAL SHOULDER ARTHROPLASTY Right 05/25/2022    Procedure: ARTHROPLASTY, SHOULDER, TOTAL, REVERSE;  Surgeon: Herb Tan II, MD;  Location: [facility] OR;  Service: Orthopedics;  Laterality: Right;    SKIN CANCER EXCISION      forehead does not recall date    TOTAL HIP ARTHROPLASTY Right 04/28/2016         TUBAL LIGATION  1980       Current Outpatient Medications on File Prior to Visit   Medication Sig Dispense Refill    amLODIPine (NORVASC) 10 MG tablet TAKE 1 TABLET (10 MG TOTAL) BY MOUTH EVERY EVENING. 90 tablet 2    ammonium lactate (LAC-HYDRIN) 12 % lotion USE ON FEET AS NEEDED      ascorbic acid, vitamin C, (VITAMIN C) 1000 MG tablet Take 1,000 mg by mouth once daily.      atenoloL (TENORMIN) 25 MG tablet TAKE 1 TABLET (25 MG TOTAL) BY MOUTH EVERY EVENING. 90 tablet 0    blood sugar diagnostic Strp 1 strip by Misc.(Non-Drug; Combo Route) route 2 (two) times daily with meals. 100 strip 11    blood-glucose meter kit Use as instructed 1 each 0    calcium-vitamin D3 (OS-KATERIN 500 + D3) 500 mg-5 mcg (200 unit) per tablet Take 1 tablet by mouth 2 (two) times daily with meals.      carboxymethylcellulose 1 % ophthalmic solution 1 drop.      CINNAMON BARK, BULK, MISC 1,000 mg by Misc.(Non-Drug; Combo Route)  "route.      clindamycin (CLEOCIN T) 1 % lotion Use hs on face 60 mL 3    co-enzyme Q-10 30 mg capsule Take 30 mg by mouth once daily.       cycloSPORINE (RESTASIS) 0.05 % ophthalmic emulsion Place 1 drop into both eyes 2 (two) times daily. (Patient not taking: Reported on 8/10/2022) 24 mL 11    diclofenac sodium (VOLTAREN) 1 % Gel Apply 2 g topically once daily.      diclofenac sodium 1.5 % Drop SMARTSI Drop(s) Topical 1 to 4 Times Daily      econazole nitrate 1 % cream Use bid for rash 85 g 2    famotidine (PEPCID) 40 MG tablet Take 1 tablet (40 mg total) by mouth once daily. 90 tablet 3    fenofibrate micronized (LOFIBRA) 134 MG Cap TAKE 1 CAPSULE (134 MG TOTAL) BY MOUTH ONCE DAILY 90 capsule 0    furosemide (LASIX) 40 MG tablet Take lasix 40 mg PO every other day PRN swelling 45 tablet 3    gabapentin (NEURONTIN) 600 MG tablet Take 1 tablet (600 mg total) by mouth 3 (three) times daily. 90 tablet 11    ketoconazole (NIZORAL) 2 % cream Apply topically 2 (two) times daily. 60 g 3    lancets Misc 1 lancet by Misc.(Non-Drug; Combo Route) route 2 (two) times daily with meals. 100 each 11    lancing device Misc 1 Device by Misc.(Non-Drug; Combo Route) route 2 (two) times daily with meals. 1 each 0    levocetirizine (XYZAL) 5 MG tablet TAKE 1 TABLET (5 MG TOTAL) BY MOUTH EVERY EVENING. 90 tablet 0    LIDOcaine-prilocaine (EMLA) cream SMARTSI Gram(s) Topical 3-4 Times Daily      multivitamin capsule Take 1 capsule by mouth once daily.      pantoprazole (PROTONIX) 40 MG tablet Take 1 tablet (40 mg total) by mouth once daily. 90 tablet 3    pen needle, diabetic 31 gauge x 5/16" Ndle 1 each by Misc.(Non-Drug; Combo Route) route 2 (two) times daily with meals. 100 each 11    pravastatin (PRAVACHOL) 40 MG tablet TAKE 1 TABLET (40 MG TOTAL) BY MOUTH EVERY EVENING. 90 tablet 0    semaglutide (RYBELSUS) 7 mg tablet TAKE 1 TABLET (7 MG TOTAL) BY MOUTH ONCE DAILY. 90 tablet 2    sertraline (ZOLOFT) 100 MG tablet TAKE 1 " TABLET (100 MG TOTAL) BY MOUTH EVERY EVENING. 90 tablet 1    solifenacin (VESICARE) 5 MG tablet Take 1 tablet (5 mg total) by mouth once daily. 90 tablet 3    triamcinolone acetonide 0.025% (KENALOG) 0.025 % cream Apply topically 2 (two) times daily. 80 g 0    vitamin E 400 UNIT capsule Take 400 Units by mouth once daily.      [DISCONTINUED] fenofibrate micronized (LOFIBRA) 134 MG Cap TAKE 1 CAPSULE (134 MG TOTAL) BY MOUTH ONCE DAILY. 90 capsule 0     No current facility-administered medications on file prior to visit.       ROS:    Constitution: Denies chills, fever, and sweats.  HENT: Denies headaches or blurry vision.  Cardiovascular: Denies chest pain or irregular heart beat.  Respiratory: Denies cough or shortness of breath.  Gastrointestinal: Denies abdominal pain, nausea, or vomiting.  Genitourinary:  Denies urinary incontinence, bladder and kidney issues  Musculoskeletal:  Denies muscle cramps.  Neurological: Denies dizziness or focal weakness.  Psychiatric/Behavioral: Normal mental status.  Hematologic/Lymphatic: Denies bleeding problem or easy bruising/bleeding.  Skin: Denies rash or suspicious lesions.    Physical examination     Gen - No acute distress, well nourished, well groomed   Eyes - Extraoccular motions intact, pupils equally round and reactive to light and accommodation   ENT - normocephalic, atruamtic, oropharynx clear   Neck - Supple, no abnormal masses   Cardiovascular - regular rate and rhythm   Pulmonary - clear to auscultation bilaterally, no wheezes, ronchi, or rales   Abdomen - soft, non-tender, non-distended, positive bowel sounds   Psych - The patient is alert and oriented x3 with normal mood and affect    Right Upper Extremity Examination     Skin is intact throughout   Motor is intact distally radial, median, ulnar, AIN, PIN   +2 radial and ulnar pulses   Sensation to light touch is intact distally radial, median, and ulnar     Examination of the Right shoulder:   ROM:   For - 120    Abd - 120   Ext - 40   Int - back pocket     Tenderness to palpation:   Subacromial space - negative  Biceps Tendon - negative  Anterior Glenohumeral Joint - negative  AC joint - negative  Glenohumeral instability - negative  Empty Can test - negative  Speeds test - negative  Ambrosio/Neers sign - negative  Cross-arm adduction test - negative    X-ray images were examined and personally interpreted by me.  Three views the right shoulder dated 07/24/2023 show a reverse right total shoulder arthroplasty that is well fixed and in good alignment.    Dx:  Status post reverse right total shoulder arthroplasty, stable    Plan:  Activity as tolerated.  Follow up in 1 year with an x-ray.

## 2023-08-23 ENCOUNTER — PATIENT MESSAGE (OUTPATIENT)
Dept: FAMILY MEDICINE | Facility: CLINIC | Age: 73
End: 2023-08-23
Payer: MEDICARE

## 2023-08-24 DIAGNOSIS — J30.9 ALLERGIC RHINITIS, UNSPECIFIED SEASONALITY, UNSPECIFIED TRIGGER: ICD-10-CM

## 2023-08-24 DIAGNOSIS — K21.9 GASTROESOPHAGEAL REFLUX DISEASE, UNSPECIFIED WHETHER ESOPHAGITIS PRESENT: ICD-10-CM

## 2023-08-24 DIAGNOSIS — I10 ESSENTIAL HYPERTENSION: ICD-10-CM

## 2023-08-24 DIAGNOSIS — H10.13 ALLERGIC CONJUNCTIVITIS OF BOTH EYES: ICD-10-CM

## 2023-08-24 DIAGNOSIS — E78.5 HYPERLIPIDEMIA: ICD-10-CM

## 2023-08-24 RX ORDER — LEVOCETIRIZINE DIHYDROCHLORIDE 5 MG/1
5 TABLET, FILM COATED ORAL NIGHTLY
Qty: 90 TABLET | Refills: 1 | Status: SHIPPED | OUTPATIENT
Start: 2023-08-24 | End: 2024-02-09

## 2023-08-24 RX ORDER — FAMOTIDINE 40 MG/1
40 TABLET, FILM COATED ORAL DAILY
Qty: 90 TABLET | Refills: 2 | Status: SHIPPED | OUTPATIENT
Start: 2023-08-24

## 2023-08-24 RX ORDER — FENOFIBRATE 134 MG/1
134 CAPSULE ORAL DAILY
Qty: 90 CAPSULE | Refills: 2 | Status: SHIPPED | OUTPATIENT
Start: 2023-08-24

## 2023-08-24 RX ORDER — ATENOLOL 25 MG/1
25 TABLET ORAL NIGHTLY
Qty: 90 TABLET | Refills: 1 | Status: SHIPPED | OUTPATIENT
Start: 2023-08-24 | End: 2024-02-09

## 2023-08-24 RX ORDER — PRAVASTATIN SODIUM 40 MG/1
40 TABLET ORAL NIGHTLY
Qty: 90 TABLET | Refills: 2 | Status: SHIPPED | OUTPATIENT
Start: 2023-08-24

## 2023-08-24 NOTE — TELEPHONE ENCOUNTER
Refill Routing Note   Medication(s) are not appropriate for processing by [facility] for the following reason(s):      Required labs outdated    [facility] action(s):  Defer  Approve Care Due:  Labs due              Appointments  past 12m or future 3m with PCP    Date Provider   Last Visit   12/2/2022 Milton Mahmood MD   Next Visit   12/5/2023 Milton Mahmood MD   ED visits in past 90 days: 0        Note composed:8:58 AM 08/24/2023

## 2023-08-24 NOTE — TELEPHONE ENCOUNTER
Care Due:                  Date            Visit Type   Department     Provider  --------------------------------------------------------------------------------                                EP -                              PRIMARY      [facility]  Last Visit: 12-      CARE ([facility])   NARINDER Mahmood                              EP -                              PRIMARY      [facility]  Next Visit: 12-      CARE ([facility])   MEDICINE       Milton Mahmood                                                            Last  Test          Frequency    Reason                     Performed    Due Date  --------------------------------------------------------------------------------    CBC.........  12 months..  fenofibrate..............  05- 04-    HBA1C.......  6 months...  semaglutide..............  12- 05-    Lipid Panel.  12 months..  fenofibrate, pravastatin.  04-   04-    [facility] Embedded Care Due Messages. Reference number: [id number].   8/24/2023 8:51:41 AM CDT

## 2023-08-31 ENCOUNTER — PATIENT MESSAGE (OUTPATIENT)
Dept: ADMINISTRATIVE | Facility: HOSPITAL | Age: 73
End: 2023-08-31
Payer: MEDICARE

## 2023-09-01 ENCOUNTER — DOCUMENTATION ONLY (OUTPATIENT)
Dept: DERMATOLOGY | Facility: CLINIC | Age: 73
End: 2023-09-01
Payer: MEDICARE

## 2023-09-19 NOTE — PROGRESS NOTES
Patient called wanting a refill on her Gabapentin 300mg. Please advise thank you See initial eval in treatment section.

## 2023-10-14 DIAGNOSIS — F32.1 CURRENT MODERATE EPISODE OF MAJOR DEPRESSIVE DISORDER WITHOUT PRIOR EPISODE: ICD-10-CM

## 2023-10-14 NOTE — TELEPHONE ENCOUNTER
Care Due:                  Date            Visit Type   Department     Provider  --------------------------------------------------------------------------------                                EP -                              PRIMARY      [facility]  Last Visit: 12-      CARE ([facility])   NARINDER Mahmood                              EP -                              PRIMARY      [facility]  Next Visit: 12-      CARE ([facility])   MEDICINE       Milton Mahmood                                                            Last  Test          Frequency    Reason                     Performed    Due Date  --------------------------------------------------------------------------------    CMP.........  12 months..  famotidine, fenofibrate,   12- 11-                             pravastatin..............    [employer] Embedded Care Due Messages. Reference number: [id number].   10/14/2023 12:58:18 PM CDT

## 2023-10-15 RX ORDER — SERTRALINE HYDROCHLORIDE 100 MG/1
100 TABLET, FILM COATED ORAL NIGHTLY
Qty: 90 TABLET | Refills: 0 | Status: SHIPPED | OUTPATIENT
Start: 2023-10-15 | End: 2024-04-03 | Stop reason: SDUPTHER

## 2023-10-15 NOTE — TELEPHONE ENCOUNTER
Provider Staff:  Action required for this patient     Please see care gap opportunities below in Care Due Message.    Thanks!  [facility]     Appointments      Date Provider   Last Visit   12/2/2022 Milton Mahmood MD   Next Visit   12/5/2023 Milton Mahmood MD     Refill Decision Note   Monique Trujillo  is requesting a refill authorization.  Brief Assessment and Rationale for Refill:  Approve     Medication Therapy Plan:         Comments:     Note composed:10:03 AM 10/15/2023             Appointments     Last Visit   12/2/2022 Milton Mahmood MD   Next Visit   12/5/2023 Milton Mahmood MD

## 2023-10-24 ENCOUNTER — OFFICE VISIT (OUTPATIENT)
Dept: DERMATOLOGY | Facility: CLINIC | Age: 73
End: 2023-10-24
Payer: MEDICARE

## 2023-10-24 DIAGNOSIS — L90.5 SCAR: ICD-10-CM

## 2023-10-24 DIAGNOSIS — L82.1 SEBORRHEIC KERATOSES: ICD-10-CM

## 2023-10-24 DIAGNOSIS — Z85.828 HISTORY OF NONMELANOMA SKIN CANCER: ICD-10-CM

## 2023-10-24 DIAGNOSIS — D22.9 BENIGN NEVUS: ICD-10-CM

## 2023-10-24 DIAGNOSIS — L71.9 ROSACEA: ICD-10-CM

## 2023-10-24 DIAGNOSIS — L30.4 INTERTRIGO: ICD-10-CM

## 2023-10-24 DIAGNOSIS — D18.01 CHERRY ANGIOMA: Primary | ICD-10-CM

## 2023-10-24 PROCEDURE — 1126F AMNT PAIN NOTED NONE PRSNT: CPT | Mod: CPTII,S$GLB,, | Performed by: STUDENT IN AN ORGANIZED HEALTH CARE EDUCATION/TRAINING PROGRAM

## 2023-10-24 PROCEDURE — 3288F PR FALLS RISK ASSESSMENT DOCUMENTED: ICD-10-PCS | Mod: CPTII,S$GLB,, | Performed by: STUDENT IN AN ORGANIZED HEALTH CARE EDUCATION/TRAINING PROGRAM

## 2023-10-24 PROCEDURE — 1101F PT FALLS ASSESS-DOCD LE1/YR: CPT | Mod: CPTII,S$GLB,, | Performed by: STUDENT IN AN ORGANIZED HEALTH CARE EDUCATION/TRAINING PROGRAM

## 2023-10-24 PROCEDURE — 1160F PR REVIEW ALL MEDS BY PRESCRIBER/CLIN PHARMACIST DOCUMENTED: ICD-10-PCS | Mod: CPTII,S$GLB,, | Performed by: STUDENT IN AN ORGANIZED HEALTH CARE EDUCATION/TRAINING PROGRAM

## 2023-10-24 PROCEDURE — 1159F MED LIST DOCD IN RCRD: CPT | Mod: CPTII,S$GLB,, | Performed by: STUDENT IN AN ORGANIZED HEALTH CARE EDUCATION/TRAINING PROGRAM

## 2023-10-24 PROCEDURE — 1101F PR PT FALLS ASSESS DOC 0-1 FALLS W/OUT INJ PAST YR: ICD-10-PCS | Mod: CPTII,S$GLB,, | Performed by: STUDENT IN AN ORGANIZED HEALTH CARE EDUCATION/TRAINING PROGRAM

## 2023-10-24 PROCEDURE — 1126F PR PAIN SEVERITY QUANTIFIED, NO PAIN PRESENT: ICD-10-PCS | Mod: CPTII,S$GLB,, | Performed by: STUDENT IN AN ORGANIZED HEALTH CARE EDUCATION/TRAINING PROGRAM

## 2023-10-24 PROCEDURE — 99214 OFFICE O/P EST MOD 30 MIN: CPT | Mod: S$GLB,,, | Performed by: STUDENT IN AN ORGANIZED HEALTH CARE EDUCATION/TRAINING PROGRAM

## 2023-10-24 PROCEDURE — 1160F RVW MEDS BY RX/DR IN RCRD: CPT | Mod: CPTII,S$GLB,, | Performed by: STUDENT IN AN ORGANIZED HEALTH CARE EDUCATION/TRAINING PROGRAM

## 2023-10-24 PROCEDURE — 99214 PR OFFICE/OUTPT VISIT, EST, LEVL IV, 30-39 MIN: ICD-10-PCS | Mod: S$GLB,,, | Performed by: STUDENT IN AN ORGANIZED HEALTH CARE EDUCATION/TRAINING PROGRAM

## 2023-10-24 PROCEDURE — 1159F PR MEDICATION LIST DOCUMENTED IN MEDICAL RECORD: ICD-10-PCS | Mod: CPTII,S$GLB,, | Performed by: STUDENT IN AN ORGANIZED HEALTH CARE EDUCATION/TRAINING PROGRAM

## 2023-10-24 PROCEDURE — 3288F FALL RISK ASSESSMENT DOCD: CPT | Mod: CPTII,S$GLB,, | Performed by: STUDENT IN AN ORGANIZED HEALTH CARE EDUCATION/TRAINING PROGRAM

## 2023-10-24 RX ORDER — KETOCONAZOLE 20 MG/G
CREAM TOPICAL 2 TIMES DAILY
Qty: 60 G | Refills: 3 | Status: SHIPPED | OUTPATIENT
Start: 2023-10-24

## 2023-10-24 RX ORDER — TRIAMCINOLONE ACETONIDE 0.25 MG/G
CREAM TOPICAL 2 TIMES DAILY
Qty: 80 G | Refills: 0 | Status: SHIPPED | OUTPATIENT
Start: 2023-10-24

## 2023-10-24 NOTE — PROGRESS NOTES
Subjective:      Patient ID:  Monique Trujillo is a 72 y.o. female who presents for   Chief Complaint   Patient presents with    Spot     Left cheek      LOV 4/26/23    Patient here today for skin check UBSE   Complains of spots on left cheek, fairly new.  Also complains of spot behind right ear     Needs refills of triamcinolone and ketoconazole. States when she uses them her intertrigo improves. She has a powder but not use it.     She would also like refills of SM triple cream for her rosacea    Path from last visit:  Final Pathologic Diagnosis   Skin, left helix, shave biopsy: --> mohs, Bhanu  -BASAL CELL CARCINOMA, SUPERFICIAL, MULTIFOCAL, EXTENDING TO A PERIPHERAL MARGIN     Derm HX:  NMSC, forehead               Review of Systems   Constitutional:  Negative for fever and chills.   Respiratory:  Negative for shortness of breath.    Skin:  Negative for daily sunscreen use, activity-related sunscreen use and wears hat.   Hematologic/Lymphatic: Does not bruise/bleed easily.       Objective:   Physical Exam   Constitutional: She appears well-developed and well-nourished. No distress.   Neurological: She is alert and oriented to person, place, and time. She is not disoriented.   Psychiatric: She has a normal mood and affect.   Skin:   Areas Examined (abnormalities noted in diagram):   Scalp / Hair Palpated and Inspected  Head / Face Inspection Performed  Neck Inspection Performed  Chest / Axilla Inspection Performed  Abdomen Inspection Performed  Back Inspection Performed  RUE Inspected  LUE Inspection Performed  Nails and Digits Inspection Performed                     Diagram Legend     Erythematous scaling macule/papule c/w actinic keratosis       Vascular papule c/w angioma      Pigmented verrucoid papule/plaque c/w seborrheic keratosis      Yellow umbilicated papule c/w sebaceous hyperplasia      Irregularly shaped tan macule c/w lentigo     1-2 mm smooth white papules consistent with Milia      Movable  subcutaneous cyst with punctum c/w epidermal inclusion cyst      Subcutaneous movable cyst c/w pilar cyst      Firm pink to brown papule c/w dermatofibroma      Pedunculated fleshy papule(s) c/w skin tag(s)      Evenly pigmented macule c/w junctional nevus     Mildly variegated pigmented, slightly irregular-bordered macule c/w mildly atypical nevus      Flesh colored to evenly pigmented papule c/w intradermal nevus       Pink pearly papule/plaque c/w basal cell carcinoma      Erythematous hyperkeratotic cursted plaque c/w SCC      Surgical scar with no sign of skin cancer recurrence      Open and closed comedones      Inflammatory papules and pustules      Verrucoid papule consistent consistent with wart     Erythematous eczematous patches and plaques     Dystrophic onycholytic nail with subungual debris c/w onychomycosis     Umbilicated papule    Erythematous-base heme-crusted tan verrucoid plaque consistent with inflamed seborrheic keratosis     Erythematous Silvery Scaling Plaque c/w Psoriasis     See annotation      Assessment / Plan:        Cherry angioma  This is a benign vascular lesion. Reassurance given. No treatment required.     Intertrigo- not at goal  -     ketoconazole (NIZORAL) 2 % cream; Apply topically 2 (two) times daily.  Dispense: 60 g; Refill: 3  -     triamcinolone acetonide 0.025% (KENALOG) 0.025 % cream; Apply topically 2 (two) times daily.  Dispense: 80 g; Refill: 0  Mix creams and apply bid x 10-14 days under breast and on lower abdomen- take breaks from tac cream, ok to use keto cream nightly  Recommend zeasorb powder daily    Benign nevus  Careful dermoscopy evaluation of nevi performed with none identified as needing biopsy today  Monitor for new mole or moles that are becoming bigger, darker, irritated, or developing irregular borders.     History of nonmelanoma skin cancer  Scar  Area(s) of previous NMSC evaluated with no signs of recurrence.    Upper body skin examination performed  today including at least 6 points as noted in physical examination. No lesions suspicious for malignancy noted.  Patient instructed in importance in daily broad spectrum sun protection of at least spf 30. Mineral sunscreen ingredients preferred (Zinc +/- Titanium) and can be found OTC.   Recommend Elta MD for daily use on face and neck.  Patient encouraged to wear hat for all outdoor exposure.   Also discussed sun avoidance and use of protective clothing.    Seborrheic keratoses  These are benign inherited growths without a malignant potential. Reassurance given to patient. No treatment is necessary.     Rosacea  Doing well with SM triple cream- refills given today         1 year    No follow-ups on file.

## 2023-11-15 DIAGNOSIS — E11.9 TYPE 2 DIABETES MELLITUS WITHOUT COMPLICATION, UNSPECIFIED WHETHER LONG TERM INSULIN USE: ICD-10-CM

## 2023-11-30 ENCOUNTER — TELEPHONE (OUTPATIENT)
Dept: FAMILY MEDICINE | Facility: CLINIC | Age: 73
End: 2023-11-30
Payer: MEDICARE

## 2023-11-30 DIAGNOSIS — G89.29 CHRONIC RIGHT SHOULDER PAIN: ICD-10-CM

## 2023-11-30 DIAGNOSIS — M19.012 OSTEOARTHRITIS OF BOTH SHOULDERS, UNSPECIFIED OSTEOARTHRITIS TYPE: Primary | ICD-10-CM

## 2023-11-30 DIAGNOSIS — M19.011 OSTEOARTHRITIS OF BOTH SHOULDERS, UNSPECIFIED OSTEOARTHRITIS TYPE: Primary | ICD-10-CM

## 2023-11-30 DIAGNOSIS — M25.511 CHRONIC RIGHT SHOULDER PAIN: ICD-10-CM

## 2023-11-30 NOTE — TELEPHONE ENCOUNTER
----- Message from Rosalia Alston sent at 11/30/2023 11:01 AM CST -----  Regarding: FW: Pharmacy Call Back    ----- Message -----  From: Katelynn Laura MD  Sent: 11/30/2023  10:56 AM CST  To: Rosalia Alston  Subject: RE: Pharmacy Call Back                           Please send to PCP. Thanks    ----- Message -----  From: Roslaia Alston  Sent: 11/30/2023  10:53 AM CST  To: Valentín Pace Staff  Subject: Pharmacy Call Back                               Type:  Pharmacy Calling to Clarify an RX    Name of Caller: Herb     Pharmacy Name: [facility]     Prescription Name:  gabapentin (NEURONTIN) 600 MG tablet    What do they need to clarify? Refill     Can you be contacted via [facility]? No     Best Call Back Number: [phone]

## 2023-12-01 RX ORDER — GABAPENTIN 600 MG/1
600 TABLET ORAL 3 TIMES DAILY
Qty: 270 TABLET | Refills: 3 | Status: SHIPPED | OUTPATIENT
Start: 2023-12-01 | End: 2024-11-30

## 2023-12-08 ENCOUNTER — PATIENT MESSAGE (OUTPATIENT)
Dept: ADMINISTRATIVE | Facility: HOSPITAL | Age: 73
End: 2023-12-08
Payer: MEDICARE

## 2023-12-29 VITALS — SYSTOLIC BLOOD PRESSURE: 164 MMHG | DIASTOLIC BLOOD PRESSURE: 92 MMHG

## 2023-12-29 DIAGNOSIS — I10 ESSENTIAL HYPERTENSION: ICD-10-CM

## 2023-12-29 RX ORDER — AMLODIPINE BESYLATE 10 MG/1
10 TABLET ORAL NIGHTLY
Qty: 90 TABLET | Refills: 0 | Status: SHIPPED | OUTPATIENT
Start: 2023-12-29

## 2023-12-29 RX ORDER — ORAL SEMAGLUTIDE 7 MG/1
TABLET ORAL
Qty: 90 TABLET | Refills: 0 | Status: SHIPPED | OUTPATIENT
Start: 2023-12-29 | End: 2024-01-22 | Stop reason: SDUPTHER

## 2023-12-29 NOTE — TELEPHONE ENCOUNTER
Care Due:                  Date            Visit Type   Department     Provider  --------------------------------------------------------------------------------                                EP -                              PRIMARY      [facility]  Last Visit: 12-      [facility] ([facility]   MEDICINE       Milton Mahmood  Next Visit: None Scheduled  None         None Found                                                            Last  Test          Frequency    Reason                     Performed    Due Date  --------------------------------------------------------------------------------    Office Visit  15 months..  amLODIPine, atenoloL,      12- 02-                             famotidine, fenofibrate,                             levocetirizine,                             pantoprazole,                             pravastatin, semaglutide,                             sertraline, solifenacin..    CBC.........  12 months..  fenofibrate..............  05- 04-    CMP.........  12 months..  famotidine, fenofibrate,   12- 11-                             pravastatin..............    HBA1C.......  6 months...  semaglutide..............  12- 05-    Lipid Panel.  12 months..  fenofibrate, pravastatin.  04-   04-    [facility] Embedded Care Due Messages. Reference number: [id number].   12/29/2023 10:18:10 AM CST

## 2023-12-29 NOTE — TELEPHONE ENCOUNTER
Refill Routing Note   Medication(s) are not appropriate for processing by [facility] for the following reason(s):        Required labs outdated  Required vitals outdated    [facility] action(s):  Defer   Requires appointment : Yes     Requires labs : Yes             Appointments  past 12m or future 3m with PCP    Date Provider   Last Visit   12/2/2022 Milton Mahmood MD   Next Visit   Visit date not found Milton Mahmood MD   ED visits in past 90 days: 0        Note composed:10:27 AM 12/29/2023            Referring Physician (Optional): Dr. Kirk

## 2024-01-03 DIAGNOSIS — E11.69 HYPERLIPIDEMIA ASSOCIATED WITH TYPE 2 DIABETES MELLITUS: ICD-10-CM

## 2024-01-03 DIAGNOSIS — E78.5 HYPERLIPIDEMIA ASSOCIATED WITH TYPE 2 DIABETES MELLITUS: ICD-10-CM

## 2024-01-18 ENCOUNTER — LAB VISIT (OUTPATIENT)
Dept: LAB | Facility: HOSPITAL | Age: 74
End: 2024-01-18
Attending: STUDENT IN AN ORGANIZED HEALTH CARE EDUCATION/TRAINING PROGRAM
Payer: MEDICARE

## 2024-01-18 DIAGNOSIS — E11.9 TYPE 2 DIABETES MELLITUS WITHOUT COMPLICATION: ICD-10-CM

## 2024-01-18 DIAGNOSIS — E11.69 HYPERLIPIDEMIA ASSOCIATED WITH TYPE 2 DIABETES MELLITUS: ICD-10-CM

## 2024-01-18 DIAGNOSIS — E78.5 HYPERLIPIDEMIA ASSOCIATED WITH TYPE 2 DIABETES MELLITUS: ICD-10-CM

## 2024-01-18 LAB
ALBUMIN SERPL BCP-MCNC: 4.1 G/DL (ref 3.5–5.2)
ALP SERPL-CCNC: 48 U/L (ref 55–135)
ALT SERPL W/O P-5'-P-CCNC: 15 U/L (ref 10–44)
ANION GAP SERPL CALC-SCNC: 9 MMOL/L (ref 8–16)
AST SERPL-CCNC: 20 U/L (ref 10–40)
BILIRUB SERPL-MCNC: 0.6 MG/DL (ref 0.1–1)
BUN SERPL-MCNC: 17 MG/DL (ref 8–23)
CALCIUM SERPL-MCNC: 9.5 MG/DL (ref 8.7–10.5)
CHLORIDE SERPL-SCNC: 107 MMOL/L (ref 95–110)
CHOLEST SERPL-MCNC: 147 MG/DL (ref 120–199)
CHOLEST/HDLC SERPL: 2.8 {RATIO} (ref 2–5)
CO2 SERPL-SCNC: 26 MMOL/L (ref 23–29)
CREAT SERPL-MCNC: 1 MG/DL (ref 0.5–1.4)
EST. GFR  (NO RACE VARIABLE): 59.1 ML/MIN/1.73 M^2
ESTIMATED AVG GLUCOSE: 103 MG/DL (ref 68–131)
GLUCOSE SERPL-MCNC: 85 MG/DL (ref 70–110)
HBA1C MFR BLD: 5.2 % (ref 4–5.6)
HDLC SERPL-MCNC: 53 MG/DL (ref 40–75)
HDLC SERPL: 36.1 % (ref 20–50)
LDLC SERPL CALC-MCNC: 66.8 MG/DL (ref 63–159)
NONHDLC SERPL-MCNC: 94 MG/DL
POTASSIUM SERPL-SCNC: 4.1 MMOL/L (ref 3.5–5.1)
PROT SERPL-MCNC: 7.3 G/DL (ref 6–8.4)
SODIUM SERPL-SCNC: 142 MMOL/L (ref 136–145)
TRIGL SERPL-MCNC: 136 MG/DL (ref 30–150)

## 2024-01-18 PROCEDURE — 80053 COMPREHEN METABOLIC PANEL: CPT | Performed by: STUDENT IN AN ORGANIZED HEALTH CARE EDUCATION/TRAINING PROGRAM

## 2024-01-18 PROCEDURE — 83036 HEMOGLOBIN GLYCOSYLATED A1C: CPT | Performed by: STUDENT IN AN ORGANIZED HEALTH CARE EDUCATION/TRAINING PROGRAM

## 2024-01-18 PROCEDURE — 82043 UR ALBUMIN QUANTITATIVE: CPT | Performed by: STUDENT IN AN ORGANIZED HEALTH CARE EDUCATION/TRAINING PROGRAM

## 2024-01-18 PROCEDURE — 80061 LIPID PANEL: CPT | Performed by: STUDENT IN AN ORGANIZED HEALTH CARE EDUCATION/TRAINING PROGRAM

## 2024-01-18 PROCEDURE — 36415 COLL VENOUS BLD VENIPUNCTURE: CPT | Mod: PO | Performed by: STUDENT IN AN ORGANIZED HEALTH CARE EDUCATION/TRAINING PROGRAM

## 2024-01-22 ENCOUNTER — HOSPITAL ENCOUNTER (OUTPATIENT)
Dept: RADIOLOGY | Facility: CLINIC | Age: 74
Discharge: HOME | End: 2024-01-22
Attending: STUDENT IN AN ORGANIZED HEALTH CARE EDUCATION/TRAINING PROGRAM
Payer: MEDICARE

## 2024-01-22 ENCOUNTER — OFFICE VISIT (OUTPATIENT)
Dept: FAMILY MEDICINE | Facility: CLINIC | Age: 74
End: 2024-01-22
Payer: MEDICARE

## 2024-01-22 VITALS
HEIGHT: 61 IN | RESPIRATION RATE: 16 BRPM | SYSTOLIC BLOOD PRESSURE: 104 MMHG | BODY MASS INDEX: 42.29 KG/M2 | OXYGEN SATURATION: 96 % | DIASTOLIC BLOOD PRESSURE: 68 MMHG | WEIGHT: 224 LBS | TEMPERATURE: 98 F | HEART RATE: 83 BPM

## 2024-01-22 DIAGNOSIS — E11.59 HYPERTENSION ASSOCIATED WITH DIABETES: ICD-10-CM

## 2024-01-22 DIAGNOSIS — M25.562 CHRONIC PAIN OF LEFT KNEE: ICD-10-CM

## 2024-01-22 DIAGNOSIS — E78.5 HYPERLIPIDEMIA ASSOCIATED WITH TYPE 2 DIABETES MELLITUS: ICD-10-CM

## 2024-01-22 DIAGNOSIS — N18.31 CHRONIC KIDNEY DISEASE, STAGE 3A: ICD-10-CM

## 2024-01-22 DIAGNOSIS — G89.29 CHRONIC PAIN OF LEFT KNEE: ICD-10-CM

## 2024-01-22 DIAGNOSIS — I15.2 HYPERTENSION ASSOCIATED WITH DIABETES: ICD-10-CM

## 2024-01-22 DIAGNOSIS — I70.0 ATHEROSCLEROSIS OF AORTA: ICD-10-CM

## 2024-01-22 DIAGNOSIS — E11.69 HYPERLIPIDEMIA ASSOCIATED WITH TYPE 2 DIABETES MELLITUS: ICD-10-CM

## 2024-01-22 DIAGNOSIS — Z12.31 ENCOUNTER FOR SCREENING MAMMOGRAM FOR MALIGNANT NEOPLASM OF BREAST: ICD-10-CM

## 2024-01-22 DIAGNOSIS — E66.01 MORBID OBESITY: ICD-10-CM

## 2024-01-22 DIAGNOSIS — F33.40 RECURRENT MAJOR DEPRESSIVE DISORDER, IN REMISSION: ICD-10-CM

## 2024-01-22 DIAGNOSIS — E11.65 TYPE 2 DIABETES MELLITUS WITH HYPERGLYCEMIA, WITHOUT LONG-TERM CURRENT USE OF INSULIN: Primary | ICD-10-CM

## 2024-01-22 DIAGNOSIS — E11.3313 TYPE 2 DIABETES MELLITUS WITH MODERATE NONPROLIFERATIVE RETINOPATHY OF BOTH EYES AND MACULAR EDEMA, UNSPECIFIED WHETHER LONG TERM INSULIN USE: ICD-10-CM

## 2024-01-22 PROBLEM — I27.20 PULMONARY HYPERTENSION: Status: RESOLVED | Noted: 2021-02-19 | Resolved: 2024-01-22

## 2024-01-22 PROBLEM — D69.2 OTHER NONTHROMBOCYTOPENIC PURPURA: Status: RESOLVED | Noted: 2022-05-23 | Resolved: 2024-01-22

## 2024-01-22 PROCEDURE — 77067 SCR MAMMO BI INCL CAD: CPT | Mod: TC,PO

## 2024-01-22 PROCEDURE — 73562 X-RAY EXAM OF KNEE 3: CPT | Mod: TC,FY,PO,LT

## 2024-01-22 PROCEDURE — 99999 PR PBB SHADOW E&M-EST. PATIENT-LVL V: CPT | Mod: PBBFAC,,, | Performed by: STUDENT IN AN ORGANIZED HEALTH CARE EDUCATION/TRAINING PROGRAM

## 2024-01-22 PROCEDURE — 73562 X-RAY EXAM OF KNEE 3: CPT | Mod: 26,LT,S$GLB, | Performed by: RADIOLOGY

## 2024-01-22 PROCEDURE — 77067 SCR MAMMO BI INCL CAD: CPT | Mod: 26,,, | Performed by: RADIOLOGY

## 2024-01-22 PROCEDURE — 99214 OFFICE O/P EST MOD 30 MIN: CPT | Mod: S$GLB,,, | Performed by: STUDENT IN AN ORGANIZED HEALTH CARE EDUCATION/TRAINING PROGRAM

## 2024-01-22 PROCEDURE — 77063 BREAST TOMOSYNTHESIS BI: CPT | Mod: 26,,, | Performed by: RADIOLOGY

## 2024-01-22 RX ORDER — ORAL SEMAGLUTIDE 7 MG/1
7 TABLET ORAL DAILY
Qty: 30 TABLET | Refills: 11 | Status: SHIPPED | OUTPATIENT
Start: 2024-01-22 | End: 2024-06-04 | Stop reason: SDUPTHER

## 2024-01-22 NOTE — PATIENT INSTRUCTIONS
Rik Amaya, Please read below to learn more on healthy habits.  Most of my patients read it.     If you are due for any health screening(s) below please notify me so we can arrange them to be ordered and scheduled. Most healthy patients at your age complete them, but you are free to accept or refuse.     If you can't do it, I'll definitely understand. If you can, I'd certainly appreciate it!     Tests to Keep You Healthy    Mammogram: ORDERED BUT NOT SCHEDULED  Eye Exam: ORDERED BUT NOT SCHEDULED  Colon Cancer Screening: Met on 11/2/2016  Last Blood Pressure <= 139/89 (1/22/2024): NO  Last HbA1c < 8 (01/18/2024): Yes      Schedule your breast cancer screening today     Breast cancer is the second most common cancer in women,  and the second leading cause of death from cancer. Mammograms can detect breast cancer early, which significantly increases the chances of curing the cancer.       Our records indicate that you may be overdue for breast cancer screening. Cancer screenings save lives, so schedule yours today to stay healthy.     If you recently had a mammogram performed outside of [facility], please let your Health care team know so that they can update your health record.        Your diabetic retinal eye exam is due     Diabetes is the #1 cause of blindness in the [place] - early detection before signs or symptoms develop can prevent debilitating blindness.     Our records indicate that you may be overdue for your annual diabetic eye exam. Eye screening can help identify patients at risk for developing vision loss which is common in diabetes. This simple screening is an important step to keeping you healthy and preventing complications from diabetes.     This recommended diabetic eye exam should take place once per year and can prevent and treat diabetes complications in the eye before developing symptoms. This can be done with a special camera is used to take photographs of the back of your eye without  having to dilate them, or you can see an eye doctor for a full dilated exam.     If you recently had your yearly diabetic eye exam performed outside of [facility], please let your Health care team know so that they can update your health record.           Table of Contents:     Cancer prevention  Explanation on the different components of your blood work and interpretation  Frequently asked questions   Blood pressure log   E-Visits  E-Consults     Cancer Prevention    Why should you choose to get screened for cancer? One simple reason is because you are important. You matter and deserve to have the best health so you can fulfill your great potential.     Colon Cancer screening - Most colon cancers can be prevented by screening. In most cases a polyp in the colon can grow and is not cancerous at first, but it can become cancerous years later. A polyp is like a seed that can grow into a weed if it is left in the soil. If the seed is detected and removed, no weed sprouts. A FIT test/Cologuard test and colonoscopy can detect precancerous polyps and lead to the prevention of cancer. A polyp is just like a seed that can be removed before the roots take hold. A colonoscopy can remove these polyps and eliminate the chance that these polyps turn into cancer.     Cervical Cancer screening- A PAP smear can detect precancerous cells that can become cervical cancer and can lead to procedures to remove them. It is very important to get a PAP smear as investing these few minutes can help prevent a lot of trouble down the road. If you want to do the most you can do to spend more time with your family and friends', cancer screenings can help with that goal.     Breast Cancer screening- Mammograms can detect breast cancer before it has spread and early detection allows the ability to remove the lesion prior to any spread. It is similar to a small rotten spot on fruit. If the spoiled part is removed quickly it can preserve  "the rest of the fruit, but if it is left alone it will corrupt the whole peach.     Smoking Cessation- "Smoking is like a chimney. The tar builds up and causes a back draft which leads to a cough. Smoking is like sitting in a car with a blocked exhaust system." Smoking can increase your risk for lung, mouth, throat, nose, esophagus, bladder, kidney, ureter, pancreas, stomach, liver, cervix, ovary, bowel, and leukemia [2]. If you have smoked in the past, you might meet the criteria for a CT lung cancer screening.     Lung Cancer Screening - "The U.S. Preventive Services Task Force (USPSTF) recommendsexternal icon yearly lung cancer screening with LDCT for people who--have a 20 pack-year or more smoking history, and smoke now or have quit within the past 15 years, and are between 50 and 80 years old. A pack-year is smoking an average of one pack of cigarettes per day for one year. For example, a person could have a 20 pack-year history by smoking one pack a day for 20 years or two packs a day for 10 years." [3]    Hypertension - Common high blood pressure meds may lower colorectal cancer risk-GEMA, July 6, 2020 - Hypertension Journal Report Medications commonly prescribed to treat high blood pressure may also reduce patients' colorectal cancer risk, according to new research published today in Hypertension, an American Heart Association journal." [4].     Low HbA1c - "Higher HbA1c levels (within both the non?diabetic and diabetic ranges) were associated with the risk of colorectal cancer (Model 2; P linear?=?0.009), especially for colon cancer." [5].    A healthy diet, exercise, limitation of alcohol use, certain infections, avoidance of radiation/environmental toxins, and staying lean lowers your cancer risk [6].     I want to empower you to make informed choices for your health. I have a listed below the most common blood components that we check for when you get blood work. I discuss what each component measures " along with common reasons for why they can be abnormal. If you are interested in understanding your blood work better, please read the lab explanation attached below.     Explanation of Lab Results    Please note: This information is included as a reference to help you better understand your lab results and is not be used for diagnosis.     Lipid Panel:  Cholesterol is a measure of cardiac risk and stroke risk. A lipid panel measures total cholesterol and provides readings which are broken down into 3 subsections: Triglycerides, HDL and LDL.    Total Cholesterol: Your total blood cholesterol is a measure of LDL cholesterol, HDL cholesterol, and other lipid components.  If your individual lipid level components are in the normal range and you have an elevated total cholesterol level we are generally not to concerned since we primarily look at the LDL cholesterol which is considered the bad cholesterol which can lead to heart attacks and strokes.  Your calculated cardiac risk is the most important factor in deciding if you would benefit from a cholesterol-lowering medication that the total cholesterol level.    Triglycerides are most diet and weight sensitive. They are affected easily by lifestyle changes. Ideally, this reading should be less than 150, although if the remainder of the cholesterol panel is within normal limits, we will tolerate upwards of 250-300. For the most part, if triglycerides are the only part of your cholesterol panel reading as 'abnormal', it is best to lose weight and modify your diet, including less saturated fat and less simple sugars. We only start medication to lower this is the level is greater than 500 since this can increase ones risk for pancreatitis. This is not the cholesterol type that leads to heart attacks.     HDL is your 'good cholesterol.' Ideally, the reading should be over 40 and is particularly helpful when it is greater than 60. Research indicates that high HDL is  extremely protective; and if your HDL level is greater than 60, we tolerate far worse LDL or triglyceride levels. For the most part, HDL is responsive to aerobic activity and ideal weight. We do not have medicines that increase the HDL reading very easily.    LDL is your 'bad cholesterol.' Our goals depend on how many cardiac risk factors you have.     High LDL: If you are diabetic or have had a heart attack, we like the LDL level to be less than 100. If you have 2 cardiac risk factors (such as diabetes, hypertension, family history, a low HDL and smoking), we like your LDL to be less than 130. If you have 0-2 cardiac risk factors, we like your LDL level to be less than 160, but we may not necessarily initiate medications to 190 unless you cannot lower it. The latest guidelines recommend to consider taking a statin only if your ASCVD cardiac risk score is at least greater than 7.5% and a strong recommendation if your risk score is greater than 10%.     Low LDL: Normal or low LDL is considered good and having an LDL below the normal range is not of a concern.     Complete Blood Count (CBC):    White blood cells: These are infection fighting cells. Mild fluctuations may represent minor illness at the time of blood draw. Marked fluctuations can represent immune diseases. This can also be elevated from smoking.     High WBC: Elevation in wbc can commonly be caused by an infection, steroid use, or any cause of inflammation such as many rheumatologic diseases, surgery, or trauma.      Low WBC: Many different things can cause this such as infections, medications, rheumatologic disorders, malignancies, nutritional deficiencies, and a normal variant in some individuals. If this occurs it is common practice to rule out a few viruses such as HIV, Hep C, B12, folate along with a a peripheral blood smear to look for abnormalities. If you are otherwise healthy with no concerning symptoms and the workup is negative we usually  monitor it with yearly blood work.  If there are other abnormal cell line abnormalities sometimes we refer to hematology to further evaluate.    Hemoglobin: A key indicator for anemia. Ranges depend on age. If you are significantly out of this range, we may need to talk with you.    High Hemoglobin: This can be elevated in some blood disorders, sleep apnea, chronic lung disease, kidney disease, testosterone use, dehydration, smoking, along with some other rare causes.     Low Hemoglobin: Many things can lead to this but the most common cause is an iron deficiency in females who lose blood during their periods, decreased absorption due to antacids, poor diet, sickle cell, anemia of chronic disease, malignancy, bleeding from the gut, along with some other less common causes. If you are a young female who has periods it is usually assumed that this is from that blood loss unless other symptoms or abnormal blood work is present. If you are above 45 and have an iron deficiency it is always recommended to get a colonoscopy to ensure that there is no lesion causing blood loss and to exclude malignancy.     Hematocrit: Another way of looking at anemia, much like your hemoglobin. If you are significantly out of this range, we may need to talk with you.    MCV: This looks at the size of your red blood cells.     High MCV:  A large number may indicate a B-12 deficiency, folate deficiency, alcohol use, liver disease, medication-induced phenomenon, or a need for further workup.    Low MCV: A small number may imply iron anemia or genetic disorder such as alpha-thalassemia. We may check iron studies called to see if you are low.    MCH: Much like MCV above.    Platelets: These are clotting factors. We tolerate a broad range in this. If your numbers are less than 100, we may need to work it up.     High Platelet Count: If your numbers are elevated, this could represent ongoing inflammation within the body which can be caused by any  infection, illness, iron deficiency, or other malignancy. We usually recheck it to monitor for improvement and if it does not resolve will work it up with more blood work.     Low Platelet Count: Many things can cause this such as infections, alcohol use, medications, liver disease, vitamin deficiencies, aspleenia, and some other rare blood disorders. If it persists many times we refer to hematology if a cause is not identified.     Iron:  This is not a reliable blood marker since this fluctuates throughout the day and if you are fasting many times your iron level in your blood is low but this does not necessarily mean you have a deficiency.  There are more reliable ways to measure your iron stores and these are discussed below.    Transferrin:  Many things can cause an abnormal result and this is not as important as some other labs mentioned below.    TIBC:  This is the total iron-binding capacity and this measures the total amount of iron that can be bound by proteins in the blood.  If you have an elevated TIBC this is a good marker that you could be low on iron and this is useful blood marker.  A simple way to think of this is seats in a car. The TIBC is the number of open seats that iron can sit in. If you have a high TIBC (number of open seats) that means you have a low iron level.     Ferritin:  A low ferritin is almost always caused from an iron deficiency.  A normal ferritin level does not need necessarily exclude an iron deficiency since many inflammatory conditions such as kidney disease, diabetes, arthritis, and obesity can raise this level hiding an iron deficiency.  If you are healthy with no inflammatory conditions this is a very reliable test for detecting an iron deficiency since nothing else lowers your ferritin level except a low iron level. Keep in mind that you can have an iron deficiency if your ferritin level is normal but your TIBC is elevated.  If you have a low iron level the next step is  always to identify why you have a low iron level.    CMP (Comprehensive Metabolic Panel):  Broadly, this is a test of organ function including the kidneys, liver, and electrolyte levels.    Glucose: This is primarily looking for diabetes. We like your blood glucose level to be less than 100 when fasting. Readings of 100-125 indicate what we call 'pre-diabetes' or 'glucose intolerance.' This does not necessarily indicate diabetes, but we may check another test called a hemoglobin A1C for confirmation. This level puts you at great risk for becoming a true diabetic and we would encourage the reduction of simple sugars and processed white flour as well as appropriate weight loss. If this number is greater than 125, it is likely you are diabetic; we will get an additional hemoglobin A1C test and will likely schedule you for an appointment. If you notice that your blood glucose is above 125 and we have not scheduled a follow-up appointment, please call us. Patients who are known diabetics can have readings greater than 125.    Urea Nitrogen: This is a kidney function and maybe elevated because of mild dehydration or because of excessive muscle breakdown from aggressive exercise habits.    Creatinine: This also is a kidney function test. It may be mildly elevated if you have particularly large muscle bulk or taking a supplement like creatine. It is related to the GFR. It is a muscle product that we track to look at your kidney function. If this is elevated and new, we may need to talk with you. If you have had this mildly elevated in the past, it is likely that we will just track it to ensure that it does not worsen quickly. Some medications may gently worsen this, namely blood pressure pills called ace inhibitors. To some degree, we will permit levels of up to 1.6.    Sodium: This is essentially the concentration of salt within the body. This may be mildly low because of dehydration, overhydration, diuretics,  .    Potassium: This is an electrolyte that can cause muscular cramping or cardiac difficulties. It is sometimes lowered by diuretic medications.    Chloride: For the most part, this is only relevant if the other electrolytes are abnormal.    CO2: This is a function of acid balance within the body. For the most part, mild abnormalities are not important and may represent a starvation or dehydration state when blood was drawn. Many medications can change this level as well such as diuretics, acetazolamide, calcium carbonate, laxatives, aspirin, and many others.    High CO2: Many things can cause this which includes dehydration, sleep apnea, and COPD.     Low CO2: Many things can lead to a low level and if you are generally healthy we are usually  not concerned. Diarrhea, renal disease, diabetes, and many medications can cause this.     Anion Gap: Only relevant if your CO2 is abnormal.    Calcium: This is not related to dietary intake of calcium. It may fluctuate gently based on the amount of protein within your body. If it is above 10.9, we may need to do additional testing. Many times if low the albumin level is low and once the corrected calcium level is calculated the calcium is in a normal range.     Total protein: This looks at protein within the body. Markedly elevated levels can represent an immune response and may require further workup.    Albumin: This may be elevated because of particularly high level of fitness. If it is markedly suppressed, it may represent organ dysfunction, particularly in the liver or kidneys.    AST and ALT: These are enzymes in the liver and if elevated can indicate liver damage.     Elevated AST/ALT: Many things can caused elevated liver enzymes and the most common reason is viral infections, alcohol use, medications, supplements, autoimmune, along with some other rare causes. One of the most common reasons for a mild elevation in liver enzymes (less than 3 times the upper limit)  is fatty liver disease. Many individuals who have extra fat in their diet store this in the liver and this can build up and cause a mild elevation. This is usually diagnosed with a liver ultrasound and exclusion of other causes. The only treatment for this is diet and exercise along with avoidance of liver toxic medications and alcohol. It is standard of care to rule our viral hepatitis and get imaging of the liver if elevated. This is monitored and if I feel concerned will refer to hepatology.     Alk Phos: Part of liver function or bones    High Alk Phos: elevated levels may indicate a liver injury or obstruction of bile flow. Elevated levels can also be seen in vitamin D deficiency, drug induced, or bone disorders.     Low Alk Phos: In most cases having a low Alkaline Phosphatase enzyme activity is not due to any disease and is simply a normal variant.  Having an elevated Alk Phos is more concerning and associated with many diseases and thus I would not be overly concerned with a low level which is seen in many health individuals. The reasons for a low serum alkaline phosphatase activity were reviewed in a 1-yr retrospective study and in this study it was found that no cause was found in most cases.  Low activity values were recorded in several individuals in the absence of any obvious cause. This would suggest that the definition of the lower limit of the reference range for alkaline phosphatase is arbitrary, thus limiting the use of low serum activity as a marker of disease.  In some cases micronutrients like Zinc (Zn) and Magnesium (Mg) are causes of low ALP activity and you can take zinc or magnesium supplements. Unfortunately, the blood work to measure zinc and magnesium levels are unreliable and not very accurate since it does not test for the intracellular zinc or magnesium levels.     Jose PURDY, Walter HICKS, Joe WEBB. Clinical significance of a low serum alkaline phosphatase. Neth J Med. 1992  Feb;40(1-2):9-14. PMID: [id number].  Morteza NAVA, William B, Merna I, Behera S, Morteza S. Low Alkaline Phosphatase (ALP) In Adult Population an Indicator of Zinc (Zn) and Magnesium (Mg) Deficiency. Curr Res Nutr Food Sci 2017;5(3). doi : http://dx.[email or web address]    Total Bilirubin: This is also part of liver function.    High T Bili: If you have right upper quadrant pain an elevation in total bilirubin can be caused by a gallbladder stone that is blocking the biliary tract that leads to your gastrointestinal tract. If you have fever and right upper quadrant pain this can sometimes be elevated when your gallbladder is infected and most individuals have nausea with vomiting associated with it. If you have no symptoms and are otherwise healthy this can be caused by Gilbert syndrome which is a benign normal variant. There are other causes such as some anemias but there would be abnormal blood counts.     Low T Bili: Nothing to be concerned about.     Thyroid Stimulating Hormone (TSH): This reading is an indicator of your thyroid function. The thyroid regulates energy levels throughout the entire body, affecting almost every organ system. This is an inverse relationship so a high number actually presents a low thyroid. If this is abnormal, we will often check an additional lab called a Free T4 to evaluate this more carefully. Borderline elevations, those of 5-10, can be watched or worked up further. Please do not take the supplement Biotin for at least a week prior to getting your TSH checked since this can lead to false measurement levels.     Prostate Specific Antigen (PSA): (Men only.) This is a prostate cancer screening test, and is no longer a routine screening test. Levels are truly a function of age. Being less than 4 is typical for someone more in their 60s. If you are young, it should probably be less than 3. A higher PSA result does not necessarily mean that you have cancer, but may indicate a need for a  "discussion with your provider. Options include observation to look at rate of rise or prostate biopsy. Not only is the absolute value important, but how much it has changed from previous years. Please ensure that there is not a dramatic rise from previous years.    Please Note: This information is included as a reference to help you better understand your lab results and is not be used for diagnosis.    Frequently asked questions    When should I take my blood pressure medication?    The latest studies show that taking your blood pressure medication at night is the best time. A recent study showed that this prevents more heart attacks and strokes. See the answer below from [place].     "Q. I've taken blood pressure medicines every morning for many years, and they keep my pressure under control. Recently, my doctor recommended taking them at bedtime, instead. Does that make sense?    A. It actually does make sense -- based on recent research. For many years, there have been at least three theoretical reasons for taking blood pressure medicines before bedtime. First, a body system that strongly affects blood pressure, called the renin-angiotensin system, has its peak activity during sleep. Second, circadian rhythms cause differences in the body chemistry at night compared with daytime. Third, most heart attacks occur in the morning, before medicines taken in the morning have a chance to "kick in."" [6].     When should I take my cholesterol medication?    It used to be recommended to take your cholesterol medication at night since the original statins that lowered cholesterol did not last 24 hours and most cholesterol synthesis is done at night. The long acting statins such as atorvastatin and rosuvastatin last 24 hours so they can be taken any time during the day. Simvastatin, pravastatin, fluvastatin, and lovastatin are shorter acting and should be taken at bed time.     Can supplements affect my blood work?    Yes " "they can. A very important supplement to not take for at least a week prior to your blood work is biotin. "Biotin supplement use is common and can lead to the false measurement of thyroid hormone in commonly used assays." [8.]    What are conditions that should not be addressed during a virtual visit?    There are some conditions that should not be evaluated via a virtual visit since optimal care is impossible. Chest pain, shortness of breath, lung conditions, abdominal pain, and any neurological complaint such as headache, dizziness, numbness ect.         When will I get commentary of my blood work?    I review all blood work that you get and I will send out commentary on this via [employer] within 72 hours. In most cases you will get a message from me sooner, but many times not all of the blood work is completed thus I usually wait until all results have returned. If there is a critical abnormality you should be contacted the same day you got blood work.     How frequently do I need to have visits to get controlled substances?    It is standard protocol to have a visit every 3 months if you are taking scheduled substances such as ADHD medications, psychiatric medications, and pain medications. This is to ensure your safety and monitor for any side effects.     When should I bring prior to a visit if I want to lose weight?    I recommend that you make a food diary for a week and fill out what you ate each day. You can bring this form in to your visit and I can look over it to suggest changes that you can make.     Which over the counter medications should I avoid if I have decreased kidney function?    NSAIDs which includes ibuprofen (Advil, Motrin, Nuprin), naproxen (Aleve), meloxicam (Mobic) and diclofenac(Zorvolex, Voltaren) and ketorolac (Toradol) can damage your kidneys if you take this long term.Tylenol does not affect your kidney and thus is safe as long as you don't have liver disease.     Is there an [facility] " pharmacy?     Yes! The [facility] is located on the first floor of the [facility]. The address is listed below. You can get curbside pickup if you call their number at [phone]. One of the many benefits of using the [facility] pharmacy is that the pharmacists can contact me directly if a cheaper alternative is available to save you money. They also see your note to know more about what the medication was prescribed for. I recommend this pharmacy since communication with me is quick in case any confusion arises on your medications.     [address] [place], [place] [address]use [facility].  [address]  [place] [address]  Phone: [phone]    Hours:  Monday - Friday  8:00 a.m. - 5:30 a.m.    Why is it not in my best interest to call in order to get an antibiotic?    Medicine is a complex field and many times the correct diagnosis is critical in order to provide the correct care. One of the most important goals of a healthcare provider is to ensure that no dangerous condition is masquerading as a mild illness. Specific questions are very important to obtain during an examination that provide a wealth of information to understand your illness. Health care providers are trained to investigate for signs that can be dangerous to your health. Messaging or calling the office in order to get an antibiotic can be very dangerous.     For example, many urinary tract infections can lead to an infection in the kidney that can result in a serious blood stream infection that can lead to hospitalization if not recognized. A cough can be caused by many different things and not necessarily an infection. It is not uncommon that one assumes a cough is from an infection when it is actually caused by a blood clot in the lungs. This can lead to death. Determining your risk can only be performed after a thorough history and examination. A few sentences through e-mail is not enough.     What are some common symptoms that should be evaluated by the emergency department  and not by phone or e-mail?    This does not include every symptom, but common examples of symptoms that should prompt one to go to the emergency department are chest pain, chest pressure, shortness of breath, difficulty breathing, abdominal pain, weakness or numbness or an extremity, sudden weakness or drooping on one side of the body, speaking difficulty, unusual or bad headache (particularly if it started suddenly), head injury, confusion, seizure, passing out, lightheaded, pain in arm or jaw, suddenly not able to speak, see, walk, or move, dizziness, neck stiffness, suicidal thoughts, testicular pain, cuts and wounds, severe pain, along with many others. This is not an inclusive list.     Outside Records    It is common to have an colonoscopy, mammogram, PAP smear, or eye exam done outside the [facility] system. Many times we do not get the records automatically sent to us. Please call your provider's office to notify them to fax us your records so that we can have the most up to date information. Your provider will review your outside results in order to provide you with the complete care that you deserve. We appreciate that you decided to choose us to be serving on your healthcare team and the more information we have about your health is essential.     If I have a psychiatric crisis what should I do?    If you ever feel that there is a risk of a harm to yourself we recommend to go to the emergency room. There is a National Suicide Prevention lifeline number 2-590-554-TALK which route you to the nearest crisis center. There is also a suicide hotline 1-343-EBFYLKG (1-372.811.1395).    988 has been designated as the new three-digit dialing code that will route callers to the National Suicide Prevention Lifeline (now known as the 988 Suicide & Crisis Lifeline), and is now active across the [place].    When people call, text, or chat 294, they will be connected to trained counselors that are part of the  existing Lifeline network. These trained counselors will listen, understand how their problems are affecting them, provide support, and connect them to resources if necessary.    The previous Lifeline phone number (1-279.311.2179) will always remain available to people in emotional distress or suicidal crisis.    The [employer] network of over 200 crisis centers has been in operation since 2005, and has been proven to be effective. Its the counselors at these local crisis centers who answer the contacts the Lifeline receives every day. Numerous studies have shown that callers feel less suicidal, less depressed, less overwhelmed and more hopeful after speaking with a Lifeline counselor.     E-Visits    E-Visits are currently available for three conditions: Urinary tract infections, Sinus symptoms, and rashes.     If you have one of these infections or rash you can fill out a questionnaire that will be sent to your provider who can review and send an appropriate medication. No visit is needed.      What is an E-Visit?    An E-Visit is a way to get care for certain conditions without needing to schedule a virtual visit or to come into the clinic. We'll ask you some clinically based questions about yourself and your symptoms and your provider will evaluate, make a diagnosis and respond with a care plan with recommendations including testing and medications as indicated, just as they would in an in-person setting.  If it becomes clear that you need to be seen virtually or in-person after the E-Visit, we can arrange that.         Should I use an E-Visit?    E-Visits should be used only for non-urgent medical conditions. If you need urgent medical care, please contact your clinic by phone, schedule a same-day appointment online or find a nearby [facility]. For serious medical emergencies, please call 911 immediately.         What to expect during an E-Visit   Once you have agreed to an E-Visit, you will be  "prompted to complete the E-Visit clinical questionnaire in [employer], which can take 10-20 minutes to complete. You may also be asked to confirm your medications.     Since this is a medical evaluation, it is billable to your insurance. Because this is a billable visit, you may also be asked for your insurance details and to enter your credit card information, just as you would for any other visit or co-pay. Much of this is stored in your account, so it should be easy to access or update if needed. You may receive a bill for this service, including any applicable copay amount, after the service is rendered.     Please allow up to 24 business hours for a reply. At any point in the E-Visit process, if you have not received a response within 72 hours, please call your clinic.        To initiate the E-Visit process in [facility], click here.       E-Consults    E-Consults are available when you need to have a specialists opinion on one thing and your PCP agrees to send an E-Consult to answer your question within 48 hours. This not only saves you time but money as well since a visit is not always needed.          Patient Education       Checking Your Blood Pressure at Home   The Basics   Written by the doctors and editors at [facility]   How is blood pressure measured? -- Blood pressure is usually measured with a device that goes around your upper arm. This is often done in a doctor's office. But some people also check their blood pressure themselves, at home or at work.  Blood pressure is explained with 2 numbers. For instance, your blood pressure might be "140 over 90." The first (top) number is the pressure inside your arteries when your heart is staci. The second (bottom) number is the pressure inside your arteries when your heart is relaxed. The table shows how doctors and nurses define high and normal blood pressure (table 1).  If your blood pressure gets too high, it puts you at risk for heart attack, stroke, and " "kidney disease. High blood pressure does not usually cause symptoms. But it can be serious.  What is a home blood pressure meter? -- A home blood pressure meter (or "monitor") is a device you can use to check your blood pressure yourself. It has a cuff that goes around your upper arm (figure 1). Some devices have a cuff that goes around your wrist instead. But doctors aren't sure if these work as well. The meter also has a small screen, or dial, that shows your blood pressure numbers.  There are also special meters you can wear for a day or 2. These are different because they automatically check your blood pressure throughout the day and night, even while you are sleeping. If your doctor thinks you should use one of these devices, they will talk to you about how to wear it.  Why do I need to check my blood pressure at home? -- If your doctor knows or suspects that you have high blood pressure, they might want you to check it at home. There are a few reasons for this. Your doctor might want to look at:  Whether your blood pressure measures the same at home as it did in the doctor's office  How well your blood pressure medicines are working  Changes in your blood pressure, for example, if it goes up and down  People who check their own blood pressure at home usually do better at keeping it low.  How do I choose a home blood pressure meter? -- When choosing a home blood pressure meter, you will probably want to think about:  Cost - Some devices cost more than others. You should also check to see if your insurance will help pay for your device.  Size - It's important to make sure the cuff fits your arm comfortably. Your doctor or nurse can help you with this.  How easy it is to use - You should make sure you understand how to use the device. You also need to be able to read the numbers on the screen.  You do not need a prescription to buy a home blood pressure meter. You can buy them at most pharmacies or over the " internet. Your doctor or nurse can help you choose the right device for you.  How do I check my blood pressure at home? -- Once you have a home blood pressure meter, your doctor or nurse should check it to make sure it fits you and works correctly.  When it's time to check your blood pressure:  Go to the bathroom and empty your bladder first. Having a full bladder can temporarily increase your blood pressure, making the results inaccurate.  Sit in a chair with your feet flat on the ground.  Try to breathe normally and stay calm.  Attach the cuff to your arm. Place the cuff directly on your skin, not over your clothing. The cuff should be tight enough to not slip down, but not uncomfortably tight.  Sit and relax for about 3 to 5 minutes with the cuff on.  Follow the directions that came with your device to start measuring your blood pressure. This might involve squeezing the bulb at the end of the tube to inflate the cuff (fill it with air). With some monitors, you just need to press a button to inflate the cuff. When the cuff fills with air, it feels like someone is squeezing your arm, but it should not hurt. Then you will slowly deflate the cuff (let the air out of it), or it will deflate by itself. The screen or dial will show your blood pressure numbers.  Stay seated and relax for 1 minute, then measure your blood pressure again.  How often should I check my blood pressure? -- It depends. Different people need to follow different schedules. Your doctor or nurse will tell you how often to check your blood pressure, and when. Some people need to check their blood pressure twice a day, in the morning and evening.  Your doctor or nurse will probably tell you to keep track of your blood pressure for at least a few days (table 2). Then they will look at the numbers. The reason for this is that it's normal for your blood pressure to change a bit from day to day. For example, the numbers might change depending on whether  "you recently had caffeine, just exercised, or feel stressed. Checking your blood pressure over several days - or longer - will give your doctor or nurse a better idea of what is average for you.  How should I keep track of my blood pressure? -- Some blood pressure meters will record your numbers for you, or send them to your computer or smartphone. If yours does not do this, you will need to write them down. Your doctor or nurse can help you figure out the best way to keep track of the numbers.  What if my blood pressure is high? -- Your doctor or nurse will tell you what to do if your blood pressure is high when you check it at home. If you get a number that is higher than normal, measure it again to see if it is still high. If it is very high (above a certain number, which your doctor or nurse will tell you to watch out for), you should call your doctor right away.  If your blood pressure is only a little high, your doctor or nurse might tell you to keep checking it for a few more days or weeks, and then call if it does not go back down. Then they can help you decide what to do next.  All topics are updated as new evidence becomes available and our peer review process is complete.  This topic retrieved from [employer] on: Sep 21, 2021.  Topic 701105 Version 4.0  Release: 29.4.2 - C29.263  © 2021 UpToDate, Inc. and/or its affiliates. All rights reserved.  table 1: Definition of normal and high blood pressure  Level  Top number  Bottom number    High 130 or above 80 or above   Elevated 120 to 129 79 or below   Normal 119 or below 79 or below   These definitions are from the American College of Cardiology/American Heart Association. Other expert groups might use slightly different definitions.  "Elevated blood pressure" is a term doctor or nurses use as a warning. It means you do not yet have high blood pressure, but your blood pressure is not as low as it should be for good health.  Graphic 99883 Version 6.0  figure " 1: Using a home blood pressure meter     This is an example of a person using a home blood pressure meter.  Graphic 881040 Version 1.0    Consumer Information Use and Disclaimer   This information is not specific medical advice and does not replace information you receive from your health care provider. This is only a brief summary of general information. It does NOT include all information about conditions, illnesses, injuries, tests, procedures, treatments, therapies, discharge instructions or life-style choices that may apply to you. You must talk with your health care provider for complete information about your health and treatment options. This information should not be used to decide whether or not to accept your health care provider's advice, instructions or recommendations. Only your health care provider has the knowledge and training to provide advice that is right for you. The use of this information is governed by the [employer] End User License Agreement, available at https://www.[employer]/en/solutions/[employer]/about/rani.The use of [employer] content is governed by the [employer] Terms of Use. ©2021 UpToDate, Inc. All rights reserved.  Copyright   © 2021 UpToDate, Inc. and/or its affiliates. All rights reserved.      Daily Blood Pressure Log    Please print this form to assist you in keeping track of your blood pressure at home.      Name:  Date of Birth:       Average Blood Pressure:           Date: Time  (a.m.) Blood  Pressure: Pulse  Rate: Time  (p.m.) Blood  Pressure : Pulse  Rate: Comments:   Sample 8:37 127/83 84    Stressful morning                                                                                                                                                                                                     Wishing you good health,     Milton Mahmood MD      References:  1-[email or web address]  2-https://www.[employer].com.au/news/onrge-dpd-24-cancers-that-can-ea-ndkido-hc-smoking/  3-https://www.cdc.gov/cancer/lung/basic_info/screening.htm  4-https://newsroom.heart.org/news/common-hypertension-medications-may-reduce-colorectal-cancer-risk  5-Emilyo A, Jayne M, Alexisada N, et al. High hemoglobin A1c levels within the non-diabetic range are associated with the risk of all cancers. Int J Cancer. 2016;138(7):1322-8691. doi:10.1002/ijc.22145  6-https://www.health.[employer].edu/newsletter_article/the-10-commandments-of-cancer-prevention  7-https://www.health.[employer].edu/diseases-and-conditions/should-i-take-blood-pressure-medications-at-night  8-Trudy HUTTON et al 2018 Prevalence of biotin supplement usage in outpatients and plasma biotin concentrations in patients presenting to the emergency department. Clin Biochem. Epub 2018 Jul 20. PMID: [id number].

## 2024-01-22 NOTE — PROGRESS NOTES
[facility] Note    Subjective:    The HPI and pertinent ROS is included in the Diagnostic Impression Remarks section at the end of the note. Please see below for further details. Chief complaint is at end of note.     Monique is a pleasant intelligent patient who is here for evaluation.     Modified Medications    Modified Medication Previous Medication    SEMAGLUTIDE (RYBELSUS) 7 MG TABLET RYBELSUS 7 mg tablet       Take 1 tablet (7 mg total) by mouth once daily.    TAKE 1 TABLET (7 MG TOTAL) BY MOUTH ONCE DAILY.       Data reviewed    274}  Previous medical records reviewed and summarized in plan section at end of note.      If you are due for any health screening(s) below please notify me so we can arrange them to be ordered and scheduled. Most healthy patients at your age complete them, but you are free to accept or refuse. If you can't do it, I'll definitely understand. If you can, I'd certainly appreciate it!     Tests to Keep You Healthy    Mammogram: ORDERED BUT NOT SCHEDULED  Eye Exam: ORDERED BUT NOT SCHEDULED  Colon Cancer Screening: Met on 11/2/2016  Last Blood Pressure <= 139/89 (1/22/2024): NO  Last HbA1c < 8 (01/18/2024): Yes      The following portions of the patient's history were reviewed and updated as appropriate: allergies, current medications, past family history, past medical history, past social history, past surgical history and problem list.    She  has a past medical history of Allergy, Arthritis, Chronic lower back pain, Depression, Fever blister, GERD (gastroesophageal reflux disease), Hemorrhoids, internal (11/05/2014), Hyperlipidemia, Hypertension, Joint pain, Morbid obesity with BMI of 40.0-44.9, adult (02/24/2015), Multiple gastric ulcers (12/14/2017), TIMMY (obstructive sleep apnea), Pulmonary hypertension, Skin cancer, and Type 2 diabetes mellitus with hyperglycemia, without long-term current use of insulin (4/20/2022).  She  has a past surgical history that  includes Knee arthroscopy w/ meniscal repair (Right, 2012); Tubal ligation (1980); Colonoscopy (N/A, 11/02/2016); Anterior cervical corpectomy w/ fusion (1988); Lumbar laminectomy (06/19/2015); Total hip arthroplasty (Right, 04/28/2016); Skin cancer excision; Epidural steroid injection into lumbar spine (N/A, 11/08/2018); Caudal epidural steroid injection (N/A, 01/10/2019); Injection of anesthetic agent around genitofemoral nerve (Bilateral, 08/01/2019); Fusion of spine with instrumentation (Left, 01/03/2020); Joint replacement; Back surgery; Epidural steroid injection (N/A, 05/18/2022); Reverse total shoulder arthroplasty (Right, 05/25/2022); and Esophagogastroduodenoscopy (N/A, 10/6/2022).    She  reports that she has never smoked. She has never been exposed to tobacco smoke. She has never used smokeless tobacco. She reports that she does not drink alcohol and does not use drugs.  She family history includes Alzheimer's disease in her maternal grandfather; Arthritis in her mother; Breast cancer in her mother; Cancer in her mother and paternal grandmother; Colon cancer in her paternal grandmother; Kidney disease in her father.    Review of patient's allergies indicates:   Allergen Reactions    Metformin Other (See Comments)     Diarrhea        Tobacco Use: Low Risk  (1/22/2024)    Patient History     Smoking Tobacco Use: Never     Smokeless Tobacco Use: Never     Passive Exposure: Never     Physical Examination  General appearance: alert, cooperative, no distress  Neck: no thyromegaly, no neck stiffness  Lungs: clear to auscultation, no wheezes, rales or rhonchi, symmetric air entry  Heart: normal rate, regular rhythm, normal S1, S2, no murmurs, rubs, clicks or gallops  Abdomen: soft, nontender, nondistended, no rigidity, rebound, or guarding.   Back: no point tenderness over spine  Extremities: peripheral pulses normal, no unilateral leg swelling or calf tenderness   Neurological:alert, oriented, normal speech, no  "new focal findings or movement disorder noted from baseline    BP Readings from Last 3 Encounters:   01/22/24 104/68   12/29/23 (!) 164/92   12/02/22 124/72     Wt Readings from Last 3 Encounters:   01/22/24 101.6 kg (223 lb 15.8 oz)   07/24/23 101.6 kg (224 lb)   04/26/23 101.6 kg (224 lb)     /68 (BP Location: Right arm, Patient Position: Sitting, BP Method: Large (Automatic))   Pulse 83   Temp 98.3 °F (36.8 °C) (Oral)   Resp 16   Ht 5' 1" (1.549 m)   Wt 101.6 kg (223 lb 15.8 oz)   LMP  (LMP Unknown)   SpO2 96%   BMI 42.32 kg/m²       274}  Laboratory: I have reviewed old labs below:       274}    Lab Results   Component Value Date    WBC 5.59 05/05/2022    HGB 12.9 05/05/2022    HCT 41.7 05/05/2022    MCV 97 05/05/2022     05/05/2022     01/18/2024    K 4.1 01/18/2024     01/18/2024    CALCIUM 9.5 01/18/2024    PHOS 3.3 05/02/2018    CO2 26 01/18/2024    GLU 85 01/18/2024    BUN 17 01/18/2024    CREATININE 1.0 01/18/2024    EGFRNORACEVR 59.1 (A) 01/18/2024    ANIONGAP 9 01/18/2024    PROT 7.3 01/18/2024    ALBUMIN 4.1 01/18/2024    BILITOT 0.6 01/18/2024    ALKPHOS 48 (L) 01/18/2024    ALT 15 01/18/2024    AST 20 01/18/2024    INR 0.9 12/12/2019    CHOL 147 01/18/2024    TRIG 136 01/18/2024    HDL 53 01/18/2024    LDLCALC 66.8 01/18/2024    TSH 2.249 04/20/2022    HGBA1C 5.2 01/18/2024      Lab reviewed by me: Particular labs of significance that I will monitor, workup, or treat to improve are mentioned below in diagnostic impression remarks.    Imaging/EKG: I have reviewed the pertinent results and my findings are noted in remarks.     274}    CC:   Chief Complaint   Patient presents with    Follow-up     Lab results     Hypertension           274}    Assessment/Plan  Monique Trujillo is a 73 y.o. female who presents to clinic with:  1. Type 2 diabetes mellitus with hyperglycemia, without long-term current use of insulin    2. Morbid obesity    3. Hypertension associated with " "diabetes    4. Hyperlipidemia associated with type 2 diabetes mellitus    5. Chronic kidney disease, stage 3a    6. Recurrent major depressive disorder, in remission    7. Atherosclerosis of aorta    8. 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 Encounter for screening mammogram for malignant neoplasm of breast    10. Chronic pain of left knee          274}  Diagnostic Impression Remarks + HPI     Documentation entered by me for this encounter may have been done in part using speech-recognition technology. Although I have made an effort to ensure accuracy, "sound like" errors may exist and should be interpreted in context.     Diabetes-well controlled on rybelsus patient reports no side effects this is keeping her A1c down and I recommend she continue this she needed prior off thus will send it to the specialty pharmacy needs an eye exam will set up    Depression-stable cont current meds monitor no SI/plan   Atherosclerosis of aorta- stable continue statin and rec healthy diet monitor follow lipid levels   Hypertension stable continue current meds monitor no headache or chest pain f/u blood work   The patient's chronic kidney disease stage 3 was monitored, evaluated, addressed and/or treated. Recommend to avoid NSAIDs and renally dose medications. ACE/ARB inhibitor if indicated and optimize blood pressure and A1c to goal.   HLD stable continue current meds monitor blood work rec mediterranean diet     Morbid obesity-recommend low glycemic index diet stay active monitor A1c and lipid levels.  TSH within normal limits.    Knee pain-patient has had some chronic left knee pain in the anterior part no falls or trauma will get x-ray can try Voltaren gel will follow-up will also put orthopedics referral  This is the extent of this pleasant patient's concerns at this present time. She did not feel chest pain upon " exertion, dyspnea, nausea, vomiting, diaphoresis, or syncope. No pleuritic chest pain, unilateral leg swelling, calf tenderness, or calf pain. Negative for unintentional weight loss night sweats, hematuria, and fevers. Monique will return to clinic in a few months for further workup and reassessment or sooner as needed. She was instructed to call the clinic or go to the emergency department or urgent care immediately if her symptoms do not improve, worsens, or if any new symptoms develop. As we discussed that symptoms could worsen over the next 24 hours she was advised that if any increased swelling, pain, or numbness arise to go immediately to the ED. Patient knows to call any time if an emergency arises. Shared decision making occurred and she verbalized understanding in agreement with this plan. I discussed imaging findings, diagnosis, possibilities, treatment options, medications, risks, and benefits. She had many questions regarding the options and long-term effects. All questions were answered. She expressed understanding after counseling regarding the diagnosis and recommendations. She was capable and demonstrated competence with understanding of these options. Shared decision making was performed resulting in her choosing the current treatment plan. Patient handout was given with instructions and recommendations. Advised the patient that if they become pregnant to alert us immediately to assess for medication changes. Having a healthy weight can decrease the risk of 13 cancers and is an important goal. I also discussed the importance of close follow up to discuss labs, change or modify her medications if needed, monitor side effects, and further evaluation of medical problems.     Additional workup planned: see labs ordered below.    See below for labs and meds ordered with associated diagnosis      1. Type 2 diabetes mellitus with hyperglycemia, without long-term current use of insulin  - Ambulatory  referral/consult to Optometry; Future    2. Morbid obesity    3. Hypertension associated with diabetes    4. Hyperlipidemia associated with type 2 diabetes mellitus    5. Chronic kidney disease, stage 3a    6. Recurrent major depressive disorder, in remission    7. Atherosclerosis of aorta    8. 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 Encounter for screening mammogram for malignant neoplasm of breast  - Mammo Digital Screening Bilat w/ Josh; Future    10. Chronic pain of left knee  - Ambulatory referral/consult to Orthopedics; Future  - X-Ray Knee 3 View Left; Future      Milton Mahmood MD      274}    If you are due for any health screening(s) below please notify me so we can arrange them to be ordered and scheduled. Most healthy patients at your age complete them, but you are free to accept or refuse.     If you can't do it, I'll definitely understand. If you can, I'd certainly appreciate it!   Tests to Keep You Healthy    Mammogram: ORDERED BUT NOT SCHEDULED  Eye Exam: ORDERED BUT NOT SCHEDULED  Colon Cancer Screening: Met on 11/2/2016  Last Blood Pressure <= 139/89 (1/22/2024): NO  Last HbA1c < 8 (01/18/2024): Yes

## 2024-01-23 LAB
ALBUMIN/CREAT UR: 493.8 UG/MG (ref 0–30)
CREAT UR-MCNC: 97 MG/DL (ref 15–325)
MICROALBUMIN UR DL<=1MG/L-MCNC: 479 UG/ML

## 2024-01-29 ENCOUNTER — PATIENT MESSAGE (OUTPATIENT)
Dept: ORTHOPEDICS | Facility: CLINIC | Age: 74
End: 2024-01-29

## 2024-01-29 ENCOUNTER — OFFICE VISIT (OUTPATIENT)
Dept: ORTHOPEDICS | Facility: CLINIC | Age: 74
End: 2024-01-29
Payer: MEDICARE

## 2024-01-29 VITALS — HEIGHT: 61 IN | WEIGHT: 224 LBS | BODY MASS INDEX: 42.29 KG/M2

## 2024-01-29 DIAGNOSIS — M25.562 CHRONIC PAIN OF LEFT KNEE: ICD-10-CM

## 2024-01-29 DIAGNOSIS — Z01.818 PREOP TESTING: ICD-10-CM

## 2024-01-29 DIAGNOSIS — M17.12 UNILATERAL PRIMARY OSTEOARTHRITIS, LEFT KNEE: Primary | ICD-10-CM

## 2024-01-29 DIAGNOSIS — G89.29 CHRONIC PAIN OF LEFT KNEE: ICD-10-CM

## 2024-01-29 PROCEDURE — 99215 OFFICE O/P EST HI 40 MIN: CPT | Mod: S$GLB,,, | Performed by: ORTHOPAEDIC SURGERY

## 2024-01-29 PROCEDURE — 99999 PR PBB SHADOW E&M-EST. PATIENT-LVL IV: CPT | Mod: PBBFAC,,, | Performed by: ORTHOPAEDIC SURGERY

## 2024-01-29 RX ORDER — MUPIROCIN 20 MG/G
OINTMENT TOPICAL
Status: CANCELLED | OUTPATIENT
Start: 2024-01-29

## 2024-01-29 NOTE — H&P (VIEW-ONLY)
CC:  73-year-old female follows up with osteoarthritis of her left knee.  She is having severe pain in the left knee.  She has pain with basic daily activities such as shopping and housework.  She is pain keeps her up at night.  She rates the pain as an 8/10.  She is tried activity modification, p.o. medications, and exercise program all without relief.  She is also tried intra-articular steroid injections which longer give her relief.    Past Medical History:   Diagnosis Date    Allergy     Arthritis     Chronic lower back pain     Depression     Fever blister     GERD (gastroesophageal reflux disease)     Hemorrhoids, internal 11/05/2014    Hyperlipidemia     Hypertension     Joint pain     Morbid obesity with BMI of 40.0-44.9, adult 02/24/2015    Multiple gastric ulcers 12/14/2017    TIMMY (obstructive sleep apnea)     does not use CPAP    Pulmonary hypertension     Skin cancer     Type 2 diabetes mellitus with hyperglycemia, without long-term current use of insulin 4/20/2022       Past Surgical History:   Procedure Laterality Date    ANTERIOR CERVICAL CORPECTOMY W/ FUSION  1988    BACK SURGERY      CAUDAL EPIDURAL STEROID INJECTION N/A 01/10/2019    Procedure: Injection-steroid-epidural-caudal;  Surgeon: Lydia Velásquez Jr., MD;  Location: [facility]GT;  Service: Pain Management;  Laterality: N/A;    COLONOSCOPY N/A 11/02/2016    Procedure: COLONOSCOPY;  Surgeon: Viji Dewitt MD;  Location: [facility];  Service: Endoscopy;  Laterality: N/A;    EPIDURAL STEROID INJECTION N/A 05/18/2022    Procedure: Injection, Steroid, Epidural;  Surgeon: Fermín Luo MD;  Location: [facility] OR;  Service: Pain Management;  Laterality: N/A;  L2-l3     EPIDURAL STEROID INJECTION INTO LUMBAR SPINE N/A 11/08/2018    Procedure: Injection-steroid-epidural-lumbar-L2-3 (LEFT > RIGHT);  Surgeon: Lydia Velásquez Jr., MD;  Location: [facility]IN [facility]GT;  Service: Pain Management;  Laterality: N/A;    ESOPHAGOGASTRODUODENOSCOPY N/A 10/6/2022     Procedure: EGD (ESOPHAGOGASTRODUODENOSCOPY);  Surgeon: Migel Koroma MD;  Location: [facility] ENDO;  Service: Endoscopy;  Laterality: N/A;    FUSION OF SPINE WITH INSTRUMENTATION Left 01/03/2020    Procedure: FUSION, SPINE, WITH INSTRUMENTATION Stage 1 Left L3-4 LOIF Rise L Stage 2 Open L3-4 L4-5 Laminectomy Removal of L4-5 Hardware, L3-5 Posterior Instrumentation and extraction of Fusion at L3-4;  Surgeon: James Brantley MD;  Location: [facility] OR;  Service: Neurosurgery;  Laterality: Left;  Procedure: Stage 1 Left L3-4 LOIF Rise L  Stage 2 Open L3-4 L4-5 Laminectomy Removal of L4-5 Hardware,     INJECTION OF ANESTHETIC AGENT AROUND GENITOFEMORAL NERVE Bilateral 08/01/2019    Procedure: BILATERAL SHOULDER ARTICULAR BRANCH BLOCK;  Surgeon: Lydia Velásquez Jr., MD;  Location: [facility]IN [facility]GT;  Service: Pain Management;  Laterality: Bilateral;    JOINT REPLACEMENT      KNEE ARTHROSCOPY W/ MENISCAL REPAIR Right 2012    LUMBAR LAMINECTOMY  06/19/2015    REVERSE TOTAL SHOULDER ARTHROPLASTY Right 05/25/2022    Procedure: ARTHROPLASTY, SHOULDER, TOTAL, REVERSE;  Surgeon: Herb Tan II, MD;  Location: [facility] OR;  Service: Orthopedics;  Laterality: Right;    SKIN CANCER EXCISION      forehead does not recall date    TOTAL HIP ARTHROPLASTY Right 04/28/2016         TUBAL LIGATION  1980       Current Outpatient Medications on File Prior to Visit   Medication Sig Dispense Refill    amLODIPine (NORVASC) 10 MG tablet Take 1 tablet (10 mg total) by mouth every evening. 90 tablet 0    ammonium lactate (LAC-HYDRIN) 12 % lotion USE ON FEET AS NEEDED      ascorbic acid, vitamin C, (VITAMIN C) 1000 MG tablet Take 1,000 mg by mouth once daily.      atenoloL (TENORMIN) 25 MG tablet Take 1 tablet (25 mg total) by mouth every evening. 90 tablet 1    blood sugar diagnostic Strp 1 strip by Misc.(Non-Drug; Combo Route) route 2 (two) times daily with meals. 100 strip 11    blood-glucose meter kit Use as instructed 1 each 0    calcium-vitamin  "D3 (OS-KATERIN 500 + D3) 500 mg-5 mcg (200 unit) per tablet Take 1 tablet by mouth 2 (two) times daily with meals.      carboxymethylcellulose 1 % ophthalmic solution 1 drop.      CINNAMON BARK, BULK, MISC 1,000 mg by Misc.(Non-Drug; Combo Route) route.      clindamycin (CLEOCIN T) 1 % lotion Use hs on face 60 mL 3    co-enzyme Q-10 30 mg capsule Take 30 mg by mouth once daily.       diclofenac sodium (VOLTAREN) 1 % Gel Apply 2 g topically once daily.      diclofenac sodium 1.5 % Drop SMARTSI Drop(s) Topical 1 to 4 Times Daily      econazole nitrate 1 % cream Use bid for rash 85 g 2    famotidine (PEPCID) 40 MG tablet Take 1 tablet (40 mg total) by mouth once daily. 90 tablet 2    fenofibrate micronized (LOFIBRA) 134 MG Cap TAKE 1 CAPSULE (134 MG TOTAL) BY MOUTH ONCE DAILY 90 capsule 2    furosemide (LASIX) 40 MG tablet Take lasix 40 mg PO every other day PRN swelling 45 tablet 3    gabapentin (NEURONTIN) 600 MG tablet Take 1 tablet (600 mg total) by mouth 3 (three) times daily. 270 tablet 3    ketoconazole (NIZORAL) 2 % cream Apply topically 2 (two) times daily. 60 g 3    lancets Misc 1 lancet by Misc.(Non-Drug; Combo Route) route 2 (two) times daily with meals. 100 each 11    lancing device Misc 1 Device by Misc.(Non-Drug; Combo Route) route 2 (two) times daily with meals. 1 each 0    levocetirizine (XYZAL) 5 MG tablet Take 1 tablet (5 mg total) by mouth every evening. 90 tablet 1    LIDOcaine-prilocaine (EMLA) cream SMARTSI Gram(s) Topical 3-4 Times Daily      multivitamin capsule Take 1 capsule by mouth once daily.      pantoprazole (PROTONIX) 40 MG tablet Take 1 tablet (40 mg total) by mouth once daily. 90 tablet 3    pen needle, diabetic 31 gauge x 5/16" Ndle 1 each by Misc.(Non-Drug; Combo Route) route 2 (two) times daily with meals. 100 each 11    pravastatin (PRAVACHOL) 40 MG tablet TAKE 1 TABLET (40 MG TOTAL) BY MOUTH EVERY EVENING. 90 tablet 2    semaglutide (RYBELSUS) 7 mg tablet Take 1 tablet (7 mg " total) by mouth once daily. 30 tablet 11    sertraline (ZOLOFT) 100 MG tablet TAKE 1 TABLET (100 MG TOTAL) BY MOUTH EVERY EVENING. 90 tablet 0    solifenacin (VESICARE) 5 MG tablet Take 1 tablet (5 mg total) by mouth once daily. 90 tablet 3    triamcinolone acetonide 0.025% (KENALOG) 0.025 % cream Apply topically 2 (two) times daily. 80 g 0    vitamin E 400 UNIT capsule Take 400 Units by mouth once daily.      cycloSPORINE (RESTASIS) 0.05 % ophthalmic emulsion Place 1 drop into both eyes 2 (two) times daily. (Patient not taking: Reported on 8/10/2022) 24 mL 11     No current facility-administered medications on file prior to visit.       ROS:    Constitution: Denies chills, fever, and sweats.  HENT: Denies headaches or blurry vision.  Cardiovascular: Denies chest pain or irregular heart beat.  Respiratory: Denies cough or shortness of breath.  Gastrointestinal: Denies abdominal pain, nausea, or vomiting.  Genitourinary:  Denies urinary incontinence, bladder and kidney issues  Musculoskeletal:  Denies muscle cramps.  Neurological: Denies dizziness or focal weakness.  Psychiatric/Behavioral: Normal mental status.  Hematologic/Lymphatic: Denies bleeding problem or easy bruising/bleeding.  Skin: Denies rash or suspicious lesions.    Physical examination     Gen - No acute distress, well nourished, well groomed   Eyes - Extraoccular motions intact, pupils equally round and reactive to light and accommodation   ENT - normocephalic, atruamtic, oropharynx clear   Neck - Supple, no abnormal masses   Cardiovascular - regular rate and rhythm   Pulmonary - clear to auscultation bilaterally, no wheezes, ronchi, or rales   Abdomen - soft, non-tender, non-distended, positive bowel sounds   Psych - The patient is alert and oriented x3 with normal mood and affect    Examination of the Left Lower Extremity:     Skin intact throughout.  Motor function is intact distally EHL/FHL/TA/argelia   +2 dorsalis pedis and posterior tibial pulses    Sensation to light touch intact distally dorsal, plantar, and first web space     Examination of the Left knee:    ROM 0 - 130   Effusion positive  Tenderness to palpation at the joint line positive  Pain during range of motion positive  Crepitation during range of motion positive     positive increased pain noted with flexion past 90   positive antalgic gait noted   negative Lachman's Test   negative Anterior Drawer Test   negative Posterior Drawer Test   positive McMurrays Test   positive Disco Test   negative   Varus/Valgus instability    X-ray images were examined and personally interpreted by me.  Three views of the left knee dated 01/29/2024 show severe osteoarthritis of the left knee with complete loss of joint space, periarticular osteophytes, and subchondral sclerosis.  Varus collapse is noted.  Kellgren grade 4    Dx:  Severe osteoarthritis left knee    Plan:  Potential morbidity to the left lower extremity with both operative and non operative treatments were discussed.  Recommendation is for left total knee arthroplasty.  Risks and benefits of surgery were explained to the patient.  She verbalized understanding and does wish to proceed.  We will schedule that for the next available date that is convenient for her.

## 2024-01-29 NOTE — PROGRESS NOTES
CC:  73-year-old female follows up with osteoarthritis of her left knee.  She is having severe pain in the left knee.  She has pain with basic daily activities such as shopping and housework.  She is pain keeps her up at night.  She rates the pain as an 8/10.  She is tried activity modification, p.o. medications, and exercise program all without relief.  She is also tried intra-articular steroid injections which longer give her relief.    Past Medical History:   Diagnosis Date    Allergy     Arthritis     Chronic lower back pain     Depression     Fever blister     GERD (gastroesophageal reflux disease)     Hemorrhoids, internal 11/05/2014    Hyperlipidemia     Hypertension     Joint pain     Morbid obesity with BMI of 40.0-44.9, adult 02/24/2015    Multiple gastric ulcers 12/14/2017    TIMMY (obstructive sleep apnea)     does not use CPAP    Pulmonary hypertension     Skin cancer     Type 2 diabetes mellitus with hyperglycemia, without long-term current use of insulin 4/20/2022       Past Surgical History:   Procedure Laterality Date    ANTERIOR CERVICAL CORPECTOMY W/ FUSION  1988    BACK SURGERY      CAUDAL EPIDURAL STEROID INJECTION N/A 01/10/2019    Procedure: Injection-steroid-epidural-caudal;  Surgeon: Lydia Velásquez Jr., MD;  Location: [facility] PAIN MGT;  Service: Pain Management;  Laterality: N/A;    COLONOSCOPY N/A 11/02/2016    Procedure: COLONOSCOPY;  Surgeon: Viji Dewitt MD;  [facility];  Service: Endoscopy;  Laterality: N/A;    EPIDURAL STEROID INJECTION N/A 05/18/2022    Procedure: Injection, Steroid, Epidural;  Surgeon: Fermín Luo MD;  Location: [facility] OR;  Service: Pain Management;  Laterality: N/A;  L2-l3     EPIDURAL STEROID INJECTION INTO LUMBAR SPINE N/A 11/08/2018    Procedure: Injection-steroid-epidural-lumbar-L2-3 (LEFT > RIGHT);  Surgeon: Lydia Velásquez Jr., MD;  Location: [facility] PAIN MGT;  Service: Pain Management;  Laterality: N/A;    ESOPHAGOGASTRODUODENOSCOPY N/A 10/6/2022     Procedure: EGD (ESOPHAGOGASTRODUODENOSCOPY);  Surgeon: Migel Koroma MD;  Location: [facility] ENDO;  Service: Endoscopy;  Laterality: N/A;    FUSION OF SPINE WITH INSTRUMENTATION Left 01/03/2020    Procedure: FUSION, SPINE, WITH INSTRUMENTATION Stage 1 Left L3-4 LOIF Rise L Stage 2 Open L3-4 L4-5 Laminectomy Removal of L4-5 Hardware, L3-5 Posterior Instrumentation and extraction of Fusion at L3-4;  Surgeon: James Brantley MD;  Location: [facility] OR;  Service: Neurosurgery;  Laterality: Left;  Procedure: Stage 1 Left L3-4 LOIF Rise L  Stage 2 Open L3-4 L4-5 Laminectomy Removal of L4-5 Hardware,     INJECTION OF ANESTHETIC AGENT AROUND GENITOFEMORAL NERVE Bilateral 08/01/2019    Procedure: BILATERAL SHOULDER ARTICULAR BRANCH BLOCK;  Surgeon: Lydia Velásquez Jr., MD;  Location: [facility] PAIN MGT;  Service: Pain Management;  Laterality: Bilateral;    JOINT REPLACEMENT      KNEE ARTHROSCOPY W/ MENISCAL REPAIR Right 2012    LUMBAR LAMINECTOMY  06/19/2015    REVERSE TOTAL SHOULDER ARTHROPLASTY Right 05/25/2022    Procedure: ARTHROPLASTY, SHOULDER, TOTAL, REVERSE;  Surgeon: Herb Tan II, MD;  Location: [facility] OR;  Service: Orthopedics;  Laterality: Right;    SKIN CANCER EXCISION      forehead does not recall date    TOTAL HIP ARTHROPLASTY Right 04/28/2016         TUBAL LIGATION  1980       Current Outpatient Medications on File Prior to Visit   Medication Sig Dispense Refill    amLODIPine (NORVASC) 10 MG tablet Take 1 tablet (10 mg total) by mouth every evening. 90 tablet 0    ammonium lactate (LAC-HYDRIN) 12 % lotion USE ON FEET AS NEEDED      ascorbic acid, vitamin C, (VITAMIN C) 1000 MG tablet Take 1,000 mg by mouth once daily.      atenoloL (TENORMIN) 25 MG tablet Take 1 tablet (25 mg total) by mouth every evening. 90 tablet 1    blood sugar diagnostic Strp 1 strip by Misc.(Non-Drug; Combo Route) route 2 (two) times daily with meals. 100 strip 11    blood-glucose meter kit Use as instructed 1 each 0    calcium-vitamin  "D3 (OS-KATERIN 500 + D3) 500 mg-5 mcg (200 unit) per tablet Take 1 tablet by mouth 2 (two) times daily with meals.      carboxymethylcellulose 1 % ophthalmic solution 1 drop.      CINNAMON BARK, BULK, MISC 1,000 mg by Misc.(Non-Drug; Combo Route) route.      clindamycin (CLEOCIN T) 1 % lotion Use hs on face 60 mL 3    co-enzyme Q-10 30 mg capsule Take 30 mg by mouth once daily.       diclofenac sodium (VOLTAREN) 1 % Gel Apply 2 g topically once daily.      diclofenac sodium 1.5 % Drop SMARTSI Drop(s) Topical 1 to 4 Times Daily      econazole nitrate 1 % cream Use bid for rash 85 g 2    famotidine (PEPCID) 40 MG tablet Take 1 tablet (40 mg total) by mouth once daily. 90 tablet 2    fenofibrate micronized (LOFIBRA) 134 MG Cap TAKE 1 CAPSULE (134 MG TOTAL) BY MOUTH ONCE DAILY 90 capsule 2    furosemide (LASIX) 40 MG tablet Take lasix 40 mg PO every other day PRN swelling 45 tablet 3    gabapentin (NEURONTIN) 600 MG tablet Take 1 tablet (600 mg total) by mouth 3 (three) times daily. 270 tablet 3    ketoconazole (NIZORAL) 2 % cream Apply topically 2 (two) times daily. 60 g 3    lancets Misc 1 lancet by Misc.(Non-Drug; Combo Route) route 2 (two) times daily with meals. 100 each 11    lancing device Misc 1 Device by Misc.(Non-Drug; Combo Route) route 2 (two) times daily with meals. 1 each 0    levocetirizine (XYZAL) 5 MG tablet Take 1 tablet (5 mg total) by mouth every evening. 90 tablet 1    LIDOcaine-prilocaine (EMLA) cream SMARTSI Gram(s) Topical 3-4 Times Daily      multivitamin capsule Take 1 capsule by mouth once daily.      pantoprazole (PROTONIX) 40 MG tablet Take 1 tablet (40 mg total) by mouth once daily. 90 tablet 3    pen needle, diabetic 31 gauge x 5/16" Ndle 1 each by Misc.(Non-Drug; Combo Route) route 2 (two) times daily with meals. 100 each 11    pravastatin (PRAVACHOL) 40 MG tablet TAKE 1 TABLET (40 MG TOTAL) BY MOUTH EVERY EVENING. 90 tablet 2    semaglutide (RYBELSUS) 7 mg tablet Take 1 tablet (7 mg " total) by mouth once daily. 30 tablet 11    sertraline (ZOLOFT) 100 MG tablet TAKE 1 TABLET (100 MG TOTAL) BY MOUTH EVERY EVENING. 90 tablet 0    solifenacin (VESICARE) 5 MG tablet Take 1 tablet (5 mg total) by mouth once daily. 90 tablet 3    triamcinolone acetonide 0.025% (KENALOG) 0.025 % cream Apply topically 2 (two) times daily. 80 g 0    vitamin E 400 UNIT capsule Take 400 Units by mouth once daily.      cycloSPORINE (RESTASIS) 0.05 % ophthalmic emulsion Place 1 drop into both eyes 2 (two) times daily. (Patient not taking: Reported on 8/10/2022) 24 mL 11     No current facility-administered medications on file prior to visit.       ROS:    Constitution: Denies chills, fever, and sweats.  HENT: Denies headaches or blurry vision.  Cardiovascular: Denies chest pain or irregular heart beat.  Respiratory: Denies cough or shortness of breath.  Gastrointestinal: Denies abdominal pain, nausea, or vomiting.  Genitourinary:  Denies urinary incontinence, bladder and kidney issues  Musculoskeletal:  Denies muscle cramps.  Neurological: Denies dizziness or focal weakness.  Psychiatric/Behavioral: Normal mental status.  Hematologic/Lymphatic: Denies bleeding problem or easy bruising/bleeding.  Skin: Denies rash or suspicious lesions.    Physical examination     Gen - No acute distress, well nourished, well groomed   Eyes - Extraoccular motions intact, pupils equally round and reactive to light and accommodation   ENT - normocephalic, atruamtic, oropharynx clear   Neck - Supple, no abnormal masses   Cardiovascular - regular rate and rhythm   Pulmonary - clear to auscultation bilaterally, no wheezes, ronchi, or rales   Abdomen - soft, non-tender, non-distended, positive bowel sounds   Psych - The patient is alert and oriented x3 with normal mood and affect    Examination of the Left Lower Extremity:     Skin intact throughout.  Motor function is intact distally EHL/FHL/TA/argelia   +2 dorsalis pedis and posterior tibial pulses    Sensation to light touch intact distally dorsal, plantar, and first web space     Examination of the Left knee:    ROM 0 - 130   Effusion positive  Tenderness to palpation at the joint line positive  Pain during range of motion positive  Crepitation during range of motion positive     positive increased pain noted with flexion past 90   positive antalgic gait noted   negative Lachman's Test   negative Anterior Drawer Test   negative Posterior Drawer Test   positive McMurrays Test   positive Disco Test   negative   Varus/Valgus instability    X-ray images were examined and personally interpreted by me.  Three views of the left knee dated 01/29/2024 show severe osteoarthritis of the left knee with complete loss of joint space, periarticular osteophytes, and subchondral sclerosis.  Varus collapse is noted.  Kellgren grade 4    Dx:  Severe osteoarthritis left knee    Plan:  Potential morbidity to the left lower extremity with both operative and non operative treatments were discussed.  Recommendation is for left total knee arthroplasty.  Risks and benefits of surgery were explained to the patient.  She verbalized understanding and does wish to proceed.  We will schedule that for the next available date that is convenient for her.

## 2024-02-08 DIAGNOSIS — I10 ESSENTIAL HYPERTENSION: ICD-10-CM

## 2024-02-08 DIAGNOSIS — J30.9 ALLERGIC RHINITIS, UNSPECIFIED SEASONALITY, UNSPECIFIED TRIGGER: ICD-10-CM

## 2024-02-08 DIAGNOSIS — H10.13 ALLERGIC CONJUNCTIVITIS OF BOTH EYES: ICD-10-CM

## 2024-02-08 NOTE — TELEPHONE ENCOUNTER
No care due was identified.  [facility] Embedded Care Due Messages. Reference number: [id number].   2/08/2024 2:09:07 PM CST

## 2024-02-09 RX ORDER — ATENOLOL 25 MG/1
25 TABLET ORAL NIGHTLY
Qty: 90 TABLET | Refills: 3 | Status: SHIPPED | OUTPATIENT
Start: 2024-02-09

## 2024-02-09 RX ORDER — PANTOPRAZOLE SODIUM 40 MG/1
40 TABLET, DELAYED RELEASE ORAL DAILY
Qty: 90 TABLET | Refills: 3 | Status: SHIPPED | OUTPATIENT
Start: 2024-02-09

## 2024-02-09 RX ORDER — LEVOCETIRIZINE DIHYDROCHLORIDE 5 MG/1
5 TABLET, FILM COATED ORAL NIGHTLY
Qty: 90 TABLET | Refills: 3 | Status: SHIPPED | OUTPATIENT
Start: 2024-02-09

## 2024-02-09 NOTE — TELEPHONE ENCOUNTER
Refill Decision Note   Monique Bagwen  is requesting a refill authorization.  Brief Assessment and Rationale for Refill:  Approve     Medication Therapy Plan:         Alert overridden per protocol: Yes   Pharmacist review requested: Yes   Comments:     Note composed:8:33 AM 02/09/2024

## 2024-02-09 NOTE — TELEPHONE ENCOUNTER
Refill Routing Note   Medication(s) are not appropriate for processing by [facility] for the following reason(s):      Drug-disease interaction    [facility] action(s):  Defer  Approve Care Due:  None identified     Medication Therapy Plan: levocetirizine and Chronic kidney disease, stage 3a    Alert overridden per protocol: Yes   Pharmacist review requested: Yes     Appointments  past 12m or future 3m with PCP    Date Provider   Last Visit   1/22/2024 Milton Mahmood MD   Next Visit   7/26/2024 Milton Mahmood MD   ED visits in past 90 days: 0        Note composed:8:16 AM 02/09/2024

## 2024-02-14 ENCOUNTER — HOSPITAL ENCOUNTER (OUTPATIENT)
Dept: RADIOLOGY | Facility: HOSPITAL | Age: 74
Discharge: HOME | End: 2024-02-14
Attending: ORTHOPAEDIC SURGERY
Payer: MEDICARE

## 2024-02-14 ENCOUNTER — HOSPITAL ENCOUNTER (OUTPATIENT)
Dept: PREADMISSION TESTING | Facility: HOSPITAL | Age: 74
Discharge: HOME | End: 2024-02-14
Attending: ORTHOPAEDIC SURGERY
Payer: MEDICARE

## 2024-02-14 DIAGNOSIS — Z01.818 PREOP TESTING: Primary | ICD-10-CM

## 2024-02-14 DIAGNOSIS — M17.12 UNILATERAL PRIMARY OSTEOARTHRITIS, LEFT KNEE: ICD-10-CM

## 2024-02-14 DIAGNOSIS — M17.12 UNILATERAL PRIMARY OSTEOARTHRITIS, LEFT KNEE: Primary | ICD-10-CM

## 2024-02-14 LAB
BASOPHILS # BLD AUTO: 0.07 K/UL (ref 0–0.2)
BASOPHILS NFR BLD: 1.3 % (ref 0–1.9)
DIFFERENTIAL METHOD BLD: NORMAL
EOSINOPHIL # BLD AUTO: 0.1 K/UL (ref 0–0.5)
EOSINOPHIL NFR BLD: 2.4 % (ref 0–8)
ERYTHROCYTE [DISTWIDTH] IN BLOOD BY AUTOMATED COUNT: 13.1 % (ref 11.5–14.5)
HCT VFR BLD AUTO: 40.7 % (ref 37–48.5)
HGB BLD-MCNC: 13.3 G/DL (ref 12–16)
IMM GRANULOCYTES # BLD AUTO: 0.02 K/UL (ref 0–0.04)
IMM GRANULOCYTES NFR BLD AUTO: 0.4 % (ref 0–0.5)
LYMPHOCYTES # BLD AUTO: 1.1 K/UL (ref 1–4.8)
LYMPHOCYTES NFR BLD: 21 % (ref 18–48)
MCH RBC QN AUTO: 30.9 PG (ref 27–31)
MCHC RBC AUTO-ENTMCNC: 32.7 G/DL (ref 32–36)
MCV RBC AUTO: 95 FL (ref 82–98)
MONOCYTES # BLD AUTO: 0.6 K/UL (ref 0.3–1)
MONOCYTES NFR BLD: 11.2 % (ref 4–15)
NEUTROPHILS # BLD AUTO: 3.4 K/UL (ref 1.8–7.7)
NEUTROPHILS NFR BLD: 63.7 % (ref 38–73)
NRBC BLD-RTO: 0 /100 WBC
PLATELET # BLD AUTO: 274 K/UL (ref 150–450)
PMV BLD AUTO: 9.9 FL (ref 9.2–12.9)
RBC # BLD AUTO: 4.3 M/UL (ref 4–5.4)
WBC # BLD AUTO: 5.37 K/UL (ref 3.9–12.7)

## 2024-02-14 PROCEDURE — 85025 COMPLETE CBC W/AUTO DIFF WBC: CPT | Performed by: ORTHOPAEDIC SURGERY

## 2024-02-14 PROCEDURE — 71046 X-RAY EXAM CHEST 2 VIEWS: CPT | Mod: 26,,, | Performed by: RADIOLOGY

## 2024-02-14 PROCEDURE — 93010 ELECTROCARDIOGRAM REPORT: CPT | Mod: ,,, | Performed by: GENERAL PRACTICE

## 2024-02-14 PROCEDURE — 93005 ELECTROCARDIOGRAM TRACING: CPT

## 2024-02-14 PROCEDURE — 36415 COLL VENOUS BLD VENIPUNCTURE: CPT | Performed by: ORTHOPAEDIC SURGERY

## 2024-02-14 PROCEDURE — 71046 X-RAY EXAM CHEST 2 VIEWS: CPT | Mod: TC

## 2024-02-14 NOTE — PRE ADMISSION SCREENING
JOINT CAMP ASSESSMENT    Name Monique Trujillo   MRN [id number]    Age/Sex 73 y.o. female    Surgeon Dr. Herb Tan II   Joint Camp Date 2/14/2024   Surgery Date 2/28/2024   Procedure Left Knee Arthroplasty   Insurance Payor: [employer] [facility] / Plan: [employer] [facility] [employer] / Product Type: Medicare Advantage /    Care Team Patient Care Team:  Milton Mahmood MD as PCP - General (Family Medicine)  Esperanza Roque LPN as Care Coordinator  Felicia Geronimo RD as Diabetes Educator (Diabetes)    Pharmacy   [facility] [place]  [address]  [address]  [place] [address]  Phone: [phone] Fax: [phone]     AM-PAC Score   23   Risk Assessment Score 4     Past Medical History:   Diagnosis Date    Allergy     Arthritis     Chronic lower back pain     Depression     Fever blister     GERD (gastroesophageal reflux disease)     Hemorrhoids, internal 11/05/2014    Hyperlipidemia     Hypertension     Joint pain     Morbid obesity with BMI of 40.0-44.9, adult 02/24/2015    Multiple gastric ulcers 12/14/2017    TIMMY (obstructive sleep apnea)     does not use CPAP    Pulmonary hypertension     Skin cancer     Type 2 diabetes mellitus with hyperglycemia, without long-term current use of insulin 4/20/2022       Past Surgical History:   Procedure Laterality Date    ANTERIOR CERVICAL CORPECTOMY W/ FUSION  1988    BACK SURGERY      CAUDAL EPIDURAL STEROID INJECTION N/A 01/10/2019    Procedure: Injection-steroid-epidural-caudal;  Surgeon: Lydia Velásquez Jr., MD;  [facility];  Service: Pain Management;  Laterality: N/A;    COLONOSCOPY N/A 11/02/2016    Procedure: COLONOSCOPY;  Surgeon: Viji Dewitt MD;  Location: [facility];  Service: Endoscopy;  Laterality: N/A;    EPIDURAL STEROID INJECTION N/A 05/18/2022    Procedure: Injection, Steroid, Epidural;  Surgeon: Fermín Luo MD;  Location: [facility] OR;  Service: Pain Management;  Laterality:  N/A;  L2-l3     EPIDURAL STEROID INJECTION INTO LUMBAR SPINE N/A 11/08/2018    Procedure: Injection-steroid-epidural-lumbar-L2-3 (LEFT > RIGHT);  Surgeon: Lydia Velásquez Jr., MD;  Location: [facility]IN [facility];  Service: Pain Management;  Laterality: N/A;    ESOPHAGOGASTRODUODENOSCOPY N/A 10/06/2022    Procedure: EGD (ESOPHAGOGASTRODUODENOSCOPY);  Surgeon: Migel Koroma MD;  Location: [facility];  Service: Endoscopy;  Laterality: N/A;    FUSION OF SPINE WITH INSTRUMENTATION Left 01/03/2020    Procedure: FUSION, SPINE, WITH INSTRUMENTATION Stage 1 Left L3-4 LOIF Rise L Stage 2 Open L3-4 L4-5 Laminectomy Removal of L4-5 Hardware, L3-5 Posterior Instrumentation and extraction of Fusion at L3-4;  Surgeon: James Brantley MD;  Location: [facility] OR;  Service: Neurosurgery;  Laterality: Left;  Procedure: Stage 1 Left L3-4 LOIF Rise L  Stage 2 Open L3-4 L4-5 Laminectomy Removal of L4-5 Hardware,     INJECTION OF ANESTHETIC AGENT AROUND GENITOFEMORAL NERVE Bilateral 08/01/2019    Procedure: BILATERAL SHOULDER ARTICULAR BRANCH BLOCK;  Surgeon: Lydia Velásquez Jr., MD;  Location: [facility];  Service: Pain Management;  Laterality: Bilateral;    JOINT REPLACEMENT      R sided--knee, hip and shoulder    KNEE ARTHROSCOPY W/ MENISCAL REPAIR Right 2012    LUMBAR LAMINECTOMY  06/19/2015    REVERSE TOTAL SHOULDER ARTHROPLASTY Right 05/25/2022    Procedure: ARTHROPLASTY, SHOULDER, TOTAL, REVERSE;  Surgeon: Herb Tan II, MD;  Location: [facility] OR;  Service: Orthopedics;  Laterality: Right;    SKIN CANCER EXCISION      forehead does not recall date    TUBAL LIGATION  1980         Home Enviroment     Living Arrangement: Lives with family  Home Environment: 1-story house/ trailer, number of outside stairs: 1, walk-in shower  Home Safety Concerns: Pets in the home: dogs (5).    DISCHARGE CAREGIVER/SUPPORT SYSTEM     Identified post-op caregiver: Patient has children / family / friends to help, daughter and grandson .  Patient's  caregiver(s) will be able to provide physical assistance. Patient will have someone to assist overnight.      Caregiver present at pre-op interview:  No      PRE-OPERATIVE FUNCTIONAL STATUS     Employment: Retired    Pre-op Functional Status: Patient is independent with mobility/ambulation, transfers, ADL's, IADL's.    Use of assistive device for ambulation: rollator  ADL: self care  ADL Limitations: difficulty with walking  Medical Restrictions: Unstable ambulation and Decreased range of motions in extremities    POTENTIAL BARRIERS TO DISCHARGE/POTENTIAL POST-OP COMPLICATIONS     Patient with hx of HTN, sleep apnea (NO CPAP), Type II diabetes. POSSIBLE SAME DAY DISCHARGE.    DISCHARGE PLAN     Expected LOS of 1 days or less for joint replacement discussed with patient. We also discussed a discharge path of HH for approximately two weeks with a transition to outpatient PT on the third week given no post-op complications.      Patient in agreement with discharge plan: Yes    Discharge to: Discharge home with home health (PT/OT) x2 weeks with transition to outpatient PT     HH:  [facility]  Patient disclosure form completed and sent to case management for upload to the medical record.      [facility] PT[facility] ([facility]     Home DME: rollator    Needed DME at D/C: rolling walker and bedside commode. Patient to purchase BSC from retail prior to surgery if needed. Patient has handicapped toilet with sink and walk in tub to push up with.     Rx: Per Dr. Herb Tan at discharge     Meds to Beds: Yes  Patient expected to discharge on Aspirin 81mg by mouth twice daily for DVT prophylaxis.

## 2024-02-14 NOTE — PRE ADMISSION SCREENING
"               [facility] Risk Assessment Scale    Patient Name: Monique Trujillo  YOB: 1950  MRN: [id number]            RIsk Factor Measure Recommendation Patient Data Scale/Score   BMI >40 Reconsider surgery, weight loss   Estimated body mass index is 42.32 kg/m² as calculated from the following:    Height as of 1/29/24: 5' 1" (1.549 m).    Weight as of 1/29/24: 101.6 kg (223 lb 15.8 oz).   [] 0 = 1 - 24.9  [] 1 = 25-29.9  [] 2 = 30-34.9  [] 3 = 35-39.9  [x] 4 = 40-44.9  [] 5 = 45-99.9   Hemoglobin AIC (if applicable) >9 Delay surgery until DM under control  Refer for:  Nutrition Therapy  Exercise   Medication    Lab Results   Component Value Date    HGBA1C 5.2 01/18/2024       Lab Results   Component Value Date    GLU 85 01/18/2024      [x] 0 = 4.0-5.6  [] 1 = 5.7-6.4  [] 2 = 6.5-6.9  [] 3 = 7.0-7.9  [] 4 = 8.0-8.9  [] 5 = 9.0-12   Hemoglobin (Anemia) <9 Delay surgery   Correct anemia Lab Results   Component Value Date    HGB 13.3 02/14/2024    [] 20 - <9.0                    Albumin <3 Delay surgery &Workup Lab Results   Component Value Date    ALBUMIN 4.1 01/18/2024    [] 20 - <3.0   Smoking Cessation >4 Weeks Delay Surgery  Refer to OP Cessation Class    Never Smoker [] 20 - current smoker                                _____ PPD                    Hx of MI, PE, Arrhythmia, CVA, DVT <30 Days Delay Surgery    N/A [] 20      Infection Variable Delay surgery and re-evaluate   N/A [] 20 - recent/current infection     Depression (PHQ) >10 out of 27 Delay Surgery and re-evaluate  Medication  Counseling              [x] 0     []1     []2     []3      []4      [] 5                    (1-4)      (5-9)  (10-14)  (15-19)   (20-27)     Memory Impairment & Memory loss (Mini-Cog Screening Tool) Advanced dementia and/or Parkinson's Reconsider surgery     [x] 0     []1     []2     []3     []4     [] 5     Physical Conditioning (Modified AM-PAC Per Physical Therapy at [facility]) Unable to ambulate on day of surgery " Delay surgery and re-evaluate  Pre-Rehabilitation   (PT evaluation)       [x]  0   []4       []8     []12        []16     []20       (<20%)   (<40%)   (<60%)   (<80% )    (>80%)     Home Environment/Caregiver support  (Per /Navigator Interview)    Availability of basic services and/or approprate assistance during post-operative period Delay surgery and re-evaluate  Safe home environment  Health   1 week post-surgery  Transportation  availability  Ability to obtain DME/Medications post-op    [x] 0     []1     []2     []3     []4     [] 5  [x] 0     []1     []2     []3     []4     [] 5  [x] 0     []1     []2     []3     []4     [] 5  [x] 0     []1     []2     []3     []4     [] 5         MD Contact: Dr. Herb Tan Comments:  Total Score:  4

## 2024-02-14 NOTE — DISCHARGE INSTRUCTIONS
To confirm, Your doctor has instructed you that surgery is scheduled for: 2/28/24    Please report to [facility], Registration the morning of surgery. You must check-in and receive a wristband before going to your procedure.  [address] [place] [address] [place] [address]    Pre-Op will call the afternoon prior to surgery between 1:00 and 6:00 PM with the final arrival time.  Phone number: [phone]    PLEASE NOTE:  The surgery schedule has many variables which may affect the time of your surgery case.  Family members should be available if your surgery time changes.  Plan to be here the day of your procedure between 4-6 hours.    MEDICATIONS:  TAKE ONLY THESE MEDICATIONS WITH A SMALL SIP OF WATER THE MORNING OF YOUR PROCEDURE:    SEE MED LIST      DO NOT TAKE THESE MEDICATIONS 5-7 DAYS PRIOR to your procedure or per your surgeon's request:   ASPIRIN, ALEVE, ADVIL, IBUPROFEN, FISH OIL VITAMIN E, HERBALS  (May take Tylenol)    ONLY if you are prescribed any types of blood thinners such as:  Aspirin, Coumadin, Plavix, Pradaxa, Xarelto, Aggrenox, Effient, Eliquis, Savasya, Brilinta, or any other, ask your surgeon whether you should stop taking them and how long before surgery you should stop.  You may also need to verify with the prescribing physician if it is ok to stop your medication.      INSTRUCTIONS IMPORTANT!!  Do not eat or drink anything between midnight and the time of your procedure- this includes gum, mints, and candy.  Do not smoke or drink alcoholic beverages 24 hours prior to your procedure.  Shower the night before AND the morning of your procedure with a Chlorhexidine wash such as Hibiclens or Dial antibacterial soap from the neck down.  Do not get it on your face or in your eyes.  You may use your own shampoo and face wash. This helps your skin to be as bacteria free as possible.    If you wear contact lenses, dentures, hearing aids or glasses, bring a container to put them in during  surgery and give to a family member for safe keeping.  Please leave all jewelry, piercing's and valuables at home. You must remove your false eyelashes prior to surgery.    DO NOT remove hair from the surgery site.  Do not shave the incision site unless you are given specific instructions to do so.    ONLY if you have been diagnosed with sleep apnea please bring your C-PAP machine.  ONLY if you wear home oxygen please bring your portable oxygen tank the day of your procedure.  ONLY if you have a history of OPEN HEART SURGERY you will need a clearance from your Cardiologist per Anesthesia.      ONLY for patients requiring bowel prep, written instructions will be given by your doctor's office.  ONLY if you have a neuro stimulator, please bring the controller with you the morning of surgery  ONLY if a type and screen test is needed before surgery, please return:  If your doctor has scheduled you for an overnight stay, bring a small overnight bag with any personal items you need.  Make arrangements in advance for transportation home by a responsible adult. You can not go home in an uber or a cab per hospital policy.  It is not safe to drive a vehicle during the 24 hours after anesthesia.          All  facilities and properties are tobacco free.  Smoking is NOT allowed.   If you have any questions about these instructions, call Pre-Op Admit  Nursing at [phone] or the Pre-Op Day Surgery Unit at [phone].

## 2024-02-14 NOTE — PRE ADMISSION SCREENING
Patient Name: Monique Trujillo  YOB: 1950   MRN: [id number]     [facility]   Basic [facility]ility [facility] Short Form 6 Clicks         How much difficulty does the patient currently have  Unable  A Lot  A Little  None      1. Turning over in bed (including adjusting bedclothes, sheets and blankets)?     1 []    2 []    3 [x]    4 []        2. Sitting down on and standing up from a chair with arms (e.g., wheelchair, bedside commode, etc.)     1 []  2 []  3 []     4 [x]      3. Moving from lying on back to sitting on the side of the bed?     1 []  2 []  3 []    4 [x]    How much help from another person does the patient currently need  Total  A Lot  A Little  None      4. Moving to and from a bed to a chair (including a wheelchair)?    1 []  2 []  3 []    4 [x]      5. Need to walk in hospital room?    1 []  2 []  3 []    4 [x]      6. Climbing 3-5 steps with a railing?    1 []  2 []  3 []    4 [x]       Raw Score:     23             CMS 0-100% Score:   11.20         %   Standardized Score:   56.93            CMS Modifier:       CI                                   [facility]   Basic Mobility Inpatient Short Form 6 Clicks Score Conversion Table*         *Use this form to convert -PAC Basic Mobility Inpatient Raw Scores.   -[facility] Inpatient Basic Mobility Short Form Scoring Example   1. Add the number values associated with the response to each item. For example, items totals yield a Raw Score of 21.   2. Match the raw score to the t-Scale scores (t-Scale score = 50.25, SE = 4.69).   3. Find the associated CMS % (CMS % = 28.97%).   4. Locate the correct CMS Functional Modifier Code, or G Code (G code = CJ)     NOTE: Each -PAC Short Form has a separate conversion table. Make sure that you use the correct conversion table.       Instruction Manual - page 45 contains conversion table

## 2024-02-16 LAB
OHS QRS DURATION: 88 MS
OHS QTC CALCULATION: 442 MS

## 2024-02-26 DIAGNOSIS — M17.12 UNILATERAL PRIMARY OSTEOARTHRITIS, LEFT KNEE: Primary | ICD-10-CM

## 2024-02-27 ENCOUNTER — ANESTHESIA EVENT (OUTPATIENT)
Dept: SURGERY | Facility: HOSPITAL | Age: 74
End: 2024-02-27
Payer: MEDICARE

## 2024-02-27 RX ORDER — HYDROCODONE BITARTRATE AND ACETAMINOPHEN 7.5; 325 MG/1; MG/1
1 TABLET ORAL EVERY 6 HOURS PRN
Qty: 28 TABLET | Refills: 0 | Status: SHIPPED | OUTPATIENT
Start: 2024-02-27

## 2024-02-27 RX ORDER — ONDANSETRON 8 MG/1
8 TABLET, ORALLY DISINTEGRATING ORAL 2 TIMES DAILY PRN
Qty: 30 TABLET | Refills: 0 | Status: SHIPPED | OUTPATIENT
Start: 2024-02-28

## 2024-02-27 RX ORDER — ASPIRIN 81 MG/1
81 TABLET ORAL 2 TIMES DAILY
Qty: 28 TABLET | Refills: 0 | Status: SHIPPED | OUTPATIENT
Start: 2024-02-28 | End: 2024-03-13

## 2024-02-28 ENCOUNTER — HOSPITAL ENCOUNTER (OUTPATIENT)
Facility: HOSPITAL | Age: 74
Discharge: HOME | End: 2024-02-28
Attending: ORTHOPAEDIC SURGERY | Admitting: ORTHOPAEDIC SURGERY
Payer: MEDICARE

## 2024-02-28 ENCOUNTER — ANESTHESIA (OUTPATIENT)
Dept: SURGERY | Facility: HOSPITAL | Age: 74
End: 2024-02-28
Payer: MEDICARE

## 2024-02-28 DIAGNOSIS — M17.12 UNILATERAL PRIMARY OSTEOARTHRITIS, LEFT KNEE: Primary | ICD-10-CM

## 2024-02-28 DIAGNOSIS — Z01.818 PREOP TESTING: ICD-10-CM

## 2024-02-28 LAB — POCT GLUCOSE: 98 MG/DL (ref 70–110)

## 2024-02-28 PROCEDURE — C1776 JOINT DEVICE (IMPLANTABLE): HCPCS | Performed by: ORTHOPAEDIC SURGERY

## 2024-02-28 PROCEDURE — 25000003 PHARM REV CODE 250: Performed by: ANESTHESIOLOGY

## 2024-02-28 PROCEDURE — 63600175 PHARM REV CODE 636 W HCPCS

## 2024-02-28 PROCEDURE — 71000033 HC RECOVERY, INTIAL HOUR: Performed by: ORTHOPAEDIC SURGERY

## 2024-02-28 PROCEDURE — 37000009 HC ANESTHESIA EA ADD 15 MINS: Performed by: ORTHOPAEDIC SURGERY

## 2024-02-28 PROCEDURE — 97116 GAIT TRAINING THERAPY: CPT

## 2024-02-28 PROCEDURE — 94799 UNLISTED PULMONARY SVC/PX: CPT | Mod: XB

## 2024-02-28 PROCEDURE — 71000015 HC POSTOP RECOV 1ST HR: Performed by: ORTHOPAEDIC SURGERY

## 2024-02-28 PROCEDURE — 25000003 PHARM REV CODE 250: Performed by: NURSE ANESTHETIST, CERTIFIED REGISTERED

## 2024-02-28 PROCEDURE — 36000710: Performed by: ORTHOPAEDIC SURGERY

## 2024-02-28 PROCEDURE — 64447 NJX AA&/STRD FEMORAL NRV IMG: CPT | Performed by: ANESTHESIOLOGY

## 2024-02-28 PROCEDURE — D9220A PRA ANESTHESIA: Mod: ANES,,, | Performed by: ANESTHESIOLOGY

## 2024-02-28 PROCEDURE — 25000003 PHARM REV CODE 250: Performed by: ORTHOPAEDIC SURGERY

## 2024-02-28 PROCEDURE — 27201423 OPTIME MED/SURG SUP & DEVICES STERILE SUPPLY: Performed by: ORTHOPAEDIC SURGERY

## 2024-02-28 PROCEDURE — 97161 PT EVAL LOW COMPLEX 20 MIN: CPT

## 2024-02-28 PROCEDURE — D9220A PRA ANESTHESIA: Mod: CRNA,,, | Performed by: NURSE ANESTHETIST, CERTIFIED REGISTERED

## 2024-02-28 PROCEDURE — 27447 TOTAL KNEE ARTHROPLASTY: CPT | Mod: LT,,, | Performed by: ORTHOPAEDIC SURGERY

## 2024-02-28 PROCEDURE — 97110 THERAPEUTIC EXERCISES: CPT

## 2024-02-28 PROCEDURE — 37000008 HC ANESTHESIA 1ST 15 MINUTES: Performed by: ORTHOPAEDIC SURGERY

## 2024-02-28 PROCEDURE — 63600175 PHARM REV CODE 636 W HCPCS: Performed by: ORTHOPAEDIC SURGERY

## 2024-02-28 PROCEDURE — 27200750 HC INSULATED NEEDLE/ STIMUPLEX: Performed by: ANESTHESIOLOGY

## 2024-02-28 PROCEDURE — C1713 ANCHOR/SCREW BN/BN,TIS/BN: HCPCS | Performed by: ORTHOPAEDIC SURGERY

## 2024-02-28 PROCEDURE — 36000711: Performed by: ORTHOPAEDIC SURGERY

## 2024-02-28 PROCEDURE — 71000016 HC POSTOP RECOV ADDL HR: Performed by: ORTHOPAEDIC SURGERY

## 2024-02-28 PROCEDURE — 71000039 HC RECOVERY, EACH ADD'L HOUR: Performed by: ORTHOPAEDIC SURGERY

## 2024-02-28 PROCEDURE — 63600175 PHARM REV CODE 636 W HCPCS: Performed by: ANESTHESIOLOGY

## 2024-02-28 PROCEDURE — 63600175 PHARM REV CODE 636 W HCPCS: Performed by: NURSE ANESTHETIST, CERTIFIED REGISTERED

## 2024-02-28 DEVICE — CEMENT BONE WHOLE BATCH: Type: IMPLANTABLE DEVICE | Site: KNEE | Status: FUNCTIONAL

## 2024-02-28 DEVICE — COMPONENT PERSONA CR SZ7 LT: Type: IMPLANTABLE DEVICE | Site: KNEE | Status: FUNCTIONAL

## 2024-02-28 DEVICE — IMPLANTABLE DEVICE: Type: IMPLANTABLE DEVICE | Site: KNEE | Status: FUNCTIONAL

## 2024-02-28 DEVICE — PSN TIB STM 5 DEG SZ D L: Type: IMPLANTABLE DEVICE | Site: KNEE | Status: FUNCTIONAL

## 2024-02-28 DEVICE — PATELLA PSN CEM POLY 8X29MM: Type: IMPLANTABLE DEVICE | Site: KNEE | Status: FUNCTIONAL

## 2024-02-28 RX ORDER — MUPIROCIN 20 MG/G
OINTMENT TOPICAL
Status: DISCONTINUED | OUTPATIENT
Start: 2024-02-28 | End: 2024-02-28 | Stop reason: HOSPADM

## 2024-02-28 RX ORDER — ROCURONIUM BROMIDE 10 MG/ML
INJECTION, SOLUTION INTRAVENOUS
Status: DISCONTINUED | OUTPATIENT
Start: 2024-02-28 | End: 2024-02-28

## 2024-02-28 RX ORDER — OXYCODONE HCL 10 MG/1
10 TABLET, FILM COATED, EXTENDED RELEASE ORAL ONCE
Status: COMPLETED | OUTPATIENT
Start: 2024-02-28 | End: 2024-02-28

## 2024-02-28 RX ORDER — CELECOXIB 100 MG/1
100 CAPSULE ORAL ONCE
Status: DISCONTINUED | OUTPATIENT
Start: 2024-02-28 | End: 2024-02-28

## 2024-02-28 RX ORDER — PROPOFOL 10 MG/ML
VIAL (ML) INTRAVENOUS
Status: DISCONTINUED | OUTPATIENT
Start: 2024-02-28 | End: 2024-02-28

## 2024-02-28 RX ORDER — ACETAMINOPHEN 10 MG/ML
INJECTION, SOLUTION INTRAVENOUS
Status: DISCONTINUED | OUTPATIENT
Start: 2024-02-28 | End: 2024-02-28

## 2024-02-28 RX ORDER — LIDOCAINE HYDROCHLORIDE 20 MG/ML
INJECTION, SOLUTION EPIDURAL; INFILTRATION; INTRACAUDAL; PERINEURAL
Status: DISCONTINUED | OUTPATIENT
Start: 2024-02-28 | End: 2024-02-28

## 2024-02-28 RX ORDER — MIDAZOLAM HYDROCHLORIDE 1 MG/ML
INJECTION, SOLUTION INTRAMUSCULAR; INTRAVENOUS
Status: DISCONTINUED | OUTPATIENT
Start: 2024-02-28 | End: 2024-02-28

## 2024-02-28 RX ORDER — PREGABALIN 75 MG/1
75 CAPSULE ORAL ONCE
Status: DISCONTINUED | OUTPATIENT
Start: 2024-02-28 | End: 2024-02-28

## 2024-02-28 RX ORDER — FENTANYL CITRATE 50 UG/ML
INJECTION, SOLUTION INTRAMUSCULAR; INTRAVENOUS
Status: DISCONTINUED | OUTPATIENT
Start: 2024-02-28 | End: 2024-02-28

## 2024-02-28 RX ORDER — OXYCODONE HYDROCHLORIDE 5 MG/1
5 TABLET ORAL
Status: DISCONTINUED | OUTPATIENT
Start: 2024-02-28 | End: 2024-02-28 | Stop reason: HOSPADM

## 2024-02-28 RX ORDER — ONDANSETRON HYDROCHLORIDE 2 MG/ML
INJECTION, SOLUTION INTRAVENOUS
Status: DISCONTINUED | OUTPATIENT
Start: 2024-02-28 | End: 2024-02-28

## 2024-02-28 RX ORDER — LIDOCAINE HYDROCHLORIDE 10 MG/ML
1 INJECTION, SOLUTION EPIDURAL; INFILTRATION; INTRACAUDAL; PERINEURAL ONCE
Status: COMPLETED | OUTPATIENT
Start: 2024-02-28 | End: 2024-02-28

## 2024-02-28 RX ORDER — SUCCINYLCHOLINE CHLORIDE 20 MG/ML
INJECTION INTRAMUSCULAR; INTRAVENOUS
Status: DISCONTINUED | OUTPATIENT
Start: 2024-02-28 | End: 2024-02-28

## 2024-02-28 RX ORDER — DEXAMETHASONE SODIUM PHOSPHATE 4 MG/ML
INJECTION, SOLUTION INTRA-ARTICULAR; INTRALESIONAL; INTRAMUSCULAR; INTRAVENOUS; SOFT TISSUE
Status: DISCONTINUED | OUTPATIENT
Start: 2024-02-28 | End: 2024-02-28

## 2024-02-28 RX ORDER — METOCLOPRAMIDE HYDROCHLORIDE 5 MG/ML
10 INJECTION INTRAMUSCULAR; INTRAVENOUS EVERY 10 MIN PRN
Status: DISCONTINUED | OUTPATIENT
Start: 2024-02-28 | End: 2024-02-28 | Stop reason: HOSPADM

## 2024-02-28 RX ORDER — FENTANYL CITRATE 50 UG/ML
25 INJECTION, SOLUTION INTRAMUSCULAR; INTRAVENOUS EVERY 5 MIN PRN
Status: DISCONTINUED | OUTPATIENT
Start: 2024-02-28 | End: 2024-02-28 | Stop reason: HOSPADM

## 2024-02-28 RX ADMIN — PROPOFOL 200 MG: 10 INJECTION, EMULSION INTRAVENOUS at 09:02

## 2024-02-28 RX ADMIN — ACETAMINOPHEN 1000 MG: 10 INJECTION, SOLUTION INTRAVENOUS at 10:02

## 2024-02-28 RX ADMIN — LIDOCAINE HYDROCHLORIDE 100 MG: 20 INJECTION, SOLUTION EPIDURAL; INFILTRATION; INTRACAUDAL at 09:02

## 2024-02-28 RX ADMIN — CEFAZOLIN 2 G: 2 INJECTION, POWDER, FOR SOLUTION INTRAMUSCULAR; INTRAVENOUS at 10:02

## 2024-02-28 RX ADMIN — GLYCOPYRROLATE 0.2 MG: 0.2 INJECTION, SOLUTION INTRAMUSCULAR; INTRAVITREAL at 10:02

## 2024-02-28 RX ADMIN — FENTANYL CITRATE 50 MCG: 50 INJECTION, SOLUTION INTRAMUSCULAR; INTRAVENOUS at 10:02

## 2024-02-28 RX ADMIN — ROCURONIUM BROMIDE 5 MG: 10 INJECTION, SOLUTION INTRAVENOUS at 09:02

## 2024-02-28 RX ADMIN — SUGAMMADEX 200 MG: 100 INJECTION, SOLUTION INTRAVENOUS at 11:02

## 2024-02-28 RX ADMIN — FENTANYL CITRATE 25 MCG: 50 INJECTION, SOLUTION INTRAMUSCULAR; INTRAVENOUS at 12:02

## 2024-02-28 RX ADMIN — FENTANYL CITRATE 25 MCG: 50 INJECTION, SOLUTION INTRAMUSCULAR; INTRAVENOUS at 01:02

## 2024-02-28 RX ADMIN — LIDOCAINE HYDROCHLORIDE 10 MG: 10 INJECTION, SOLUTION EPIDURAL; INFILTRATION; INTRACAUDAL; PERINEURAL at 09:02

## 2024-02-28 RX ADMIN — DEXAMETHASONE SODIUM PHOSPHATE 4 MG: 4 INJECTION, SOLUTION INTRA-ARTICULAR; INTRALESIONAL; INTRAMUSCULAR; INTRAVENOUS; SOFT TISSUE at 10:02

## 2024-02-28 RX ADMIN — SODIUM CHLORIDE, SODIUM GLUCONATE, SODIUM ACETATE, POTASSIUM CHLORIDE AND MAGNESIUM CHLORIDE: 526; 502; 368; 37; 30 INJECTION, SOLUTION INTRAVENOUS at 09:02

## 2024-02-28 RX ADMIN — FENTANYL CITRATE 50 MCG: 50 INJECTION, SOLUTION INTRAMUSCULAR; INTRAVENOUS at 09:02

## 2024-02-28 RX ADMIN — MIDAZOLAM 1 MG: 1 INJECTION INTRAMUSCULAR; INTRAVENOUS at 09:02

## 2024-02-28 RX ADMIN — SUCCINYLCHOLINE CHLORIDE 180 MG: 20 INJECTION, SOLUTION INTRAMUSCULAR; INTRAVENOUS at 09:02

## 2024-02-28 RX ADMIN — ROCURONIUM BROMIDE 30 MG: 10 INJECTION, SOLUTION INTRAVENOUS at 10:02

## 2024-02-28 RX ADMIN — ONDANSETRON 4 MG: 2 INJECTION INTRAMUSCULAR; INTRAVENOUS at 10:02

## 2024-02-28 RX ADMIN — MUPIROCIN: 20 OINTMENT TOPICAL at 08:02

## 2024-02-28 RX ADMIN — OXYCODONE HYDROCHLORIDE 10 MG: 10 TABLET, FILM COATED, EXTENDED RELEASE ORAL at 09:02

## 2024-02-28 RX ADMIN — OXYCODONE 5 MG: 5 TABLET ORAL at 12:02

## 2024-02-28 NOTE — PLAN OF CARE
Patient received from recovery at this time.  AAOX3.  NAD noted.  Dressing to left knee remains clean, dry and intact. Tolerating po intake well with no complaints of nausea/vomiting.  Patient able to move BLE with no problems noted.  Due to void.

## 2024-02-28 NOTE — ANESTHESIA POSTPROCEDURE EVALUATION
Anesthesia Post Evaluation    Patient: Monique Trujillo    Procedure(s) Performed: Procedure(s) (LRB):  ARTHROPLASTY, KNEE (Left)    Final Anesthesia Type: general  The surgery is a total joint replacement and the reasoning for not using regional anesthesia is (Previous back surgery)    Patient location during evaluation: PACU  Patient participation: Yes- Able to Participate  Level of consciousness: awake  Post-procedure vital signs: reviewed and stable  Pain management: adequate  Airway patency: patent    PONV status at discharge: No PONV  Anesthetic complications: no      Cardiovascular status: blood pressure returned to baseline  Respiratory status: unassisted  Hydration status: euvolemic  Follow-up not needed.              Vitals Value Taken Time   /55 02/28/24 1430   Temp 36.5 °C (97.7 °F) 02/28/24 1320   Pulse 74 02/28/24 1430   Resp 16 02/28/24 1430   SpO2 96 % 02/28/24 1430         Event Time   Out of Recovery 13:34:00         Pain/Lindsey Score: Pain Rating Prior to Med Admin: 6 (2/28/2024  1:12 PM)  Pain Rating Post Med Admin: 4 (2/28/2024  1:30 PM)  Lindsey Score: 9 (2/28/2024  1:30 PM)  Modified Lindsey Score: 20 (2/28/2024  2:30 PM)

## 2024-02-28 NOTE — PT/OT/SLP EVAL
Physical Therapy Evaluation and Discharge Note    Patient Name:  Monique Trujillo   MRN:  [id number]    Recommendations:     Discharge Recommendations: Low Intensity Therapy  Discharge Equipment Recommendations: walker, rolling   Barriers to discharge: None    Assessment:     Monique Trujillo is a 73 y.o. female admitted with a medical diagnosis of Unilateral primary osteoarthritis, left knee. .  At this time, patient is scheduled for discharge home and appears will be safe with mobility in the home.  Patient would benefit though from continued PT to work on strengthening and ROM to further increase safety with mobility.       Recent Surgery: Procedure(s) (LRB):  ARTHROPLASTY, KNEE (Left) Day of Surgery    Plan:     During this hospitalization, patient does not require further acute PT services.  Please re-consult if situation changes.      Subjective     Chief Complaint: fatigue  Patient/Family Comments/goals: go home and sleep  Pain/Comfort:  Pain Rating 1: 3/10  Location - Side 1: Left  Location - Orientation 1: lower  Location 1: knee  Pain Addressed 1: Reposition, Cessation of Activity  Pain Rating Post-Intervention 1: 7/10    Patients cultural, spiritual, Druze conflicts given the current situation:      Living Environment:  Currently lives with family in a 1 story home with 1 step entry.  Prior to admission, patients level of function was modified independent.  Equipment used at home: rollator.  DME owned (not currently used): none.  Upon discharge, patient will have assistance from family.    Objective:     Communicated with nurse prior to session.  Patient found supine with cryotherapy upon PT entry to room.    General Precautions: Standard, fall    Orthopedic Precautions:LLE weight bearing as tolerated   Braces:    Respiratory Status: Room air    Exams:  RLE ROM: WFL  RLE Strength: WNL  LLE ROM: Deficits: knee extension -10 degrees, flexion 80 degrees both taken in sitting  LLE Strength: Deficits:  3-/5 overall    Functional Mobility:  Bed Mobility:     Supine to Sit: stand by assistance  Transfers:     Sit to Stand:  contact guard assistance with rolling walker  Gait: x 70 feet rw cGA    AM-PAC 6 CLICK MOBILITY  Total Score:20       Treatment and Education:  Exercise to include ankle pumps, quad sets, hip abd, heel slides and LAQ.  All done left LE x 10 reps.  Gait training x 70 feet RW, x 150 feet RW CGA, x 150 feet RW sBA, up/down 1 4 inch step RW CGA    AM-PAC 6 CLICK MOBILITY  Total Score:20     Patient left up in chair with call button in reach, nurse notified, and daughter present.    GOALS:   Multidisciplinary Problems       Physical Therapy Goals       Not on file                    History:     Past Medical History:   Diagnosis Date    Allergy     Arthritis     Chronic lower back pain     Depression     Fever blister     GERD (gastroesophageal reflux disease)     Hemorrhoids, internal 11/05/2014    Hyperlipidemia     Hypertension     Joint pain     Morbid obesity with BMI of 40.0-44.9, adult 02/24/2015    Multiple gastric ulcers 12/14/2017    TIMMY (obstructive sleep apnea)     does not use CPAP    Pulmonary hypertension     Skin cancer     Type 2 diabetes mellitus with hyperglycemia, without long-term current use of insulin 4/20/2022       Past Surgical History:   Procedure Laterality Date    ANTERIOR CERVICAL CORPECTOMY W/ FUSION  1988    BACK SURGERY      CAUDAL EPIDURAL STEROID INJECTION N/A 01/10/2019    Procedure: Injection-steroid-epidural-caudal;  Surgeon: Lydia Velásquez Jr., MD;  Location: [facility] PAIN T;  Service: Pain Management;  Laterality: N/A;    COLONOSCOPY N/A 11/02/2016    Procedure: COLONOSCOPY;  Surgeon: Viji Dewitt MD;  Location: [facility];  Service: Endoscopy;  Laterality: N/A;    EPIDURAL STEROID INJECTION N/A 05/18/2022    Procedure: Injection, Steroid, Epidural;  Surgeon: Fermín Luo MD;  Location: [facility] OR;  Service: Pain Management;  Laterality: N/A;  L2-l3      EPIDURAL STEROID INJECTION INTO LUMBAR SPINE N/A 11/08/2018    Procedure: Injection-steroid-epidural-lumbar-L2-3 (LEFT > RIGHT);  Surgeon: Lydia Velásquez Jr., MD;  Location: [facility] PAIN MGT;  Service: Pain Management;  Laterality: N/A;    ESOPHAGOGASTRODUODENOSCOPY N/A 10/06/2022    Procedure: EGD (ESOPHAGOGASTRODUODENOSCOPY);  Surgeon: Migel Koroma MD;  Location: [facility];  Service: Endoscopy;  Laterality: N/A;    FUSION OF SPINE WITH INSTRUMENTATION Left 01/03/2020    Procedure: FUSION, SPINE, WITH INSTRUMENTATION Stage 1 Left L3-4 LOIF Rise L Stage 2 Open L3-4 L4-5 Laminectomy Removal of L4-5 Hardware, L3-5 Posterior Instrumentation and extraction of Fusion at L3-4;  Surgeon: James Brantley MD;  Location: [facility] OR;  Service: Neurosurgery;  Laterality: Left;  Procedure: Stage 1 Left L3-4 LOIF Rise L  Stage 2 Open L3-4 L4-5 Laminectomy Removal of L4-5 Hardware,     INJECTION OF ANESTHETIC AGENT AROUND GENITOFEMORAL NERVE Bilateral 08/01/2019    Procedure: BILATERAL SHOULDER ARTICULAR BRANCH BLOCK;  Surgeon: Lydia Velásquez Jr., MD;  Location: [facility] PAIN MGT;  Service: Pain Management;  Laterality: Bilateral;    JOINT REPLACEMENT      R sided--knee, hip and shoulder    KNEE ARTHROSCOPY W/ MENISCAL REPAIR Right 2012    LUMBAR LAMINECTOMY  06/19/2015    REVERSE TOTAL SHOULDER ARTHROPLASTY Right 05/25/2022    Procedure: ARTHROPLASTY, SHOULDER, TOTAL, REVERSE;  Surgeon: Herb Tan II, MD;  Location: [facility] OR;  Service: Orthopedics;  Laterality: Right;    SKIN CANCER EXCISION      forehead does not recall date    TUBAL LIGATION  1980       Time Tracking:     PT Received On: 02/28/24  PT Start Time: 1455     PT Stop Time: 1540  PT Total Time (min): 45 min     Billable Minutes: Evaluation 20, Gait Training 15, and Therapeutic Exercise 10      02/28/2024

## 2024-02-28 NOTE — OP NOTE
[facility]  Orthopedic Surgery Department  Operative Note    SUMMARY     Date of Procedure: 2/28/2024     Procedure: Procedure(s) (LRB):  ARTHROPLASTY, KNEE (Left)     Surgeon(s) and Role:     * Herb Tan II, MD - Primary    Assisting Surgeon: None    First Assist:  HENOK Hogan    Pre-Operative Diagnosis: Unilateral primary osteoarthritis, left knee [M17.12]  Preop testing [Z01.818]    Post-Operative Diagnosis: Post-Op Diagnosis Codes:     * Unilateral primary osteoarthritis, left knee [M17.12]     * Preop testing [Z01.818]    Anesthesia: Choice    Technical Procedures Used:  Left total knee arthroplasty    Description of the Findings of the Procedure:  Dictated    Significant Surgical Tasks Conducted by the Assistant(s), if Applicable:  Positioning and prepping the patient, retraction during exposure and implant insertion, wound closure and bandage application    Complications: No    Estimated Blood Loss (EBL): 50 mL           Implants:   Implant Name Type Inv. Item Serial No.  Lot No. LRB No. Used Action   CEMENT BONE WHOLE BATCH - [id number]  CEMENT BONE WHOLE BATCH  [employer] [id number] Left 2 Implanted   PIN DRILL HEADLESS TROCAR - [id number]  PIN DRILL HEADLESS TROCAR  [employer] [id number] Left 4 Implanted and Explanted   SCREW PERSONA HEX FEM 2.5X25MM - [id number]  SCREW PERSONA HEX FEM 2.5X25MM  [employer]  Left 1 Implanted and Explanted   PATELLA PSN DAMON POLY 8X29MM - [id number]  PATELLA PSN DAMON POLY 8X29MM  [employer] [id number] Left 1 Implanted   PSN TIB STM 5 DEG SZ D L - [id number]  PSN TIB STM 5 DEG SZ D L  [employer] [id number] Left 1 Implanted   PERSONA KNEE SYSTEM, VIVACIT-E HIGHLY CROSSLINKED POLY, LEFT 10    [employer]OMET [id number] Left 1 Implanted   COMPONENT PERSONA CR SZ7 LT - [id number]  COMPONENT PERSONA CR SZ7 LT  [employer] [id number] Left 1 Implanted       Specimens:   Specimen (24h ago, onward)      None                    Condition:  Good    Disposition: PACU - hemodynamically stable.    Attestation: I was present for the entire procedure.    Procedure In Detail:    The patient was brought to the operating room and place on the table in the supine position.  The patient underwent general endotracheal intubation without complication.  The Left lower extremity was prepped and draped in the normal sterile fashion.  An echmark was used to exsanguinate the limb and the tourniquet was taken up to 250mm of mercury.    A midline skin incision was made and dissection was taken down to the paratenon which was incised and reflected medially.  A mid-vastus parapatellar arthrotomy was then created.  The knee was placed in extension and the patella was everted 90 degrees.  Part of the patellar fat pad was resected and dissection was taken medially to the level of the MCL.  The patellar cut was made and the patella was sized.  It sized out at a 29.  A 29 trial was placed and hohmann retractors were placed medially and laterally.  The knee was flexed and the patella was allowed to sublux over the lateral femoral condyle.  This led to excellent exposure of the knee joint which was noted to be severely arthritic.  The ACL was resected.  A drill bit was used to open the distal femur and an intramedullary guide was used to place the distal femoral cutting block.  The cutting block was pinned into place and the guide was removed.  The distal femoral cut was made and the cutting block was removed.  The femur was then sized and it sized out at a 7.  Using posterior referencing but verifying the rotation with the epicondylar axis, a size 7 cutting block was then pinned to the distal femur and the anterior, posterior, and chamfer cuts were made.  The cutting block was removed.  A PCL retractor was then placed and the tibia was allowed to translate anteriorly.    The menisci were resected and a drill bit was used to open the proximal tibia.  An intramedullary guide was  used to place a cutting block on the proximal tibia.  The block was pinned into place and the guide was removed.  The proxima tibial cut was then made.  The tibia was sized and it sized out at a D.  A size D trial was placed on the tibia and a size 7 trial was placed on the femur.  A 10mm polyethylene was placed and with those components in place the knee was noted to have full extension, flexion to 140  with balanced flexion and extension gaps and balanced varus/valgus stability.  This was felt to be good construct.  The trial poly was removed and the femoral and tibial trial were punched to accept the final components, then they were removed.    While the final components were opened, cement was mixed on the back table and the knee was irrigated with 3 Liters of normal saline in a pulsatile lavage.  When the cement had reached an appropriate consistency, the tibial and femoral components were cemented and impacted into place.  The trial tibial tray was placed on the tibial tray and the knee was brought into full extension.  The patellar button was then cemented and clamped in place.  The knee was then placed in 35 degrees of flexion until the cement completely hardened.  The trial poly was then removed. Excess cement was removed with a Teez.mobi osteotome.  The final highly cross linked poly was then snapped into place on the tibial tray.  With the final components in place the knee had the same range of motion and stability as it did with the trials.  This was felt to be a very good construct.    The knee was then placed in 35 degrees of flexion and closure was begun.  The arthrotomy was closed with a combination of #5 Ethibond and a 0 PDS Stratafix suture.  We then closed in a layered fashion using 2-0 PDS Stratafix, a 3-0 Monocryl Stratafix, and a Dermabond Preneo device on the skin.  A sterile intra-operative occlusive dressing was place along with a  polar care device for post operative pain controll.  The  patient was then awakened from anesthesia and taken to recovery where they were noted to be stable post-operatively.  Needle and lap counts were correct at the end of the case.

## 2024-02-28 NOTE — PLAN OF CARE
Patient cleared by PT to discharge to home.  Dressing with a scant amount of drainage noted.  Discharge instructions given to pt and family/friend, verbalized understanding and questions answered. Handouts provided. Belongings given back to pt. IV removed- catheter intact. Discharge via wheelchair.

## 2024-02-28 NOTE — DISCHARGE SUMMARY
[facility]  Department of Orthopedic Surgery  Discharge Note  Short Stay    Admit Date: 2/28/2024    Discharge Date and Time: No discharge date for patient encounter.     Attending Physician: Herb Tan II, MD     Discharge Provider: Herb Tan II    Diagnoses:  Active Hospital Problems    Diagnosis  POA    *Unilateral primary osteoarthritis, left knee [M17.12]  Yes      Resolved Hospital Problems   No resolved problems to display.       Discharged Condition: good    Hospital Course: Patient was admitted for an outpatient procedure and tolerated the procedure well with no complications.    Final Diagnoses: Same as principal problem.    Disposition: Home or Self Care    Follow up/Patient Instructions:    Medications:  Reconciled Home Medications:      Medication List        CONTINUE taking these medications      amLODIPine 10 MG tablet  Commonly known as: NORVASC  Take 1 tablet (10 mg total) by mouth every evening.     ammonium lactate 12 % lotion  Commonly known as: LAC-HYDRIN  USE ON FEET AS NEEDED     ascorbic acid (vitamin C) 1000 MG tablet  Commonly known as: VITAMIN C  Take 1,000 mg by mouth once daily.     aspirin 81 MG EC tablet  Commonly known as: ECOTRIN  Take 1 tablet (81 mg total) by mouth 2 (two) times a day. for 14 days     atenoloL 25 MG tablet  Commonly known as: TENORMIN  TAKE 1 TABLET (25 MG TOTAL) BY MOUTH EVERY EVENING.     blood sugar diagnostic Strp  1 strip by Misc.(Non-Drug; Combo Route) route 2 (two) times daily with meals.     blood-glucose meter kit  Use as instructed     calcium-vitamin D3 500 mg-5 mcg (200 unit) per tablet  Commonly known as: OS-KATERIN 500 + D3  Take 1 tablet by mouth 2 (two) times daily with meals.     carboxymethylcellulose 1 % ophthalmic solution  1 drop.     CINNAMON BARK (BULK) MISC  1,000 mg by Misc.(Non-Drug; Combo Route) route.     clindamycin 1 % lotion  Commonly known as: CLEOCIN T  Use hs on face     co-enzyme Q-10 30 mg capsule  Take 30 mg  "by mouth once daily.     cycloSPORINE 0.05 % ophthalmic emulsion  Commonly known as: RESTASIS  Place 1 drop into both eyes 2 (two) times daily.     * diclofenac sodium 1.5 % Drop  SMARTSI Drop(s) Topical 1 to 4 Times Daily     * diclofenac sodium 1 % Gel  Commonly known as: VOLTAREN  Apply 2 g topically once daily.     econazole nitrate 1 % cream  Use bid for rash     famotidine 40 MG tablet  Commonly known as: PEPCID  Take 1 tablet (40 mg total) by mouth once daily.     fenofibrate micronized 134 MG Cap  Commonly known as: LOFIBRA  TAKE 1 CAPSULE (134 MG TOTAL) BY MOUTH ONCE DAILY     furosemide 40 MG tablet  Commonly known as: LASIX  Take lasix 40 mg PO every other day PRN swelling     gabapentin 600 MG tablet  Commonly known as: NEURONTIN  Take 1 tablet (600 mg total) by mouth 3 (three) times daily.     HYDROcodone-acetaminophen 7.5-325 mg per tablet  Commonly known as: NORCO  Take 1 tablet by mouth every 6 (six) hours as needed for Pain.     ketoconazole 2 % cream  Commonly known as: NIZORAL  Apply topically 2 (two) times daily.     lancets Misc  1 lancet by Misc.(Non-Drug; Combo Route) route 2 (two) times daily with meals.     lancing device Misc  1 Device by Misc.(Non-Drug; Combo Route) route 2 (two) times daily with meals.     levocetirizine 5 MG tablet  Commonly known as: XYZAL  TAKE 1 TABLET (5 MG TOTAL) BY MOUTH EVERY EVENING.     LIDOcaine-prilocaine cream  Commonly known as: EMLA  SMARTSI Gram(s) Topical 3-4 Times Daily     multivitamin capsule  Take 1 capsule by mouth once daily.     ondansetron 8 MG Tbdl  Commonly known as: ZOFRAN-ODT  Take 1 tablet (8 mg total) by mouth 2 (two) times daily as needed.     pantoprazole 40 MG tablet  Commonly known as: PROTONIX  TAKE 1 TABLET (40 MG TOTAL) BY MOUTH ONCE DAILY.     pen needle, diabetic 31 gauge x 5/16" Ndle  1 each by Misc.(Non-Drug; Combo Route) route 2 (two) times daily with meals.     pravastatin 40 MG tablet  Commonly known as: PRAVACHOL  TAKE " 1 TABLET (40 MG TOTAL) BY MOUTH EVERY EVENING.     RYBELSUS 7 mg tablet  Generic drug: semaglutide  Take 1 tablet (7 mg total) by mouth once daily.     sertraline 100 MG tablet  Commonly known as: ZOLOFT  TAKE 1 TABLET (100 MG TOTAL) BY MOUTH EVERY EVENING.     solifenacin 5 MG tablet  Commonly known as: VESICARE  Take 1 tablet (5 mg total) by mouth once daily.     triamcinolone acetonide 0.025% 0.025 % cream  Commonly known as: KENALOG  Apply topically 2 (two) times daily.     vitamin E 400 UNIT capsule  Take 400 Units by mouth once daily.           * This list has 2 medication(s) that are the same as other medications prescribed for you. Read the directions carefully, and ask your doctor or other care provider to review them with you.                Discharge Procedure Orders   Diet Adult Regular     Ice to affected area     Notify your health care provider if you experience any of the following:  increased confusion or weakness     Notify your health care provider if you experience any of the following:  persistent dizziness, light-headedness, or visual disturbances     Notify your health care provider if you experience any of the following:  worsening rash     Notify your health care provider if you experience any of the following:  severe persistent headache     Notify your health care provider if you experience any of the following:  difficulty breathing or increased cough     Notify your health care provider if you experience any of the following:  redness, tenderness, or signs of infection (pain, swelling, redness, odor or green/yellow discharge around incision site)     Notify your health care provider if you experience any of the following:  severe uncontrolled pain     Notify your health care provider if you experience any of the following:  persistent nausea and vomiting or diarrhea     Notify your health care provider if you experience any of the following:  temperature >100.4     Leave dressing on -  Keep it clean, dry, and intact until clinic visit     Weight bearing restrictions (specify):   Order Comments: WBAT LLE      Follow-up Information       [facility] [place]. Call in 1 day(s).    Specialties: Home Health Services, Home Therapy Services, Home Living Aide Services  Why: As needed  Contact information:  [address] [place] [address]  [place] [address]  [phone]             Herb Tan II, MD Follow up in 2 week(s).    Specialty: Orthopedic Surgery  Contact information:  [address] [place] [address] [address] [place] [address]  [phone]                             Discharge Procedure Orders (must include Diet, Follow-up, Activity):   Discharge Procedure Orders (must include Diet, Follow-up, Activity)   Diet Adult Regular     Ice to affected area     Notify your health care provider if you experience any of the following:  increased confusion or weakness     Notify your health care provider if you experience any of the following:  persistent dizziness, light-headedness, or visual disturbances     Notify your health care provider if you experience any of the following:  worsening rash     Notify your health care provider if you experience any of the following:  severe persistent headache     Notify your health care provider if you experience any of the following:  difficulty breathing or increased cough     Notify your health care provider if you experience any of the following:  redness, tenderness, or signs of infection (pain, swelling, redness, odor or green/yellow discharge around incision site)     Notify your health care provider if you experience any of the following:  severe uncontrolled pain     Notify your health care provider if you experience any of the following:  persistent nausea and vomiting or diarrhea     Notify your health care provider if you experience any of the following:  temperature >100.4     Leave dressing on - Keep it clean, dry, and intact until clinic visit     Weight  bearing restrictions (specify):   Order Comments: WBAT LLE

## 2024-02-28 NOTE — TRANSFER OF CARE
Anesthesia Transfer of Care Note    Patient: Monique Trujillo    Procedure(s) Performed: Procedure(s) (LRB):  ARTHROPLASTY, KNEE (Left)    Patient location: PACU    Anesthesia Type: general    Transport from OR: Transported from OR on 6-10 L/min O2 by face mask with adequate spontaneous ventilation    Post pain: adequate analgesia    Post assessment: no apparent anesthetic complications and tolerated procedure well    Post vital signs: stable    Level of consciousness: awake and responds to stimulation    Nausea/Vomiting: no nausea/vomiting    Complications: none    Transfer of care protocol was followed      Last vitals: Visit Vitals  /72 (BP Location: Right arm, Patient Position: Lying)   Pulse 72   Temp 36.6 °C (97.8 °F) (Skin)   Resp 18   Wt 101.2 kg (223 lb)   LMP  (LMP Unknown)   SpO2 96%   Breastfeeding No   BMI 42.14 kg/m²

## 2024-02-28 NOTE — ANESTHESIA PREPROCEDURE EVALUATION
02/28/2024  Monique Trujillo is a 73 y.o., female.      Pre-op Assessment    I have reviewed the Patient Summary Reports.     I have reviewed the Nursing Notes. I have reviewed the NPO Status.   I have reviewed the Medications.     Review of Systems  Anesthesia Hx:  No problems with previous Anesthesia                Social:  Non-Smoker       Cardiovascular:     Hypertension   Denies MI.  Denies CAD.    Denies CABG/stent.    Denies Angina.        ECG has been reviewed. ONCLUSIONS     1 - Normal left ventricular systolic function (EF 55-60%).     2 - Normal right ventricular systolic function .     3 - Indeterminate LV diastolic function.     4 - Mild mitral regurgitation.     5 - Pulmonary hypertension. The estimated PA systolic pressure is 42 mmHg.     6 - Mild tricuspid regurgitation.                            Pulmonary:    Denies COPD.  Denies Asthma.    Denies Recent URI. Sleep Apnea, CPAP                Renal/:  Chronic Renal Disease, CKD                Hepatic/GI:   PUD,  GERD Denies Liver Disease.            Musculoskeletal:  Arthritis          Spine Disorders: lumbar Degenerative disease and Disc disease           Neurological:  Denies TIA.  Denies CVA. Neuromuscular Disease,   Denies Seizures.                                Endocrine:  Diabetes Denies Hypothyroidism.        Morbid Obesity / BMI > 40  Psych:  Psychiatric History                  Physical Exam  General: Well nourished, Cooperative, Alert and Oriented  Morbid obesity with BMI of 40.0-44.9, adult  Airway:  Mallampati: II / II  Mouth Opening: Normal  TM Distance: 4 - 6 cm  Tongue: Normal    Dental:  Intact    Chest/Lungs:  Clear to auscultation, Normal Respiratory Rate    Heart:  Rate: Normal  Rhythm: Regular Rhythm  Sounds: Normal        Anesthesia Plan  Type of Anesthesia, risks & benefits discussed:    Anesthesia Type: Spinal, Gen  ETT  Intra-op Monitoring Plan: Standard ASA Monitors  Post Op Pain Control Plan: multimodal analgesia and IV/PO Opioids PRN  Induction:  IV  Informed Consent: Informed consent signed with the Patient and all parties understand the risks and agree with anesthesia plan.  All questions answered.   ASA Score: 3  Day of Surgery Review of History & Physical: I have interviewed and examined the patient. I have reviewed the patient's H&P dated:     Ready For Surgery From Anesthesia Perspective.     .

## 2024-02-28 NOTE — PROGRESS NOTES
Criteria per anesthesia for transfer to post op . Tolerating po fluids Pain addressed with po and IV analgesics Transferred per stretcher to phase 2 Report given to Syl

## 2024-02-28 NOTE — ANESTHESIA PROCEDURE NOTES
Intubation    Date/Time: 2/28/2024 9:54 AM    Performed by: Bina Heath CRNA  Authorized by: Michael Tilley MD    Intubation:     Induction:  Intravenous    Intubated:  Postinduction    Mask Ventilation:  Easy mask    Attempts:  1    Attempted By:  CRNA and student    Method of Intubation:  Video laryngoscopy    Blade:  Rizo 3    Laryngeal View Grade: Grade I - full view of cords      Difficult Airway Encountered?: No      Complications:  None    Airway Device:  Oral endotracheal tube    Airway Device Size:  7.0    Style/Cuff Inflation:  Cuffed (inflated to minimal occlusive pressure)    Tube secured:  22    Secured at:  The lips    Placement Verified By:  Capnometry    Complicating Factors:  None    Findings Post-Intubation:  BS equal bilateral and atraumatic/condition of teeth unchanged

## 2024-02-28 NOTE — ANESTHESIA PROCEDURE NOTES
Peripheral Block    Patient location during procedure: pre-op   Block not for primary anesthetic.  Reason for block: at surgeon's request and post-op pain management   Post-op Pain Location: left knee   Start time: 2/28/2024 9:25 AM  Timeout: 2/28/2024 9:25 AM   End time: 2/28/2024 9:31 AM    Staffing  Authorizing Provider: Valentin Quintana MD  Performing Provider: Valentin Quintana MD    Staffing  Performed by: Valentin Quintana MD  Authorized by: Valentin Quintana MD    Preanesthetic Checklist  Completed: patient identified, IV checked, site marked, risks and benefits discussed, surgical consent, monitors and equipment checked, pre-op evaluation and timeout performed  Peripheral Block  Patient position: supine  Prep: ChloraPrep  Patient monitoring: heart rate, cardiac monitor, continuous pulse ox, continuous capnometry and frequent blood pressure checks  Block type: adductor canal  Laterality: left  Injection technique: single shot  Needle  Needle type: Stimuplex   Needle gauge: 21 G  Needle length: 4 in  Needle localization: anatomical landmarks and ultrasound guidance   -ultrasound image captured on disc.  Assessment  Injection assessment: negative aspiration, negative parasthesia and local visualized surrounding nerve  Paresthesia pain: none  Heart rate change: no  Slow fractionated injection: yes  Pain Tolerance: comfortable throughout block and no complaints      Additional Notes  VSS.  [facility] RN monitoring vitals throughout procedure.  Patient tolerated procedure well.

## 2024-02-28 NOTE — DISCHARGE INSTRUCTIONS
"Discharge Instructions: After Your Surgery/Procedure  Youve just had surgery. During surgery you were given medicine called anesthesia to keep you relaxed and free of pain. After surgery you may have some pain or nausea. This is common. Here are some tips for feeling better and getting well after surgery.     Stay on schedule with your medication.   Going home  Your doctor or nurse will show you how to take care of yourself when you go home. He or she will also answer your questions. Have an adult family member or friend drive you home.      For your safety we recommend these precaution for the first 24 hours after your procedure:  Do not drive or use heavy equipment.  Do not make important decisions or sign legal papers.  Do not drink alcohol.  Have someone stay with you, if needed. He or she can watch for problems and help keep you safe.  Your concentration, balance, coordination, and judgement may be impaired for many hours after anesthesia.  Use caution when ambulating or standing up.     You may feel weak and "washed out" after anesthesia and surgery.      Subtle residual effects of general anesthesia or sedation with regional / local anesthesia can last more than 24 hours.  Rest for the remainder of the day or longer if your Doctor/Surgeon has advised you to do so.  Although you may feel normal within the first 24 hours, your reflexes and mental ability may be impaired without you realizing it.  You may feel dizzy, lightheaded or sleepy for 24 hours or longer.      Be sure to go to all follow-up visits with your doctor. And rest after your surgery for as long as your doctor tells you to.  Coping with pain  If you have pain after surgery, pain medicine will help you feel better. Take it as told, before pain becomes severe. Also, ask your doctor or pharmacist about other ways to control pain. This might be with heat, ice, or relaxation. And follow any other instructions your surgeon or nurse gives you.  Tips " for taking pain medicine  To get the best relief possible, remember these points:  Pain medicines can upset your stomach. Taking them with a little food may help.  Most pain relievers taken by mouth need at least 20 to 30 minutes to start to work.  Taking medicine on a schedule can help you remember to take it. Try to time your medicine so that you can take it before starting an activity. This might be before you get dressed, go for a walk, or sit down for dinner.  Constipation is a common side effect of pain medicines. Call your doctor before taking any medicines such as laxatives or stool softeners to help ease constipation. Also ask if you should skip any foods. Drinking lots of fluids and eating foods such as fruits and vegetables that are high in fiber can also help. Remember, do not take laxatives unless your surgeon has prescribed them.  Drinking alcohol and taking pain medicine can cause dizziness and slow your breathing. It can even be deadly. Do not drink alcohol while taking pain medicine.  Pain medicine can make you react more slowly to things. Do not drive or run machinery while taking pain medicine.  Your health care provider may tell you to take acetaminophen to help ease your pain. Ask him or her how much you are supposed to take each day. Acetaminophen or other pain relievers may interact with your prescription medicines or other over-the-counter (OTC) drugs. Some prescription medicines have acetaminophen and other ingredients. Using both prescription and OTC acetaminophen for pain can cause you to overdose. Read the labels on your OTC medicines with care. This will help you to clearly know the list of ingredients, how much to take, and any warnings. It may also help you not take too much acetaminophen. If you have questions or do not understand the information, ask your pharmacist or health care provider to explain it to you before you take the OTC medicine.  Managing nausea  Some people have an  upset stomach after surgery. This is often because of anesthesia, pain, or pain medicine, or the stress of surgery. These tips will help you handle nausea and eat healthy foods as you get better. If you were on a special food plan before surgery, ask your doctor if you should follow it while you get better. These tips may help:  Do not push yourself to eat. Your body will tell you when to eat and how much.  Start off with clear liquids and soup. They are easier to digest.  Next try semi-solid foods, such as mashed potatoes, applesauce, and gelatin, as you feel ready.  Slowly move to solid foods. Dont eat fatty, rich, or spicy foods at first.  Do not force yourself to have 3 large meals a day. Instead eat smaller amounts more often.  Take pain medicines with a small amount of solid food, such as crackers or toast, to avoid nausea.     Call your surgeon if  You still have pain an hour after taking medicine. The medicine may not be strong enough.  You feel too sleepy, dizzy, or groggy. The medicine may be too strong.  You have side effects like nausea, vomiting, or skin changes, such as rash, itching, or hives.       If you have obstructive sleep apnea  You were given anesthesia medicine during surgery to keep you comfortable and free of pain. After surgery, you may have more apnea spells because of this medicine and other medicines you were given. The spells may last longer than usual.   At home:  Keep using the continuous positive airway pressure (CPAP) device when you sleep. Unless your health care provider tells you not to, use it when you sleep, day or night. CPAP is a common device used to treat obstructive sleep apnea.  Talk with your provider before taking any pain medicine, muscle relaxants, or sedatives. Your provider will tell you about the possible dangers of taking these medicines.  © 5721-9768 [employer]. [address] [place] [address], [place] [address]. All rights reserved. This information is  not intended as a substitute for professional medical care. Always follow your healthcare professional's instructions.          Using an Incentive Spirometer    An incentive spirometer is a device that helps you do deep breathing exercises. These exercises expand your lungs, aid in circulation, and help prevent pneumonia. Deep breathing exercises also help you breathe better and improve the function of your lungs by:  Keeping your lungs clear  Strengthening your breathing muscles  Helping prevent respiratory complications or problems  The incentive spirometer gives you a way to take an active part in recover. A nurse or therapist will teach you breathing exercises. To do these exercises, you will breathe in through your mouth and not your nose. The incentive spirometer only works correctly if you breathe in through your mouth.  Steps to clear lungs  Step 1. Exhale normally. Then, inhale normally.  Relax and breathe out.  Step 2. Place your lips tightly around the mouthpiece.  Make sure the device is upright and not tilted.  Step 3. Inhale as much air as you can through the mouthpiece (don't breath through your nose).  Inhale slowly and deeply.  Hold your breath long enough to keep the balls or disk raised for at least 3 to 5 seconds, or as instructed by your healthcare provider.  Some spirometers have an indicator to let you know that you are breathing in too fast. If the indicator goes off, breathe in more slowly.  Step 4. Repeat the exercise regularly.  Do this exercise every hour while you're awake, or as instructed by your healthcare provider.  If you were taught deep breathing and coughing exercises, do them regularly as instructed by your healthcare provider.           Post op instructions for prevention of DVT  What is deep vein thrombosis?  Deep vein thrombosis (DVT) is the medical term for blood clots in the deep veins of the leg.  These blood clots can be dangerous.  A DVT can block a blood vessel and keep  blood from getting where it needs to go.  Another problem is that the clot can travel to other parts of the body such as the lungs.  A clot that travels to the lungs is called a pulmonary embolus (PE) and can cause serious problems with breathing which can lead to death.  Am I at risk for DVT/PE?  If you are not very active, you are at risk of DVT.  Anyone confined to bed, sitting for long periods of time, recovering from surgery, etc. increases the risk of DVT.  Other risk factors are cancer diagnosis, certain medications, estrogen replacement in any form,older age, obesity, pregnancy, smoking, history of clotting disorders, and dehydration.  How will I know if I have a DVT?  Swelling in the lower leg  Pain  Warmth, redness, hardness or bulging of the vein  If you have any of these symptoms, call your doctors office right away.  Some people will not have any symptoms until the clot moves to the lungs.  What are the symptoms of a PE?  Panting, shortness of breath, or trouble breathing  Sharp, knife-like chest pain when you breathe  Coughing or coughing up blood  Rapid heartbeat  If you have any of these symptoms or get worse quickly, call 911 for emergency treatment.  How can I prevent a DVT?  Avoid long periods of inactivity and dont cross your legs--get up and walk around every hour or so.  Stay active--walking after surgery is highly encouraged.  This means you should get out of the house and walk in the neighborhood.  Going up and down stairs will not impair healing (unless advised against such activity by your doctor).    Drink plenty of noncaffeinated, nonalcoholic fluids each day to prevent dehydration.  Wear special support stockings, if they have been advised by your doctor.  If you travel, stop at least once an hour and walk around.  Avoid smoking (assistance with stopping is available through your healthcare provider)  Always notify your doctor if you are not able to follow the post operative  instructions that are given to you at the time of discharge.  It may be necessary to prescribe one of the medications available to prevent DVT.          Exparel(bupivacaine) has been injected to provide approximately 72 hours of reduced pain after your surgery.  Do not remove the bracelet for five days.  Report to your doctor as soon as possible if you experience any of the following:   Restlessness   Anxiety   Speech problems    Lightheadedness   Numbness and tingling of the mouth and lips   Seizures    Metallic taste   Blurred vision   Tremors    Twitching   Depression   Extreme drowsiness  Avoid additional use of local anesthetics (such as dental procedures) for five days (96 hours).          We hope your stay was comfortable as you heal now, mend and rest.    For we have enjoyed taking care of you by giving your our best.    And as you get better, by regaining your health and strength;   We count it as a privilege to have served you and hope your time at [facility] was well spent.      Thank  You!!!

## 2024-02-29 VITALS
RESPIRATION RATE: 16 BRPM | BODY MASS INDEX: 42.14 KG/M2 | SYSTOLIC BLOOD PRESSURE: 106 MMHG | HEART RATE: 74 BPM | TEMPERATURE: 98 F | DIASTOLIC BLOOD PRESSURE: 55 MMHG | OXYGEN SATURATION: 96 % | WEIGHT: 223 LBS

## 2024-02-29 PROCEDURE — G0180 MD CERTIFICATION HHA PATIENT: HCPCS | Mod: ,,, | Performed by: ORTHOPAEDIC SURGERY

## 2024-02-29 NOTE — PROGRESS NOTES
2/29/24 Very pleased with care given.  States all staff were very kind and helpful.  States she has read and understands all discharge instructions.  Instructed to ice and elevate, do foot pumps and WBAT with walker.

## 2024-03-01 ENCOUNTER — TELEPHONE (OUTPATIENT)
Dept: ORTHOPEDICS | Facility: CLINIC | Age: 74
End: 2024-03-01
Payer: MEDICARE

## 2024-03-01 NOTE — TELEPHONE ENCOUNTER
----- Message from Sepideh Oakley MA sent at 3/1/2024  2:10 PM CST -----  Contact: best  [facility]  Call back    Wound status

## 2024-03-04 ENCOUNTER — TELEPHONE (OUTPATIENT)
Dept: ORTHOPEDICS | Facility: CLINIC | Age: 74
End: 2024-03-04
Payer: MEDICARE

## 2024-03-04 NOTE — TELEPHONE ENCOUNTER
----- Message from Sepideh Oakley MA sent at 3/4/2024 12:54 PM CST -----  Contact: best REYEZ  Wound breaking down  Please call back

## 2024-03-04 NOTE — TELEPHONE ENCOUNTER
Patient states there is blistering around the incision. She states no fever. Advised her to keep clean and covered.

## 2024-03-11 DIAGNOSIS — Z96.652 HISTORY OF ARTHROPLASTY OF LEFT KNEE: Primary | ICD-10-CM

## 2024-03-14 ENCOUNTER — OFFICE VISIT (OUTPATIENT)
Dept: ORTHOPEDICS | Facility: CLINIC | Age: 74
End: 2024-03-14
Payer: MEDICARE

## 2024-03-14 ENCOUNTER — HOSPITAL ENCOUNTER (OUTPATIENT)
Dept: RADIOLOGY | Facility: HOSPITAL | Age: 74
Discharge: HOME | End: 2024-03-14
Attending: ORTHOPAEDIC SURGERY
Payer: MEDICARE

## 2024-03-14 VITALS — BODY MASS INDEX: 42.13 KG/M2 | WEIGHT: 223.13 LBS | RESPIRATION RATE: 16 BRPM | HEIGHT: 61 IN

## 2024-03-14 DIAGNOSIS — Z96.652 HISTORY OF ARTHROPLASTY OF LEFT KNEE: ICD-10-CM

## 2024-03-14 DIAGNOSIS — L03.116 CELLULITIS OF LEFT ANKLE: ICD-10-CM

## 2024-03-14 DIAGNOSIS — Z96.652 HISTORY OF ARTHROPLASTY OF LEFT KNEE: Primary | ICD-10-CM

## 2024-03-14 PROCEDURE — 73560 X-RAY EXAM OF KNEE 1 OR 2: CPT | Mod: TC,PO,LT

## 2024-03-14 PROCEDURE — 99999 PR PBB SHADOW E&M-EST. PATIENT-LVL II: CPT | Mod: PBBFAC,,, | Performed by: ORTHOPAEDIC SURGERY

## 2024-03-14 PROCEDURE — 99024 POSTOP FOLLOW-UP VISIT: CPT | Mod: S$GLB,,, | Performed by: ORTHOPAEDIC SURGERY

## 2024-03-14 PROCEDURE — 73560 X-RAY EXAM OF KNEE 1 OR 2: CPT | Mod: 26,LT,, | Performed by: RADIOLOGY

## 2024-03-14 RX ORDER — CEPHALEXIN 500 MG/1
500 CAPSULE ORAL 4 TIMES DAILY
Qty: 40 CAPSULE | Refills: 1 | Status: SHIPPED | OUTPATIENT
Start: 2024-03-14

## 2024-03-14 NOTE — PROGRESS NOTES
CC:  73-year-old female follows up status post left total knee arthroplasty.  Date of surgery was 02/28/2024.  This is her 2 week follow up.  Today she reports pain as a 3/10 and otherwise has no complaints.    LLE:  Incisions healing well no sign of infection  Grossly intact motor sensory function distally  Plus 2 distal pulses  There is some mild cellulitis distally around the ankle but no where near the incision.    X-ray images were examined and personally interpreted by me.  Two views left knee dated 03/14/2024 show left total knee arthroplasty that is well fixed and in good alignment.    Dx:  Status post left total knee arthroplasty, stable.  The patient does have some cellulitis around the ankle that I am going to treat for her.    Plan:  Keflex 500 mg q.i.d. for 10 days.  That should clear up the cellulitis at the ankle.  Outpatient physical therapy referral made  Follow up in 4 weeks for re-evaluation

## 2024-03-27 ENCOUNTER — CLINICAL SUPPORT (OUTPATIENT)
Dept: REHABILITATION | Facility: HOSPITAL | Age: 74
End: 2024-03-27
Attending: ORTHOPAEDIC SURGERY
Payer: MEDICARE

## 2024-03-27 DIAGNOSIS — M25.662 DECREASED RANGE OF MOTION (ROM) OF LEFT KNEE: Primary | ICD-10-CM

## 2024-03-27 DIAGNOSIS — Z96.652 HISTORY OF ARTHROPLASTY OF LEFT KNEE: ICD-10-CM

## 2024-03-27 PROCEDURE — 97110 THERAPEUTIC EXERCISES: CPT | Mod: PN

## 2024-03-27 PROCEDURE — 97161 PT EVAL LOW COMPLEX 20 MIN: CPT | Mod: PN

## 2024-03-27 NOTE — PLAN OF CARE
[facility]  Physical Therapy Initial Evaluation    Date: 3/27/2024   Name: Monique Trujillo  Clinic Number: [id number]    Therapy Diagnosis:   Encounter Diagnoses   Name Primary?    History of arthroplasty of left knee     Decreased range of motion (ROM) of left knee Yes     Physician: Herb Tan II, MD    Physician Orders: PT Eval and Treat   Medical Diagnosis from Referral: Z96.652 (ICD-10-CM) - History of arthroplasty of left knee   Evaluation Date: 3/27/2024  Authorization Period Expiration: 12/31/2024  Plan of Care Expiration: 4/26/2024  Visit # / Visits authorized: 1/1    Time In: 1012 am  Time Out: 1043 am  Total Appointment Time (timed & untimed codes): 31 minutes    Precautions: Standard, Cellulitis     Date of Procedure: 2/28/2024   Procedure: Procedure(s) (LRB):  ARTHROPLASTY, KNEE (Left)     Subjective   Date of onset: 2/28/2024  History of current condition - Monique reports: she has been doing well since L total knee. She states she had the R knee done years ago. She denies any pain but states she cannot bend her knee as much as she would like. She states she was using rollator prior to surgery. She denies any LOB/falls. She states she develop cellulitis along the ankle after surgery which she is taken meds for currently. She states she also developed blisters around incision which have since improved greatly and now resolved. She states she has been doing home health and felt fine with those exercises. She states she is retired. Patient reports she is a diabetic and has loss of sensation along B feet so that is an issue. She states it is unchanged since surgery.     Medical History:   Past Medical History:   Diagnosis Date    Allergy     Arthritis     Chronic lower back pain     Depression     Fever blister     GERD (gastroesophageal reflux disease)     Hemorrhoids, internal 11/05/2014    Hyperlipidemia     Hypertension     Joint pain     Morbid obesity with BMI of  40.0-44.9, adult 02/24/2015    Multiple gastric ulcers 12/14/2017    TIMMY (obstructive sleep apnea)     does not use CPAP    Pulmonary hypertension     Skin cancer     Type 2 diabetes mellitus with hyperglycemia, without long-term current use of insulin 4/20/2022       Surgical History:   Monique Trujillo  has a past surgical history that includes Knee arthroscopy w/ meniscal repair (Right, 2012); Tubal ligation (1980); Colonoscopy (N/A, 11/02/2016); Anterior cervical corpectomy w/ fusion (1988); Lumbar laminectomy (06/19/2015); Skin cancer excision; Epidural steroid injection into lumbar spine (N/A, 11/08/2018); Caudal epidural steroid injection (N/A, 01/10/2019); Injection of anesthetic agent around genitofemoral nerve (Bilateral, 08/01/2019); Fusion of spine with instrumentation (Left, 01/03/2020); Joint replacement; Back surgery; Epidural steroid injection (N/A, 05/18/2022); Reverse total shoulder arthroplasty (Right, 05/25/2022); Esophagogastroduodenoscopy (N/A, 10/06/2022); and Knee Arthroplasty (Left, 2/28/2024).    Medications:   Monique has a current medication list which includes the following prescription(s): amlodipine, ammonium lactate, ascorbic acid (vitamin c), aspirin, atenolol, blood sugar diagnostic, blood-glucose meter, calcium-vitamin d3, carboxymethylcellulose, cephalexin, cinnamon bark, clindamycin, co-enzyme q-10, cyclosporine, diclofenac sodium, diclofenac sodium, econazole nitrate, famotidine, fenofibrate micronized, furosemide, gabapentin, hydrocodone-acetaminophen, ketoconazole, lancets, lancing device, levocetirizine, lidocaine-prilocaine, multivitamin, ondansetron, pantoprazole, pen needle, diabetic, pravastatin, rybelsus, sertraline, solifenacin, triamcinolone acetonide 0.025%, and vitamin e.    Allergies:   Review of patient's allergies indicates:   Allergen Reactions    Metformin Other (See Comments)     Diarrhea         Imaging, per MD note: X-ray images were examined and  personally interpreted by me.  Two views left knee dated 03/14/2024 show left total knee arthroplasty that is well fixed and in good alignment.    Prior Therapy: Y; HH  Social History:  lives with their family  Occupation: retired  Prior Level of Function: Mod I   Current Level of Function: Mod I with RW; no assistance within the home    Pain:  Current 0/10, worst 2/10, best 0/10   Location: left knee  Description: Aching  Aggravating Factors: Standing and Flexing  Easing Factors: rest    Patients goals: improve bending of L knee; get stronger    Objective   Gait:   Assistive device - AD  Decreased knee FLX    Decreased step length    Decreased hip EXT    Fwd lean on rollator    Observation: calm and cooperative; resolved blister on anterior knee; mild redness along anterior ankle       Range of Motion:   Knee Left active Left Passive Right Active R passive   Flexion 105 108 120 123   Extension +5 +8 +1 +3       Joint Mobility:  (R) patellar hypomobility in inferior direction  Tibiofemoral: L soft tissue stretch into flexion    Lower Extremity Strength  Quad Set: able   SLR: able with no lag    Seated MMT:   Right LE  Left LE    Knee extension: 4/5 Knee extension: 4-/5   Knee flexion: 4/5 Knee flexion: 4-/5   Hip flexion: 4-/5 Hip flexion: 4-/5   Hip extension:  3+/5 Hip extension: 3+/5   Hip abduction: 4/5 Hip abduction: 4-/5                       Functional Testing:  TUG - 18.5 s with rollator     Table: Population Norms for TUG    Age  Average TUG    60 - 69 years  8.1 seconds    70 - 79 years  9.2 seconds    80 - 99 years  11.3 seconds         Evaluation   30 second Chair Rise  (adults > 59 y/o) 9 completed with no arms      Edema: minimal edema    Limitation/Restriction for FOTO Knee Survey    Therapist reviewed FOTO scores for Monique Trujillo on 3/27/2024.   FOTO documents entered into [employer] - see Media section.    Score: 74%  Predcited Score: 73%         TREATMENT   Treatment Time In: 1028 am  Treatment  Time Out: 1043 am  Total Treatment time (time-based codes) separate from Evaluation: 15 minutes    Monique received therapeutic exercises to develop strength, endurance, ROM, and flexibility for 15 minutes including:      SLR 3 x 8    DL Bridge 3 x 8    SL Hip Abd 3 x 8    Seated Knee flexion stretch x 25; 5s holds   Education - HEP / POC / perform HEP on bed or sofa, do not get onto floor / use rollator     Home Exercises and Patient Education Provided    Education provided:   - HEP  - POC/prognosis    Written Home Exercises Provided: yes.  Exercises were reviewed and Monique was able to demonstrate them prior to the end of the session.  Monique demonstrated good  understanding of the education provided.     See EMR under Patient Instructions for exercises provided 3/27/2024.    Assessment   Monique is a 73 y.o. female referred to outpatient Physical Therapy with a medical diagnosis of History of arthroplasty of left knee. Pt presents with limited knee AROM; LE weakness; abnormal gait, and functional limitations of difficulty with sit to stands/standing/walking for prolonged periods of time. Patient would benefit from skilled PT to address these deficits and facilitate a return to PLOF. Informed patient to continue to take meds for cellulitis as prescribed.     Pt to be seen 2x/week for 4x weeks     Patient prognosis is Excellent.   Patientt will benefit from skilled outpatient Physical Therapy to address the deficits stated above and in the chart below, provide patient /family education, and to maximize patientt's level of independence.     Plan of care discussed with patient: Yes  Patient's spiritual, cultural and educational needs considered and patient is agreeable to the plan of care and goals as stated below:     Anticipated Barriers for therapy: none    Medical Necessity is demonstrated by the following  History  Co-morbidities and personal factors that may impact the plan of care Co-morbidities:    depression, diabetes, high BMI, HTN, and cellulitis    Personal Factors:   age     high   Examination  Body Structures and Functions, activity limitations and participation restrictions that may impact the plan of care Body Regions:   lower extremities    Body Systems:    gross symmetry  ROM  strength  gross coordinated movement  balance  gait  transfers  motor control    Participation Restrictions:   none    Activity limitations:   no deficits    General Tasks and Commands  no deficits    Communication  no deficits    Mobility  lifting and carrying objects  walking    Self care  no deficits    Domestic Life  doing house work (cleaning house, washing dishes, laundry)  assisting others    Interactions/Relationships  no deficits    Life Areas  no deficits    Community and Social Life  community life  recreation and leisure         low   Clinical Presentation stable and uncomplicated low   Decision Making/ Complexity Score: low     Goals:  Long Term Goals: (4 weeks)  1. Pt will be independent with updated HEP supplement PT in improving functional mobility. - progressing, not met  2. Pt will improve (L) knee AROM to at least +5- 0-120 in order to improve gait and ability to perform ADLs. - progressing, not met  3. Pt will improve FOTO knee survey score to </= predicted % limited in order to demo improved functional mobility. - progressing, not met  4. Pt will perform TUG in < 12 seconds without AD in order to decrease risk of falling - progressing, not met  5. Pt will perform at least 12 sit to stands without UE support on 30 second sit to stand test in order to demo improved ability to perform transfers. - progressing, not met    Plan   Plan of care Certification: 3/27/2024 to 4/26/2024.    Outpatient Physical Therapy 2 times weekly for 4 weeks to include the following interventions: Gait Training, Manual Therapy, Moist Heat/ Ice, Neuromuscular Re-ed, Patient Education, Self Care, Therapeutic Activities, and  Therapeutic Exercise.     Hao Rocha, PT

## 2024-04-01 ENCOUNTER — CLINICAL SUPPORT (OUTPATIENT)
Dept: REHABILITATION | Facility: HOSPITAL | Age: 74
End: 2024-04-01
Payer: MEDICARE

## 2024-04-01 DIAGNOSIS — M25.662 DECREASED RANGE OF MOTION (ROM) OF LEFT KNEE: Primary | ICD-10-CM

## 2024-04-01 PROCEDURE — 97110 THERAPEUTIC EXERCISES: CPT | Mod: PN,CQ

## 2024-04-01 PROCEDURE — 97530 THERAPEUTIC ACTIVITIES: CPT | Mod: PN,CQ

## 2024-04-01 NOTE — PROGRESS NOTES
[facility]   Physical Therapy Treatment Note      Name: Monique Trujillo  Clinic Number: [id number]    Therapy Diagnosis:   Encounter Diagnosis   Name Primary?    Decreased range of motion (ROM) of left knee Yes     Physician: Herb Tan II, MD    Visit Date: 4/1/2024    Physician Orders: PT Eval and Treat   Medical Diagnosis from Referral: Z96.652 (ICD-10-CM) - History of arthroplasty of left knee   Evaluation Date: 3/27/2024  Authorization Period Expiration: 12/31/2024  Plan of Care Expiration: 4/26/2024  Visit # / Visits authorized: 1/10     Time In: 100  Time Out: 153  Total Appointment Time (timed & untimed codes): 53 minutes     Precautions: Standard, Cellulitis      Date of Procedure: 2/28/2024   Procedure: Procedure(s) (LRB):  ARTHROPLASTY, KNEE (Left)     PTA Visit #: 1/5       Subjective     Patient reports: her knee got swollen and tight when she was doing her exercises, scar is tight.   She was compliant with home exercise program.  Response to previous treatment: no issues stated   Functional change: ongoing    Pain: 2/10  Location: left knee      Objective      Objective Measures updated at progress report unless specified.     Treatment     DOS: 02/28/2024    Monique received the treatments listed below:      therapeutic exercises to develop strength and ROM for 30 minutes including:     Quad sets 3 x 10   SLR 3 x 8   Short Arc Quads 2# 3 x 8              DL Bridge 3 x 8               SL Hip Abd 3 x 8               Seated Knee flexion stretch x 25; 5s holds   Nustep x 8 minutes              manual therapy techniques: Joint mobilizations were applied for 5 minutes, including:    Patella mobs    neuromuscular re-education activities to improve: Balance for 0 minutes. The following activities were included:      therapeutic activities to improve functional performance for 13 minutes, including:    Long Arc Quad 3 x  8    Sit to stands from low mat 3 x 8    Forward step  ups 2 x 10     gait training to improve functional mobility and safety for 0  minutes, including:        Patient Education and Home Exercises       Education provided:   - home exercises, gait mechanics     Written Home Exercises Provided: Patient instructed to cont prior HEP. Exercises were reviewed and Monique was able to demonstrate them prior to the end of the session.  Monique demonstrated good  understanding of the education provided. See Electronic Medical Record under Patient Instructions for exercises provided during therapy sessions    Assessment     Encouraged patient to perform scar massage to help reduce tightness and improve range of motion in left knee. Functional mobility and strength is improving well. Educated patient to watch her scar for increased redness and warmth. Progress as tolerated with focus on gait training.     Monique Is progressing well towards her goals.   Patient prognosis is Good.     Patient will continue to benefit from skilled outpatient physical therapy to address the deficits listed in the problem list box on initial evaluation, provide pt/family education and to maximize pt's level of independence in the home and community environment.     Patient's spiritual, cultural and educational needs considered and pt agreeable to plan of care and goals.     Anticipated barriers to physical therapy: none     Goals:   Long Term Goals: (4 weeks)  1. Pt will be independent with updated HEP supplement PT in improving functional mobility. - progressing, not met  2. Pt will improve (L) knee AROM to at least +5- 0-120 in order to improve gait and ability to perform ADLs. - progressing, not met  3. Pt will improve FOTO knee survey score to </= predicted % limited in order to demo improved functional mobility. - progressing, not met  4. Pt will perform TUG in < 12 seconds without AD in order to decrease risk of falling - progressing, not met  5. Pt will perform at least 12 sit to stands  without UE support on 30 second sit to stand test in order to demo improved ability to perform transfers. - progressing, not met       Plan     Continue per Plan of Care     Zina Goodwin, PTA

## 2024-04-03 ENCOUNTER — EXTERNAL HOME HEALTH (OUTPATIENT)
Dept: HOME HEALTH SERVICES | Facility: HOSPITAL | Age: 74
End: 2024-04-03
Payer: MEDICARE

## 2024-04-03 DIAGNOSIS — F32.1 CURRENT MODERATE EPISODE OF MAJOR DEPRESSIVE DISORDER WITHOUT PRIOR EPISODE: ICD-10-CM

## 2024-04-03 RX ORDER — SERTRALINE HYDROCHLORIDE 100 MG/1
100 TABLET, FILM COATED ORAL NIGHTLY
Qty: 90 TABLET | Refills: 1 | Status: SHIPPED | OUTPATIENT
Start: 2024-04-03 | End: 2024-09-30

## 2024-04-03 NOTE — TELEPHONE ENCOUNTER
No care due was identified.  [facility] Embedded Care Due Messages. Reference number: [id number].   4/03/2024 3:56:49 PM CDT

## 2024-04-03 NOTE — TELEPHONE ENCOUNTER
----- Message from Jan Garcia sent at 4/3/2024  3:35 PM CDT -----  Contact: lambert  Type: Needs Medical Advice  Who Called:  lambert [facility]  Best Call Back Number:[phone]  Additional Information: Lambert is calling the office to get sertraline (ZOLOFT) 100 MG tablet refilled for pt fax # [phone].Please call back advise.

## 2024-04-04 ENCOUNTER — CLINICAL SUPPORT (OUTPATIENT)
Dept: REHABILITATION | Facility: HOSPITAL | Age: 74
End: 2024-04-04
Payer: MEDICARE

## 2024-04-04 DIAGNOSIS — M25.662 DECREASED RANGE OF MOTION (ROM) OF LEFT KNEE: Primary | ICD-10-CM

## 2024-04-04 PROCEDURE — 97112 NEUROMUSCULAR REEDUCATION: CPT | Mod: PN

## 2024-04-04 PROCEDURE — 97530 THERAPEUTIC ACTIVITIES: CPT | Mod: PN

## 2024-04-04 NOTE — PROGRESS NOTES
[facility]   Physical Therapy Treatment Note      Name: Monique Trujillo  Clinic Number: [id number]    Therapy Diagnosis:   Encounter Diagnosis   Name Primary?    Decreased range of motion (ROM) of left knee Yes       Physician: Herb Tan II, MD    Visit Date: 4/4/2024    Physician Orders: PT Eval and Treat   Medical Diagnosis from Referral: Z96.652 (ICD-10-CM) - History of arthroplasty of left knee   Evaluation Date: 3/27/2024  Authorization Period Expiration: 12/31/2024  Plan of Care Expiration: 4/26/2024  Visit # / Visits authorized: 2/10     Time In: 1030 am  Time Out: 1118 am  Total Appointment Time (timed & untimed codes): 23 minutes     Precautions: Standard, Cellulitis      Date of Procedure: 2/28/2024   Procedure: Procedure(s) (LRB):  ARTHROPLASTY, KNEE (Left)     PTA Visit #: 1/5       Subjective     Patient reports: she is seeing a home nurse on Tuesday to check cellulitis. She states she feels fine and has finished the medication.     She was compliant with home exercise program.  Response to previous treatment: no issues stated   Functional change: ongoing    Pain: 2/10  Location: left knee      Objective      Objective Measures updated at progress report unless specified.     Treatment     DOS: 02/28/2024    Monique received the treatments listed below:      PT Tech assisted with session    therapeutic exercises to develop strength and ROM for 25 minutes including:    Supine SLR 3 x 8 2# B  LAQ 10# 3 x 8  B  Seated Heel raises 2 x 15   Nustep x 8 minutes Level 2  Education - HEP / POC               manual therapy techniques: Joint mobilizations were applied for 0 minutes, including:    Patella mobs    neuromuscular re-education activities to improve: Balance for 12 minutes. The following activities were included:    DL Bridge 3 x 8  Supine Clams 2 x 10 B GTB              Standing Hip Abd 2 x 10 2# B     therapeutic activities to improve functional performance for 13  minutes, including:    DL Shuttle 3 x 10 62#  SL Shuttle 3 x 8 B 25#       gait training to improve functional mobility and safety for 0  minutes, including:        Patient Education and Home Exercises       Education provided:   - home exercises, gait mechanics     Written Home Exercises Provided: Patient instructed to cont prior HEP. Exercises were reviewed and Monique was able to demonstrate them prior to the end of the session.  Monique demonstrated good  understanding of the education provided. See Electronic Medical Record under Patient Instructions for exercises provided during therapy sessions    Assessment     Monique tolerated treatment well. She demonstrate full extension with slight limitation in flexion. Redness on anterior lower leg was not presents today which could mean resolved cellulitis. Will continue to monitor and progress as tolerated.       Monique Is progressing well towards her goals.   Patient prognosis is Good.     Patient will continue to benefit from skilled outpatient physical therapy to address the deficits listed in the problem list box on initial evaluation, provide pt/family education and to maximize pt's level of independence in the home and community environment.     Patient's spiritual, cultural and educational needs considered and pt agreeable to plan of care and goals.     Anticipated barriers to physical therapy: none     Goals:   Long Term Goals: (4 weeks)  1. Pt will be independent with updated HEP supplement PT in improving functional mobility. - progressing, not met  2. Pt will improve (L) knee AROM to at least +5- 0-120 in order to improve gait and ability to perform ADLs. - progressing, not met  3. Pt will improve FOTO knee survey score to </= predicted % limited in order to demo improved functional mobility. - progressing, not met  4. Pt will perform TUG in < 12 seconds without AD in order to decrease risk of falling - progressing, not met  5. Pt will perform at  least 12 sit to stands without UE support on 30 second sit to stand test in order to demo improved ability to perform transfers. - progressing, not met       Plan     Continue per Plan of Care     Hoa Rocha, PT

## 2024-04-08 ENCOUNTER — DOCUMENTATION ONLY (OUTPATIENT)
Dept: REHABILITATION | Facility: HOSPITAL | Age: 74
End: 2024-04-08

## 2024-04-08 ENCOUNTER — CLINICAL SUPPORT (OUTPATIENT)
Dept: REHABILITATION | Facility: HOSPITAL | Age: 74
End: 2024-04-08
Payer: MEDICARE

## 2024-04-08 DIAGNOSIS — M25.662 DECREASED RANGE OF MOTION (ROM) OF LEFT KNEE: Primary | ICD-10-CM

## 2024-04-08 PROCEDURE — 97112 NEUROMUSCULAR REEDUCATION: CPT | Mod: PN,CQ

## 2024-04-08 PROCEDURE — 97110 THERAPEUTIC EXERCISES: CPT | Mod: PN,CQ

## 2024-04-08 NOTE — PROGRESS NOTES
PT/PTA met face to face to discuss pt's treatment plan and progress towards established goals. Pt will be seen by a physical therapist minimally every 6th visit or every 30 days.    Belle Beck PTA

## 2024-04-08 NOTE — PROGRESS NOTES
[facility]   Physical Therapy Treatment Note      Name: Monique Trujillo  Clinic Number: [id number]    Therapy Diagnosis:   Encounter Diagnosis   Name Primary?    Decreased range of motion (ROM) of left knee Yes       Physician: Herb Tan II, MD    Visit Date: 4/8/2024    Physician Orders: PT Eval and Treat   Medical Diagnosis from Referral: Z96.652 (ICD-10-CM) - History of arthroplasty of left knee   Evaluation Date: 3/27/2024  Authorization Period Expiration: 12/31/2024  Plan of Care Expiration: 4/26/2024  Visit # / Visits authorized: 3/10     Time In: 101p  Time Out: 158p  Total Appointment Time (timed & untimed codes): 30 minutes     Precautions: Standard, Cellulitis      Date of Procedure: 2/28/2024   Procedure: Procedure(s) (LRB):  ARTHROPLASTY, KNEE (Left)       Subjective     Patient reports: doing well today    She was compliant with home exercise program.  Response to previous treatment: positive  Functional change: ongoing    Pain: 2/10  Location: left knee      Objective      Objective Measures updated at progress report unless specified.     Treatment     DOS: 02/28/2024    Monique received the treatments listed below:      Therapeutic exercises to develop strength and ROM for 34 minutes including:    Nustep x 10 minutes Level 2    Slant board Gastroc stretch 3 x 30 sec Bilateral     Supine Straight Leg Raise  3 x 8 2# Bilateral   Short Arc Quads 2# 3 x 10 Bilateral   LAQ 5# 3 x 8  Bilateral   Seated Heel raises 2 x 15    Manual therapy techniques: Joint mobilizations were applied for 0 minutes, including:    Patella mobs    Neuromuscular re-education activities to improve: Balance for 8 minutes. The following activities were included:    DL Bridge 3 x 8  Supine Clams 2 x 10 Green Theraband    Standing Hip Abd 2 x 10 2# Bilateral   NOT PERFORMED     Therapeutic activities to improve functional performance for 15 minutes, including:    DL Shuttle 3 x 10 62#  SL Shuttle  "3 x 8 B 25#     Step ups 4" x 12    gait training to improve functional mobility and safety for 0  minutes, including:        Patient Education and Home Exercises       Education provided:   - home exercises, gait mechanics     Written Home Exercises Provided: Patient instructed to cont prior HEP. Exercises were reviewed and Monique was able to demonstrate them prior to the end of the session.  Monique demonstrated good  understanding of the education provided. See Electronic Medical Record under Patient Instructions for exercises provided during therapy sessions    Assessment     Monique tolerated PRE's with good form.  Decreased Long Arc Quad to 5# today, pt able to tolerate better.  Increased UE support with step ups.        Monique Is progressing well towards her goals.   Patient prognosis is Good.     Patient will continue to benefit from skilled outpatient physical therapy to address the deficits listed in the problem list box on initial evaluation, provide pt/family education and to maximize pt's level of independence in the home and community environment.     Patient's spiritual, cultural and educational needs considered and pt agreeable to plan of care and goals.     Anticipated barriers to physical therapy: none     Goals:   Long Term Goals: (4 weeks)  1. Pt will be independent with updated HEP supplement PT in improving functional mobility. - progressing, not met  2. Pt will improve (L) knee AROM to at least +5- 0-120 in order to improve gait and ability to perform ADLs. - progressing, not met  3. Pt will improve FOTO knee survey score to </= predicted % limited in order to demo improved functional mobility. - progressing, not met  4. Pt will perform TUG in < 12 seconds without AD in order to decrease risk of falling - progressing, not met  5. Pt will perform at least 12 sit to stands without UE support on 30 second sit to stand test in order to demo improved ability to perform transfers. - " progressing, not met       Plan     Continue per Plan of Care     Belle Beck, PTA

## 2024-04-08 NOTE — DISCHARGE SUMMARY
Problem: Potential for Falls  Goal: Patient will remain free of falls  Description: INTERVENTIONS:  - Educate patient/family on patient safety including physical limitations  - Instruct patient to call for assistance with activity   - Consult OT/PT to assist with strengthening/mobility   - Keep Call bell within reach  - Keep bed low and locked with side rails adjusted as appropriate  - Keep care items and personal belongings within reach  - Initiate and maintain comfort rounds  - Make Fall Risk Sign visible to staff  - Offer Toileting every 2 Hours, in advance of need  - Initiate/Maintain bed alarm  - Obtain necessary fall risk management equipment: call bell  Problem: Prexisting or High Potential for Compromised Skin Integrity  Goal: Skin integrity is maintained or improved  Description: INTERVENTIONS:  - Identify patients at risk for skin breakdown  - Assess and monitor skin integrity  - Assess and monitor nutrition and hydration status  - Monitor labs   - Assess for incontinence   - Turn and reposition patient  - Assist with mobility/ambulation  - Relieve pressure over bony prominences  - Avoid friction and shearing  - Provide appropriate hygiene as needed including keeping skin clean and dry  - Evaluate need for skin moisturizer/barrier cream  - Collaborate with interdisciplinary team   - Patient/family teaching  - Consider wound care consult   Outcome: Progressing     Problem: PAIN - ADULT  Goal: Verbalizes/displays adequate comfort level or baseline comfort level  Description: Interventions:  - Encourage patient to monitor pain and request assistance  - Assess pain using appropriate pain scale  - Administer analgesics based on type and severity of pain and evaluate response  - Implement non-pharmacological measures as appropriate and evaluate response  - Consider cultural and social influences on pain and pain management  - Notify physician/advanced practitioner if interventions unsuccessful or patient  [facility]  Discharge Note  Short Stay     Admit Date: 11/8/2018    Discharge Date: 11/8/2018     Attending Physician: Lydia Velásquez Jr, MD    Diagnoses:  Active Hospital Problems    Diagnosis  POA    *Lumbar radiculopathy [M54.16]  Yes    Chronic pain [G89.29]  Yes      Resolved Hospital Problems   No resolved problems to display.     Discharged Condition: Good     Hospital Course: Patient was admitted for an outpatient interventional pain management procedure and tolerated the procedure well with no complications.     Final Diagnoses: Same as principal problem.     Disposition: Home or Self Care     Follow up/Patient Instructions:    Follow-up Information     Go to Lydia Velásquez Jr, MD.    Specialty:  Pain Medicine  Why:  Post-procedural Follow Up As Scheduled, Call to make an appointment if you do not have one  Contact information:  [address]  [address]  [place] [address]  [phone]                   Reconciled Medications:     Medication List      CONTINUE taking these medications    amLODIPine 10 MG tablet  Commonly known as:  NORVASC  TAKE 1 TABLET BY MOUTH EVERY DAY     atenolol 25 MG tablet  Commonly known as:  TENORMIN  TAKE 1 TABLET BY MOUTH EVERY DAY     CALCIUM 500 + D 500 mg(1,250mg) -200 unit per tablet  Generic drug:  calcium-vitamin D3  Take 1 tablet by mouth 2 (two) times daily with meals.     celecoxib 200 MG capsule  Commonly known as:  CeleBREX  Take 1 capsule (200 mg total) by mouth once daily.     citalopram 40 MG tablet  Commonly known as:  CELEXA  TAKE 1 TABLET BY MOUTH EVERY DAY     co-enzyme Q-10 30 mg capsule  Take 30 mg by mouth once daily.     econazole nitrate 1 % cream  Use bid for rash     fenofibric acid 135 mg Cpdr  Commonly known as:  FIBRICOR  Take 1 capsule (135 mg total) by mouth once daily.     fish oil-omega-3 fatty acids 300-1,000 mg capsule  Take 2 g by mouth once daily.     furosemide 40 MG tablet  Commonly known as:  LASIX  Take 1 tablet (40  mg total) by mouth daily as needed (edema).     gabapentin 800 MG tablet  Commonly known as:  NEURONTIN  TAKE 1 TABLET BY MOUTH EVERY EVENING     GLUCOSAMINE SULF-CHONDROITIN ORAL  Take 1 tablet by mouth once daily.     multivitamin capsule  Take 1 capsule by mouth once daily.     OPW TEST CLAIM - DO NOT FILL  Take by mouth. OPW test claim. Do not fill.     pantoprazole 20 MG tablet  Commonly known as:  PROTONIX  Take 1 tablet (20 mg total) by mouth once daily.     pravastatin 40 MG tablet  Commonly known as:  PRAVACHOL  TAKE 1 TABLET BY MOUTH EVERY DAY     solifenacin 10 MG tablet  Commonly known as:  VESICARE  Take 1 tablet (10 mg total) by mouth once daily.     VITAMIN C 1000 MG tablet  Generic drug:  ascorbic acid (vitamin C)  Take 1,000 mg by mouth once daily.     vitamin E 400 UNIT capsule  Take 400 Units by mouth once daily.           Discharge Procedure Orders (must include Diet, Follow-up, Activity)   Call MD for:  temperature >100.4     Call MD for:  severe uncontrolled pain     Call MD for:  redness, tenderness, or signs of infection (pain, swelling, redness, odor or green/yellow discharge around incision site)     Call MD for:  difficulty breathing or increased cough     Call MD for:  severe persistent headache     Call MD for:  worsening rash     Remove dressing in 24 hours       Lydia Velásquez Jr, MD  Interventional Pain Medicine / Anesthesiology     reports new pain  Outcome: Progressing     Problem: INFECTION - ADULT  Goal: Absence or prevention of progression during hospitalization  Description: INTERVENTIONS:  - Assess and monitor for signs and symptoms of infection  - Monitor lab/diagnostic results  - Monitor all insertion sites, i.e. indwelling lines, tubes, and drains  - Monitor endotracheal if appropriate and nasal secretions for changes in amount and color  - [place] appropriate cooling/warming therapies per order  - Administer medications as ordered  - Instruct and encourage patient and family to use good hand hygiene technique  - Identify and instruct in appropriate isolation precautions for identified infection/condition  Outcome: Progressing  Goal: Absence of fever/infection during neutropenic period  Description: INTERVENTIONS:  - Monitor WBC    Outcome: Progressing     Problem: SAFETY ADULT  Goal: Patient will remain free of falls  Description: INTERVENTIONS:  - Educate patient/family on patient safety including physical limitations  - Instruct patient to call for assistance with activity   - Consult OT/PT to assist with strengthening/mobility   - Keep Call bell within reach  - Keep bed low and locked with side rails adjusted as appropriate  - Keep care items and personal belongings within reach  - Initiate and maintain comfort rounds  - Make Fall Risk Sign visible to staff  - Offer Toileting every 2 Hours, in advance of need  - Initiate/Maintain bed alarm  - Obtain necessary fall risk management equipment: call bell  - Apply yellow socks and bracelet for high fall risk patients  - Consider moving patient to room near nurses station  Outcome: Progressing  Goal: Maintain or return to baseline ADL function  Description: INTERVENTIONS:  -  Assess patient's ability to carry out ADLs; assess patient's baseline for ADL function and identify physical deficits which impact ability to perform ADLs (bathing, care of mouth/teeth, toileting, grooming,  dressing, etc.)  - Assess/evaluate cause of self-care deficits   - Assess range of motion  - Assess patient's mobility; develop plan if impaired  - Assess patient's need for assistive devices and provide as appropriate  - Encourage maximum independence but intervene and supervise when necessary  - Involve family in performance of ADLs  - Assess for home care needs following discharge   - Consider OT consult to assist with ADL evaluation and planning for discharge  - Provide patient education as appropriate  Outcome: Progressing  Goal: Maintains/Returns to pre admission functional level  Description: INTERVENTIONS:  - Perform AM-PAC 6 Click Basic Mobility/ Daily Activity assessment daily.  - Set and communicate daily mobility goal to care team and patient/family/caregiver.   - Collaborate with rehabilitation services on mobility goals if consulted  - Perform Range of Motion 3 times a day.  - Reposition patient every 2 hours.  - Dangle patient 3 times a day  - Stand patient 3 times a day  - Ambulate patient 3 times a day  - Out of bed to chair 3 times a day   - Out of bed for meals 3  Problem: DISCHARGE PLANNING  Goal: Discharge to home or other facility with appropriate resources  Description: INTERVENTIONS:  - Identify barriers to discharge w/patient and caregiver  - Arrange for needed discharge resources and transportation as appropriate  - Identify discharge learning needs (meds, wound care, etc.)  - Arrange for interpretive services to assist at discharge as needed  - Refer to Case Management Department for coordinating discharge planning if the patient needs post-hospital services based on physician/advanced practitioner order or complex needs related to functional status, cognitive ability, or social support system  Outcome: Progressing     Problem: Knowledge Deficit  Goal: Patient/family/caregiver demonstrates understanding of disease process, treatment plan, medications, and discharge  instructions  Description: Complete learning assessment and assess knowledge base.  Interventions:  - Provide teaching at level of understanding  - Provide teaching via preferred learning methods  Outcome: Progressing     Problem: CARDIOVASCULAR - ADULT  Goal: Maintains optimal cardiac output and hemodynamic stability  Description: INTERVENTIONS:  - Monitor I/O, vital signs and rhythm  - Monitor for S/S and trends of decreased cardiac output  - Administer and titrate ordered vasoactive medications to optimize hemodynamic stability  - Assess quality of pulses, skin color and temperature  - Assess for signs of decreased coronary artery perfusion  - Instruct patient to report change in severity of symptoms  Outcome: Progressing  Goal: Absence of cardiac dysrhythmias or at baseline rhythm  Description: INTERVENTIONS:  - Continuous cardiac monitoring, vital signs, obtain 12 lead EKG if ordered  - Administer antiarrhythmic and heart rate control medications as ordered  - Monitor electrolytes and administer replacement therapy as ordered  Outcome: Progressing     Problem: METABOLIC, FLUID AND ELECTROLYTES - ADULT  Goal: Electrolytes maintained within normal limits  Description: INTERVENTIONS:  - Monitor labs and assess patient for signs and symptoms of electrolyte imbalances  - Administer electrolyte replacement as ordered  - Monitor response to electrolyte replacements, including repeat lab results as appropriate  - Instruct patient on fluid and nutrition as appropriate  Outcome: Progressing  Goal: Fluid balance maintained  Description: INTERVENTIONS:  - Monitor labs   - Monitor I/O and WT  - Instruct patient on fluid and nutrition as appropriate  - Assess for signs & symptoms of volume excess or deficit  Outcome: Progressing  Goal: Glucose maintained within target range  Description: INTERVENTIONS:  - Monitor Blood Glucose as ordered  - Assess for signs and symptoms of hyperglycemia and hypoglycemia  - Administer ordered  medications to maintain glucose within target range  - Assess nutritional intake and initiate nutrition service referral as needed  Outcome: Progressing     Problem: HEMATOLOGIC - ADULT  Goal: Maintains hematologic stability  Description: INTERVENTIONS  - Assess for signs and symptoms of bleeding or hemorrhage  - Monitor labs  - Administer supportive blood products/factors as ordered and appropriate  Outcome: Progressing    times a day  - Out of bed for toileting  - Record patient progress and toleration of activity level   Outcome: Progressing     - Apply yellow socks and bracelet for high fall risk patients  - Consider moving patient to room near nurses station  Outcome: Progressing

## 2024-04-11 ENCOUNTER — CLINICAL SUPPORT (OUTPATIENT)
Dept: REHABILITATION | Facility: HOSPITAL | Age: 74
End: 2024-04-11
Payer: MEDICARE

## 2024-04-11 DIAGNOSIS — M25.662 DECREASED RANGE OF MOTION (ROM) OF LEFT KNEE: Primary | ICD-10-CM

## 2024-04-11 PROCEDURE — 97530 THERAPEUTIC ACTIVITIES: CPT | Mod: PN,CQ

## 2024-04-11 PROCEDURE — 97112 NEUROMUSCULAR REEDUCATION: CPT | Mod: PN,CQ

## 2024-04-11 PROCEDURE — 97110 THERAPEUTIC EXERCISES: CPT | Mod: PN,CQ

## 2024-04-11 NOTE — PROGRESS NOTES
[facility]   Physical Therapy Treatment Note      Name: Monique Trujillo  Clinic Number: [id number]    Therapy Diagnosis:   Encounter Diagnosis   Name Primary?    Decreased range of motion (ROM) of left knee Yes       Physician: Herb Tan II, MD    Visit Date: 4/11/2024    Physician Orders: PT Eval and Treat   Medical Diagnosis from Referral: Z96.652 (ICD-10-CM) - History of arthroplasty of left knee   Evaluation Date: 3/27/2024  Authorization Period Expiration: 12/31/2024  Plan of Care Expiration: 4/26/2024  Visit # / Visits authorized: 4/10     Time In: 100  Time Out: 158   Total Appointment Time (timed & untimed codes): 58 minutes     Precautions: Standard, Cellulitis      Date of Procedure: 2/28/2024   Procedure: Procedure(s) (LRB):  ARTHROPLASTY, KNEE (Left)       Subjective     Patient reports: knee is doing alright, her cellulitis is acting up.   She was compliant with home exercise program.  Response to previous treatment: no issues stated   Functional change: ongoing    Pain: 2/10  Location: left knee      Objective      Objective Measures updated at progress report unless specified.     Treatment     DOS: 02/28/2024    Monique received the treatments listed below:      Therapeutic exercises to develop strength and ROM for 34 minutes including:    Nustep x 10 minutes Level 2    Slant board Gastroc stretch 3 x 30 sec Bilateral     Supine Straight Leg Raise  3 x 10 2# Bilateral   Short Arc Quads 2# 3 x 10 Bilateral   LAQ 5# 3 x 8 Bilateral     Manual therapy techniques: Joint mobilizations were applied for 0 minutes, including:    Patella mobs    Neuromuscular re-education activities to improve: Balance for 8 minutes. The following activities were included:    DL Bridge Green Theraband 3 x 8    Standing Hip Abd 2 x 10  Bilateral     Therapeutic activities to improve functional performance for 15 minutes, including:    DL Shuttle 3 x 10 37#  SL Shuttle 3 x 8 B 25#     Sit to  "stand from 24 inch box with airex 3 x 8   Step ups 4" x 10    gait training to improve functional mobility and safety for 0 minutes, including:      Patient Education and Home Exercises       Education provided:   - home exercises, gait mechanics     Written Home Exercises Provided: Patient instructed to cont prior HEP. Exercises were reviewed and Monique was able to demonstrate them prior to the end of the session.  Monique demonstrated good  understanding of the education provided. See Electronic Medical Record under Patient Instructions for exercises provided during therapy sessions    Assessment     Monique continues to be challenged with left quad strength with limitations with step ups. Tolerated all exercises well with focus on knee range of motion and strength. Progress as tolerated.     Monique Is progressing well towards her goals.   Patient prognosis is Good.     Patient will continue to benefit from skilled outpatient physical therapy to address the deficits listed in the problem list box on initial evaluation, provide pt/family education and to maximize pt's level of independence in the home and community environment.     Patient's spiritual, cultural and educational needs considered and pt agreeable to plan of care and goals.     Anticipated barriers to physical therapy: none     Goals:   Long Term Goals: (4 weeks)  1. Pt will be independent with updated HEP supplement PT in improving functional mobility. - progressing, not met  2. Pt will improve (L) knee AROM to at least +5- 0-120 in order to improve gait and ability to perform ADLs. - progressing, not met  3. Pt will improve FOTO knee survey score to </= predicted % limited in order to demo improved functional mobility. - progressing, not met  4. Pt will perform TUG in < 12 seconds without AD in order to decrease risk of falling - progressing, not met  5. Pt will perform at least 12 sit to stands without UE support on 30 second sit to stand test " in order to demo improved ability to perform transfers. - progressing, not met       Plan     Continue per Plan of Care     Zina Goodwin, PTA

## 2024-04-18 ENCOUNTER — DOCUMENT SCAN (OUTPATIENT)
Dept: HOME HEALTH SERVICES | Facility: HOSPITAL | Age: 74
End: 2024-04-18
Payer: MEDICARE

## 2024-04-18 ENCOUNTER — CLINICAL SUPPORT (OUTPATIENT)
Dept: REHABILITATION | Facility: HOSPITAL | Age: 74
End: 2024-04-18
Payer: MEDICARE

## 2024-04-18 DIAGNOSIS — M25.662 DECREASED RANGE OF MOTION (ROM) OF LEFT KNEE: Primary | ICD-10-CM

## 2024-04-18 DIAGNOSIS — Z96.652 HISTORY OF ARTHROPLASTY OF LEFT KNEE: ICD-10-CM

## 2024-04-18 PROCEDURE — 97530 THERAPEUTIC ACTIVITIES: CPT | Mod: PN,CQ

## 2024-04-18 PROCEDURE — 97112 NEUROMUSCULAR REEDUCATION: CPT | Mod: PN,CQ

## 2024-04-18 PROCEDURE — 97110 THERAPEUTIC EXERCISES: CPT | Mod: PN,CQ

## 2024-04-18 NOTE — PROGRESS NOTES
[facility]   Physical Therapy Treatment Note      Name: Monique Trujillo  Clinic Number: [id number]    Therapy Diagnosis:   Encounter Diagnoses   Name Primary?    Decreased range of motion (ROM) of left knee Yes    History of arthroplasty of left knee          Physician: Herb Tan II, MD    Visit Date: 4/18/2024    Physician Orders: PT Eval and Treat   Medical Diagnosis from Referral: Z96.652 (ICD-10-CM) - History of arthroplasty of left knee   Evaluation Date: 3/27/2024  Authorization Period Expiration: 12/31/2024  Plan of Care Expiration: 4/26/2024  Visit # / Visits authorized: 5/10     Time In: 1135 (early arrival)  Time Out: 1230  Total Appointment Time (timed & untimed codes): 55 minutes     Precautions: Standard, Cellulitis      Date of Procedure: 2/28/2024   Procedure: Procedure(s) (LRB):  ARTHROPLASTY, KNEE (Left)       Subjective     Patient reports: knee is doing good, feels like her cellulitis is doing much better.     She was compliant with home exercise program.  Response to previous treatment: no issues stated   Functional change: ongoing    Pain: 2/10  Location: left knee      Objective      Objective Measures updated at progress report unless specified.     Treatment     DOS: 02/28/2024    Monique received the treatments listed below:      Therapeutic exercises to develop strength and ROM for 34 minutes including:    Nustep x 10 minutes Level 2    Slant board Gastroc stretch 3 x 30 sec Bilateral     Supine Straight Leg Raise  3 x 10 2# Bilateral   Short Arc Quads 2# 3 x 10 Bilateral   LAQ 5# 3 x 8 Bilateral     Manual therapy techniques: Joint mobilizations were applied for 0 minutes, including:    Patella mobs    Neuromuscular re-education activities to improve: Balance for 8 minutes. The following activities were included:    DL Bridge Green Theraband 3 x 8    Standing Hip Abd 2 x 10  Bilateral     Therapeutic activities to improve functional performance for 15  "minutes, including:    DL Shuttle 3 x 10 37#  SL Shuttle 3 x 8 B 25#     Sit to stand from 24 inch box with airex 3 x 8   Step ups 4" x 10    gait training to improve functional mobility and safety for 0 minutes, including:      Patient Education and Home Exercises       Education provided:   - home exercises, gait mechanics     Written Home Exercises Provided: Patient instructed to cont prior HEP. Exercises were reviewed and Monique was able to demonstrate them prior to the end of the session.  Monique demonstrated good  understanding of the education provided. See Electronic Medical Record under Patient Instructions for exercises provided during therapy sessions    Assessment     Monique noted with good tolerance to treatment. Continues to display quad strength deficits evident during step ups. She responds well to all cueing with improvements noted. Will continue to benefit from skilled Physical Therapy to maximize range of motion and strength gains as able.    Monique Is progressing well towards her goals.   Patient prognosis is Good.     Patient will continue to benefit from skilled outpatient physical therapy to address the deficits listed in the problem list box on initial evaluation, provide pt/family education and to maximize pt's level of independence in the home and community environment.     Patient's spiritual, cultural and educational needs considered and pt agreeable to plan of care and goals.     Anticipated barriers to physical therapy: none     Goals:   Long Term Goals: (4 weeks)  1. Pt will be independent with updated HEP supplement PT in improving functional mobility. - progressing, not met  2. Pt will improve (L) knee AROM to at least +5- 0-120 in order to improve gait and ability to perform ADLs. - progressing, not met  3. Pt will improve FOTO knee survey score to </= predicted % limited in order to demo improved functional mobility. - progressing, not met  4. Pt will perform TUG in < 12 " seconds without AD in order to decrease risk of falling - progressing, not met  5. Pt will perform at least 12 sit to stands without UE support on 30 second sit to stand test in order to demo improved ability to perform transfers. - progressing, not met       Plan     Continue per Plan of Care     Nhung Coleman, PTA

## 2024-04-22 ENCOUNTER — CLINICAL SUPPORT (OUTPATIENT)
Dept: REHABILITATION | Facility: HOSPITAL | Age: 74
End: 2024-04-22
Payer: MEDICARE

## 2024-04-22 DIAGNOSIS — M25.662 DECREASED RANGE OF MOTION (ROM) OF LEFT KNEE: Primary | ICD-10-CM

## 2024-04-22 PROCEDURE — 97112 NEUROMUSCULAR REEDUCATION: CPT | Mod: PN,CQ

## 2024-04-22 PROCEDURE — 97110 THERAPEUTIC EXERCISES: CPT | Mod: PN,CQ

## 2024-04-22 PROCEDURE — 97530 THERAPEUTIC ACTIVITIES: CPT | Mod: PN,CQ

## 2024-04-22 NOTE — PROGRESS NOTES
[facility]   Physical Therapy Treatment Note      Name: Monique Trujillo  Clinic Number: [id number]    Therapy Diagnosis:   Encounter Diagnosis   Name Primary?    Decreased range of motion (ROM) of left knee Yes         Physician: Herb Tan II, MD    Visit Date: 4/22/2024    Physician Orders: PT Eval and Treat   Medical Diagnosis from Referral: Z96.652 (ICD-10-CM) - History of arthroplasty of left knee   Evaluation Date: 3/27/2024  Authorization Period Expiration: 12/31/2024  Plan of Care Expiration: 4/26/2024  Visit # / Visits authorized: 6/10     Time In: 100  Time Out: 155  Total Appointment Time (timed & untimed codes): 55 minutes     Precautions: Standard, Cellulitis      Date of Procedure: 2/28/2024   Procedure: Procedure(s) (LRB):  ARTHROPLASTY, KNEE (Left)       Subjective     Patient reports: knee is a little stiff, but doing much better. Still using the Rolling Walker.   She was compliant with home exercise program.  Response to previous treatment: no issues stated   Functional change: ongoing    Pain: 2/10  Location: left knee      Objective      Objective Measures updated at progress report unless specified.     Treatment     DOS: 02/28/2024    Monique received the treatments listed below:      Therapeutic exercises to develop strength and ROM for 34 minutes including:    Nustep x 10 minutes Level 2    Slant board Gastroc stretch 3 x 30 sec Bilateral     Supine Straight Leg Raise  3 x 10 2# Bilateral   Short Arc Quads 2# 3 x 10 Bilateral   LAQ 5# 3 x 8 Bilateral     Manual therapy techniques: Joint mobilizations were applied for 0 minutes, including:    Patella mobs    Neuromuscular re-education activities to improve: Balance for 8 minutes. The following activities were included:    DL Bridge Green Theraband 3 x 8    Standing Hip Abd 2 x 10  Bilateral     Therapeutic activities to improve functional performance for 15 minutes, including:    DL Shuttle 3 x 10 37#  SL  "Shuttle 3 x 8 B 25#     Sit to stand from 24 inch box with airex 3 x 8   Step ups 4" x 10    gait training to improve functional mobility and safety for 0 minutes, including:      Patient Education and Home Exercises       Education provided:   - home exercises, gait mechanics     Written Home Exercises Provided: Patient instructed to cont prior HEP. Exercises were reviewed and Monique was able to demonstrate them prior to the end of the session.  Monique demonstrated good  understanding of the education provided. See Electronic Medical Record under Patient Instructions for exercises provided during therapy sessions    Assessment     Monique noted with good tolerance to treatment, but continued weakness with quad activation in functional mobility. Will continue to benefit from skilled Physical Therapy to maximize range of motion and strength gains as able.    Monique Is progressing well towards her goals.   Patient prognosis is Good.     Patient will continue to benefit from skilled outpatient physical therapy to address the deficits listed in the problem list box on initial evaluation, provide pt/family education and to maximize pt's level of independence in the home and community environment.     Patient's spiritual, cultural and educational needs considered and pt agreeable to plan of care and goals.     Anticipated barriers to physical therapy: none     Goals:   Long Term Goals: (4 weeks)  1. Pt will be independent with updated HEP supplement PT in improving functional mobility. - progressing, not met  2. Pt will improve (L) knee AROM to at least +5- 0-120 in order to improve gait and ability to perform ADLs. - progressing, not met  3. Pt will improve FOTO knee survey score to </= predicted % limited in order to demo improved functional mobility. - progressing, not met  4. Pt will perform TUG in < 12 seconds without AD in order to decrease risk of falling - progressing, not met  5. Pt will perform at least " 12 sit to stands without UE support on 30 second sit to stand test in order to demo improved ability to perform transfers. - progressing, not met       Plan     Continue per Plan of Care     Zina Goodwin, PTA          severe

## 2024-04-25 ENCOUNTER — CLINICAL SUPPORT (OUTPATIENT)
Dept: REHABILITATION | Facility: HOSPITAL | Age: 74
End: 2024-04-25
Payer: MEDICARE

## 2024-04-25 DIAGNOSIS — M25.662 DECREASED RANGE OF MOTION (ROM) OF LEFT KNEE: Primary | ICD-10-CM

## 2024-04-25 PROCEDURE — 97112 NEUROMUSCULAR REEDUCATION: CPT | Mod: PN

## 2024-04-25 PROCEDURE — 97530 THERAPEUTIC ACTIVITIES: CPT | Mod: PN

## 2024-04-25 NOTE — PROGRESS NOTES
[facility]   Physical Therapy Discharge Summary      Name: Monique Trujillo  Clinic Number: [id number]    Therapy Diagnosis:   Encounter Diagnosis   Name Primary?    Decreased range of motion (ROM) of left knee Yes     Physician: Herb Tan II, MD    Visit Date: 4/25/2024    Physician Orders: PT Eval and Treat   Medical Diagnosis from Referral: Z96.652 (ICD-10-CM) - History of arthroplasty of left knee   Evaluation Date: 3/27/2024  Authorization Period Expiration: 12/31/2024  Plan of Care Expiration: 4/26/2024  Visit # / Visits authorized: 7/10     Time In: 100 pm  Time Out: 141 pm  Total Appointment Time (timed & untimed codes): 23 minutes     Precautions: Standard, Cellulitis      Date of Procedure: 2/28/2024   Procedure: Procedure(s) (LRB):  ARTHROPLASTY, KNEE (Left)       Subjective     Patient reports: she is feeling good. Denies any pain or discomfort. States she has been using walker for year so she does not plan on getting rid of it.     She was compliant with home exercise program.  Response to previous treatment: no issues stated   Functional change: ongoing    Pain: 0/10  Location: left knee      Objective      ROM: +3-0-120    TUG - 18.5 s with rollator / 16s with RW        Evaluation/Current   30 second Chair Rise  (adults > 59 y/o) 9 completed with no arms/14 reps with no arms       Treatment     DOS: 02/28/2024    PT Tech assisted with portion of the session    Monique received the treatments listed below:      Therapeutic exercises to develop strength and ROM for 25 minutes including:    Assessment above  Nustep x 10 minutes Level 3  Supine Straight Leg Raise 4 x 8 3#   LAQ 10# 3 x 10 Bilateral     Manual therapy techniques: Joint mobilizations were applied for 0 minutes, including:    Patella mobs    Neuromuscular re-education activities to improve: Balance for 8 minutes. The following activities were included:    DL Bridge Green Theraband 3 x 8  Standing Hip Abd 2  x 10 Bilateral     Therapeutic activities to improve functional performance for 8 minutes, includin inch Step Up 3 x 8   Wall Squats 3 x 8     gait training to improve functional mobility and safety for 0 minutes, including:      Patient Education and Home Exercises       Education provided:   - home exercises, gait mechanics     Written Home Exercises Provided: Patient instructed to cont prior HEP. Exercises were reviewed and Monique was able to demonstrate them prior to the end of the session.  Monique demonstrated good  understanding of the education provided. See Electronic Medical Record under Patient Instructions for exercises provided during therapy sessions    Assessment     Monique noted with good tolerance to treatment. She is agreeable to d/c due to functional progress/reaching goals. She denies any functional difficulties. She verbalizes that she wishes to remain with RW which I agree with . Encouraged her to continue to use this for fall risk management. HEP reviewed with good understanding. 3/5 goals met.     Goals:   Long Term Goals: (4 weeks)  1. Pt will be independent with updated HEP supplement PT in improving functional mobility. -  met  2. Pt will improve (L) knee AROM to at least +5- 0-120 in order to improve gait and ability to perform ADLs. -  met  3. Pt will improve FOTO knee survey score to </= predicted % limited in order to demo improved functional mobility. - progressing, not met  4. Pt will perform TUG in < 12 seconds without AD in order to decrease risk of falling - not met  5. Pt will perform at least 12 sit to stands without UE support on 30 second sit to stand test in order to demo improved ability to perform transfers. -  met       Plan   D/c    Hao Rocha, PT

## 2024-05-02 DIAGNOSIS — Z96.652 HISTORY OF ARTHROPLASTY OF LEFT KNEE: Primary | ICD-10-CM

## 2024-05-08 ENCOUNTER — PATIENT MESSAGE (OUTPATIENT)
Dept: FAMILY MEDICINE | Facility: CLINIC | Age: 74
End: 2024-05-08
Payer: MEDICARE

## 2024-05-29 DIAGNOSIS — Z78.0 MENOPAUSE: ICD-10-CM

## 2024-06-04 DIAGNOSIS — K21.9 GASTROESOPHAGEAL REFLUX DISEASE, UNSPECIFIED WHETHER ESOPHAGITIS PRESENT: ICD-10-CM

## 2024-06-04 RX ORDER — ORAL SEMAGLUTIDE 7 MG/1
7 TABLET ORAL DAILY
Qty: 30 TABLET | Refills: 11 | Status: SHIPPED | OUTPATIENT
Start: 2024-06-04

## 2024-06-04 RX ORDER — FAMOTIDINE 40 MG/1
40 TABLET, FILM COATED ORAL DAILY
Qty: 90 TABLET | Refills: 2 | Status: SHIPPED | OUTPATIENT
Start: 2024-06-04

## 2024-06-04 NOTE — TELEPHONE ENCOUNTER
----- Message from Gem Kern sent at 6/4/2024  1:09 PM CDT -----  Contact: Morteza/Baldemar  Type:  RX Refill Request    Who Called:  Ray  Refill or New Rx:  refill  RX Name and Strength:    semaglutide (RYBELSUS) 7 mg tablet    famotidine (PEPCID) 40 MG tablet    How is the patient currently taking it? (ex. 1XDay):  as directed  Is this a 30 day or 90 day RX:  30  Preferred Pharmacy with phone number:      [facility] - [place] [address]  [place] [address]  Phone: [phone] Fax: [phone]     Local or Mail Order:  local  Ordering Provider:  Timoteo Randolph Call Back Number:  [phone]

## 2024-06-04 NOTE — TELEPHONE ENCOUNTER
Care Due:                  Date            Visit Type   Department     Provider  --------------------------------------------------------------------------------                                EP -                              PRIMARY      [facility]  Last Visit: 01-      CARE ([facility])   NARINDER Mahmood                              EP -                              PRIMARY      [facility]  Next Visit: 07-      CARE ([facility])   MEDICINE       Milton Mahmood                                                            Last  Test          Frequency    Reason                     Performed    Due Date  --------------------------------------------------------------------------------    HBA1C.......  6 months...  semaglutide..............  01- 07-    Health [facility] Embedded Care Due Messages. Reference number: [id number].   6/04/2024 1:23:13 PM CDT

## 2024-07-02 DIAGNOSIS — E78.5 HYPERLIPIDEMIA: ICD-10-CM

## 2024-07-02 RX ORDER — FENOFIBRATE 134 MG/1
134 CAPSULE ORAL DAILY
Qty: 90 CAPSULE | Refills: 2 | Status: SHIPPED | OUTPATIENT
Start: 2024-07-02

## 2024-07-02 RX ORDER — PRAVASTATIN SODIUM 40 MG/1
40 TABLET ORAL NIGHTLY
Qty: 90 TABLET | Refills: 2 | Status: SHIPPED | OUTPATIENT
Start: 2024-07-02

## 2024-07-02 NOTE — TELEPHONE ENCOUNTER
----- Message from Cece Ribeiro sent at 7/2/2024 11:49 AM CDT -----  Regarding: Needs refills  Type:  RX Refill Request    Who Called:  P[harmacy  Refill or New Rx:  refills  RX Name and Strength:  amplodepine 10 mg       fenofibrate micronized (LOFIBRA) 134 MG Cap      pravastatin (PRAVACHOL) 40 MG tablet    How is the patient currently taking it? (ex. 1XDay):  see chart  Is this a 30 day or 90 day RX:  see chart  Preferred Pharmacy with phone number:        [facility] - [place] - [address] [place] [address]  [address] [facility]  [address]  [place] [address]  Phone: [phone] Fax: [phone]      Local or Mail Order:  local  Ordering Provider:  dr trell Randolph Call Back Number:  [phone]    Additional Information:

## 2024-07-02 NOTE — TELEPHONE ENCOUNTER
No care due was identified.  [facility] Embedded Care Due Messages. Reference number: [id number].   7/02/2024 11:55:25 AM CDT

## 2024-07-03 DIAGNOSIS — I10 ESSENTIAL HYPERTENSION: ICD-10-CM

## 2024-07-03 RX ORDER — AMLODIPINE BESYLATE 10 MG/1
10 TABLET ORAL NIGHTLY
Qty: 90 TABLET | Refills: 0 | Status: SHIPPED | OUTPATIENT
Start: 2024-07-03 | End: 2025-06-28

## 2024-07-03 NOTE — TELEPHONE ENCOUNTER
No care due was identified.  Health [facility] Embedded Care Due Messages. Reference number: [id number].   7/03/2024 11:14:27 AM CDT

## 2024-07-26 ENCOUNTER — LAB VISIT (OUTPATIENT)
Dept: LAB | Facility: HOSPITAL | Age: 74
End: 2024-07-26
Attending: STUDENT IN AN ORGANIZED HEALTH CARE EDUCATION/TRAINING PROGRAM
Payer: MEDICARE

## 2024-07-26 ENCOUNTER — OFFICE VISIT (OUTPATIENT)
Dept: FAMILY MEDICINE | Facility: CLINIC | Age: 74
End: 2024-07-26
Payer: MEDICARE

## 2024-07-26 VITALS
HEART RATE: 77 BPM | OXYGEN SATURATION: 96 % | SYSTOLIC BLOOD PRESSURE: 134 MMHG | TEMPERATURE: 99 F | WEIGHT: 211.63 LBS | BODY MASS INDEX: 39.95 KG/M2 | HEIGHT: 61 IN | DIASTOLIC BLOOD PRESSURE: 84 MMHG | RESPIRATION RATE: 19 BRPM

## 2024-07-26 DIAGNOSIS — R79.9 ABNORMAL FINDING OF BLOOD CHEMISTRY, UNSPECIFIED: ICD-10-CM

## 2024-07-26 DIAGNOSIS — L30.4 INTERTRIGO: ICD-10-CM

## 2024-07-26 DIAGNOSIS — K31.83 ACHLORHYDRIA: ICD-10-CM

## 2024-07-26 DIAGNOSIS — Z00.00 HEALTHCARE MAINTENANCE: Primary | ICD-10-CM

## 2024-07-26 DIAGNOSIS — M54.16 LUMBAR RADICULOPATHY: ICD-10-CM

## 2024-07-26 DIAGNOSIS — E11.59 HYPERTENSION ASSOCIATED WITH DIABETES: ICD-10-CM

## 2024-07-26 DIAGNOSIS — N18.31 CHRONIC KIDNEY DISEASE, STAGE 3A: ICD-10-CM

## 2024-07-26 DIAGNOSIS — E11.3313 TYPE 2 DIABETES MELLITUS WITH MODERATE NONPROLIFERATIVE RETINOPATHY OF BOTH EYES AND MACULAR EDEMA, UNSPECIFIED WHETHER LONG TERM INSULIN USE: ICD-10-CM

## 2024-07-26 DIAGNOSIS — I15.2 HYPERTENSION ASSOCIATED WITH DIABETES: ICD-10-CM

## 2024-07-26 DIAGNOSIS — M17.0 PRIMARY OSTEOARTHRITIS OF BOTH KNEES: ICD-10-CM

## 2024-07-26 LAB
ALBUMIN SERPL BCP-MCNC: 4.2 G/DL (ref 3.5–5.2)
ALP SERPL-CCNC: 61 U/L (ref 55–135)
ALT SERPL W/O P-5'-P-CCNC: 15 U/L (ref 10–44)
ANION GAP SERPL CALC-SCNC: 14 MMOL/L (ref 8–16)
AST SERPL-CCNC: 22 U/L (ref 10–40)
BILIRUB SERPL-MCNC: 0.6 MG/DL (ref 0.1–1)
BUN SERPL-MCNC: 25 MG/DL (ref 8–23)
CALCIUM SERPL-MCNC: 10.2 MG/DL (ref 8.7–10.5)
CHLORIDE SERPL-SCNC: 105 MMOL/L (ref 95–110)
CO2 SERPL-SCNC: 24 MMOL/L (ref 23–29)
CREAT SERPL-MCNC: 1.3 MG/DL (ref 0.5–1.4)
EST. GFR  (NO RACE VARIABLE): 43.1 ML/MIN/1.73 M^2
ESTIMATED AVG GLUCOSE: 103 MG/DL (ref 68–131)
GLUCOSE SERPL-MCNC: 83 MG/DL (ref 70–110)
HBA1C MFR BLD: 5.2 % (ref 4–5.6)
HCYS SERPL-SCNC: 18.3 UMOL/L (ref 4–15.5)
POTASSIUM SERPL-SCNC: 4.8 MMOL/L (ref 3.5–5.1)
PROT SERPL-MCNC: 7.6 G/DL (ref 6–8.4)
SODIUM SERPL-SCNC: 143 MMOL/L (ref 136–145)

## 2024-07-26 PROCEDURE — 99999 PR PBB SHADOW E&M-EST. PATIENT-LVL V: CPT | Mod: PBBFAC,,, | Performed by: STUDENT IN AN ORGANIZED HEALTH CARE EDUCATION/TRAINING PROGRAM

## 2024-07-26 PROCEDURE — 84443 ASSAY THYROID STIM HORMONE: CPT | Performed by: STUDENT IN AN ORGANIZED HEALTH CARE EDUCATION/TRAINING PROGRAM

## 2024-07-26 PROCEDURE — 83090 ASSAY OF HOMOCYSTEINE: CPT | Performed by: STUDENT IN AN ORGANIZED HEALTH CARE EDUCATION/TRAINING PROGRAM

## 2024-07-26 PROCEDURE — 80053 COMPREHEN METABOLIC PANEL: CPT | Performed by: STUDENT IN AN ORGANIZED HEALTH CARE EDUCATION/TRAINING PROGRAM

## 2024-07-26 PROCEDURE — 83921 ORGANIC ACID SINGLE QUANT: CPT | Performed by: STUDENT IN AN ORGANIZED HEALTH CARE EDUCATION/TRAINING PROGRAM

## 2024-07-26 PROCEDURE — 83036 HEMOGLOBIN GLYCOSYLATED A1C: CPT | Performed by: STUDENT IN AN ORGANIZED HEALTH CARE EDUCATION/TRAINING PROGRAM

## 2024-07-26 PROCEDURE — 36415 COLL VENOUS BLD VENIPUNCTURE: CPT | Mod: PO | Performed by: STUDENT IN AN ORGANIZED HEALTH CARE EDUCATION/TRAINING PROGRAM

## 2024-07-26 PROCEDURE — 84207 ASSAY OF VITAMIN B-6: CPT | Performed by: STUDENT IN AN ORGANIZED HEALTH CARE EDUCATION/TRAINING PROGRAM

## 2024-07-26 RX ORDER — KETOCONAZOLE 20 MG/G
CREAM TOPICAL 2 TIMES DAILY
Qty: 60 G | Refills: 3 | Status: SHIPPED | OUTPATIENT
Start: 2024-07-26

## 2024-07-26 RX ORDER — TRIAMCINOLONE ACETONIDE 0.25 MG/G
CREAM TOPICAL 2 TIMES DAILY
Qty: 80 G | Refills: 2 | Status: SHIPPED | OUTPATIENT
Start: 2024-07-26

## 2024-07-26 NOTE — PROGRESS NOTES
[facility] Note    Subjective:    The HPI and pertinent ROS is included in the Diagnostic Impression Remarks section at the end of the note. Please see below for further details. Chief complaint is at end of note.     Monique is a pleasant intelligent patient who is here for evaluation.     Modified Medications    Modified Medication Previous Medication    KETOCONAZOLE (NIZORAL) 2 % CREAM ketoconazole (NIZORAL) 2 % cream       Apply topically 2 (two) times daily.    Apply topically 2 (two) times daily.    TRIAMCINOLONE ACETONIDE 0.025% (KENALOG) 0.025 % CREAM triamcinolone acetonide 0.025% (KENALOG) 0.025 % cream       Apply topically 2 (two) times daily.    Apply topically 2 (two) times daily.       Data reviewed 274}  Previous medical records reviewed and summarized in plan section at end of note.      If you are due for any health screening(s) below please notify me so we can arrange them to be ordered and scheduled. Most healthy patients at your age complete them, but you are free to accept or refuse. If you can't do it, I'll definitely understand. If you can, I'd certainly appreciate it!     Tests to Keep You Healthy    Mammogram: Met on 1/22/2024  Eye Exam: ORDERED BUT NOT SCHEDULED  Colon Cancer Screening: Met on 11/2/2016  Last Blood Pressure <= 139/89 (7/26/2024): Yes  Last HbA1c < 8 (01/18/2024): Yes      The following portions of the patient's history were reviewed and updated as appropriate: allergies, current medications, past family history, past medical history, past social history, past surgical history and problem list.    She  has a past medical history of Allergy, Arthritis, Chronic lower back pain, Depression, Fever blister, GERD (gastroesophageal reflux disease), Hemorrhoids, internal (11/05/2014), Hyperlipidemia, Hypertension, Joint pain, Morbid obesity with BMI of 40.0-44.9, adult (02/24/2015), Multiple gastric ulcers (12/14/2017), TIMMY (obstructive sleep apnea), Pulmonary  hypertension, Skin cancer, and Type 2 diabetes mellitus with hyperglycemia, without long-term current use of insulin (4/20/2022).  She  has a past surgical history that includes Knee arthroscopy w/ meniscal repair (Right, 2012); Tubal ligation (1980); Colonoscopy (N/A, 11/02/2016); Anterior cervical corpectomy w/ fusion (1988); Lumbar laminectomy (06/19/2015); Skin cancer excision; Epidural steroid injection into lumbar spine (N/A, 11/08/2018); Caudal epidural steroid injection (N/A, 01/10/2019); Injection of anesthetic agent around genitofemoral nerve (Bilateral, 08/01/2019); Fusion of spine with instrumentation (Left, 01/03/2020); Joint replacement; Back surgery; Epidural steroid injection (N/A, 05/18/2022); Reverse total shoulder arthroplasty (Right, 05/25/2022); Esophagogastroduodenoscopy (N/A, 10/06/2022); and Knee Arthroplasty (Left, 2/28/2024).    She  reports that she has never smoked. She has never been exposed to tobacco smoke. She has never used smokeless tobacco. She reports that she does not drink alcohol and does not use drugs.  She family history includes Alzheimer's disease in her maternal grandfather; Arthritis in her mother; Breast cancer in her mother; Cancer in her mother and paternal grandmother; Colon cancer in her paternal grandmother; Kidney disease in her father.    Review of patient's allergies indicates:   Allergen Reactions    Metformin Other (See Comments)     Diarrhea        Tobacco Use: Low Risk  (7/26/2024)    Patient History     Smoking Tobacco Use: Never     Smokeless Tobacco Use: Never     Passive Exposure: Never     Physical Examination  Physical Exam  Vital Signs  Vitals show a blood pressure of 134/84. Weight is 211 pounds.     General appearance: alert, cooperative, no distress  Neck: no thyromegaly, no neck stiffness  Lungs: clear to auscultation, no wheezes, rales or rhonchi, symmetric air entry  Heart: normal rate, regular rhythm, normal S1, S2, no murmurs, rubs, clicks or  "gallops  Abdomen: soft, nontender, nondistended, no rigidity, rebound, or guarding.   Back: no point tenderness over spine  Extremities: peripheral pulses normal, no unilateral leg swelling or calf tenderness   Neurological:alert, oriented, normal speech, no new focal findings or movement disorder noted from baseline      BP Readings from Last 3 Encounters:   07/26/24 134/84   02/28/24 (!) 106/55   01/22/24 104/68     Wt Readings from Last 3 Encounters:   07/26/24 96 kg (211 lb 10.3 oz)   03/14/24 101.2 kg (223 lb 1.7 oz)   02/28/24 101.2 kg (223 lb)     /84 (BP Location: Right arm, Patient Position: Sitting, BP Method: Large (Manual))   Pulse 77   Temp 99.3 °F (37.4 °C) (Oral)   Resp 19   Ht 5' 1" (1.549 m)   Wt 96 kg (211 lb 10.3 oz)   LMP  (LMP Unknown)   SpO2 96%   BMI 39.99 kg/m²    274}  Laboratory: I have reviewed old labs below:    274}    Lab Results   Component Value Date    WBC 5.37 02/14/2024    HGB 13.3 02/14/2024    HCT 40.7 02/14/2024    MCV 95 02/14/2024     02/14/2024     01/18/2024    K 4.1 01/18/2024     01/18/2024    CALCIUM 9.5 01/18/2024    PHOS 3.3 05/02/2018    CO2 26 01/18/2024    GLU 85 01/18/2024    BUN 17 01/18/2024    CREATININE 1.0 01/18/2024    EGFRNORACEVR 59.1 (A) 01/18/2024    ANIONGAP 9 01/18/2024    PROT 7.3 01/18/2024    ALBUMIN 4.1 01/18/2024    BILITOT 0.6 01/18/2024    ALKPHOS 48 (L) 01/18/2024    ALT 15 01/18/2024    AST 20 01/18/2024    INR 0.9 12/12/2019    CHOL 147 01/18/2024    TRIG 136 01/18/2024    HDL 53 01/18/2024    LDLCALC 66.8 01/18/2024    TSH 2.249 04/20/2022    HGBA1C 5.2 01/18/2024      Lab reviewed by me: Particular labs of significance that I will monitor, workup, or treat to improve are mentioned below in diagnostic impression remarks.    Results  Laboratory Studies  Blood sugar 103.       Imaging/EKG: I have reviewed the pertinent results and my findings are noted in remarks.  274}    CC:   Chief Complaint   Patient presents " with    Follow-up    Hypertension        274}    History of Present Illness  The patient is a 73-year-old female who is here for follow-up.    She reports a weight loss of 12 pounds. Her blood sugar level this morning was 103. She experiences numbness, tingling, and burning in her feet, which has limited her ability to drive. On some days, she does not require her walker, but on other days, she feels unsteady. She is unsure if her vertigo is related to her feet problem. Her B12 levels have been consistently below 100. Rybelsus is effective for her.    Supplemental Information  She had all her teeth done. She was in the process of getting permanent dentures.    FAMILY HISTORY  Her mother recently passed away at the age of 95.       Assessment/Plan  Monique Trujillo is a 73 y.o. female who presents to clinic with:  1. Healthcare maintenance    2. Intertrigo    3. Lumbar radiculopathy    4. Primary osteoarthritis of both knees    5. 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 Hypertension associated with diabetes    7. Chronic kidney disease, stage 3a    8. Abnormal finding of blood chemistry, unspecified    9. Achlorhydria       274}    Assessment & Plan  1. Diabetes.  Well controlled. Continue current medications. Monitor.    2. Neuropathy.  Poorly controlled neuropathy reported. Vitamins will be checked to rule out other deficiencies. Gabapentin will be continued.    3. Back pain, chronic lumbar radiculopathy.  Gabapentin will be continued. Monitor.    4. Fatigue.  Blood work will be checked. Follow-up will be scheduled to monitor this.     Hypertension -stable continue current meds monitor no headache or chest pain f/u blood work     The patient's chronic kidney disease was monitored, evaluated, addressed and/or treated. Recommend to avoid NSAIDs and renally dose medications. ACE/ARB inhibitor if indicated and  optimize blood pressure and A1c to goal.       This note was generated with the assistance of ambient listening technology. Verbal consent was obtained by the patient and accompanying visitor(s) for the recording of patient appointment to facilitate this note. I attest to having reviewed and edited the generated note for accuracy, though some syntax or spelling errors may persist. Please contact the author of this note for any clarification.      1. Intertrigo  - ketoconazole (NIZORAL) 2 % cream; Apply topically 2 (two) times daily.  Dispense: 60 g; Refill: 3  - triamcinolone acetonide 0.025% (KENALOG) 0.025 % cream; Apply topically 2 (two) times daily.  Dispense: 80 g; Refill: 2    2. Healthcare maintenance    3. Lumbar radiculopathy    4. Primary osteoarthritis of both knees    5. 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 Hypertension associated with diabetes    7. Chronic kidney disease, stage 3a    8. Abnormal finding of blood chemistry, unspecified  - TSH; Future  - METHYLMALONIC ACID, SERUM; Future  - HOMOCYSTEINE, SERUM; Future  - VITAMIN B6; Future    9. Achlorhydria  - HOMOCYSTEINE, SERUM; Future      Milton Mahmood MD   274}    If you are due for any health screening(s) below please notify me so we can arrange them to be ordered and scheduled. Most healthy patients at your age complete them, but you are free to accept or refuse.     If you can't do it, I'll definitely understand. If you can, I'd certainly appreciate it!   Tests to Keep You Healthy    Mammogram: Met on 1/22/2024  Eye Exam: ORDERED BUT NOT SCHEDULED  Colon Cancer Screening: Met on 11/2/2016  Last Blood Pressure <= 139/89 (7/26/2024): Yes  Last HbA1c < 8 (01/18/2024): Yes

## 2024-07-27 LAB — TSH SERPL DL<=0.005 MIU/L-ACNC: 2.15 UIU/ML (ref 0.4–4)

## 2024-07-29 ENCOUNTER — PATIENT MESSAGE (OUTPATIENT)
Dept: FAMILY MEDICINE | Facility: CLINIC | Age: 74
End: 2024-07-29
Payer: MEDICARE

## 2024-07-29 DIAGNOSIS — E53.8 FOLIC ACID DEFICIENCY: Primary | ICD-10-CM

## 2024-07-29 RX ORDER — FOLIC ACID 1 MG/1
1 TABLET ORAL DAILY
Qty: 90 TABLET | Refills: 3 | Status: SHIPPED | OUTPATIENT
Start: 2024-07-29 | End: 2025-07-29

## 2024-08-01 LAB
METHYLMALONATE SERPL-SCNC: 0.23 UMOL/L
PYRIDOXAL SERPL-MCNC: 59 UG/L (ref 5–50)

## 2024-08-08 ENCOUNTER — HOSPITAL ENCOUNTER (OUTPATIENT)
Dept: RADIOLOGY | Facility: CLINIC | Age: 74
Discharge: HOME | End: 2024-08-08
Attending: STUDENT IN AN ORGANIZED HEALTH CARE EDUCATION/TRAINING PROGRAM
Payer: MEDICARE

## 2024-08-08 DIAGNOSIS — Z78.0 MENOPAUSE: ICD-10-CM

## 2024-08-08 PROCEDURE — 77080 DXA BONE DENSITY AXIAL: CPT | Mod: TC,PO

## 2024-08-08 PROCEDURE — 77080 DXA BONE DENSITY AXIAL: CPT | Mod: 26,,, | Performed by: STUDENT IN AN ORGANIZED HEALTH CARE EDUCATION/TRAINING PROGRAM

## 2024-08-13 ENCOUNTER — OFFICE VISIT (OUTPATIENT)
Dept: OPTOMETRY | Facility: CLINIC | Age: 74
End: 2024-08-13
Payer: COMMERCIAL

## 2024-08-13 DIAGNOSIS — H25.13 NUCLEAR SCLEROSIS, BILATERAL: ICD-10-CM

## 2024-08-13 DIAGNOSIS — H26.9 CORTICAL CATARACT: ICD-10-CM

## 2024-08-13 DIAGNOSIS — E11.65 TYPE 2 DIABETES MELLITUS WITH HYPERGLYCEMIA, WITHOUT LONG-TERM CURRENT USE OF INSULIN: ICD-10-CM

## 2024-08-13 DIAGNOSIS — H04.123 DRY EYE SYNDROME, BILATERAL: ICD-10-CM

## 2024-08-13 DIAGNOSIS — H52.7 REFRACTIVE ERROR: ICD-10-CM

## 2024-08-13 DIAGNOSIS — E11.9 DIABETES MELLITUS TYPE 2 WITHOUT RETINOPATHY: ICD-10-CM

## 2024-08-13 DIAGNOSIS — H57.89 EYE IRRITATION: ICD-10-CM

## 2024-08-13 DIAGNOSIS — Z01.00 EXAMINATION OF EYES AND VISION: Primary | ICD-10-CM

## 2024-08-13 PROCEDURE — 99999 PR PBB SHADOW E&M-EST. PATIENT-LVL IV: CPT | Mod: PBBFAC,,, | Performed by: OPTOMETRIST

## 2024-08-13 PROCEDURE — 92004 COMPRE OPH EXAM NEW PT 1/>: CPT | Mod: S$GLB,,, | Performed by: OPTOMETRIST

## 2024-08-13 PROCEDURE — 92015 DETERMINE REFRACTIVE STATE: CPT | Mod: S$GLB,,, | Performed by: OPTOMETRIST

## 2024-08-13 NOTE — PROGRESS NOTES
HPI     Diabetic Eye Exam     Additional comments: DLE 5-2020           Comments    Pt here today for DM eye exam.       States blurred vision at both near & distance.     Dog chewed glasses x 4 months ago so needs updated rx.     Denies any headaches or eye pain.    Hemoglobin A1C       Date                     Value               Ref Range             Status                07/26/2024               5.2                 4.0 - 5.6 %           Final                 01/18/2024               5.2                 4.0 - 5.6 %           Final                 12/02/2022               5.3                 4.0 - 5.6 %           Final              BSL controlled.     C/o fbs under SHAR x 2 yrs.   Always feels like something is rubbing eye.    (+) dry eyes -- ATS OU prn  (-) allergies  (+) floaters OU -- no light flashes              Last edited by Jourdan Lopez, OD on 8/13/2024  9:31 AM.            Assessment /Plan     For exam results, see Encounter Report.    Examination of eyes and vision    Type 2 diabetes mellitus with hyperglycemia, without long-term current use of insulin  -     Ambulatory referral/consult to Optometry    Diabetes mellitus type 2 without retinopathy    Nuclear sclerosis, bilateral    Cortical cataract    Refractive error    Dry eye syndrome, bilateral    Allergic conjunctivitis of left eye      1. Examination of eyes and vision    2. Type 2 diabetes mellitus with hyperglycemia, without long-term current use of insulin  3. Diabetes mellitus type 2 without retinopathy  Discussed possible ocular affects of uncontrolled blood sugar with patient. Recommended continued strong blood sugar control and continued care with PCP. Monitor yearly.     4. Nuclear sclerosis, bilateral  5. Cortical cataract  Moderate OU, OS >> OD  Discussed possible ocular affects of cataracts. Acceptable BCVA OU.   Discussed treatment options. Surgery not recommended at this time.   Monitor yearly.     6. Refractive error  Dispensed updated  spectacle Rx. Discussed various spectacle lens options. Discussed adaptation period to new specs.   Demonstrated new spec Rx vs uncorrected vision in phoropter with patient satisfaction  Discussed BCVA 20/30 OU secondary to cataracts    7. Dry eye syndrome, bilateral  Discussed ocular affects of dry eyes. Recommend OTC TheraTears artificial tears 2-4 times a day in both eyes.   Discussed chronicity of SHANNAN. RTC if symptoms not alleviated by continued use of artificial tears.     8. Irritation of left eye  Palpebral papilla OS  Start otc Pataday once daily prn for itching  Report back if worsening or no resolution

## 2024-09-13 DIAGNOSIS — K21.9 GASTROESOPHAGEAL REFLUX DISEASE, UNSPECIFIED WHETHER ESOPHAGITIS PRESENT: ICD-10-CM

## 2024-09-13 DIAGNOSIS — I10 ESSENTIAL HYPERTENSION: ICD-10-CM

## 2024-09-13 DIAGNOSIS — F32.1 CURRENT MODERATE EPISODE OF MAJOR DEPRESSIVE DISORDER WITHOUT PRIOR EPISODE: ICD-10-CM

## 2024-09-13 DIAGNOSIS — E78.5 HYPERLIPIDEMIA: ICD-10-CM

## 2024-09-14 RX ORDER — SERTRALINE HYDROCHLORIDE 100 MG/1
100 TABLET, FILM COATED ORAL NIGHTLY
Qty: 90 TABLET | Refills: 3 | Status: SHIPPED | OUTPATIENT
Start: 2024-09-14

## 2024-09-14 RX ORDER — AMLODIPINE BESYLATE 10 MG/1
10 TABLET ORAL NIGHTLY
Qty: 90 TABLET | Refills: 3 | Status: SHIPPED | OUTPATIENT
Start: 2024-09-14

## 2024-09-14 RX ORDER — PRAVASTATIN SODIUM 40 MG/1
40 TABLET ORAL NIGHTLY
Qty: 90 TABLET | Refills: 1 | Status: SHIPPED | OUTPATIENT
Start: 2024-09-14

## 2024-09-14 RX ORDER — ORAL SEMAGLUTIDE 7 MG/1
7 TABLET ORAL DAILY
Qty: 90 TABLET | Refills: 1 | Status: SHIPPED | OUTPATIENT
Start: 2024-09-14

## 2024-09-15 NOTE — TELEPHONE ENCOUNTER
No care due was identified.  [facility] Embedded Care Due Messages. Reference number: [id number].   9/13/2024 5:12:48 PM CDT  
Refill Routing Note   Medication(s) are not appropriate for processing by [facility] for the following reason(s):        Due for refill >6 months ago  Required labs abnormal  Drug-disease interaction:     [facility] action(s):  Defer  Approve      Medication Therapy Plan:  DEFER: vesicare (due for refill greater than 6 months ago), Fenofibrate (DDI), Famotidine (eGFR abnormal)      Appointments  past 12m or future 3m with PCP    Date Provider   Last Visit   7/26/2024 Milton Mahmood MD   Next Visit   1/30/2025 Milton Mahmood MD   ED visits in past 90 days: 0        Note composed:9:28 PM 09/14/2024           
Improved

## 2024-09-16 RX ORDER — FAMOTIDINE 40 MG/1
40 TABLET, FILM COATED ORAL DAILY
Qty: 90 TABLET | Refills: 3 | Status: SHIPPED | OUTPATIENT
Start: 2024-09-16

## 2024-09-16 RX ORDER — SOLIFENACIN SUCCINATE 5 MG/1
5 TABLET, FILM COATED ORAL DAILY
Qty: 90 TABLET | Refills: 3 | Status: SHIPPED | OUTPATIENT
Start: 2024-09-16

## 2024-09-16 RX ORDER — FENOFIBRATE 134 MG/1
134 CAPSULE ORAL DAILY
Qty: 90 CAPSULE | Refills: 1 | Status: SHIPPED | OUTPATIENT
Start: 2024-09-16

## 2024-10-08 ENCOUNTER — OFFICE VISIT (OUTPATIENT)
Dept: URGENT CARE | Facility: CLINIC | Age: 74
End: 2024-10-08
Payer: MEDICARE

## 2024-10-08 VITALS
OXYGEN SATURATION: 96 % | WEIGHT: 211 LBS | DIASTOLIC BLOOD PRESSURE: 84 MMHG | HEIGHT: 61 IN | BODY MASS INDEX: 39.84 KG/M2 | TEMPERATURE: 98 F | HEART RATE: 87 BPM | SYSTOLIC BLOOD PRESSURE: 142 MMHG | RESPIRATION RATE: 16 BRPM

## 2024-10-08 DIAGNOSIS — J40 BRONCHITIS: Primary | ICD-10-CM

## 2024-10-08 DIAGNOSIS — R06.2 WHEEZING: ICD-10-CM

## 2024-10-08 DIAGNOSIS — R05.9 COUGH, UNSPECIFIED TYPE: ICD-10-CM

## 2024-10-08 PROCEDURE — 99214 OFFICE O/P EST MOD 30 MIN: CPT | Mod: S$GLB,,, | Performed by: NURSE PRACTITIONER

## 2024-10-08 RX ORDER — ALBUTEROL SULFATE 90 UG/1
2 INHALANT RESPIRATORY (INHALATION) EVERY 6 HOURS PRN
Qty: 18 G | Refills: 0 | Status: SHIPPED | OUTPATIENT
Start: 2024-10-08

## 2024-10-08 RX ORDER — BENZONATATE 100 MG/1
100 CAPSULE ORAL 3 TIMES DAILY PRN
Qty: 30 CAPSULE | Refills: 0 | Status: SHIPPED | OUTPATIENT
Start: 2024-10-08 | End: 2024-10-18

## 2024-10-08 RX ORDER — FLUTICASONE PROPIONATE 50 MCG
1 SPRAY, SUSPENSION (ML) NASAL DAILY
Qty: 9.9 ML | Refills: 0 | Status: SHIPPED | OUTPATIENT
Start: 2024-10-08

## 2024-10-08 RX ORDER — DOXYCYCLINE 100 MG/1
100 CAPSULE ORAL 2 TIMES DAILY
Qty: 10 CAPSULE | Refills: 0 | Status: SHIPPED | OUTPATIENT
Start: 2024-10-08 | End: 2024-10-13

## 2024-10-08 NOTE — PROGRESS NOTES
"Subjective:      Patient ID: Monique Trujillo is a 73 y.o. female.    Vitals:  height is 5' 1" (1.549 m) and weight is 95.7 kg (211 lb). Her oral temperature is 98.4 °F (36.9 °C). Her blood pressure is 142/84 (abnormal) and her pulse is 87. Her respiration is 16 and oxygen saturation is 96%.     Chief Complaint: Cough    Cough  This is a new problem. Episode onset: x 2 weeks. The problem has been unchanged. The cough is Productive of sputum. Associated symptoms include ear congestion, nasal congestion, postnasal drip, rhinorrhea, shortness of breath and wheezing. Pertinent negatives include no chest pain, chills, ear pain, eye redness, fever, headaches, myalgias, rash or sore throat. There is no history of environmental allergies.       Constitution: Positive for fatigue. Negative for activity change, appetite change, chills, fever, unexpected weight change and generalized weakness.   HENT:  Positive for congestion and postnasal drip. Negative for ear pain, ear discharge, foreign body in ear, tinnitus, hearing loss, dental problem, mouth sores, tongue pain, facial swelling, sinus pain, sinus pressure, sore throat, trouble swallowing and voice change.    Neck: Negative for neck pain, neck stiffness and painful lymph nodes.   Cardiovascular:  Negative for chest pain, leg swelling, palpitations and sob on exertion.   Eyes:  Negative for eye trauma, eye discharge, eye itching, eye pain, eye redness, vision loss and eyelid swelling.   Respiratory:  Positive for cough, sputum production (Yellow), shortness of breath and wheezing. Negative for chest tightness, COPD and asthma.    Gastrointestinal:  Negative for abdominal pain, nausea, vomiting, constipation, diarrhea, bright red blood in stool and dark colored stools.   Endocrine: hair loss, cold intolerance and heat intolerance.   Genitourinary:  Negative for dysuria, frequency, urgency and hematuria.   Musculoskeletal:  Negative for pain, trauma, joint pain, joint " swelling, abnormal ROM of joint and muscle ache.   Skin:  Negative for color change, pale, rash, wound and hives.   Allergic/Immunologic: Negative for environmental allergies, seasonal allergies, food allergies, asthma, hives and itching.   Neurological:  Negative for dizziness, history of vertigo, light-headedness, facial drooping, speech difficulty, headaches, disorientation, altered mental status, loss of consciousness and numbness.   Hematologic/Lymphatic: Negative for swollen lymph nodes and easy bruising/bleeding. Does not bruise/bleed easily.   Psychiatric/Behavioral:  Negative for altered mental status, disorientation, confusion, agitation, sleep disturbance and hallucinations.       Objective:     Physical Exam   Constitutional: She is oriented to person, place, and time. She appears well-developed. She is cooperative.   HENT:   Head: Normocephalic and atraumatic.   Ears:   Right Ear: Hearing, external ear and ear canal normal. A middle ear effusion is present.   Left Ear: Hearing, external ear and ear canal normal. A middle ear effusion is present.   Nose: Mucosal edema and rhinorrhea present. No nasal deformity. No epistaxis. Right sinus exhibits no maxillary sinus tenderness and no frontal sinus tenderness. Left sinus exhibits no maxillary sinus tenderness and no frontal sinus tenderness.   Mouth/Throat: Uvula is midline, oropharynx is clear and moist and mucous membranes are normal. No trismus in the jaw. Normal dentition. No uvula swelling.   Eyes: Conjunctivae and lids are normal.   Neck: Trachea normal and phonation normal. Neck supple.   Cardiovascular: Normal rate, regular rhythm, normal heart sounds and normal pulses.   Pulmonary/Chest: Effort normal. No stridor. She has no decreased breath sounds. She has wheezes (Expiratory) in the right lower field and the left lower field.   Rhonchorous breath sounds bilaterally         Comments: Rhonchorous breath sounds bilaterally    Abdominal: Normal  appearance and bowel sounds are normal. Soft.   Musculoskeletal: Normal range of motion.         General: Normal range of motion.   Neurological: She is alert and oriented to person, place, and time. She exhibits normal muscle tone.   Skin: Skin is warm, dry and intact.   Psychiatric: Her speech is normal and behavior is normal. Judgment and thought content normal.   Nursing note and vitals reviewed.      Assessment:     1. Bronchitis    2. Wheezing    3. Cough, unspecified type        Plan:       Bronchitis  -     doxycycline (VIBRAMYCIN) 100 MG Cap; Take 1 capsule (100 mg total) by mouth 2 (two) times daily. for 5 days  Dispense: 10 capsule; Refill: 0  -     benzonatate (TESSALON) 100 MG capsule; Take 1 capsule (100 mg total) by mouth 3 (three) times daily as needed for Cough.  Dispense: 30 capsule; Refill: 0  -     albuterol (VENTOLIN HFA) 90 mcg/actuation inhaler; Inhale 2 puffs into the lungs every 6 (six) hours as needed for Wheezing. Rescue  Dispense: 18 g; Refill: 0  -     fluticasone propionate (FLONASE) 50 mcg/actuation nasal spray; 1 spray (50 mcg total) by Each Nostril route once daily.  Dispense: 9.9 mL; Refill: 0    Wheezing  -     albuterol (VENTOLIN HFA) 90 mcg/actuation inhaler; Inhale 2 puffs into the lungs every 6 (six) hours as needed for Wheezing. Rescue  Dispense: 18 g; Refill: 0    Cough, unspecified type  -     benzonatate (TESSALON) 100 MG capsule; Take 1 capsule (100 mg total) by mouth 3 (three) times daily as needed for Cough.  Dispense: 30 capsule; Refill: 0    Utilize over-the-counter Tylenol or Motrin as directed for fever.    Ensure adequate fluid intake with electrolytes.    Return to clinic for new or worsening symptoms.  Patient is recommended to follow-up with their PCP post discharge.    Total time spent on med rec, H&P, with over half of the time in direct patient care: 36 minutes         Additional MDM:     Heart Failure Score:   COPD = No

## 2024-11-15 DIAGNOSIS — I10 ESSENTIAL HYPERTENSION: ICD-10-CM

## 2024-11-15 RX ORDER — ATENOLOL 25 MG/1
25 TABLET ORAL NIGHTLY
Qty: 90 TABLET | Refills: 2 | Status: SHIPPED | OUTPATIENT
Start: 2024-11-15

## 2024-11-15 RX ORDER — PANTOPRAZOLE SODIUM 40 MG/1
40 TABLET, DELAYED RELEASE ORAL DAILY
Qty: 90 TABLET | Refills: 2 | Status: SHIPPED | OUTPATIENT
Start: 2024-11-15

## 2024-11-15 NOTE — TELEPHONE ENCOUNTER
Care Due:                  Date            Visit Type   Department     Provider  --------------------------------------------------------------------------------                                EP -                              PRIMARY      [facility]  Last Visit: 07-      CARE ([facility])   NARINDER Mahmood                              EP -                              PRIMARY      [facility]  Next Visit: 01-      CARE ([facility])   MEDICINE       Milton Mahmood                                                            Last  Test          Frequency    Reason                     Performed    Due Date  --------------------------------------------------------------------------------    CBC.........  12 months..  fenofibrate..............  02- 02-    HBA1C.......  6 months...  semaglutide..............  07- 01-    Lipid Panel.  12 months..  fenofibrate, pravastatin.  01- 01-    [facility] Embedded Care Due Messages. Reference number: [id number].   11/15/2024 9:44:31 AM CST

## 2024-11-15 NOTE — TELEPHONE ENCOUNTER
Refill Routing Note   Medication(s) are not appropriate for processing by [facility] for the following reason(s):        Required vitals abnormal    [facility] action(s):  Approve  Defer     Requires labs : Yes           Alert overridden per protocol: Yes     Appointments  past 12m or future 3m with PCP    Date Provider   Last Visit   7/26/2024 Milton Mahmood MD   Next Visit   1/28/2025 Milton Mahmood MD   ED visits in past 90 days: 0        Note composed:2:56 PM 11/15/2024

## 2025-01-07 DIAGNOSIS — G89.29 CHRONIC RIGHT SHOULDER PAIN: ICD-10-CM

## 2025-01-07 DIAGNOSIS — E78.5 HYPERLIPIDEMIA: ICD-10-CM

## 2025-01-07 DIAGNOSIS — M19.012 OSTEOARTHRITIS OF BOTH SHOULDERS, UNSPECIFIED OSTEOARTHRITIS TYPE: ICD-10-CM

## 2025-01-07 DIAGNOSIS — M19.011 OSTEOARTHRITIS OF BOTH SHOULDERS, UNSPECIFIED OSTEOARTHRITIS TYPE: ICD-10-CM

## 2025-01-07 DIAGNOSIS — I10 ESSENTIAL HYPERTENSION: ICD-10-CM

## 2025-01-07 DIAGNOSIS — M25.511 CHRONIC RIGHT SHOULDER PAIN: ICD-10-CM

## 2025-01-07 RX ORDER — GABAPENTIN 600 MG/1
600 TABLET ORAL 3 TIMES DAILY
Qty: 270 TABLET | Refills: 3 | Status: SHIPPED | OUTPATIENT
Start: 2025-01-07 | End: 2026-01-07

## 2025-01-07 RX ORDER — FENOFIBRATE 134 MG/1
134 CAPSULE ORAL DAILY
Qty: 90 CAPSULE | Refills: 3 | Status: SHIPPED | OUTPATIENT
Start: 2025-01-07

## 2025-01-07 RX ORDER — AMLODIPINE BESYLATE 10 MG/1
10 TABLET ORAL NIGHTLY
Qty: 90 TABLET | Refills: 3 | Status: SHIPPED | OUTPATIENT
Start: 2025-01-07

## 2025-01-07 RX ORDER — PRAVASTATIN SODIUM 40 MG/1
40 TABLET ORAL NIGHTLY
Qty: 90 TABLET | Refills: 1 | Status: SHIPPED | OUTPATIENT
Start: 2025-01-07

## 2025-01-07 NOTE — TELEPHONE ENCOUNTER
No care due was identified.  [facility] Embedded Care Due Messages. Reference number: [id number].   1/07/2025 2:14:56 PM CST

## 2025-01-07 NOTE — TELEPHONE ENCOUNTER
----- Message from Dawn sent at 1/7/2025  2:05 PM CST -----  Contact: pt  Type:  RX Refill Request    Who Called:  pt  Refill or New Rx:   refill  RX Name and Strength:    gabapentin (NEURONTIN) 600 MG tablet   fenofibrate micronized (LOFIBRA) 134 MG Cap - pharm said rx was reversed by office  amLODIPine (NORVASC) 10 MG tablet                   - pharm said rx was reversed by office  pravastatin (PRAVACHOL) 40 MG tablet                  - pharm said rx was reversed by office     How is the patient currently taking it? (ex. 1XDay):  as ordered  Is this a 30 day or 90 day RX:  90  Preferred Pharmacy with phone number:   [facility] [address] [facility]  [address]  [place] [address]  Phone: [phone] Fax: [phone]    Local or Mail Order:  local  Ordering Provider:  trell Randolph Call Back Number:  [phone]  Additional Information:  pt is out

## 2025-01-15 ENCOUNTER — TELEPHONE (OUTPATIENT)
Dept: FAMILY MEDICINE | Facility: CLINIC | Age: 75
End: 2025-01-15
Payer: MEDICARE

## 2025-01-15 DIAGNOSIS — Z12.31 ENCOUNTER FOR SCREENING MAMMOGRAM FOR MALIGNANT NEOPLASM OF BREAST: Primary | ICD-10-CM

## 2025-01-15 DIAGNOSIS — Z12.39 ENCOUNTER FOR SCREENING FOR MALIGNANT NEOPLASM OF BREAST, UNSPECIFIED SCREENING MODALITY: ICD-10-CM

## 2025-01-15 NOTE — TELEPHONE ENCOUNTER
----- Message from Nhung sent at 1/15/2025  2:57 PM CST -----  Contact: self  Type: Needs Medical Advice  Who Called:  pt  Best Call Back Number: [phone]   Additional Information: pt would like to get orders for mammo and to see if it can be scheduled for 1/28 after her appt with dr cai she states around 1pm due to transporation.please call

## 2025-01-28 ENCOUNTER — OFFICE VISIT (OUTPATIENT)
Dept: FAMILY MEDICINE | Facility: CLINIC | Age: 75
End: 2025-01-28
Payer: MEDICARE

## 2025-01-28 ENCOUNTER — HOSPITAL ENCOUNTER (OUTPATIENT)
Dept: RADIOLOGY | Facility: CLINIC | Age: 75
Discharge: HOME | End: 2025-01-28
Attending: STUDENT IN AN ORGANIZED HEALTH CARE EDUCATION/TRAINING PROGRAM
Payer: MEDICARE

## 2025-01-28 VITALS
WEIGHT: 213.88 LBS | HEIGHT: 61 IN | BODY MASS INDEX: 40.38 KG/M2 | HEART RATE: 78 BPM | TEMPERATURE: 99 F | SYSTOLIC BLOOD PRESSURE: 126 MMHG | OXYGEN SATURATION: 98 % | RESPIRATION RATE: 16 BRPM | DIASTOLIC BLOOD PRESSURE: 74 MMHG

## 2025-01-28 DIAGNOSIS — M25.512 CHRONIC LEFT SHOULDER PAIN: ICD-10-CM

## 2025-01-28 DIAGNOSIS — G89.29 CHRONIC LEFT SHOULDER PAIN: ICD-10-CM

## 2025-01-28 DIAGNOSIS — E78.5 HYPERLIPIDEMIA ASSOCIATED WITH TYPE 2 DIABETES MELLITUS: ICD-10-CM

## 2025-01-28 DIAGNOSIS — E11.3313 TYPE 2 DIABETES MELLITUS WITH MODERATE NONPROLIFERATIVE RETINOPATHY OF BOTH EYES AND MACULAR EDEMA, UNSPECIFIED WHETHER LONG TERM INSULIN USE: ICD-10-CM

## 2025-01-28 DIAGNOSIS — M17.0 PRIMARY OSTEOARTHRITIS OF BOTH KNEES: ICD-10-CM

## 2025-01-28 DIAGNOSIS — E11.59 HYPERTENSION ASSOCIATED WITH DIABETES: ICD-10-CM

## 2025-01-28 DIAGNOSIS — K21.9 GASTROESOPHAGEAL REFLUX DISEASE, UNSPECIFIED WHETHER ESOPHAGITIS PRESENT: ICD-10-CM

## 2025-01-28 DIAGNOSIS — I15.2 HYPERTENSION ASSOCIATED WITH DIABETES: ICD-10-CM

## 2025-01-28 DIAGNOSIS — E11.69 HYPERLIPIDEMIA ASSOCIATED WITH TYPE 2 DIABETES MELLITUS: ICD-10-CM

## 2025-01-28 DIAGNOSIS — Z00.00 HEALTHCARE MAINTENANCE: Primary | ICD-10-CM

## 2025-01-28 DIAGNOSIS — E66.01 MORBID OBESITY: ICD-10-CM

## 2025-01-28 DIAGNOSIS — E78.5 HYPERLIPIDEMIA: ICD-10-CM

## 2025-01-28 DIAGNOSIS — Z12.31 ENCOUNTER FOR SCREENING MAMMOGRAM FOR MALIGNANT NEOPLASM OF BREAST: ICD-10-CM

## 2025-01-28 DIAGNOSIS — I10 ESSENTIAL HYPERTENSION: ICD-10-CM

## 2025-01-28 DIAGNOSIS — N18.32 STAGE 3B CHRONIC KIDNEY DISEASE: ICD-10-CM

## 2025-01-28 DIAGNOSIS — Z12.39 ENCOUNTER FOR SCREENING FOR MALIGNANT NEOPLASM OF BREAST, UNSPECIFIED SCREENING MODALITY: ICD-10-CM

## 2025-01-28 PROBLEM — N18.31 CHRONIC KIDNEY DISEASE, STAGE 3A: Status: RESOLVED | Noted: 2024-01-22 | Resolved: 2025-01-28

## 2025-01-28 PROCEDURE — 77063 BREAST TOMOSYNTHESIS BI: CPT | Mod: 26,,, | Performed by: RADIOLOGY

## 2025-01-28 PROCEDURE — 73030 X-RAY EXAM OF SHOULDER: CPT | Mod: 26,LT,, | Performed by: RADIOLOGY

## 2025-01-28 PROCEDURE — 77067 SCR MAMMO BI INCL CAD: CPT | Mod: 26,,, | Performed by: RADIOLOGY

## 2025-01-28 PROCEDURE — 77063 BREAST TOMOSYNTHESIS BI: CPT | Mod: TC,PO

## 2025-01-28 PROCEDURE — G2211 COMPLEX E/M VISIT ADD ON: HCPCS | Mod: S$GLB,,, | Performed by: STUDENT IN AN ORGANIZED HEALTH CARE EDUCATION/TRAINING PROGRAM

## 2025-01-28 PROCEDURE — 99214 OFFICE O/P EST MOD 30 MIN: CPT | Mod: S$GLB,,, | Performed by: STUDENT IN AN ORGANIZED HEALTH CARE EDUCATION/TRAINING PROGRAM

## 2025-01-28 PROCEDURE — 73030 X-RAY EXAM OF SHOULDER: CPT | Mod: TC,FY,PO,LT

## 2025-01-28 RX ORDER — FAMOTIDINE 40 MG/1
40 TABLET, FILM COATED ORAL DAILY
Qty: 90 TABLET | Refills: 3 | Status: SHIPPED | OUTPATIENT
Start: 2025-01-28

## 2025-01-28 RX ORDER — PANTOPRAZOLE SODIUM 40 MG/1
40 TABLET, DELAYED RELEASE ORAL DAILY
Qty: 90 TABLET | Refills: 3 | Status: SHIPPED | OUTPATIENT
Start: 2025-01-28 | End: 2026-01-23

## 2025-01-28 RX ORDER — ATENOLOL 25 MG/1
25 TABLET ORAL NIGHTLY
Qty: 90 TABLET | Refills: 2 | Status: SHIPPED | OUTPATIENT
Start: 2025-01-28

## 2025-01-28 RX ORDER — PRAVASTATIN SODIUM 40 MG/1
40 TABLET ORAL NIGHTLY
Qty: 90 TABLET | Refills: 3 | Status: SHIPPED | OUTPATIENT
Start: 2025-01-28 | End: 2026-01-23

## 2025-01-28 NOTE — PROGRESS NOTES
[facility] Note    Subjective:    The HPI and pertinent ROS is included in the Diagnostic Impression Remarks section at the end of the note. Please see below for further details. Chief complaint is at end of note.     Monique is a pleasant intelligent patient who is here for evaluation.     Modified Medications    Modified Medication Previous Medication    ATENOLOL (TENORMIN) 25 MG TABLET atenoloL (TENORMIN) 25 MG tablet       Take 1 tablet (25 mg total) by mouth every evening.    TAKE 1 TABLET (25 MG TOTAL) BY MOUTH EVERY EVENING.    FAMOTIDINE (PEPCID) 40 MG TABLET famotidine (PEPCID) 40 MG tablet       Take 1 tablet (40 mg total) by mouth once daily.    Take 1 tablet (40 mg total) by mouth once daily.    PANTOPRAZOLE (PROTONIX) 40 MG TABLET pantoprazole (PROTONIX) 40 MG tablet       Take 1 tablet (40 mg total) by mouth once daily.    TAKE 1 TABLET (40 MG TOTAL) BY MOUTH ONCE DAILY.    PRAVASTATIN (PRAVACHOL) 40 MG TABLET pravastatin (PRAVACHOL) 40 MG tablet       Take 1 tablet (40 mg total) by mouth every evening.    Take 1 tablet (40 mg total) by mouth every evening.       Data reviewed 274}  Previous medical records reviewed and summarized in plan section at end of note.      If you are due for any health screening(s) below please notify me so we can arrange them to be ordered and scheduled. Most healthy patients at your age complete them, but you are free to accept or refuse. If you can't do it, I'll definitely understand. If you can, I'd certainly appreciate it!     All of your core healthy metrics are met.      The following portions of the patient's history were reviewed and updated as appropriate: allergies, current medications, past family history, past medical history, past social history, past surgical history and problem list.    She  has a past medical history of Allergy, Arthritis, Chronic lower back pain, Depression, Fever blister, GERD (gastroesophageal reflux disease),  Hemorrhoids, internal (11/05/2014), Hyperlipidemia, Hypertension, Joint pain, Morbid obesity with BMI of 40.0-44.9, adult (02/24/2015), Multiple gastric ulcers (12/14/2017), TIMMY (obstructive sleep apnea), Pulmonary hypertension, Skin cancer, and Type 2 diabetes mellitus with hyperglycemia, without long-term current use of insulin (4/20/2022).  She  has a past surgical history that includes Knee arthroscopy w/ meniscal repair (Right, 2012); Tubal ligation (1980); Colonoscopy (N/A, 11/02/2016); Anterior cervical corpectomy w/ fusion (1988); Lumbar laminectomy (06/19/2015); Skin cancer excision; Epidural steroid injection into lumbar spine (N/A, 11/08/2018); Caudal epidural steroid injection (N/A, 01/10/2019); Injection of anesthetic agent around genitofemoral nerve (Bilateral, 08/01/2019); Fusion of spine with instrumentation (Left, 01/03/2020); Joint replacement; Back surgery; Epidural steroid injection (N/A, 05/18/2022); Reverse total shoulder arthroplasty (Right, 05/25/2022); Esophagogastroduodenoscopy (N/A, 10/06/2022); and Knee Arthroplasty (Left, 2/28/2024).    She  reports that she has never smoked. She has never been exposed to tobacco smoke. She has never used smokeless tobacco. She reports that she does not drink alcohol and does not use drugs.  She family history includes Alzheimer's disease in her maternal grandfather; Arthritis in her mother; Breast cancer in her mother; Cancer in her mother and paternal grandmother; Colon cancer in her paternal grandmother; Kidney disease in her father.    Review of patient's allergies indicates:   Allergen Reactions    Metformin Other (See Comments)     Diarrhea        Tobacco Use: Low Risk  (1/28/2025)    Patient History     Smoking Tobacco Use: Never     Smokeless Tobacco Use: Never     Passive Exposure: Never     Physical Examination  Physical Exam  Vital Signs  Blood pressure is 126/74.     General appearance: alert, cooperative, no distress  Neck: no thyromegaly,  "no neck stiffness  Lungs: clear to auscultation, no wheezes, rales or rhonchi, symmetric air entry  Heart: normal rate, regular rhythm, normal S1, S2, no murmurs, rubs, clicks or gallops  Abdomen: soft, nontender, nondistended, no rigidity, rebound, or guarding.   Back: no point tenderness over spine  Extremities: peripheral pulses normal, no unilateral leg swelling or calf tenderness   Neurological:alert, oriented, normal speech, no new focal findings or movement disorder noted from baseline    Protective Sensation (w/ 10 gram monofilament):  Right: Decreased  Left: Decreased    Visual Inspection:  Normal -  Bilateral    Pedal Pulses:   Right: Present  Left: Present    Posterior Tibialis Pulses:   Right:Present  Left: Present      BP Readings from Last 3 Encounters:   01/28/25 126/74   10/08/24 (!) 142/84   07/26/24 134/84     Wt Readings from Last 3 Encounters:   01/28/25 97 kg (213 lb 13.5 oz)   10/08/24 95.7 kg (211 lb)   07/26/24 96 kg (211 lb 10.3 oz)     /74 (BP Location: Right arm, Patient Position: Sitting)   Pulse 78   Temp 98.7 °F (37.1 °C) (Oral)   Resp 16   Ht 5' 1" (1.549 m)   Wt 97 kg (213 lb 13.5 oz)   LMP  (LMP Unknown)   SpO2 98%   BMI 40.41 kg/m²    274}  Laboratory: I have reviewed old labs below:    274}    Lab Results   Component Value Date    WBC 5.37 02/14/2024    HGB 13.3 02/14/2024    HCT 40.7 02/14/2024    MCV 95 02/14/2024     02/14/2024     07/26/2024    K 4.8 07/26/2024     07/26/2024    CALCIUM 10.2 07/26/2024    PHOS 3.3 05/02/2018    CO2 24 07/26/2024    GLU 83 07/26/2024    BUN 25 (H) 07/26/2024    CREATININE 1.3 07/26/2024    EGFRNORACEVR 43.1 (A) 07/26/2024    ANIONGAP 14 07/26/2024    PROT 7.6 07/26/2024    ALBUMIN 4.2 07/26/2024    BILITOT 0.6 07/26/2024    ALKPHOS 61 07/26/2024    ALT 15 07/26/2024    AST 22 07/26/2024    INR 0.9 12/12/2019    CHOL 147 01/18/2024    TRIG 136 01/18/2024    HDL 53 01/18/2024    LDLCALC 66.8 01/18/2024    TSH 2.151 " 2024    HGBA1C 5.2 2024      Lab reviewed by me: Particular labs of significance that I will monitor, workup, or treat to improve are mentioned below in diagnostic impression remarks.    Results  Laboratory Studies  Thyroid function was normal. Kidney function slightly decreased. A1c was normal 6 months ago. Protein in urine was detected a year ago.       Imaging/EKG: I have reviewed the pertinent results and my findings are noted in remarks.  274}    CC:   Chief Complaint   Patient presents with    Follow-up    Hypertension        274}    History of Present Illness  The patient is a 74-year-old female who presents for evaluation of left shoulder pain, chronic kidney disease, and diabetes.    She reports experiencing pain in her left shoulder, which she attributes to the cold weather. The pain is particularly noticeable when she attempts to reach upwards. It does not disturb her sleep unless she rolls onto the affected side, prompting her to reposition onto her back. She has previously undergone injections for her right shoulder and expresses a preference for this treatment over surgical intervention. She has had both knees, right hip, and right shoulder operated on.    She has expressed interest in understanding how to manage her kidney health. Her father passed away while preparing for dialysis.    She has been inconsistent with her diabetes medication regimen. She continues to take Rybelsus but reports a plateau in weight loss, with her weight remaining stable around 212 to 213 pounds after a previous peak of 235 pounds.    FAMILY HISTORY  Her father  while preparing for dialysis.    MEDICATIONS  Current: Rybelsus       Assessment/Plan  Monique Trujillo is a 74 y.o. female who presents to clinic with:  1. Healthcare maintenance    2. Essential hypertension    3. Gastroesophageal reflux disease, unspecified whether esophagitis present    4. Hyperlipidemia    5. Chronic left shoulder pain    6.  Hypertension associated with diabetes    7. Primary osteoarthritis of both knees    8. 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 Hyperlipidemia associated with type 2 diabetes mellitus    10. Stage 3b chronic kidney disease    11. Morbid obesity       274}    Assessment & Plan  1. Chronic left shoulder pain.  The symptoms suggest a potential diagnosis of osteoarthritis. She is advised to avoid NSAIDs due to her kidney function. An x-ray of the left shoulder will be conducted. A referral to sports medicine will be made for further evaluation and potential injection therapy.    2. Chronic kidney disease.  Her condition necessitates improvement. The risks and benefits of SGLT2 inhibitors were discussed, and she consented to this treatment approach. A prescription for Jardiance 10 mg will be sent to [facility] pharmacy. Urine protein, blood test for kidney function, A1c, and cholesterol levels will be checked. She prefers to postpone the initiation of any additional medications at this time.    3. Diabetes.  Her condition is currently stable. She will maintain her existing medication regimen, with the addition of an SGLT2 inhibitor.    PROCEDURE  The patient has previously undergone injections for her right shoulder.      HLD associated w diabetes stable continue current meds monitor blood work rec mediterranean diet   Hypertension assoc with dm -stable continue current meds monitor no headache or chest pain f/u blood work     The patient's chronic kidney disease was monitored, evaluated, addressed and/or treated. Recommend to avoid NSAIDs and renally dose medications. ACE/ARB inhibitor if indicated and optimize blood pressure and A1c to goal.   Morbid obesity-recommend healthy diet, exercise, and monitor A1c, lipids, liver enzymes, and TSH.     This note was generated with the assistance of ambient listening  technology. Verbal consent was obtained by the patient and accompanying visitor(s) for the recording of patient appointment to facilitate this note. I attest to having reviewed and edited the generated note for accuracy, though some syntax or spelling errors may persist. Please contact the author of this note for any clarification.      1. Healthcare maintenance    2. Essential hypertension  - atenoloL (TENORMIN) 25 MG tablet; Take 1 tablet (25 mg total) by mouth every evening.  Dispense: 90 tablet; Refill: 2    3. Gastroesophageal reflux disease, unspecified whether esophagitis present  - famotidine (PEPCID) 40 MG tablet; Take 1 tablet (40 mg total) by mouth once daily.  Dispense: 90 tablet; Refill: 3  - pantoprazole (PROTONIX) 40 MG tablet; Take 1 tablet (40 mg total) by mouth once daily.  Dispense: 90 tablet; Refill: 3    4. Hyperlipidemia  - pravastatin (PRAVACHOL) 40 MG tablet; Take 1 tablet (40 mg total) by mouth every evening.  Dispense: 90 tablet; Refill: 3    5. Chronic left shoulder pain  - Ambulatory referral/consult to Sports Medicine; Future  - X-Ray Shoulder Trauma 3 view Left; Future    6. Hypertension associated with diabetes    7. Primary osteoarthritis of both knees    8. 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 Hyperlipidemia associated with type 2 diabetes mellitus  - Lipid Panel; Future    10. Stage 3b chronic kidney disease  - empagliflozin (JARDIANCE) 10 mg tablet; Take 1 tablet (10 mg total) by mouth once daily.  Dispense: 90 tablet; Refill: 3  - CYSTATIN C, SERUM; Future    11. Morbid obesity      Milton Mahmood MD   274}    If you are due for any health screening(s) below please notify me so we can arrange them to be ordered and scheduled. Most healthy patients at your age complete them, but you are free to  accept or refuse.     If you can't do it, I'll definitely understand. If you can, I'd certainly appreciate it!   All of your core healthy metrics are met.

## 2025-02-05 ENCOUNTER — OFFICE VISIT (OUTPATIENT)
Dept: ORTHOPEDICS | Facility: CLINIC | Age: 75
End: 2025-02-05
Payer: MEDICARE

## 2025-02-05 VITALS — BODY MASS INDEX: 40.02 KG/M2 | HEIGHT: 61 IN | WEIGHT: 212 LBS

## 2025-02-05 DIAGNOSIS — G89.29 CHRONIC LEFT SHOULDER PAIN: ICD-10-CM

## 2025-02-05 DIAGNOSIS — M25.512 CHRONIC LEFT SHOULDER PAIN: ICD-10-CM

## 2025-02-05 PROCEDURE — 1159F MED LIST DOCD IN RCRD: CPT | Mod: CPTII,S$GLB,, | Performed by: ORTHOPAEDIC SURGERY

## 2025-02-05 PROCEDURE — 1125F AMNT PAIN NOTED PAIN PRSNT: CPT | Mod: CPTII,S$GLB,, | Performed by: ORTHOPAEDIC SURGERY

## 2025-02-05 PROCEDURE — 3044F HG A1C LEVEL LT 7.0%: CPT | Mod: CPTII,S$GLB,, | Performed by: ORTHOPAEDIC SURGERY

## 2025-02-05 PROCEDURE — 20610 DRAIN/INJ JOINT/BURSA W/O US: CPT | Mod: LT,S$GLB,, | Performed by: ORTHOPAEDIC SURGERY

## 2025-02-05 PROCEDURE — 3288F FALL RISK ASSESSMENT DOCD: CPT | Mod: CPTII,S$GLB,, | Performed by: ORTHOPAEDIC SURGERY

## 2025-02-05 PROCEDURE — 99214 OFFICE O/P EST MOD 30 MIN: CPT | Mod: 25,S$GLB,, | Performed by: ORTHOPAEDIC SURGERY

## 2025-02-05 PROCEDURE — 1160F RVW MEDS BY RX/DR IN RCRD: CPT | Mod: CPTII,S$GLB,, | Performed by: ORTHOPAEDIC SURGERY

## 2025-02-05 PROCEDURE — 99999 PR PBB SHADOW E&M-EST. PATIENT-LVL V: CPT | Mod: PBBFAC,,, | Performed by: ORTHOPAEDIC SURGERY

## 2025-02-05 PROCEDURE — 3072F LOW RISK FOR RETINOPATHY: CPT | Mod: CPTII,S$GLB,, | Performed by: ORTHOPAEDIC SURGERY

## 2025-02-05 PROCEDURE — 1101F PT FALLS ASSESS-DOCD LE1/YR: CPT | Mod: CPTII,S$GLB,, | Performed by: ORTHOPAEDIC SURGERY

## 2025-02-05 RX ORDER — TRIAMCINOLONE ACETONIDE 40 MG/ML
40 INJECTION, SUSPENSION INTRA-ARTICULAR; INTRAMUSCULAR
Status: DISCONTINUED | OUTPATIENT
Start: 2025-02-05 | End: 2025-02-05 | Stop reason: HOSPADM

## 2025-02-05 RX ADMIN — TRIAMCINOLONE ACETONIDE 40 MG: 40 INJECTION, SUSPENSION INTRA-ARTICULAR; INTRAMUSCULAR at 11:02

## 2025-02-05 NOTE — PROCEDURES
Large Joint Aspiration/Injection: L subacromial bursa    Date/Time: 2/5/2025 11:30 AM    Performed by: Santana Chávez MD  Authorized by: Santana Chávez MD    Consent Done?:  Yes (Verbal)  Indications:  Pain  Site marked: the procedure site was marked    Timeout: prior to procedure the correct patient, procedure, and site was verified      Local anesthesia used?: Yes    Local anesthetic: Ropivicaine.  Anesthetic total (ml):  3      Details:  Needle Size:  20 G  Ultrasonic Guidance for needle placement?: No    Approach:  Posterior  Location:  Shoulder  Site:  L subacromial bursa  Medications:  40 mg triamcinolone acetonide 40 mg/mL  Patient tolerance:  Patient tolerated the procedure well with no immediate complications

## 2025-02-05 NOTE — PROGRESS NOTES
Subjective:      Patient ID: Monique Trujillo is a 74 y.o. female.    Chief Complaint: Pain of the Left Shoulder    HPI  74-year-old female long history of bilateral shoulder pain.  Underwent several years ago right reverse total shoulder.  She has also had a knee replaced.  She states she was told that she would need her left shoulder replaced but states it after those surgeries and some ongoing pain declines consideration for any surgical intervention.  She does seek some help with the overall pain level in his shoulder.  There has been no trauma.  ROS      Objective:    Ortho Exam     Constitutional:   Patient is alert  and oriented in no acute distress  HEENT:  normocephalic atraumatic; PERRL EOMI  Neck:  Supple without adenopathy  Cardiovascular:  Normal rate and rhythm  Pulmonary:  Normal respiratory effort normal chest wall expansion  Abdominal:  Nonprotuberant nondistended  Musculoskeletal:  Mild limitation forward flexion in rotation of both shoulders  Adequate motor strength  Crepitus with range of motion of the left shoulder  Normal distal neurologic and vascular examination  Neurological:  No focal defect; cranial nerves 2-12 grossly intact  Psychiatric/behavioral:  Mood and behavior normal           My Radiographs Findings:  Radiographs of the left shoulder was concern significant with the severe degenerative changes with joint space narrowing subchondral sclerosis and osteophyte formation    Assessment:       Encounter Diagnosis   Name Primary?    Chronic left shoulder pain          Plan:       I have discussed medical condition treatment options with her at length.  After a verbal consent and sterile prep I injected her left shoulder today without complication.  We have discussed general shoulder rehab lifting precautions and NSAIDs if approved by her PCP.  Follow up with me can be as needed.        Past Medical History:   Diagnosis Date    Allergy     Arthritis     Chronic lower back pain      Depression     Fever blister     GERD (gastroesophageal reflux disease)     Hemorrhoids, internal 11/05/2014    Hyperlipidemia     Hypertension     Joint pain     Morbid obesity with BMI of 40.0-44.9, adult 02/24/2015    Multiple gastric ulcers 12/14/2017    TIMMY (obstructive sleep apnea)     does not use CPAP    Pulmonary hypertension     Skin cancer     Type 2 diabetes mellitus with hyperglycemia, without long-term current use of insulin 4/20/2022     Past Surgical History:   Procedure Laterality Date    ANTERIOR CERVICAL CORPECTOMY W/ FUSION  1988    BACK SURGERY      CAUDAL EPIDURAL STEROID INJECTION N/A 01/10/2019    Procedure: Injection-steroid-epidural-caudal;  Surgeon: Lydia Velásquez Jr., MD;  Location: [facility];  Service: Pain Management;  Laterality: N/A;    COLONOSCOPY N/A 11/02/2016    Procedure: COLONOSCOPY;  Surgeon: Viji Dewitt MD;  Location: [facility];  Service: Endoscopy;  Laterality: N/A;    EPIDURAL STEROID INJECTION N/A 05/18/2022    Procedure: Injection, Steroid, Epidural;  Surgeon: Fermín Luo MD;  [facility];  Service: Pain Management;  Laterality: N/A;  L2-l3     EPIDURAL STEROID INJECTION INTO LUMBAR SPINE N/A 11/08/2018    Procedure: Injection-steroid-epidural-lumbar-L2-3 (LEFT > RIGHT);  Surgeon: Lydia Velásquez Jr., MD;  Location: [facility];  Service: Pain Management;  Laterality: N/A;    ESOPHAGOGASTRODUODENOSCOPY N/A 10/06/2022    Procedure: EGD (ESOPHAGOGASTRODUODENOSCOPY);  Surgeon: Migel Koroma MD;  Location: [facility];  Service: Endoscopy;  Laterality: N/A;    FUSION OF SPINE WITH INSTRUMENTATION Left 01/03/2020    Procedure: FUSION, SPINE, WITH INSTRUMENTATION Stage 1 Left L3-4 LOIF Rise L Stage 2 Open L3-4 L4-5 Laminectomy Removal of L4-5 Hardware, L3-5 Posterior Instrumentation and extraction of Fusion at L3-4;  Surgeon: James Brantley MD;  Location: [facility];  Service: Neurosurgery;  Laterality: Left;  Procedure: Stage 1 Left L3-4 LOIF Rise  L  Stage 2 Open L3-4 L4-5 Laminectomy Removal of L4-5 Hardware,     INJECTION OF ANESTHETIC AGENT AROUND GENITOFEMORAL NERVE Bilateral 08/01/2019    Procedure: BILATERAL SHOULDER ARTICULAR BRANCH BLOCK;  Surgeon: Lydia Velásquez Jr., MD;  Location: [facility];  Service: Pain Management;  Laterality: Bilateral;    JOINT REPLACEMENT      R sided--knee, hip and shoulder    KNEE ARTHROPLASTY Left 2/28/2024    Procedure: ARTHROPLASTY, KNEE;  Surgeon: Herb Tan II, MD;  Location: [facility] OR;  Service: Orthopedics;  Laterality: Left;    KNEE ARTHROSCOPY W/ MENISCAL REPAIR Right 2012    LUMBAR LAMINECTOMY  06/19/2015    REVERSE TOTAL SHOULDER ARTHROPLASTY Right 05/25/2022    Procedure: ARTHROPLASTY, SHOULDER, TOTAL, REVERSE;  Surgeon: Herb Tan II, MD;  Location: [facility] OR;  Service: Orthopedics;  Laterality: Right;    SKIN CANCER EXCISION      forehead does not recall date    TUBAL LIGATION  1980         Current Outpatient Medications:     albuterol (VENTOLIN HFA) 90 mcg/actuation inhaler, Inhale 2 puffs into the lungs every 6 (six) hours as needed for Wheezing. Rescue, Disp: 18 g, Rfl: 0    amLODIPine (NORVASC) 10 MG tablet, Take 1 tablet (10 mg total) by mouth every evening., Disp: 90 tablet, Rfl: 3    ammonium lactate (LAC-HYDRIN) 12 % lotion, USE ON FEET AS NEEDED, Disp: , Rfl:     ascorbic acid, vitamin C, (VITAMIN C) 1000 MG tablet, Take 1,000 mg by mouth once daily., Disp: , Rfl:     atenoloL (TENORMIN) 25 MG tablet, Take 1 tablet (25 mg total) by mouth every evening., Disp: 90 tablet, Rfl: 2    blood sugar diagnostic Strp, 1 strip by Misc.(Non-Drug; Combo Route) route 2 (two) times daily with meals., Disp: 100 strip, Rfl: 11    blood-glucose meter kit, Use as instructed, Disp: 1 each, Rfl: 0    calcium-vitamin D3 (OS-KATERIN 500 + D3) 500 mg-5 mcg (200 unit) per tablet, Take 1 tablet by mouth 2 (two) times daily with meals., Disp: , Rfl:     carboxymethylcellulose 1 % ophthalmic solution, 1 drop., Disp:  , Rfl:     CINNAMON BARK, BULK, MISC, 1,000 mg by Misc.(Non-Drug; Combo Route) route., Disp: , Rfl:     clindamycin (CLEOCIN T) 1 % lotion, Use hs on face, Disp: 60 mL, Rfl: 3    co-enzyme Q-10 30 mg capsule, Take 30 mg by mouth once daily. , Disp: , Rfl:     diclofenac sodium (VOLTAREN) 1 % Gel, Apply 2 g topically once daily., Disp: , Rfl:     diclofenac sodium 1.5 % Drop, , Disp: , Rfl:     econazole nitrate 1 % cream, Use bid for rash, Disp: 85 g, Rfl: 2    empagliflozin (JARDIANCE) 10 mg tablet, Take 1 tablet (10 mg total) by mouth once daily., Disp: 90 tablet, Rfl: 3    famotidine (PEPCID) 40 MG tablet, Take 1 tablet (40 mg total) by mouth once daily., Disp: 90 tablet, Rfl: 3    fenofibrate micronized (LOFIBRA) 134 MG Cap, Take 1 capsule (134 mg total) by mouth once daily., Disp: 90 capsule, Rfl: 3    fluticasone propionate (FLONASE) 50 mcg/actuation nasal spray, 1 spray (50 mcg total) by Each Nostril route once daily., Disp: 9.9 mL, Rfl: 0    folic acid (FOLVITE) 1 MG tablet, Take 1 tablet (1 mg total) by mouth once daily., Disp: 90 tablet, Rfl: 3    furosemide (LASIX) 40 MG tablet, Take lasix 40 mg PO every other day PRN swelling, Disp: 45 tablet, Rfl: 3    gabapentin (NEURONTIN) 600 MG tablet, Take 1 tablet (600 mg total) by mouth 3 (three) times daily., Disp: 270 tablet, Rfl: 3    HYDROcodone-acetaminophen (NORCO) 7.5-325 mg per tablet, Take 1 tablet by mouth every 6 (six) hours as needed for Pain., Disp: 28 tablet, Rfl: 0    ketoconazole (NIZORAL) 2 % cream, Apply topically 2 (two) times daily., Disp: 60 g, Rfl: 3    lancets Misc, 1 lancet by Misc.(Non-Drug; Combo Route) route 2 (two) times daily with meals., Disp: 100 each, Rfl: 11    lancing device Misc, 1 Device by Misc.(Non-Drug; Combo Route) route 2 (two) times daily with meals., Disp: 1 each, Rfl: 0    levocetirizine (XYZAL) 5 MG tablet, TAKE 1 TABLET (5 MG TOTAL) BY MOUTH EVERY EVENING., Disp: 90 tablet, Rfl: 3    LIDOcaine-prilocaine (EMLA) cream,  ", Disp: , Rfl:     multivitamin capsule, Take 1 capsule by mouth once daily., Disp: , Rfl:     ondansetron (ZOFRAN-ODT) 8 MG TbDL, Take 1 tablet (8 mg total) by mouth 2 (two) times daily as needed., Disp: 30 tablet, Rfl: 0    pantoprazole (PROTONIX) 40 MG tablet, Take 1 tablet (40 mg total) by mouth once daily., Disp: 90 tablet, Rfl: 3    pen needle, diabetic 31 gauge x 5/16" Ndle, 1 each by Misc.(Non-Drug; Combo Route) route 2 (two) times daily with meals., Disp: 100 each, Rfl: 11    pravastatin (PRAVACHOL) 40 MG tablet, Take 1 tablet (40 mg total) by mouth every evening., Disp: 90 tablet, Rfl: 3    semaglutide (RYBELSUS) 7 mg tablet, Take 1 tablet (7 mg total) by mouth once daily., Disp: 90 tablet, Rfl: 1    sertraline (ZOLOFT) 100 MG tablet, Take 1 tablet (100 mg total) by mouth every evening., Disp: 90 tablet, Rfl: 3    solifenacin (VESICARE) 5 MG tablet, Take 1 tablet (5 mg total) by mouth once daily., Disp: 90 tablet, Rfl: 3    triamcinolone acetonide 0.025% (KENALOG) 0.025 % cream, Apply topically 2 (two) times daily., Disp: 80 g, Rfl: 2    vitamin E 400 UNIT capsule, Take 400 Units by mouth once daily., Disp: , Rfl:     cycloSPORINE (RESTASIS) 0.05 % ophthalmic emulsion, Place 1 drop into both eyes 2 (two) times daily., Disp: 24 mL, Rfl: 11    Review of patient's allergies indicates:   Allergen Reactions    Metformin Other (See Comments)     Diarrhea        Family History   Problem Relation Name Age of Onset    Arthritis Mother      Cancer Mother          breast ca    Breast cancer Mother      Kidney disease Father      Alzheimer's disease Maternal Grandfather      Cancer Paternal Grandmother          colon ca    Colon cancer Paternal Grandmother       Social History     Occupational History     Employer: [employer]   Tobacco Use    Smoking status: Never     Passive exposure: Never    Smokeless tobacco: Never   Substance and Sexual Activity    Alcohol use: No     Alcohol/week: 0.0 standard drinks of alcohol "    Drug use: No    Sexual activity: Not Currently

## 2025-06-25 DIAGNOSIS — Z78.0 MENOPAUSE: ICD-10-CM

## 2025-07-18 DIAGNOSIS — J30.9 ALLERGIC RHINITIS, UNSPECIFIED SEASONALITY, UNSPECIFIED TRIGGER: ICD-10-CM

## 2025-07-18 DIAGNOSIS — E11.65 TYPE 2 DIABETES MELLITUS WITH HYPERGLYCEMIA, WITHOUT LONG-TERM CURRENT USE OF INSULIN: ICD-10-CM

## 2025-07-18 DIAGNOSIS — H10.13 ALLERGIC CONJUNCTIVITIS OF BOTH EYES: ICD-10-CM

## 2025-07-18 NOTE — TELEPHONE ENCOUNTER
No care due was identified.  [facility] Embedded Care Due Messages. Reference number: [id number].   7/18/2025 2:11:01 PM CDT

## 2025-07-18 NOTE — TELEPHONE ENCOUNTER
Refill Routing Note   Medication(s) are not appropriate for processing by [facility] for the following reason(s):        No active prescription written by provider: test strips  Drug-disease interaction: : levocetirizine and Stage 3b chronic kidney disease      [facility] action(s):  Defer             Appointments  past 12m or future 3m with PCP    Date Provider   Last Visit   1/28/2025 Milton Mahmood MD   Next Visit   8/12/2025 Milton Mahmood MD   ED visits in past 90 days: 0        Note composed:6:37 PM 07/18/2025

## 2025-07-21 RX ORDER — LEVOCETIRIZINE DIHYDROCHLORIDE 5 MG/1
5 TABLET, FILM COATED ORAL NIGHTLY
Qty: 90 TABLET | Refills: 1 | Status: SHIPPED | OUTPATIENT
Start: 2025-07-21

## 2025-07-21 RX ORDER — BLOOD SUGAR DIAGNOSTIC
STRIP MISCELLANEOUS
Qty: 200 STRIP | Refills: 3 | Status: SHIPPED | OUTPATIENT
Start: 2025-07-21

## 2025-07-30 DIAGNOSIS — N18.32 STAGE 3B CHRONIC KIDNEY DISEASE: ICD-10-CM

## 2025-08-12 ENCOUNTER — OFFICE VISIT (OUTPATIENT)
Dept: FAMILY MEDICINE | Facility: CLINIC | Age: 75
End: 2025-08-12
Payer: MEDICARE

## 2025-08-12 DIAGNOSIS — E11.3313 TYPE 2 DIABETES MELLITUS WITH MODERATE NONPROLIFERATIVE RETINOPATHY OF BOTH EYES AND MACULAR EDEMA, UNSPECIFIED WHETHER LONG TERM INSULIN USE: ICD-10-CM

## 2025-08-12 DIAGNOSIS — M16.11 PRIMARY OSTEOARTHRITIS OF RIGHT HIP: ICD-10-CM

## 2025-08-12 DIAGNOSIS — E11.59 HYPERTENSION ASSOCIATED WITH DIABETES: ICD-10-CM

## 2025-08-12 DIAGNOSIS — R53.83 FATIGUE, UNSPECIFIED TYPE: ICD-10-CM

## 2025-08-12 DIAGNOSIS — Z00.00 HEALTHCARE MAINTENANCE: Primary | ICD-10-CM

## 2025-08-12 DIAGNOSIS — I15.2 HYPERTENSION ASSOCIATED WITH DIABETES: ICD-10-CM

## 2025-08-12 DIAGNOSIS — F32.1 CURRENT MODERATE EPISODE OF MAJOR DEPRESSIVE DISORDER WITHOUT PRIOR EPISODE: ICD-10-CM

## 2025-08-12 PROCEDURE — 98013 SYNCH AUDIO-ONLY EST LOW 20: CPT | Mod: 93,,, | Performed by: STUDENT IN AN ORGANIZED HEALTH CARE EDUCATION/TRAINING PROGRAM

## 2025-08-12 PROCEDURE — 3072F LOW RISK FOR RETINOPATHY: CPT | Mod: CPTII,93,, | Performed by: STUDENT IN AN ORGANIZED HEALTH CARE EDUCATION/TRAINING PROGRAM

## 2025-08-12 PROCEDURE — 3044F HG A1C LEVEL LT 7.0%: CPT | Mod: CPTII,93,, | Performed by: STUDENT IN AN ORGANIZED HEALTH CARE EDUCATION/TRAINING PROGRAM

## 2025-08-13 ENCOUNTER — LAB VISIT (OUTPATIENT)
Dept: LAB | Facility: HOSPITAL | Age: 75
End: 2025-08-13
Attending: STUDENT IN AN ORGANIZED HEALTH CARE EDUCATION/TRAINING PROGRAM
Payer: MEDICARE

## 2025-08-13 DIAGNOSIS — R53.83 FATIGUE, UNSPECIFIED TYPE: ICD-10-CM

## 2025-08-13 DIAGNOSIS — I15.2 HYPERTENSION ASSOCIATED WITH DIABETES: ICD-10-CM

## 2025-08-13 DIAGNOSIS — E11.3313 TYPE 2 DIABETES MELLITUS WITH MODERATE NONPROLIFERATIVE RETINOPATHY OF BOTH EYES AND MACULAR EDEMA, UNSPECIFIED WHETHER LONG TERM INSULIN USE: ICD-10-CM

## 2025-08-13 DIAGNOSIS — E11.59 HYPERTENSION ASSOCIATED WITH DIABETES: ICD-10-CM

## 2025-08-13 LAB
ABSOLUTE EOSINOPHIL (OHS): 0.08 K/UL
ABSOLUTE MONOCYTE (OHS): 0.45 K/UL (ref 0.3–1)
ABSOLUTE NEUTROPHIL COUNT (OHS): 2.65 K/UL (ref 1.8–7.7)
ALBUMIN SERPL BCP-MCNC: 4.1 G/DL (ref 3.5–5.2)
ALP SERPL-CCNC: 51 UNIT/L (ref 40–150)
ALT SERPL W/O P-5'-P-CCNC: 19 UNIT/L (ref 0–55)
ANION GAP (OHS): 11 MMOL/L (ref 8–16)
AST SERPL-CCNC: 31 UNIT/L (ref 0–50)
BASOPHILS # BLD AUTO: 0.04 K/UL
BASOPHILS NFR BLD AUTO: 0.9 %
BILIRUB SERPL-MCNC: 0.5 MG/DL (ref 0.1–1)
BUN SERPL-MCNC: 15 MG/DL (ref 8–23)
CALCIUM SERPL-MCNC: 9.4 MG/DL (ref 8.7–10.5)
CHLORIDE SERPL-SCNC: 108 MMOL/L (ref 95–110)
CO2 SERPL-SCNC: 23 MMOL/L (ref 23–29)
CREAT SERPL-MCNC: 1 MG/DL (ref 0.5–1.4)
EAG (OHS): 105 MG/DL (ref 68–131)
ERYTHROCYTE [DISTWIDTH] IN BLOOD BY AUTOMATED COUNT: 13.9 % (ref 11.5–14.5)
GFR SERPLBLD CREATININE-BSD FMLA CKD-EPI: 59 ML/MIN/1.73/M2
GLUCOSE SERPL-MCNC: 81 MG/DL (ref 70–110)
HBA1C MFR BLD: 5.3 % (ref 4–5.6)
HCT VFR BLD AUTO: 44 % (ref 37–48.5)
HGB BLD-MCNC: 13.9 GM/DL (ref 12–16)
IMM GRANULOCYTES # BLD AUTO: 0.01 K/UL (ref 0–0.04)
IMM GRANULOCYTES NFR BLD AUTO: 0.2 % (ref 0–0.5)
LYMPHOCYTES # BLD AUTO: 1.23 K/UL (ref 1–4.8)
MCH RBC QN AUTO: 30 PG (ref 27–31)
MCHC RBC AUTO-ENTMCNC: 31.6 G/DL (ref 32–36)
MCV RBC AUTO: 95 FL (ref 82–98)
NUCLEATED RBC (/100WBC) (OHS): 0 /100 WBC
PLATELET # BLD AUTO: 267 K/UL (ref 150–450)
PMV BLD AUTO: 10.4 FL (ref 9.2–12.9)
POTASSIUM SERPL-SCNC: 4 MMOL/L (ref 3.5–5.1)
PROT SERPL-MCNC: 7.3 GM/DL (ref 6–8.4)
RBC # BLD AUTO: 4.63 M/UL (ref 4–5.4)
RELATIVE EOSINOPHIL (OHS): 1.8 %
RELATIVE LYMPHOCYTE (OHS): 27.6 % (ref 18–48)
RELATIVE MONOCYTE (OHS): 10.1 % (ref 4–15)
RELATIVE NEUTROPHIL (OHS): 59.4 % (ref 38–73)
SODIUM SERPL-SCNC: 142 MMOL/L (ref 136–145)
TSH SERPL-ACNC: 2.84 UIU/ML (ref 0.4–4)
WBC # BLD AUTO: 4.46 K/UL (ref 3.9–12.7)

## 2025-08-13 PROCEDURE — 85025 COMPLETE CBC W/AUTO DIFF WBC: CPT

## 2025-08-13 PROCEDURE — 80053 COMPREHEN METABOLIC PANEL: CPT

## 2025-08-13 PROCEDURE — 36415 COLL VENOUS BLD VENIPUNCTURE: CPT | Mod: PO

## 2025-08-13 PROCEDURE — 84443 ASSAY THYROID STIM HORMONE: CPT

## 2025-08-13 PROCEDURE — 83036 HEMOGLOBIN GLYCOSYLATED A1C: CPT

## 2025-09-03 DIAGNOSIS — F32.1 CURRENT MODERATE EPISODE OF MAJOR DEPRESSIVE DISORDER WITHOUT PRIOR EPISODE: ICD-10-CM

## 2025-09-04 RX ORDER — SOLIFENACIN SUCCINATE 5 MG/1
5 TABLET, FILM COATED ORAL DAILY
Qty: 90 TABLET | Refills: 3 | Status: SHIPPED | OUTPATIENT
Start: 2025-09-04

## 2025-09-04 RX ORDER — SERTRALINE HYDROCHLORIDE 100 MG/1
100 TABLET, FILM COATED ORAL NIGHTLY
Qty: 90 TABLET | Refills: 3 | Status: SHIPPED | OUTPATIENT
Start: 2025-09-04

## (undated) DEVICE — DRAPE INCISE IOBAN 2 23X17IN

## (undated) DEVICE — SEE MEDLINE ITEM 146313

## (undated) DEVICE — DRAPE SHOULDER 160X102IN 10/CA

## (undated) DEVICE — TOWEL OR DISP STRL BLUE 4/PK

## (undated) DEVICE — GLOVE SURG ULTRA TOUCH 8

## (undated) DEVICE — UNDERGLOVES BIOGEL PI SIZE 8.5

## (undated) DEVICE — GLOVE BIOGEL ORTHOPEDIC 8

## (undated) DEVICE — MIXER BONE CEMENT

## (undated) DEVICE — DRAPE THREE-QTR REINF 53X77IN

## (undated) DEVICE — SEE MEDLINE ITEM 156905

## (undated) DEVICE — TRAY FOLEY 16FR INFECTION CONT

## (undated) DEVICE — HOOD T-5 TEAR AWAY STERILE

## (undated) DEVICE — NDL 22GA X1 1/2 REG BEVEL

## (undated) DEVICE — TOGA FLYTE PEEL AWAY XLARGE

## (undated) DEVICE — SUT VICRYL PLUS 0 CT1 18IN

## (undated) DEVICE — STOCKINET TUBULAR 1 PLY 6X60IN

## (undated) DEVICE — SOL POVIDONE SCRUB IODINE 4 OZ

## (undated) DEVICE — GOWN TOGA SYS PEELWY ZIP 2 XL

## (undated) DEVICE — SEE MEDLINE ITEM 152764

## (undated) DEVICE — SEE MEDLINE ITEM 157117

## (undated) DEVICE — DRAPE C-ARM/MOBILE XRAY 44X80

## (undated) DEVICE — DRAPE STERI-DRAPE 1000 17X11IN

## (undated) DEVICE — SUT 2/0 18IN COATED VICRYL

## (undated) DEVICE — PACK BASIC

## (undated) DEVICE — STRAP OR TABLE 5IN X 72IN

## (undated) DEVICE — UNDERGLOVE BIOGEL PI SZ 6.5 LF

## (undated) DEVICE — ELECTRODE REM PLYHSV RETURN 9

## (undated) DEVICE — INTERPULSE SET

## (undated) DEVICE — BLADE ELECTRO EDGE INSULATED

## (undated) DEVICE — SUT STRATAFIX SPIRAL VIOLET

## (undated) DEVICE — DRAPE PLASTIC U 60X72

## (undated) DEVICE — SEE MEDLINE ITEM 157116

## (undated) DEVICE — DRESSING AQUACEL AG 3.5X10IN

## (undated) DEVICE — PACK SIRUS BASIC V SURG STRL

## (undated) DEVICE — SUT ETHIBOND XTRA 5 V-37 GR

## (undated) DEVICE — PENCIL ROCKER SWITCH 10FT CORD

## (undated) DEVICE — MANIFOLD 4 PORT

## (undated) DEVICE — ALCOHOL 70% ANTISEPTIC ISO 4OZ

## (undated) DEVICE — SEE MEDLINE ITEM 157131

## (undated) DEVICE — SEE MEDLINE ITEM 152523

## (undated) DEVICE — COVER OVERHEAD SURG LT BLUE

## (undated) DEVICE — TOURNIQUET SB QC DP 34X4IN

## (undated) DEVICE — SUT MONOCRYL 3-0 PS-1

## (undated) DEVICE — SPONGE BULKEE II ABSRB 6X6.75

## (undated) DEVICE — APPLICATOR CHLORAPREP ORN 26ML

## (undated) DEVICE — TUBE SUCTION YANKAUER HI CAP

## (undated) DEVICE — KIT TRIMANO

## (undated) DEVICE — KIT SUCTION WOUND CLSED 400ML

## (undated) DEVICE — PACK CUSTOM UNIV BASIN SLI

## (undated) DEVICE — ALCOHOL 70% ISOP RUBBING 4OZ

## (undated) DEVICE — BLADE 90X13X1.27MM

## (undated) DEVICE — BLADE SAGITTAL 18 X 1.27 X 90M

## (undated) DEVICE — BOWL MIXING BONE CEMENT

## (undated) DEVICE — GLOVE SURG ULTRA TOUCH 8.5

## (undated) DEVICE — COVER TABLE 44X90 STERILE

## (undated) DEVICE — DRESSING AQUACEL FOAM 5 X 5

## (undated) DEVICE — BANDAGE ESMARK ELASTIC ST 6X9

## (undated) DEVICE — DRAPE STERI U-SHAPED 47X51IN

## (undated) DEVICE — CORD BIPOLAR 12 FOOT

## (undated) DEVICE — PIN DRILL HEADLESS TROCAR
Type: IMPLANTABLE DEVICE | Site: KNEE | Status: NON-FUNCTIONAL
Removed: 2024-02-28

## (undated) DEVICE — DRESSING MEPILEX BORDER 4 X 4

## (undated) DEVICE — COVER BACK TABLE 72X21

## (undated) DEVICE — 3.2 PINS

## (undated) DEVICE — PULSAVAC ZIMMER

## (undated) DEVICE — SEE MEDLINE ITEM 146292

## (undated) DEVICE — GLOVE SURG ULTRA TOUCH 7

## (undated) DEVICE — BRIEF INCONT ULTRA XL 57-66IN

## (undated) DEVICE — PAD PREP 50/CA

## (undated) DEVICE — NDL MAYO 2

## (undated) DEVICE — SEE MEDLINE ITEM 153151

## (undated) DEVICE — SPONGE LAP 18X18 PREWASHED

## (undated) DEVICE — Device

## (undated) DEVICE — BLADE SURG CARBON STEEL #10

## (undated) DEVICE — SUT CTD VICRYL 2.0

## (undated) DEVICE — WRAP KNEE ACCU THERM GEL PACK

## (undated) DEVICE — GLOVE SENSICARE PI GRN 8.5

## (undated) DEVICE — SUT CTD VICRYL CT-1 27

## (undated) DEVICE — SUT 4/0 18IN NUROLON BLK B

## (undated) DEVICE — ELECTRODE BLD EXT 6.50 ST DISP

## (undated) DEVICE — SLEEVE SCD EXPRESS KNEE MEDIUM

## (undated) DEVICE — DRAPE INCISE IOBAN 2 23X33IN

## (undated) DEVICE — DRESSING AQUACEL AG ADV 3.5X12

## (undated) DEVICE — DRAPE ORTH SPLIT 77X108IN

## (undated) DEVICE — PADDING CAST SPECIALIST 6X4YD

## (undated) DEVICE — DRESSING SURGICAL 1/2X1/2

## (undated) DEVICE — GLOVE 6.5 PROTEXIS PI BLUE

## (undated) DEVICE — SUT STRATAFIX PDS PLS 45CM

## (undated) DEVICE — DRESSING MEPILEX BORDR AG 4X10

## (undated) DEVICE — DRESSING TRANS 4X4 TEGADERM

## (undated) DEVICE — SOL NACL IRR 3000ML

## (undated) DEVICE — GAUZE SPONGE 4X4 12PLY

## (undated) DEVICE — SEE MEDLINE ITEM 152622

## (undated) DEVICE — SYS DURASEAL SPINE

## (undated) DEVICE — GLOVE SENSICARE PI ALOE 8

## (undated) DEVICE — DRAPE STERI INSTRUMENT 1018

## (undated) DEVICE — SUT 0 VICRYL / UR6 (J603)

## (undated) DEVICE — TOWEL OR NONABSORB ADH 17X26

## (undated) DEVICE — SEE MEDLINE ITEM 157150

## (undated) DEVICE — BURR MIS CURVED 3.0MM

## (undated) DEVICE — SEE MEDLINE ITEM 107746

## (undated) DEVICE — NDL 18GA X1 1/2 REG BEVEL

## (undated) DEVICE — SPONGE GAUZE 16PLY 4X4

## (undated) DEVICE — SUT VICRYL 2 0 CT 2

## (undated) DEVICE — UNDERGLOVES BIOGEL PI SZ 7 LF

## (undated) DEVICE — 4.0 DRILL

## (undated) DEVICE — SUT 2/0 36IN COATED VICRYL

## (undated) DEVICE — SOL IRR NACL .9% 3000ML

## (undated) DEVICE — SYS CLSR DERMABOND PRINEO 22CM

## (undated) DEVICE — SCREW PERSONA HEX FEM 2.5X25MM
Type: IMPLANTABLE DEVICE | Site: KNEE | Status: NON-FUNCTIONAL
Removed: 2024-02-28

## (undated) DEVICE — SUT MONO 3-0 PS-2 18 PLST

## (undated) DEVICE — SYS ILLUMINATION MARS3V SPINE

## (undated) DEVICE — SOL NACL IRR 1000ML BTL

## (undated) DEVICE — WRAP PROTECTIVE LEG POS STRL

## (undated) DEVICE — STAPLER SKIN ROTATING HEAD

## (undated) DEVICE — YANKAUER FLEX NO VENT HI CAP

## (undated) DEVICE — ADHESIVE MASTISOL VIAL 48/BX

## (undated) DEVICE — SUT 2-0 ETHILON 18 FS

## (undated) DEVICE — PACK EXTREMITY ORTHOMAX

## (undated) DEVICE — GLOVE SURGEONS ULTRA TOUCH 6.5

## (undated) DEVICE — DRESSING TELFA N ADH 3X8

## (undated) DEVICE — BANDAID GARFIELD

## (undated) DEVICE — GLOVE 7.5 PROTEXIS PI BLUE

## (undated) DEVICE — BNDG COFLEX FOAM LF2 ST 6X5YD

## (undated) DEVICE — BLADE SAG DUAL 18MMX1.27MMX90M

## (undated) DEVICE — SEE MEDLINE ITEM 157148

## (undated) DEVICE — LINER SUCTION 3000CC

## (undated) DEVICE — CUBE COLD THERAPY POLAR CARE

## (undated) DEVICE — COVER SNAP KAP 30

## (undated) DEVICE — KIT SPINAL PATIENT CARE JACK

## (undated) DEVICE — GLOVE BIOGEL ECLIPSE SZ 7

## (undated) DEVICE — GLOVE PROTEXIS HYDROGEL SZ6

## (undated) DEVICE — PILLOW FACE ADLT FOAM W/VELCRO